# Patient Record
Sex: FEMALE | Race: OTHER | HISPANIC OR LATINO | Employment: OTHER | URBAN - METROPOLITAN AREA
[De-identification: names, ages, dates, MRNs, and addresses within clinical notes are randomized per-mention and may not be internally consistent; named-entity substitution may affect disease eponyms.]

---

## 2018-03-28 ENCOUNTER — TRANSCRIBE ORDERS (OUTPATIENT)
Dept: ADMINISTRATIVE | Facility: HOSPITAL | Age: 48
End: 2018-03-28

## 2018-03-28 DIAGNOSIS — N94.9 DISEASE OF FEMALE GENITAL ORGANS: Primary | ICD-10-CM

## 2018-03-30 ENCOUNTER — HOSPITAL ENCOUNTER (OUTPATIENT)
Dept: ULTRASOUND IMAGING | Facility: HOSPITAL | Age: 48
Discharge: HOME | End: 2018-03-30
Payer: COMMERCIAL

## 2018-03-30 DIAGNOSIS — N94.9 DISEASE OF FEMALE GENITAL ORGANS: ICD-10-CM

## 2018-03-30 PROCEDURE — 76856 US EXAM PELVIC COMPLETE: CPT

## 2018-03-30 PROCEDURE — 76830 TRANSVAGINAL US NON-OB: CPT

## 2018-04-03 ENCOUNTER — TRANSCRIBE ORDERS (OUTPATIENT)
Dept: ADMINISTRATIVE | Facility: HOSPITAL | Age: 48
End: 2018-04-03

## 2018-04-03 DIAGNOSIS — G56.03 CARPAL TUNNEL SYNDROME, BILATERAL: Primary | ICD-10-CM

## 2018-05-15 ENCOUNTER — PROCEDURE VISIT (OUTPATIENT)
Dept: NEUROLOGY | Facility: CLINIC | Age: 48
End: 2018-05-15
Payer: COMMERCIAL

## 2018-05-15 DIAGNOSIS — G56.03 CARPAL TUNNEL SYNDROME, BILATERAL: ICD-10-CM

## 2018-05-15 PROCEDURE — 95886 MUSC TEST DONE W/N TEST COMP: CPT | Performed by: PHYSICAL MEDICINE & REHABILITATION

## 2018-05-15 PROCEDURE — 95910 NRV CNDJ TEST 7-8 STUDIES: CPT | Performed by: PHYSICAL MEDICINE & REHABILITATION

## 2018-05-15 NOTE — PROGRESS NOTES
The procedure was discussed with the patient  Verbal consent was obtained after discussing risks and benefits  A sterile concentric needle electrode was used  The patient tolerated the procedure well  There were no complications  EMG REPORT    Test:  Bilateral Upper Extremities   Performing Dr: CALLIE Moore  The left and right median and ulnar motor conduction velocities and compound muscle action potentials were normal with normal distal latencies across the wrists  The left and right median and ulnar sensory conduction velocity and sensory action potentials were also normal with normal distal latencies across the wrists  The right median  palmar evoked response was prolonged by 0 5 milliseconds as compared to the right ulnar palmar evoked response at the same distance  The left median palmar evoked response was prolonged by  0 6 milliseconds as compared to the left ulnar palmar evoked response at the same distance  The left and right median and ulnar F wave latencies were within normal limits  Concentric needle EMG of the upper extremities bilaterally and cervical paraspinal muscles revealed no spontaneous activities  Early recruited motor units appear normal   Recruitment patterns were full or full for effort  INTERPRETATION: There is electrophysiologic evidence of a:    1  Bilateral mild median nerve compression neuropathy at the wrist with demyelinative changes, consistent with a diagnosis of carpal tunnel syndrome  2   There is no evidence of a cervical radiculopathy or ulnar neuropathy bilaterally  Clinical correlation is recommended       CALLIE Moore

## 2018-05-22 ENCOUNTER — TRANSCRIBE ORDERS (OUTPATIENT)
Dept: ADMINISTRATIVE | Facility: HOSPITAL | Age: 48
End: 2018-05-22

## 2018-05-22 DIAGNOSIS — R52 PAIN: Primary | ICD-10-CM

## 2018-06-01 ENCOUNTER — HOSPITAL ENCOUNTER (OUTPATIENT)
Dept: MRI IMAGING | Facility: HOSPITAL | Age: 48
Discharge: HOME | End: 2018-06-01
Payer: COMMERCIAL

## 2018-06-01 DIAGNOSIS — R52 PAIN: ICD-10-CM

## 2018-06-01 PROCEDURE — 73721 MRI JNT OF LWR EXTRE W/O DYE: CPT

## 2018-06-04 ENCOUNTER — TELEPHONE (OUTPATIENT)
Dept: NEUROLOGY | Facility: CLINIC | Age: 48
End: 2018-06-04

## 2018-06-08 LAB
ABSOL LYMPHOCYTES (HISTORICAL): 1.4 K/UL (ref 0.5–4)
ALBUMIN SERPL BCP-MCNC: 4.4 G/DL (ref 3–5.2)
ALP SERPL-CCNC: 54 U/L (ref 43–122)
ALT SERPL W P-5'-P-CCNC: 26 U/L (ref 9–52)
ANION GAP SERPL CALCULATED.3IONS-SCNC: 9 MMOL/L (ref 5–14)
AST SERPL W P-5'-P-CCNC: 17 U/L (ref 14–36)
BASOPHILS # BLD AUTO: 0 % (ref 0–1)
BASOPHILS # BLD AUTO: 0 K/UL (ref 0–0.1)
BILIRUB SERPL-MCNC: 0.5 MG/DL
BUN SERPL-MCNC: 12 MG/DL (ref 5–25)
CALCIUM SERPL-MCNC: 9.7 MG/DL (ref 8.4–10.2)
CHLORIDE SERPL-SCNC: 105 MEQ/L (ref 97–108)
CHOLEST SERPL-MCNC: 205 MG/DL
CHOLEST/HDLC SERPL: 3.2 {RATIO}
CO2 SERPL-SCNC: 26 MMOL/L (ref 22–30)
CREATINE, SERUM (HISTORICAL): 0.69 MG/DL (ref 0.6–1.2)
DEPRECATED RDW RBC AUTO: 13 %
EGFR (HISTORICAL): >60 ML/MIN/1.73 M2
EOSINOPHIL # BLD AUTO: 0 K/UL (ref 0–0.4)
EOSINOPHIL NFR BLD AUTO: 1 % (ref 0–6)
EST. AVERAGE GLUCOSE BLD GHB EST-MCNC: 105 MG/DL
GLUCOSE SERPL-MCNC: 94 MG/DL (ref 70–99)
HBA1C MFR BLD HPLC: 5.3 %
HCT VFR BLD AUTO: 39.8 % (ref 36–46)
HDLC SERPL-MCNC: 64 MG/DL
HGB BLD-MCNC: 13.4 G/DL (ref 12–16)
LDL/HDL RATIO (HISTORICAL): 1.9
LDLC SERPL CALC-MCNC: 122 MG/DL
LYMPHOCYTES NFR BLD AUTO: 34 % (ref 25–45)
MCH RBC QN AUTO: 31.2 PG (ref 26–34)
MCHC RBC AUTO-ENTMCNC: 33.6 % (ref 31–36)
MCV RBC AUTO: 93 FL (ref 80–100)
MONOCYTES # BLD AUTO: 0.3 K/UL (ref 0.2–0.9)
MONOCYTES NFR BLD AUTO: 7 % (ref 1–10)
NEUTROPHILS ABS COUNT (HISTORICAL): 2.4 K/UL (ref 1.8–7.8)
NEUTS SEG NFR BLD AUTO: 58 % (ref 45–65)
PLATELET # BLD AUTO: 226 K/MCL (ref 150–450)
POTASSIUM SERPL-SCNC: 4.7 MEQ/L (ref 3.6–5)
RBC # BLD AUTO: 4.29 M/MCL (ref 4–5.2)
SODIUM SERPL-SCNC: 140 MEQ/L (ref 137–147)
T3FREE SERPL-MCNC: 4.65 PG/ML (ref 2.77–5.27)
T4 FREE SERPL-MCNC: 0.88 NG/DL (ref 0.78–2.19)
TOTAL PROTEIN (HISTORICAL): 7.4 G/DL (ref 5.9–8.4)
TRIGL SERPL-MCNC: 93 MG/DL
TSH SERPL DL<=0.05 MIU/L-ACNC: 0.92 UIU/ML (ref 0.47–4.68)
VIT B12 SERPL-MCNC: >1000 PG/ML (ref 239–931)
VITAMIN D25-HYDROXY (HISTORICAL): 31.4 NG/ML (ref 30–100)
VLDLC SERPL CALC-MCNC: 19 MG/DL (ref 0–40)
WBC # BLD AUTO: 4.1 K/MCL (ref 4.5–11)

## 2018-06-10 ENCOUNTER — TRANSCRIBE ORDERS (OUTPATIENT)
Dept: ADMINISTRATIVE | Facility: HOSPITAL | Age: 48
End: 2018-06-10

## 2018-06-10 ENCOUNTER — HOSPITAL ENCOUNTER (OUTPATIENT)
Dept: RADIOLOGY | Facility: HOSPITAL | Age: 48
Discharge: HOME | End: 2018-06-10
Payer: COMMERCIAL

## 2018-06-10 DIAGNOSIS — M79.672 LEFT FOOT PAIN: ICD-10-CM

## 2018-06-10 DIAGNOSIS — M79.672 LEFT FOOT PAIN: Primary | ICD-10-CM

## 2018-06-10 PROCEDURE — 73650 X-RAY EXAM OF HEEL: CPT

## 2018-07-02 ENCOUNTER — HOSPITAL ENCOUNTER (EMERGENCY)
Facility: HOSPITAL | Age: 48
Discharge: HOME | End: 2018-07-02
Attending: EMERGENCY MEDICINE | Admitting: EMERGENCY MEDICINE
Payer: COMMERCIAL

## 2018-07-02 VITALS
HEART RATE: 108 BPM | DIASTOLIC BLOOD PRESSURE: 79 MMHG | SYSTOLIC BLOOD PRESSURE: 121 MMHG | WEIGHT: 154 LBS | TEMPERATURE: 98.6 F | OXYGEN SATURATION: 98 % | RESPIRATION RATE: 16 BRPM

## 2018-07-02 DIAGNOSIS — J20.9 BRONCHOSPASM WITH BRONCHITIS, ACUTE: Primary | ICD-10-CM

## 2018-07-02 PROCEDURE — 99283 EMERGENCY DEPT VISIT LOW MDM: CPT

## 2018-07-02 RX ORDER — ALBUTEROL SULFATE 90 UG/1
2 AEROSOL, METERED RESPIRATORY (INHALATION) EVERY 6 HOURS PRN
Qty: 1 INHALER | Refills: 0 | Status: SHIPPED | OUTPATIENT
Start: 2018-07-02 | End: 2018-08-23 | Stop reason: SDUPTHER

## 2018-07-02 RX ORDER — PREDNISONE 1 MG/1
TABLET ORAL
Qty: 21 TABLET | Refills: 0 | Status: SHIPPED | OUTPATIENT
Start: 2018-07-02 | End: 2018-10-23 | Stop reason: ALTCHOICE

## 2018-07-02 RX ORDER — LEVOFLOXACIN 500 MG/1
500 TABLET, FILM COATED ORAL DAILY
Qty: 7 TABLET | Refills: 0 | Status: SHIPPED | OUTPATIENT
Start: 2018-07-02 | End: 2018-07-09

## 2018-07-02 NOTE — DISCHARGE INSTRUCTIONS
Acute Bronchitis   WHAT YOU NEED TO KNOW:   Acute bronchitis is swelling and irritation in the air passages of your lungs  This irritation may cause you to cough or have other breathing problems  Acute bronchitis often starts because of another illness, such as a cold or the flu  The illness spreads from your nose and throat to your windpipe and airways  Bronchitis is often called a chest cold  Acute bronchitis lasts about 3 to 6 weeks and is usually not a serious illness  Your cough can last for several weeks  DISCHARGE INSTRUCTIONS:   Return to the emergency department if:   · You cough up blood  · Your lips or fingernails turn blue  · You feel like you are not getting enough air when you breathe  Contact your healthcare provider if:   · You have a fever  · Your breathing problems do not go away or get worse  · Your cough does not get better within 4 weeks  · You have questions or concerns about your condition or care  Self-care:   · Get more rest   Rest helps your body to heal  Slowly start to do more each day  Rest when you feel it is needed  · Avoid irritants in the air  Avoid chemicals, fumes, and dust  Wear a face mask if you must work around dust or fumes  Stay inside on days when air pollution levels are high  If you have allergies, stay inside when pollen counts are high  Do not use aerosol products, such as spray-on deodorant, bug spray, and hair spray  · Do not smoke or be around others who smoke  Nicotine and other chemicals in cigarettes and cigars damages the cilia that move mucus out of your lungs  Ask your healthcare provider for information if you currently smoke and need help to quit  E-cigarettes or smokeless tobacco still contain nicotine  Talk to your healthcare provider before you use these products  · Drink liquids as directed  Liquids help keep your air passages moist and help you cough up mucus   You may need to drink more liquids when you have acute bronchitis  Ask how much liquid to drink each day and which liquids are best for you  · Use a humidifier or vaporizer  Use a cool mist humidifier or a vaporizer to increase air moisture in your home  This may make it easier for you to breathe and help decrease your cough  Decrease risk for acute bronchitis:   · Get the vaccinations you need  Ask your healthcare provider if you should get vaccinated against the flu or pneumonia  · Prevent the spread of germs  You can decrease your risk of acute bronchitis and other illnesses by doing the following:     [facility] your hands often with soap and water  Carry germ-killing hand lotion or gel with you  You can use the lotion or gel to clean your hands when soap and water are not available  ¨ Do not touch your eyes, nose, or mouth unless you have washed your hands first     ¨ Always cover your mouth when you cough to prevent the spread of germs  It is best to cough into a tissue or your shirt sleeve instead of into your hand  Ask those around you cover their mouths when they cough  ¨ Try to avoid people who have a cold or the flu  If you are sick, stay away from others as much as possible  Medicines: Your healthcare provider may  give you any of the following:  · Ibuprofen or acetaminophen  are medicines that help lower your fever  They are available without a doctor's order  Ask your healthcare provider which medicine is right for you  Ask how much to take and how often to take it  Follow directions  These medicines can cause stomach bleeding if not taken correctly  Ibuprofen can cause kidney damage  Do not take ibuprofen if you have kidney disease, an ulcer, or allergies to aspirin  Acetaminophen can cause liver damage  Do not take more than 4,000 milligrams in 24 hours  · Decongestants  help loosen mucus in your lungs and make it easier to cough up  This can help you breathe easier  · Cough suppressants  decrease your urge to cough   If your cough produces mucus, do not take a cough suppressant unless your healthcare provider tells you to  Your healthcare provider may suggest that you take a cough suppressant at night so you can rest     · Inhalers  may be given  Your healthcare provider may give you one or more inhalers to help you breathe easier and cough less  An inhaler gives your medicine to open your airways  Ask your healthcare provider to show you how to use your inhaler correctly  · Take your medicine as directed  Contact your healthcare provider if you think your medicine is not helping or if you have side effects  Tell him of her if you are allergic to any medicine  Keep a list of the medicines, vitamins, and herbs you take  Include the amounts, and when and why you take them  Bring the list or the pill bottles to follow-up visits  Carry your medicine list with you in case of an emergency  Follow up with your healthcare provider as directed:  Write down questions you have so you will remember to ask them during your follow-up visits  © 2017 [employer] Information is for End User's use only and may not be sold, redistributed or otherwise used for commercial purposes  All illustrations and images included in CareNotes® are the copyrighted property of [employer]  or [employer]  The above information is an  only  It is not intended as medical advice for individual conditions or treatments  Talk to your doctor, nurse or pharmacist before following any medical regimen to see if it is safe and effective for you

## 2018-07-02 NOTE — ED PROVIDER NOTES
History  Chief Complaint   Patient presents with    Cough     pt c/o cough for the past two days  56 y o  female with past medical history significant for chronic low back pain, hypothyroidism, and depression and anxiety presents to ED with chief complaint of cough with congestion  Onset of symptoms reported as 2 days ago  Location of symptoms reported as chest and upper respiratory tract  Quality is reported as cough with congestion and wheezing  Severity is reported as moderate  Associated symptoms: positive for cough  Positive for runny nose  Positive for sinus congestion  Positive for wheezing  Denies fevers  Denies chest pain  Denies lower extremity swelling  Denies rash  Denies sore throat  Modifying factors:  Patient reports she has run out of and albuterol inhaler which she thinks is causing her symptoms to worsen  She reports physical activity, specifically vacuuming, exacerbates symptoms    Context:  Patient reports her son was recently sick with similar symptoms  She reports she has not subsequently developed similar symptoms  Denies recent travel outside the country  Patient is a nonsmoker  She reports she had an albuterol inhaler at home which she has run out of  Reviewed past medical history and visits via EPIC: no prior visits to this ED               History provided by:  Patient   used: No    Cough   Associated symptoms: wheezing    Associated symptoms: no chest pain, no chills, no diaphoresis, no ear pain, no eye discharge, no fever, no headaches, no myalgias, no rash, no rhinorrhea, no shortness of breath and no sore throat        None       Past Medical History:   Diagnosis Date    Carpal tunnel syndrome     Chronic pain     lower back    Disease of thyroid gland     Psychiatric disorder     depression/anxiety       Past Surgical History:   Procedure Laterality Date     SECTION      CHOLECYSTECTOMY      PARTIAL HYSTERECTOMY      TONSILLECTOMY History reviewed  No pertinent family history  I have reviewed and agree with the history as documented  Social History   Substance Use Topics    Smoking status: Never Smoker    Smokeless tobacco: Never Used    Alcohol use No        Review of Systems   Constitutional: Negative for activity change, appetite change, chills, diaphoresis, fatigue, fever and unexpected weight change  HENT: Positive for congestion  Negative for dental problem, drooling, ear discharge, ear pain, facial swelling, hearing loss, mouth sores, nosebleeds, postnasal drip, rhinorrhea, sinus pain, sinus pressure, sneezing, sore throat, tinnitus and trouble swallowing  Eyes: Negative for photophobia, pain, discharge, redness and itching  Respiratory: Positive for cough and wheezing  Negative for apnea, choking, chest tightness, shortness of breath and stridor  Cardiovascular: Negative for chest pain, palpitations and leg swelling  Gastrointestinal: Positive for nausea  Negative for abdominal distention, abdominal pain, anal bleeding, blood in stool, constipation, diarrhea and vomiting  Endocrine: Negative for cold intolerance, heat intolerance, polydipsia, polyphagia and polyuria  Genitourinary: Negative for decreased urine volume, difficulty urinating, dysuria, flank pain, frequency, hematuria, urgency, vaginal bleeding, vaginal discharge and vaginal pain  Musculoskeletal: Negative for arthralgias, back pain, gait problem, joint swelling, myalgias, neck pain and neck stiffness  Skin: Negative for color change, pallor, rash and wound  Allergic/Immunologic: Negative for environmental allergies, food allergies and immunocompromised state  Neurological: Negative for dizziness, tremors, seizures, syncope, facial asymmetry, speech difficulty, weakness, light-headedness, numbness and headaches  Hematological: Negative for adenopathy  Does not bruise/bleed easily     Psychiatric/Behavioral: Negative for agitation, behavioral problems, confusion, decreased concentration, hallucinations and suicidal ideas  The patient is not nervous/anxious  All other systems reviewed and are negative  Physical Exam  Physical Exam   Constitutional: She is oriented to person, place, and time  She appears well-developed and well-nourished  No distress  /79 (BP Location: Right arm)   Pulse (!) 108   Temp 98 6 °F (37 °C) (Oral)   Resp 16   Wt 69 9 kg (154 lb)   SpO2 98%     o2 sat 98% room air - normal, no intervention    HENT:   Head: Normocephalic and atraumatic  Right Ear: Tympanic membrane, external ear and ear canal normal    Left Ear: Tympanic membrane, external ear and ear canal normal    Nose: Rhinorrhea present  No nose lacerations, sinus tenderness, nasal deformity, septal deviation or nasal septal hematoma  No epistaxis  No foreign bodies  Right sinus exhibits no maxillary sinus tenderness and no frontal sinus tenderness  Left sinus exhibits no maxillary sinus tenderness and no frontal sinus tenderness  Mouth/Throat: No oropharyngeal exudate  There is yellow mucous drainage noted to the posterior pharynx  Uvula is midline without deviation  Tonsils without edema or purulent exudate  Nasal turbinates aren't injected and edematous bilaterally with yellow mucous nasal drainage noted   Eyes: Conjunctivae and EOM are normal  Pupils are equal, round, and reactive to light  Right eye exhibits no discharge  Left eye exhibits no discharge  No scleral icterus  Neck: Normal range of motion  Neck supple  No JVD present  No tracheal deviation present  Cardiovascular: Normal rate and regular rhythm  Pulmonary/Chest: Effort normal  No stridor  No respiratory distress  She has wheezes  She has no rales  She exhibits no tenderness  Breath sounds present bilaterally on auscultation  Scattered rhonchi bilaterally  No retractions or accessory muscle use  Abdominal: Soft   Bowel sounds are normal  She exhibits no distension and no mass  There is no tenderness  There is no rebound and no guarding  No hernia  Musculoskeletal: Normal range of motion  She exhibits no edema, tenderness or deformity  Lymphadenopathy:     She has no cervical adenopathy  Neurological: She is alert and oriented to person, place, and time  She has normal reflexes  She displays normal reflexes  No cranial nerve deficit or sensory deficit  She exhibits normal muscle tone  Coordination normal    Skin: Skin is warm and dry  Capillary refill takes less than 2 seconds  No rash noted  She is not diaphoretic  No erythema  No pallor  Psychiatric: She has a normal mood and affect  Her behavior is normal  Judgment and thought content normal    Nursing note and vitals reviewed  Vital Signs  ED Triage Vitals [07/02/18 0833]   Temperature Pulse Respirations Blood Pressure SpO2   98 6 °F (37 °C) (!) 108 16 121/79 98 %      Temp Source Heart Rate Source Patient Position - Orthostatic VS BP Location FiO2 (%)   Oral Monitor Sitting Right arm --      Pain Score       Worst Possible Pain           Vitals:    07/02/18 0833   BP: 121/79   Pulse: (!) 108   Patient Position - Orthostatic VS: Sitting       Visual Acuity      ED Medications  Medications - No data to display    Diagnostic Studies  Results Reviewed     None                 No orders to display              Procedures  Procedures       Phone Contacts  ED Phone Contact    ED Course                               MDM  Number of Diagnoses or Management Options  Bronchospasm with bronchitis, acute: new and does not require workup  Diagnosis management comments: ddx includes but is not limited to:  URI, flu, allergies, asthma, environmental exposures, foreign body, GERD, bronchitis, pneumonia, consider but doubt chf, pe, interstitial lung disease, pertussis  Discussed with patient symptoms appear most consistent with bronchitis  Given her wheezing will treat with an albuterol inhaler  Add prednisone  levaquin for antibiotic coverage as patient reports she gets chronic bronchitis every year  She is allergic to doxycycline and erythromycin  Patient with no hypoxia or hypotension on vital sign review - pulse tachycardic but patient also with anxiety and suspect this is source for tachycardia  Patient appears clinically well, speaking in full sentences with barking cough noted throughout exam and scattered expiratory wheezes on clinical exam without retractions or accessory muscle use no indication for admission  Stable for discharge with outpatient treatment  Follow up with pcp in 2-3 days instructed  Reviewed reasons to return to ed  Patient verbalized understanding of diagnosis and agreement with discharge plan of care as well as understanding of reasons to return to ed  Amount and/or Complexity of Data Reviewed  Review and summarize past medical records: yes    Patient Progress  Patient progress: stable    CritCare Time    Disposition  Final diagnoses:   Bronchospasm with bronchitis, acute     Time reflects when diagnosis was documented in both MDM as applicable and the Disposition within this note     Time User Action Codes Description Comment    7/2/2018  9:07 AM Marycarmen Bello Add [J20 9] Acute bronchitis     7/2/2018  9:07 AM Marycarmen Bello Add [J20 9] Bronchospasm with bronchitis, acute     7/2/2018  9:07 AM Marycarmen Bello Modify [J20 9] Bronchospasm with bronchitis, acute     7/2/2018  9:07 AM Marycarmen Bello Remove [J20 9] Acute bronchitis       ED Disposition     ED Disposition Condition Comment    Discharge  Madison Roman discharge to home/self care      Condition at discharge: Stable        Follow-up Information     Follow up With Specialties Details Why Contact Info Additional Information    Wes Fraga MD Family Medicine Call in 2 days for further evaluation of symptoms [address] [place] [address] [id number] Emergency Department Emergency Medicine Go to If symptoms worsen [address] ED, [place], [place], [address]    Olivia Ring MD Pulmonology, Neurology, Pulmonary Disease, Sleep Medicine Call in 2 days for further evaluation of symptoms [phone] [place] [id number] [phone]             Discharge Medication List as of 7/2/2018  9:12 AM      START taking these medications    Details   albuterol (PROVENTIL HFA,VENTOLIN HFA) 90 mcg/act inhaler Inhale 2 puffs every 6 (six) hours as needed for wheezing, Starting Mon 7/2/2018, Print      levofloxacin (LEVAQUIN) 500 mg tablet Take 1 tablet (500 mg total) by mouth daily for 7 days, Starting Mon 7/2/2018, Until Mon 7/9/2018, Print      predniSONE 5 mg tablet 40 mg PO day 1, then 30 mg PO day 2, 20 mg PO day 3, 10 mg PO day 4, 5 mg PO day 5 then stop, Print           No discharge procedures on file      ED Provider  Electronically Signed by           Velma Carrillo PA-C  07/02/18 6642

## 2018-07-12 ENCOUNTER — APPOINTMENT (EMERGENCY)
Dept: CT IMAGING | Facility: HOSPITAL | Age: 48
End: 2018-07-12
Payer: COMMERCIAL

## 2018-07-12 ENCOUNTER — HOSPITAL ENCOUNTER (EMERGENCY)
Facility: HOSPITAL | Age: 48
Discharge: HOME | End: 2018-07-12
Attending: EMERGENCY MEDICINE | Admitting: EMERGENCY MEDICINE
Payer: COMMERCIAL

## 2018-07-12 VITALS
HEIGHT: 64 IN | OXYGEN SATURATION: 100 % | WEIGHT: 125.88 LBS | RESPIRATION RATE: 18 BRPM | HEART RATE: 81 BPM | DIASTOLIC BLOOD PRESSURE: 75 MMHG | TEMPERATURE: 97.9 F | BODY MASS INDEX: 21.49 KG/M2 | SYSTOLIC BLOOD PRESSURE: 140 MMHG

## 2018-07-12 DIAGNOSIS — R07.9 RIGHT-SIDED CHEST PAIN: ICD-10-CM

## 2018-07-12 DIAGNOSIS — R09.1 PLEURISY: ICD-10-CM

## 2018-07-12 DIAGNOSIS — J20.9 ACUTE BRONCHITIS: Primary | ICD-10-CM

## 2018-07-12 DIAGNOSIS — R53.83 FATIGUE: ICD-10-CM

## 2018-07-12 LAB
ALBUMIN SERPL BCP-MCNC: 3.9 G/DL (ref 3.5–5)
ALP SERPL-CCNC: 47 U/L (ref 46–116)
ALT SERPL W P-5'-P-CCNC: 24 U/L (ref 12–78)
ANION GAP SERPL CALCULATED.3IONS-SCNC: 2 MMOL/L (ref 4–13)
APTT PPP: 28 SECONDS (ref 24–36)
AST SERPL W P-5'-P-CCNC: 14 U/L (ref 5–45)
ATRIAL RATE: 68 BPM
BASOPHILS # BLD AUTO: 0.01 THOUSANDS/ΜL (ref 0–0.1)
BASOPHILS NFR BLD AUTO: 0 % (ref 0–1)
BILIRUB SERPL-MCNC: 0.3 MG/DL (ref 0.2–1)
BILIRUB UR QL STRIP: NEGATIVE
BUN SERPL-MCNC: 11 MG/DL (ref 5–25)
CALCIUM SERPL-MCNC: 8.9 MG/DL (ref 8.3–10.1)
CHLORIDE SERPL-SCNC: 106 MMOL/L (ref 100–108)
CLARITY UR: CLEAR
CO2 SERPL-SCNC: 30 MMOL/L (ref 21–32)
COLOR UR: YELLOW
CREAT SERPL-MCNC: 0.76 MG/DL (ref 0.6–1.3)
EOSINOPHIL # BLD AUTO: 0.06 THOUSAND/ΜL (ref 0–0.61)
EOSINOPHIL NFR BLD AUTO: 1 % (ref 0–6)
ERYTHROCYTE [DISTWIDTH] IN BLOOD BY AUTOMATED COUNT: 12.1 % (ref 11.6–15.1)
EXT PREG TEST URINE: NEGATIVE
GFR SERPL CREATININE-BSD FRML MDRD: 94 ML/MIN/1.73SQ M
GLUCOSE SERPL-MCNC: 95 MG/DL (ref 65–140)
GLUCOSE UR STRIP-MCNC: NEGATIVE MG/DL
HCT VFR BLD AUTO: 40.8 % (ref 34.8–46.1)
HGB BLD-MCNC: 13.5 G/DL (ref 11.5–15.4)
HGB UR QL STRIP.AUTO: NEGATIVE
IMM GRANULOCYTES # BLD AUTO: 0.01 THOUSAND/UL (ref 0–0.2)
IMM GRANULOCYTES NFR BLD AUTO: 0 % (ref 0–2)
INR PPP: 1.02 (ref 0.86–1.17)
KETONES UR STRIP-MCNC: NEGATIVE MG/DL
LACTATE SERPL-SCNC: 0.7 MMOL/L (ref 0.5–2)
LEUKOCYTE ESTERASE UR QL STRIP: NEGATIVE
LYMPHOCYTES # BLD AUTO: 1.95 THOUSANDS/ΜL (ref 0.6–4.47)
LYMPHOCYTES NFR BLD AUTO: 39 % (ref 14–44)
MAGNESIUM SERPL-MCNC: 2 MG/DL (ref 1.6–2.6)
MCH RBC QN AUTO: 30 PG (ref 26.8–34.3)
MCHC RBC AUTO-ENTMCNC: 33.1 G/DL (ref 31.4–37.4)
MCV RBC AUTO: 91 FL (ref 82–98)
MONOCYTES # BLD AUTO: 0.24 THOUSAND/ΜL (ref 0.17–1.22)
MONOCYTES NFR BLD AUTO: 5 % (ref 4–12)
NEUTROPHILS # BLD AUTO: 2.74 THOUSANDS/ΜL (ref 1.85–7.62)
NEUTS SEG NFR BLD AUTO: 55 % (ref 43–75)
NITRITE UR QL STRIP: NEGATIVE
NRBC BLD AUTO-RTO: 0 /100 WBCS
P AXIS: 36 DEGREES
PH UR STRIP.AUTO: 5.5 [PH] (ref 4.5–8)
PLATELET # BLD AUTO: 228 THOUSANDS/UL (ref 149–390)
PMV BLD AUTO: 9.4 FL (ref 8.9–12.7)
POTASSIUM SERPL-SCNC: 4.2 MMOL/L (ref 3.5–5.3)
PR INTERVAL: 152 MS
PROT SERPL-MCNC: 7.2 G/DL (ref 6.4–8.2)
PROT UR STRIP-MCNC: NEGATIVE MG/DL
PROTHROMBIN TIME: 13.3 SECONDS (ref 11.8–14.2)
QRS AXIS: 73 DEGREES
QRSD INTERVAL: 86 MS
QT INTERVAL: 398 MS
QTC INTERVAL: 423 MS
RBC # BLD AUTO: 4.5 MILLION/UL (ref 3.81–5.12)
SODIUM SERPL-SCNC: 138 MMOL/L (ref 136–145)
SP GR UR STRIP.AUTO: 1.01 (ref 1–1.03)
T WAVE AXIS: 60 DEGREES
TROPONIN I SERPL-MCNC: <0.02 NG/ML
UROBILINOGEN UR QL STRIP.AUTO: 0.2 E.U./DL
VENTRICULAR RATE: 68 BPM
WBC # BLD AUTO: 5.01 THOUSAND/UL (ref 4.31–10.16)

## 2018-07-12 PROCEDURE — 36415 COLL VENOUS BLD VENIPUNCTURE: CPT | Performed by: EMERGENCY MEDICINE

## 2018-07-12 PROCEDURE — 86663 EPSTEIN-BARR ANTIBODY: CPT | Performed by: EMERGENCY MEDICINE

## 2018-07-12 PROCEDURE — 96374 THER/PROPH/DIAG INJ IV PUSH: CPT

## 2018-07-12 PROCEDURE — 85025 COMPLETE CBC W/AUTO DIFF WBC: CPT | Performed by: EMERGENCY MEDICINE

## 2018-07-12 PROCEDURE — 93005 ELECTROCARDIOGRAM TRACING: CPT

## 2018-07-12 PROCEDURE — 86665 EPSTEIN-BARR CAPSID VCA: CPT | Performed by: EMERGENCY MEDICINE

## 2018-07-12 PROCEDURE — 85610 PROTHROMBIN TIME: CPT | Performed by: EMERGENCY MEDICINE

## 2018-07-12 PROCEDURE — 81025 URINE PREGNANCY TEST: CPT | Performed by: EMERGENCY MEDICINE

## 2018-07-12 PROCEDURE — 93010 ELECTROCARDIOGRAM REPORT: CPT | Performed by: INTERNAL MEDICINE

## 2018-07-12 PROCEDURE — 86664 EPSTEIN-BARR NUCLEAR ANTIGEN: CPT | Performed by: EMERGENCY MEDICINE

## 2018-07-12 PROCEDURE — 85730 THROMBOPLASTIN TIME PARTIAL: CPT | Performed by: EMERGENCY MEDICINE

## 2018-07-12 PROCEDURE — 83605 ASSAY OF LACTIC ACID: CPT | Performed by: EMERGENCY MEDICINE

## 2018-07-12 PROCEDURE — 80053 COMPREHEN METABOLIC PANEL: CPT | Performed by: EMERGENCY MEDICINE

## 2018-07-12 PROCEDURE — 81003 URINALYSIS AUTO W/O SCOPE: CPT | Performed by: EMERGENCY MEDICINE

## 2018-07-12 PROCEDURE — 94640 AIRWAY INHALATION TREATMENT: CPT

## 2018-07-12 PROCEDURE — 71275 CT ANGIOGRAPHY CHEST: CPT

## 2018-07-12 PROCEDURE — 99285 EMERGENCY DEPT VISIT HI MDM: CPT

## 2018-07-12 PROCEDURE — 83735 ASSAY OF MAGNESIUM: CPT | Performed by: EMERGENCY MEDICINE

## 2018-07-12 PROCEDURE — 84484 ASSAY OF TROPONIN QUANT: CPT | Performed by: EMERGENCY MEDICINE

## 2018-07-12 PROCEDURE — 96361 HYDRATE IV INFUSION ADD-ON: CPT

## 2018-07-12 RX ORDER — KETOROLAC TROMETHAMINE 30 MG/ML
15 INJECTION, SOLUTION INTRAMUSCULAR; INTRAVENOUS ONCE
Status: COMPLETED | OUTPATIENT
Start: 2018-07-12 | End: 2018-07-12

## 2018-07-12 RX ORDER — LIDOCAINE 50 MG/G
1 PATCH TOPICAL DAILY PRN
Qty: 6 PATCH | Refills: 0 | Status: SHIPPED | OUTPATIENT
Start: 2018-07-12 | End: 2021-05-06 | Stop reason: SDUPTHER

## 2018-07-12 RX ORDER — LIDOCAINE 50 MG/G
1 PATCH TOPICAL ONCE
Status: DISCONTINUED | OUTPATIENT
Start: 2018-07-12 | End: 2018-07-12 | Stop reason: HOSPADM

## 2018-07-12 RX ORDER — IPRATROPIUM BROMIDE AND ALBUTEROL SULFATE 2.5; .5 MG/3ML; MG/3ML
3 SOLUTION RESPIRATORY (INHALATION) ONCE
Status: COMPLETED | OUTPATIENT
Start: 2018-07-12 | End: 2018-07-12

## 2018-07-12 RX ORDER — ONDANSETRON 2 MG/ML
4 INJECTION INTRAMUSCULAR; INTRAVENOUS ONCE
Status: DISCONTINUED | OUTPATIENT
Start: 2018-07-12 | End: 2018-07-12 | Stop reason: HOSPADM

## 2018-07-12 RX ORDER — NAPROXEN 500 MG/1
500 TABLET ORAL EVERY 12 HOURS PRN
Qty: 20 TABLET | Refills: 0 | Status: SHIPPED | OUTPATIENT
Start: 2018-07-12 | End: 2019-05-20 | Stop reason: ALTCHOICE

## 2018-07-12 RX ADMIN — IOHEXOL 85 ML: 350 INJECTION, SOLUTION INTRAVENOUS at 12:28

## 2018-07-12 RX ADMIN — SODIUM CHLORIDE 1000 ML: 0.9 INJECTION, SOLUTION INTRAVENOUS at 11:16

## 2018-07-12 RX ADMIN — IPRATROPIUM BROMIDE AND ALBUTEROL SULFATE 3 ML: .5; 3 SOLUTION RESPIRATORY (INHALATION) at 10:55

## 2018-07-12 RX ADMIN — KETOROLAC TROMETHAMINE 15 MG: 30 INJECTION, SOLUTION INTRAMUSCULAR at 11:17

## 2018-07-12 RX ADMIN — LIDOCAINE 1 PATCH: 50 PATCH CUTANEOUS at 10:55

## 2018-07-12 NOTE — DISCHARGE INSTRUCTIONS
Acute Bronchitis   WHAT YOU NEED TO KNOW:   Acute bronchitis is swelling and irritation in the air passages of your lungs  This irritation may cause you to cough or have other breathing problems  Acute bronchitis often starts because of another illness, such as a cold or the flu  The illness spreads from your nose and throat to your windpipe and airways  Bronchitis is often called a chest cold  Acute bronchitis lasts about 3 to 6 weeks and is usually not a serious illness  Your cough can last for several weeks  DISCHARGE INSTRUCTIONS:   Return to the emergency department if:   · You cough up blood  · Your lips or fingernails turn blue  · You feel like you are not getting enough air when you breathe  Contact your healthcare provider if:   · You have a fever  · Your breathing problems do not go away or get worse  · Your cough does not get better within 4 weeks  · You have questions or concerns about your condition or care  Self-care:   · Get more rest   Rest helps your body to heal  Slowly start to do more each day  Rest when you feel it is needed  · Avoid irritants in the air  Avoid chemicals, fumes, and dust  Wear a face mask if you must work around dust or fumes  Stay inside on days when air pollution levels are high  If you have allergies, stay inside when pollen counts are high  Do not use aerosol products, such as spray-on deodorant, bug spray, and hair spray  · Do not smoke or be around others who smoke  Nicotine and other chemicals in cigarettes and cigars damages the cilia that move mucus out of your lungs  Ask your healthcare provider for information if you currently smoke and need help to quit  E-cigarettes or smokeless tobacco still contain nicotine  Talk to your healthcare provider before you use these products  · Drink liquids as directed  Liquids help keep your air passages moist and help you cough up mucus   You may need to drink more liquids when you have acute bronchitis  Ask how much liquid to drink each day and which liquids are best for you  · Use a humidifier or vaporizer  Use a cool mist humidifier or a vaporizer to increase air moisture in your home  This may make it easier for you to breathe and help decrease your cough  Decrease risk for acute bronchitis:   · Get the vaccinations you need  Ask your healthcare provider if you should get vaccinated against the flu or pneumonia  · Prevent the spread of germs  You can decrease your risk of acute bronchitis and other illnesses by doing the following:     [facility] [facility] your hands often with soap and water  Carry germ-killing hand lotion or gel with you  You can use the lotion or gel to clean your hands when soap and water are not available  ¨ Do not touch your eyes, nose, or mouth unless you have washed your hands first     ¨ Always cover your mouth when you cough to prevent the spread of germs  It is best to cough into a tissue or your shirt sleeve instead of into your hand  Ask those around you cover their mouths when they cough  ¨ Try to avoid people who have a cold or the flu  If you are sick, stay away from others as much as possible  Medicines: Your healthcare provider may  give you any of the following:  · Ibuprofen or acetaminophen  are medicines that help lower your fever  They are available without a doctor's order  Ask your healthcare provider which medicine is right for you  Ask how much to take and how often to take it  Follow directions  These medicines can cause stomach bleeding if not taken correctly  Ibuprofen can cause kidney damage  Do not take ibuprofen if you have kidney disease, an ulcer, or allergies to aspirin  Acetaminophen can cause liver damage  Do not take more than 4,000 milligrams in 24 hours  · Decongestants  help loosen mucus in your lungs and make it easier to cough up  This can help you breathe easier  · Cough suppressants  decrease your urge to cough   If your cough produces mucus, do not take a cough suppressant unless your healthcare provider tells you to  Your healthcare provider may suggest that you take a cough suppressant at night so you can rest     · Inhalers  may be given  Your healthcare provider may give you one or more inhalers to help you breathe easier and cough less  An inhaler gives your medicine to open your airways  Ask your healthcare provider to show you how to use your inhaler correctly  · Take your medicine as directed  Contact your healthcare provider if you think your medicine is not helping or if you have side effects  Tell him of her if you are allergic to any medicine  Keep a list of the medicines, vitamins, and herbs you take  Include the amounts, and when and why you take them  Bring the list or the pill bottles to follow-up visits  Carry your medicine list with you in case of an emergency  Follow up with your healthcare provider as directed:  Write down questions you have so you will remember to ask them during your follow-up visits  © 2017 [address] [facility] [place] Information is for End User's use only and may not be sold, redistributed or otherwise used for commercial purposes  All illustrations and images included in CareNotes® are the copyrighted property of [employer]  or [employer]  The above information is an  only  It is not intended as medical advice for individual conditions or treatments  Talk to your doctor, nurse or pharmacist before following any medical regimen to see if it is safe and effective for you  Pleurisy   WHAT YOU NEED TO KNOW:   Pleurisy happens when the pleura becomes irritated or swollen  The pleura are 2 thin layers of tissue that surround your lungs and line the inside of your chest cavity  There is a small amount of fluid between the pleura that helps the layers move easily when you breathe   When the pleura is irritated or swollen, the layers rub together as you breathe  DISCHARGE INSTRUCTIONS:   Call 911 if:   · You have sudden, intense chest pain that feels different from your symptoms  · You are breathing fast, feel confused, or feel like you are going to faint  Seek care immediately if:   · You cough up yellow, green, gray, or bloody mucus  · You feel more short of breath than usual     · Your lips or fingernails turn dusky or blue  Contact your healthcare provider if:   · Your pain gets worse, even after treatment  · You have questions or concerns about your condition or care  · You have a fever  Medicines: You may  receive any of the following:  · Cough medicine  helps decrease your urge to cough  A cough suppressant may help if a dry cough is causing you pain  · Antibiotics  may treat pleurisy caused by a bacteria  · Steroids  may be given to decrease inflammation  · NSAIDs , such as ibuprofen, help decrease swelling, pain, and fever  This medicine is available with or without a doctor's order  NSAIDs can cause stomach bleeding or kidney problems in certain people  If you take blood thinner medicine, always ask if NSAIDs are safe for you  Always read the medicine label and follow directions  Do not give these medicines to children under 10months of age without direction from your child's healthcare provider  · Prescription pain medicine  may be given to decrease severe pain if other pain medicines do not work  Do not wait until the pain is severe before you ask for more medicine  · Take your medicine as directed  Contact your healthcare provider if you think your medicine is not helping or if you have side effects  Tell him or her if you are allergic to any medicine  Keep a list of the medicines, vitamins, and herbs you take  Include the amounts, and when and why you take them  Bring the list or the pill bottles to follow-up visits  Carry your medicine list with you in case of an emergency    Follow up with your healthcare provider as directed:  Write down your questions so you remember to ask them during your visits  Self-care:   · Splint your pain when coughing  Hold a pillow or folded blanket tightly over your chest when you cough or take a deep breath  · Find a comfortable position  that allows you to decrease pain and breathe easier  You may find it comfortable to lie on your the side that has pleurisy  Change your position frequently to prevent complications, such as worsening pneumonia or lung collapse  · Do not smoke  Nicotine and other chemicals in cigarettes and cigars can cause lung damage  Ask your healthcare provider for information if you currently smoke and need help to quit  E-cigarettes or smokeless tobacco still contain nicotine  Talk to your healthcare provider before you use these products  Prevention:   · Get early treatment  for conditions that cause pleurisy  · Get vaccinated  Ask your healthcare provider if you should get a flu and pneumonia vaccine  These vaccines may prevent infections that cause pleurisy  © 2017 [address] [employer] Information is for End User's use only and may not be sold, redistributed or otherwise used for commercial purposes  All illustrations and images included in CareNotes® are the copyrighted property of [employer]  or [employer]  The above information is an  only  It is not intended as medical advice for individual conditions or treatments  Talk to your doctor, nurse or pharmacist before following any medical regimen to see if it is safe and effective for you  Fatigue   WHAT YOU NEED TO KNOW:   Fatigue is mental and physical exhaustion that does not get better with rest  Fatigue may make daily activities difficult or cause extreme sleepiness  It is normal to feel tired sometimes, but long-term fatigue may be a sign of serious illness  DISCHARGE INSTRUCTIONS:   Seek care immediately if:   · You have chest pain       · You have difficulty breathing  Contact your healthcare provider if:   · You have a cough that gets worse, or does not go away  · You see blood in your urine or bowel movement  · You have numbness or tingling around your mouth or in an arm or leg  · You faint, feel dizzy, or have vision changes  · You have swelling in your lymph nodes  · You are a woman and have vaginal bleeding that is not normal for you, or is not expected  · You lose weight without trying, or you have trouble eating  · You feel weak or have muscle pain  · You have pain or swelling in your joints  · You have questions or concerns about your condition or care  Follow up with your healthcare provider as directed: You may need more tests  Your healthcare provider may also refer you to a specialist  Write down your questions so you remember to ask them during your visits  Manage fatigue:   · Keep a fatigue diary  Include anything that makes you feel more tired or less tired  Bring the diary with you to follow-up visits with your provider  · Exercise as directed  Exercise can help you feel more alert  Exercise can also help you manage stress or relieve depression  Try to get at least 30 minutes of exercise most days of the week  · Keep a regular sleep schedule  Go to bed and wake up at the same times every day  Limit naps to 1 hour each day  A nap can improve fatigue, but a long nap may make it harder to go to sleep at night  · Plan and limit your activities  Limit the number of activities such as shopping and cleaning you do each day  If possible, try to spread out your trips throughout the week  Plan ahead so you are not rushing to get something done  Only do activities that you have the energy to complete  Take breaks between activities  Ask for help if you need it  Another person may be able to drive you or help with daily activities  · Eat a variety of healthy foods    Healthy foods include fruits, vegetables, whole-grain breads, low-fat dairy products, beans, lean meats, and fish  Good nutrition can help manage fatigue  · Limit caffeine and alcohol  These can make it difficult to fall or stay asleep  Women should limit alcohol to 1 drink a day  Men should limit alcohol to 2 drinks a day  A drink of alcohol is 12 ounces of beer, 5 ounces of wine, or 1½ ounces of liquor  Ask our healthcare provider how much caffeine is safe for you  · Do not smoke  Nicotine and other chemicals in cigarettes and cigars can cause lung damage and increase fatigue  Ask your healthcare provider for information if you currently smoke and need help to quit  E-cigarettes or smokeless tobacco still contain nicotine  Talk to your healthcare provider before you use these products  © 2017 [address] [employer] Information is for End User's use only and may not be sold, redistributed or otherwise used for commercial purposes  All illustrations and images included in CareNotes® are the copyrighted property of [employer]  [employer]  The above information is an  only  It is not intended as medical advice for individual conditions or treatments  Talk to your doctor, nurse or pharmacist before following any medical regimen to see if it is safe and effective for you

## 2018-07-12 NOTE — ED PROVIDER NOTES
History  Chief Complaint   Patient presents with    Weakness - Generalized     pt diagnosed with bronchitis one week ago, hasnt followed up with PCP, pt c/o right sided rib pain and fatigue      Patient is a 64-year-old female with past medical and surgical history of depression, anxiety, hypothyroidism, chronic low back pain, cholecystectomy, partial hysterectomy, presents to the emergency department complaining of cough for over 2 weeks as well as fatigue and right lateral chest pain  Patient reports she has had a nonproductive cough for about 2 weeks and was seen here for this cough and diagnosed with bronchitis  She reports she still is having nonproductive cough however feels mucus that is stuck in her chest   Over the past 5 days she has experienced increased fatigue which is unusual for her as well as pain in her right chest   She localizes the pain to the lower lateral right chest and states the pain is constant and feels as though someone punched her in the chest   The pain is worse when she coughs or takes a deep breath  Patient also reports generalized chest tightness deep inhalation and mild shortness of breath  On review of systems she also admits to nausea  She denies any known fever, shaking chills, headache, dizziness or near syncope, runny nose, congestion, sore throat, ear pain, neck pain or stiffness, back pain, palpitations, hemoptysis, abdominal pain, vomiting, diarrhea, constipation, blood per rectum or melena, dysuria, change in urinary frequency, hematuria, flank pain, skin rash or color change, extremity weakness or paresthesia or other focal neurologic deficits  Patient 16month-old son was sick with similar bronchitis type symptoms about 2 weeks ago however his symptoms have improved  She denies any other known sick contacts  Denies any recent travel outside the country  Denies smoking history  No prior history of pneumonia  Denies any history of blood clot          History provided by:  Patient   used: No        Prior to Admission Medications   Prescriptions Last Dose Informant Patient Reported? Taking? ALPRAZolam (XANAX) 0 25 mg tablet   Yes No   Sig: alprazolam 0 25 mg tablet   albuterol (PROVENTIL HFA,VENTOLIN HFA) 90 mcg/act inhaler   No No   Sig: Inhale 2 puffs every 6 (six) hours as needed for wheezing   albuterol (VENTOLIN HFA) 90 mcg/act inhaler   Yes No   Sig: Ventolin HFA 90 mcg/actuation aerosol inhaler   buPROPion (WELLBUTRIN XL) 150 mg 24 hr tablet   Yes No   Sig: TAKE 1 TABLET EVERY MORNING   butalbital-acetaminophen-caffeine (FIORICET,ESGIC) -40 mg per tablet   Yes No   Sig: butalbital-acetaminophen-caffeine 50 mg-325 mg-40 mg tablet   celecoxib (CELEBREX) 200 mg capsule   Yes No   Sig: Every 12 hours   citalopram (CeleXA) 20 mg tablet   Yes No   Sig: citalopram 20 mg tablet   fluticasone-vilanterol (BREO ELLIPTA) 100-25 mcg/inh inhaler   Yes No   Sig: every 24 hours   naratriptan (AMERGE) 2 5 MG tablet   Yes No   Sig: take 1 tablet by oral route once may repeat after 4 hours   predniSONE 5 mg tablet   No No   Si mg PO day 1, then 30 mg PO day 2, 20 mg PO day 3, 10 mg PO day 4, 5 mg PO day 5 then stop   scopolamine (TRANSDERM-SCOP, 1 5 MG,) 1 5 mg/3 days TD 72 hr patch   Yes No   Sig: Transderm-Scop 1 5 mg transdermal patch (1 mg over 3 days)   thyroid (ARMOUR THYROID) 15 MG tablet   Yes No   traMADol (ULTRAM) 50 mg tablet   Yes No   Sig: tramadol 50 mg tablet      Facility-Administered Medications: None       Past Medical History:   Diagnosis Date    Carpal tunnel syndrome     Chronic pain     lower back    Disease of thyroid gland     Psychiatric disorder     depression/anxiety       Past Surgical History:   Procedure Laterality Date     SECTION      CHOLECYSTECTOMY      PARTIAL HYSTERECTOMY      TONSILLECTOMY         History reviewed  No pertinent family history    I have reviewed and agree with the history as documented  Social History   Substance Use Topics    Smoking status: Never Smoker    Smokeless tobacco: Never Used    Alcohol use No        Review of Systems   Constitutional: Positive for fatigue  Negative for chills, diaphoresis and fever  HENT: Negative for congestion, ear pain, rhinorrhea and sore throat  Eyes: Negative for pain and visual disturbance  Respiratory: Positive for cough, chest tightness and shortness of breath  Negative for wheezing  Cardiovascular: Positive for chest pain  Negative for palpitations and leg swelling  Gastrointestinal: Positive for nausea  Negative for abdominal distention, abdominal pain, blood in stool, constipation, diarrhea and vomiting  Genitourinary: Negative for dysuria, flank pain, frequency and hematuria  Musculoskeletal: Negative for back pain, neck pain and neck stiffness  Skin: Negative for color change, pallor and rash  Allergic/Immunologic: Negative for immunocompromised state  Neurological: Negative for dizziness, syncope, weakness, light-headedness, numbness and headaches  Hematological: Negative for adenopathy  Does not bruise/bleed easily  Psychiatric/Behavioral: Negative for confusion and decreased concentration  All other systems reviewed and are negative  Physical Exam  Physical Exam   Constitutional: She is oriented to person, place, and time  She appears well-developed and well-nourished  No distress  HENT:   Head: Normocephalic and atraumatic  Right Ear: External ear normal    Left Ear: External ear normal    Mouth/Throat: Oropharynx is clear and moist  No oropharyngeal exudate  Eyes: Conjunctivae and EOM are normal  Pupils are equal, round, and reactive to light  Neck: Normal range of motion  Neck supple  No JVD present  Cardiovascular: Normal rate, regular rhythm, normal heart sounds and intact distal pulses  Exam reveals no gallop and no friction rub  No murmur heard    Pulmonary/Chest: Effort normal and breath sounds normal  No respiratory distress  She has no wheezes  She has no rales  She exhibits tenderness  Right lower anterior and lateral chest wall tenderness  No crepitus  Abdominal: Soft  Bowel sounds are normal  She exhibits no distension  There is no tenderness  There is no rebound and no guarding  Musculoskeletal: Normal range of motion  She exhibits no edema or tenderness  Lymphadenopathy:     She has no cervical adenopathy  Neurological: She is alert and oriented to person, place, and time  No gross motor or sensory deficits  5/5 strength throughout  Skin: Skin is warm and dry  No rash noted  She is not diaphoretic  No erythema  No pallor  Psychiatric: She has a normal mood and affect  Her behavior is normal    Nursing note and vitals reviewed        Vital Signs  ED Triage Vitals [07/12/18 0953]   Temperature Pulse Respirations Blood Pressure SpO2   97 9 °F (36 6 °C) 74 18 136/78 97 %      Temp Source Heart Rate Source Patient Position - Orthostatic VS BP Location FiO2 (%)   Oral Monitor Sitting Right arm --      Pain Score       7         Vitals:    07/12/18 0953 07/12/18 1301   BP: 136/78 140/75   BP Location: Right arm Left arm   Pulse: 74 81   Resp: 18 18   Temp: 97 9 °F (36 6 °C)    TempSrc: Oral    SpO2: 97% 100%   Weight: 57 1 kg (125 lb 14 1 oz)    Height: 5' 4" (1 626 m)      Visual Acuity      ED Medications  Medications   lidocaine (LIDODERM) 5 % patch 1 patch (1 patch Transdermal Medication Applied 7/12/18 1055)   ondansetron (ZOFRAN) injection 4 mg (4 mg Intravenous Not Given 7/12/18 1117)   ipratropium-albuterol (DUO-NEB) 0 5-2 5 mg/3 mL inhalation solution 3 mL (3 mL Nebulization Given 7/12/18 1055)   sodium chloride 0 9 % bolus 1,000 mL (0 mL Intravenous Stopped 7/12/18 1258)   ketorolac (TORADOL) injection 15 mg (15 mg Intravenous Given 7/12/18 1117)   iohexol (OMNIPAQUE) 350 MG/ML injection (MULTI-DOSE) 85 mL (85 mL Intravenous Given 7/12/18 1228)       Diagnostic Studies  Results Reviewed     Procedure Component Value Units Date/Time    UA w Reflex to Microscopic [[id number]] Collected:  07/12/18 1212    Lab Status:  Final result Specimen:  Urine from Urine, Clean Catch Updated:  07/12/18 1221     Color, UA Yellow     Clarity, UA Clear     Specific Gravity, UA 1 015     pH, UA 5 5     Leukocytes, UA Negative     Nitrite, UA Negative     Protein, UA Negative mg/dl      Glucose, UA Negative mg/dl      Ketones, UA Negative mg/dl      Urobilinogen, UA 0 2 E U /dl      Bilirubin, UA Negative     Blood, UA Negative    POCT pregnancy, urine [[id number]]  (Normal) Resulted:  07/12/18 1217    Lab Status:  Final result Updated:  07/12/18 1217     EXT PREG TEST UR (Ref: Negative) Negative    Troponin I [[id number]]  (Normal) Collected:  07/12/18 1114    Lab Status:  Final result Specimen:  Blood from Arm, Right Updated:  07/12/18 1144     Troponin I <0 02 ng/mL     Lactic acid, plasma [[id number]]  (Normal) Collected:  07/12/18 1114    Lab Status:  Final result Specimen:  Blood from Arm, Right Updated:  07/12/18 1144     LACTIC ACID 0 7 mmol/L     Narrative:         Result may be elevated if tourniquet was used during collection  Comprehensive metabolic panel [[id number]]  (Abnormal) Collected:  07/12/18 1114    Lab Status:  Final result Specimen:  Blood from Arm, Right Updated:  07/12/18 1141     Sodium 138 mmol/L      Potassium 4 2 mmol/L      Chloride 106 mmol/L      CO2 30 mmol/L      Anion Gap 2 (L) mmol/L      BUN 11 mg/dL      Creatinine 0 76 mg/dL      Glucose 95 mg/dL      Calcium 8 9 mg/dL      AST 14 U/L      ALT 24 U/L      Alkaline Phosphatase 47 U/L      Total Protein 7 2 g/dL      Albumin 3 9 g/dL      Total Bilirubin 0 30 mg/dL      eGFR 94 ml/min/1 73sq m     Narrative:         National Kidney Disease Education Program recommendations are as follows:  GFR calculation is accurate only with a steady state creatinine  Chronic Kidney disease less than 60 ml/min/1 73 sq  meters  Kidney failure less than 15 ml/min/1 73 sq  meters  Magnesium [[id number]]  (Normal) Collected:  07/12/18 1114    Lab Status:  Final result Specimen:  Blood from Arm, Right Updated:  07/12/18 1141     Magnesium 2 0 mg/dL     APTT [[id number]]  (Normal) Collected:  07/12/18 1114    Lab Status:  Final result Specimen:  Blood from Arm, Right Updated:  07/12/18 1137     PTT 28 seconds     Protime-INR [[id number]]  (Normal) Collected:  07/12/18 1114    Lab Status:  Final result Specimen:  Blood from Arm, Right Updated:  07/12/18 1137     Protime 13 3 seconds      INR 1 02    CBC and differential [[id number]] Collected:  07/12/18 1114    Lab Status:  Final result Specimen:  Blood from Arm, Right Updated:  07/12/18 1125     WBC 5 01 Thousand/uL      RBC 4 50 Million/uL      Hemoglobin 13 5 g/dL      Hematocrit 40 8 %      MCV 91 fL      MCH 30 0 pg      MCHC 33 1 g/dL      RDW 12 1 %      MPV 9 4 fL      Platelets 567 Thousands/uL      nRBC 0 /100 WBCs      Neutrophils Relative 55 %      Immat GRANS % 0 %      Lymphocytes Relative 39 %      Monocytes Relative 5 %      Eosinophils Relative 1 %      Basophils Relative 0 %      Neutrophils Absolute 2 74 Thousands/µL      Immature Grans Absolute 0 01 Thousand/uL      Lymphocytes Absolute 1 95 Thousands/µL      Monocytes Absolute 0 24 Thousand/µL      Eosinophils Absolute 0 06 Thousand/µL      Basophils Absolute 0 01 Thousands/µL     EBV acute panel [[id number]] Collected:  07/12/18 1114    Lab Status: In process Specimen:  Blood from Arm, Right Updated:  07/12/18 1119                 CTA ED chest PE study   Final Result by Josette Pozo MD (07/12 1315)      1  No acute pulmonary embolism  2   4 mm nonobstructing left renal calculus                    Workstation performed: [id number]                    Procedures  ECG 12 Lead Documentation  Date/Time: 7/12/2018 11:08 AM  Performed by: Zeinab Benavides  Authorized by: Zeinab Benavides     ECG reviewed by me, the ED Provider: yes    Patient location:  ED  Previous ECG:     Previous ECG:  Unavailable  Interpretation:     Interpretation: normal    Rate:     ECG rate:  68    ECG rate assessment: normal    Rhythm:     Rhythm: sinus rhythm    Ectopy:     Ectopy: none    QRS:     QRS axis:  Normal    QRS intervals:  Normal  Conduction:     Conduction: normal    ST segments:     ST segments:  Normal  T waves:     T waves: normal             Phone Contacts  ED Phone Contact    ED Course  ED Course as of Jul 12 1334   Thu Jul 12, 2018   1331 Updated patient of workup thus far including incidental kidney stone  Most likely pain is musculoskeletal in origin secondary to bronchitis  Advised her to follow up with PCP  Discussed ED return parameters  MDM  Number of Diagnoses or Management Options  Diagnosis management comments: 78-year-old female presents with 2 weeks of nonproductive cough and recent fatigue and right pleuritic pain  Differential includes acute bronchitis with musculoskeletal pain, costochondritis, pneumonia, pulmonary embolism, less likely ACS  Will check cardiac and septic labs and obtain a CTA of the chest which will be able to rule out PE as well as assess for evidence of pneumonia  Will treat with DuoNeb, IV fluid bolus, Toradol and Lidoderm patch         Amount and/or Complexity of Data Reviewed  Clinical lab tests: ordered and reviewed  Tests in the radiology section of CPT®: ordered and reviewed  Tests in the medicine section of CPT®: ordered and reviewed  Independent visualization of images, tracings, or specimens: yes      CritCare Time    Disposition  Final diagnoses:   Acute bronchitis   Fatigue   Right-sided chest pain   Pleurisy     Time reflects when diagnosis was documented in both MDM as applicable and the Disposition within this note     Time User Action Codes Description Comment    7/12/2018  1:32 PM Camilo Rodriguez [J20 9] Acute bronchitis     7/12/2018  1:32 PM Camilo CAMACHO Add [R53 83] Fatigue     7/12/2018  1:32 PM Camilo CAMACHO Add [R07 9] Right-sided chest pain     7/12/2018  1:33 PM Camilo CAMACHO Add [R09 1] Pleurisy       ED Disposition     ED Disposition Condition Comment    Discharge  Mari Chávez discharge to home/self care  Condition at discharge: Stable        Follow-up Information     Follow up With Specialties Details Why Contact Info Additional Information    Chris Velarde MD Family Medicine Schedule an appointment as soon as possible for a visit  [address] [facility]       [address] [facility] Emergency Department Emergency Medicine Go to If symptoms worsen [address]  [phone] [place] ED, [address] [place], [place], [facility]          Patient's Medications   Discharge Prescriptions    LIDOCAINE (LIDODERM) 5 %    Place 1 patch on the skin daily as needed for mild pain or moderate pain Remove & Discard patch within 12 hours or as directed by MD       Start Date: 7/12/2018 End Date: --       Order Dose: 1 patch       Quantity: 6 patch    Refills: 0    NAPROXEN (NAPROSYN) 500 MG TABLET    Take 1 tablet (500 mg total) by mouth every 12 (twelve) hours as needed for mild pain or moderate pain       Start Date: 7/12/2018 End Date: --       Order Dose: 500 mg       Quantity: 20 tablet    Refills: 0     No discharge procedures on file      ED Provider  Electronically Signed by           Camron Gutiérrez DO  07/12/18 0862

## 2018-07-13 LAB
EBV EA IGG SER-ACNC: <9 U/ML (ref 0–8.9)
EBV NA IGG SER IA-ACNC: 253 U/ML (ref 0–17.9)
EBV PATRN SPEC IB-IMP: ABNORMAL
EBV VCA IGG SER IA-ACNC: 211 U/ML (ref 0–17.9)
EBV VCA IGM SER IA-ACNC: <36 U/ML (ref 0–35.9)

## 2018-07-16 RX ORDER — LIDOCAINE 50 MG/G
1 PATCH TOPICAL
COMMUNITY
Start: 2018-07-12 | End: 2019-01-09 | Stop reason: CLARIF

## 2018-07-16 RX ORDER — NAPROXEN 250 MG/1
250 TABLET ORAL
COMMUNITY
End: 2018-10-23 | Stop reason: SDUPTHER

## 2018-07-16 RX ORDER — NABUMETONE 750 MG/1
TABLET, FILM COATED ORAL
COMMUNITY
Start: 2018-07-13 | End: 2018-10-23 | Stop reason: SDUPTHER

## 2018-07-16 RX ORDER — NARATRIPTAN 2.5 MG/1
TABLET ORAL
COMMUNITY
Start: 2018-07-13 | End: 2020-06-17 | Stop reason: ALTCHOICE

## 2018-07-18 ENCOUNTER — OFFICE VISIT (OUTPATIENT)
Dept: FAMILY MEDICINE CLINIC | Facility: CLINIC | Age: 48
End: 2018-07-18
Payer: COMMERCIAL

## 2018-07-18 VITALS
RESPIRATION RATE: 19 BRPM | SYSTOLIC BLOOD PRESSURE: 122 MMHG | WEIGHT: 161 LBS | DIASTOLIC BLOOD PRESSURE: 70 MMHG | HEIGHT: 64 IN | OXYGEN SATURATION: 99 % | BODY MASS INDEX: 27.49 KG/M2 | TEMPERATURE: 97.6 F | HEART RATE: 82 BPM

## 2018-07-18 DIAGNOSIS — E03.9 ACQUIRED HYPOTHYROIDISM: ICD-10-CM

## 2018-07-18 DIAGNOSIS — K58.0 IRRITABLE BOWEL SYNDROME WITH DIARRHEA: Primary | ICD-10-CM

## 2018-07-18 DIAGNOSIS — E53.8 B12 DEFICIENCY: ICD-10-CM

## 2018-07-18 DIAGNOSIS — E78.5 DYSLIPIDEMIA, GOAL LDL BELOW 130: ICD-10-CM

## 2018-07-18 DIAGNOSIS — F51.05 INSOMNIA DUE TO OTHER MENTAL DISORDER: ICD-10-CM

## 2018-07-18 DIAGNOSIS — F99 INSOMNIA DUE TO OTHER MENTAL DISORDER: ICD-10-CM

## 2018-07-18 DIAGNOSIS — E55.9 VITAMIN D DEFICIENCY: ICD-10-CM

## 2018-07-18 DIAGNOSIS — G43.719 INTRACTABLE CHRONIC MIGRAINE WITHOUT AURA AND WITHOUT STATUS MIGRAINOSUS: ICD-10-CM

## 2018-07-18 PROBLEM — J45.20 INTERMITTENT ASTHMA: Status: ACTIVE | Noted: 2017-03-07

## 2018-07-18 PROBLEM — F41.8 MIXED ANXIETY AND DEPRESSIVE DISORDER: Status: ACTIVE | Noted: 2017-10-16

## 2018-07-18 PROBLEM — M51.369 DEGENERATION OF LUMBAR INTERVERTEBRAL DISC: Status: ACTIVE | Noted: 2018-01-05

## 2018-07-18 PROBLEM — F41.9 ANXIETY DISORDER: Status: ACTIVE | Noted: 2017-03-07

## 2018-07-18 PROBLEM — M79.7 PRIMARY FIBROMYALGIA SYNDROME: Status: ACTIVE | Noted: 2017-03-07

## 2018-07-18 PROBLEM — M50.90 CERVICAL DISC DISORDER: Status: ACTIVE | Noted: 2018-01-05

## 2018-07-18 PROBLEM — M51.36 DEGENERATION OF LUMBAR INTERVERTEBRAL DISC: Status: ACTIVE | Noted: 2018-01-05

## 2018-07-18 PROBLEM — G56.00 CARPAL TUNNEL SYNDROME: Status: ACTIVE | Noted: 2018-07-18

## 2018-07-18 PROCEDURE — 99214 OFFICE O/P EST MOD 30 MIN: CPT | Performed by: FAMILY MEDICINE

## 2018-07-18 PROCEDURE — 3008F BODY MASS INDEX DOCD: CPT | Performed by: FAMILY MEDICINE

## 2018-07-18 RX ORDER — DIVALPROEX SODIUM 250 MG/1
250 TABLET, DELAYED RELEASE ORAL DAILY
COMMUNITY

## 2018-07-18 RX ORDER — MELATONIN 10 MG
10 CAPSULE ORAL DAILY
Qty: 30 CAPSULE | Refills: 1 | Status: SHIPPED | OUTPATIENT
Start: 2018-07-18 | End: 2019-05-20 | Stop reason: ALTCHOICE

## 2018-07-18 RX ORDER — BUSPIRONE HYDROCHLORIDE 10 MG/1
10 TABLET ORAL DAILY
COMMUNITY
End: 2019-11-20 | Stop reason: SDUPTHER

## 2018-07-18 NOTE — PROGRESS NOTES
Assessment/Plan:    1  Follow up with specialists regarding management of chronic conditions  2  Form completed for loan discharge paperwork  3   Trial of melatonin 10 mg q h s   4   Follow-up with me in 2-3 months  No problem-specific Assessment & Plan notes found for this encounter  Problem List Items Addressed This Visit     B12 deficiency    Relevant Orders    CBC and differential    Dyslipidemia, goal LDL below 130    Relevant Orders    Comprehensive metabolic panel    Lipid panel    Hypothyroidism    Relevant Orders    TSH, 3rd generation with Free T4 reflex    IBS (irritable bowel syndrome) - Primary    Intractable chronic migraine without aura and without status migrainosus    Relevant Medications    Erenumab-aooe 70 MG/ML SOAJ    nabumetone (RELAFEN) 750 mg tablet    naproxen (NAPROSYN) 250 mg tablet    naratriptan (AMERGE) 2 5 MG tablet    divalproex sodium (DEPAKOTE) 250 mg EC tablet    Vitamin D deficiency    Relevant Orders    Vitamin D 25 hydroxy      Other Visit Diagnoses     Insomnia due to other mental disorder        Relevant Medications    busPIRone (BUSPAR) 10 mg tablet    Melatonin 10 MG CAPS            Subjective:      Patient ID: Rachid Tidwell is a 56 y o  female  56 yof is here to follow up on her recent hospital visit and to obtain her disability as she claims she is not her normal self  She says she is unable to regain her strength due to fibromyalgia flares, IBS exacerbations and migraines  Reports being lethargic to the point of not being able to sleep  Waking up after an hour of sleep  Reports having a cough, believing it came from her grandson  Has been evaluated for a PE, had an incidental finding of a kidney stone  Was given Lidoderm patches and naproxen to help with rib pain caused by coughing  This has since resolved  Reports naproxen exacerbates her IBS  Migraines have increased in frequency and are now associated with nervousness and dizziness   Has a migraine today, but has not taken anything for it  Takes Tylenol, Amerge, Flexoril, prednisone and toradol to manage migraines  Says she also began a new SQ once monthly injection but cannot recall the name  This is managed by her neurologist  Claims she typically gets migraines a majority of days having them 5 days out of the last week  She is able to watch videos on her phone with her grandson at this visit, but reports that she cannot tolerate bright lights  Her chronic conditions are managed by neurology, rheumatology and GI  Cough   Associated symptoms include a fever, headaches, myalgias, rhinorrhea and shortness of breath  Pertinent negatives include no chest pain or postnasal drip  The following portions of the patient's history were reviewed and updated as appropriate: allergies, current medications, past family history, past medical history, past social history, past surgical history and problem list     Review of Systems   Constitutional: Positive for fatigue, fever and unexpected weight change  HENT: Positive for rhinorrhea  Negative for congestion, postnasal drip and tinnitus  Reports pressure changes in ear, like "taking off in an airplane"   Eyes: Positive for photophobia  Negative for visual disturbance  Reports some blurry vision and claims she needs to go for her annual vision check   Respiratory: Positive for cough and shortness of breath  Cardiovascular: Negative for chest pain, palpitations and leg swelling  Gastrointestinal: Positive for abdominal distention, diarrhea, nausea and vomiting  Genitourinary: Positive for frequency  Negative for difficulty urinating, dysuria and vaginal pain  Musculoskeletal: Positive for arthralgias, back pain, myalgias and neck pain  Allergic/Immunologic: Positive for food allergies  Neurological: Positive for dizziness, weakness, light-headedness, numbness and headaches  Negative for facial asymmetry     Hematological: Bruises/bleeds easily  Psychiatric/Behavioral: Positive for agitation, confusion, decreased concentration, dysphoric mood and sleep disturbance  The patient is nervous/anxious  Objective:      /70   Pulse 82   Temp 97 6 °F (36 4 °C)   Resp 19   Ht 5' 4" (1 626 m)   Wt 73 kg (161 lb)   SpO2 99%   Breastfeeding? No   BMI 27 64 kg/m²          Physical Exam   Constitutional: She appears well-developed  She is cooperative  No distress  HENT:   Head: Normocephalic and atraumatic  Eyes: Conjunctivae are normal  No scleral icterus  Neck: Normal range of motion  Neck supple  No thyromegaly present  Cardiovascular: Normal rate, regular rhythm, normal heart sounds and intact distal pulses  No murmur heard  Pulmonary/Chest: Effort normal and breath sounds normal  No respiratory distress  She has no wheezes  She has no rales  Musculoskeletal: She exhibits tenderness  She exhibits no edema  Neurological: She is alert  Skin: She is not diaphoretic  Psychiatric: She has a normal mood and affect   Judgment and thought content normal

## 2018-07-18 NOTE — PATIENT INSTRUCTIONS
1  Follow up with specialists regarding management of chronic conditions  2  Form completed for loan discharge paperwork

## 2018-07-19 ENCOUNTER — APPOINTMENT (OUTPATIENT)
Dept: LAB | Facility: HOSPITAL | Age: 48
End: 2018-07-19
Payer: COMMERCIAL

## 2018-07-19 DIAGNOSIS — E78.5 DYSLIPIDEMIA, GOAL LDL BELOW 130: ICD-10-CM

## 2018-07-19 DIAGNOSIS — E53.8 B12 DEFICIENCY: ICD-10-CM

## 2018-07-19 DIAGNOSIS — E03.9 ACQUIRED HYPOTHYROIDISM: ICD-10-CM

## 2018-07-19 DIAGNOSIS — E55.9 VITAMIN D DEFICIENCY: ICD-10-CM

## 2018-07-19 LAB
25(OH)D3 SERPL-MCNC: 13.4 NG/ML (ref 30–100)
ALBUMIN SERPL BCP-MCNC: 3.7 G/DL (ref 3.5–5)
ALP SERPL-CCNC: 44 U/L (ref 46–116)
ALT SERPL W P-5'-P-CCNC: 31 U/L (ref 12–78)
ANION GAP SERPL CALCULATED.3IONS-SCNC: 8 MMOL/L (ref 4–13)
AST SERPL W P-5'-P-CCNC: 14 U/L (ref 5–45)
BASOPHILS # BLD AUTO: 0.02 THOUSANDS/ΜL (ref 0–0.1)
BASOPHILS NFR BLD AUTO: 0 % (ref 0–1)
BILIRUB SERPL-MCNC: 0.3 MG/DL (ref 0.2–1)
BUN SERPL-MCNC: 14 MG/DL (ref 5–25)
CALCIUM SERPL-MCNC: 8.8 MG/DL (ref 8.3–10.1)
CHLORIDE SERPL-SCNC: 102 MMOL/L (ref 100–108)
CHOLEST SERPL-MCNC: 208 MG/DL (ref 50–200)
CO2 SERPL-SCNC: 29 MMOL/L (ref 21–32)
CREAT SERPL-MCNC: 0.78 MG/DL (ref 0.6–1.3)
EOSINOPHIL # BLD AUTO: 0.06 THOUSAND/ΜL (ref 0–0.61)
EOSINOPHIL NFR BLD AUTO: 1 % (ref 0–6)
ERYTHROCYTE [DISTWIDTH] IN BLOOD BY AUTOMATED COUNT: 12.3 % (ref 11.6–15.1)
GFR SERPL CREATININE-BSD FRML MDRD: 91 ML/MIN/1.73SQ M
GLUCOSE P FAST SERPL-MCNC: 86 MG/DL (ref 65–99)
HCT VFR BLD AUTO: 37.5 % (ref 34.8–46.1)
HDLC SERPL-MCNC: 60 MG/DL (ref 40–60)
HGB BLD-MCNC: 12.2 G/DL (ref 11.5–15.4)
IMM GRANULOCYTES # BLD AUTO: 0.04 THOUSAND/UL (ref 0–0.2)
IMM GRANULOCYTES NFR BLD AUTO: 1 % (ref 0–2)
LDLC SERPL CALC-MCNC: 114 MG/DL (ref 0–100)
LYMPHOCYTES # BLD AUTO: 2.11 THOUSANDS/ΜL (ref 0.6–4.47)
LYMPHOCYTES NFR BLD AUTO: 41 % (ref 14–44)
MCH RBC QN AUTO: 30.6 PG (ref 26.8–34.3)
MCHC RBC AUTO-ENTMCNC: 32.5 G/DL (ref 31.4–37.4)
MCV RBC AUTO: 94 FL (ref 82–98)
MONOCYTES # BLD AUTO: 0.31 THOUSAND/ΜL (ref 0.17–1.22)
MONOCYTES NFR BLD AUTO: 6 % (ref 4–12)
NEUTROPHILS # BLD AUTO: 2.67 THOUSANDS/ΜL (ref 1.85–7.62)
NEUTS SEG NFR BLD AUTO: 51 % (ref 43–75)
NONHDLC SERPL-MCNC: 148 MG/DL
NRBC BLD AUTO-RTO: 0 /100 WBCS
PLATELET # BLD AUTO: 221 THOUSANDS/UL (ref 149–390)
PMV BLD AUTO: 9.9 FL (ref 8.9–12.7)
POTASSIUM SERPL-SCNC: 4 MMOL/L (ref 3.5–5.3)
PROT SERPL-MCNC: 7 G/DL (ref 6.4–8.2)
RBC # BLD AUTO: 3.99 MILLION/UL (ref 3.81–5.12)
SODIUM SERPL-SCNC: 139 MMOL/L (ref 136–145)
T4 FREE SERPL-MCNC: 0.9 NG/DL (ref 0.76–1.46)
TRIGL SERPL-MCNC: 169 MG/DL
TSH SERPL DL<=0.05 MIU/L-ACNC: 6.78 UIU/ML (ref 0.36–3.74)
WBC # BLD AUTO: 5.21 THOUSAND/UL (ref 4.31–10.16)

## 2018-07-19 PROCEDURE — 85025 COMPLETE CBC W/AUTO DIFF WBC: CPT

## 2018-07-19 PROCEDURE — 80053 COMPREHEN METABOLIC PANEL: CPT

## 2018-07-19 PROCEDURE — 82306 VITAMIN D 25 HYDROXY: CPT

## 2018-07-19 PROCEDURE — 36415 COLL VENOUS BLD VENIPUNCTURE: CPT

## 2018-07-19 PROCEDURE — 84443 ASSAY THYROID STIM HORMONE: CPT

## 2018-07-19 PROCEDURE — 80061 LIPID PANEL: CPT

## 2018-07-19 PROCEDURE — 84439 ASSAY OF FREE THYROXINE: CPT

## 2018-08-04 ENCOUNTER — APPOINTMENT (OUTPATIENT)
Dept: LAB | Facility: HOSPITAL | Age: 48
End: 2018-08-04
Payer: COMMERCIAL

## 2018-08-04 ENCOUNTER — TRANSCRIBE ORDERS (OUTPATIENT)
Dept: ADMINISTRATIVE | Facility: HOSPITAL | Age: 48
End: 2018-08-04

## 2018-08-04 DIAGNOSIS — B35.1 TINEA UNGUIUM: Primary | ICD-10-CM

## 2018-08-04 DIAGNOSIS — B35.1 TINEA UNGUIUM: ICD-10-CM

## 2018-08-04 LAB
ALBUMIN SERPL BCP-MCNC: 3.6 G/DL (ref 3.5–5)
ALP SERPL-CCNC: 54 U/L (ref 46–116)
ALT SERPL W P-5'-P-CCNC: 20 U/L (ref 12–78)
AST SERPL W P-5'-P-CCNC: 13 U/L (ref 5–45)
BILIRUB DIRECT SERPL-MCNC: 0.09 MG/DL (ref 0–0.2)
BILIRUB SERPL-MCNC: 0.4 MG/DL (ref 0.2–1)
PROT SERPL-MCNC: 7.2 G/DL (ref 6.4–8.2)

## 2018-08-04 PROCEDURE — 36415 COLL VENOUS BLD VENIPUNCTURE: CPT

## 2018-08-04 PROCEDURE — 80076 HEPATIC FUNCTION PANEL: CPT

## 2018-08-22 DIAGNOSIS — J20.9 BRONCHOSPASM WITH BRONCHITIS, ACUTE: ICD-10-CM

## 2018-08-22 NOTE — TELEPHONE ENCOUNTER
Patient called and requested a medication refill  She needs a refill for ProVentil HFA 90mcg/act inhaler  She uses the inhaler 2 puffs every 6 hours for wheezing    She would like it called into [facility] [phone]

## 2018-08-23 RX ORDER — ALBUTEROL SULFATE 90 UG/1
2 AEROSOL, METERED RESPIRATORY (INHALATION) EVERY 6 HOURS PRN
Qty: 1 INHALER | Refills: 2 | Status: SHIPPED | OUTPATIENT
Start: 2018-08-23 | End: 2018-10-23 | Stop reason: SDUPTHER

## 2018-09-06 ENCOUNTER — TELEPHONE (OUTPATIENT)
Dept: FAMILY MEDICINE CLINIC | Facility: CLINIC | Age: 48
End: 2018-09-06

## 2018-09-06 NOTE — TELEPHONE ENCOUNTER
Patient called and wanted to know if we ever got the results of EMG from back in April  She stated that it was done at [address]  Please call the patient to advise [phone]

## 2018-09-07 NOTE — TELEPHONE ENCOUNTER
Report from Dr Cornell Shrestha are reviewed  INTERPRETATION: There is electrophysiologic evidence of a:     1   Bilateral mild median nerve compression neuropathy at the wrist with demyelinative changes, consistent with a diagnosis of carpal tunnel syndrome      2  There is no evidence of a cervical radiculopathy or ulnar neuropathy bilaterally      If she is experiencing a lot of problems with her hands due to carpal tunnel syndrome, I would recommend a referral to a specialist   [facility]  [facility] or [facility]

## 2018-09-10 NOTE — TELEPHONE ENCOUNTER
I called this patient back and left a very detailed message  I reviewed the results with patient and made recommendations on who she can schedule apt with  I asked her if she has any questions to call us back or if she did not understand something      Otherwise to schedule apt with specialist

## 2018-09-14 DIAGNOSIS — G43.909 MIGRAINE WITHOUT STATUS MIGRAINOSUS, NOT INTRACTABLE, UNSPECIFIED MIGRAINE TYPE: Primary | ICD-10-CM

## 2018-09-14 RX ORDER — BUTALBITAL, ACETAMINOPHEN AND CAFFEINE 50; 325; 40 MG/1; MG/1; MG/1
1 TABLET ORAL EVERY 4 HOURS PRN
Qty: 30 TABLET | Refills: 0 | Status: SHIPPED | OUTPATIENT
Start: 2018-09-14 | End: 2018-10-23 | Stop reason: SDUPTHER

## 2018-10-03 ENCOUNTER — APPOINTMENT (OUTPATIENT)
Dept: LAB | Facility: HOSPITAL | Age: 48
End: 2018-10-03
Payer: COMMERCIAL

## 2018-10-03 ENCOUNTER — TRANSCRIBE ORDERS (OUTPATIENT)
Dept: ADMINISTRATIVE | Facility: HOSPITAL | Age: 48
End: 2018-10-03

## 2018-10-03 DIAGNOSIS — E55.9 VITAMIN D DEFICIENCY: ICD-10-CM

## 2018-10-03 DIAGNOSIS — Z79.899 HIGH RISK MEDICATIONS (NOT ANTICOAGULANTS) LONG-TERM USE: ICD-10-CM

## 2018-10-03 DIAGNOSIS — G43.719 INTRACTABLE CHRONIC MIGRAINE WITHOUT AURA AND WITHOUT STATUS MIGRAINOSUS: Primary | ICD-10-CM

## 2018-10-03 DIAGNOSIS — G43.719 INTRACTABLE CHRONIC MIGRAINE WITHOUT AURA AND WITHOUT STATUS MIGRAINOSUS: ICD-10-CM

## 2018-10-03 LAB
25(OH)D3 SERPL-MCNC: 39.5 NG/ML (ref 30–100)
ALBUMIN SERPL BCP-MCNC: 3.9 G/DL (ref 3.5–5)
ALP SERPL-CCNC: 43 U/L (ref 46–116)
ALT SERPL W P-5'-P-CCNC: 41 U/L (ref 12–78)
ANION GAP SERPL CALCULATED.3IONS-SCNC: 7 MMOL/L (ref 4–13)
AST SERPL W P-5'-P-CCNC: 16 U/L (ref 5–45)
BASOPHILS # BLD AUTO: 0.01 THOUSANDS/ΜL (ref 0–0.1)
BASOPHILS NFR BLD AUTO: 0 % (ref 0–1)
BILIRUB SERPL-MCNC: 0.3 MG/DL (ref 0.2–1)
BUN SERPL-MCNC: 14 MG/DL (ref 5–25)
CALCIUM SERPL-MCNC: 8.8 MG/DL (ref 8.3–10.1)
CHLORIDE SERPL-SCNC: 106 MMOL/L (ref 100–108)
CO2 SERPL-SCNC: 28 MMOL/L (ref 21–32)
CREAT SERPL-MCNC: 0.8 MG/DL (ref 0.6–1.3)
EOSINOPHIL # BLD AUTO: 0.04 THOUSAND/ΜL (ref 0–0.61)
EOSINOPHIL NFR BLD AUTO: 1 % (ref 0–6)
ERYTHROCYTE [DISTWIDTH] IN BLOOD BY AUTOMATED COUNT: 12.6 % (ref 11.6–15.1)
FERRITIN SERPL-MCNC: 76 NG/ML (ref 8–388)
GFR SERPL CREATININE-BSD FRML MDRD: 88 ML/MIN/1.73SQ M
GLUCOSE P FAST SERPL-MCNC: 98 MG/DL (ref 65–99)
HCT VFR BLD AUTO: 41.1 % (ref 34.8–46.1)
HGB BLD-MCNC: 13.5 G/DL (ref 11.5–15.4)
IMM GRANULOCYTES # BLD AUTO: 0.01 THOUSAND/UL (ref 0–0.2)
IMM GRANULOCYTES NFR BLD AUTO: 0 % (ref 0–2)
LYMPHOCYTES # BLD AUTO: 1.58 THOUSANDS/ΜL (ref 0.6–4.47)
LYMPHOCYTES NFR BLD AUTO: 27 % (ref 14–44)
MAGNESIUM SERPL-MCNC: 2.1 MG/DL (ref 1.6–2.6)
MCH RBC QN AUTO: 30.5 PG (ref 26.8–34.3)
MCHC RBC AUTO-ENTMCNC: 32.8 G/DL (ref 31.4–37.4)
MCV RBC AUTO: 93 FL (ref 82–98)
MONOCYTES # BLD AUTO: 0.27 THOUSAND/ΜL (ref 0.17–1.22)
MONOCYTES NFR BLD AUTO: 5 % (ref 4–12)
NEUTROPHILS # BLD AUTO: 4.01 THOUSANDS/ΜL (ref 1.85–7.62)
NEUTS SEG NFR BLD AUTO: 67 % (ref 43–75)
NRBC BLD AUTO-RTO: 0 /100 WBCS
PLATELET # BLD AUTO: 214 THOUSANDS/UL (ref 149–390)
PMV BLD AUTO: 9.6 FL (ref 8.9–12.7)
POTASSIUM SERPL-SCNC: 4.3 MMOL/L (ref 3.5–5.3)
PROT SERPL-MCNC: 6.9 G/DL (ref 6.4–8.2)
RBC # BLD AUTO: 4.43 MILLION/UL (ref 3.81–5.12)
SODIUM SERPL-SCNC: 141 MMOL/L (ref 136–145)
T4 FREE SERPL-MCNC: 0.72 NG/DL (ref 0.76–1.46)
TSH SERPL DL<=0.05 MIU/L-ACNC: 0.45 UIU/ML (ref 0.36–3.74)
VIT B12 SERPL-MCNC: 628 PG/ML (ref 100–900)
WBC # BLD AUTO: 5.92 THOUSAND/UL (ref 4.31–10.16)

## 2018-10-03 PROCEDURE — 36415 COLL VENOUS BLD VENIPUNCTURE: CPT

## 2018-10-03 PROCEDURE — 80053 COMPREHEN METABOLIC PANEL: CPT

## 2018-10-03 PROCEDURE — 82607 VITAMIN B-12: CPT

## 2018-10-03 PROCEDURE — 82728 ASSAY OF FERRITIN: CPT

## 2018-10-03 PROCEDURE — 84443 ASSAY THYROID STIM HORMONE: CPT

## 2018-10-03 PROCEDURE — 82306 VITAMIN D 25 HYDROXY: CPT

## 2018-10-03 PROCEDURE — 85025 COMPLETE CBC W/AUTO DIFF WBC: CPT

## 2018-10-03 PROCEDURE — 83735 ASSAY OF MAGNESIUM: CPT

## 2018-10-03 PROCEDURE — 84439 ASSAY OF FREE THYROXINE: CPT

## 2018-10-04 ENCOUNTER — TELEPHONE (OUTPATIENT)
Dept: FAMILY MEDICINE CLINIC | Facility: CLINIC | Age: 48
End: 2018-10-04

## 2018-10-04 NOTE — TELEPHONE ENCOUNTER
Please call patient and inform that all of her labs were within satisfactory limits except that her free T4 was very slightly low at 0 72  Normal levels are 0 76-1 46  Since her TSH is 0 446, I would not recommend to increase her thyroid medication dose to try and improve her free T4 at this time  Also inform that I will no longer be at the practice and that I am recommending she schedule a follow-up appointment with the new provider who will be starting in November: dr Dany Espino for ongoing care

## 2018-10-04 NOTE — TELEPHONE ENCOUNTER
Patient called and wanted to go over her lab results and discuss a medication change  Please call the patient to advise at [phone]

## 2018-10-05 ENCOUNTER — CLINICAL SUPPORT (OUTPATIENT)
Dept: FAMILY MEDICINE CLINIC | Facility: CLINIC | Age: 48
End: 2018-10-05
Payer: COMMERCIAL

## 2018-10-05 ENCOUNTER — TELEPHONE (OUTPATIENT)
Dept: FAMILY MEDICINE CLINIC | Facility: CLINIC | Age: 48
End: 2018-10-05

## 2018-10-05 DIAGNOSIS — Z23 ENCOUNTER FOR IMMUNIZATION: ICD-10-CM

## 2018-10-05 PROCEDURE — 90686 IIV4 VACC NO PRSV 0.5 ML IM: CPT

## 2018-10-05 PROCEDURE — 90471 IMMUNIZATION ADMIN: CPT

## 2018-10-08 ENCOUNTER — TELEPHONE (OUTPATIENT)
Dept: FAMILY MEDICINE CLINIC | Facility: CLINIC | Age: 48
End: 2018-10-08

## 2018-10-11 ENCOUNTER — TELEPHONE (OUTPATIENT)
Dept: FAMILY MEDICINE CLINIC | Facility: CLINIC | Age: 48
End: 2018-10-11

## 2018-10-15 ENCOUNTER — TELEPHONE (OUTPATIENT)
Dept: FAMILY MEDICINE CLINIC | Facility: CLINIC | Age: 48
End: 2018-10-15

## 2018-10-18 RX ORDER — BETAMETHASONE DIPROPIONATE 0.5 MG/G
CREAM TOPICAL
COMMUNITY
End: 2022-04-11 | Stop reason: ALTCHOICE

## 2018-10-23 ENCOUNTER — OFFICE VISIT (OUTPATIENT)
Dept: FAMILY MEDICINE CLINIC | Facility: CLINIC | Age: 48
End: 2018-10-23
Payer: COMMERCIAL

## 2018-10-23 VITALS
TEMPERATURE: 97.6 F | WEIGHT: 155 LBS | SYSTOLIC BLOOD PRESSURE: 110 MMHG | RESPIRATION RATE: 18 BRPM | BODY MASS INDEX: 26.61 KG/M2 | HEART RATE: 100 BPM | OXYGEN SATURATION: 100 % | DIASTOLIC BLOOD PRESSURE: 64 MMHG

## 2018-10-23 DIAGNOSIS — J45.40 MODERATE PERSISTENT ASTHMA WITHOUT COMPLICATION: ICD-10-CM

## 2018-10-23 DIAGNOSIS — E03.9 ACQUIRED HYPOTHYROIDISM: ICD-10-CM

## 2018-10-23 DIAGNOSIS — F41.9 ANXIETY: ICD-10-CM

## 2018-10-23 DIAGNOSIS — M79.7 FIBROMYALGIA: Primary | ICD-10-CM

## 2018-10-23 DIAGNOSIS — G43.909 MIGRAINE WITHOUT STATUS MIGRAINOSUS, NOT INTRACTABLE, UNSPECIFIED MIGRAINE TYPE: ICD-10-CM

## 2018-10-23 PROCEDURE — 99214 OFFICE O/P EST MOD 30 MIN: CPT | Performed by: FAMILY MEDICINE

## 2018-10-23 PROCEDURE — 1036F TOBACCO NON-USER: CPT | Performed by: FAMILY MEDICINE

## 2018-10-23 RX ORDER — ALBUTEROL SULFATE 90 UG/1
2 AEROSOL, METERED RESPIRATORY (INHALATION) EVERY 6 HOURS PRN
Qty: 3 INHALER | Refills: 2 | Status: SHIPPED | OUTPATIENT
Start: 2018-10-23 | End: 2020-07-14 | Stop reason: SDUPTHER

## 2018-10-23 RX ORDER — CELECOXIB 200 MG/1
CAPSULE ORAL
COMMUNITY
Start: 2018-04-04 | End: 2018-10-23 | Stop reason: SDUPTHER

## 2018-10-23 RX ORDER — BUPROPION HYDROCHLORIDE 150 MG/1
TABLET, EXTENDED RELEASE ORAL EVERY 12 HOURS
COMMUNITY
Start: 2018-05-08 | End: 2019-01-09 | Stop reason: ALTCHOICE

## 2018-10-23 RX ORDER — LEVOTHYROXINE AND LIOTHYRONINE 38; 9 UG/1; UG/1
60 TABLET ORAL DAILY
COMMUNITY
End: 2018-10-23 | Stop reason: SDUPTHER

## 2018-10-23 RX ORDER — LEVOTHYROXINE AND LIOTHYRONINE 38; 9 UG/1; UG/1
60 TABLET ORAL DAILY
Qty: 90 TABLET | Refills: 3 | Status: SHIPPED | OUTPATIENT
Start: 2018-10-23 | End: 2019-03-18 | Stop reason: SDUPTHER

## 2018-10-23 RX ORDER — LEVOTHYROXINE AND LIOTHYRONINE 38; 9 UG/1; UG/1
60 TABLET ORAL DAILY
Qty: 30 TABLET | Refills: 5 | Status: SHIPPED | OUTPATIENT
Start: 2018-10-23 | End: 2018-10-23 | Stop reason: SDUPTHER

## 2018-10-23 RX ORDER — BUTALBITAL, ACETAMINOPHEN AND CAFFEINE 50; 325; 40 MG/1; MG/1; MG/1
1 TABLET ORAL EVERY 4 HOURS PRN
Qty: 30 TABLET | Refills: 2 | Status: SHIPPED | OUTPATIENT
Start: 2018-10-23 | End: 2019-05-20 | Stop reason: DRUGHIGH

## 2018-10-23 RX ORDER — ALPRAZOLAM 0.25 MG/1
0.25 TABLET ORAL
Qty: 30 TABLET | Refills: 1 | Status: SHIPPED | OUTPATIENT
Start: 2018-10-23 | End: 2019-01-12 | Stop reason: ALTCHOICE

## 2018-10-23 RX ORDER — FLUTICASONE FUROATE AND VILANTEROL 100; 25 UG/1; UG/1
1 POWDER RESPIRATORY (INHALATION) EVERY 24 HOURS
Qty: 3 INHALER | Refills: 2 | Status: SHIPPED | OUTPATIENT
Start: 2018-10-23 | End: 2020-11-13 | Stop reason: ALTCHOICE

## 2018-10-23 RX ORDER — CELECOXIB 200 MG/1
200 CAPSULE ORAL DAILY
Qty: 30 CAPSULE | Refills: 2 | Status: SHIPPED | OUTPATIENT
Start: 2018-10-23

## 2018-10-23 RX ORDER — LEVOTHYROXINE AND LIOTHYRONINE 38; 9 UG/1; UG/1
60 TABLET ORAL DAILY
Qty: 90 TABLET | Refills: 3 | Status: SHIPPED | OUTPATIENT
Start: 2018-10-23 | End: 2018-10-23 | Stop reason: SDUPTHER

## 2018-10-23 NOTE — PROGRESS NOTES
Assessment/Plan:           Problem List Items Addressed This Visit     Anxiety    Relevant Medications    ALPRAZolam (XANAX) 0 25 mg tablet    Fibromyalgia - Primary    Relevant Medications    celecoxib (CELEBREX) 200 mg capsule    Hypothyroidism    Relevant Medications    thyroid (ARMOUR) 60 MG tablet    Moderate persistent asthma without complication    Relevant Medications    albuterol (PROVENTIL HFA,VENTOLIN HFA) 90 mcg/act inhaler    fluticasone-vilanterol (BREO ELLIPTA) 100-25 mcg/inh inhaler    Migraine without status migrainosus, not intractable    Relevant Medications    buPROPion (WELLBUTRIN SR) 150 mg 12 hr tablet    butalbital-acetaminophen-caffeine (FIORICET,ESGIC) -40 mg per tablet    celecoxib (CELEBREX) 200 mg capsule            Subjective:      Patient ID: Arie Browne is a 53 y o  female  Here for follow up multiple ongoing disabling problems:    1  Fibromyalgia:  Ongoing disabling symptoms  Has associated trouble with insomnia  Intense fatigue so she has not had any trouble falling asleep however she is waking frequently and then has trouble going back to sleep  She has seen Rheumatology (Dr Alexa Reynaga) regularly and has had tried numerous medications which have had side effects for her  She also has been seeing a Chiropractor  2   Migraine:    She has seen Neurology Dr Clifton Adorno regularly for treatment  Again multiple medications have been instituted without any significant ongoing relief  She has been recommended to consider injections but she is contemplating this at this time  She has required numerous steroid therapy for breaking her status migrainous attacks  Recently she has been started on Aimovig  Which has helped reduce her migraines to about 9 attacks per month  3   Carpal Tunnel:  Has seen ortho  Injections attempted  [facility]  Impacted writing ability and getting tremors  EMG obtained  She has been recommended surgery at this time      4  Knee pain:    Torn meniscus and being managed by [facility] (Dr Gutierrez)  She has received corticosteroid injections without any significant long-term relief  She has also been recommended to consider arthroscopic surgery for her knee  5   Cervical-Thoracic-Lumbar pain:  Ongoing pain and having trouble functioning and completing iADLs  Taking frequent breaks and has to rest throughout the day  Has ongoing treatment with Chiropractic care and [facility]  6   Anxiety-Depression:  Seeing a psychiatrist and psychologist   Brooke Fajardo to struggle  Taking medications as prescribed  Has needed to take xanax  The following portions of the patient's history were reviewed and updated as appropriate: allergies, current medications, past family history, past medical history, past social history, past surgical history and problem list     Review of Systems   Constitutional: Positive for fatigue  HENT: Negative for sinus pain, sinus pressure, sneezing, sore throat and voice change  Respiratory: Negative for cough, shortness of breath and wheezing  Cardiovascular: Negative for chest pain, palpitations and leg swelling  Gastrointestinal: Positive for abdominal pain and nausea  Endocrine: Negative for polyuria  Musculoskeletal: Positive for arthralgias, back pain, myalgias, neck pain and neck stiffness  Skin: Negative for rash  Allergic/Immunologic: Positive for environmental allergies  Neurological: Positive for headaches  Negative for tremors, speech difficulty and weakness  Hematological: Does not bruise/bleed easily  Psychiatric/Behavioral: Positive for sleep disturbance  The patient is nervous/anxious  Objective:      /64   Pulse 100   Temp 97 6 °F (36 4 °C) (Tympanic)   Resp 18   Wt 70 3 kg (155 lb)   SpO2 100%   BMI 26 61 kg/m²          Physical Exam   Constitutional: She appears well-developed and well-nourished  She is cooperative  No distress     HENT:   Head: Normocephalic and atraumatic  Eyes: Conjunctivae are normal  No scleral icterus  Neck: Normal range of motion  Neck supple  No thyromegaly present  Cardiovascular: Normal rate, regular rhythm, normal heart sounds and intact distal pulses  No murmur heard  Pulmonary/Chest: Effort normal and breath sounds normal  No respiratory distress  She has no wheezes  She has no rales  Musculoskeletal: She exhibits no edema  Multiple tender points  Neurological: She is alert  Skin: She is not diaphoretic  Psychiatric: Her behavior is normal  Judgment and thought content normal  Her mood appears anxious  She exhibits a depressed mood

## 2018-10-24 ENCOUNTER — TELEPHONE (OUTPATIENT)
Dept: FAMILY MEDICINE CLINIC | Facility: CLINIC | Age: 48
End: 2018-10-24

## 2018-10-24 NOTE — TELEPHONE ENCOUNTER
Patient called and wanted to know if she could possibly come in for a quick appt for some paper work that she needs to have filled out for a handicap placard  Please call the patient to advise [phone]

## 2018-10-24 NOTE — TELEPHONE ENCOUNTER
I returned the patient call, and told her to call us to obtain apt  See who she is gonna follow ex Dr Jose Maria Angel, Dr Cindy Bernabe, Dr Honorio Santiago and we will transition to them, unless she wants apt with Louis Schilling

## 2018-10-25 ENCOUNTER — TELEPHONE (OUTPATIENT)
Dept: FAMILY MEDICINE CLINIC | Facility: CLINIC | Age: 48
End: 2018-10-25

## 2018-10-25 NOTE — TELEPHONE ENCOUNTER
Patient called and asked us to fill out a handicap disability plaque  I have completed form and placed on your desk to sign  Once completed we will call her to

## 2018-10-30 ENCOUNTER — TRANSCRIBE ORDERS (OUTPATIENT)
Dept: ADMINISTRATIVE | Facility: HOSPITAL | Age: 48
End: 2018-10-30

## 2018-10-30 ENCOUNTER — HOSPITAL ENCOUNTER (OUTPATIENT)
Dept: RADIOLOGY | Facility: HOSPITAL | Age: 48
Discharge: HOME | End: 2018-10-30
Attending: PODIATRIST
Payer: COMMERCIAL

## 2018-10-30 DIAGNOSIS — M79.672 LEFT FOOT PAIN: Primary | ICD-10-CM

## 2018-10-30 DIAGNOSIS — M79.672 LEFT FOOT PAIN: ICD-10-CM

## 2018-10-30 PROCEDURE — 73630 X-RAY EXAM OF FOOT: CPT

## 2018-11-09 ENCOUNTER — OFFICE VISIT (OUTPATIENT)
Dept: DERMATOLOGY | Facility: CLINIC | Age: 48
End: 2018-11-09
Payer: COMMERCIAL

## 2018-11-09 DIAGNOSIS — D22.9 NEVUS: Primary | ICD-10-CM

## 2018-11-09 DIAGNOSIS — D23.9 DERMATOFIBROMA: ICD-10-CM

## 2018-11-09 DIAGNOSIS — Z13.89 SCREENING FOR SKIN CONDITION: ICD-10-CM

## 2018-11-09 PROCEDURE — 99203 OFFICE O/P NEW LOW 30 MIN: CPT | Performed by: DERMATOLOGY

## 2018-11-09 NOTE — PROGRESS NOTES
[address] [facility] DERMATOLOGY  [address] [place] [address]  [phone]  [phone]     MRN: [id number] : 1970  Encounter: [id number]  Patient Information: [place]  Chief complaint:  Skin check    History of present illness:  81-year-old female without previous history of skin cancer presents because of concerns regarding changes in her skin  Past Medical History:   Diagnosis Date    Anxiety     Carpal tunnel syndrome     Chronic pain     lower back    Disease of thyroid gland     Dyslipidemia     Fibromyalgia     Migraine     Psychiatric disorder     depression/anxiety    Vitamin D deficiency      Past Surgical History:   Procedure Laterality Date     SECTION      CHOLECYSTECTOMY      PARTIAL HYSTERECTOMY      TONSILLECTOMY       Social History   History   Alcohol Use No     History   Drug Use No     History   Smoking Status    Never Smoker   Smokeless Tobacco    Never Used     Family History   Problem Relation Age of Onset    No Known Problems Mother     No Known Problems Father      Meds/Allergies   Allergies   Allergen Reactions    Doxycycline Rash    Erythromycin Rash    Latex Rash    Eletriptan      Pain in lower jaw with pressure in throat       Meds:  Prior to Admission medications    Medication Sig Start Date End Date Taking?  Authorizing Provider   albuterol (PROVENTIL HFA,VENTOLIN HFA) 90 mcg/act inhaler Inhale 2 puffs every 6 (six) hours as needed for wheezing 10/23/18  Yes Arelis Carbone MD   ALPRAZolam [facility]) 0 25 mg tablet Take 1 tablet (0 25 mg total) by mouth daily at bedtime as needed for anxiety 10/23/18  Yes Arelis Carbone MD   betamethasone dipropionate (DIPROSONE) 0 05 % cream APPLY A THIN LAYER TO THE AFFECTED AREA(S) BY TOPICAL ROUTE ONCE DAILY   Yes Historical Provider, MD   buPROPion [facility] SR) 150 mg 12 hr tablet Every 12 hours 18  Yes Historical Provider, MD   buPROPion [facility] XL) 150 mg 24 hr tablet TAKE 1 TABLET EVERY MORNING 11/30/17  Yes Historical Provider, MD   busPIRone (BUSPAR) 10 mg tablet Take 10 mg by mouth daily   Yes Historical Provider, MD   butalbital-acetaminophen-caffeine (FIORICET,ESGIC) -40 mg per tablet Take 1 tablet by mouth every 4 (four) hours as needed for headaches 10/23/18  Yes Joe Odell MD   celecoxib (CELEBREX) 200 mg capsule Take 1 capsule (200 mg total) by mouth daily 10/23/18  Yes Joe Odell MD   citalopram (CeleXA) 20 mg tablet citalopram 20 mg tablet   Yes Historical Provider, MD   divalproex sodium (DEPAKOTE) 250 mg EC tablet Take 250 mg by mouth daily   Yes Historical Provider, MD Berger Ramon 70 MG/ML SOAJ Inject 70 mg under the skin 7/13/18  Yes Historical Provider, MD   fluticasone-vilanterol (BREO ELLIPTA) 100-25 mcg/inh inhaler Inhale 1 puff every 24 hours 10/23/18  Yes Joe Odell MD   lidocaine (LIDODERM) 5 % Place 1 patch on the skin daily as needed for mild pain or moderate pain Remove & Discard patch within 12 hours or as directed by MD 7/12/18  Yes Thony Mcdonough DO   lidocaine (LIDODERM) 5 % Place 1 patch on the skin 7/12/18  Yes Historical Provider, MD   Melatonin 10 MG CAPS Take 1 capsule (10 mg total) by mouth daily 7/18/18  Yes Joe Odell MD   naproxen (NAPROSYN) 500 mg tablet Take 1 tablet (500 mg total) by mouth every 12 (twelve) hours as needed for mild pain or moderate pain 7/12/18  Yes Thony Mcdonough DO   naratriptan (AMERGE) 2 5 MG tablet take 1 tablet by oral route once may repeat after 4 hours 4/4/18  Yes Historical Provider, MD   naratriptan (AMERGE) 2 5 MG tablet Take 1 tablet at onset of migraine  May repeat in 4 hours if unresolved  Do not exceed 5 mg in 24 hours   Limit 2 in 24 hours and 4 per week 7/13/18  Yes Historical Provider, MD   scopolamine (TRANSDERM-SCOP, 1 5 MG,) 1 5 mg/3 days TD 72 hr patch Transderm-Scop 1 5 mg transdermal patch (1 mg over 3 days)   Yes Historical Provider, MD thyroid (ARMOUR) 60 MG tablet Take 1 tablet (60 mg total) by mouth daily 10/23/18  Yes Sree Stephens MD   traMADol (ULTRAM) 50 mg tablet tramadol 50 mg tablet   Yes Historical Provider, MD       Subjective:     Review of Systems:    General: negative for - chills, fatigue, fever,  weight gain or weight loss  Psychological: negative for - anxiety, behavioral disorder, concentration difficulties, decreased libido, depression, irritability, memory difficulties, mood swings, sleep disturbances or suicidal ideation  ENT: negative for - hearing difficulties , nasal congestion, nasal discharge, oral lesions, sinus pain, sneezing, sore throat  Allergy and Immunology: negative for - hives, insect bite sensitivity,  Hematological and Lymphatic: negative for - bleeding problems, blood clots,bruising, swollen lymph nodes  Endocrine: negative for - hair pattern changes, hot flashes, malaise/lethargy, mood swings, palpitations, polydipsia/polyuria, skin changes, temperature intolerance or unexpected weight change  Respiratory: negative for - cough, hemoptysis, orthopnea, shortness of breath, or wheezing  Cardiovascular: negative for - chest pain, dyspnea on exertion, edema,  Gastrointestinal: negative for - abdominal pain, nausea/vomiting  Genito-Urinary: negative for - dysuria, incontinence, irregular/heavy menses or urinary frequency/urgency  Musculoskeletal: negative for - gait disturbance, joint pain, joint stiffness, joint swelling, muscle pain, muscular weakness  Dermatological:  As in HPI  Neurological: negative for confusion, dizziness, headaches, impaired coordination/balance, memory loss, numbness/tingling, seizures, speech problems, tremors or weakness       Objective: There were no vitals taken for this visit      Physical Exam:    General Appearance:    Alert, cooperative, no distress   Head:    Normocephalic, without obvious abnormality, atraumatic           Skin:   A full skin exam was performed including scalp, head scalp, eyes, ears, nose, lips, neck, chest, axilla, abdomen, back, buttocks, bilateral upper extremities, bilateral lower extremities, hands, feet, fingers, toes, fingernails, and toenails normal pigmented lesions all with regular shape and color as occasional papules with positive pinch sign nothing else remarkable noted on complete exam     Assessment:     1  Nevus     2  Dermatofibroma     3  Screening for skin condition           Plan:   Nevi reviewed the concept of ABCDE and ugly duckling nothing markedly atypical patient reassured  Dermatofibroma advised that this is a benign process no treatment indicated  Screening for Dermatologic Disorders: Nothing else of concern noted on complete exam follow up juan c Villatoro MD  11/9/2018,8:35 AM    Portions of the record may have been created with voice recognition software   Occasional wrong word or "sound a like" substitutions may have occurred due to the inherent limitations of voice recognition software   Read the chart carefully and recognize, using context, where substitutions have occurred

## 2018-11-09 NOTE — PATIENT INSTRUCTIONS
Nevi reviewed the concept of ABCDE and ugly duckling nothing markedly atypical patient reassured  Dermatofibroma advised that this is a benign process no treatment indicated  Screening for Dermatologic Disorders: Nothing else of concern noted on complete exam follow up prn

## 2018-11-13 ENCOUNTER — TELEPHONE (OUTPATIENT)
Dept: FAMILY MEDICINE CLINIC | Facility: CLINIC | Age: 48
End: 2018-11-13

## 2018-11-13 DIAGNOSIS — M50.90 CERVICAL DISC DISORDER: ICD-10-CM

## 2018-11-13 DIAGNOSIS — M79.7 FIBROMYALGIA: Primary | ICD-10-CM

## 2018-11-13 NOTE — TELEPHONE ENCOUNTER
Faxed to patient pharmacy per patients request Please confirm the patient has restarted his Coumadin.    Also he should make a follow-up appointment with me in one to 2 weeks. Sciatica is a relatively common condition can be secondary to chronic degeneration in the low back region both in the disc and with arthritis. Follow treatment plan as recommended by the ER and follow-up with me in one to 2 weeks. In the majority of cases this does resolve within 4-6 weeks but at times may need further intervention and treatment.

## 2018-11-13 NOTE — TELEPHONE ENCOUNTER
Patient called and would like to speak to someone in regards to getting a walking cane    She said that she spoke to her insurance, which they told her to speak to her PCP get a DME script for the cane

## 2018-11-28 ENCOUNTER — TELEPHONE (OUTPATIENT)
Dept: FAMILY MEDICINE CLINIC | Facility: CLINIC | Age: 48
End: 2018-11-28

## 2018-11-28 NOTE — TELEPHONE ENCOUNTER
Patient called and stated that she had called last week in regards to getting a Nebulizer machine for her to use    Please call the patient to advise on the status [phone]

## 2018-11-29 ENCOUNTER — TELEPHONE (OUTPATIENT)
Dept: FAMILY MEDICINE CLINIC | Facility: CLINIC | Age: 48
End: 2018-11-29

## 2019-01-07 ENCOUNTER — HOSPITAL ENCOUNTER (EMERGENCY)
Facility: HOSPITAL | Age: 49
Discharge: HOME | End: 2019-01-07
Attending: EMERGENCY MEDICINE
Payer: COMMERCIAL

## 2019-01-07 VITALS
OXYGEN SATURATION: 97 % | RESPIRATION RATE: 18 BRPM | BODY MASS INDEX: 26.46 KG/M2 | HEIGHT: 64 IN | TEMPERATURE: 98 F | SYSTOLIC BLOOD PRESSURE: 117 MMHG | HEART RATE: 94 BPM | DIASTOLIC BLOOD PRESSURE: 69 MMHG | WEIGHT: 155 LBS

## 2019-01-07 DIAGNOSIS — R19.7 NAUSEA VOMITING AND DIARRHEA: Primary | ICD-10-CM

## 2019-01-07 DIAGNOSIS — E78.2 MIXED HYPERLIPIDEMIA: ICD-10-CM

## 2019-01-07 DIAGNOSIS — M79.10 MYALGIA: ICD-10-CM

## 2019-01-07 DIAGNOSIS — F32.89 OTHER DEPRESSION: ICD-10-CM

## 2019-01-07 DIAGNOSIS — R11.2 NAUSEA VOMITING AND DIARRHEA: Primary | ICD-10-CM

## 2019-01-07 LAB
ANION GAP SERPL CALCULATED.3IONS-SCNC: 9 MMOL/L (ref 4–13)
BASOPHILS # BLD AUTO: 0.01 THOUSANDS/ΜL (ref 0–0.1)
BASOPHILS NFR BLD AUTO: 0 % (ref 0–1)
BUN SERPL-MCNC: 8 MG/DL (ref 5–25)
CALCIUM SERPL-MCNC: 9.1 MG/DL (ref 8.3–10.1)
CHLORIDE SERPL-SCNC: 105 MMOL/L (ref 100–108)
CO2 SERPL-SCNC: 26 MMOL/L (ref 21–32)
CREAT SERPL-MCNC: 0.76 MG/DL (ref 0.6–1.3)
EOSINOPHIL # BLD AUTO: 0.07 THOUSAND/ΜL (ref 0–0.61)
EOSINOPHIL NFR BLD AUTO: 1 % (ref 0–6)
ERYTHROCYTE [DISTWIDTH] IN BLOOD BY AUTOMATED COUNT: 12 % (ref 11.6–15.1)
GFR SERPL CREATININE-BSD FRML MDRD: 94 ML/MIN/1.73SQ M
GLUCOSE SERPL-MCNC: 85 MG/DL (ref 65–140)
HCT VFR BLD AUTO: 40.8 % (ref 34.8–46.1)
HGB BLD-MCNC: 13.3 G/DL (ref 11.5–15.4)
IMM GRANULOCYTES # BLD AUTO: 0.01 THOUSAND/UL (ref 0–0.2)
IMM GRANULOCYTES NFR BLD AUTO: 0 % (ref 0–2)
LYMPHOCYTES # BLD AUTO: 1.82 THOUSANDS/ΜL (ref 0.6–4.47)
LYMPHOCYTES NFR BLD AUTO: 28 % (ref 14–44)
MAGNESIUM SERPL-MCNC: 2 MG/DL (ref 1.6–2.6)
MCH RBC QN AUTO: 29.8 PG (ref 26.8–34.3)
MCHC RBC AUTO-ENTMCNC: 32.6 G/DL (ref 31.4–37.4)
MCV RBC AUTO: 91 FL (ref 82–98)
MONOCYTES # BLD AUTO: 0.28 THOUSAND/ΜL (ref 0.17–1.22)
MONOCYTES NFR BLD AUTO: 4 % (ref 4–12)
NEUTROPHILS # BLD AUTO: 4.3 THOUSANDS/ΜL (ref 1.85–7.62)
NEUTS SEG NFR BLD AUTO: 67 % (ref 43–75)
NRBC BLD AUTO-RTO: 0 /100 WBCS
PLATELET # BLD AUTO: 220 THOUSANDS/UL (ref 149–390)
PMV BLD AUTO: 9.9 FL (ref 8.9–12.7)
POTASSIUM SERPL-SCNC: 4.2 MMOL/L (ref 3.5–5.3)
RBC # BLD AUTO: 4.47 MILLION/UL (ref 3.81–5.12)
SODIUM SERPL-SCNC: 140 MMOL/L (ref 136–145)
WBC # BLD AUTO: 6.49 THOUSAND/UL (ref 4.31–10.16)

## 2019-01-07 PROCEDURE — 96374 THER/PROPH/DIAG INJ IV PUSH: CPT

## 2019-01-07 PROCEDURE — 99284 EMERGENCY DEPT VISIT MOD MDM: CPT

## 2019-01-07 PROCEDURE — 85025 COMPLETE CBC W/AUTO DIFF WBC: CPT | Performed by: EMERGENCY MEDICINE

## 2019-01-07 PROCEDURE — 84443 ASSAY THYROID STIM HORMONE: CPT

## 2019-01-07 PROCEDURE — 96361 HYDRATE IV INFUSION ADD-ON: CPT

## 2019-01-07 PROCEDURE — 80061 LIPID PANEL: CPT

## 2019-01-07 PROCEDURE — 83735 ASSAY OF MAGNESIUM: CPT | Performed by: EMERGENCY MEDICINE

## 2019-01-07 PROCEDURE — 36415 COLL VENOUS BLD VENIPUNCTURE: CPT | Performed by: EMERGENCY MEDICINE

## 2019-01-07 PROCEDURE — 80048 BASIC METABOLIC PNL TOTAL CA: CPT | Performed by: EMERGENCY MEDICINE

## 2019-01-07 RX ORDER — KETOROLAC TROMETHAMINE 30 MG/ML
15 INJECTION, SOLUTION INTRAMUSCULAR; INTRAVENOUS ONCE
Status: COMPLETED | OUTPATIENT
Start: 2019-01-07 | End: 2019-01-07

## 2019-01-07 RX ORDER — ONDANSETRON 2 MG/ML
4 INJECTION INTRAMUSCULAR; INTRAVENOUS ONCE
Status: DISCONTINUED | OUTPATIENT
Start: 2019-01-07 | End: 2019-01-07 | Stop reason: HOSPADM

## 2019-01-07 RX ADMIN — SODIUM CHLORIDE 1000 ML: 0.9 INJECTION, SOLUTION INTRAVENOUS at 11:02

## 2019-01-07 RX ADMIN — KETOROLAC TROMETHAMINE 15 MG: 30 INJECTION, SOLUTION INTRAMUSCULAR at 11:02

## 2019-01-07 NOTE — ED PROVIDER NOTES
History  Chief Complaint   Patient presents with    Abdominal Pain     started a week ago with dizziness, headache, lightheadedness, vomiting, fever     HPI    Prior to Admission Medications   Prescriptions Last Dose Informant Patient Reported? Taking?    ALPRAZolam (XANAX) 0 25 mg tablet  Self No No   Sig: Take 1 tablet (0 25 mg total) by mouth daily at bedtime as needed for anxiety   Erenumab-aooe 70 MG/ML SOAJ  Self Yes No   Sig: Inject 70 mg under the skin   Melatonin 10 MG CAPS  Self No No   Sig: Take 1 capsule (10 mg total) by mouth daily   albuterol (PROVENTIL HFA,VENTOLIN HFA) 90 mcg/act inhaler  Self No No   Sig: Inhale 2 puffs every 6 (six) hours as needed for wheezing   betamethasone dipropionate (DIPROSONE) 0 05 % cream  Self Yes No   Sig: APPLY A THIN LAYER TO THE AFFECTED AREA(S) BY TOPICAL ROUTE ONCE DAILY   buPROPion (WELLBUTRIN SR) 150 mg 12 hr tablet  Self Yes No   Sig: Every 12 hours   buPROPion (WELLBUTRIN XL) 150 mg 24 hr tablet  Self Yes No   Sig: TAKE 1 TABLET EVERY MORNING   busPIRone (BUSPAR) 10 mg tablet  Self Yes No   Sig: Take 10 mg by mouth daily   butalbital-acetaminophen-caffeine (FIORICET,ESGIC) -40 mg per tablet  Self No No   Sig: Take 1 tablet by mouth every 4 (four) hours as needed for headaches   celecoxib (CELEBREX) 200 mg capsule  Self No No   Sig: Take 1 capsule (200 mg total) by mouth daily   citalopram (CeleXA) 20 mg tablet  Self Yes No   Sig: citalopram 20 mg tablet   divalproex sodium (DEPAKOTE) 250 mg EC tablet  Self Yes No   Sig: Take 250 mg by mouth daily   fluticasone-vilanterol (BREO ELLIPTA) 100-25 mcg/inh inhaler  Self No No   Sig: Inhale 1 puff every 24 hours   lidocaine (LIDODERM) 5 %  Self No No   Sig: Place 1 patch on the skin daily as needed for mild pain or moderate pain Remove & Discard patch within 12 hours or as directed by MD   lidocaine (LIDODERM) 5 %  Self Yes No   Sig: Place 1 patch on the skin   naproxen (NAPROSYN) 500 mg tablet  Self No No   Sig: Take 1 tablet (500 mg total) by mouth every 12 (twelve) hours as needed for mild pain or moderate pain   naratriptan (AMERGE) 2 5 MG tablet  Self Yes No   Sig: take 1 tablet by oral route once may repeat after 4 hours   naratriptan (AMERGE) 2 5 MG tablet  Self Yes No   Sig: Take 1 tablet at onset of migraine  May repeat in 4 hours if unresolved  Do not exceed 5 mg in 24 hours  Limit 2 in 24 hours and 4 per week   scopolamine (TRANSDERM-SCOP, 1 5 MG,) 1 5 mg/3 days TD 72 hr patch  Self Yes No   Sig: Transderm-Scop 1 5 mg transdermal patch (1 mg over 3 days)   thyroid (ARMOUR) 60 MG tablet  Self No No   Sig: Take 1 tablet (60 mg total) by mouth daily   traMADol (ULTRAM) 50 mg tablet  Self Yes No   Sig: tramadol 50 mg tablet      Facility-Administered Medications: None       Past Medical History:   Diagnosis Date    Anxiety     Carpal tunnel syndrome     Chronic pain     lower back    Disease of thyroid gland     Dyslipidemia     Fibromyalgia     Migraine     Psychiatric disorder     depression/anxiety    Vitamin D deficiency        Past Surgical History:   Procedure Laterality Date     SECTION      CHOLECYSTECTOMY      PARTIAL HYSTERECTOMY      TONSILLECTOMY         Family History   Problem Relation Age of Onset    No Known Problems Mother     No Known Problems Father      I have reviewed and agree with the history as documented  Social History   Substance Use Topics    Smoking status: Never Smoker    Smokeless tobacco: Never Used    Alcohol use No        Review of Systems    Physical Exam  Physical Exam   Constitutional: She is oriented to person, place, and time  She appears well-developed and well-nourished  No distress  Does not appear clinically dehyrated   HENT:   Head: Normocephalic and atraumatic  Mouth/Throat: Oropharynx is clear and moist    Eyes: Pupils are equal, round, and reactive to light  Conjunctivae are normal    Neck: Normal range of motion   No tracheal deviation present  Cardiovascular: Normal rate, regular rhythm, normal heart sounds and intact distal pulses  Pulmonary/Chest: Effort normal and breath sounds normal  No respiratory distress  Abdominal: Soft  Bowel sounds are normal  She exhibits no distension  There is generalized tenderness (very mild)  There is no rigidity, no rebound, no guarding and no CVA tenderness  Neurological: She is alert and oriented to person, place, and time  She has normal strength  GCS eye subscore is 4  GCS verbal subscore is 5  GCS motor subscore is 6  Skin: Skin is warm and dry  Psychiatric: She has a normal mood and affect  Her behavior is normal    Nursing note and vitals reviewed  Vital Signs  ED Triage Vitals [01/07/19 0953]   Temperature Pulse Respirations Blood Pressure SpO2   98 °F (36 7 °C) 94 18 117/69 97 %      Temp Source Heart Rate Source Patient Position - Orthostatic VS BP Location FiO2 (%)   Oral Monitor -- Right arm --      Pain Score       Worst Possible Pain           Vitals:    01/07/19 0953   BP: 117/69   Pulse: 94       Visual Acuity      ED Medications  Medications   sodium chloride 0 9 % bolus 1,000 mL (0 mL Intravenous Stopped 1/7/19 1145)   ketorolac (TORADOL) injection 15 mg (15 mg Intravenous Given 1/7/19 1102)       Diagnostic Studies  Results Reviewed     Procedure Component Value Units Date/Time    Basic metabolic panel [[id number]] Collected:  01/07/19 1101    Lab Status:  Final result Specimen:  Blood from Arm, Right Updated:  01/07/19 1127     Sodium 140 mmol/L      Potassium 4 2 mmol/L      Chloride 105 mmol/L      CO2 26 mmol/L      ANION GAP 9 mmol/L      BUN 8 mg/dL      Creatinine 0 76 mg/dL      Glucose 85 mg/dL      Calcium 9 1 mg/dL      eGFR 94 ml/min/1 73sq m     Narrative:         National Kidney Disease Education Program recommendations are as follows:  GFR calculation is accurate only with a steady state creatinine  Chronic Kidney disease less than 60 ml/min/1 73 sq  meters  Kidney failure less than 15 ml/min/1 73 sq  meters  Magnesium [[id number]]  (Normal) Collected:  01/07/19 1101    Lab Status:  Final result Specimen:  Blood from Arm, Right Updated:  01/07/19 1127     Magnesium 2 0 mg/dL     CBC and differential [[id number]] Collected:  01/07/19 1101    Lab Status:  Final result Specimen:  Blood from Arm, Right Updated:  01/07/19 1112     WBC 6 49 Thousand/uL      RBC 4 47 Million/uL      Hemoglobin 13 3 g/dL      Hematocrit 40 8 %      MCV 91 fL      MCH 29 8 pg      MCHC 32 6 g/dL      RDW 12 0 %      MPV 9 9 fL      Platelets 008 Thousands/uL      nRBC 0 /100 WBCs      Neutrophils Relative 67 %      Immat GRANS % 0 %      Lymphocytes Relative 28 %      Monocytes Relative 4 %      Eosinophils Relative 1 %      Basophils Relative 0 %      Neutrophils Absolute 4 30 Thousands/µL      Immature Grans Absolute 0 01 Thousand/uL      Lymphocytes Absolute 1 82 Thousands/µL      Monocytes Absolute 0 28 Thousand/µL      Eosinophils Absolute 0 07 Thousand/µL      Basophils Absolute 0 01 Thousands/µL                  No orders to display              Procedures  Procedures       Phone Contacts  ED Phone Contact    ED Course                               MDM  Number of Diagnoses or Management Options  Myalgia: new and requires workup  Nausea vomiting and diarrhea: new and requires workup  Diagnosis management comments: This is a 42-year-old female who presents here today with generalized malaise  She became sick about a week ago while on a cruise with abdominal cramping, nausea, vomiting, diarrhea  She initially had fevers up to 101 in the mornings but these have resolved  She endorses a history of IBS D, and does have mild diarrhea at baseline  However she states it has been "out of control"  Her vomiting has been improving, such that she is able to tolerate liquids and though she has been nauseous has not vomited in the past two days  She does endorse diffuse abdominal cramping  More recently, she has been having diffuse cramping of her muscles  She denies any URI symptoms  She has been taking her prescribed Flexeril which helps her sleep but does not provide significant improvement of the pain  She has not taken anything specifically for the vomiting or diarrhea  Her son became sick after she did, but she denies any other known sick contacts  She had , partial hysterectomy and cholecystectomy but no other abdominal surgeries  She denies any blood in her stools  She is here today because she still feels run down, tired, and is having significant diffuse muscle cramping, and is concerned that this is more than just a simple virus because she has not yet shaken it  ROS: Otherwise negative, unless stated as above  She is well-appearing, in no acute distress  She does have very mild diffuse abdominal tenderness  The remainder of her exam is unremarkable  She does not appear clinically dehydrated  This is most likely either viral GI illness or food poisoning from the ship  Her diffuse muscle cramping could be due to lingering affects of the illness verses mild dehydration or electrolyte abnormality for multiple episodes of vomiting or diarrhea  She has had resolution of fevers and is not having significant abdominal pain or tenderness making likelihood of acute intra-abdominal process such as colitis, diverticulitis, appendicitis per viscous with abscess less likely  We will start with lab work to evaluate for signs of significant dehydration or electrolyte abnormalities and treat her symptoms  I do not feel that she needs imaging at this point in time, unless she has significant lab abnormalities  Her lab work is unremarkable  She feels better after medications here  I discussed with her findings, treatment at home, follow up with her PCP and indications for return, and she expresses understanding with this plan         Amount and/or Complexity of Data Reviewed  Clinical lab tests: ordered and reviewed      CritCare Time    Disposition  Final diagnoses:   Nausea vomiting and diarrhea   Myalgia     Time reflects when diagnosis was documented in both MDM as applicable and the Disposition within this note     Time User Action Codes Description Comment    1/7/2019 11:32 AM Rafael Casiano Add [R11 2,  R19 7] Nausea vomiting and diarrhea     1/7/2019 11:32 AM Rafael Casiano Add [M79 10] Myalgia       ED Disposition     ED Disposition Condition Comment    Discharge  Kimberly McLeod discharge to home/self care      Condition at discharge: Good        Follow-up Information     Follow up With Specialties Details Why [address] Bartholomew, MD Family Medicine Schedule an appointment as soon as possible for a visit in 3 days As needed, to follow up on your symptoms [address]  [phone]            Discharge Medication List as of 1/7/2019 11:35 AM      CONTINUE these medications which have NOT CHANGED    Details   albuterol (PROVENTIL HFA,VENTOLIN HFA) 90 mcg/act inhaler Inhale 2 puffs every 6 (six) hours as needed for wheezing, Starting Tue 10/23/2018, Normal      ALPRAZolam (XANAX) 0 25 mg tablet Take 1 tablet (0 25 mg total) by mouth daily at bedtime as needed for anxiety, Starting Tue 10/23/2018, Normal      betamethasone dipropionate (DIPROSONE) 0 05 % cream APPLY A THIN LAYER TO THE AFFECTED AREA(S) BY TOPICAL ROUTE ONCE DAILY, Historical Med      buPROPion (WELLBUTRIN SR) 150 mg 12 hr tablet Every 12 hours, Starting Tue 5/8/2018, Historical Med      buPROPion (WELLBUTRIN XL) 150 mg 24 hr tablet TAKE 1 TABLET EVERY MORNING, Historical Med      busPIRone (BUSPAR) 10 mg tablet Take 10 mg by mouth daily, Historical Med      butalbital-acetaminophen-caffeine (FIORICET,ESGIC) -40 mg per tablet Take 1 tablet by mouth every 4 (four) hours as needed for headaches, Starting Tue 10/23/2018, Normal celecoxib (CELEBREX) 200 mg capsule Take 1 capsule (200 mg total) by mouth daily, Starting Tue 10/23/2018, Normal      citalopram (CeleXA) 20 mg tablet citalopram 20 mg tablet, Historical Med      divalproex sodium (DEPAKOTE) 250 mg EC tablet Take 250 mg by mouth daily, Historical Med      Erenumab-aooe 70 MG/ML SOAJ Inject 70 mg under the skin, Starting Fri 7/13/2018, Historical Med      fluticasone-vilanterol (BREO ELLIPTA) 100-25 mcg/inh inhaler Inhale 1 puff every 24 hours, Starting Tue 10/23/2018, Normal      !! lidocaine (LIDODERM) 5 % Place 1 patch on the skin daily as needed for mild pain or moderate pain Remove & Discard patch within 12 hours or as directed by MD, Starting Thu 7/12/2018, Print      !! lidocaine (LIDODERM) 5 % Place 1 patch on the skin, Starting Thu 7/12/2018, Historical Med      Melatonin 10 MG CAPS Take 1 capsule (10 mg total) by mouth daily, Starting Wed 7/18/2018, Print      naproxen (NAPROSYN) 500 mg tablet Take 1 tablet (500 mg total) by mouth every 12 (twelve) hours as needed for mild pain or moderate pain, Starting Thu 7/12/2018, Print      !! naratriptan (AMERGE) 2 5 MG tablet take 1 tablet by oral route once may repeat after 4 hours, Historical Med      !! naratriptan (AMERGE) 2 5 MG tablet Take 1 tablet at onset of migraine  May repeat in 4 hours if unresolved  Do not exceed 5 mg in 24 hours  Limit 2 in 24 hours and 4 per week, Historical Med      scopolamine (TRANSDERM-SCOP, 1 5 MG,) 1 5 mg/3 days TD 72 hr patch Transderm-Scop 1 5 mg transdermal patch (1 mg over 3 days), Historical Med      thyroid (ARMOUR) 60 MG tablet Take 1 tablet (60 mg total) by mouth daily, Starting Tue 10/23/2018, Print      traMADol (ULTRAM) 50 mg tablet tramadol 50 mg tablet, Historical Med       !! - Potential duplicate medications found  Please discuss with provider  No discharge procedures on file      ED Provider  Electronically Signed by           Starla Casiano MD  01/08/19 4936

## 2019-01-07 NOTE — DISCHARGE INSTRUCTIONS
Take ibuprofen (Motrin, Advil) or acetaminophen (Tylenol) as needed for pain, as per the instructions  Use ice or heat, or topical muscle rubs to the area to help with the pain  Do your normal daily activities to keep the muscles loose  Drink plenty of fluids while you are sick, and eat as you are able to tolerate  Follow up with the primary care doctor to make sure that you are doing better  Acute Diarrhea   WHAT YOU NEED TO KNOW:   Acute diarrhea starts quickly and lasts a short time, usually 1 to 3 days  It can last up to 2 weeks  You may not be able to control your diarrhea  Acute diarrhea usually stops on its own  DISCHARGE INSTRUCTIONS:   Return to the emergency department if:   · You feel confused  · Your heartbeat is faster than normal      · Your eyes look deeply sunken, or you have no tears when you cry  · You urinate less than usual, or your urine is dark yellow  · You have blood or mucus in your stools  · You have severe abdominal pain  · You are unable to drink any liquids  Contact your healthcare provider if:   · Your symptoms do not get better with treatment  · You have a fever higher than 101 3°F (38 5°C)  · You have trouble eating and drinking because you are vomiting  · You are thirsty or have a dry mouth  · Your diarrhea does not get better in 7 days  · You have questions or concerns about your condition or care  Follow up with your healthcare provider as directed:  Write down your questions so you remember to ask them during your visits  Medicines:  · Diarrhea medicine  is an over-the-counter medicine that helps slow or stop your diarrhea  If you take other medicines, talk to your healthcare provider before you take diarrhea medicine  · Antibiotics  may be given to help treat an infection caused by bacteria  · Antiparasitics  may be given to treat an infection caused by parasites  · Take your medicine as directed    Contact your healthcare provider if you think your medicine is not helping or if you have side effects  Tell him of her if you are allergic to any medicine  Keep a list of the medicines, vitamins, and herbs you take  Include the amounts, and when and why you take them  Bring the list or the pill bottles to follow-up visits  Carry your medicine list with you in case of an emergency  Self-care:   · Drink liquids as directed  Liquids will help prevent dehydration caused by diarrhea  Ask your healthcare provider how much liquid to drink each day and which liquids are best for you  You may need to drink an oral rehydration solution (ORS)  An ORS has the right amounts of water, salts, and sugar you need to replace body fluids  You can buy an ORS at most grocery stores and pharmacies  · Eat foods that are easy to digest   Examples include rice, lentils, cereal, bananas, potatoes, and bread  It also includes some fruits (bananas, melon), well-cooked vegetables, and lean meats  Avoid foods high in fiber, fat, and sugar  Also avoid caffeine, alcohol, dairy, and red meat until your diarrhea is gone  Prevent acute diarrhea:   · Wash your hands often  Use soap and water  Wash your hands before you eat or prepare food  Also wash your hands after you use the bathroom  Use an alcohol-based hand gel when soap and water are not available  · Keep bathroom surfaces clean  This helps prevent the spread of germs that cause acute diarrhea  · Wash fruits and vegetables well before you eat them  This can help remove germs that cause diarrhea  If possible, remove the skin from fruits and vegetables, or cook them well before you eat them  · Cook meat as directed  ¨ Cook ground meat  to 160°F      ¨ Cook ground poultry, whole poultry, or cuts of poultry  to at least 165°F  Remove the meat from heat  Let it stand for 3 minutes before you eat it       ¨ Cook whole cuts of meat other than poultry  to at least 145°F  Remove the meat from heat  Let it stand for 3 minutes before you eat it  · Wash dishes that have touched raw meat with hot water and soap  This includes cutting boards, utensils, dishes, and serving containers  · Place raw or cooked meat in the refrigerator as soon as possible  Bacteria can grow in meat that is left at room temperature too long  · Do not eat raw or undercooked oysters, clams, or mussels  These foods may be contaminated and cause infection  · Drink filtered or treated water only when you travel  Do not put ice in your drinks  Drink bottled water whenever possible  © 2017 [employer] Information is for End User's use only and may not be sold, redistributed or otherwise used for commercial purposes  All illustrations and images included in CareNotes® are the copyrighted property of [employer]  or [employer]  The above information is an  only  It is not intended as medical advice for individual conditions or treatments  Talk to your doctor, nurse or pharmacist before following any medical regimen to see if it is safe and effective for you

## 2019-01-09 ENCOUNTER — OFFICE VISIT (OUTPATIENT)
Dept: FAMILY MEDICINE CLINIC | Facility: CLINIC | Age: 49
End: 2019-01-09
Payer: COMMERCIAL

## 2019-01-09 VITALS
BODY MASS INDEX: 26.46 KG/M2 | OXYGEN SATURATION: 99 % | DIASTOLIC BLOOD PRESSURE: 74 MMHG | HEIGHT: 64 IN | WEIGHT: 155 LBS | HEART RATE: 90 BPM | SYSTOLIC BLOOD PRESSURE: 118 MMHG

## 2019-01-09 DIAGNOSIS — F32.89 OTHER DEPRESSION: Primary | ICD-10-CM

## 2019-01-09 DIAGNOSIS — R53.82 CHRONIC FATIGUE: ICD-10-CM

## 2019-01-09 DIAGNOSIS — I10 ESSENTIAL HYPERTENSION: ICD-10-CM

## 2019-01-09 DIAGNOSIS — Z12.31 SCREENING MAMMOGRAM, ENCOUNTER FOR: ICD-10-CM

## 2019-01-09 DIAGNOSIS — Z11.59 NEED FOR HEPATITIS C SCREENING TEST: ICD-10-CM

## 2019-01-09 DIAGNOSIS — E55.9 HYPOVITAMINOSIS D: ICD-10-CM

## 2019-01-09 DIAGNOSIS — Z76.89 ESTABLISHING CARE WITH NEW DOCTOR, ENCOUNTER FOR: ICD-10-CM

## 2019-01-09 DIAGNOSIS — R21 RASH: ICD-10-CM

## 2019-01-09 DIAGNOSIS — E78.2 MIXED HYPERLIPIDEMIA: ICD-10-CM

## 2019-01-09 DIAGNOSIS — M81.0 AGE-RELATED OSTEOPOROSIS WITHOUT CURRENT PATHOLOGICAL FRACTURE: ICD-10-CM

## 2019-01-09 DIAGNOSIS — M25.50 ARTHRALGIA, UNSPECIFIED JOINT: ICD-10-CM

## 2019-01-09 LAB
CHOLEST SERPL-MCNC: 194 MG/DL (ref 50–200)
HDLC SERPL-MCNC: 51 MG/DL (ref 40–60)
LDLC SERPL CALC-MCNC: 108 MG/DL (ref 0–100)
NONHDLC SERPL-MCNC: 143 MG/DL
TRIGL SERPL-MCNC: 173 MG/DL
TSH SERPL DL<=0.05 MIU/L-ACNC: 3.55 UIU/ML (ref 0.36–3.74)

## 2019-01-09 PROCEDURE — 3008F BODY MASS INDEX DOCD: CPT | Performed by: FAMILY MEDICINE

## 2019-01-09 PROCEDURE — 3074F SYST BP LT 130 MM HG: CPT | Performed by: FAMILY MEDICINE

## 2019-01-09 PROCEDURE — 99214 OFFICE O/P EST MOD 30 MIN: CPT | Performed by: FAMILY MEDICINE

## 2019-01-09 PROCEDURE — 3078F DIAST BP <80 MM HG: CPT | Performed by: FAMILY MEDICINE

## 2019-01-09 RX ORDER — BUPROPION HYDROCHLORIDE 150 MG/1
300 TABLET ORAL EVERY MORNING
Refills: 0
Start: 2019-01-09 | End: 2019-11-20 | Stop reason: ALTCHOICE

## 2019-01-09 RX ORDER — PROCHLORPERAZINE MALEATE 10 MG
TABLET ORAL 3 TIMES DAILY
COMMUNITY
Start: 2018-05-08 | End: 2019-05-20 | Stop reason: ALTCHOICE

## 2019-01-09 RX ORDER — DEXAMETHASONE 4 MG/1
TABLET ORAL
COMMUNITY
Start: 2018-12-03 | End: 2019-11-20 | Stop reason: ALTCHOICE

## 2019-01-09 RX ORDER — CYCLOBENZAPRINE HCL 10 MG
10 TABLET ORAL 3 TIMES DAILY PRN
COMMUNITY
End: 2020-06-17 | Stop reason: SDUPTHER

## 2019-01-09 NOTE — PATIENT INSTRUCTIONS
Fasting blood work has been ordered  documistic and DEXA have been ordered  I want to sign for your records from your Doctors   you see a lot of specialists    a lot of consults   I need records so we can see that were all on the same page  The next appointment I want to be a full appointment 45 minutes

## 2019-01-09 NOTE — PROGRESS NOTES
Standard Visit   Assessment/Plan:     Visit Diagnosis:  Diagnoses and all orders for this visit:    Other depression  -     buPROPion (WELLBUTRIN XL) 150 mg 24 hr tablet; Take 2 tablets (300 mg total) by mouth every morning  -     TSH, 3rd generation with Free T4 reflex; Future    Establishing care with new doctor, encounter for    Mixed hyperlipidemia  -     Lipid panel; Future    Essential hypertension  -     Microalbumin / creatinine urine ratio  -     Comprehensive metabolic panel; Future    Need for hepatitis C screening test  -     Hepatitis C antibody; Future    Chronic fatigue  -     Vitamin D 25 hydroxy; Future    Hypovitaminosis D  -     Vitamin D 25 hydroxy; Future    Arthralgia, unspecified joint  -     RAOUL Screen w/ Reflex to Titer/Pattern; Future  -     RF Screen w/ Reflex to Titer; Future    Rash  -     RAOUL Screen w/ Reflex to Titer/Pattern; Future  -     RF Screen w/ Reflex to Titer; Future    Screening mammogram, encounter for  -     Mammo screening bilateral w cad; Future    Age-related osteoporosis without current pathological fracture    Other orders  -     Erenumab-aooe 70 MG/ML SOAJ; Inject 70 mg under the skin  -     diclofenac sodium (VOLTAREN) 1 %; APPLY 2 GRAM TO THE AFFECTED AREA(S) BY TOPICAL ROUTE 4 TIMES PER DAY  -     cyclobenzaprine (FLEXERIL) 10 mg tablet; Take 10 mg by mouth Three times daily as needed  -     prochlorperazine (COMPAZINE) 10 mg tablet; 3 (three) times a day  -     dexamethasone (DECADRON) 4 mg tablet; Take 3 tabs day 1, 2 tabs day 2, 1 tab day 3  Then stop  Take in the a m with food  Subjective:    Patient ID: Kady Wu is a 53 y o  female  Patient is here with the following concerns:     To establish  We are going to order routine screening fasting blood work as a new patient today  Last mammo possibly was 2018 but she is not sure  Gyn exam was years ago  Partial hysterectomy was done in 2011 for fibroid myoma   hypothyroidism which for which she takes Gorman Thyroid  She carries a diagnosis of fibromyalgia    She sees a rheumatologist at [place] [address]  Dr Devaughn Martin  She sees a psychiatrist Dr Orquidea Salmeron  She does not have an endocrinologist  She has a consult specialist for orthopedic for her knees Dr Matt GLEASON  She has asthma for which she takes Advair inhalers and rescue inhalers  She sees a podiatrist for plantar fasciitis        The following portions of the patient's history were reviewed and updated as appropriate: allergies, current medications, past family history, past medical history, past social history, past surgical history and problem list     Review of Systems   Constitutional: Positive for activity change  Musculoskeletal:        Fibromyalgia  Bilateral carpal tunnel  Plantar fasciitis   Psychiatric/Behavioral: Positive for sleep disturbance  /74   Pulse 90   Ht 5' 4" (1 626 m)   Wt 70 3 kg (155 lb)   SpO2 99%   BMI 26 61 kg/m²   Social History     Social History    Marital status: Single     Spouse name: N/A    Number of children: N/A    Years of education: N/A     Occupational History    Not on file       Social History Main Topics    Smoking status: Never Smoker    Smokeless tobacco: Never Used    Alcohol use No    Drug use: No    Sexual activity: No     Other Topics Concern    Not on file     Social History Narrative    No narrative on file     Past Medical History:   Diagnosis Date    Anxiety     Carpal tunnel syndrome     Chronic pain     lower back    Disease of thyroid gland     Dyslipidemia     Fibromyalgia     Migraine     Psychiatric disorder     depression/anxiety    Vitamin D deficiency      Family History   Problem Relation Age of Onset    No Known Problems Mother     No Known Problems Father      Past Surgical History:   Procedure Laterality Date     SECTION      CHOLECYSTECTOMY      PARTIAL HYSTERECTOMY      TONSILLECTOMY         Current Outpatient Prescriptions:     dexamethasone (DECADRON) 4 mg tablet, Take 3 tabs day 1, 2 tabs day 2, 1 tab day 3  Then stop  Take in the a m with food  , Disp: , Rfl:     Erenumab-aooe 70 MG/ML SOAJ, Inject 70 mg under the skin, Disp: , Rfl:     prochlorperazine (COMPAZINE) 10 mg tablet, 3 (three) times a day, Disp: , Rfl:     albuterol (PROVENTIL HFA,VENTOLIN HFA) 90 mcg/act inhaler, Inhale 2 puffs every 6 (six) hours as needed for wheezing, Disp: 3 Inhaler, Rfl: 2    ALPRAZolam (XANAX) 0 25 mg tablet, Take 1 tablet (0 25 mg total) by mouth daily at bedtime as needed for anxiety, Disp: 30 tablet, Rfl: 1    betamethasone dipropionate (DIPROSONE) 0 05 % cream, APPLY A THIN LAYER TO THE AFFECTED AREA(S) BY TOPICAL ROUTE ONCE DAILY, Disp: , Rfl:     buPROPion (WELLBUTRIN XL) 150 mg 24 hr tablet, Take 2 tablets (300 mg total) by mouth every morning, Disp: , Rfl: 0    busPIRone (BUSPAR) 10 mg tablet, Take 10 mg by mouth daily, Disp: , Rfl:     butalbital-acetaminophen-caffeine (FIORICET,ESGIC) -40 mg per tablet, Take 1 tablet by mouth every 4 (four) hours as needed for headaches, Disp: 30 tablet, Rfl: 2    celecoxib (CELEBREX) 200 mg capsule, Take 1 capsule (200 mg total) by mouth daily, Disp: 30 capsule, Rfl: 2    citalopram (CeleXA) 20 mg tablet, citalopram 20 mg tablet, Disp: , Rfl:     cyclobenzaprine (FLEXERIL) 10 mg tablet, Take 10 mg by mouth Three times daily as needed, Disp: , Rfl:     diclofenac sodium (VOLTAREN) 1 %, APPLY 2 GRAM TO THE AFFECTED AREA(S) BY TOPICAL ROUTE 4 TIMES PER DAY, Disp: , Rfl:     divalproex sodium (DEPAKOTE) 250 mg EC tablet, Take 250 mg by mouth daily, Disp: , Rfl:     fluticasone-vilanterol (BREO ELLIPTA) 100-25 mcg/inh inhaler, Inhale 1 puff every 24 hours, Disp: 3 Inhaler, Rfl: 2    lidocaine (LIDODERM) 5 %, Place 1 patch on the skin daily as needed for mild pain or moderate pain Remove & Discard patch within 12 hours or as directed by MD, Disp: 6 patch, Rfl: 0   Melatonin 10 MG CAPS, Take 1 capsule (10 mg total) by mouth daily, Disp: 30 capsule, Rfl: 1    naproxen (NAPROSYN) 500 mg tablet, Take 1 tablet (500 mg total) by mouth every 12 (twelve) hours as needed for mild pain or moderate pain, Disp: 20 tablet, Rfl: 0    naratriptan (AMERGE) 2 5 MG tablet, Take 1 tablet at onset of migraine  May repeat in 4 hours if unresolved  Do not exceed 5 mg in 24 hours  Limit 2 in 24 hours and 4 per week, Disp: , Rfl:     scopolamine (TRANSDERM-SCOP, 1 5 MG,) 1 5 mg/3 days TD 72 hr patch, Transderm-Scop 1 5 mg transdermal patch (1 mg over 3 days), Disp: , Rfl:     thyroid (ARMOUR) 60 MG tablet, Take 1 tablet (60 mg total) by mouth daily, Disp: 90 tablet, Rfl: 3      Objective:     Physical Exam   Constitutional: She is oriented to person, place, and time  She appears well-developed and well-nourished  HENT:   Head: Normocephalic and atraumatic  Eyes: Pupils are equal, round, and reactive to light  Neck: Normal range of motion  Neck supple  Cardiovascular: Normal rate  Pulmonary/Chest: Effort normal and breath sounds normal    Abdominal: Soft  Musculoskeletal: Normal range of motion  Neurological: She is alert and oriented to person, place, and time  Skin: Skin is warm and dry  Psychiatric: She has a normal mood and affect  Nursing note and vitals reviewed  Social History     Social History    Marital status: Single     Spouse name: N/A    Number of children: N/A    Years of education: N/A     Occupational History    Not on file       Social History Main Topics    Smoking status: Never Smoker    Smokeless tobacco: Never Used    Alcohol use No    Drug use: No    Sexual activity: No     Other Topics Concern    Not on file     Social History Narrative    No narrative on file     Past Medical History:   Diagnosis Date    Anxiety     Carpal tunnel syndrome     Chronic pain     lower back    Disease of thyroid gland     Dyslipidemia     Fibromyalgia  Migraine     Psychiatric disorder     depression/anxiety    Vitamin D deficiency        Current Outpatient Prescriptions:     dexamethasone (DECADRON) 4 mg tablet, Take 3 tabs day 1, 2 tabs day 2, 1 tab day 3  Then stop  Take in the a m with food  , Disp: , Rfl:     Erenumab-aooe 70 MG/ML SOAJ, Inject 70 mg under the skin, Disp: , Rfl:     prochlorperazine (COMPAZINE) 10 mg tablet, 3 (three) times a day, Disp: , Rfl:     albuterol (PROVENTIL HFA,VENTOLIN HFA) 90 mcg/act inhaler, Inhale 2 puffs every 6 (six) hours as needed for wheezing, Disp: 3 Inhaler, Rfl: 2    ALPRAZolam (XANAX) 0 25 mg tablet, Take 1 tablet (0 25 mg total) by mouth daily at bedtime as needed for anxiety, Disp: 30 tablet, Rfl: 1    betamethasone dipropionate (DIPROSONE) 0 05 % cream, APPLY A THIN LAYER TO THE AFFECTED AREA(S) BY TOPICAL ROUTE ONCE DAILY, Disp: , Rfl:     buPROPion (WELLBUTRIN XL) 150 mg 24 hr tablet, Take 2 tablets (300 mg total) by mouth every morning, Disp: , Rfl: 0    busPIRone (BUSPAR) 10 mg tablet, Take 10 mg by mouth daily, Disp: , Rfl:     butalbital-acetaminophen-caffeine (FIORICET,ESGIC) -40 mg per tablet, Take 1 tablet by mouth every 4 (four) hours as needed for headaches, Disp: 30 tablet, Rfl: 2    celecoxib (CELEBREX) 200 mg capsule, Take 1 capsule (200 mg total) by mouth daily, Disp: 30 capsule, Rfl: 2    citalopram (CeleXA) 20 mg tablet, citalopram 20 mg tablet, Disp: , Rfl:     cyclobenzaprine (FLEXERIL) 10 mg tablet, Take 10 mg by mouth Three times daily as needed, Disp: , Rfl:     diclofenac sodium (VOLTAREN) 1 %, APPLY 2 GRAM TO THE AFFECTED AREA(S) BY TOPICAL ROUTE 4 TIMES PER DAY, Disp: , Rfl:     divalproex sodium (DEPAKOTE) 250 mg EC tablet, Take 250 mg by mouth daily, Disp: , Rfl:     fluticasone-vilanterol (BREO ELLIPTA) 100-25 mcg/inh inhaler, Inhale 1 puff every 24 hours, Disp: 3 Inhaler, Rfl: 2    lidocaine (LIDODERM) 5 %, Place 1 patch on the skin daily as needed for mild pain or moderate pain Remove & Discard patch within 12 hours or as directed by MD, Disp: 6 patch, Rfl: 0    Melatonin 10 MG CAPS, Take 1 capsule (10 mg total) by mouth daily, Disp: 30 capsule, Rfl: 1    naproxen (NAPROSYN) 500 mg tablet, Take 1 tablet (500 mg total) by mouth every 12 (twelve) hours as needed for mild pain or moderate pain, Disp: 20 tablet, Rfl: 0    naratriptan (AMERGE) 2 5 MG tablet, Take 1 tablet at onset of migraine  May repeat in 4 hours if unresolved  Do not exceed 5 mg in 24 hours   Limit 2 in 24 hours and 4 per week, Disp: , Rfl:     scopolamine (TRANSDERM-SCOP, 1 5 MG,) 1 5 mg/3 days TD 72 hr patch, Transderm-Scop 1 5 mg transdermal patch (1 mg over 3 days), Disp: , Rfl:     thyroid (ARMOUR) 60 MG tablet, Take 1 tablet (60 mg total) by mouth daily, Disp: 90 tablet, Rfl: 3  Family History   Problem Relation Age of Onset    No Known Problems Mother     No Known Problems Father        Patient Instructions   Fasting blood work has been ordered  [facility] and DEXA have been ordered  I want to sign for your records from your Doctors   you see a lot of specialists    a lot of consults   I need records so we can see that were all on the same page  The next appointment I want to be a full appointment 45 minutes          ERICKA Herrera

## 2019-01-10 ENCOUNTER — LAB (OUTPATIENT)
Dept: LAB | Facility: HOSPITAL | Age: 49
End: 2019-01-10
Payer: COMMERCIAL

## 2019-01-10 DIAGNOSIS — E55.9 HYPOVITAMINOSIS D: ICD-10-CM

## 2019-01-10 DIAGNOSIS — R21 RASH: ICD-10-CM

## 2019-01-10 DIAGNOSIS — M25.50 ARTHRALGIA, UNSPECIFIED JOINT: ICD-10-CM

## 2019-01-10 DIAGNOSIS — R53.82 CHRONIC FATIGUE: ICD-10-CM

## 2019-01-10 DIAGNOSIS — Z11.59 NEED FOR HEPATITIS C SCREENING TEST: ICD-10-CM

## 2019-01-10 DIAGNOSIS — I10 ESSENTIAL HYPERTENSION: ICD-10-CM

## 2019-01-10 LAB
25(OH)D3 SERPL-MCNC: 33.8 NG/ML (ref 30–100)
ALBUMIN SERPL BCP-MCNC: 3.9 G/DL (ref 3.5–5)
ALP SERPL-CCNC: 56 U/L (ref 46–116)
ALT SERPL W P-5'-P-CCNC: 31 U/L (ref 12–78)
ANION GAP SERPL CALCULATED.3IONS-SCNC: 9 MMOL/L (ref 4–13)
AST SERPL W P-5'-P-CCNC: 18 U/L (ref 5–45)
BILIRUB SERPL-MCNC: 0.3 MG/DL (ref 0.2–1)
BUN SERPL-MCNC: 14 MG/DL (ref 5–25)
CALCIUM SERPL-MCNC: 9 MG/DL (ref 8.3–10.1)
CHLORIDE SERPL-SCNC: 105 MMOL/L (ref 100–108)
CO2 SERPL-SCNC: 25 MMOL/L (ref 21–32)
CREAT SERPL-MCNC: 0.71 MG/DL (ref 0.6–1.3)
GFR SERPL CREATININE-BSD FRML MDRD: 102 ML/MIN/1.73SQ M
GLUCOSE P FAST SERPL-MCNC: 86 MG/DL (ref 65–99)
POTASSIUM SERPL-SCNC: 4.2 MMOL/L (ref 3.5–5.3)
PROT SERPL-MCNC: 7.1 G/DL (ref 6.4–8.2)
SODIUM SERPL-SCNC: 139 MMOL/L (ref 136–145)

## 2019-01-10 PROCEDURE — 82306 VITAMIN D 25 HYDROXY: CPT

## 2019-01-10 PROCEDURE — 80053 COMPREHEN METABOLIC PANEL: CPT

## 2019-01-10 PROCEDURE — 36415 COLL VENOUS BLD VENIPUNCTURE: CPT

## 2019-01-10 PROCEDURE — 86430 RHEUMATOID FACTOR TEST QUAL: CPT

## 2019-01-10 PROCEDURE — 86038 ANTINUCLEAR ANTIBODIES: CPT

## 2019-01-10 PROCEDURE — 86431 RHEUMATOID FACTOR QUANT: CPT

## 2019-01-10 PROCEDURE — 86803 HEPATITIS C AB TEST: CPT

## 2019-01-11 LAB
CRYOGLOB RF SER-ACNC: ABNORMAL [IU]/ML
HCV AB SER QL: NORMAL
RHEUMATOID FACT SER QL LA: POSITIVE

## 2019-01-12 DIAGNOSIS — M05.80 POLYARTHRITIS WITH POSITIVE RHEUMATOID FACTOR (HCC): Primary | ICD-10-CM

## 2019-01-12 LAB — RYE IGE QN: NEGATIVE

## 2019-01-12 NOTE — PROGRESS NOTES
Let pt know they have abnormal lab result    Lab result positive for rheumatoid factor  I need her to be seen by rheumatologist am going to set up the consult for her to see Jaime Tapia  The rest the lab work is not in yet we are still waiting on the [address] [place] [address] but I will set up this appointment        Continue same advice discussed in office

## 2019-01-15 ENCOUNTER — TELEPHONE (OUTPATIENT)
Dept: OTHER | Facility: OTHER | Age: 49
End: 2019-01-15

## 2019-01-15 DIAGNOSIS — R06.2 WHEEZING: Primary | ICD-10-CM

## 2019-01-16 DIAGNOSIS — J45.41 MODERATE PERSISTENT ASTHMA WITH ACUTE EXACERBATION: Primary | ICD-10-CM

## 2019-01-22 ENCOUNTER — HOSPITAL ENCOUNTER (OUTPATIENT)
Dept: RADIOLOGY | Facility: HOSPITAL | Age: 49
Discharge: HOME | End: 2019-01-22
Payer: COMMERCIAL

## 2019-01-22 ENCOUNTER — OFFICE VISIT (OUTPATIENT)
Dept: FAMILY MEDICINE CLINIC | Facility: CLINIC | Age: 49
End: 2019-01-22
Payer: COMMERCIAL

## 2019-01-22 ENCOUNTER — TRANSCRIBE ORDERS (OUTPATIENT)
Dept: LAB | Facility: HOSPITAL | Age: 49
End: 2019-01-22

## 2019-01-22 ENCOUNTER — APPOINTMENT (OUTPATIENT)
Dept: LAB | Facility: HOSPITAL | Age: 49
End: 2019-01-22
Payer: COMMERCIAL

## 2019-01-22 ENCOUNTER — TELEPHONE (OUTPATIENT)
Dept: FAMILY MEDICINE CLINIC | Facility: CLINIC | Age: 49
End: 2019-01-22

## 2019-01-22 VITALS
TEMPERATURE: 97.9 F | HEART RATE: 104 BPM | SYSTOLIC BLOOD PRESSURE: 110 MMHG | OXYGEN SATURATION: 98 % | BODY MASS INDEX: 26.61 KG/M2 | HEIGHT: 64 IN | DIASTOLIC BLOOD PRESSURE: 64 MMHG

## 2019-01-22 DIAGNOSIS — R06.02 SHORTNESS OF BREATH: ICD-10-CM

## 2019-01-22 DIAGNOSIS — R05.9 COUGH IN ADULT: ICD-10-CM

## 2019-01-22 DIAGNOSIS — R05.9 COUGH IN ADULT: Primary | ICD-10-CM

## 2019-01-22 DIAGNOSIS — R06.02 SHORTNESS OF BREATH: Primary | ICD-10-CM

## 2019-01-22 DIAGNOSIS — I10 ESSENTIAL HYPERTENSION, MALIGNANT: Primary | ICD-10-CM

## 2019-01-22 LAB
CREAT UR-MCNC: 59.6 MG/DL
MICROALBUMIN UR-MCNC: <5 MG/L (ref 0–20)
MICROALBUMIN/CREAT 24H UR: <8 MG/G CREATININE (ref 0–30)

## 2019-01-22 PROCEDURE — 82043 UR ALBUMIN QUANTITATIVE: CPT | Performed by: FAMILY MEDICINE

## 2019-01-22 PROCEDURE — 71046 X-RAY EXAM CHEST 2 VIEWS: CPT

## 2019-01-22 PROCEDURE — 82570 ASSAY OF URINE CREATININE: CPT | Performed by: FAMILY MEDICINE

## 2019-01-22 PROCEDURE — 99214 OFFICE O/P EST MOD 30 MIN: CPT | Performed by: NURSE PRACTITIONER

## 2019-01-22 RX ORDER — DEXTROMETHORPHAN HYDROBROMIDE AND PROMETHAZINE HYDROCHLORIDE 15; 6.25 MG/5ML; MG/5ML
5 SYRUP ORAL 4 TIMES DAILY PRN
Qty: 118 ML | Refills: 0 | Status: SHIPPED | OUTPATIENT
Start: 2019-01-22 | End: 2019-05-20 | Stop reason: ALTCHOICE

## 2019-01-22 RX ORDER — BENZONATATE 100 MG/1
100 CAPSULE ORAL 3 TIMES DAILY PRN
Qty: 20 CAPSULE | Refills: 0 | Status: SHIPPED | OUTPATIENT
Start: 2019-01-22 | End: 2019-05-02 | Stop reason: SDUPTHER

## 2019-01-22 RX ORDER — PREDNISONE 20 MG/1
20 TABLET ORAL DAILY
Qty: 5 TABLET | Refills: 0 | Status: SHIPPED | OUTPATIENT
Start: 2019-01-22 | End: 2019-05-20 | Stop reason: ALTCHOICE

## 2019-01-22 RX ORDER — ALBUTEROL SULFATE 2.5 MG/3ML
2.5 SOLUTION RESPIRATORY (INHALATION) EVERY 6 HOURS PRN
Qty: 20 VIAL | Refills: 0 | Status: SHIPPED | OUTPATIENT
Start: 2019-01-22

## 2019-01-22 NOTE — PROGRESS NOTES
Assessment/Plan:    Cough in adult  Promethazine qid prn cough  Tesalon pearles tid prn cough    Shortness of breath  Chest xray to r/o pneumonia  Positive egophony on physical assessment         Problem List Items Addressed This Visit     Cough in adult - Primary     Promethazine qid prn cough  Tesalon pearles tid prn cough         Relevant Medications    promethazine-dextromethorphan (PHENERGAN-DM) 6 25-15 mg/5 mL oral syrup    benzonatate (TESSALON PERLES) 100 mg capsule    Other Relevant Orders    XR chest pa & lateral    Shortness of breath     Chest xray to r/o pneumonia  Positive egophony on physical assessment         Relevant Medications    albuterol (2 5 mg/3 mL) 0 083 % nebulizer solution    Other Relevant Orders    XR chest pa & lateral            Subjective:      Patient ID: Isela Gaffney is a 53 y o  female  Pt is here with complaints of cough, congestion, fevers and chills, body hurts, chest hurts  Did get the flu shot  Feeling sick since Sunday  Took Mucinex sinus max, promethazine with some relief  Red Litemily has also been ill, is on ABX  The following portions of the patient's history were reviewed and updated as appropriate: allergies, current medications, past family history, past medical history, past social history, past surgical history and problem list     Review of Systems   Constitutional: Positive for appetite change, fatigue and fever  HENT: Positive for congestion, postnasal drip, rhinorrhea, sinus pain, sinus pressure and sore throat  Eyes: Negative  Respiratory: Positive for cough, chest tightness and shortness of breath  Cardiovascular: Negative  Gastrointestinal: Negative  Endocrine: Negative  Genitourinary: Negative  Musculoskeletal: Negative  Skin: Negative  Allergic/Immunologic: Negative  Neurological: Positive for headaches (with cough)  Hematological: Negative  Psychiatric/Behavioral: Negative            Objective:      /64 Pulse 104   Temp 97 9 °F (36 6 °C)   Ht 5' 4" (1 626 m)   SpO2 98%   BMI 26 61 kg/m²          Physical Exam   Constitutional: She is oriented to person, place, and time  She appears well-developed and well-nourished  HENT:   Head: Normocephalic and atraumatic  Right Ear: External ear normal    Left Ear: External ear normal    Nose: Right sinus exhibits frontal sinus tenderness  Left sinus exhibits frontal sinus tenderness  Eyes: Pupils are equal, round, and reactive to light  Neck: Normal range of motion  Cardiovascular: Normal rate and regular rhythm  Pulmonary/Chest: Tachypnea noted  She is in respiratory distress  She has decreased breath sounds in the right lower field and the left lower field  She has no wheezes  She has rales in the right lower field  She exhibits tenderness  Pos egophony   Musculoskeletal: Normal range of motion  Neurological: She is alert and oriented to person, place, and time  Skin: Skin is warm and dry  There is pallor  Psychiatric: She has a normal mood and affect   Her behavior is normal  Judgment and thought content normal

## 2019-01-22 NOTE — PATIENT INSTRUCTIONS
Obtain chest xray  Use cough medicine every 6 hrs as needed   Use nebulizer treatment    Will result xray    Increase oral hydration  Warm tea with honey  Adequate rest  Tylenol or Motrin for pain and/or fever  Nasal mist  Humidify room  Use decongestant- Sudafed  Cold Yuri lozengesGood handwashing  Antihistamine - Allegra or claritin

## 2019-01-24 DIAGNOSIS — R91.1 LEFT UPPER LOBE PULMONARY NODULE: Primary | ICD-10-CM

## 2019-01-26 ENCOUNTER — TELEPHONE (OUTPATIENT)
Dept: FAMILY MEDICINE CLINIC | Facility: CLINIC | Age: 49
End: 2019-01-26

## 2019-01-26 NOTE — TELEPHONE ENCOUNTER
----- Message from Mack Choudhary sent at 1/26/2019 10:49 AM EST -----  Regarding: FW: Test Results Question  Contact: [phone]  Please call this patient back and answer her question about the nodule that they found on a chest x-ray  She emailed me with a question what is a nodule  They found a small nodule on a chest x-ray that was done  The chest x-ray was actually ordered by someone else   I was commenting on it and gave her a call  I want to make sure that follow-up was done and a CT scan was ordered  Can you please double check and make sure that it is ordered in the chart for her    It just needs when we imaged the chest that showed a small nodule this can mean many things often it means just a scar from an old bad bronchitis or an old pneumonia  We do the CT scan just to the take a better look at it and make sure that it is nothing to worry about  ----- Message -----  From: Radha Mcbride MA  Sent: 1/25/2019   1:47 PM  To: ERICKA Choudhary  Subject: FW: Test Results Question                            ----- Message -----  From: Dahlia Saucedo  Sent: 1/25/2019  12:58 PM  To: [address] [facility]  Subject: FW: Test Results Question                            ----- Message -----  From: Nisa Escudero  Sent: 1/25/2019  12:48 PM  To: [address] [facility]  Subject: Test Results Question                            I have a question about XR CHEST PA & LATERAL resulted on 1/22/19 at 1:48 PM     What does that nodular mean?

## 2019-01-28 DIAGNOSIS — R91.1 NODULE OF LEFT LUNG: Primary | ICD-10-CM

## 2019-01-30 ENCOUNTER — TELEPHONE (OUTPATIENT)
Dept: FAMILY MEDICINE CLINIC | Facility: CLINIC | Age: 49
End: 2019-01-30

## 2019-02-06 ENCOUNTER — HOSPITAL ENCOUNTER (OUTPATIENT)
Dept: CT IMAGING | Facility: HOSPITAL | Age: 49
Discharge: HOME | End: 2019-02-06
Payer: COMMERCIAL

## 2019-02-06 DIAGNOSIS — R91.1 LEFT UPPER LOBE PULMONARY NODULE: ICD-10-CM

## 2019-02-06 DIAGNOSIS — R91.1 NODULE OF LEFT LUNG: ICD-10-CM

## 2019-02-06 PROCEDURE — 71260 CT THORAX DX C+: CPT

## 2019-02-06 RX ADMIN — IOHEXOL 85 ML: 350 INJECTION, SOLUTION INTRAVENOUS at 07:58

## 2019-02-08 ENCOUNTER — TELEPHONE (OUTPATIENT)
Dept: FAMILY MEDICINE CLINIC | Facility: CLINIC | Age: 49
End: 2019-02-08

## 2019-02-11 ENCOUNTER — APPOINTMENT (OUTPATIENT)
Dept: LAB | Facility: HOSPITAL | Age: 49
End: 2019-02-11
Payer: COMMERCIAL

## 2019-02-11 ENCOUNTER — TRANSCRIBE ORDERS (OUTPATIENT)
Dept: ADMINISTRATIVE | Facility: HOSPITAL | Age: 49
End: 2019-02-11

## 2019-02-11 DIAGNOSIS — R76.8 FALSE POSITIVE SEROLOGICAL TEST FOR SYPHILIS: ICD-10-CM

## 2019-02-11 DIAGNOSIS — M25.50 PAIN IN JOINT, MULTIPLE SITES: ICD-10-CM

## 2019-02-11 DIAGNOSIS — M25.50 DIFFUSE ARTHRALGIA: ICD-10-CM

## 2019-02-11 DIAGNOSIS — R76.8 ELEVATED RHEUMATOID FACTOR: ICD-10-CM

## 2019-02-11 DIAGNOSIS — Z13.89 SCREENING FOR RHEUMATIC DISORDER: ICD-10-CM

## 2019-02-11 DIAGNOSIS — Z13.89 ENCOUNTER FOR ROUTINE SCREENING FOR MALFORMATION USING ULTRASONICS: ICD-10-CM

## 2019-02-11 DIAGNOSIS — M25.50 PAIN IN JOINT, MULTIPLE SITES: Primary | ICD-10-CM

## 2019-02-11 LAB
CRP SERPL QL: <3 MG/L
ERYTHROCYTE [SEDIMENTATION RATE] IN BLOOD: 3 MM/HOUR (ref 0–20)

## 2019-02-11 PROCEDURE — 36415 COLL VENOUS BLD VENIPUNCTURE: CPT

## 2019-02-11 PROCEDURE — 85652 RBC SED RATE AUTOMATED: CPT

## 2019-02-11 PROCEDURE — 84165 PROTEIN E-PHORESIS SERUM: CPT | Performed by: PATHOLOGY

## 2019-02-11 PROCEDURE — 86200 CCP ANTIBODY: CPT

## 2019-02-11 PROCEDURE — 84165 PROTEIN E-PHORESIS SERUM: CPT

## 2019-02-11 PROCEDURE — 86140 C-REACTIVE PROTEIN: CPT

## 2019-02-11 PROCEDURE — 86235 NUCLEAR ANTIGEN ANTIBODY: CPT

## 2019-02-12 LAB
ENA SS-A AB SER-ACNC: <0.2 AI (ref 0–0.9)
ENA SS-B AB SER-ACNC: <0.2 AI (ref 0–0.9)

## 2019-02-13 LAB — CCP IGA+IGG SERPL IA-ACNC: 4 UNITS (ref 0–19)

## 2019-02-14 LAB
ALBUMIN SERPL ELPH-MCNC: 4.75 G/DL (ref 3.5–5)
ALBUMIN SERPL ELPH-MCNC: 65 % (ref 52–65)
ALPHA1 GLOB SERPL ELPH-MCNC: 0.23 G/DL (ref 0.1–0.4)
ALPHA1 GLOB SERPL ELPH-MCNC: 3.1 % (ref 2.5–5)
ALPHA2 GLOB SERPL ELPH-MCNC: 0.64 G/DL (ref 0.4–1.2)
ALPHA2 GLOB SERPL ELPH-MCNC: 8.8 % (ref 7–13)
BETA GLOB ABNORMAL SERPL ELPH-MCNC: 0.45 G/DL (ref 0.4–0.8)
BETA1 GLOB SERPL ELPH-MCNC: 6.2 % (ref 5–13)
BETA2 GLOB SERPL ELPH-MCNC: 3.7 % (ref 2–8)
BETA2+GAMMA GLOB SERPL ELPH-MCNC: 0.27 G/DL (ref 0.2–0.5)
GAMMA GLOB ABNORMAL SERPL ELPH-MCNC: 0.96 G/DL (ref 0.5–1.6)
GAMMA GLOB SERPL ELPH-MCNC: 13.2 % (ref 12–22)
IGG/ALB SER: 1.86 {RATIO} (ref 1.1–1.8)
PROT PATTERN SERPL ELPH-IMP: ABNORMAL
PROT SERPL-MCNC: 7.3 G/DL (ref 6.4–8.2)

## 2019-03-05 NOTE — RESULT ENCOUNTER NOTE
Please call patient and let them know  The CT looked ok  The small spot they saw in the left lung did not appear on the CT scan  They did see scarring from past bronchitis and URIs on the right  These typically are of no concern  You do have a very small left sided kidney stone of no real significance

## 2019-03-07 ENCOUNTER — APPOINTMENT (EMERGENCY)
Dept: CT IMAGING | Facility: HOSPITAL | Age: 49
End: 2019-03-07
Payer: COMMERCIAL

## 2019-03-07 ENCOUNTER — HOSPITAL ENCOUNTER (EMERGENCY)
Facility: HOSPITAL | Age: 49
Discharge: HOME | End: 2019-03-07
Attending: EMERGENCY MEDICINE | Admitting: EMERGENCY MEDICINE
Payer: COMMERCIAL

## 2019-03-07 VITALS
BODY MASS INDEX: 26.6 KG/M2 | TEMPERATURE: 98.5 F | OXYGEN SATURATION: 100 % | RESPIRATION RATE: 16 BRPM | HEART RATE: 69 BPM | DIASTOLIC BLOOD PRESSURE: 59 MMHG | WEIGHT: 154.98 LBS | SYSTOLIC BLOOD PRESSURE: 113 MMHG

## 2019-03-07 DIAGNOSIS — R10.84 GENERALIZED ABDOMINAL PAIN: Primary | ICD-10-CM

## 2019-03-07 DIAGNOSIS — N94.9 ADNEXAL CYST: ICD-10-CM

## 2019-03-07 DIAGNOSIS — R19.7 DIARRHEA, UNSPECIFIED TYPE: ICD-10-CM

## 2019-03-07 LAB
ALBUMIN SERPL BCP-MCNC: 4 G/DL (ref 3.5–5)
ALP SERPL-CCNC: 51 U/L (ref 46–116)
ALT SERPL W P-5'-P-CCNC: 21 U/L (ref 12–78)
ANION GAP SERPL CALCULATED.3IONS-SCNC: 11 MMOL/L (ref 4–13)
AST SERPL W P-5'-P-CCNC: 14 U/L (ref 5–45)
BASOPHILS # BLD AUTO: 0.02 THOUSANDS/ΜL (ref 0–0.1)
BASOPHILS NFR BLD AUTO: 0 % (ref 0–1)
BILIRUB SERPL-MCNC: 0.4 MG/DL (ref 0.2–1)
BILIRUB UR QL STRIP: NEGATIVE
BUN SERPL-MCNC: 11 MG/DL (ref 5–25)
CALCIUM SERPL-MCNC: 9 MG/DL (ref 8.3–10.1)
CHLORIDE SERPL-SCNC: 106 MMOL/L (ref 100–108)
CLARITY UR: CLEAR
CO2 SERPL-SCNC: 24 MMOL/L (ref 21–32)
COLOR UR: YELLOW
CREAT SERPL-MCNC: 0.8 MG/DL (ref 0.6–1.3)
EOSINOPHIL # BLD AUTO: 0.04 THOUSAND/ΜL (ref 0–0.61)
EOSINOPHIL NFR BLD AUTO: 1 % (ref 0–6)
ERYTHROCYTE [DISTWIDTH] IN BLOOD BY AUTOMATED COUNT: 13 % (ref 11.6–15.1)
GFR SERPL CREATININE-BSD FRML MDRD: 88 ML/MIN/1.73SQ M
GLUCOSE SERPL-MCNC: 87 MG/DL (ref 65–140)
GLUCOSE UR STRIP-MCNC: NEGATIVE MG/DL
HCT VFR BLD AUTO: 38.3 % (ref 34.8–46.1)
HGB BLD-MCNC: 12.5 G/DL (ref 11.5–15.4)
HGB UR QL STRIP.AUTO: NEGATIVE
IMM GRANULOCYTES # BLD AUTO: 0.01 THOUSAND/UL (ref 0–0.2)
IMM GRANULOCYTES NFR BLD AUTO: 0 % (ref 0–2)
KETONES UR STRIP-MCNC: ABNORMAL MG/DL
LEUKOCYTE ESTERASE UR QL STRIP: NEGATIVE
LIPASE SERPL-CCNC: 103 U/L (ref 73–393)
LYMPHOCYTES # BLD AUTO: 2.06 THOUSANDS/ΜL (ref 0.6–4.47)
LYMPHOCYTES NFR BLD AUTO: 40 % (ref 14–44)
MCH RBC QN AUTO: 29.8 PG (ref 26.8–34.3)
MCHC RBC AUTO-ENTMCNC: 32.6 G/DL (ref 31.4–37.4)
MCV RBC AUTO: 91 FL (ref 82–98)
MONOCYTES # BLD AUTO: 0.31 THOUSAND/ΜL (ref 0.17–1.22)
MONOCYTES NFR BLD AUTO: 6 % (ref 4–12)
NEUTROPHILS # BLD AUTO: 2.77 THOUSANDS/ΜL (ref 1.85–7.62)
NEUTS SEG NFR BLD AUTO: 53 % (ref 43–75)
NITRITE UR QL STRIP: NEGATIVE
NRBC BLD AUTO-RTO: 0 /100 WBCS
PH UR STRIP.AUTO: 6 [PH]
PLATELET # BLD AUTO: 232 THOUSANDS/UL (ref 149–390)
PMV BLD AUTO: 9.7 FL (ref 8.9–12.7)
POTASSIUM SERPL-SCNC: 4 MMOL/L (ref 3.5–5.3)
PROT SERPL-MCNC: 7.2 G/DL (ref 6.4–8.2)
PROT UR STRIP-MCNC: NEGATIVE MG/DL
RBC # BLD AUTO: 4.19 MILLION/UL (ref 3.81–5.12)
SODIUM SERPL-SCNC: 141 MMOL/L (ref 136–145)
SP GR UR STRIP.AUTO: >=1.03 (ref 1–1.03)
UROBILINOGEN UR QL STRIP.AUTO: 0.2 E.U./DL
WBC # BLD AUTO: 5.21 THOUSAND/UL (ref 4.31–10.16)

## 2019-03-07 PROCEDURE — 96374 THER/PROPH/DIAG INJ IV PUSH: CPT

## 2019-03-07 PROCEDURE — 85025 COMPLETE CBC W/AUTO DIFF WBC: CPT | Performed by: EMERGENCY MEDICINE

## 2019-03-07 PROCEDURE — 81003 URINALYSIS AUTO W/O SCOPE: CPT | Performed by: EMERGENCY MEDICINE

## 2019-03-07 PROCEDURE — 74177 CT ABD & PELVIS W/CONTRAST: CPT

## 2019-03-07 PROCEDURE — 83690 ASSAY OF LIPASE: CPT | Performed by: EMERGENCY MEDICINE

## 2019-03-07 PROCEDURE — 80053 COMPREHEN METABOLIC PANEL: CPT | Performed by: EMERGENCY MEDICINE

## 2019-03-07 PROCEDURE — 99284 EMERGENCY DEPT VISIT MOD MDM: CPT

## 2019-03-07 PROCEDURE — 36415 COLL VENOUS BLD VENIPUNCTURE: CPT

## 2019-03-07 RX ORDER — KETOROLAC TROMETHAMINE 30 MG/ML
30 INJECTION, SOLUTION INTRAMUSCULAR; INTRAVENOUS ONCE
Status: COMPLETED | OUTPATIENT
Start: 2019-03-07 | End: 2019-03-07

## 2019-03-07 RX ADMIN — IOHEXOL 100 ML: 350 INJECTION, SOLUTION INTRAVENOUS at 17:19

## 2019-03-07 RX ADMIN — KETOROLAC TROMETHAMINE 30 MG: 30 INJECTION, SOLUTION INTRAMUSCULAR at 17:00

## 2019-03-07 NOTE — ED PROVIDER NOTES
History  Chief Complaint   Patient presents with    Abdominal Pain     pt sent by doctor for concern for appendicitis, pt c/o nausea, diarrhea and RLQ tenderness       History provided by:  Patient  Abdominal Pain   Pain location:  Generalized  Pain quality: aching    Pain radiates to:  Does not radiate  Pain severity:  Moderate  Onset quality:  Gradual  Duration:  6 days  Timing:  Constant  Progression:  Unchanged  Chronicity:  New  Context: previous surgery (4 c-sections and partial hysterectomy)    Relieved by:  Nothing  Worsened by:  Nothing  Ineffective treatments: Bowel activity (normally takes xifam for IBS and that helps and this time it didn't)  Associated symptoms: diarrhea, fatigue and nausea    Associated symptoms: no anorexia, no chest pain, no chills, no dysuria, no fever, no hematuria, no shortness of breath, no sore throat and no vomiting    Risk factors: multiple surgeries        Prior to Admission Medications   Prescriptions Last Dose Informant Patient Reported? Taking?    Erenumab-aooe 70 MG/ML SOAJ   Yes No   Sig: Inject 70 mg under the skin   Melatonin 10 MG CAPS  Self No No   Sig: Take 1 capsule (10 mg total) by mouth daily   albuterol (2 5 mg/3 mL) 0 083 % nebulizer solution   No No   Sig: Take 1 vial (2 5 mg total) by nebulization every 6 (six) hours as needed for wheezing or shortness of breath   albuterol (PROVENTIL HFA,VENTOLIN HFA) 90 mcg/act inhaler  Self No No   Sig: Inhale 2 puffs every 6 (six) hours as needed for wheezing   benzonatate (TESSALON PERLES) 100 mg capsule   No No   Sig: Take 1 capsule (100 mg total) by mouth 3 (three) times a day as needed for cough   betamethasone dipropionate (DIPROSONE) 0 05 % cream  Self Yes No   Sig: APPLY A THIN LAYER TO THE AFFECTED AREA(S) BY TOPICAL ROUTE ONCE DAILY   buPROPion (WELLBUTRIN XL) 150 mg 24 hr tablet   No No   Sig: Take 2 tablets (300 mg total) by mouth every morning   busPIRone (BUSPAR) 10 mg tablet  Self Yes No   Sig: Take 10 mg by mouth daily   butalbital-acetaminophen-caffeine (FIORICET,ESGIC) -40 mg per tablet  Self No No   Sig: Take 1 tablet by mouth every 4 (four) hours as needed for headaches   celecoxib (CELEBREX) 200 mg capsule  Self No No   Sig: Take 1 capsule (200 mg total) by mouth daily   citalopram (CeleXA) 20 mg tablet  Self Yes No   Sig: citalopram 20 mg tablet   cyclobenzaprine (FLEXERIL) 10 mg tablet   Yes No   Sig: Take 10 mg by mouth Three times daily as needed   dexamethasone (DECADRON) 4 mg tablet   Yes No   Sig: Take 3 tabs day 1, 2 tabs day 2, 1 tab day 3  Then stop  Take in the a m with food  diclofenac sodium (VOLTAREN) 1 %   Yes No   Sig: APPLY 2 GRAM TO THE AFFECTED AREA(S) BY TOPICAL ROUTE 4 TIMES PER DAY   divalproex sodium (DEPAKOTE) 250 mg EC tablet  Self Yes No   Sig: Take 250 mg by mouth daily   fluticasone-vilanterol (BREO ELLIPTA) 100-25 mcg/inh inhaler  Self No No   Sig: Inhale 1 puff every 24 hours   lidocaine (LIDODERM) 5 %  Self No No   Sig: Place 1 patch on the skin daily as needed for mild pain or moderate pain Remove & Discard patch within 12 hours or as directed by MD   naproxen (NAPROSYN) 500 mg tablet  Self No No   Sig: Take 1 tablet (500 mg total) by mouth every 12 (twelve) hours as needed for mild pain or moderate pain   naratriptan (AMERGE) 2 5 MG tablet  Self Yes No   Sig: Take 1 tablet at onset of migraine  May repeat in 4 hours if unresolved  Do not exceed 5 mg in 24 hours   Limit 2 in 24 hours and 4 per week   predniSONE 20 mg tablet   No No   Sig: Take 1 tablet (20 mg total) by mouth daily   prochlorperazine (COMPAZINE) 10 mg tablet   Yes No   Sig: 3 (three) times a day   promethazine-dextromethorphan (PHENERGAN-DM) 6 25-15 mg/5 mL oral syrup   No No   Sig: Take 5 mL by mouth 4 (four) times a day as needed for cough   scopolamine (TRANSDERM-SCOP, 1 5 MG,) 1 5 mg/3 days TD 72 hr patch  Self Yes No   Sig: Transderm-Scop 1 5 mg transdermal patch (1 mg over 3 days)   thyroid (KAIT) 60 MG tablet  Self No No   Sig: Take 1 tablet (60 mg total) by mouth daily      Facility-Administered Medications: None       Past Medical History:   Diagnosis Date    Anxiety     Carpal tunnel syndrome     Chronic pain     lower back    Disease of thyroid gland     Dyslipidemia     Fibromyalgia     Migraine     Psychiatric disorder     depression/anxiety    Vitamin D deficiency        Past Surgical History:   Procedure Laterality Date     SECTION      CHOLECYSTECTOMY      PARTIAL HYSTERECTOMY      TONSILLECTOMY         Family History   Problem Relation Age of Onset    No Known Problems Mother     No Known Problems Father      I have reviewed and agree with the history as documented  Social History     Tobacco Use    Smoking status: Never Smoker    Smokeless tobacco: Never Used   Substance Use Topics    Alcohol use: No    Drug use: No        Review of Systems   Constitutional: Positive for fatigue  Negative for chills and fever  HENT: Negative for sore throat  Respiratory: Negative for shortness of breath  Cardiovascular: Negative for chest pain  Gastrointestinal: Positive for abdominal pain, diarrhea and nausea  Negative for anorexia and vomiting  Genitourinary: Negative for dysuria and hematuria  All other systems reviewed and are negative  Physical Exam  Physical Exam   Constitutional: She is oriented to person, place, and time  She appears well-developed and well-nourished  No distress  HENT:   Head: Normocephalic and atraumatic  Nose: Nose normal    Mouth/Throat: Oropharynx is clear and moist    Eyes: Pupils are equal, round, and reactive to light  Conjunctivae and EOM are normal    Neck: Normal range of motion  Neck supple  Cardiovascular: Normal rate, regular rhythm and normal heart sounds  Pulmonary/Chest: Effort normal and breath sounds normal  No respiratory distress  Abdominal: Soft  Bowel sounds are normal  There is generalized tenderness   There is no rigidity, no rebound and no guarding  Musculoskeletal: Normal range of motion  Neurological: She is alert and oriented to person, place, and time  Skin: Skin is warm and dry  She is not diaphoretic  Psychiatric: She has a normal mood and affect  Nursing note and vitals reviewed  Vital Signs  ED Triage Vitals [03/07/19 1427]   Temperature Pulse Respirations Blood Pressure SpO2   98 5 °F (36 9 °C) 77 16 143/65 100 %      Temp Source Heart Rate Source Patient Position - Orthostatic VS BP Location FiO2 (%)   Oral Monitor Sitting Left arm --      Pain Score       5           Vitals:    03/07/19 1427 03/07/19 1700   BP: 143/65 113/59   Pulse: 77 69   Patient Position - Orthostatic VS: Sitting        Visual Acuity      ED Medications  Medications   ketorolac (TORADOL) injection 30 mg (30 mg Intravenous Given 3/7/19 1700)   iohexol (OMNIPAQUE) 350 MG/ML injection (MULTI-DOSE) 100 mL (100 mL Intravenous Given 3/7/19 1719)       Diagnostic Studies  Results Reviewed     Procedure Component Value Units Date/Time    Comprehensive metabolic panel [[id number]] Collected:  03/07/19 1540    Lab Status:  Final result Specimen:  Blood from Arm, Right Updated:  03/07/19 1602     Sodium 141 mmol/L      Potassium 4 0 mmol/L      Chloride 106 mmol/L      CO2 24 mmol/L      ANION GAP 11 mmol/L      BUN 11 mg/dL      Creatinine 0 80 mg/dL      Glucose 87 mg/dL      Calcium 9 0 mg/dL      AST 14 U/L      ALT 21 U/L      Alkaline Phosphatase 51 U/L      Total Protein 7 2 g/dL      Albumin 4 0 g/dL      Total Bilirubin 0 40 mg/dL      eGFR 88 ml/min/1 73sq m     Narrative:       National Kidney Disease Education Program recommendations are as follows:  GFR calculation is accurate only with a steady state creatinine  Chronic Kidney disease less than 60 ml/min/1 73 sq  meters  Kidney failure less than 15 ml/min/1 73 sq  meters      Lipase [[id number]]  (Normal) Collected:  03/07/19 1540    Lab Status:  Final result Specimen: Blood from Arm, Right Updated:  03/07/19 1602     Lipase 103 u/L     UA w Reflex to Microscopic w Reflex to Culture [[id number]]  (Abnormal) Collected:  03/07/19 1549    Lab Status:  Final result Specimen:  Urine, Clean Catch Updated:  03/07/19 1555     Color, UA Yellow     Clarity, UA Clear     Specific Gravity, UA >=1 030     pH, UA 6 0     Leukocytes, UA Negative     Nitrite, UA Negative     Protein, UA Negative mg/dl      Glucose, UA Negative mg/dl      Ketones, UA 15 (1+) mg/dl      Urobilinogen, UA 0 2 E U /dl      Bilirubin, UA Negative     Blood, UA Negative    CBC and differential [[id number]] Collected:  03/07/19 1541    Lab Status:  Final result Specimen:  Blood from Arm, Right Updated:  03/07/19 1547     WBC 5 21 Thousand/uL      RBC 4 19 Million/uL      Hemoglobin 12 5 g/dL      Hematocrit 38 3 %      MCV 91 fL      MCH 29 8 pg      MCHC 32 6 g/dL      RDW 13 0 %      MPV 9 7 fL      Platelets 803 Thousands/uL      nRBC 0 /100 WBCs      Neutrophils Relative 53 %      Immat GRANS % 0 %      Lymphocytes Relative 40 %      Monocytes Relative 6 %      Eosinophils Relative 1 %      Basophils Relative 0 %      Neutrophils Absolute 2 77 Thousands/µL      Immature Grans Absolute 0 01 Thousand/uL      Lymphocytes Absolute 2 06 Thousands/µL      Monocytes Absolute 0 31 Thousand/µL      Eosinophils Absolute 0 04 Thousand/µL      Basophils Absolute 0 02 Thousands/µL                  CT abdomen pelvis with contrast   Final Result by Marian Bell MD (03/07 1739)      No acute inflammatory changes in the abdomen or pelvis  32 mm left adnexal cyst  If the patient is postmenopausal follow-up with pelvic ultrasound in 6-12 months  Patient is not postmenopausal then this is in keeping with a physiologic follicle not necessitating follow-up              Workstation performed: [id number]                    Procedures  Procedures       Phone Contacts  ED Phone Contact    ED Course MDM  Number of Diagnoses or Management Options  Adnexal cyst: new and requires workup  Diarrhea, unspecified type: new and requires workup  Generalized abdominal pain: new and requires workup     Amount and/or Complexity of Data Reviewed  Clinical lab tests: ordered and reviewed  Tests in the radiology section of CPT®: ordered and reviewed    Risk of Complications, Morbidity, and/or Mortality  Presenting problems: high    Patient Progress  Patient progress: stable      Disposition  Final diagnoses:   Generalized abdominal pain   Adnexal cyst   Diarrhea, unspecified type     Time reflects when diagnosis was documented in both MDM as applicable and the Disposition within this note     Time User Action Codes Description Comment    3/7/2019  5:43 PM Winnie Snider, [address] [R10 84] Generalized abdominal pain     3/7/2019  5:43 PM Henri Goldmann Add [N94 9] Adnexal cyst     3/7/2019  5:43 PM Sana, [address] [facility] Add [R19 7] Diarrhea, unspecified type       ED Disposition     ED Disposition Condition Date/Time Comment    Discharge Stable Thu Mar 7, 2019  5:43 PM Dona Doe discharge to home/self care  Follow-up Information     Follow up With Specialties Details Why Contact Info Additional Information    [address] [facility] Emergency Department Emergency Medicine  If symptoms worsen [address] [facility] [id number] [phone] [place] ED, [address], [place], [address] [place] [address], [address] [place] [address], Nurse Practitioner Call  If symptoms worsen  [place] [address]  [phone]             Patient's Medications   Discharge Prescriptions    No medications on file     No discharge procedures on file      ED Provider  Electronically Signed by           Arsen Walker MD  03/07/19 1812 Krishna Hull MD  03/07/19 1464

## 2019-03-18 DIAGNOSIS — E03.9 ACQUIRED HYPOTHYROIDISM: ICD-10-CM

## 2019-03-18 NOTE — TELEPHONE ENCOUNTER
Patient says she uses Patient assistance care for her medications and it comes straight from the pharmacy  They mail to our office and patient will pick it up

## 2019-03-18 NOTE — TELEPHONE ENCOUNTER
Patient called and asked to speak to Jose David Rousseau  She stated that she had an agreement with our office for her one medication, that our office needs to fill this medication for by March 28th  She needs a refill for thyroid (ARMOUR) 60 MG tablet - take 1 tablet (60 mg total) by mouth    Please advise  [phone]

## 2019-03-18 NOTE — TELEPHONE ENCOUNTER
This patient has not seen our office, she will need to establish care before any meds can be filled  Second, in chart she saw Tracy Jane on 1/19/19 to establish care, it is her new pcp, she will need to call her for  meds unless she is gonna transfer back  Can you please call the patient back as I am running on the floor? Thanks!

## 2019-03-20 DIAGNOSIS — E03.9 ACQUIRED HYPOTHYROIDISM: ICD-10-CM

## 2019-03-20 RX ORDER — LEVOTHYROXINE AND LIOTHYRONINE 38; 9 UG/1; UG/1
60 TABLET ORAL DAILY
Qty: 90 TABLET | Refills: 1 | Status: SHIPPED | OUTPATIENT
Start: 2019-03-20 | End: 2019-09-06 | Stop reason: SDUPTHER

## 2019-03-22 RX ORDER — THYROID 60 MG
TABLET ORAL
Qty: 30 TABLET | Refills: 4 | OUTPATIENT
Start: 2019-03-22

## 2019-03-22 RX ORDER — CYANOCOBALAMIN 1000 UG/ML
INJECTION INTRAMUSCULAR; SUBCUTANEOUS
Qty: 1 ML | Refills: 10 | OUTPATIENT
Start: 2019-03-22

## 2019-04-09 ENCOUNTER — TRANSCRIBE ORDERS (OUTPATIENT)
Dept: ADMINISTRATIVE | Facility: HOSPITAL | Age: 49
End: 2019-04-09

## 2019-04-09 ENCOUNTER — APPOINTMENT (OUTPATIENT)
Dept: LAB | Facility: HOSPITAL | Age: 49
End: 2019-04-09
Payer: COMMERCIAL

## 2019-04-09 DIAGNOSIS — R10.10 PAIN OF UPPER ABDOMEN: ICD-10-CM

## 2019-04-09 DIAGNOSIS — R10.10 PAIN OF UPPER ABDOMEN: Primary | ICD-10-CM

## 2019-04-09 PROCEDURE — 87505 NFCT AGENT DETECTION GI: CPT

## 2019-04-10 LAB
CAMPYLOBACTER DNA SPEC NAA+PROBE: NORMAL
SALMONELLA DNA SPEC QL NAA+PROBE: NORMAL
SHIGA TOXIN STX GENE SPEC NAA+PROBE: NORMAL
SHIGELLA DNA SPEC QL NAA+PROBE: NORMAL

## 2019-04-18 ENCOUNTER — OFFICE VISIT (OUTPATIENT)
Dept: FAMILY MEDICINE CLINIC | Facility: CLINIC | Age: 49
End: 2019-04-18
Payer: COMMERCIAL

## 2019-04-18 VITALS
HEIGHT: 64 IN | DIASTOLIC BLOOD PRESSURE: 80 MMHG | BODY MASS INDEX: 26.6 KG/M2 | HEART RATE: 83 BPM | SYSTOLIC BLOOD PRESSURE: 110 MMHG | OXYGEN SATURATION: 97 % | TEMPERATURE: 96.1 F | RESPIRATION RATE: 16 BRPM

## 2019-04-18 DIAGNOSIS — F41.8 MIXED ANXIETY AND DEPRESSIVE DISORDER: ICD-10-CM

## 2019-04-18 DIAGNOSIS — K58.0 IRRITABLE BOWEL SYNDROME WITH DIARRHEA: ICD-10-CM

## 2019-04-18 DIAGNOSIS — R53.82 CHRONIC FATIGUE: Primary | ICD-10-CM

## 2019-04-18 DIAGNOSIS — M79.7 PRIMARY FIBROMYALGIA SYNDROME: ICD-10-CM

## 2019-04-18 DIAGNOSIS — R77.8 ABNORMAL SPEP: ICD-10-CM

## 2019-04-18 DIAGNOSIS — F41.9 ANXIETY: ICD-10-CM

## 2019-04-18 DIAGNOSIS — G43.909 MIGRAINE WITHOUT STATUS MIGRAINOSUS, NOT INTRACTABLE, UNSPECIFIED MIGRAINE TYPE: ICD-10-CM

## 2019-04-18 DIAGNOSIS — M79.7 FIBROMYALGIA: ICD-10-CM

## 2019-04-18 DIAGNOSIS — L65.9 HAIR LOSS: ICD-10-CM

## 2019-04-18 PROCEDURE — 99214 OFFICE O/P EST MOD 30 MIN: CPT | Performed by: FAMILY MEDICINE

## 2019-04-24 ENCOUNTER — TELEPHONE (OUTPATIENT)
Dept: FAMILY MEDICINE CLINIC | Facility: CLINIC | Age: 49
End: 2019-04-24

## 2019-04-24 DIAGNOSIS — N83.202 CYSTS OF BOTH OVARIES: Primary | ICD-10-CM

## 2019-04-24 DIAGNOSIS — N83.201 CYSTS OF BOTH OVARIES: Primary | ICD-10-CM

## 2019-05-02 DIAGNOSIS — R05.9 COUGH IN ADULT: ICD-10-CM

## 2019-05-02 RX ORDER — BENZONATATE 100 MG/1
CAPSULE ORAL
Qty: 20 CAPSULE | Refills: 0 | Status: SHIPPED | OUTPATIENT
Start: 2019-05-02 | End: 2020-01-15 | Stop reason: ALTCHOICE

## 2019-05-17 ENCOUNTER — TELEPHONE (OUTPATIENT)
Dept: FAMILY MEDICINE CLINIC | Facility: CLINIC | Age: 49
End: 2019-05-17

## 2019-05-20 ENCOUNTER — OFFICE VISIT (OUTPATIENT)
Dept: BARIATRICS | Facility: CLINIC | Age: 49
End: 2019-05-20
Payer: COMMERCIAL

## 2019-05-20 VITALS
TEMPERATURE: 98 F | BODY MASS INDEX: 26.73 KG/M2 | DIASTOLIC BLOOD PRESSURE: 74 MMHG | HEIGHT: 64 IN | WEIGHT: 156.6 LBS | HEART RATE: 94 BPM | SYSTOLIC BLOOD PRESSURE: 120 MMHG

## 2019-05-20 DIAGNOSIS — G43.909 MIGRAINE WITHOUT STATUS MIGRAINOSUS, NOT INTRACTABLE, UNSPECIFIED MIGRAINE TYPE: ICD-10-CM

## 2019-05-20 DIAGNOSIS — J45.40 MODERATE PERSISTENT ASTHMA WITHOUT COMPLICATION: ICD-10-CM

## 2019-05-20 DIAGNOSIS — E66.3 OVERWEIGHT: Primary | ICD-10-CM

## 2019-05-20 DIAGNOSIS — F41.8 MIXED ANXIETY AND DEPRESSIVE DISORDER: ICD-10-CM

## 2019-05-20 DIAGNOSIS — E03.9 ACQUIRED HYPOTHYROIDISM: ICD-10-CM

## 2019-05-20 DIAGNOSIS — N20.0 KIDNEY STONES: ICD-10-CM

## 2019-05-20 PROCEDURE — 99204 OFFICE O/P NEW MOD 45 MIN: CPT | Performed by: PHYSICIAN ASSISTANT

## 2019-05-20 RX ORDER — DULOXETIN HYDROCHLORIDE 30 MG/1
CAPSULE, DELAYED RELEASE ORAL
COMMUNITY
Start: 2017-12-14 | End: 2019-11-20 | Stop reason: ALTCHOICE

## 2019-05-20 RX ORDER — AZITHROMYCIN 250 MG/1
TABLET, FILM COATED ORAL
COMMUNITY
End: 2019-05-20 | Stop reason: ALTCHOICE

## 2019-05-20 RX ORDER — ELETRIPTAN HYDROBROMIDE 40 MG/1
TABLET, FILM COATED ORAL
COMMUNITY
Start: 2018-05-08 | End: 2020-01-15 | Stop reason: ALTCHOICE

## 2019-05-20 RX ORDER — IBUPROFEN 800 MG/1
TABLET ORAL
COMMUNITY
End: 2019-05-20 | Stop reason: ALTCHOICE

## 2019-05-20 RX ORDER — TRAMADOL HYDROCHLORIDE 50 MG/1
TABLET ORAL
COMMUNITY
End: 2019-11-01 | Stop reason: ALTCHOICE

## 2019-05-20 RX ORDER — NABUMETONE 750 MG/1
TABLET, FILM COATED ORAL
COMMUNITY
Start: 2018-09-04 | End: 2019-11-01

## 2019-05-20 RX ORDER — CEFDINIR 300 MG/1
CAPSULE ORAL
COMMUNITY
End: 2019-05-20 | Stop reason: ALTCHOICE

## 2019-05-20 RX ORDER — TIZANIDINE HYDROCHLORIDE 2 MG/1
2 CAPSULE, GELATIN COATED ORAL 3 TIMES DAILY
COMMUNITY
End: 2020-06-17 | Stop reason: SDUPTHER

## 2019-05-20 RX ORDER — OLANZAPINE 10 MG/1
TABLET ORAL
COMMUNITY
Start: 2018-03-29 | End: 2019-05-20 | Stop reason: ALTCHOICE

## 2019-05-20 RX ORDER — TOPIRAMATE 25 MG/1
TABLET ORAL
COMMUNITY
End: 2019-05-20 | Stop reason: DRUGHIGH

## 2019-05-20 RX ORDER — BUTALBITAL, ACETAMINOPHEN, CAFFEINE AND CODEINE PHOSPHATE 300; 50; 40; 30 MG/1; MG/1; MG/1; MG/1
CAPSULE ORAL EVERY 4 HOURS
COMMUNITY
End: 2019-11-01 | Stop reason: ALTCHOICE

## 2019-05-20 RX ORDER — OMEPRAZOLE 20 MG/1
20 CAPSULE, DELAYED RELEASE ORAL 2 TIMES DAILY
COMMUNITY
End: 2021-04-13

## 2019-05-20 RX ORDER — LIDOCAINE HYDROCHLORIDE 30 MG/G
CREAM TOPICAL
COMMUNITY
End: 2019-05-20 | Stop reason: ALTCHOICE

## 2019-05-20 RX ORDER — DICYCLOMINE HYDROCHLORIDE 10 MG/1
CAPSULE ORAL
COMMUNITY
End: 2019-05-20 | Stop reason: ALTCHOICE

## 2019-05-20 RX ORDER — DIPHENOXYLATE HYDROCHLORIDE AND ATROPINE SULFATE 2.5; .025 MG/1; MG/1
TABLET ORAL
COMMUNITY
End: 2019-05-20 | Stop reason: ALTCHOICE

## 2019-05-20 RX ORDER — SUCRALFATE 1 G/1
TABLET ORAL
COMMUNITY
End: 2019-05-20 | Stop reason: ALTCHOICE

## 2019-05-20 RX ORDER — AMOXICILLIN 875 MG/1
TABLET, COATED ORAL
COMMUNITY
End: 2019-05-20 | Stop reason: ALTCHOICE

## 2019-05-20 RX ORDER — ALPRAZOLAM 0.25 MG/1
TABLET ORAL
COMMUNITY
End: 2019-11-01 | Stop reason: ALTCHOICE

## 2019-05-20 RX ORDER — HYOSCYAMINE SULFATE 0.125 MG
TABLET ORAL
COMMUNITY
End: 2019-05-20 | Stop reason: ALTCHOICE

## 2019-05-20 RX ORDER — SUMATRIPTAN 20 MG/1
SPRAY NASAL
COMMUNITY
End: 2019-05-20 | Stop reason: ALTCHOICE

## 2019-05-20 RX ORDER — GABAPENTIN 100 MG/1
CAPSULE ORAL
COMMUNITY
End: 2019-05-20 | Stop reason: ALTCHOICE

## 2019-05-21 ENCOUNTER — APPOINTMENT (OUTPATIENT)
Dept: LAB | Facility: HOSPITAL | Age: 49
End: 2019-05-21
Payer: COMMERCIAL

## 2019-05-21 DIAGNOSIS — E03.9 ACQUIRED HYPOTHYROIDISM: ICD-10-CM

## 2019-05-21 DIAGNOSIS — F41.8 MIXED ANXIETY AND DEPRESSIVE DISORDER: ICD-10-CM

## 2019-05-21 DIAGNOSIS — E66.3 OVERWEIGHT: ICD-10-CM

## 2019-05-21 DIAGNOSIS — G43.909 MIGRAINE WITHOUT STATUS MIGRAINOSUS, NOT INTRACTABLE, UNSPECIFIED MIGRAINE TYPE: ICD-10-CM

## 2019-05-21 LAB
EST. AVERAGE GLUCOSE BLD GHB EST-MCNC: 103 MG/DL
HBA1C MFR BLD: 5.2 % (ref 4.2–6.3)
INSULIN SERPL-ACNC: 5.9 MU/L (ref 3–25)

## 2019-05-21 PROCEDURE — 83036 HEMOGLOBIN GLYCOSYLATED A1C: CPT

## 2019-05-21 PROCEDURE — 36415 COLL VENOUS BLD VENIPUNCTURE: CPT

## 2019-05-21 PROCEDURE — 83525 ASSAY OF INSULIN: CPT

## 2019-05-22 ENCOUNTER — TELEPHONE (OUTPATIENT)
Dept: BARIATRICS | Facility: CLINIC | Age: 49
End: 2019-05-22

## 2019-06-12 ENCOUNTER — TELEPHONE (OUTPATIENT)
Dept: BARIATRICS | Facility: CLINIC | Age: 49
End: 2019-06-12

## 2019-08-08 ENCOUNTER — CONSULT (OUTPATIENT)
Dept: HEMATOLOGY ONCOLOGY | Facility: CLINIC | Age: 49
End: 2019-08-08
Payer: COMMERCIAL

## 2019-08-08 VITALS
RESPIRATION RATE: 16 BRPM | TEMPERATURE: 98.7 F | BODY MASS INDEX: 25.87 KG/M2 | HEART RATE: 77 BPM | OXYGEN SATURATION: 99 % | DIASTOLIC BLOOD PRESSURE: 62 MMHG | WEIGHT: 146 LBS | SYSTOLIC BLOOD PRESSURE: 115 MMHG | HEIGHT: 63 IN

## 2019-08-08 DIAGNOSIS — Z80.3 FAMILY HISTORY OF BREAST CANCER: ICD-10-CM

## 2019-08-08 DIAGNOSIS — R23.8 EASY BRUISING: Primary | ICD-10-CM

## 2019-08-08 DIAGNOSIS — Z80.0 FAMILY HISTORY OF PANCREATIC CANCER: ICD-10-CM

## 2019-08-08 DIAGNOSIS — R77.0 ABNORMAL ALBUMIN: ICD-10-CM

## 2019-08-08 PROCEDURE — 99243 OFF/OP CNSLTJ NEW/EST LOW 30: CPT | Performed by: INTERNAL MEDICINE

## 2019-08-08 NOTE — PROGRESS NOTES
HEMATOLOGY / ONCOLOGY CLINIC NOTE    Primary Care Provider: ERICKA Lira  Referring Provider:    MRN: [id number]  : 1970    Reason for Encounter:  Chief Complaint   Patient presents with   Ju         Hematology / Oncology History:     Jose Alberto Angela is a 53 y o  female who came in for follow up         1, easy bruise for 3 years, otherwise no major hematology oncology history  Assessment / Plan:       1  Easy bruising  - patient reported has been having easy bruise ongoing for 3 years  Unclear etiology  Eating balanced diet, not on blood thinner, no prior bleeding disorders  Reported used to have menorrhagia before hysterectomy  Patient has a hysterectomy about 3 years ago due to fibroid  Patient is from [facility]   No family history of hematology disorders  - we will proceed with basic workup including PT PTT and platelet function test   Will follow patient in 1 week  - Protime-INR  - APTT  - Platelet Aggregation Test; Future    2  Abnormal albumin  - lab reviewed, SPEP is normal   Appears patient has a slightly abnormal albumin/globulin ratio, however this is a mild abnormality, no further workup needed    3, family history of malignancy  -  Farnaz Taveras had a history of breast cancer, patient's father had history of pancreatic cancer  Patient is up-to-date with cancer screening   - overall patient has no red flag symptoms for malignancy  45  minutes were spent face to face with patient with greater than 50% of the time spent in counseling or coordination of care including discussions of treatment instructions  All of the patient's questions were answered to their satisfactory during this discussion  Advised pt to call if there is any further questions         Interval History:     2019 :  43-year-old female, firm protocol, history of hypothyroidism, fibromyalgia, IBS, questionable other autoimmune disease, referred to Hematology for abnormal albumin/globulin ratio  Patient reported overall at baseline health status, she is currently suffering from migraine  Advised her body weight is stable, no lumps bumps, no bleeding  Patient reported has been having easy bruise ongoing for about 3 years  She is otherwise eating balanced diet, not on blood thinner, she has menorrhagia however likely due to uterine fibroid, status post hysterectomy 3 years ago  No family history of bleeding disorder  Patient is nonsmoker  Problem list:     Patient Active Problem List   Diagnosis    Anxiety    B12 deficiency    Carpal tunnel syndrome    Cervical disc disorder    Degeneration of lumbar intervertebral disc    Dyslipidemia, goal LDL below 130    Fibromyalgia    Hypothyroidism    IBS (irritable bowel syndrome)    Intermittent asthma    Intractable chronic migraine without aura and without status migrainosus    Mixed anxiety and depressive disorder    Primary fibromyalgia syndrome    Vitamin D deficiency    Moderate persistent asthma without complication    Migraine without status migrainosus, not intractable    Cough in adult    Shortness of breath    Overweight    Kidney stones       PHYSICIAL EXAMINATION:       Vital Signs:   [unfilled]  Body mass index is 25 86 kg/m²  Body surface area is 1 69 meters squared  Normal skin, did not notice any bruises  No other major findings on physical         GEN: Alert, awake oriented x3, in no acute distress  HEENT- No pallor, icterus, cyanosis, no oral mucosal lesions,   LAD - no palpable cervical, clavicle, axillary, inguinal LAD  Heart- normal S1 S2, regular rate and rhythm, No murmur, rubs     Lungs- decreased breathing sound bilateral    Abdomen- soft, Non tender, bowel sounds present  Extremities- No cyanosis, clubbing, edema  Neuro- No focal neurological deficit           PAST MEDICAL HISTORY:   has a past medical history of Anxiety, Anxiety, Asthma, Carpal tunnel syndrome, Chronic pain, Depression, Disease of thyroid gland, Dyslipidemia, Fibromyalgia, Irritable bowel, Kidney stones, Kidney stones (2019), Migraine, Psychiatric disorder, and Vitamin D deficiency  PAST SURGICAL HISTORY:   has a past surgical history that includes  section; Cholecystectomy; Tonsillectomy; Partial hysterectomy; Bunionectomy; and Tubal ligation  CURRENT MEDICATIONS:   Current Outpatient Medications   Medication Sig Dispense Refill    albuterol (2 5 mg/3 mL) 0 083 % nebulizer solution Take 1 vial (2 5 mg total) by nebulization every 6 (six) hours as needed for wheezing or shortness of breath 20 vial 0    albuterol (PROVENTIL HFA,VENTOLIN HFA) 90 mcg/act inhaler Inhale 2 puffs every 6 (six) hours as needed for wheezing 3 Inhaler 2    ALPRAZolam (XANAX) 0 25 mg tablet alprazolam 0 25 mg tablet      benzonatate (TESSALON PERLES) 100 mg capsule TAKE 1 CAPSULE (100 MG TOTAL) BY MOUTH THREE (THREE) TIMES A DAY AS NEEDED FOR COUGH 20 capsule 0    betamethasone dipropionate (DIPROSONE) 0 05 % cream APPLY A THIN LAYER TO THE AFFECTED AREA(S) BY TOPICAL ROUTE ONCE DAILY      buPROPion (WELLBUTRIN XL) 150 mg 24 hr tablet Take 2 tablets (300 mg total) by mouth every morning  0    busPIRone (BUSPAR) 10 mg tablet Take 10 mg by mouth daily      Butalbital-APAP-Caff-Cod (FIORICET/CODEINE) -67-30 MG CAPS Take by mouth every 4 (four) hours      celecoxib (CELEBREX) 200 mg capsule Take 1 capsule (200 mg total) by mouth daily 30 capsule 2    cyclobenzaprine (FLEXERIL) 10 mg tablet Take 10 mg by mouth Three times daily as needed      dexamethasone (DECADRON) 4 mg tablet Take 3 tabs day 1, 2 tabs day 2, 1 tab day 3  Then stop  Take in the a m with food        diclofenac sodium (VOLTAREN) 1 % APPLY 2 GRAM TO THE AFFECTED AREA(S) BY TOPICAL ROUTE 4 TIMES PER DAY      Diclofenac Sodium ER (VOLTAREN-XR) 100 MG 24 hr tablet take 1/2  tablet by oral route  every day      divalproex sodium (DEPAKOTE) 250 mg EC tablet Take 250 mg by mouth daily      DULoxetine (CYMBALTA) 30 mg delayed release capsule duloxetine 30 mg capsule,delayed release      eletriptan (RELPAX) 40 MG tablet take 1 tablet by oral route ; if headache returns, the dose may be repeated after 2 hours, but nomore than two doses should be given within a 24-hour period   Erenumab-aooe 70 MG/ML SOAJ Inject 70 mg under the skin      fluticasone-vilanterol (BREO ELLIPTA) 100-25 mcg/inh inhaler Inhale 1 puff every 24 hours 3 Inhaler 2    lidocaine (LIDODERM) 5 % Place 1 patch on the skin daily as needed for mild pain or moderate pain Remove & Discard patch within 12 hours or as directed by MD 6 patch 0    nabumetone (RELAFEN) 750 mg tablet Take 1 tablet twice a day as needed for headache, limit 3 days per week      naratriptan (AMERGE) 2 5 MG tablet Take 1 tablet at onset of migraine  May repeat in 4 hours if unresolved  Do not exceed 5 mg in 24 hours  Limit 2 in 24 hours and 4 per week      omeprazole (PriLOSEC) 20 mg delayed release capsule Take 20 mg by mouth daily      thyroid (ARMOUR) 60 MG tablet Take 1 tablet (60 mg total) by mouth daily 90 tablet 1    TiZANidine (ZANAFLEX) 2 MG capsule Take 2 mg by mouth 3 (three) times a day      Topiramate ER (TROKENDI XR) 50 MG CP24 Take by mouth      traMADol (ULTRAM) 50 mg tablet tramadol 50 mg tablet      Transdermal Patch PTCH Transderm-Scop 1 5 mg transdermal patch (1 mg over 3 days)       No current facility-administered medications for this visit  [unfilled]    SOCIAL HISTORY:   reports that she has never smoked  She has never used smokeless tobacco  She reports that she does not drink alcohol or use drugs       FAMILY HISTORY:  family history includes Cancer in her cousin, father, maternal uncle, paternal aunt, paternal grandmother, and paternal uncle; Diabetes in her mother; Heart disease in her brother; Hypertension in her brother and mother; Hypothyroidism in her mother and sister; Stroke in her mother  ALLERGIES:  is allergic to doxycycline; erythromycin; other; latex; and eletriptan  REVIEW OF SYSTEMS:  Please note that a 14-point review of systems was performed to include Constitutional, HEENT, Respiratory, CVS, GI, , Musculoskeletal, Integumentary, Neurologic, Rheumatologic, Endocrinologic, Psychiatric, Lymphatic, and Hematologic/Oncologic systems were reviewed and are negative unless otherwise stated in HPI  Positive and negative findings pertinent to this evaluation are incorporated into the history of present illness  LAB:  Lab Results   Component Value Date    WBC 5 21 03/07/2019    HGB 12 5 03/07/2019    HCT 38 3 03/07/2019    MCV 91 03/07/2019     03/07/2019     Lab Results   Component Value Date     06/08/2018    SODIUM 141 03/07/2019    K 4 0 03/07/2019     03/07/2019    CO2 24 03/07/2019    ANIONGAP 9 06/08/2018    AGAP 11 03/07/2019    BUN 11 03/07/2019    CREATININE 0 80 03/07/2019    GLUC 87 03/07/2019    GLUF 86 01/10/2019    CALCIUM 9 0 03/07/2019    AST 14 03/07/2019    ALT 21 03/07/2019    ALKPHOS 51 03/07/2019    PROT 7 4 06/08/2018    TP 7 2 03/07/2019    BILITOT 0 5 06/08/2018    TBILI 0 40 03/07/2019    EGFR 88 03/07/2019       CBC with diff:       Invalid input(s):  RBC, TOTALCELLSCOUNTED, SEGS%, GRANS%, LYMPHS%, EOS%, BASO%, ABNEUT, ABGRANS, ABLYMPHS, ABMOMOS, ABEOS, ABBASO    CMP:      Invalid input(s): ALBUMIN        IMAGING:  No orders to display     No results found

## 2019-08-12 ENCOUNTER — APPOINTMENT (OUTPATIENT)
Dept: LAB | Facility: CLINIC | Age: 49
End: 2019-08-12
Payer: COMMERCIAL

## 2019-08-12 LAB
APTT PPP: 29 SECONDS (ref 23–37)
INR PPP: 1.14 (ref 0.84–1.19)
PROTHROMBIN TIME: 14 SECONDS (ref 11.6–14.5)

## 2019-08-12 PROCEDURE — 85576 BLOOD PLATELET AGGREGATION: CPT

## 2019-08-12 PROCEDURE — 85610 PROTHROMBIN TIME: CPT | Performed by: INTERNAL MEDICINE

## 2019-08-12 PROCEDURE — 85730 THROMBOPLASTIN TIME PARTIAL: CPT | Performed by: INTERNAL MEDICINE

## 2019-08-12 PROCEDURE — 36415 COLL VENOUS BLD VENIPUNCTURE: CPT | Performed by: INTERNAL MEDICINE

## 2019-08-15 ENCOUNTER — OFFICE VISIT (OUTPATIENT)
Dept: HEMATOLOGY ONCOLOGY | Facility: CLINIC | Age: 49
End: 2019-08-15
Payer: COMMERCIAL

## 2019-08-15 VITALS
HEART RATE: 82 BPM | SYSTOLIC BLOOD PRESSURE: 118 MMHG | DIASTOLIC BLOOD PRESSURE: 78 MMHG | RESPIRATION RATE: 16 BRPM | BODY MASS INDEX: 24.92 KG/M2 | OXYGEN SATURATION: 99 % | TEMPERATURE: 98.1 F | WEIGHT: 146 LBS | HEIGHT: 64 IN

## 2019-08-15 DIAGNOSIS — R23.8 EASY BRUISABILITY: Primary | ICD-10-CM

## 2019-08-15 DIAGNOSIS — R77.0 ABNORMALITY OF ALBUMIN: ICD-10-CM

## 2019-08-15 DIAGNOSIS — Z80.7: ICD-10-CM

## 2019-08-15 PROCEDURE — 99214 OFFICE O/P EST MOD 30 MIN: CPT | Performed by: INTERNAL MEDICINE

## 2019-08-15 NOTE — PROGRESS NOTES
HEMATOLOGY / ONCOLOGY CLINIC NOTE    Primary Care Provider: ERICKA Ma  Referring Provider:    MRN: [id number]  : 1970    Reason for Encounter:    Chief Complaint   Patient presents with   Geovani Chicas Follow-up         Hematology / Oncology History:     Ophelia Franks is a 53 y o  female who came in for follow up         1, easy bruise for 3 years, otherwise no major hematology oncology history  Assessment / Plan:       1  Easy bruising  - patient reported has been having easy bruise ongoing for 3 years  Unclear etiology  Eating balanced diet, not on blood thinner, no prior bleeding disorders  Reported used to have menorrhagia before hysterectomy  Patient has a hysterectomy about 3 years ago due to fibroid  Patient is from [facility]   No family history of hematology disorders  - reviewed labs with patient, PT PTT platelet number and platelet function are normal   From hematology standpoint, there is no further workup or management needed  Follow with Hematology as needed in the future  -  I recommend patient to take more fruit or vitamin-C tablets if tolerable  2  Abnormal albumin  - lab reviewed, SPEP is normal   Appears patient has a slightly abnormal albumin/globulin ratio, however this is a mild abnormality  - no further workup needed    3, family history of malignancy  -  Franko Cha had a history of breast cancer, patient's father had history of pancreatic cancer  Patient is up-to-date with cancer screening   - no evidence for malignancy continue cancer screening by PCP  25  minutes were spent face to face with patient with greater than 50% of the time spent in counseling or coordination of care including discussions of treatment instructions  All of the patient's questions were answered to their satisfactory during this discussion  Advised pt to call if there is any further questions         Interval History:     2019 :  61-year-old female, firm protocol, history of hypothyroidism, fibromyalgia, IBS, questionable other autoimmune disease, referred to Hematology for abnormal albumin/globulin ratio  Patient reported overall at baseline health status, she is currently suffering from migraine  Advised her body weight is stable, no lumps bumps, no bleeding  Patient reported has been having easy bruise ongoing for about 3 years  She is otherwise eating balanced diet, not on blood thinner, she has menorrhagia however likely due to uterine fibroid, status post hysterectomy 3 years ago  No family history of bleeding disorder  Patient is nonsmoker  8/15/2019 :  Patient came in for follow-up  Still having easy bruise after minor injury  Due to patient's IBS, her diarrhea is restricted  Problem list:       Patient Active Problem List   Diagnosis    Anxiety    B12 deficiency    Carpal tunnel syndrome    Cervical disc disorder    Degeneration of lumbar intervertebral disc    Dyslipidemia, goal LDL below 130    Fibromyalgia    Hypothyroidism    IBS (irritable bowel syndrome)    Intermittent asthma    Intractable chronic migraine without aura and without status migrainosus    Mixed anxiety and depressive disorder    Primary fibromyalgia syndrome    Vitamin D deficiency    Moderate persistent asthma without complication    Migraine without status migrainosus, not intractable    Cough in adult    Shortness of breath    Overweight    Kidney stones       PHYSICIAL EXAMINATION:       Vital Signs:   [unfilled]  Body mass index is 25 06 kg/m²  Body surface area is 1 71 meters squared  Minor bruises noticed on right arm  No other major findings on physical         GEN: Alert, awake oriented x3, in no acute distress  HEENT- No pallor, icterus, cyanosis, no oral mucosal lesions,   LAD - no palpable cervical, clavicle, axillary, inguinal LAD  Heart- normal S1 S2, regular rate and rhythm, No murmur, rubs     Lungs- decreased breathing sound bilateral    Abdomen- soft, Non tender, bowel sounds present  Extremities- No cyanosis, clubbing, edema  Neuro- No focal neurological deficit           PAST MEDICAL HISTORY:   has a past medical history of Anxiety, Anxiety, Asthma, Carpal tunnel syndrome, Chronic pain, Depression, Disease of thyroid gland, Dyslipidemia, Fibromyalgia, Irritable bowel, Kidney stones, Kidney stones (2019), Migraine, Psychiatric disorder, and Vitamin D deficiency  PAST SURGICAL HISTORY:   has a past surgical history that includes  section; Cholecystectomy; Tonsillectomy; Partial hysterectomy; Bunionectomy; and Tubal ligation  CURRENT MEDICATIONS:     Current Outpatient Medications   Medication Sig Dispense Refill    albuterol (2 5 mg/3 mL) 0 083 % nebulizer solution Take 1 vial (2 5 mg total) by nebulization every 6 (six) hours as needed for wheezing or shortness of breath 20 vial 0    albuterol (PROVENTIL HFA,VENTOLIN HFA) 90 mcg/act inhaler Inhale 2 puffs every 6 (six) hours as needed for wheezing 3 Inhaler 2    ALPRAZolam (XANAX) 0 25 mg tablet alprazolam 0 25 mg tablet      benzonatate (TESSALON PERLES) 100 mg capsule TAKE 1 CAPSULE (100 MG TOTAL) BY MOUTH THREE (THREE) TIMES A DAY AS NEEDED FOR COUGH 20 capsule 0    betamethasone dipropionate (DIPROSONE) 0 05 % cream APPLY A THIN LAYER TO THE AFFECTED AREA(S) BY TOPICAL ROUTE ONCE DAILY      buPROPion (WELLBUTRIN XL) 150 mg 24 hr tablet Take 2 tablets (300 mg total) by mouth every morning  0    busPIRone (BUSPAR) 10 mg tablet Take 10 mg by mouth daily      Butalbital-APAP-Caff-Cod (FIORICET/CODEINE) -40-30 MG CAPS Take by mouth every 4 (four) hours      celecoxib (CELEBREX) 200 mg capsule Take 1 capsule (200 mg total) by mouth daily 30 capsule 2    cyclobenzaprine (FLEXERIL) 10 mg tablet Take 10 mg by mouth Three times daily as needed      dexamethasone (DECADRON) 4 mg tablet Take 3 tabs day 1, 2 tabs day 2, 1 tab day 3  Then stop   Take in the a m with food   diclofenac sodium (VOLTAREN) 1 % APPLY 2 GRAM TO THE AFFECTED AREA(S) BY TOPICAL ROUTE 4 TIMES PER DAY      Diclofenac Sodium ER (VOLTAREN-XR) 100 MG 24 hr tablet take 1/2  tablet by oral route  every day      divalproex sodium (DEPAKOTE) 250 mg EC tablet Take 250 mg by mouth daily      DULoxetine (CYMBALTA) 30 mg delayed release capsule duloxetine 30 mg capsule,delayed release      eletriptan (RELPAX) 40 MG tablet take 1 tablet by oral route ; if headache returns, the dose may be repeated after 2 hours, but nomore than two doses should be given within a 24-hour period   Erenumab-aooe 70 MG/ML SOAJ Inject 70 mg under the skin      fluticasone-vilanterol (BREO ELLIPTA) 100-25 mcg/inh inhaler Inhale 1 puff every 24 hours 3 Inhaler 2    lidocaine (LIDODERM) 5 % Place 1 patch on the skin daily as needed for mild pain or moderate pain Remove & Discard patch within 12 hours or as directed by MD 6 patch 0    nabumetone (RELAFEN) 750 mg tablet Take 1 tablet twice a day as needed for headache, limit 3 days per week      naratriptan (AMERGE) 2 5 MG tablet Take 1 tablet at onset of migraine  May repeat in 4 hours if unresolved  Do not exceed 5 mg in 24 hours  Limit 2 in 24 hours and 4 per week      omeprazole (PriLOSEC) 20 mg delayed release capsule Take 20 mg by mouth daily      thyroid (ARMOUR) 60 MG tablet Take 1 tablet (60 mg total) by mouth daily 90 tablet 1    TiZANidine (ZANAFLEX) 2 MG capsule Take 2 mg by mouth 3 (three) times a day      Topiramate ER (TROKENDI XR) 50 MG CP24 Take by mouth      traMADol (ULTRAM) 50 mg tablet tramadol 50 mg tablet      Transdermal Patch PTCH Transderm-Scop 1 5 mg transdermal patch (1 mg over 3 days)       No current facility-administered medications for this visit  [unfilled]    SOCIAL HISTORY:   reports that she has never smoked  She has never used smokeless tobacco  She reports that she does not drink alcohol or use drugs       FAMILY HISTORY:  family history includes Cancer in her cousin, father, maternal uncle, paternal aunt, paternal grandmother, and paternal uncle; Diabetes in her mother; Heart disease in her brother; Hypertension in her brother and mother; Hypothyroidism in her mother and sister; Stroke in her mother  ALLERGIES:  is allergic to doxycycline; erythromycin; other; latex; and eletriptan  REVIEW OF SYSTEMS:  Please note that a 14-point review of systems was performed to include Constitutional, HEENT, Respiratory, CVS, GI, , Musculoskeletal, Integumentary, Neurologic, Rheumatologic, Endocrinologic, Psychiatric, Lymphatic, and Hematologic/Oncologic systems were reviewed and are negative unless otherwise stated in HPI  Positive and negative findings pertinent to this evaluation are incorporated into the history of present illness  LAB:    Lab Results   Component Value Date    WBC 5 21 03/07/2019    HGB 12 5 03/07/2019    HCT 38 3 03/07/2019    MCV 91 03/07/2019     03/07/2019       Lab Results   Component Value Date     06/08/2018    SODIUM 141 03/07/2019    K 4 0 03/07/2019     03/07/2019    CO2 24 03/07/2019    ANIONGAP 9 06/08/2018    AGAP 11 03/07/2019    BUN 11 03/07/2019    CREATININE 0 80 03/07/2019    GLUC 87 03/07/2019    GLUF 86 01/10/2019    CALCIUM 9 0 03/07/2019    AST 14 03/07/2019    ALT 21 03/07/2019    ALKPHOS 51 03/07/2019    PROT 7 4 06/08/2018    TP 7 2 03/07/2019    BILITOT 0 5 06/08/2018    TBILI 0 40 03/07/2019    EGFR 88 03/07/2019       CBC with diff:       Invalid input(s):  RBC, TOTALCELLSCOUNTED, SEGS%, GRANS%, LYMPHS%, EOS%, BASO%, ABNEUT, ABGRANS, ABLYMPHS, ABMOMOS, ABEOS, ABBASO    CMP:      Invalid input(s): ALBUMIN        IMAGING:    No orders to display     No results found

## 2019-09-06 ENCOUNTER — CLINICAL SUPPORT (OUTPATIENT)
Dept: FAMILY MEDICINE CLINIC | Facility: CLINIC | Age: 49
End: 2019-09-06
Payer: COMMERCIAL

## 2019-09-06 DIAGNOSIS — Z23 ENCOUNTER FOR IMMUNIZATION: ICD-10-CM

## 2019-09-06 DIAGNOSIS — E03.9 ACQUIRED HYPOTHYROIDISM: ICD-10-CM

## 2019-09-06 PROCEDURE — 90471 IMMUNIZATION ADMIN: CPT

## 2019-09-06 PROCEDURE — 90682 RIV4 VACC RECOMBINANT DNA IM: CPT

## 2019-09-06 RX ORDER — LEVOTHYROXINE AND LIOTHYRONINE 38; 9 UG/1; UG/1
60 TABLET ORAL DAILY
Qty: 90 TABLET | Refills: 1 | Status: SHIPPED | OUTPATIENT
Start: 2019-09-06 | End: 2020-02-11 | Stop reason: ALTCHOICE

## 2019-09-25 ENCOUNTER — TELEPHONE (OUTPATIENT)
Dept: BEHAVIORAL/MENTAL HEALTH CLINIC | Facility: CLINIC | Age: 49
End: 2019-09-25

## 2019-09-25 NOTE — TELEPHONE ENCOUNTER
Behavorial Health Outpatient Intake Questions    Referred by:    Please advised interviewee that they need to answer all questions truthfully to allow for best care and any misrepresentations of information may affect their ability to be seen at this clinic   => Was this discussed? Yes     Behavorial Health Outpatient Intake History -     Presenting Problem (in patient's words): DEPRESSION AND ANXIETY FOR A LONG TIME, UNHAPPY WITH LAST OFFICE SHE WAS AT  Has the patient ever seen or is currently seeing a psychiatrist? Yes   If yes who/when? MAY 2019, DR HINES    If seen as outpatient, have they been seen here (and by whom)? If not seen here, which provider(s) did the patient see and for how long? Has the patient ever seen or currently see a therapist? Yes If yes who/when? CURRENT [facility], OVER A YEAR    Has a member of the patient's family been in therapy here? No  If yes, with whom? Has the patient been hospitalized for mental health? No   If yes, how long ago was last hospitalization and where was it? Substance Abuse:No concerns of substance abuse are reported  Does the patient have ICM or CTT? No    Is the patient taking injectable psychiatric medications? No    => If yes, patient cannot be seen here  Communications  Are there any developmental disabilities? No    Does the patient have hearing impairment? No       History-    Has the patient served in the [place] [address]? No    If yes, have you had combat services? No    Was the patient activated into federal active duty as a member of the [employer] or reserve? No    Legal History-     Does the patient have any history of arrests, [facility]/care home time, or DUIs? No  If Yes-  1) What types of charges? 2) When were they last incarcerated? 3) Are they currently on parole or probation? Minor Child-    Who has custody of the child? Is there a custody agreement?      If there is a custody agreement remind parent that they must bring a copy to the first appt or they will not be seen  Intake Team, please check with provider before scheduling if flags come up such as:  - complex case  - legal history (other than DUI)  - communication barrier concerns are present  - if, in your judgment, this needs further review    ACCEPTED as a patient Yes  => Appointment Date: 11/20/2019 w/ Tristin Sit    Referred Elsewhere? No    Name of Insurance Co:  Insurance ID#  [employer] #  If ins is primary or secondary  If patient is a minor, parents information such as Name, D  O B of guarantor

## 2019-10-31 ENCOUNTER — TELEPHONE (OUTPATIENT)
Dept: FAMILY MEDICINE CLINIC | Facility: CLINIC | Age: 49
End: 2019-10-31

## 2019-11-01 ENCOUNTER — TELEPHONE (OUTPATIENT)
Dept: FAMILY MEDICINE CLINIC | Facility: CLINIC | Age: 49
End: 2019-11-01

## 2019-11-01 DIAGNOSIS — E03.9 ACQUIRED HYPOTHYROIDISM: Primary | ICD-10-CM

## 2019-11-01 NOTE — TELEPHONE ENCOUNTER
For what? What do I put down as the reason? She takes nothing for thyroid? What symptoms is she having?   The ref is in for her per her request

## 2019-11-01 NOTE — TELEPHONE ENCOUNTER
Pt was looking for paperwork that was to be faxed for her medication  I found an old order in her chart and faxed it to [phone]    However there was only 1 page, not sure if it was enough

## 2019-11-20 ENCOUNTER — OFFICE VISIT (OUTPATIENT)
Dept: PSYCHIATRY | Facility: CLINIC | Age: 49
End: 2019-11-20
Payer: COMMERCIAL

## 2019-11-20 DIAGNOSIS — F31.30 BIPOLAR AFFECTIVE DISORDER, CURRENT EPISODE DEPRESSED, CURRENT EPISODE SEVERITY UNSPECIFIED (HCC): Primary | ICD-10-CM

## 2019-11-20 DIAGNOSIS — F41.1 GAD (GENERALIZED ANXIETY DISORDER): ICD-10-CM

## 2019-11-20 PROCEDURE — 90792 PSYCH DIAG EVAL W/MED SRVCS: CPT | Performed by: PHYSICIAN ASSISTANT

## 2019-11-20 RX ORDER — DICLOFENAC SODIUM 75 MG/1
75 TABLET, DELAYED RELEASE ORAL
COMMUNITY
End: 2021-04-13 | Stop reason: ALTCHOICE

## 2019-11-20 RX ORDER — QUETIAPINE FUMARATE 50 MG/1
50 TABLET, FILM COATED ORAL
Qty: 30 TABLET | Refills: 1 | Status: SHIPPED | OUTPATIENT
Start: 2019-11-20 | End: 2019-12-11 | Stop reason: SDDI

## 2019-11-20 RX ORDER — PROCHLORPERAZINE MALEATE 10 MG
TABLET ORAL
COMMUNITY
Start: 2019-10-09 | End: 2020-08-17 | Stop reason: ALTCHOICE

## 2019-11-20 RX ORDER — METHOCARBAMOL 500 MG/1
500 TABLET, FILM COATED ORAL 4 TIMES DAILY
COMMUNITY
End: 2020-06-17 | Stop reason: SDUPTHER

## 2019-11-20 RX ORDER — BUSPIRONE HYDROCHLORIDE 5 MG/1
5 TABLET ORAL 3 TIMES DAILY
Qty: 90 TABLET | Refills: 1 | Status: SHIPPED | OUTPATIENT
Start: 2019-11-20 | End: 2019-12-11 | Stop reason: DRUGHIGH
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
## 2019-11-20 NOTE — BH TREATMENT PLAN
TREATMENT PLAN (Medication Management Only)        [facility]    Name and Date of Birth:  Chari Srinivasan 51 y o  1970  Date of Treatment Plan: November 20, 2019  Diagnosis/Diagnoses:  Bipolar d/o, depressed, ZAINA  Strengths/Personal Resources for Self-Care: "good grandmother, caring"  Area/Areas of need (in own words): "low tolerance level with anger"  1  Long Term Goal: lose weight with diet and exercise  Target Date: 2 months - 1/20/2020  Person/Persons responsible for completion of goal: Andra Godinez  2  Short Term Objective (s) - How will we reach this goal?:   A  Provider new recommended medication/dosage changes and/or continue medication(s): continue current medications as prescribed  B  restart new book  C  continue therapy  Target Date: 2 months - 1/20/2020  Person/Persons Responsible for Completion of Goal: Andra Godinez, provider  Progress Towards Goals: initiating treatment  Treatment Modality: medication management every 4 weeks, continue psychotherapy with own therapist  Review due 90 to 120 days from date of this plan: 4 months - 3/20/2020  Expected length of service: ongoing treatment  My Physician/PA/NP and I have developed this plan together and I agree to work on the goals and objectives  I understand the treatment goals that were developed for my treatment

## 2019-12-11 ENCOUNTER — OFFICE VISIT (OUTPATIENT)
Dept: PSYCHIATRY | Facility: CLINIC | Age: 49
End: 2019-12-11
Payer: COMMERCIAL

## 2019-12-11 DIAGNOSIS — F41.1 GAD (GENERALIZED ANXIETY DISORDER): ICD-10-CM

## 2019-12-11 DIAGNOSIS — F31.30 BIPOLAR AFFECTIVE DISORDER, CURRENT EPISODE DEPRESSED, CURRENT EPISODE SEVERITY UNSPECIFIED (HCC): Primary | ICD-10-CM

## 2019-12-11 PROCEDURE — 99214 OFFICE O/P EST MOD 30 MIN: CPT | Performed by: PHYSICIAN ASSISTANT

## 2019-12-11 RX ORDER — ORPHENADRINE CITRATE 100 MG/1
TABLET, EXTENDED RELEASE ORAL
COMMUNITY
End: 2020-06-17 | Stop reason: SDUPTHER

## 2019-12-11 RX ORDER — DEXAMETHASONE 4 MG/1
TABLET ORAL
COMMUNITY
Start: 2019-12-02 | End: 2020-06-17 | Stop reason: ALTCHOICE

## 2019-12-11 RX ORDER — BUSPIRONE HYDROCHLORIDE 7.5 MG/1
7.5 TABLET ORAL 3 TIMES DAILY
Qty: 90 TABLET | Refills: 1 | Status: SHIPPED | OUTPATIENT
Start: 2019-12-11 | End: 2020-01-15 | Stop reason: DRUGHIGH

## 2019-12-11 NOTE — PSYCH
MEDICATION MANAGEMENT NOTE        [facility]      Name and Date of Birth:  Dre Mijares 51 y o  1970 MRN: [id number]    Date of Visit: December 11, 2019    SUBJECTIVE:    Lashae Griffiths is seen today for a follow up for Bipolar Disorder and Generalized Anxiety Disorder  Today reports that since starting the Seroquel about 3 weeks ago she stopped on her own about 3 days ago  She reports she felt like it was causing too many side effects including feeling sedated which we did talk about at last visit and that her body needs to adjust her medication  She also reports she feels I have been gaining weight    Patient denies any increase in appetite but feels she has been gaining weight and is concerned and that is her main concern today  Patient also feels that it may have triggered about migraine which she reports she spoke to her neurologist about and is on a steroid taper which can cause weight gain  When asked about continuing Seroquel to see if side effects or reduce patient refuses reports she will not restart it  In terms of depression patient reports she had another stressor recently as her daughter was hospitalized and diagnosed with bipolar disorder about 2 weeks ago  She feels guilty about not being able to help her daughter as much but she reports they cannot live together as they have a toxic relationship  She does feel slightly less depressed mood, but still reports anhedonia, low energy as well  Denies any SI/HI  She reports her sleep has been [place]    She admits there is no change in anxiety and no change in irritability  Her main concern is her irritability as well as weight gain  Discussed with patient at length that in terms of treatment for bipolar disorder most medications when the risk of weight gain and benefits at times outweigh risks  She reports trouble with worry still, irritability, trouble relaxing          HPI ROS:             ('was' notes: recent => remote)  Medication Side Effects:  Sedation, weight gain? With Seroquel  denied   Depression (10 worst):  Increased (Was increased)   Anxiety (10 worst): No change, increased (Was increased)   Safety concerns (SI, HI, etc): Denies (Was denied)   Sleep: Better (Was trouble staying asleep)   Energy: Low (Was low)   Appetite: No change (Was increased)   Weight Change: Unsure denied       Review Of Systems:      Constitutional feeling tired and low energy   Cardiovascular negative   Respiratory negative   Gastrointestinal negative   Musculoskeletal negative   Neurological migraine headache   Endocrine negative       The following portions of the patient's history were reviewed and updated as appropriate: past family history, past medical history, past social history, past surgical history and problem list     Past Psychiatric History:     Past Psychiatric Medication Trials: Zoloft, Celexa, Effexor, Cymbalta, Wellbutrin, Amitriptyline/Elavil, Depakote, Topamax, Neurontin and Buspar,Xanax      Past Medical History:    Past Medical History:   Diagnosis Date    Anxiety     Anxiety     Asthma     Bipolar disorder ([facility] )     Carpal tunnel syndrome     Chronic pain     lower back    Depression     Disease of thyroid gland     Dyslipidemia     Fibromyalgia     Irritable bowel     Kidney stones     Kidney stones 5/20/2019    Migraine     Psychiatric disorder     depression/anxiety    Suicide attempt [facility])     as teen    Vitamin D deficiency      Past Medical History Pertinent Negatives:   Diagnosis Date Noted    Allergic 10/23/2018    Anemia 10/23/2018    Cancer ([facility] ) 10/23/2018    [facility] 10/23/2018    COPD (chronic obstructive pulmonary disease) ([facility] ) 10/23/2018    Coronary artery disease 10/23/2018    Dementia ([facility] ) 10/23/2018    Diabetes mellitus ([facility] ) 10/23/2018    Diverticulitis of colon 10/23/2018    Eating disorder 10/23/2018    GERD (gastroesophageal reflux disease) 10/23/2018    Heart murmur 10/23/2018    HL (hearing loss) 10/23/2018    Hypertension 10/23/2018    Infectious viral hepatitis 10/23/2018    Inflammatory bowel disease 10/23/2018    Memory loss 10/23/2018    Myocardial infarction ([facility] ) 10/23/2018    Obesity 10/23/2018    Osteoporosis 10/23/2018    Otitis media 10/23/2018    Pneumonia 10/23/2018    Scoliosis 10/23/2018    Seizures ([facility] ) 10/23/2018    Shingles 10/23/2018    [facility] 10/23/2018    Substance abuse ([facility] ) 10/23/2018    Urinary tract infection 10/23/2018    Visual impairment 10/23/2018     Past Surgical History:   Procedure Laterality Date    BUNIONECTOMY       SECTION      CHOLECYSTECTOMY      PARTIAL HYSTERECTOMY      TONSILLECTOMY      TUBAL LIGATION       Allergies   Allergen Reactions    Doxycycline Rash    Erythromycin Rash    Other Edema and Wheezing     jalapeno    Latex Rash    Eletriptan      Pain in lower jaw with pressure in throat       Substance Abuse History:    Social History     Substance and Sexual Activity   Alcohol Use No     Social History     Substance and Sexual Activity   Drug Use No       Social History:    Social History     Socioeconomic History    Marital status: Single     Spouse name: Not on file    Number of children: Not on file    Years of education: Not on file    Highest education level: Not on file   Occupational History    Not on file   Social Needs    Financial resource strain: Not on file    Food insecurity:     Worry: Not on file     Inability: Not on file    Transportation needs:     Medical: Not on file     Non-medical: Not on file   Tobacco Use    Smoking status: Never Smoker    Smokeless tobacco: Never Used   Substance and Sexual Activity    Alcohol use: No    Drug use: No    Sexual activity: Never     Partners: Male     Birth control/protection: None   Lifestyle    Physical activity:     Days per week: Not on file Minutes per session: Not on file    Stress: Not on file   Relationships    Social connections:     Talks on phone: Not on file     Gets together: Not on file     Attends Buddhist service: Not on file     Active member of club or organization: Not on file     Attends meetings of clubs or organizations: Not on file     Relationship status: Not on file    Intimate partner violence:     Fear of current or ex partner: Not on file     Emotionally abused: Not on file     Physically abused: Not on file     Forced sexual activity: Not on file   Other Topics Concern    Not on file   Social History Narrative    Not on file       Family Psychiatric History:     Family History   Problem Relation Age of Onset    Hypertension Mother     Diabetes Mother     Hypothyroidism Mother     Stroke Mother     Bipolar disorder Mother     Anxiety disorder Mother     Cancer Father         pancreatic     Hypothyroidism Sister     Hypertension Brother     Heart disease Brother     Cancer Maternal Uncle         lung    Cancer Paternal Aunt         breast    Cancer Paternal Uncle         testicular     Cancer Paternal Grandmother         breast    Cancer Cousin         brain    Bipolar disorder Daughter                 OBJECTIVE:     Vital signs in last 24 hours: There were no vitals filed for this visit      Mental Status Evaluation:    Appearance age appropriate, casually dressed, Good hygiene   Behavior cooperative, calm, Appears slightly irritable, limited eye contact   Speech normal rate, normal volume, normal pitch   Mood depressed, anxious, irritable   Affect mood-congruent   Thought Processes organized, goal directed   Associations concrete associations   Thought Content no overt delusions   Perceptual Disturbances: no auditory hallucinations, no visual hallucinations   Abnormal Thoughts  Risk Potential Suicidal ideation - None  Homicidal ideation - None  Potential for aggression - No   Orientation oriented to person, place, time/date and situation   Memory recent and remote memory grossly intact   Consciousness alert and awake   Attention Span Concentration Span attention span and concentration are age appropriate   Intellect appears to be of average intelligence   Insight fair   Judgement moderate   Muscle Strength and  Gait normal muscle strength and normal muscle tone, normal gait and normal balance   Motor activity no abnormal movements   Language no difficulty naming common objects, no difficulty repeating a phrase, no difficulty writing a sentence   Fund of Knowledge adequate knowledge of current events  adequate fund of knowledge regarding past history  adequate fund of knowledge regarding vocabulary    Pain moderate       Laboratory Results:   I have personally reviewed all pertinent laboratory/tests results  Most Recent Labs:   Lab Results   Component Value Date    WBC 5 21 03/07/2019    RBC 4 19 03/07/2019    HGB 12 5 03/07/2019    HCT 38 3 03/07/2019     03/07/2019    RDW 13 0 03/07/2019    NEUTROABS 2 77 03/07/2019     06/08/2018    K 4 0 03/07/2019     03/07/2019    CO2 24 03/07/2019    BUN 11 03/07/2019    CREATININE 0 80 03/07/2019    GLUCOSE 94 06/08/2018    CALCIUM 9 0 03/07/2019    AST 14 03/07/2019    ALT 21 03/07/2019    ALKPHOS 51 03/07/2019    PROT 7 4 06/08/2018    BILITOT 0 5 06/08/2018    CHOL 205 (H) 06/08/2018    HDL 51 01/07/2019    TRIG 173 (H) 01/07/2019    LDLCALC 108 (H) 01/07/2019    RTD3OOJFANKJ 3 552 01/07/2019    FREET4 0 72 (L) 10/03/2018    T3FREE 4 65 06/08/2018       No results found for this or any previous visit  Assessment/Plan:       There are no diagnoses linked to this encounter  Treatment Recommendations/Precautions/Plan:    Patient has been educated about their diagnosis and treatment modalities  They voiced understanding and agreement with the following plan: Increase BuSpar to 7 5 mg t i d   As patient reports she has been on a higher dose in the past and it helped for anxiety  Discontinue Seroquel 50 mg q h s  As patient reports she stopped this on her own 3 days ago and despite recommendation does not want to restart  Start Latuda 20 mg p o  Q d  With dinner for bipolar disorder in particular bipolar depression as patient reports depression is her main concern at this time       Can consider in future increasing dose of Depakote for mood stability for bipolar disorder    Medication management every 4 weeks  Continue psychotherapy with own therapist  Follows with family physician for medical issues  Aware of 24 hour and weekend coverage for urgent situations accessed by calling [facility] main practice number  Aware to continue following up with Neurology as they have been prescribing Depakote and topiramate for migraines    -Discussed self monitoring of symptoms, and symptom monitoring tools     -Patient has been informed to call the office with any questions or concerns before next office visit or to call 911 or get to emergency room for immediate medical attention or suicidality     -Completed and signed during the session: Not applicable - Treatment Plan not due at this session    Risks/Benefits      Risks, Benefits And Possible Side Effects Of Medications:    Risks, benefits, and possible side effects of medications explained to [place] including risk of parkinsonian symptoms, Tardive Dyskinesia and metabolic syndrome, potential for weight gain, sedation, GI intolerance, dizziness, QTc prolongation related to treatment with antipsychotic medications, risk of cardiovascular events in elderly related to treatment with antipsychotic medications, risks and benefits of treatment with medications in pregnancy and risks and benefits of treatment with medications in lactation and she reports that she is not pregnant, trying to become pregnant or has no symptoms of pregnancy   She verbalizes understanding and agreement for treatment  Controlled Medication Discussion:     No current/active records found for controlled prescriptions according to [place] Prescription Drug Monitoring Program    Psychotherapy Provided:     Individual psychotherapy provided: Medication changes discussed with Maddy Wheeler  Medication education provided to Maddy Han PA-C 12/11/19

## 2019-12-17 DIAGNOSIS — F41.1 GAD (GENERALIZED ANXIETY DISORDER): ICD-10-CM

## 2019-12-17 DIAGNOSIS — F31.30 BIPOLAR AFFECTIVE DISORDER, CURRENT EPISODE DEPRESSED, CURRENT EPISODE SEVERITY UNSPECIFIED (HCC): ICD-10-CM

## 2019-12-17 RX ORDER — BUSPIRONE HYDROCHLORIDE 5 MG/1
TABLET ORAL
Qty: 90 TABLET | Refills: 0 | OUTPATIENT
Start: 2019-12-17

## 2019-12-26 ENCOUNTER — TRANSCRIBE ORDERS (OUTPATIENT)
Dept: ADMINISTRATIVE | Facility: HOSPITAL | Age: 49
End: 2019-12-26

## 2019-12-26 ENCOUNTER — LAB (OUTPATIENT)
Dept: LAB | Facility: HOSPITAL | Age: 49
End: 2019-12-26
Payer: COMMERCIAL

## 2019-12-26 DIAGNOSIS — E03.9 HYPOTHYROIDISM, UNSPECIFIED TYPE: ICD-10-CM

## 2019-12-26 DIAGNOSIS — I51.9 MYXEDEMA HEART DISEASE: ICD-10-CM

## 2019-12-26 DIAGNOSIS — E03.9 HYPOTHYROIDISM, UNSPECIFIED TYPE: Primary | ICD-10-CM

## 2019-12-26 DIAGNOSIS — E03.9 MYXEDEMA HEART DISEASE: ICD-10-CM

## 2019-12-26 LAB
T3 SERPL-MCNC: 1 NG/ML (ref 0.6–1.8)
T4 FREE SERPL-MCNC: 0.91 NG/DL (ref 0.76–1.46)
TSH SERPL DL<=0.05 MIU/L-ACNC: 3 UIU/ML (ref 0.36–3.74)

## 2019-12-26 PROCEDURE — 36415 COLL VENOUS BLD VENIPUNCTURE: CPT

## 2019-12-26 PROCEDURE — 84480 ASSAY TRIIODOTHYRONINE (T3): CPT

## 2019-12-26 PROCEDURE — 84439 ASSAY OF FREE THYROXINE: CPT

## 2019-12-26 PROCEDURE — 84443 ASSAY THYROID STIM HORMONE: CPT

## 2020-01-03 ENCOUNTER — TELEPHONE (OUTPATIENT)
Dept: PSYCHIATRY | Facility: CLINIC | Age: 50
End: 2020-01-03

## 2020-01-03 NOTE — TELEPHONE ENCOUNTER
Patient asked to please call her because she is having concerns that need to be addressed before her appointment

## 2020-01-03 NOTE — TELEPHONE ENCOUNTER
Called pt back today and she reports she feels the change in medication has been helpful with less SEs /wght gain and some change in mood, however, she still admits to emotional lability  She reports her concern is she can no longer see her current therapist and would like to continue therapy  Discussed with pt that I will send a message to intake today at our office to have them call her for new pt therapy appt to help cope with anger  She is in agreement  Also discussed that she is having difficulty sleeping still  Told pt to get used to [place] that was just started and at next OV next wk we can discuss if sleep is still poor about options for insomnia  She also asked about a weight loss medication to which I told her I was not familiar with certain medications and she should talk to her PCP about it and she understands  Told her to call with any other questions or concerns before next visit  Message sent to intake today to call pt about therapy appt

## 2020-01-13 NOTE — TELEPHONE ENCOUNTER
Received a faxed notification that a prior authorization was generated via [facility]  Request completed for Latuda 20 mg daily and submitted to Suhail Becerra

## 2020-01-15 ENCOUNTER — OFFICE VISIT (OUTPATIENT)
Dept: PSYCHIATRY | Facility: CLINIC | Age: 50
End: 2020-01-15
Payer: COMMERCIAL

## 2020-01-15 VITALS
WEIGHT: 168 LBS | DIASTOLIC BLOOD PRESSURE: 78 MMHG | BODY MASS INDEX: 28.84 KG/M2 | SYSTOLIC BLOOD PRESSURE: 117 MMHG | HEART RATE: 80 BPM

## 2020-01-15 DIAGNOSIS — F41.1 GAD (GENERALIZED ANXIETY DISORDER): ICD-10-CM

## 2020-01-15 DIAGNOSIS — G89.29 CHRONIC LOW BACK PAIN, UNSPECIFIED BACK PAIN LATERALITY, UNSPECIFIED WHETHER SCIATICA PRESENT: ICD-10-CM

## 2020-01-15 DIAGNOSIS — M54.50 CHRONIC LOW BACK PAIN, UNSPECIFIED BACK PAIN LATERALITY, UNSPECIFIED WHETHER SCIATICA PRESENT: ICD-10-CM

## 2020-01-15 DIAGNOSIS — F31.30 BIPOLAR AFFECTIVE DISORDER, CURRENT EPISODE DEPRESSED, CURRENT EPISODE SEVERITY UNSPECIFIED (HCC): Primary | ICD-10-CM

## 2020-01-15 DIAGNOSIS — K58.0 IRRITABLE BOWEL SYNDROME WITH DIARRHEA: ICD-10-CM

## 2020-01-15 PROBLEM — F31.9 BIPOLAR 1 DISORDER (HCC): Status: ACTIVE | Noted: 2020-01-15

## 2020-01-15 PROCEDURE — 99214 OFFICE O/P EST MOD 30 MIN: CPT | Performed by: PHYSICIAN ASSISTANT

## 2020-01-15 RX ORDER — BACLOFEN 10 MG/1
TABLET ORAL
COMMUNITY
Start: 2020-01-03 | End: 2022-04-11 | Stop reason: ALTCHOICE

## 2020-01-15 RX ORDER — LEVOTHYROXINE SODIUM 125 UG/1
CAPSULE ORAL
COMMUNITY
Start: 2020-01-08 | End: 2020-08-17 | Stop reason: ALTCHOICE

## 2020-01-15 RX ORDER — BUSPIRONE HYDROCHLORIDE 10 MG/1
10 TABLET ORAL 3 TIMES DAILY
Qty: 90 TABLET | Refills: 0 | Status: SHIPPED | OUTPATIENT
Start: 2020-01-15 | End: 2020-02-11 | Stop reason: SDUPTHER
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
## 2020-01-16 ENCOUNTER — TELEPHONE (OUTPATIENT)
Dept: PSYCHIATRY | Facility: CLINIC | Age: 50
End: 2020-01-16

## 2020-01-16 NOTE — TELEPHONE ENCOUNTER
The insurance denied the prior authorization for (Hubert Pereira) but they want you to call and do a peer to peer after Brendan Small explained the Hubert Pereira has the least side affects given her medical history  She said the sooner you do the peer to peer with them than they can do there part  If you have any further questions she said please call her

## 2020-01-16 NOTE — TELEPHONE ENCOUNTER
Nursing received denial for prior authorization for Latuda from [employer]  It was denied completely because the non formulary rules require the patient to have previously tried one of the following: Aripiprazole, Clozapine, Fluphenazine, Haloperidol, Loxapine, Perphenazine, Risperdal Consta, Trifluoperazine, Ziprasidone  Nursing did document on prior auth request the failed trials of Quetiapine and Olanzapine  Per the letter the physician may file a complaint/grievance and expedite the request by calling ([phone]), faxing a letter ([phone]) or by emailing [email or web address]  Please advise  For Mirella Sykes to review

## 2020-01-16 NOTE — TELEPHONE ENCOUNTER
Leslie Angel, is there a contact number or did you receive a fax back in regards to her prior auth for a peer to peer for me to go about doing that?

## 2020-01-17 ENCOUNTER — TELEPHONE (OUTPATIENT)
Dept: PSYCHIATRY | Facility: CLINIC | Age: 50
End: 2020-01-17

## 2020-01-17 NOTE — TELEPHONE ENCOUNTER
Called pt today and left voicemail to call office back to discuss denial of prior authorization today for Latuda and to discuss medication options

## 2020-01-20 ENCOUNTER — TELEPHONE (OUTPATIENT)
Dept: PSYCHIATRY | Facility: CLINIC | Age: 50
End: 2020-01-20

## 2020-01-20 NOTE — TELEPHONE ENCOUNTER
Returned pt's call back today and spoke with pt about insurance company denying prior [place] for [employer]  She reports she spoke with insurance co  To file grievance about this and was told by insurance a [id number] could be initiated at [phone]  I called this number and spoke with clinical pharmacist who stated at least 3 medications on formulary must be tried prior to approval of [employer]  She also reported if pt can send record from pharmacy that she did try 30 days of Latuda before prior auth they could reconsider denial  Pharmacist provided fax number [phone] for pt's pharmacy to fax that [employer] was picked up for 30 day supply by pt and add PA# [id number] to fax     Called pt back and left message about requesting proof of  from pharmacy be faxed to that insurance co  number for possible approval

## 2020-01-20 NOTE — TELEPHONE ENCOUNTER
Nursing received a prior auth approval for Latuda from [address] [place] [address] 1/20/2020 to 1/20/2021   Pharmacy and patient made aware of approval

## 2020-01-22 ENCOUNTER — APPOINTMENT (OUTPATIENT)
Dept: LAB | Facility: HOSPITAL | Age: 50
End: 2020-01-22
Payer: COMMERCIAL

## 2020-01-22 DIAGNOSIS — I51.9 MYXEDEMA HEART DISEASE: ICD-10-CM

## 2020-01-22 DIAGNOSIS — E03.9 HYPOTHYROIDISM, UNSPECIFIED TYPE: ICD-10-CM

## 2020-01-22 DIAGNOSIS — E03.9 MYXEDEMA HEART DISEASE: ICD-10-CM

## 2020-01-22 LAB
T3 SERPL-MCNC: 0.9 NG/ML (ref 0.6–1.8)
T4 FREE SERPL-MCNC: 0.84 NG/DL (ref 0.76–1.46)
TSH SERPL DL<=0.05 MIU/L-ACNC: 2.9 UIU/ML (ref 0.36–3.74)

## 2020-01-22 PROCEDURE — 84480 ASSAY TRIIODOTHYRONINE (T3): CPT

## 2020-01-22 PROCEDURE — 36415 COLL VENOUS BLD VENIPUNCTURE: CPT

## 2020-01-22 PROCEDURE — 84443 ASSAY THYROID STIM HORMONE: CPT

## 2020-01-22 PROCEDURE — 84439 ASSAY OF FREE THYROXINE: CPT

## 2020-01-28 ENCOUNTER — APPOINTMENT (OUTPATIENT)
Dept: LAB | Facility: HOSPITAL | Age: 50
End: 2020-01-28
Payer: COMMERCIAL

## 2020-01-28 ENCOUNTER — TRANSCRIBE ORDERS (OUTPATIENT)
Dept: ADMINISTRATIVE | Facility: HOSPITAL | Age: 50
End: 2020-01-28

## 2020-01-28 DIAGNOSIS — Z13.89 ENCOUNTER FOR ROUTINE SCREENING FOR MALFORMATION USING ULTRASONICS: ICD-10-CM

## 2020-01-28 DIAGNOSIS — M25.50 PAIN IN JOINT, MULTIPLE SITES: ICD-10-CM

## 2020-01-28 DIAGNOSIS — M25.50 PAIN IN JOINT, MULTIPLE SITES: Primary | ICD-10-CM

## 2020-01-28 DIAGNOSIS — E55.9 AVITAMINOSIS D: ICD-10-CM

## 2020-01-28 LAB
25(OH)D3 SERPL-MCNC: 16.8 NG/ML (ref 30–100)
ALBUMIN SERPL BCP-MCNC: 4 G/DL (ref 3.5–5)
ALP SERPL-CCNC: 62 U/L (ref 46–116)
ALT SERPL W P-5'-P-CCNC: 17 U/L (ref 12–78)
ANION GAP SERPL CALCULATED.3IONS-SCNC: 8 MMOL/L (ref 4–13)
AST SERPL W P-5'-P-CCNC: 13 U/L (ref 5–45)
BASOPHILS # BLD AUTO: 0.01 THOUSANDS/ΜL (ref 0–0.1)
BASOPHILS NFR BLD AUTO: 0 % (ref 0–1)
BILIRUB SERPL-MCNC: 0.4 MG/DL (ref 0.2–1)
BUN SERPL-MCNC: 14 MG/DL (ref 5–25)
CALCIUM SERPL-MCNC: 8.8 MG/DL (ref 8.3–10.1)
CHLORIDE SERPL-SCNC: 107 MMOL/L (ref 100–108)
CO2 SERPL-SCNC: 26 MMOL/L (ref 21–32)
CREAT SERPL-MCNC: 0.78 MG/DL (ref 0.6–1.3)
CRP SERPL QL: <3 MG/L
EOSINOPHIL # BLD AUTO: 0.05 THOUSAND/ΜL (ref 0–0.61)
EOSINOPHIL NFR BLD AUTO: 1 % (ref 0–6)
ERYTHROCYTE [DISTWIDTH] IN BLOOD BY AUTOMATED COUNT: 12.9 % (ref 11.6–15.1)
ERYTHROCYTE [SEDIMENTATION RATE] IN BLOOD: 3 MM/HOUR (ref 0–20)
EST. AVERAGE GLUCOSE BLD GHB EST-MCNC: 100 MG/DL
GFR SERPL CREATININE-BSD FRML MDRD: 90 ML/MIN/1.73SQ M
GLUCOSE P FAST SERPL-MCNC: 76 MG/DL (ref 65–99)
HBA1C MFR BLD: 5.1 % (ref 4.2–6.3)
HCT VFR BLD AUTO: 41.7 % (ref 34.8–46.1)
HGB BLD-MCNC: 13.3 G/DL (ref 11.5–15.4)
IMM GRANULOCYTES # BLD AUTO: 0.02 THOUSAND/UL (ref 0–0.2)
IMM GRANULOCYTES NFR BLD AUTO: 0 % (ref 0–2)
LYMPHOCYTES # BLD AUTO: 1.79 THOUSANDS/ΜL (ref 0.6–4.47)
LYMPHOCYTES NFR BLD AUTO: 34 % (ref 14–44)
MCH RBC QN AUTO: 30.2 PG (ref 26.8–34.3)
MCHC RBC AUTO-ENTMCNC: 31.9 G/DL (ref 31.4–37.4)
MCV RBC AUTO: 95 FL (ref 82–98)
MONOCYTES # BLD AUTO: 0.29 THOUSAND/ΜL (ref 0.17–1.22)
MONOCYTES NFR BLD AUTO: 5 % (ref 4–12)
NEUTROPHILS # BLD AUTO: 3.19 THOUSANDS/ΜL (ref 1.85–7.62)
NEUTS SEG NFR BLD AUTO: 60 % (ref 43–75)
NRBC BLD AUTO-RTO: 0 /100 WBCS
PLATELET # BLD AUTO: 234 THOUSANDS/UL (ref 149–390)
PMV BLD AUTO: 10.6 FL (ref 8.9–12.7)
POTASSIUM SERPL-SCNC: 4.1 MMOL/L (ref 3.5–5.3)
PROT SERPL-MCNC: 7.4 G/DL (ref 6.4–8.2)
RBC # BLD AUTO: 4.4 MILLION/UL (ref 3.81–5.12)
SODIUM SERPL-SCNC: 141 MMOL/L (ref 136–145)
VIT B12 SERPL-MCNC: 681 PG/ML (ref 100–900)
WBC # BLD AUTO: 5.35 THOUSAND/UL (ref 4.31–10.16)

## 2020-01-28 PROCEDURE — 85025 COMPLETE CBC W/AUTO DIFF WBC: CPT

## 2020-01-28 PROCEDURE — 86140 C-REACTIVE PROTEIN: CPT

## 2020-01-28 PROCEDURE — 36415 COLL VENOUS BLD VENIPUNCTURE: CPT

## 2020-01-28 PROCEDURE — 82306 VITAMIN D 25 HYDROXY: CPT

## 2020-01-28 PROCEDURE — 82607 VITAMIN B-12: CPT

## 2020-01-28 PROCEDURE — 83036 HEMOGLOBIN GLYCOSYLATED A1C: CPT

## 2020-01-28 PROCEDURE — 80053 COMPREHEN METABOLIC PANEL: CPT

## 2020-01-28 PROCEDURE — 85652 RBC SED RATE AUTOMATED: CPT

## 2020-02-07 ENCOUNTER — TRANSCRIBE ORDERS (OUTPATIENT)
Dept: ADMINISTRATIVE | Facility: HOSPITAL | Age: 50
End: 2020-02-07

## 2020-02-07 ENCOUNTER — TELEPHONE (OUTPATIENT)
Dept: FAMILY MEDICINE CLINIC | Facility: CLINIC | Age: 50
End: 2020-02-07

## 2020-02-07 ENCOUNTER — APPOINTMENT (OUTPATIENT)
Dept: LAB | Facility: HOSPITAL | Age: 50
End: 2020-02-07
Payer: COMMERCIAL

## 2020-02-07 DIAGNOSIS — R10.10 PAIN OF UPPER ABDOMEN: ICD-10-CM

## 2020-02-07 DIAGNOSIS — R10.10 PAIN OF UPPER ABDOMEN: Primary | ICD-10-CM

## 2020-02-07 LAB
ALBUMIN SERPL BCP-MCNC: 3.8 G/DL (ref 3.5–5)
ALP SERPL-CCNC: 59 U/L (ref 46–116)
ALT SERPL W P-5'-P-CCNC: 21 U/L (ref 12–78)
ANION GAP SERPL CALCULATED.3IONS-SCNC: 8 MMOL/L (ref 4–13)
AST SERPL W P-5'-P-CCNC: 13 U/L (ref 5–45)
BASOPHILS # BLD AUTO: 0.02 THOUSANDS/ΜL (ref 0–0.1)
BASOPHILS NFR BLD AUTO: 0 % (ref 0–1)
BILIRUB SERPL-MCNC: 0.4 MG/DL (ref 0.2–1)
BUN SERPL-MCNC: 13 MG/DL (ref 5–25)
CALCIUM SERPL-MCNC: 8.6 MG/DL (ref 8.3–10.1)
CHLORIDE SERPL-SCNC: 106 MMOL/L (ref 100–108)
CO2 SERPL-SCNC: 25 MMOL/L (ref 21–32)
CREAT SERPL-MCNC: 0.79 MG/DL (ref 0.6–1.3)
CRP SERPL QL: <3 MG/L
EOSINOPHIL # BLD AUTO: 0.08 THOUSAND/ΜL (ref 0–0.61)
EOSINOPHIL NFR BLD AUTO: 2 % (ref 0–6)
ERYTHROCYTE [DISTWIDTH] IN BLOOD BY AUTOMATED COUNT: 12.8 % (ref 11.6–15.1)
ERYTHROCYTE [SEDIMENTATION RATE] IN BLOOD: 2 MM/HOUR (ref 0–20)
GFR SERPL CREATININE-BSD FRML MDRD: 89 ML/MIN/1.73SQ M
GLUCOSE P FAST SERPL-MCNC: 89 MG/DL (ref 65–99)
HCT VFR BLD AUTO: 41.9 % (ref 34.8–46.1)
HGB BLD-MCNC: 13.6 G/DL (ref 11.5–15.4)
IMM GRANULOCYTES # BLD AUTO: 0.01 THOUSAND/UL (ref 0–0.2)
IMM GRANULOCYTES NFR BLD AUTO: 0 % (ref 0–2)
LYMPHOCYTES # BLD AUTO: 1.77 THOUSANDS/ΜL (ref 0.6–4.47)
LYMPHOCYTES NFR BLD AUTO: 37 % (ref 14–44)
MCH RBC QN AUTO: 30.4 PG (ref 26.8–34.3)
MCHC RBC AUTO-ENTMCNC: 32.5 G/DL (ref 31.4–37.4)
MCV RBC AUTO: 94 FL (ref 82–98)
MONOCYTES # BLD AUTO: 0.32 THOUSAND/ΜL (ref 0.17–1.22)
MONOCYTES NFR BLD AUTO: 7 % (ref 4–12)
NEUTROPHILS # BLD AUTO: 2.65 THOUSANDS/ΜL (ref 1.85–7.62)
NEUTS SEG NFR BLD AUTO: 54 % (ref 43–75)
NRBC BLD AUTO-RTO: 0 /100 WBCS
PLATELET # BLD AUTO: 244 THOUSANDS/UL (ref 149–390)
PMV BLD AUTO: 9.8 FL (ref 8.9–12.7)
POTASSIUM SERPL-SCNC: 4.1 MMOL/L (ref 3.5–5.3)
PROT SERPL-MCNC: 7.2 G/DL (ref 6.4–8.2)
RBC # BLD AUTO: 4.47 MILLION/UL (ref 3.81–5.12)
SODIUM SERPL-SCNC: 139 MMOL/L (ref 136–145)
WBC # BLD AUTO: 4.85 THOUSAND/UL (ref 4.31–10.16)

## 2020-02-07 PROCEDURE — 86140 C-REACTIVE PROTEIN: CPT

## 2020-02-07 PROCEDURE — 36415 COLL VENOUS BLD VENIPUNCTURE: CPT

## 2020-02-07 PROCEDURE — 85652 RBC SED RATE AUTOMATED: CPT

## 2020-02-07 PROCEDURE — 85025 COMPLETE CBC W/AUTO DIFF WBC: CPT

## 2020-02-07 PROCEDURE — 80053 COMPREHEN METABOLIC PANEL: CPT

## 2020-02-07 NOTE — TELEPHONE ENCOUNTER
Please call Petra Humphrey and let her know her lab work was reviewed and everything was within normal

## 2020-02-10 ENCOUNTER — APPOINTMENT (OUTPATIENT)
Dept: LAB | Facility: HOSPITAL | Age: 50
End: 2020-02-10
Payer: COMMERCIAL

## 2020-02-10 DIAGNOSIS — R10.10 PAIN OF UPPER ABDOMEN: ICD-10-CM

## 2020-02-10 PROCEDURE — 87505 NFCT AGENT DETECTION GI: CPT

## 2020-02-10 PROCEDURE — 87177 OVA AND PARASITES SMEARS: CPT

## 2020-02-10 PROCEDURE — 87338 HPYLORI STOOL AG IA: CPT

## 2020-02-10 PROCEDURE — 87209 SMEAR COMPLEX STAIN: CPT

## 2020-02-11 ENCOUNTER — OFFICE VISIT (OUTPATIENT)
Dept: PSYCHIATRY | Facility: CLINIC | Age: 50
End: 2020-02-11
Payer: COMMERCIAL

## 2020-02-11 VITALS
SYSTOLIC BLOOD PRESSURE: 114 MMHG | HEART RATE: 74 BPM | WEIGHT: 168 LBS | DIASTOLIC BLOOD PRESSURE: 75 MMHG | BODY MASS INDEX: 28.84 KG/M2

## 2020-02-11 DIAGNOSIS — F31.30 BIPOLAR AFFECTIVE DISORDER, CURRENT EPISODE DEPRESSED, CURRENT EPISODE SEVERITY UNSPECIFIED (HCC): Primary | ICD-10-CM

## 2020-02-11 DIAGNOSIS — K58.0 IRRITABLE BOWEL SYNDROME WITH DIARRHEA: ICD-10-CM

## 2020-02-11 DIAGNOSIS — F41.1 GAD (GENERALIZED ANXIETY DISORDER): ICD-10-CM

## 2020-02-11 DIAGNOSIS — G43.909 MIGRAINE WITHOUT STATUS MIGRAINOSUS, NOT INTRACTABLE, UNSPECIFIED MIGRAINE TYPE: ICD-10-CM

## 2020-02-11 LAB — H PYLORI AG STL QL IA: NEGATIVE

## 2020-02-11 PROCEDURE — 3074F SYST BP LT 130 MM HG: CPT | Performed by: PHYSICIAN ASSISTANT

## 2020-02-11 PROCEDURE — 3078F DIAST BP <80 MM HG: CPT | Performed by: PHYSICIAN ASSISTANT

## 2020-02-11 PROCEDURE — 99214 OFFICE O/P EST MOD 30 MIN: CPT | Performed by: PHYSICIAN ASSISTANT

## 2020-02-11 PROCEDURE — 1036F TOBACCO NON-USER: CPT | Performed by: PHYSICIAN ASSISTANT

## 2020-02-11 RX ORDER — CLOTRIMAZOLE AND BETAMETHASONE DIPROPIONATE 10; .64 MG/G; MG/G
CREAM TOPICAL
COMMUNITY
Start: 2020-01-14 | End: 2020-11-13 | Stop reason: ALTCHOICE

## 2020-02-11 RX ORDER — RIFAXIMIN 550 MG/1
TABLET ORAL
COMMUNITY
Start: 2020-01-22

## 2020-02-11 RX ORDER — BUSPIRONE HYDROCHLORIDE 10 MG/1
10 TABLET ORAL 3 TIMES DAILY
Qty: 90 TABLET | Refills: 2 | Status: SHIPPED | OUTPATIENT
Start: 2020-02-15 | End: 2020-04-22 | Stop reason: SDUPTHER

## 2020-02-11 NOTE — PSYCH
MEDICATION MANAGEMENT NOTE          Leonardmaura  48      Name and Date of Birth:  Chari Srinivasan 51 y o  1970 MRN: [id number]    Date of Visit: February 11, 2020    SUBJECTIVE:    Andra Godinez is seen today for a follow up for Bipolar Disorder and Generalized Anxiety Disorder  Today reports since restarting the [place] after being approved for the prior off appeal she reports her mood has been okay  In the last few weeks since starting [place] again she reports that has been no worsening of depressive or hypomanic symptoms, but reports there is no change in depression  She reports this is due to current long flare-up of her IBS  She reports she is currently following with her provider about her IBS but having worsening physical ailments has not been helping her current depression  She admits to low energy, decreased concentration, depressed mood, anhedonia  However, anhedonia appears more to be because of physical pain  Also admits that sleep continues to be poor due to her IBS and not due to anxiety or depression  She denies SI/HI  She also currently admits to still anxiety with feeling on edge, worry about her physical symptoms and her migraines in IBS and irritability with no change since last visit  She does admit however that she has not been taking her BuSpar as prescribed for t i d  She reports this is due to her current nausea and vomiting and she cannot keep food down  She also admits that she has missed the last 2 days of Latuda due to not being able to keep food down her eating  Regards to hypomanic symptoms she denies any grandiosity, anger lasting for long, distractibility, speech changes  She she does report she is concerned she might be shopping more than she usually does which has happened in the past but she reports so far it has not caused any financial trouble            HPI ROS:             ('was' notes: recent => remote)  Medication Side Effects:  denies  denied, possible IBS flare up with Latuda?    Depression (10 worst): High, no change (Was high)   Anxiety (10 worst): No change (Was high)   Safety concerns (SI, HI, etc): denies (Was denied)   Sleep: Poor due to IBS (Was poor, interrupted)   Energy: low (Was low)   Appetite: Poor due to IBS (Was increased at times)   Weight Change: denies Denied recent       Review Of Systems:      Constitutional feeling poorly and feeling tired   Cardiovascular negative   Respiratory negative   Gastrointestinal abdominal pain, abdominal cramps, nausea, diarrhea and vomiting   Musculoskeletal negative   Neurological migraine headache   Endocrine negative       The following portions of the patient's history were reviewed and updated as appropriate: past family history, past medical history, past social history, past surgical history and problem list     Past Psychiatric History:     Past Psychiatric Medication Trials: Zoloft, Celexa, Effexor, Cymbalta, Wellbutrin, Amitriptyline/Elavil, Depakote, Topamax, Neurontin, Seroquel, Latuda, Buspar and Xanax      Past Medical History:    Past Medical History:   Diagnosis Date    Anxiety     Anxiety     Asthma     Bipolar disorder ([facility] )     Carpal tunnel syndrome     Chronic pain     lower back    Depression     Disease of thyroid gland     Dyslipidemia     Fibromyalgia     Irritable bowel     Kidney stones     Kidney stones 5/20/2019    Migraine     Psychiatric disorder     depression/anxiety    Suicide attempt [facility])     as teen    Vitamin D deficiency      Past Medical History Pertinent Negatives:   Diagnosis Date Noted    Allergic 10/23/2018    Anemia 10/23/2018    Cancer ([facility] ) 10/23/2018    Clotting disorder ([facility] ) 10/23/2018    COPD (chronic obstructive pulmonary disease) ([facility] ) 10/23/2018    Coronary artery disease 10/23/2018    Dementia ([facility] ) 10/23/2018    Diabetes mellitus ([facility] ) 10/23/2018    Diverticulitis of colon 10/23/2018    Eating disorder 10/23/2018    GERD (gastroesophageal reflux disease) 10/23/2018    Heart murmur 10/23/2018    HL (hearing loss) 10/23/2018    Hypertension 10/23/2018    Infectious viral hepatitis 10/23/2018    Inflammatory bowel disease 10/23/2018    Memory loss 10/23/2018    Myocardial infarction ([facility] ) 10/23/2018    Obesity 10/23/2018    Osteoporosis 10/23/2018    Otitis media 10/23/2018    Pneumonia 10/23/2018    Scoliosis 10/23/2018    Seizures ([address] ) 10/23/2018    Shingles 10/23/2018    Stroke ([facility] ) 10/23/2018    Substance abuse ([facility] ) 10/23/2018    Urinary tract infection 10/23/2018    Visual impairment 10/23/2018     Past Surgical History:   Procedure Laterality Date    BUNIONECTOMY       SECTION      CHOLECYSTECTOMY      PARTIAL HYSTERECTOMY      TONSILLECTOMY      TUBAL LIGATION       Allergies   Allergen Reactions    Doxycycline Rash    Erythromycin Rash    Other Edema and Wheezing     jalapeno    Latex Rash    Eletriptan      Pain in lower jaw with pressure in throat       Substance Abuse History:    Social History     Substance and Sexual Activity   Alcohol Use No     Social History     Substance and Sexual Activity   Drug Use No       Social History:    Social History     Socioeconomic History    Marital status: Single     Spouse name: Not on file    Number of children: Not on file    Years of education: Not on file    Highest education level: Not on file   Occupational History    Not on file   Social Needs    Financial resource strain: Not on file    Food insecurity:     Worry: Not on file     Inability: Not on file    Transportation needs:     Medical: Not on file     Non-medical: Not on file   Tobacco Use    Smoking status: Never Smoker    Smokeless tobacco: Never Used   Substance and Sexual Activity    Alcohol use: No    Drug use: No    Sexual activity: Never     Partners: Male     Birth control/protection: None   Lifestyle    Physical activity:     Days per week: Not on file     Minutes per session: Not on file    Stress: Not on file   Relationships    Social connections:     Talks on phone: Not on file     Gets together: Not on file     Attends Sikhism service: Not on file     Active member of club or organization: Not on file     Attends meetings of clubs or organizations: Not on file     Relationship status: Not on file    Intimate partner violence:     Fear of current or ex partner: Not on file     Emotionally abused: Not on file     Physically abused: Not on file     Forced sexual activity: Not on file   Other Topics Concern    Not on file   Social History Narrative    Not on file       Family Psychiatric History:     Family History   Problem Relation Age of Onset    Hypertension Mother     Diabetes Mother     Hypothyroidism Mother     Stroke Mother     Bipolar disorder Mother     Anxiety disorder Mother     Cancer Father         pancreatic     Hypothyroidism Sister     Hypertension Brother     Heart disease Brother     Cancer Maternal Uncle         lung    Cancer Paternal Aunt         breast    Cancer Paternal Uncle         testicular     Cancer Paternal Grandmother         breast    Cancer Cousin         brain    Bipolar disorder Daughter                 OBJECTIVE:     Vital signs in last 24 hours:    Vitals:    02/11/20 1035   BP: 114/75   BP Location: Left arm   Patient Position: Sitting   Cuff Size: Standard   Pulse: 74   Weight: 76 2 kg (168 lb)       Mental Status Evaluation:    Appearance age appropriate, casually dressed   Behavior pleasant, cooperative, appears anxious, good eye contact   Speech normal rate, normal volume, normal pitch, scant   Mood depressed, anxious, irritable   Affect constricted   Thought Processes organized, goal directed   Associations circumstantial associations   Thought Content no overt delusions   Perceptual Disturbances: no auditory hallucinations, no visual hallucinations Abnormal Thoughts  Risk Potential Suicidal ideation - None  Homicidal ideation - None  Potential for aggression - No   Orientation oriented to person, place, time/date and situation   Memory recent and remote memory grossly intact   Consciousness alert and awake   Attention Span Concentration Span attention span and concentration are age appropriate   Intellect appears to be of average intelligence   Insight intact   Judgement intact   Muscle Strength and  Gait normal muscle strength and normal muscle tone, normal gait and normal balance   Motor activity no abnormal movements   Language no difficulty naming common objects, no difficulty repeating a phrase, no difficulty writing a sentence   Fund of Knowledge adequate knowledge of current events  adequate fund of knowledge regarding past history  adequate fund of knowledge regarding vocabulary    Pain severe       Laboratory Results:   Most Recent Labs:   Lab Results   Component Value Date    WBC 4 85 02/07/2020    RBC 4 47 02/07/2020    HGB 13 6 02/07/2020    HCT 41 9 02/07/2020     02/07/2020    RDW 12 8 02/07/2020    NEUTROABS 2 65 02/07/2020     06/08/2018    K 4 1 02/07/2020     02/07/2020    CO2 25 02/07/2020    BUN 13 02/07/2020    CREATININE 0 79 02/07/2020    GLUCOSE 94 06/08/2018    CALCIUM 8 6 02/07/2020    AST 13 02/07/2020    ALT 21 02/07/2020    ALKPHOS 59 02/07/2020    PROT 7 4 06/08/2018    BILITOT 0 5 06/08/2018    CHOL 205 (H) 06/08/2018    HDL 51 01/07/2019    TRIG 173 (H) 01/07/2019    LDLCALC 108 (H) 01/07/2019    VCS3SEXWYNXS 2 897 01/22/2020    FREET4 0 84 01/22/2020    T3FREE 4 65 06/08/2018     I have personally reviewed all pertinent laboratory/tests results  No results found for this or any previous visit        Assessment/Plan:       Diagnoses and all orders for this visit:    Bipolar affective disorder, current episode depressed, current episode severity unspecified (HCC)  -     lurasidone (LATUDA) 20 mg tablet; Take 1 tablet (20 mg total) by mouth daily with dinner    ZAINA (generalized anxiety disorder)  -     busPIRone (BUSPAR) 10 mg tablet; Take 1 tablet (10 mg total) by mouth 3 (three) times a day    Irritable bowel syndrome with diarrhea    Migraine without status migrainosus, not intractable, unspecified migraine type         Treatment Recommendations/Precautions/Plan:    Patient has been educated about their diagnosis and treatment modalities  They voiced understanding and agreement with the following plan:    Continue Latuda 20 mg p o  q d  with dinner, patient reports due to nausea and vomiting with IBS flare-up she has missed the last 2 days of doses  Discussed that this medication is best with taken with at least 380 calories and needs to be taking with food for best efficacy  So at this time discussed not changing medication and getting back on routine of taking the medication consistently with IBS flare to help best for bipolar depression symptoms  Patient also admits there has been no worsening of symptoms  Continue BuSpar 10 mg p o  t i d  for anxiety, discussed with patient about taking as prescribed but right now has not been taking due to IBS flare-up in nausea and vomiting  Discussed not changing at this time to get back on a better routine of taking it as prescribed and not want to exacerbate any GI intolerance with change in dose    Defer to Neurology for Depakote 250 mg QD for migraines but can be mood stabilizer for BP d/o    Medication management every 5 weeks; patient reports she will call to reschedule appointments and was made aware about future follow-up appointments with different provider this provider is leaving the practice  She reports when she is feeling better she will call and make that future appointment for smooth transition of care      Aware of need to follow up with family physician for medical issues as well as GI for IBS and Neurology for migraines    - Pt has upcoming appt for therapy at this office later this month    -Discussed self monitoring of symptoms, and symptom monitoring tools     -Patient has been informed to call office with any questions or concerns prior to next OV  -Completed and signed during the session: Not applicable - Treatment Plan not due at this session    Risks/Benefits      Risks, Benefits And Possible Side Effects Of Medications:    Risks, benefits, and possible side effects of medications explained to [place] including risk of parkinsonian symptoms, Tardive Dyskinesia and metabolic syndrome related to treatment with antipsychotic medications, risk of cardiovascular events in elderly related to treatment with antipsychotic medications, risks and benefits of treatment with medications in pregnancy, risks and benefits of treatment with medications in lactation and Serotonin syndrome risk, sedation with Buspar  She verbalizes understanding and agreement for treatment  Controlled Medication Discussion:     No recent records found for controlled prescriptions according to [place] Prescription Drug Monitoring Program    Psychotherapy Provided:     Individual psychotherapy provided: Recent stressor including health issues discussed with Royal christy  Importance of medication and treatment compliance reviewed with Royal westley Horner PA-C 02/11/20

## 2020-02-13 LAB — O+P STL CONC: NORMAL

## 2020-02-21 ENCOUNTER — SOCIAL WORK (OUTPATIENT)
Dept: BEHAVIORAL/MENTAL HEALTH CLINIC | Facility: CLINIC | Age: 50
End: 2020-02-21
Payer: COMMERCIAL

## 2020-02-21 DIAGNOSIS — F31.30 BIPOLAR AFFECTIVE DISORDER, CURRENT EPISODE DEPRESSED, CURRENT EPISODE SEVERITY UNSPECIFIED (HCC): ICD-10-CM

## 2020-02-21 DIAGNOSIS — F41.1 GAD (GENERALIZED ANXIETY DISORDER): Primary | ICD-10-CM

## 2020-02-21 PROCEDURE — 90792 PSYCH DIAG EVAL W/MED SRVCS: CPT | Performed by: SOCIAL WORKER
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
## 2020-02-21 NOTE — PSYCH
Treatment Plan Tracking    # The initialTreatment Plan not completed within required time limits due to not enough time during the initial assessment   :

## 2020-02-24 ENCOUNTER — TELEPHONE (OUTPATIENT)
Dept: PSYCHIATRY | Facility: CLINIC | Age: 50
End: 2020-02-24

## 2020-02-24 NOTE — TELEPHONE ENCOUNTER
It says new patient at 1:00 how do I remove that I don't want to get in trouble for putting it in a new patient slot?

## 2020-02-24 NOTE — TELEPHONE ENCOUNTER
Is that open spot at 8:30 tomorrow morning for a new patient only? Froilan Mcmillan wanted to schedule an apt with you but I know you are leaving and all of the other slots open are for new patients only

## 2020-02-24 NOTE — TELEPHONE ENCOUNTER
I have an opening next wk on Wed , 3/4/20 if she would like that? I saw her last wk and at that time she said she would call back to schedule a f/u, so if she is ok with next Wed  And also, I did discuss with her about scheduling an appt  with another provider towards the end of April as well for when I leave she is able to get in on time  Would you also be able to see if she would like to schedule with another provider towards the end of April as well as with me on 3/4? Thank you

## 2020-02-24 NOTE — TELEPHONE ENCOUNTER
1 pm is what I have available that day please  As for another provider, it does not matter who  Whomever is available please Mani Tang or Enrique Shirley most likely)   Thanks

## 2020-03-04 ENCOUNTER — OFFICE VISIT (OUTPATIENT)
Dept: PSYCHIATRY | Facility: CLINIC | Age: 50
End: 2020-03-04
Payer: COMMERCIAL

## 2020-03-04 VITALS — HEART RATE: 87 BPM | DIASTOLIC BLOOD PRESSURE: 75 MMHG | SYSTOLIC BLOOD PRESSURE: 115 MMHG

## 2020-03-04 DIAGNOSIS — F41.1 GAD (GENERALIZED ANXIETY DISORDER): ICD-10-CM

## 2020-03-04 DIAGNOSIS — F31.32 BIPOLAR AFFECTIVE DISORDER, CURRENTLY DEPRESSED, MODERATE (HCC): Primary | ICD-10-CM

## 2020-03-04 DIAGNOSIS — G43.719 INTRACTABLE CHRONIC MIGRAINE WITHOUT AURA AND WITHOUT STATUS MIGRAINOSUS: ICD-10-CM

## 2020-03-04 DIAGNOSIS — K58.0 IRRITABLE BOWEL SYNDROME WITH DIARRHEA: ICD-10-CM

## 2020-03-04 PROCEDURE — 1036F TOBACCO NON-USER: CPT | Performed by: PHYSICIAN ASSISTANT

## 2020-03-04 PROCEDURE — 3074F SYST BP LT 130 MM HG: CPT | Performed by: PHYSICIAN ASSISTANT

## 2020-03-04 PROCEDURE — 99214 OFFICE O/P EST MOD 30 MIN: CPT | Performed by: PHYSICIAN ASSISTANT

## 2020-03-04 PROCEDURE — 3078F DIAST BP <80 MM HG: CPT | Performed by: PHYSICIAN ASSISTANT

## 2020-03-04 NOTE — PSYCH
MEDICATION MANAGEMENT NOTE        [facility]      Name and Date of Birth:  Brandon Alonso 51 y o  1970 MRN: [id number]    Date of Visit: March 4, 2020    SUBJECTIVE:    Itz Patel is seen today for a follow up for Bipolar Disorder and Generalized Anxiety Disorder  Today reports she feels there has still been no change in her depression or anxiety since last visit  She reports she continues to have a terrible episode of IBS which is attributing to her poor mood  She does admit there are some days where she has less anhedonia and does activities with her grandson and then other days she has worse anhedonia  Still admits to poor energy, depressed mood and she reports I feel like my depression is deep    Still does admit to poor appetite but that is due to her abdominal cramping with IBS and she reports she is following up with Gastroenterology on Monday  Denies SI/HI  In terms of anxiety she reports she feels like she has more of a sense of feeling afraid something awful might happen and she reports about 3 4 nights a week this feeling does wake her up suddenly  She continues to report sleep is poor and she gets about 4-5 hours due to IBS but also anxiety and feeling afraid something awful might happen  She does report feeling anxious during the day is intermittent and does not worry as much  However, she still remains irritable, restless, and has trouble relaxing  She also reports recently she feels like some nights she is having nightmares but when asked about him she does not recall and she feels it is more like the feeling something awful is going to happen for example, she reports 1 day she woke up in thought it was Thanksgiving and she was worse she did have a dinner ready  She also reports in regards to her irritability she is having tough time and she feels I feel like I do not care where I say to people anymore    A trigger right now also includes not only health issues but she may have to file for custody against her daughter for her grandson  She is currently the guardian of her grandson and takes very good care from reports that he is her protective factor in life but she reports she is anxious that she may have to fight for custody for him  Due to her IBS she still reports she has poor appetite but did discuss today that she does try to eat something when she takes [place] as it works best with food  She reports currently she did anguish muffin with cheese when she takes [place] and did discuss usually taking 3 and 50 calories by eating what she can when she does take and she agrees          HPI ROS:             ('was' notes: recent => remote)  Medication Side Effects:  denies  denied   Depression (10 worst): high (Was high, no change)   Anxiety (10 worst): high (Was no change)   Safety concerns (SI, HI, etc): denies (Was denied)   Sleep: Poor due to IBS and anxiety, 4-5 hrs (Was poor due to IBS)   Energy: low (Was low)   Appetite: Decreased due to IBS (Was poor due to IBS)   Weight Change: denies denied       Review Of Systems:      Constitutional feeling tired and low energy   Cardiovascular negative   Respiratory negative   Gastrointestinal abdominal cramps   Musculoskeletal negative   Neurological migraine headache   Endocrine negative       The following portions of the patient's history were reviewed and updated as appropriate: past family history, past medical history, past social history, past surgical history and problem list     Past Psychiatric History:     Past Psychiatric Medication Trials: Zoloft, Celexa, Effexor, Cymbalta, Wellbutrin, Amitriptyline/Elavil, Depakote, Topamax, Neurontin, Seroquel, Latuda, Buspar and Xanax      Past Medical History:    Past Medical History:   Diagnosis Date    Anxiety     Anxiety     Asthma     Bipolar disorder (HCC)     Carpal tunnel syndrome     Chronic pain     lower back    Depression     Disease of thyroid gland     Dyslipidemia     Fibromyalgia     Irritable bowel     Kidney stones     Kidney stones 2019    Migraine     Psychiatric disorder     depression/anxiety    Suicide attempt [facility])     as teen    Vitamin D deficiency      Past Medical History Pertinent Negatives:   Diagnosis Date Noted    Allergic 10/23/2018    Anemia 10/23/2018    Cancer ([address] ) 10/23/2018    Clotting disorder ([address] ) 10/23/2018    COPD (chronic obstructive pulmonary disease) ([address] 10/23/2018    Coronary artery disease 10/23/2018    Dementia ([address] ) 10/23/2018    Diabetes mellitus ([address] ) 10/23/2018    Diverticulitis of colon 10/23/2018    Eating disorder 10/23/2018    GERD (gastroesophageal reflux disease) 10/23/2018    Heart murmur 10/23/2018    HL (hearing loss) 10/23/2018    Hypertension 10/23/2018    Infectious viral hepatitis 10/23/2018    Inflammatory bowel disease 10/23/2018    Memory loss 10/23/2018    Myocardial infarction ([address] ) 10/23/2018    Obesity 10/23/2018    Osteoporosis 10/23/2018    Otitis media 10/23/2018    Pneumonia 10/23/2018    Scoliosis 10/23/2018    Seizures ([address] ) 10/23/2018    Shingles 10/23/2018    Stroke [address] [address] ) 10/23/2018    Substance abuse ([facility] [address] ) 10/23/2018    Urinary tract infection 10/23/2018    Visual impairment 10/23/2018     Past Surgical History:   Procedure Laterality Date    BUNIONECTOMY       SECTION      CHOLECYSTECTOMY      PARTIAL HYSTERECTOMY      TONSILLECTOMY      TUBAL LIGATION       Allergies   Allergen Reactions    Doxycycline Rash    Erythromycin Rash    Other Edema and Wheezing     jalapeno    Latex Rash    Eletriptan      Pain in lower jaw with pressure in throat       Substance Abuse History:    Social History     Substance and Sexual Activity   Alcohol Use No     Social History     Substance and Sexual Activity   Drug Use No       Social History:    Social History     Socioeconomic History    Marital status: Single     Spouse name: Not on file    Number of children: Not on file    Years of education: Not on file    Highest education level: Not on file   Occupational History    Not on file   Social Needs    Financial resource strain: Not on file    Food insecurity:     Worry: Not on file     Inability: Not on file    Transportation needs:     Medical: Not on file     Non-medical: Not on file   Tobacco Use    Smoking status: Never Smoker    Smokeless tobacco: Never Used   Substance and Sexual Activity    Alcohol use: No    Drug use: No    Sexual activity: Never     Partners: Male     Birth control/protection: None   Lifestyle    Physical activity:     Days per week: Not on file     Minutes per session: Not on file    Stress: Not on file   Relationships    Social connections:     Talks on phone: Not on file     Gets together: Not on file     Attends Sikh service: Not on file     Active member of club or organization: Not on file     Attends meetings of clubs or organizations: Not on file     Relationship status: Not on file    Intimate partner violence:     Fear of current or ex partner: Not on file     Emotionally abused: Not on file     Physically abused: Not on file     Forced sexual activity: Not on file   Other Topics Concern    Not on file   Social History Narrative    Not on file       Family Psychiatric History:     Family History   Problem Relation Age of Onset    Hypertension Mother     Diabetes Mother     Hypothyroidism Mother     Stroke Mother     Bipolar disorder Mother     Anxiety disorder Mother     Cancer Father         pancreatic     Hypothyroidism Sister     Hypertension Brother     Heart disease Brother     Cancer Maternal Uncle         lung    Cancer Paternal Aunt         breast    Cancer Paternal Uncle         testicular     Cancer Paternal Grandmother         breast    Cancer Cousin         brain    Bipolar disorder Daughter                 OBJECTIVE: Vital signs in last 24 hours:    Vitals:    03/04/20 1300   BP: 115/75   BP Location: Left arm   Patient Position: Sitting   Cuff Size: Standard   Pulse: 87       Mental Status Evaluation:    Appearance age appropriate, casually dressed   Behavior pleasant, cooperative, good eye contact, calm   Speech normal rate, normal volume, normal pitch   Mood depressed, anxious, irritable   Affect mood-congruent   Thought Processes organized, goal directed   Associations intact associations   Thought Content no overt delusions   Perceptual Disturbances: no auditory hallucinations, no visual hallucinations   Abnormal Thoughts  Risk Potential Suicidal ideation - None  Homicidal ideation - None  Potential for aggression - No   Orientation oriented to person, place, time/date and situation   Memory recent and remote memory grossly intact   Consciousness alert and awake   Attention Span Concentration Span attention span and concentration are age appropriate   Intellect appears to be of average intelligence   Insight intact   Judgement intact   Muscle Strength and  Gait normal muscle strength and normal muscle tone, normal gait and normal balance   Motor activity no abnormal movements   Language no difficulty naming common objects, no difficulty repeating a phrase, no difficulty writing a sentence   Fund of Knowledge adequate knowledge of current events  adequate fund of knowledge regarding past history  adequate fund of knowledge regarding vocabulary    Pain severe       Laboratory Results:   Recent Labs (last 6 months):   Appointment on 02/10/2020   Component Date Value    Salmonella sp PCR 02/10/2020 None Detected     Shigella sp/Enteroinvasi* 02/10/2020 None Detected     Campylobacter sp (jejuni* 02/10/2020 None Detected     Shiga toxin 1/Shiga toxi* 02/10/2020 None Detected     H pylori Ag, Stl 02/10/2020 Negative     Ova + Parasite Exam 02/10/2020 No ova, cysts, or parasites seen     One negative specimen does not rule out the possibility of a  parasitic infection      Appointment on 02/07/2020   Component Date Value    CRP 02/07/2020 <3 0     WBC 02/07/2020 4 85     RBC 02/07/2020 4 47     Hemoglobin 02/07/2020 13 6     Hematocrit 02/07/2020 41 9     MCV 02/07/2020 94     MCH 02/07/2020 30 4     MCHC 02/07/2020 32 5     RDW 02/07/2020 12 8     MPV 02/07/2020 9 8     Platelets 59/60/8813 244     nRBC 02/07/2020 0     Neutrophils Relative 02/07/2020 54     Immat GRANS % 02/07/2020 0     Lymphocytes Relative 02/07/2020 37     Monocytes Relative 02/07/2020 7     Eosinophils Relative 02/07/2020 2     Basophils Relative 02/07/2020 0     Neutrophils Absolute 02/07/2020 2 65     Immature Grans Absolute 02/07/2020 0 01     Lymphocytes Absolute 02/07/2020 1 77     Monocytes Absolute 02/07/2020 0 32     Eosinophils Absolute 02/07/2020 0 08     Basophils Absolute 02/07/2020 0 02     Sed Rate 02/07/2020 2     Sodium 02/07/2020 139     Potassium 02/07/2020 4 1     Chloride 02/07/2020 106     CO2 02/07/2020 25     ANION GAP 02/07/2020 8     BUN 02/07/2020 13     Creatinine 02/07/2020 0 79     Glucose, Fasting 02/07/2020 89     Calcium 02/07/2020 8 6     AST 02/07/2020 13     ALT 02/07/2020 21     Alkaline Phosphatase 02/07/2020 59     Total Protein 02/07/2020 7 2     Albumin 02/07/2020 3 8     Total Bilirubin 02/07/2020 0 40     eGFR 02/07/2020 89    Appointment on 01/28/2020   Component Date Value    WBC 01/28/2020 5 35     RBC 01/28/2020 4 40     Hemoglobin 01/28/2020 13 3     Hematocrit 01/28/2020 41 7     MCV 01/28/2020 95     MCH 01/28/2020 30 2     MCHC 01/28/2020 31 9     RDW 01/28/2020 12 9     MPV 01/28/2020 10 6     Platelets 43/74/5062 234     nRBC 01/28/2020 0     Neutrophils Relative 01/28/2020 60     Immat GRANS % 01/28/2020 0     Lymphocytes Relative 01/28/2020 34     Monocytes Relative 01/28/2020 5     Eosinophils Relative 01/28/2020 1     Basophils Relative 01/28/2020 0     Neutrophils Absolute 01/28/2020 3 19     Immature Grans Absolute 01/28/2020 0 02     Lymphocytes Absolute 01/28/2020 1 79     Monocytes Absolute 01/28/2020 0 29     Eosinophils Absolute 01/28/2020 0 05     Basophils Absolute 01/28/2020 0 01     Sodium 01/28/2020 141     Potassium 01/28/2020 4 1     Chloride 01/28/2020 107     CO2 01/28/2020 26     ANION GAP 01/28/2020 8     BUN 01/28/2020 14     Creatinine 01/28/2020 0 78     Glucose, Fasting 01/28/2020 76     Calcium 01/28/2020 8 8     AST 01/28/2020 13     ALT 01/28/2020 17     Alkaline Phosphatase 01/28/2020 62     Total Protein 01/28/2020 7 4     Albumin 01/28/2020 4 0     Total Bilirubin 01/28/2020 0 40     eGFR 01/28/2020 90     CRP 01/28/2020 <3 0     Sed Rate 01/28/2020 3     Hemoglobin A1C 01/28/2020 5 1     EAG 01/28/2020 100     Vit D, 25-Hydroxy 01/28/2020 16 8*    Vitamin B-12 01/28/2020 681    Appointment on 01/22/2020   Component Date Value    TSH 3RD GENERATON 01/22/2020 2 897     Free T4 01/22/2020 0 84     T3, Total 01/22/2020 0 90    Lab on 12/26/2019   Component Date Value    TSH 3RD GENERATON 12/26/2019 2 998     Free T4 12/26/2019 0 91     T3, Total 12/26/2019 1 00      I have personally reviewed all pertinent laboratory/tests results  No results found for this or any previous visit  Assessment/Plan:       Diagnoses and all orders for this visit:    Bipolar affective disorder, currently depressed, moderate (HCC)  -     lurasidone (Latuda) 40 mg tablet; Take 1 tablet (40 mg total) by mouth daily with dinner    ZAINA (generalized anxiety disorder)    Irritable bowel syndrome with diarrhea    Intractable chronic migraine without aura and without status migrainosus          Treatment Recommendations/Precautions/Plan:    Patient has been educated about their diagnosis and treatment modalities   They voiced understanding and agreement with the following plan:    Continue BuSpar 10 mg p o  t i d  for anxiety  Increase Latuda to 40 mg p o  q d  with dinner as even though patient reports she is having a tough time eating well taking Latuda feel the benefit of increasing dose to work on bipolar depression and irritability is needed  Did discuss the possibility of needing a prior Auth and office well help with prior off if needed so continue 20 mg until she can  to 40 mg tablet  Depakote 250 mg q d  per Neurology for migraines, patient reports at higher doses she has been severely sedated, so will continue titration with Latuda to help with bipolar depression and irritability    Can also consider adding on trazodone or SSRI to help further with anxiety and/or sleep in future but were normal white now work on titrating up [place] not make too many changes due to patient's current bout of IBS    Patient to follow up with Gastroenterology for IBS which is attributing to current poor sleep, poor appetite, anxiety and depression  Patient also following up with Neurology next week for migraines    Medication management every 6 weeks  Continue psychotherapy with [facility] therapist Brandon Zeng S patient reports she feels initiating therapy has been helpful so far  Aware of need to follow up with family physician for medical issues    Pt signed RO I today for disability paperwork to be filled out     -Discussed self monitoring of symptoms, and symptom monitoring tools     -Patient has been informed to call the office with any questions or concerns prior to next OV      -Completed and signed during the session: Not applicable - Treatment Plan to be completed by [facility] [address] therapist    Risks/Benefits      Risks, Benefits And Possible Side Effects Of Medications:    Risks, benefits, and possible side effects of medications explained to [facility] including risk of parkinsonian symptoms, Tardive Dyskinesia and metabolic syndrome related to treatment with antipsychotic medications, risk of cardiovascular events in elderly related to treatment with antipsychotic medications  She verbalizes understanding and agreement for treatment  Controlled Medication Discussion:     Not applicable, no Controlled substances filled for this pt by this provider    Psychotherapy Provided:     Individual psychotherapy provided: Medication changes discussed with Bryon Garcia  Medication education provided to Bryon Garcia  Importance of medication and treatment compliance reviewed with Bryon Andino PA-C 03/04/20

## 2020-03-11 ENCOUNTER — APPOINTMENT (OUTPATIENT)
Dept: LAB | Facility: HOSPITAL | Age: 50
End: 2020-03-11
Payer: COMMERCIAL

## 2020-03-11 ENCOUNTER — OFFICE VISIT (OUTPATIENT)
Dept: FAMILY MEDICINE CLINIC | Facility: CLINIC | Age: 50
End: 2020-03-11
Payer: COMMERCIAL

## 2020-03-11 ENCOUNTER — TELEPHONE (OUTPATIENT)
Dept: PSYCHOLOGY | Facility: CLINIC | Age: 50
End: 2020-03-11

## 2020-03-11 VITALS
DIASTOLIC BLOOD PRESSURE: 74 MMHG | HEART RATE: 85 BPM | SYSTOLIC BLOOD PRESSURE: 118 MMHG | OXYGEN SATURATION: 98 % | BODY MASS INDEX: 28.84 KG/M2 | HEIGHT: 64 IN

## 2020-03-11 DIAGNOSIS — E78.01 FAMILIAL HYPERCHOLESTEROLEMIA: ICD-10-CM

## 2020-03-11 DIAGNOSIS — Z11.4 SCREENING FOR HIV (HUMAN IMMUNODEFICIENCY VIRUS): ICD-10-CM

## 2020-03-11 DIAGNOSIS — G43.709 CHRONIC MIGRAINE WITHOUT AURA WITHOUT STATUS MIGRAINOSUS, NOT INTRACTABLE: ICD-10-CM

## 2020-03-11 DIAGNOSIS — Z12.11 SCREENING FOR COLON CANCER: ICD-10-CM

## 2020-03-11 DIAGNOSIS — G89.29 CHRONIC LOW BACK PAIN, UNSPECIFIED BACK PAIN LATERALITY, UNSPECIFIED WHETHER SCIATICA PRESENT: ICD-10-CM

## 2020-03-11 DIAGNOSIS — M81.0 AGE-RELATED OSTEOPOROSIS WITHOUT CURRENT PATHOLOGICAL FRACTURE: ICD-10-CM

## 2020-03-11 DIAGNOSIS — R53.82 CHRONIC FATIGUE: ICD-10-CM

## 2020-03-11 DIAGNOSIS — R73.01 IMPAIRED FASTING GLUCOSE: ICD-10-CM

## 2020-03-11 DIAGNOSIS — I10 ESSENTIAL HYPERTENSION: ICD-10-CM

## 2020-03-11 DIAGNOSIS — Z12.31 ENCOUNTER FOR SCREENING MAMMOGRAM FOR BREAST CANCER: ICD-10-CM

## 2020-03-11 DIAGNOSIS — E03.9 ACQUIRED HYPOTHYROIDISM: ICD-10-CM

## 2020-03-11 DIAGNOSIS — G43.909 MIGRAINE WITHOUT STATUS MIGRAINOSUS, NOT INTRACTABLE, UNSPECIFIED MIGRAINE TYPE: ICD-10-CM

## 2020-03-11 DIAGNOSIS — M54.50 CHRONIC LOW BACK PAIN, UNSPECIFIED BACK PAIN LATERALITY, UNSPECIFIED WHETHER SCIATICA PRESENT: ICD-10-CM

## 2020-03-11 DIAGNOSIS — K58.0 IRRITABLE BOWEL SYNDROME WITH DIARRHEA: ICD-10-CM

## 2020-03-11 DIAGNOSIS — Z00.00 WELL ADULT EXAM: Primary | ICD-10-CM

## 2020-03-11 LAB
ALBUMIN SERPL BCP-MCNC: 4 G/DL (ref 3.5–5)
ALP SERPL-CCNC: 57 U/L (ref 46–116)
ALT SERPL W P-5'-P-CCNC: 16 U/L (ref 12–78)
ANION GAP SERPL CALCULATED.3IONS-SCNC: 10 MMOL/L (ref 4–13)
AST SERPL W P-5'-P-CCNC: 11 U/L (ref 5–45)
BASOPHILS # BLD AUTO: 0.01 THOUSANDS/ΜL (ref 0–0.1)
BASOPHILS NFR BLD AUTO: 0 % (ref 0–1)
BILIRUB SERPL-MCNC: 0.4 MG/DL (ref 0.2–1)
BUN SERPL-MCNC: 14 MG/DL (ref 5–25)
CALCIUM SERPL-MCNC: 8.8 MG/DL (ref 8.3–10.1)
CHLORIDE SERPL-SCNC: 107 MMOL/L (ref 100–108)
CHOLEST SERPL-MCNC: 210 MG/DL (ref 50–200)
CO2 SERPL-SCNC: 23 MMOL/L (ref 21–32)
CREAT SERPL-MCNC: 0.8 MG/DL (ref 0.6–1.3)
EOSINOPHIL # BLD AUTO: 0.03 THOUSAND/ΜL (ref 0–0.61)
EOSINOPHIL NFR BLD AUTO: 1 % (ref 0–6)
ERYTHROCYTE [DISTWIDTH] IN BLOOD BY AUTOMATED COUNT: 12.7 % (ref 11.6–15.1)
EST. AVERAGE GLUCOSE BLD GHB EST-MCNC: 97 MG/DL
GFR SERPL CREATININE-BSD FRML MDRD: 87 ML/MIN/1.73SQ M
GLUCOSE P FAST SERPL-MCNC: 92 MG/DL (ref 65–99)
HBA1C MFR BLD: 5 %
HCT VFR BLD AUTO: 41.1 % (ref 34.8–46.1)
HDLC SERPL-MCNC: 68 MG/DL
HGB BLD-MCNC: 13.5 G/DL (ref 11.5–15.4)
IMM GRANULOCYTES # BLD AUTO: 0.01 THOUSAND/UL (ref 0–0.2)
IMM GRANULOCYTES NFR BLD AUTO: 0 % (ref 0–2)
LDLC SERPL CALC-MCNC: 126 MG/DL (ref 0–100)
LYMPHOCYTES # BLD AUTO: 1.74 THOUSANDS/ΜL (ref 0.6–4.47)
LYMPHOCYTES NFR BLD AUTO: 39 % (ref 14–44)
MCH RBC QN AUTO: 30.5 PG (ref 26.8–34.3)
MCHC RBC AUTO-ENTMCNC: 32.8 G/DL (ref 31.4–37.4)
MCV RBC AUTO: 93 FL (ref 82–98)
MONOCYTES # BLD AUTO: 0.31 THOUSAND/ΜL (ref 0.17–1.22)
MONOCYTES NFR BLD AUTO: 7 % (ref 4–12)
NEUTROPHILS # BLD AUTO: 2.41 THOUSANDS/ΜL (ref 1.85–7.62)
NEUTS SEG NFR BLD AUTO: 53 % (ref 43–75)
NONHDLC SERPL-MCNC: 142 MG/DL
NRBC BLD AUTO-RTO: 0 /100 WBCS
PLATELET # BLD AUTO: 226 THOUSANDS/UL (ref 149–390)
PMV BLD AUTO: 10.4 FL (ref 8.9–12.7)
POTASSIUM SERPL-SCNC: 4.3 MMOL/L (ref 3.5–5.3)
PROT SERPL-MCNC: 7.4 G/DL (ref 6.4–8.2)
RBC # BLD AUTO: 4.42 MILLION/UL (ref 3.81–5.12)
SODIUM SERPL-SCNC: 140 MMOL/L (ref 136–145)
TRIGL SERPL-MCNC: 79 MG/DL
TSH SERPL DL<=0.05 MIU/L-ACNC: 3.2 UIU/ML (ref 0.36–3.74)
WBC # BLD AUTO: 4.51 THOUSAND/UL (ref 4.31–10.16)

## 2020-03-11 PROCEDURE — 83036 HEMOGLOBIN GLYCOSYLATED A1C: CPT

## 2020-03-11 PROCEDURE — 99396 PREV VISIT EST AGE 40-64: CPT | Performed by: FAMILY MEDICINE

## 2020-03-11 PROCEDURE — 84443 ASSAY THYROID STIM HORMONE: CPT

## 2020-03-11 PROCEDURE — 80053 COMPREHEN METABOLIC PANEL: CPT

## 2020-03-11 PROCEDURE — 36415 COLL VENOUS BLD VENIPUNCTURE: CPT

## 2020-03-11 PROCEDURE — 85025 COMPLETE CBC W/AUTO DIFF WBC: CPT

## 2020-03-11 PROCEDURE — 87389 HIV-1 AG W/HIV-1&-2 AB AG IA: CPT

## 2020-03-11 PROCEDURE — 80061 LIPID PANEL: CPT

## 2020-03-11 RX ORDER — ONDANSETRON 4 MG/1
4 TABLET, ORALLY DISINTEGRATING ORAL EVERY 6 HOURS PRN
Qty: 20 TABLET | Refills: 0 | Status: SHIPPED | OUTPATIENT
Start: 2020-03-11 | End: 2020-12-02

## 2020-03-11 NOTE — PROGRESS NOTES
BMI Counseling: Body mass index is 28 84 kg/m²  The BMI is above normal  Nutrition recommendations include decreasing portion sizes, decreasing fast food intake, limiting drinks that contain sugar, increasing intake of lean protein and reducing intake of cholesterol  Exercise recommendations include vigorous physical activity 75 minutes/week  No pharmacotherapy was ordered  Standard Visit   Assessment/Plan:     Visit Diagnosis:  Diagnoses and all orders for this visit:    Well adult exam    Acquired hypothyroidism    Chronic low back pain, unspecified back pain laterality, unspecified whether sciatica present    Irritable bowel syndrome with diarrhea    Migraine without status migrainosus, not intractable, unspecified migraine type    Chronic fatigue  -     Microalbumin / creatinine urine ratio  -     TSH, 3rd generation with Free T4 reflex; Future    Impaired fasting glucose  -     Hemoglobin A1C; Future    Familial hypercholesterolemia  -     Lipid panel; Future    Essential hypertension  -     Comprehensive metabolic panel; Future  -     CBC and differential; Future    Screening for colon cancer  -     Occult Blood, Fecal Immunochemical; Future    Screening for HIV (human immunodeficiency virus)  -     HIV 1/2 Antigen/Antibody (4th Generation) w Reflex [facility]; Future    Encounter for screening mammogram for breast cancer  -     Mammo screening bilateral w cad; Future    Age-related osteoporosis without current pathological fracture  -     DXA bone density spine hip and pelvis; Future    Chronic migraine without aura without status migrainosus, not intractable  -     ondansetron (ZOFRAN-ODT) 4 mg disintegrating tablet; Take 1 tablet (4 mg total) by mouth every 6 (six) hours as needed for nausea or vomiting    Other orders  -     Topiramate ER (Trokendi XR) 100 MG CP24;  Take 100 mg by mouth 2 (two) times a day  -     Galcanezumab-gnlm 120 MG/ML SOAJ; Inject 120 mg under the skin every 28 days     use the Zofran at the 1st sign of a migraine  This will help the nausea  Fasting lab work has been ordered   Remember to hydrate   Mammo has been ordered   This will be coded as a physical         Subjective:    Patient ID: Marietta Harris is a 51 y o  female  Patient is here with the following concerns:    Here for a physical    THIS VISIT WILL BE CODED AS A PHYSICAL   LABS WILL BE ORDERED WITHIN THIS VISIT   SURGICAL HISTORY INCLUDES:   3 C-sections   partial hysterectomy  G P3   cholecystectomy   bunionectomy   T&A    EGD and colonoscopy   father passed age  61 pancreatic cancer  Mom alive age 80 diabetes, hypothyroidism, dementia   3 children grown   72-year-old  Son   Migraines -   [facility]   55-year-old daughter  Migraines, nephritis, back pain,   [place]  ( [address] mom)   72-year-old daughter  Migraines,  Bipolar,  [facility]      The following portions of the patient's history were reviewed and updated as appropriate: allergies, current medications, past family history, past medical history, past social history, past surgical history and problem list     Review of Systems   Gastrointestinal: Positive for diarrhea  Musculoskeletal: Positive for arthralgias and back pain  Fibromyalgia   Neurological: Positive for headaches  Psychiatric/Behavioral: The patient is nervous/anxious            /74   Pulse 85   Ht 5' 4" (1 626 m)   SpO2 98%   BMI 28 84 kg/m²   Social History     Socioeconomic History    Marital status: Single     Spouse name: Not on file    Number of children: Not on file    Years of education: Not on file    Highest education level: Not on file   Occupational History    Not on file   Social Needs    Financial resource strain: Not on file    Food insecurity:     Worry: Not on file     Inability: Not on file    Transportation needs:     Medical: Not on file     Non-medical: Not on file   Tobacco Use    Smoking status: Never Smoker    Smokeless tobacco: Never Used   Substance and Sexual Activity    Alcohol use: No    Drug use: No    Sexual activity: Never     Partners: Male     Birth control/protection: None   Lifestyle    Physical activity:     Days per week: Not on file     Minutes per session: Not on file    Stress: Not on file   Relationships    Social connections:     Talks on phone: Not on file     Gets together: Not on file     Attends Mosque service: Not on file     Active member of club or organization: Not on file     Attends meetings of clubs or organizations: Not on file     Relationship status: Not on file    Intimate partner violence:     Fear of current or ex partner: Not on file     Emotionally abused: Not on file     Physically abused: Not on file     Forced sexual activity: Not on file   Other Topics Concern    Not on file   Social History Narrative    Not on file     Past Medical History:   Diagnosis Date    Anxiety     Anxiety     Asthma     Bipolar disorder ([facility] [address]     Carpal tunnel syndrome     Chronic pain     lower back    Depression     Disease of thyroid gland     Dyslipidemia     Fibromyalgia     Irritable bowel     Kidney stones     Kidney stones 2019    Migraine     Psychiatric disorder     depression/anxiety    Suicide attempt [facility])     as teen    Vitamin D deficiency      Family History   Problem Relation Age of Onset    Hypertension Mother     Diabetes Mother     Hypothyroidism Mother     Stroke Mother     Bipolar disorder Mother     Anxiety disorder Mother     Cancer Father         pancreatic     Hypothyroidism Sister     Hypertension Brother     Heart disease Brother     Cancer Maternal Uncle         lung    Cancer Paternal Aunt         breast    Cancer Paternal Uncle         testicular     Cancer Paternal Grandmother         breast    Cancer Cousin         brain    Bipolar disorder Daughter      Past Surgical History:   Procedure Laterality Date    BUNIONECTOMY       SECTION  CHOLECYSTECTOMY      PARTIAL HYSTERECTOMY      TONSILLECTOMY      TUBAL LIGATION         Current Outpatient Medications:     Galcanezumab-gnlm 120 MG/ML SOAJ, Inject 120 mg under the skin every 28 days, Disp: , Rfl:     Topiramate ER (Trokendi XR) 100 MG CP24, Take 100 mg by mouth 2 (two) times a day, Disp: , Rfl:     albuterol (2 5 mg/3 mL) 0 083 % nebulizer solution, Take 1 vial (2 5 mg total) by nebulization every 6 (six) hours as needed for wheezing or shortness of breath, Disp: 20 vial, Rfl: 0    albuterol (PROVENTIL HFA,VENTOLIN HFA) 90 mcg/act inhaler, Inhale 2 puffs every 6 (six) hours as needed for wheezing, Disp: 3 Inhaler, Rfl: 2    baclofen 10 mg tablet, , Disp: , Rfl:     betamethasone dipropionate (DIPROSONE) 0 05 % cream, APPLY A THIN LAYER TO THE AFFECTED AREA(S) BY TOPICAL ROUTE ONCE DAILY, Disp: , Rfl:     busPIRone (BUSPAR) 10 mg tablet, Take 1 tablet (10 mg total) by mouth 3 (three) times a day, Disp: 90 tablet, Rfl: 2    celecoxib (CELEBREX) 200 mg capsule, Take 1 capsule (200 mg total) by mouth daily, Disp: 30 capsule, Rfl: 2    clotrimazole-betamethasone (LOTRISONE) 1-0 05 % cream, , Disp: , Rfl:     cyclobenzaprine (FLEXERIL) 10 mg tablet, Take 10 mg by mouth Three times daily as needed, Disp: , Rfl:     dexamethasone (DECADRON) 4 mg tablet, , Disp: , Rfl:     diclofenac (VOLTAREN) 75 mg EC tablet, Take 75 mg by mouth, Disp: , Rfl:     diclofenac sodium (VOLTAREN) 1 %, APPLY 2 GRAM TO THE AFFECTED AREA(S) BY TOPICAL ROUTE 4 TIMES PER DAY, Disp: , Rfl:     divalproex sodium (DEPAKOTE) 250 mg EC tablet, Take 250 mg by mouth daily, Disp: , Rfl:     Erenumab-aooe 70 MG/ML SOAJ, Inject 70 mg under the skin, Disp: , Rfl:     fluticasone-vilanterol (BREO ELLIPTA) 100-25 mcg/inh inhaler, Inhale 1 puff every 24 hours, Disp: 3 Inhaler, Rfl: 2    lidocaine (LIDODERM) 5 %, Place 1 patch on the skin daily as needed for mild pain or moderate pain Remove & Discard patch within 12 hours or as directed by MD, Disp: 6 patch, Rfl: 0    lurasidone (Latuda) 40 mg tablet, Take 1 tablet (40 mg total) by mouth daily with dinner, Disp: 30 tablet, Rfl: 3    methocarbamol (ROBAXIN) 500 mg tablet, Take 500 mg by mouth 4 (four) times a day, Disp: , Rfl:     naratriptan (AMERGE) 2 5 MG tablet, Take 1 tablet at onset of migraine  May repeat in 4 hours if unresolved  Do not exceed 5 mg in 24 hours  Limit 2 in 24 hours and 4 per week, Disp: , Rfl:     omeprazole (PriLOSEC) 20 mg delayed release capsule, Take 20 mg by mouth 2 (two) times a day , Disp: , Rfl:     ondansetron (ZOFRAN-ODT) 4 mg disintegrating tablet, Take 1 tablet (4 mg total) by mouth every 6 (six) hours as needed for nausea or vomiting, Disp: 20 tablet, Rfl: 0    orphenadrine (NORFLEX) 100 mg tablet, , Disp: , Rfl:     prochlorperazine (COMPAZINE) 10 mg tablet, Take 1/2 tab BID for 7 days, Disp: , Rfl:     TIROSINT 125 MCG CAPS, , Disp: , Rfl:     TiZANidine (ZANAFLEX) 2 MG capsule, Take 2 mg by mouth 3 (three) times a day, Disp: , Rfl:     Transdermal Patch PTCH, Transderm-Scop 1 5 mg transdermal patch (1 mg over 3 days), Disp: , Rfl:     XIFAXAN 550 MG tablet, , Disp: , Rfl:     Lab Review   Appointment on 02/10/2020   Component Date Value    Salmonella sp PCR 02/10/2020 None Detected     Shigella sp/Enteroinvasi* 02/10/2020 None Detected     Campylobacter sp (jejuni* 02/10/2020 None Detected     Shiga toxin 1/Shiga toxi* 02/10/2020 None Detected     H pylori Ag, Stl 02/10/2020 Negative     Ova + Parasite Exam 02/10/2020 No ova, cysts, or parasites seen     One negative specimen does not rule out the possibility of a  parasitic infection      Appointment on 02/07/2020   Component Date Value    CRP 02/07/2020 <3 0     WBC 02/07/2020 4 85     RBC 02/07/2020 4 47     Hemoglobin 02/07/2020 13 6     Hematocrit 02/07/2020 41 9     MCV 02/07/2020 94     [facility] 02/07/2020 30 4     MCHC 02/07/2020 32 5     RDW 02/07/2020 12 8     MPV 02/07/2020 9 8     Platelets 42/48/6987 244     nRBC 02/07/2020 0     Neutrophils Relative 02/07/2020 54     Immat GRANS % 02/07/2020 0     Lymphocytes Relative 02/07/2020 37     Monocytes Relative 02/07/2020 7     Eosinophils Relative 02/07/2020 2     Basophils Relative 02/07/2020 0     Neutrophils Absolute 02/07/2020 2 65     Immature Grans Absolute 02/07/2020 0 01     Lymphocytes Absolute 02/07/2020 1 77     Monocytes Absolute 02/07/2020 0 32     Eosinophils Absolute 02/07/2020 0 08     Basophils Absolute 02/07/2020 0 02     Sed Rate 02/07/2020 2     Sodium 02/07/2020 139     Potassium 02/07/2020 4 1     Chloride 02/07/2020 106     CO2 02/07/2020 25     ANION GAP 02/07/2020 8     BUN 02/07/2020 13     Creatinine 02/07/2020 0 79     Glucose, Fasting 02/07/2020 89     Calcium 02/07/2020 8 6     AST 02/07/2020 13     ALT 02/07/2020 21     Alkaline Phosphatase 02/07/2020 59     Total Protein 02/07/2020 7 2     Albumin 02/07/2020 3 8     Total Bilirubin 02/07/2020 0 40     eGFR 02/07/2020 89    Appointment on 01/28/2020   Component Date Value    WBC 01/28/2020 5 35     RBC 01/28/2020 4 40     Hemoglobin 01/28/2020 13 3     Hematocrit 01/28/2020 41 7     MCV 01/28/2020 95     MCH 01/28/2020 30 2     MCHC 01/28/2020 31 9     RDW 01/28/2020 12 9     MPV 01/28/2020 10 6     Platelets 30/96/9790 234     nRBC 01/28/2020 0     Neutrophils Relative 01/28/2020 60     Immat GRANS % 01/28/2020 0     Lymphocytes Relative 01/28/2020 34     Monocytes Relative 01/28/2020 5     Eosinophils Relative 01/28/2020 1     Basophils Relative 01/28/2020 0     Neutrophils Absolute 01/28/2020 3 19     Immature Grans Absolute 01/28/2020 0 02     Lymphocytes Absolute 01/28/2020 1 79     Monocytes Absolute 01/28/2020 0 29     Eosinophils Absolute 01/28/2020 0 05     Basophils Absolute 01/28/2020 0 01     Sodium 01/28/2020 141  Potassium 01/28/2020 4 1     Chloride 01/28/2020 107     CO2 01/28/2020 26     ANION GAP 01/28/2020 8     BUN 01/28/2020 14     Creatinine 01/28/2020 0 78     Glucose, Fasting 01/28/2020 76     Calcium 01/28/2020 8 8     AST 01/28/2020 13     ALT 01/28/2020 17     Alkaline Phosphatase 01/28/2020 62     Total Protein 01/28/2020 7 4     Albumin 01/28/2020 4 0     Total Bilirubin 01/28/2020 0 40     eGFR 01/28/2020 90     CRP 01/28/2020 <3 0     Sed Rate 01/28/2020 3     Hemoglobin A1C 01/28/2020 5 1     EAG 01/28/2020 100     Vit D, 25-Hydroxy 01/28/2020 16 8*    Vitamin B-12 01/28/2020 681    Appointment on 01/22/2020   Component Date Value    TSH 3RD GENERATON 01/22/2020 2 897     Free T4 01/22/2020 0 84     T3, Total 01/22/2020 0 90    Lab on 12/26/2019   Component Date Value    TSH 3RD GENERATON 12/26/2019 2 998     Free T4 12/26/2019 0 91     T3, Total 12/26/2019 1 00         Objective:     Physical Exam   Constitutional: She is oriented to person, place, and time  She appears well-developed and well-nourished  HENT:   Head: Normocephalic and atraumatic  Eyes: Pupils are equal, round, and reactive to light  Neck: Normal range of motion  Neck supple  Cardiovascular: Normal rate  Pulmonary/Chest: Effort normal and breath sounds normal    Abdominal: Soft  Musculoskeletal: Normal range of motion  Neurological: She is alert and oriented to person, place, and time  Skin: Skin is warm and dry  Psychiatric: She has a normal mood and affect  Nursing note and vitals reviewed        Social History     Socioeconomic History    Marital status: Single     Spouse name: Not on file    Number of children: Not on file    Years of education: Not on file    Highest education level: Not on file   Occupational History    Not on file   Social Needs    Financial resource strain: Not on file    Food insecurity:     Worry: Not on file     Inability: Not on file   [place] needs:      Medical: Not on file     Non-medical: Not on file   Tobacco Use    Smoking status: Never Smoker    Smokeless tobacco: Never Used   Substance and Sexual Activity    Alcohol use: No    Drug use: No    Sexual activity: Never     Partners: Male     Birth control/protection: None   Lifestyle    Physical activity:     Days per week: Not on file     Minutes per session: Not on file    Stress: Not on file   Relationships    Social connections:     Talks on phone: Not on file     Gets together: Not on file     Attends Mormonism service: Not on file     Active member of club or organization: Not on file     Attends meetings of clubs or organizations: Not on file     Relationship status: Not on file    Intimate partner violence:     Fear of current or ex partner: Not on file     Emotionally abused: Not on file     Physically abused: Not on file     Forced sexual activity: Not on file   Other Topics Concern    Not on file   Social History Narrative    Not on file     Past Medical History:   Diagnosis Date    Anxiety     Anxiety     Asthma     Bipolar disorder ([facility] [address]     Carpal tunnel syndrome     Chronic pain     lower back    Depression     Disease of thyroid gland     Dyslipidemia     Fibromyalgia     Irritable bowel     Kidney stones     Kidney stones 5/20/2019    Migraine     Psychiatric disorder     depression/anxiety    Suicide attempt [facility])     as teen    Vitamin D deficiency        Current Outpatient Medications:     Galcanezumab-gnlm 120 MG/ML SOAJ, Inject 120 mg under the skin every 28 days, Disp: , Rfl:     Topiramate ER (Trokendi XR) 100 MG CP24, Take 100 mg by mouth 2 (two) times a day, Disp: , Rfl:     albuterol (2 5 mg/3 mL) 0 083 % nebulizer solution, Take 1 vial (2 5 mg total) by nebulization every 6 (six) hours as needed for wheezing or shortness of breath, Disp: 20 vial, Rfl: 0    albuterol (PROVENTIL HFA,VENTOLIN HFA) 90 mcg/act inhaler, Inhale 2 puffs every 6 (six) hours as needed for wheezing, Disp: 3 Inhaler, Rfl: 2    baclofen 10 mg tablet, , Disp: , Rfl:     betamethasone dipropionate (DIPROSONE) 0 05 % cream, APPLY A THIN LAYER TO THE AFFECTED AREA(S) BY TOPICAL ROUTE ONCE DAILY, Disp: , Rfl:     busPIRone (BUSPAR) 10 mg tablet, Take 1 tablet (10 mg total) by mouth 3 (three) times a day, Disp: 90 tablet, Rfl: 2    celecoxib (CELEBREX) 200 mg capsule, Take 1 capsule (200 mg total) by mouth daily, Disp: 30 capsule, Rfl: 2    clotrimazole-betamethasone (LOTRISONE) 1-0 05 % cream, , Disp: , Rfl:     cyclobenzaprine (FLEXERIL) 10 mg tablet, Take 10 mg by mouth Three times daily as needed, Disp: , Rfl:     dexamethasone (DECADRON) 4 mg tablet, , Disp: , Rfl:     diclofenac (VOLTAREN) 75 mg EC tablet, Take 75 mg by mouth, Disp: , Rfl:     diclofenac sodium (VOLTAREN) 1 %, APPLY 2 GRAM TO THE AFFECTED AREA(S) BY TOPICAL ROUTE 4 TIMES PER DAY, Disp: , Rfl:     divalproex sodium (DEPAKOTE) 250 mg EC tablet, Take 250 mg by mouth daily, Disp: , Rfl:     Erenumab-aooe 70 MG/ML SOAJ, Inject 70 mg under the skin, Disp: , Rfl:     fluticasone-vilanterol (BREO ELLIPTA) 100-25 mcg/inh inhaler, Inhale 1 puff every 24 hours, Disp: 3 Inhaler, Rfl: 2    lidocaine (LIDODERM) 5 %, Place 1 patch on the skin daily as needed for mild pain or moderate pain Remove & Discard patch within 12 hours or as directed by MD, Disp: 6 patch, Rfl: 0    lurasidone (Latuda) 40 mg tablet, Take 1 tablet (40 mg total) by mouth daily with dinner, Disp: 30 tablet, Rfl: 3    methocarbamol (ROBAXIN) 500 mg tablet, Take 500 mg by mouth 4 (four) times a day, Disp: , Rfl:     naratriptan (AMERGE) 2 5 MG tablet, Take 1 tablet at onset of migraine  May repeat in 4 hours if unresolved  Do not exceed 5 mg in 24 hours   Limit 2 in 24 hours and 4 per week, Disp: , Rfl:     omeprazole (PriLOSEC) 20 mg delayed release capsule, Take 20 mg by mouth 2 (two) times a day , Disp: , Rfl:     ondansetron (ZOFRAN-ODT) 4 mg disintegrating tablet, Take 1 tablet (4 mg total) by mouth every 6 (six) hours as needed for nausea or vomiting, Disp: 20 tablet, Rfl: 0    orphenadrine (NORFLEX) 100 mg tablet, , Disp: , Rfl:     prochlorperazine (COMPAZINE) 10 mg tablet, Take 1/2 tab BID for 7 days, Disp: , Rfl:     TIROSINT 125 MCG CAPS, , Disp: , Rfl:     TiZANidine (ZANAFLEX) 2 MG capsule, Take 2 mg by mouth 3 (three) times a day, Disp: , Rfl:     Transdermal Patch PTCH, Transderm-Scop 1 5 mg transdermal patch (1 mg over 3 days), Disp: , Rfl:     XIFAXAN 550 MG tablet, , Disp: , Rfl:   Family History   Problem Relation Age of Onset    Hypertension Mother     Diabetes Mother     Hypothyroidism Mother     Stroke Mother     Bipolar disorder Mother     Anxiety disorder Mother     Cancer Father         pancreatic     Hypothyroidism Sister     Hypertension Brother     Heart disease Brother     Cancer Maternal Uncle         lung    Cancer Paternal Aunt         breast    Cancer Paternal Uncle         testicular     Cancer Paternal Grandmother         breast    Cancer Cousin         brain    Bipolar disorder Daughter      There are no Patient Instructions on file for this visit          ERICKA Fowler

## 2020-03-11 NOTE — TELEPHONE ENCOUNTER
Received a fax notification that a prior authorization for Latuda 40mg tablets was started on [employer]  Faxed prior authorization for Latuda 40mg tablets to [facility] [phone] (fax number)  Awaiting response

## 2020-03-12 LAB — HIV 1+2 AB+HIV1 P24 AG SERPL QL IA: NORMAL

## 2020-03-12 NOTE — TELEPHONE ENCOUNTER
Nursing received prior authorization approval for Latuda 40 mg from 3/11/2020 through 3/11/2021   Preferred pharmacy and patient made aware of the prior auth approval

## 2020-03-13 ENCOUNTER — SOCIAL WORK (OUTPATIENT)
Dept: BEHAVIORAL/MENTAL HEALTH CLINIC | Facility: CLINIC | Age: 50
End: 2020-03-13
Payer: COMMERCIAL

## 2020-03-13 DIAGNOSIS — F41.1 GAD (GENERALIZED ANXIETY DISORDER): Primary | ICD-10-CM

## 2020-03-13 DIAGNOSIS — F31.32 BIPOLAR AFFECTIVE DISORDER, CURRENTLY DEPRESSED, MODERATE (HCC): ICD-10-CM

## 2020-03-13 PROCEDURE — 90834 PSYTX W PT 45 MINUTES: CPT | Performed by: SOCIAL WORKER

## 2020-03-13 PROCEDURE — 1036F TOBACCO NON-USER: CPT | Performed by: SOCIAL WORKER

## 2020-03-13 NOTE — PSYCH
Treatment Plan Tracking    # InitialTreatment Plan not completed within required time limits due to:we were to do it today but she was so upset about her daughter and her grandson and she needed to talk  We agreed to do it next session  Client presented with emotional/behavorial issues that required clinical intervention  Alejandro Barlow
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
## 2020-03-19 ENCOUNTER — TELEPHONE (OUTPATIENT)
Dept: PSYCHIATRY | Facility: CLINIC | Age: 50
End: 2020-03-19

## 2020-03-20 ENCOUNTER — TELEMEDICINE (OUTPATIENT)
Dept: BEHAVIORAL/MENTAL HEALTH CLINIC | Facility: CLINIC | Age: 50
End: 2020-03-20
Payer: COMMERCIAL

## 2020-03-20 ENCOUNTER — TELEPHONE (OUTPATIENT)
Dept: FAMILY MEDICINE CLINIC | Facility: CLINIC | Age: 50
End: 2020-03-20

## 2020-03-20 DIAGNOSIS — F31.32 BIPOLAR AFFECTIVE DISORDER, CURRENTLY DEPRESSED, MODERATE (HCC): ICD-10-CM

## 2020-03-20 DIAGNOSIS — F41.1 GAD (GENERALIZED ANXIETY DISORDER): Primary | ICD-10-CM

## 2020-03-20 PROCEDURE — 99212 OFFICE O/P EST SF 10 MIN: CPT | Performed by: SOCIAL WORKER
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
## 2020-03-20 NOTE — TELEPHONE ENCOUNTER
Nadia Celis,  Please give Gallito Perez a call and tell her    We need all the documentation that she has that gave her temporary custody of this baby  The [address], date where this hearing took place  I need all of this information ASAP

## 2020-03-20 NOTE — TELEPHONE ENCOUNTER
Spoke to [place] she stated she is going for sole custody of Bhumika Hernandez and will need to take a 4 hour class on parenting , pt states she did let them know she cannot sit down for 4 hours due to health conditions, they did tell her if she is able to receive a letter from her provider stating she cannot sit that long they will allow her to watch the DVD at home

## 2020-03-24 ENCOUNTER — TELEPHONE (OUTPATIENT)
Dept: FAMILY MEDICINE CLINIC | Facility: CLINIC | Age: 50
End: 2020-03-24

## 2020-03-24 ENCOUNTER — TELEPHONE (OUTPATIENT)
Dept: PSYCHIATRY | Facility: CLINIC | Age: 50
End: 2020-03-24

## 2020-03-24 NOTE — TELEPHONE ENCOUNTER
Patent wishes to know the status of a letter she needs to be able to conduct taking a parent course at home, vs going to a class and sitting for 4 hours  Patient states she is unable to sit for that long for a class

## 2020-04-09 ENCOUNTER — PATIENT MESSAGE (OUTPATIENT)
Dept: FAMILY MEDICINE CLINIC | Facility: CLINIC | Age: 50
End: 2020-04-09

## 2020-04-10 ENCOUNTER — TELEPHONE (OUTPATIENT)
Dept: FAMILY MEDICINE CLINIC | Facility: CLINIC | Age: 50
End: 2020-04-10

## 2020-04-10 DIAGNOSIS — L02.92 BOIL: Primary | ICD-10-CM

## 2020-04-10 RX ORDER — CEPHALEXIN 500 MG/1
CAPSULE ORAL
Qty: 20 CAPSULE | Refills: 0 | Status: SHIPPED | OUTPATIENT
Start: 2020-04-10 | End: 2020-04-15

## 2020-04-11 ENCOUNTER — PATIENT MESSAGE (OUTPATIENT)
Dept: FAMILY MEDICINE CLINIC | Facility: CLINIC | Age: 50
End: 2020-04-11

## 2020-04-20 ENCOUNTER — TELEMEDICINE (OUTPATIENT)
Dept: BEHAVIORAL/MENTAL HEALTH CLINIC | Facility: CLINIC | Age: 50
End: 2020-04-20
Payer: COMMERCIAL

## 2020-04-20 DIAGNOSIS — F41.1 GAD (GENERALIZED ANXIETY DISORDER): Primary | ICD-10-CM

## 2020-04-20 DIAGNOSIS — F31.32 BIPOLAR AFFECTIVE DISORDER, CURRENTLY DEPRESSED, MODERATE (HCC): ICD-10-CM

## 2020-04-20 PROCEDURE — 90834 PSYTX W PT 45 MINUTES: CPT | Performed by: SOCIAL WORKER

## 2020-04-20 NOTE — PSYCH
Virtual Regular Visit    Problem List Items Addressed This Visit        Other    ZAINA (generalized anxiety disorder) - Primary    Bipolar disorder ([facility] )               Reason for visit is ***    Encounter provider Gumaro Livingston    Provider located at [address] [facility]      Recent Visits  No visits were found meeting these conditions  Showing recent visits within past 7 days and meeting all other requirements     Future Appointments  No visits were found meeting these conditions  Showing future appointments within next 150 days and meeting all other requirements        The patient was identified by name and date of birth  Dre Mijares was informed that this is a telemedicine visit and that the visit is being conducted through   She acknowledged consent and understanding of privacy and security of the video platform  The patient has agreed to participate and understands they can discontinue the visit at any time  Patient is aware this is a billable service  Subjective  Dre Mijares is a 51 y o  female ***      HPI     Past Medical History:   Diagnosis Date    Anxiety     Anxiety     Asthma     [facility] disorder ([facility] )     Carpal tunnel syndrome     Chronic pain     lower back    Depression     Disease of thyroid gland     Dyslipidemia     Fibromyalgia     Irritable bowel     Kidney stones     Kidney stones 2019    Migraine     Psychiatric disorder     depression/anxiety    Suicide attempt [facility])     as teen    Vitamin D deficiency        Past Surgical History:   Procedure Laterality Date    BUNIONECTOMY       SECTION      CHOLECYSTECTOMY      PARTIAL HYSTERECTOMY      TONSILLECTOMY      TUBAL LIGATION         Current Outpatient Medications   Medication Sig Dispense Refill    albuterol (2 5 mg/3 mL) 0 083 % nebulizer solution Take 1 vial (2 5 mg total) by nebulization every 6 (six) hours as needed for wheezing or shortness of breath 20 vial 0    albuterol (PROVENTIL HFA,VENTOLIN HFA) 90 mcg/act inhaler Inhale 2 puffs every 6 (six) hours as needed for wheezing 3 Inhaler 2    baclofen 10 mg tablet       betamethasone dipropionate (DIPROSONE) 0 05 % cream APPLY A THIN LAYER TO THE AFFECTED AREA(S) BY TOPICAL ROUTE ONCE DAILY      busPIRone (BUSPAR) 10 mg tablet Take 1 tablet (10 mg total) by mouth 3 (three) times a day 90 tablet 2    celecoxib (CELEBREX) 200 mg capsule Take 1 capsule (200 mg total) by mouth daily 30 capsule 2    clotrimazole-betamethasone (LOTRISONE) 1-0 05 % cream       cyclobenzaprine (FLEXERIL) 10 mg tablet Take 10 mg by mouth Three times daily as needed      dexamethasone (DECADRON) 4 mg tablet       diclofenac (VOLTAREN) 75 mg EC tablet Take 75 mg by mouth      diclofenac sodium (VOLTAREN) 1 % APPLY 2 GRAM TO THE AFFECTED AREA(S) BY TOPICAL ROUTE 4 TIMES PER DAY      divalproex sodium (DEPAKOTE) 250 mg EC tablet Take 250 mg by mouth daily      Erenumab-aooe 70 MG/ML SOAJ Inject 70 mg under the skin      fluticasone-vilanterol (BREO ELLIPTA) 100-25 mcg/inh inhaler Inhale 1 puff every 24 hours 3 Inhaler 2    Galcanezumab-gnlm 120 MG/ML SOAJ Inject 120 mg under the skin every 28 days      lidocaine (LIDODERM) 5 % Place 1 patch on the skin daily as needed for mild pain or moderate pain Remove & Discard patch within 12 hours or as directed by MD 6 patch 0    lurasidone (Latuda) 40 mg tablet Take 1 tablet (40 mg total) by mouth daily with dinner 30 tablet 3    methocarbamol (ROBAXIN) 500 mg tablet Take 500 mg by mouth 4 (four) times a day      naratriptan (AMERGE) 2 5 MG tablet Take 1 tablet at onset of migraine  May repeat in 4 hours if unresolved  Do not exceed 5 mg in 24 hours   Limit 2 in 24 hours and 4 per week      omeprazole (PriLOSEC) 20 mg delayed release capsule Take 20 mg by mouth 2 (two) times a day       ondansetron (ZOFRAN-ODT) 4 mg disintegrating tablet Take 1 tablet (4 mg total) by mouth every 6 (six) hours as needed for nausea or vomiting 20 tablet 0    orphenadrine (NORFLEX) 100 mg tablet       prochlorperazine (COMPAZINE) 10 mg tablet Take 1/2 tab BID for 7 days      TIROSINT 125 MCG CAPS       TiZANidine (ZANAFLEX) 2 MG capsule Take 2 mg by mouth 3 (three) times a day      Topiramate ER (Trokendi XR) 100 MG CP24 Take 100 mg by mouth 2 (two) times a day      Transdermal Patch PTCH Transderm-Scop 1 5 mg transdermal patch (1 mg over 3 days)      XIFAXAN 550 MG tablet        No current facility-administered medications for this visit  Allergies   Allergen Reactions    Doxycycline Rash    Erythromycin Rash    Other Edema and Wheezing     jalapeno    Latex Rash    Eletriptan      Pain in lower jaw with pressure in throat       Review of Systems    Video Exam    There were no vitals filed for this visit  Physical Exam     As a result of this visit, I have referred the patient for further respiratory evaluation

## 2020-04-20 NOTE — ADDENDUM NOTE
Addended by: Liliana Esparza on: 4/20/2020 01:03 PM     Modules accepted: Level of Service, SmartSet

## 2020-04-20 NOTE — BH TREATMENT PLAN
Cassius Nageotte gave the undersigned authorization to sign for her on her plan due to [id number] and the fact this was a virtual video session  1970       Date of Initial Treatment Plan: 03/20/20   Date of Current Treatment Plan: 03/20/2020  Treatment Plan Number Initial    Strengths/Personal Resources for Self Care: Independent, caring mother and grandmother, takes care of business    Diagnosis:   1  ZAINA (generalized anxiety disorder)     2  Bipolar affective disorder, currently depressed, moderate ([facility] )         Area of Needs: please see below      Long Term Goal 1: I need to continue to develop skills to better manage my anxiety and my depression    Target Date:07/20/2020  Completion Date:TBD          Short Term Objectives for Goal 1: Mindfulness, meditation, stress management strategies, deep breathing, distress tolerance and radical acceptance  Long Term Goal 2: I need to continue to draw and respect my own boundaries and I will continue to make healthy decisions for myself    Target Date: 07/20/2020  Completion Date: TBD    Short Term Objectives for Goal 2: I will not enable people around me in their dusfunctional thinking and behaviors         Long Term Goal # 3: Thru the therapeutic process I want to further explore some of my past poor decision making and put such choices to rest       Target Date: 07/20/2020  Completion Date: TBD    Short Term Objectives for Goal 3: again the use of mindfulness and focusing more in the moment and or present and where I am going rather than staying in the past and behind    GOAL 1: Modality: Individual 4x per month   Completion Date tbd    GOAL 2: Modality: Individual 4x per month   Completion Date tbd     GOAL 3: Modality: Individual 4x per month   Completion Date tbd      [address]: Diagnosis and Treatment Plan explained to Juan Wright relates understanding diagnosis and is agreeable to Treatment Plan         Client Comments : Please share your thoughts, feelings, need and/or experiences regarding your treatment plan: The undersigned and Maddy Wheeler will work as a team to help her progress on her goals

## 2020-04-22 ENCOUNTER — TELEMEDICINE (OUTPATIENT)
Dept: PSYCHIATRY | Facility: CLINIC | Age: 50
End: 2020-04-22
Payer: COMMERCIAL

## 2020-04-22 DIAGNOSIS — F41.1 GAD (GENERALIZED ANXIETY DISORDER): ICD-10-CM

## 2020-04-22 DIAGNOSIS — F31.32 BIPOLAR AFFECTIVE DISORDER, CURRENTLY DEPRESSED, MODERATE (HCC): Primary | ICD-10-CM

## 2020-04-22 PROCEDURE — 99214 OFFICE O/P EST MOD 30 MIN: CPT | Performed by: PHYSICIAN ASSISTANT

## 2020-04-22 RX ORDER — BUSPIRONE HYDROCHLORIDE 10 MG/1
10 TABLET ORAL 3 TIMES DAILY
Qty: 90 TABLET | Refills: 0 | Status: SHIPPED | OUTPATIENT
Start: 2020-05-15 | End: 2020-06-02

## 2020-04-28 ENCOUNTER — TELEPHONE (OUTPATIENT)
Dept: PSYCHIATRY | Facility: CLINIC | Age: 50
End: 2020-04-28

## 2020-04-29 ENCOUNTER — TELEMEDICINE (OUTPATIENT)
Dept: BEHAVIORAL/MENTAL HEALTH CLINIC | Facility: CLINIC | Age: 50
End: 2020-04-29
Payer: COMMERCIAL

## 2020-04-29 ENCOUNTER — TELEMEDICINE (OUTPATIENT)
Dept: FAMILY MEDICINE CLINIC | Facility: CLINIC | Age: 50
End: 2020-04-29
Payer: COMMERCIAL

## 2020-04-29 DIAGNOSIS — H10.30 ACUTE CONJUNCTIVITIS, UNSPECIFIED ACUTE CONJUNCTIVITIS TYPE, UNSPECIFIED LATERALITY: ICD-10-CM

## 2020-04-29 DIAGNOSIS — F41.1 GAD (GENERALIZED ANXIETY DISORDER): Primary | ICD-10-CM

## 2020-04-29 DIAGNOSIS — M62.89 MASS OF MUSCLE OF LEFT UPPER EXTREMITY: Primary | ICD-10-CM

## 2020-04-29 DIAGNOSIS — F31.32 BIPOLAR AFFECTIVE DISORDER, CURRENTLY DEPRESSED, MODERATE (HCC): ICD-10-CM

## 2020-04-29 DIAGNOSIS — H00.015 HORDEOLUM EXTERNUM OF LEFT LOWER EYELID: ICD-10-CM

## 2020-04-29 PROCEDURE — 99213 OFFICE O/P EST LOW 20 MIN: CPT | Performed by: FAMILY MEDICINE

## 2020-04-29 PROCEDURE — 90834 PSYTX W PT 45 MINUTES: CPT | Performed by: SOCIAL WORKER

## 2020-04-29 RX ORDER — MOXIFLOXACIN 5 MG/ML
SOLUTION/ DROPS OPHTHALMIC
Qty: 3 ML | Refills: 0 | Status: SHIPPED | OUTPATIENT
Start: 2020-04-29 | End: 2020-05-01 | Stop reason: SDUPTHER

## 2020-05-01 DIAGNOSIS — H10.30 ACUTE CONJUNCTIVITIS, UNSPECIFIED ACUTE CONJUNCTIVITIS TYPE, UNSPECIFIED LATERALITY: ICD-10-CM

## 2020-05-01 RX ORDER — MOXIFLOXACIN 5 MG/ML
SOLUTION/ DROPS OPHTHALMIC
Qty: 3 ML | Refills: 0 | Status: SHIPPED | OUTPATIENT
Start: 2020-05-01 | End: 2020-05-13

## 2020-05-05 ENCOUNTER — TELEPHONE (OUTPATIENT)
Dept: FAMILY MEDICINE CLINIC | Facility: CLINIC | Age: 50
End: 2020-05-05

## 2020-05-07 DIAGNOSIS — H00.015 HORDEOLUM EXTERNUM OF LEFT LOWER EYELID: Primary | ICD-10-CM

## 2020-05-07 RX ORDER — SULFACETAMIDE SODIUM 100 MG/ML
SOLUTION/ DROPS OPHTHALMIC
Qty: 5 ML | Refills: 1 | Status: SHIPPED | OUTPATIENT
Start: 2020-05-07 | End: 2020-05-11

## 2020-05-13 ENCOUNTER — TELEMEDICINE (OUTPATIENT)
Dept: BEHAVIORAL/MENTAL HEALTH CLINIC | Facility: CLINIC | Age: 50
End: 2020-05-13
Payer: COMMERCIAL

## 2020-05-13 DIAGNOSIS — F41.1 GAD (GENERALIZED ANXIETY DISORDER): Primary | ICD-10-CM

## 2020-05-13 DIAGNOSIS — H10.30 ACUTE CONJUNCTIVITIS, UNSPECIFIED ACUTE CONJUNCTIVITIS TYPE, UNSPECIFIED LATERALITY: Primary | ICD-10-CM

## 2020-05-13 DIAGNOSIS — M54.50 CHRONIC LOW BACK PAIN, UNSPECIFIED BACK PAIN LATERALITY, UNSPECIFIED WHETHER SCIATICA PRESENT: ICD-10-CM

## 2020-05-13 DIAGNOSIS — G89.29 CHRONIC LOW BACK PAIN, UNSPECIFIED BACK PAIN LATERALITY, UNSPECIFIED WHETHER SCIATICA PRESENT: ICD-10-CM

## 2020-05-13 DIAGNOSIS — F31.32 BIPOLAR AFFECTIVE DISORDER, CURRENTLY DEPRESSED, MODERATE (HCC): ICD-10-CM

## 2020-05-13 PROCEDURE — 90834 PSYTX W PT 45 MINUTES: CPT | Performed by: SOCIAL WORKER

## 2020-05-14 ENCOUNTER — TELEMEDICINE (OUTPATIENT)
Dept: FAMILY MEDICINE CLINIC | Facility: CLINIC | Age: 50
End: 2020-05-14
Payer: COMMERCIAL

## 2020-05-14 ENCOUNTER — HOSPITAL ENCOUNTER (OUTPATIENT)
Dept: ULTRASOUND IMAGING | Facility: HOSPITAL | Age: 50
Discharge: HOME | End: 2020-05-14
Payer: COMMERCIAL

## 2020-05-14 VITALS — TEMPERATURE: 98.5 F

## 2020-05-14 DIAGNOSIS — B99.9 INFECTION: Primary | ICD-10-CM

## 2020-05-14 DIAGNOSIS — M62.89 MASS OF MUSCLE OF LEFT UPPER EXTREMITY: ICD-10-CM

## 2020-05-14 PROBLEM — R05.9 COUGH IN ADULT: Status: RESOLVED | Noted: 2019-01-22 | Resolved: 2020-05-14

## 2020-05-14 PROCEDURE — 76882 US LMTD JT/FCL EVL NVASC XTR: CPT

## 2020-05-14 PROCEDURE — 99215 OFFICE O/P EST HI 40 MIN: CPT | Performed by: FAMILY MEDICINE

## 2020-05-15 ENCOUNTER — TELEPHONE (OUTPATIENT)
Dept: FAMILY MEDICINE CLINIC | Facility: CLINIC | Age: 50
End: 2020-05-15

## 2020-05-29 ENCOUNTER — TELEMEDICINE (OUTPATIENT)
Dept: BEHAVIORAL/MENTAL HEALTH CLINIC | Facility: CLINIC | Age: 50
End: 2020-05-29
Payer: COMMERCIAL

## 2020-05-29 DIAGNOSIS — F31.32 BIPOLAR AFFECTIVE DISORDER, CURRENTLY DEPRESSED, MODERATE (HCC): ICD-10-CM

## 2020-05-29 DIAGNOSIS — F41.1 GAD (GENERALIZED ANXIETY DISORDER): Primary | ICD-10-CM

## 2020-05-29 PROCEDURE — 90834 PSYTX W PT 45 MINUTES: CPT | Performed by: SOCIAL WORKER

## 2020-06-02 ENCOUNTER — TELEMEDICINE (OUTPATIENT)
Dept: PSYCHIATRY | Facility: CLINIC | Age: 50
End: 2020-06-02
Payer: COMMERCIAL

## 2020-06-02 DIAGNOSIS — F31.81 BIPOLAR 2 DISORDER (HCC): Primary | ICD-10-CM

## 2020-06-02 DIAGNOSIS — G43.009 MIGRAINE WITHOUT AURA AND WITHOUT STATUS MIGRAINOSUS, NOT INTRACTABLE: ICD-10-CM

## 2020-06-02 DIAGNOSIS — E55.9 VITAMIN D DEFICIENCY: ICD-10-CM

## 2020-06-02 DIAGNOSIS — F41.1 GAD (GENERALIZED ANXIETY DISORDER): ICD-10-CM

## 2020-06-02 DIAGNOSIS — M79.7 PRIMARY FIBROMYALGIA SYNDROME: ICD-10-CM

## 2020-06-02 PROCEDURE — 99215 OFFICE O/P EST HI 40 MIN: CPT | Performed by: PHYSICIAN ASSISTANT

## 2020-06-02 RX ORDER — BUSPIRONE HYDROCHLORIDE 15 MG/1
15 TABLET ORAL 2 TIMES DAILY
Qty: 60 TABLET | Refills: 2 | Status: SHIPPED | OUTPATIENT
Start: 2020-06-02 | End: 2020-09-14

## 2020-06-02 RX ORDER — ZIPRASIDONE HYDROCHLORIDE 20 MG/1
CAPSULE ORAL
Qty: 30 CAPSULE | Refills: 2 | Status: SHIPPED | OUTPATIENT
Start: 2020-06-02 | End: 2020-08-26

## 2020-06-13 ENCOUNTER — APPOINTMENT (OUTPATIENT)
Dept: LAB | Facility: HOSPITAL | Age: 50
End: 2020-06-13
Payer: COMMERCIAL

## 2020-06-13 ENCOUNTER — TRANSCRIBE ORDERS (OUTPATIENT)
Dept: ADMINISTRATIVE | Facility: HOSPITAL | Age: 50
End: 2020-06-13

## 2020-06-13 DIAGNOSIS — E03.9 HYPOTHYROIDISM, ADULT: ICD-10-CM

## 2020-06-13 DIAGNOSIS — E03.9 HYPOTHYROIDISM, ADULT: Primary | ICD-10-CM

## 2020-06-13 LAB
T3 SERPL-MCNC: 1 NG/ML (ref 0.6–1.8)
T4 FREE SERPL-MCNC: 0.97 NG/DL (ref 0.76–1.46)
TSH SERPL DL<=0.05 MIU/L-ACNC: 5.33 UIU/ML (ref 0.36–3.74)

## 2020-06-13 PROCEDURE — 84443 ASSAY THYROID STIM HORMONE: CPT

## 2020-06-13 PROCEDURE — 84480 ASSAY TRIIODOTHYRONINE (T3): CPT

## 2020-06-13 PROCEDURE — 36415 COLL VENOUS BLD VENIPUNCTURE: CPT

## 2020-06-13 PROCEDURE — 84439 ASSAY OF FREE THYROXINE: CPT

## 2020-06-15 ENCOUNTER — TELEMEDICINE (OUTPATIENT)
Dept: FAMILY MEDICINE CLINIC | Facility: CLINIC | Age: 50
End: 2020-06-15
Payer: COMMERCIAL

## 2020-06-15 ENCOUNTER — TELEPHONE (OUTPATIENT)
Dept: BEHAVIORAL/MENTAL HEALTH CLINIC | Facility: CLINIC | Age: 50
End: 2020-06-15

## 2020-06-15 DIAGNOSIS — Z20.828 EXPOSURE TO SARS-ASSOCIATED CORONAVIRUS: Primary | ICD-10-CM

## 2020-06-15 DIAGNOSIS — Z20.828 EXPOSURE TO SARS-ASSOCIATED CORONAVIRUS: ICD-10-CM

## 2020-06-15 PROCEDURE — 99213 OFFICE O/P EST LOW 20 MIN: CPT | Performed by: NURSE PRACTITIONER

## 2020-06-15 PROCEDURE — U0003 INFECTIOUS AGENT DETECTION BY NUCLEIC ACID (DNA OR RNA); SEVERE ACUTE RESPIRATORY SYNDROME CORONAVIRUS 2 (SARS-COV-2) (CORONAVIRUS DISEASE [COVID-19]), AMPLIFIED PROBE TECHNIQUE, MAKING USE OF HIGH THROUGHPUT TECHNOLOGIES AS DESCRIBED BY CMS-2020-01-R: HCPCS

## 2020-06-16 LAB — SARS-COV-2 RNA SPEC QL NAA+PROBE: NOT DETECTED

## 2020-06-17 ENCOUNTER — TELEMEDICINE (OUTPATIENT)
Dept: FAMILY MEDICINE CLINIC | Facility: CLINIC | Age: 50
End: 2020-06-17
Payer: COMMERCIAL

## 2020-06-17 ENCOUNTER — TELEPHONE (OUTPATIENT)
Dept: FAMILY MEDICINE CLINIC | Facility: CLINIC | Age: 50
End: 2020-06-17

## 2020-06-17 DIAGNOSIS — E03.9 ACQUIRED HYPOTHYROIDISM: Primary | ICD-10-CM

## 2020-06-17 DIAGNOSIS — F41.1 GAD (GENERALIZED ANXIETY DISORDER): ICD-10-CM

## 2020-06-17 DIAGNOSIS — T78.40XA ALLERGIC STATE, INITIAL ENCOUNTER: Primary | ICD-10-CM

## 2020-06-17 DIAGNOSIS — F31.81 BIPOLAR 2 DISORDER (HCC): ICD-10-CM

## 2020-06-17 PROCEDURE — 99213 OFFICE O/P EST LOW 20 MIN: CPT | Performed by: FAMILY MEDICINE

## 2020-06-17 RX ORDER — LEVOTHYROXINE SODIUM 0.03 MG/1
25 TABLET ORAL DAILY
Qty: 90 TABLET | Refills: 1 | Status: SHIPPED | OUTPATIENT
Start: 2020-06-17 | End: 2020-07-29 | Stop reason: SDUPTHER

## 2020-06-17 RX ORDER — LORATADINE 10 MG/1
TABLET ORAL
Qty: 90 TABLET | Refills: 1 | Status: SHIPPED | OUTPATIENT
Start: 2020-06-17

## 2020-06-17 RX ORDER — MONTELUKAST SODIUM 10 MG/1
TABLET ORAL
Qty: 90 TABLET | Refills: 1 | Status: SHIPPED | OUTPATIENT
Start: 2020-06-17 | End: 2020-08-17 | Stop reason: ALTCHOICE

## 2020-06-17 RX ORDER — FLUTICASONE PROPIONATE 50 MCG
SPRAY, SUSPENSION (ML) NASAL
Qty: 1 BOTTLE | Refills: 3 | Status: SHIPPED | OUTPATIENT
Start: 2020-06-17 | End: 2020-10-02

## 2020-06-19 RX ORDER — BUSPIRONE HYDROCHLORIDE 10 MG/1
TABLET ORAL
Qty: 90 TABLET | Refills: 1 | OUTPATIENT
Start: 2020-06-19

## 2020-06-19 RX ORDER — LURASIDONE HYDROCHLORIDE 40 MG/1
TABLET, FILM COATED ORAL
Qty: 30 TABLET | Refills: 2 | OUTPATIENT
Start: 2020-06-19

## 2020-06-22 ENCOUNTER — TELEMEDICINE (OUTPATIENT)
Dept: BEHAVIORAL/MENTAL HEALTH CLINIC | Facility: CLINIC | Age: 50
End: 2020-06-22
Payer: COMMERCIAL

## 2020-06-22 DIAGNOSIS — F31.81 BIPOLAR 2 DISORDER (HCC): Primary | ICD-10-CM

## 2020-06-22 DIAGNOSIS — F41.1 GAD (GENERALIZED ANXIETY DISORDER): ICD-10-CM

## 2020-06-22 PROCEDURE — 90834 PSYTX W PT 45 MINUTES: CPT | Performed by: SOCIAL WORKER

## 2020-06-26 ENCOUNTER — TELEMEDICINE (OUTPATIENT)
Dept: BEHAVIORAL/MENTAL HEALTH CLINIC | Facility: CLINIC | Age: 50
End: 2020-06-26
Payer: COMMERCIAL

## 2020-06-26 DIAGNOSIS — F31.81 BIPOLAR 2 DISORDER (HCC): ICD-10-CM

## 2020-06-26 DIAGNOSIS — F41.1 GAD (GENERALIZED ANXIETY DISORDER): Primary | ICD-10-CM

## 2020-06-26 PROCEDURE — 90834 PSYTX W PT 45 MINUTES: CPT | Performed by: SOCIAL WORKER

## 2020-07-08 ENCOUNTER — TELEMEDICINE (OUTPATIENT)
Dept: BEHAVIORAL/MENTAL HEALTH CLINIC | Facility: CLINIC | Age: 50
End: 2020-07-08
Payer: COMMERCIAL

## 2020-07-08 DIAGNOSIS — F31.81 BIPOLAR 2 DISORDER (HCC): ICD-10-CM

## 2020-07-08 DIAGNOSIS — F41.1 GAD (GENERALIZED ANXIETY DISORDER): Primary | ICD-10-CM

## 2020-07-08 PROCEDURE — 1036F TOBACCO NON-USER: CPT | Performed by: SOCIAL WORKER

## 2020-07-08 PROCEDURE — 90834 PSYTX W PT 45 MINUTES: CPT | Performed by: SOCIAL WORKER
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
## 2020-07-14 DIAGNOSIS — J45.40 MODERATE PERSISTENT ASTHMA WITHOUT COMPLICATION: ICD-10-CM

## 2020-07-14 RX ORDER — ALBUTEROL SULFATE 90 UG/1
2 AEROSOL, METERED RESPIRATORY (INHALATION) EVERY 6 HOURS PRN
Qty: 3 INHALER | Refills: 2 | Status: SHIPPED | OUTPATIENT
Start: 2020-07-14 | End: 2021-04-13

## 2020-07-24 ENCOUNTER — TELEMEDICINE (OUTPATIENT)
Dept: BEHAVIORAL/MENTAL HEALTH CLINIC | Facility: CLINIC | Age: 50
End: 2020-07-24
Payer: COMMERCIAL

## 2020-07-24 DIAGNOSIS — F31.81 BIPOLAR 2 DISORDER (HCC): ICD-10-CM

## 2020-07-24 DIAGNOSIS — F41.1 GAD (GENERALIZED ANXIETY DISORDER): Primary | ICD-10-CM

## 2020-07-24 PROCEDURE — 1036F TOBACCO NON-USER: CPT | Performed by: SOCIAL WORKER

## 2020-07-24 PROCEDURE — 90834 PSYTX W PT 45 MINUTES: CPT | Performed by: SOCIAL WORKER
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
## 2020-07-24 NOTE — PSYCH
Treatment Plan Tracking    # The updatedTreatment Plan not completed within required time limits due to:the fact it was due on the 20th    However today she was so upset about issues with her daughter and her daughter in law  She also wanted to talk about her current goals  We will update her goals the next session  Client presented with emotional/behavorial issues that required clinical intervention  Jean Pierre Ruano

## 2020-07-25 DIAGNOSIS — J45.20 MILD INTERMITTENT ASTHMA, UNSPECIFIED WHETHER COMPLICATED: Primary | ICD-10-CM

## 2020-07-25 RX ORDER — SOFT LENS DISINFECTANT
SOLUTION, NON-ORAL MISCELLANEOUS
COMMUNITY
End: 2020-07-27 | Stop reason: SDUPTHER

## 2020-07-27 RX ORDER — SOFT LENS DISINFECTANT
SOLUTION, NON-ORAL MISCELLANEOUS
Qty: 90 EACH | Refills: 1 | Status: SHIPPED | OUTPATIENT
Start: 2020-07-27 | End: 2022-07-08

## 2020-07-28 ENCOUNTER — APPOINTMENT (OUTPATIENT)
Dept: LAB | Facility: HOSPITAL | Age: 50
End: 2020-07-28
Payer: COMMERCIAL

## 2020-07-28 ENCOUNTER — TRANSCRIBE ORDERS (OUTPATIENT)
Dept: ADMINISTRATIVE | Facility: HOSPITAL | Age: 50
End: 2020-07-28

## 2020-07-28 DIAGNOSIS — R10.9 ABDOMINAL PAIN, UNSPECIFIED ABDOMINAL LOCATION: ICD-10-CM

## 2020-07-28 DIAGNOSIS — R19.7 DIARRHEA, UNSPECIFIED TYPE: ICD-10-CM

## 2020-07-28 DIAGNOSIS — R10.9 ABDOMINAL PAIN, UNSPECIFIED ABDOMINAL LOCATION: Primary | ICD-10-CM

## 2020-07-28 DIAGNOSIS — R11.0 NAUSEA: ICD-10-CM

## 2020-07-28 DIAGNOSIS — Z12.11 SCREENING FOR COLON CANCER: ICD-10-CM

## 2020-07-28 DIAGNOSIS — E03.9 HYPOTHYROIDISM, UNSPECIFIED TYPE: Primary | ICD-10-CM

## 2020-07-28 DIAGNOSIS — E03.9 HYPOTHYROIDISM, UNSPECIFIED TYPE: ICD-10-CM

## 2020-07-28 LAB
AMYLASE SERPL-CCNC: 51 IU/L (ref 25–115)
HEMOCCULT STL QL IA: NEGATIVE
LIPASE SERPL-CCNC: 91 U/L (ref 73–393)
T3 SERPL-MCNC: 1 NG/ML (ref 0.6–1.8)
T4 FREE SERPL-MCNC: 0.93 NG/DL (ref 0.76–1.46)
TSH SERPL DL<=0.05 MIU/L-ACNC: 4.31 UIU/ML (ref 0.36–3.74)

## 2020-07-28 PROCEDURE — 82150 ASSAY OF AMYLASE: CPT

## 2020-07-28 PROCEDURE — 83690 ASSAY OF LIPASE: CPT

## 2020-07-28 PROCEDURE — 84480 ASSAY TRIIODOTHYRONINE (T3): CPT

## 2020-07-28 PROCEDURE — 84443 ASSAY THYROID STIM HORMONE: CPT

## 2020-07-28 PROCEDURE — 84439 ASSAY OF FREE THYROXINE: CPT

## 2020-07-28 PROCEDURE — 36415 COLL VENOUS BLD VENIPUNCTURE: CPT

## 2020-07-28 PROCEDURE — G0328 FECAL BLOOD SCRN IMMUNOASSAY: HCPCS

## 2020-07-29 DIAGNOSIS — E03.9 ACQUIRED HYPOTHYROIDISM: ICD-10-CM

## 2020-07-29 RX ORDER — LEVOTHYROXINE SODIUM 0.05 MG/1
50 TABLET ORAL DAILY
Qty: 90 TABLET | Refills: 1 | Status: SHIPPED | OUTPATIENT
Start: 2020-07-29 | End: 2020-10-21

## 2020-08-13 ENCOUNTER — TELEMEDICINE (OUTPATIENT)
Dept: BEHAVIORAL/MENTAL HEALTH CLINIC | Facility: CLINIC | Age: 50
End: 2020-08-13
Payer: COMMERCIAL

## 2020-08-13 ENCOUNTER — TELEPHONE (OUTPATIENT)
Dept: PSYCHIATRY | Facility: CLINIC | Age: 50
End: 2020-08-13

## 2020-08-13 DIAGNOSIS — F41.1 GAD (GENERALIZED ANXIETY DISORDER): Primary | ICD-10-CM

## 2020-08-13 DIAGNOSIS — F31.81 BIPOLAR 2 DISORDER (HCC): ICD-10-CM

## 2020-08-13 PROCEDURE — 90834 PSYTX W PT 45 MINUTES: CPT | Performed by: SOCIAL WORKER

## 2020-08-13 NOTE — TELEPHONE ENCOUNTER
Please call Davian Abel at [phone]  She got cut off from session and would like to finish and get her point across
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
## 2020-08-13 NOTE — PSYCH
Treatment Plan Tracking    # Her updatedTreatment Plan not completed within required time limits due to we were to update it today but shelter thru the session she got cut off and I could not recontact her  Will complete next session:

## 2020-08-14 ENCOUNTER — APPOINTMENT (OUTPATIENT)
Dept: LAB | Facility: HOSPITAL | Age: 50
End: 2020-08-14
Payer: COMMERCIAL

## 2020-08-14 ENCOUNTER — TRANSCRIBE ORDERS (OUTPATIENT)
Dept: ADMINISTRATIVE | Facility: HOSPITAL | Age: 50
End: 2020-08-14

## 2020-08-14 DIAGNOSIS — M05.841 SEROPOSITIVE RHEUMATOID ARTHRITIS OF BOTH HANDS (HCC): ICD-10-CM

## 2020-08-14 DIAGNOSIS — M79.642 PAIN IN BOTH HANDS: ICD-10-CM

## 2020-08-14 DIAGNOSIS — M79.641 PAIN IN BOTH HANDS: ICD-10-CM

## 2020-08-14 DIAGNOSIS — M79.7 FIBROMYALGIA: Primary | ICD-10-CM

## 2020-08-14 DIAGNOSIS — M05.842 SEROPOSITIVE RHEUMATOID ARTHRITIS OF BOTH HANDS (HCC): ICD-10-CM

## 2020-08-14 DIAGNOSIS — M79.7 FIBROMYALGIA: ICD-10-CM

## 2020-08-14 DIAGNOSIS — E55.9 VITAMIN D DEFICIENCY: ICD-10-CM

## 2020-08-14 LAB
25(OH)D3 SERPL-MCNC: 18 NG/ML (ref 30–100)
ALBUMIN SERPL BCP-MCNC: 3.8 G/DL (ref 3.5–5)
ALP SERPL-CCNC: 64 U/L (ref 46–116)
ALT SERPL W P-5'-P-CCNC: 21 U/L (ref 12–78)
ANION GAP SERPL CALCULATED.3IONS-SCNC: 7 MMOL/L (ref 4–13)
AST SERPL W P-5'-P-CCNC: 14 U/L (ref 5–45)
BASOPHILS # BLD AUTO: 0.01 THOUSANDS/ΜL (ref 0–0.1)
BASOPHILS NFR BLD AUTO: 0 % (ref 0–1)
BILIRUB SERPL-MCNC: 0.5 MG/DL (ref 0.2–1)
BUN SERPL-MCNC: 11 MG/DL (ref 5–25)
CALCIUM SERPL-MCNC: 8.8 MG/DL (ref 8.3–10.1)
CHLORIDE SERPL-SCNC: 108 MMOL/L (ref 100–108)
CO2 SERPL-SCNC: 26 MMOL/L (ref 21–32)
CREAT SERPL-MCNC: 0.74 MG/DL (ref 0.6–1.3)
CRP SERPL QL: <3 MG/L
CRYOGLOB RF SER-ACNC: ABNORMAL [IU]/ML
EOSINOPHIL # BLD AUTO: 0.05 THOUSAND/ΜL (ref 0–0.61)
EOSINOPHIL NFR BLD AUTO: 1 % (ref 0–6)
ERYTHROCYTE [DISTWIDTH] IN BLOOD BY AUTOMATED COUNT: 13 % (ref 11.6–15.1)
ERYTHROCYTE [SEDIMENTATION RATE] IN BLOOD: 9 MM/HOUR (ref 0–19)
GFR SERPL CREATININE-BSD FRML MDRD: 96 ML/MIN/1.73SQ M
GLUCOSE P FAST SERPL-MCNC: 91 MG/DL (ref 65–99)
HCT VFR BLD AUTO: 40.3 % (ref 34.8–46.1)
HGB BLD-MCNC: 12.9 G/DL (ref 11.5–15.4)
IMM GRANULOCYTES # BLD AUTO: 0.01 THOUSAND/UL (ref 0–0.2)
IMM GRANULOCYTES NFR BLD AUTO: 0 % (ref 0–2)
LYMPHOCYTES # BLD AUTO: 1.37 THOUSANDS/ΜL (ref 0.6–4.47)
LYMPHOCYTES NFR BLD AUTO: 34 % (ref 14–44)
MCH RBC QN AUTO: 29.6 PG (ref 26.8–34.3)
MCHC RBC AUTO-ENTMCNC: 32 G/DL (ref 31.4–37.4)
MCV RBC AUTO: 92 FL (ref 82–98)
MONOCYTES # BLD AUTO: 0.21 THOUSAND/ΜL (ref 0.17–1.22)
MONOCYTES NFR BLD AUTO: 5 % (ref 4–12)
NEUTROPHILS # BLD AUTO: 2.36 THOUSANDS/ΜL (ref 1.85–7.62)
NEUTS SEG NFR BLD AUTO: 60 % (ref 43–75)
NRBC BLD AUTO-RTO: 0 /100 WBCS
PLATELET # BLD AUTO: 235 THOUSANDS/UL (ref 149–390)
PMV BLD AUTO: 9.8 FL (ref 8.9–12.7)
POTASSIUM SERPL-SCNC: 4.5 MMOL/L (ref 3.5–5.3)
PROT SERPL-MCNC: 7.3 G/DL (ref 6.4–8.2)
RBC # BLD AUTO: 4.36 MILLION/UL (ref 3.81–5.12)
RHEUMATOID FACT SER QL LA: POSITIVE
SODIUM SERPL-SCNC: 141 MMOL/L (ref 136–145)
WBC # BLD AUTO: 4.01 THOUSAND/UL (ref 4.31–10.16)

## 2020-08-14 PROCEDURE — 82306 VITAMIN D 25 HYDROXY: CPT

## 2020-08-14 PROCEDURE — 86430 RHEUMATOID FACTOR TEST QUAL: CPT

## 2020-08-14 PROCEDURE — 86200 CCP ANTIBODY: CPT

## 2020-08-14 PROCEDURE — 85025 COMPLETE CBC W/AUTO DIFF WBC: CPT

## 2020-08-14 PROCEDURE — 86431 RHEUMATOID FACTOR QUANT: CPT

## 2020-08-14 PROCEDURE — 86140 C-REACTIVE PROTEIN: CPT

## 2020-08-14 PROCEDURE — 36415 COLL VENOUS BLD VENIPUNCTURE: CPT

## 2020-08-14 PROCEDURE — 85652 RBC SED RATE AUTOMATED: CPT

## 2020-08-14 PROCEDURE — 86038 ANTINUCLEAR ANTIBODIES: CPT

## 2020-08-14 PROCEDURE — 80053 COMPREHEN METABOLIC PANEL: CPT

## 2020-08-15 ENCOUNTER — APPOINTMENT (OUTPATIENT)
Dept: LAB | Facility: HOSPITAL | Age: 50
End: 2020-08-15
Payer: COMMERCIAL

## 2020-08-15 LAB
BILIRUB UR QL STRIP: NEGATIVE
CLARITY UR: NORMAL
COLOR UR: YELLOW
GLUCOSE UR STRIP-MCNC: NEGATIVE MG/DL
HGB UR QL STRIP.AUTO: NEGATIVE
KETONES UR STRIP-MCNC: NEGATIVE MG/DL
LEUKOCYTE ESTERASE UR QL STRIP: NEGATIVE
NITRITE UR QL STRIP: NEGATIVE
PH UR STRIP.AUTO: 7.5 [PH]
PROT UR STRIP-MCNC: NEGATIVE MG/DL
SP GR UR STRIP.AUTO: 1.01 (ref 1–1.03)
UROBILINOGEN UR QL STRIP.AUTO: 0.2 E.U./DL

## 2020-08-15 PROCEDURE — 82570 ASSAY OF URINE CREATININE: CPT | Performed by: FAMILY MEDICINE

## 2020-08-15 PROCEDURE — 81003 URINALYSIS AUTO W/O SCOPE: CPT | Performed by: INTERNAL MEDICINE

## 2020-08-15 PROCEDURE — 82043 UR ALBUMIN QUANTITATIVE: CPT | Performed by: FAMILY MEDICINE

## 2020-08-16 LAB
CCP IGA+IGG SERPL IA-ACNC: 2 UNITS (ref 0–19)
CREAT UR-MCNC: 171 MG/DL
MICROALBUMIN UR-MCNC: 6.9 MG/L (ref 0–20)
MICROALBUMIN/CREAT 24H UR: 4 MG/G CREATININE (ref 0–30)

## 2020-08-17 ENCOUNTER — TELEPHONE (OUTPATIENT)
Dept: FAMILY MEDICINE CLINIC | Facility: CLINIC | Age: 50
End: 2020-08-17

## 2020-08-17 ENCOUNTER — TELEMEDICINE (OUTPATIENT)
Dept: FAMILY MEDICINE CLINIC | Facility: CLINIC | Age: 50
End: 2020-08-17
Payer: COMMERCIAL

## 2020-08-17 VITALS — TEMPERATURE: 99.3 F | WEIGHT: 177 LBS | BODY MASS INDEX: 30.38 KG/M2

## 2020-08-17 DIAGNOSIS — Z20.828 EXPOSURE TO SARS-ASSOCIATED CORONAVIRUS: Primary | ICD-10-CM

## 2020-08-17 LAB — RYE IGE QN: NEGATIVE

## 2020-08-17 PROCEDURE — 3078F DIAST BP <80 MM HG: CPT | Performed by: NURSE PRACTITIONER

## 2020-08-17 PROCEDURE — 1036F TOBACCO NON-USER: CPT | Performed by: NURSE PRACTITIONER

## 2020-08-17 PROCEDURE — 99213 OFFICE O/P EST LOW 20 MIN: CPT | Performed by: NURSE PRACTITIONER

## 2020-08-17 PROCEDURE — 3074F SYST BP LT 130 MM HG: CPT | Performed by: NURSE PRACTITIONER

## 2020-08-17 NOTE — ASSESSMENT & PLAN NOTE
To obtain COVID-19 testing now, will call with results  Advised self quarantine for 14 days  Counseled on staying well hydrated, taking small, frequent sips of a sports drinks such as Gatorade and beginning bland diet  May take over-the-counter Tylenol as needed for fever or body aches  Follow-up if symptoms have not improved in 1 week or sooner if symptoms worsen

## 2020-08-17 NOTE — PROGRESS NOTES
Virtual Regular Visit      Assessment/Plan:    Problem List Items Addressed This Visit        Other    Exposure to SARS-associated coronavirus - Primary     To obtain COVID-19 testing now, will call with results  Advised self quarantine for 14 days  Counseled on staying well hydrated, taking small, frequent sips of a sports drinks such as Gatorade and beginning bland diet  May take over-the-counter Tylenol as needed for fever or body aches  Follow-up if symptoms have not improved in 1 week or sooner if symptoms worsen  Relevant Orders    Novel Coronavirus (COVID-19), PCR [facility] - Col[facility] at   [facility] [place] [address] or [facility]               Reason for visit is   Chief Complaint   Patient presents with    Virtual Regular Visit        Encounter provider Darcy Dawson, [facility]    Provider located at [facility] [address] [address] [phone]      Recent Visits  No visits were found meeting these conditions  Showing recent visits within past 7 days and meeting all other requirements     Today's Visits  Date Type Provider Dept   08/17/20 Telephone Darcy Dawson, [address] [place] [address]   08/17/20 [facility], [facility] Inf today's visits and meeting all other requirements     Future Appointments  No visits were found meeting these conditions  Showing future appointments within next 150 days and meeting all other requirements        The patient was identified by name and date of birth  Arie Browne was informed that this is a telemedicine visit and that the visit is being conducted through [employer] and patient was informed that this is not a secure, HIPAA-complaint platform  She agrees to proceed     My office door was closed  No one else was in the room    She acknowledged consent and understanding of privacy and security of the video platform  The patient has agreed to participate and understands they can discontinue the visit at any time  Patient is aware this is a billable service  Subjective  Erick Preston is a 51 y o  female       Maria Teresa Sherman reports developing headache and low-grade fever yesterday  T-max 99 3°  She is also experiencing body aches, epigastric abdominal pain, nausea, and diarrhea  She has had multiple episodes of diarrhea yesterday  Today she has had 4 episodes of diarrhea  Denies sick contacts, cough, shortness of breath, vomiting, blood in stool, sore throat, nasal congestion, or change in smell or taste  Nothing has been used to treat her symptoms         Past Medical History:   Diagnosis Date    Anxiety     Anxiety     Asthma     Bipolar disorder ([facility] [address] )     Carpal tunnel syndrome     Chronic pain     lower back    Depression     Disease of thyroid gland     Dyslipidemia     Fibromyalgia     Irritable bowel     Kidney stones     Kidney stones 2019    Migraine     Psychiatric disorder     depression/anxiety    [facility])     as teen    Vitamin D deficiency        Past Surgical History:   Procedure Laterality Date    BUNIONECTOMY       SECTION      CHOLECYSTECTOMY      PARTIAL HYSTERECTOMY      TONSILLECTOMY      TUBAL LIGATION         Current Outpatient Medications   Medication Sig Dispense Refill    albuterol (2 5 mg/3 mL) 0 083 % nebulizer solution Take 1 vial (2 5 mg total) by nebulization every 6 (six) hours as needed for wheezing or shortness of breath 20 vial 0    albuterol (PROVENTIL HFA,VENTOLIN HFA) 90 mcg/act inhaler Inhale 2 puffs every 6 (six) hours as needed for wheezing 3 Inhaler 2    baclofen 10 mg tablet       betamethasone dipropionate (DIPROSONE) 0 05 % cream APPLY A THIN LAYER TO THE AFFECTED AREA(S) BY TOPICAL ROUTE ONCE DAILY      busPIRone (BUSPAR) 15 mg tablet Take 1 tablet (15 mg total) by mouth 2 (two) times a day 60 tablet 2    celecoxib (CELEBREX) 200 mg capsule Take 1 capsule (200 mg total) by mouth daily 30 capsule 2    clotrimazole-betamethasone (LOTRISONE) 1-0 05 % cream       diclofenac (VOLTAREN) 75 mg EC tablet Take 75 mg by mouth      diclofenac sodium (VOLTAREN) 1 % APPLY 2 GRAM TO THE AFFECTED AREA(S) BY TOPICAL ROUTE 4 TIMES PER DAY      divalproex sodium (DEPAKOTE) 250 mg EC tablet Take 250 mg by mouth daily      fluticasone (FLONASE) 50 mcg/act nasal spray Two sprays both sides of the nose twice a d 1 Bottle 3    fluticasone-vilanterol (BREO ELLIPTA) 100-25 mcg/inh inhaler Inhale 1 puff every 24 hours 3 Inhaler 2    Galcanezumab-gnlm 120 MG/ML SOAJ Inject 120 mg under the skin every 28 days      levothyroxine 50 mcg tablet Take 1 tablet (50 mcg total) by mouth daily 90 tablet 1    lidocaine (LIDODERM) 5 % Place 1 patch on the skin daily as needed for mild pain or moderate pain Remove & Discard patch within 12 hours or as directed by MD 6 patch 0    loratadine (CLARITIN) 10 mg tablet Take 1 pill daily for allergies 90 tablet 1    Misc Natural Products (TURMERIC CURCUMIN) CAPS Take 2,000 capsules by mouth daily      omeprazole (PriLOSEC) 20 mg delayed release capsule Take 20 mg by mouth 2 (two) times a day       ondansetron (ZOFRAN-ODT) 4 mg disintegrating tablet Take 1 tablet (4 mg total) by mouth every 6 (six) hours as needed for nausea or vomiting 20 tablet 0    Respiratory Therapy Supplies (NEBULIZER) DONA by Does not apply route every 4 (four) hours while awake 90 each 1    tobramycin (TOBREX) 0 3 % OINT Administer 0 5 inches to both eyes 3 (three) times a day 1 Tube 0    Topiramate ER (Trokendi XR) 100 MG CP24 Take 100 mg by mouth 2 (two) times a day      Transdermal Patch PTCH Transderm-Scop 1 5 mg transdermal patch (1 mg over 3 days)      Ubrogepant (Ubrelvy) 50 MG TABS       XIFAXAN 550 MG tablet       ziprasidone (GEODON) 20 mg capsule Take 1 cap po qd with food 30 capsule 2     No current facility-administered medications for this visit  Allergies   Allergen Reactions    Doxycycline Rash    Erythromycin Rash    Other Edema and Wheezing     jalapeno    Latex Rash    Eletriptan      Pain in lower jaw with pressure in throat       Review of Systems   Constitutional: Positive for fatigue  HENT: Negative for congestion and sore throat  Respiratory: Negative  Negative for cough, chest tightness and shortness of breath  Cardiovascular: Negative  Gastrointestinal: Positive for abdominal pain, diarrhea and nausea  Negative for abdominal distention, blood in stool, constipation and vomiting  Musculoskeletal: Positive for myalgias  Neurological: Positive for light-headedness and headaches  Psychiatric/Behavioral: Negative  Video Exam    Vitals:    08/17/20 1309   Temp: 99 3 °F (37 4 °C)   Weight: 80 3 kg (177 lb)       Physical Exam  Constitutional:       Appearance: Normal appearance  HENT:      Head: Normocephalic and atraumatic  Mouth/Throat:      Mouth: Mucous membranes are moist    Neck:      Musculoskeletal: Neck supple  Pulmonary:      Effort: Pulmonary effort is normal    Neurological:      Mental Status: She is alert and oriented to person, place, and time  Psychiatric:         Mood and Affect: Mood normal          Behavior: Behavior normal          Thought Content: Thought content normal          Judgment: Judgment normal           I spent 10 minutes directly with the patient during this visit      VIRTUAL VISIT [address] acknowledges that she has consented to an online visit or consultation  She understands that the online visit is based solely on information provided by her, and that, in the absence of a face-to-face physical evaluation by the physician, the diagnosis she receives is both limited and provisional in terms of accuracy and completeness   This is not intended to replace a full medical face-to-face evaluation by the physician  Grace Byers understands and accepts these terms

## 2020-08-18 DIAGNOSIS — Z20.828 EXPOSURE TO SARS-ASSOCIATED CORONAVIRUS: ICD-10-CM

## 2020-08-18 PROCEDURE — U0003 INFECTIOUS AGENT DETECTION BY NUCLEIC ACID (DNA OR RNA); SEVERE ACUTE RESPIRATORY SYNDROME CORONAVIRUS 2 (SARS-COV-2) (CORONAVIRUS DISEASE [COVID-19]), AMPLIFIED PROBE TECHNIQUE, MAKING USE OF HIGH THROUGHPUT TECHNOLOGIES AS DESCRIBED BY CMS-2020-01-R: HCPCS | Performed by: NURSE PRACTITIONER

## 2020-08-19 ENCOUNTER — TELEMEDICINE (OUTPATIENT)
Dept: BEHAVIORAL/MENTAL HEALTH CLINIC | Facility: CLINIC | Age: 50
End: 2020-08-19
Payer: COMMERCIAL

## 2020-08-19 DIAGNOSIS — F41.1 GAD (GENERALIZED ANXIETY DISORDER): Primary | ICD-10-CM

## 2020-08-19 DIAGNOSIS — F31.81 BIPOLAR 2 DISORDER (HCC): ICD-10-CM

## 2020-08-19 LAB — SARS-COV-2 RNA SPEC QL NAA+PROBE: NOT DETECTED

## 2020-08-19 PROCEDURE — 1036F TOBACCO NON-USER: CPT | Performed by: SOCIAL WORKER

## 2020-08-19 PROCEDURE — 90834 PSYTX W PT 45 MINUTES: CPT | Performed by: SOCIAL WORKER
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
## 2020-08-19 NOTE — BH TREATMENT PLAN
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 with her 3 adult dysfunctional children  Target Date: 12/19/2020  Completion Date: TBD         Short Term Objectives for Goal 1: Will look at situations and will discuss in therapy appropriate boundaries that need to be set and adhered to  Long Term Goal 2: Colonel Motta continues to work on skills that will help her keep her depression and her anxiety to a minimum level  Target Date: 12/19/2020  Completion Date: TBD    Short Term Objectives for Goal 2: Mindfulness and cognitive behavioral strategies         Long Term Goal # 3: N/A     Target Date: N/A  Completion Date: N/A    Short Term Objectives for Goal 3: N/A    GOAL 1: Modality: Individual 2x per month   Completion Date TBD    GOAL 2: Modality: Individual 2x per month   Completion Date tbd     GOAL 3: Modality: Individual n/ax per month   Completion Date n/a      Behavioral Health Treatment Plan [facility]: Diagnosis and Treatment Plan explained to Odalis Bernal relates understanding diagnosis and is agreeable to Treatment Plan  Client Comments : Please share your thoughts, feelings, need and/or experiences regarding your treatment plan: Colonel Motta and the undersigned will work as a team t help her progress on her goals

## 2020-08-20 ENCOUNTER — TELEPHONE (OUTPATIENT)
Dept: FAMILY MEDICINE CLINIC | Facility: CLINIC | Age: 50
End: 2020-08-20

## 2020-08-20 NOTE — TELEPHONE ENCOUNTER
Spoke with patient and informed the patient of lab results, which the patient understood and has no further questions at this time

## 2020-08-26 DIAGNOSIS — F31.81 BIPOLAR 2 DISORDER (HCC): ICD-10-CM

## 2020-08-26 RX ORDER — ZIPRASIDONE HYDROCHLORIDE 20 MG/1
CAPSULE ORAL
Qty: 30 CAPSULE | Refills: 0 | Status: SHIPPED | OUTPATIENT
Start: 2020-08-26 | End: 2020-09-14

## 2020-08-27 ENCOUNTER — APPOINTMENT (OUTPATIENT)
Dept: LAB | Facility: HOSPITAL | Age: 50
End: 2020-08-27
Payer: COMMERCIAL

## 2020-08-27 ENCOUNTER — TRANSCRIBE ORDERS (OUTPATIENT)
Dept: ADMINISTRATIVE | Facility: HOSPITAL | Age: 50
End: 2020-08-27

## 2020-08-27 DIAGNOSIS — R19.7 DIARRHEA, UNSPECIFIED TYPE: Primary | ICD-10-CM

## 2020-08-27 DIAGNOSIS — R19.7 DIARRHEA, UNSPECIFIED TYPE: ICD-10-CM

## 2020-08-27 LAB
ALBUMIN SERPL BCP-MCNC: 3.9 G/DL (ref 3.5–5)
ALP SERPL-CCNC: 66 U/L (ref 46–116)
ALT SERPL W P-5'-P-CCNC: 18 U/L (ref 12–78)
ANION GAP SERPL CALCULATED.3IONS-SCNC: 10 MMOL/L (ref 4–13)
AST SERPL W P-5'-P-CCNC: 16 U/L (ref 5–45)
BASOPHILS # BLD AUTO: 0.01 THOUSANDS/ΜL (ref 0–0.1)
BASOPHILS NFR BLD AUTO: 0 % (ref 0–1)
BILIRUB SERPL-MCNC: 0.2 MG/DL (ref 0.2–1)
BUN SERPL-MCNC: 18 MG/DL (ref 5–25)
CALCIUM SERPL-MCNC: 8.8 MG/DL (ref 8.3–10.1)
CHLORIDE SERPL-SCNC: 107 MMOL/L (ref 100–108)
CO2 SERPL-SCNC: 24 MMOL/L (ref 21–32)
CREAT SERPL-MCNC: 0.92 MG/DL (ref 0.6–1.3)
CRP SERPL QL: <3 MG/L
EOSINOPHIL # BLD AUTO: 0.05 THOUSAND/ΜL (ref 0–0.61)
EOSINOPHIL NFR BLD AUTO: 1 % (ref 0–6)
ERYTHROCYTE [DISTWIDTH] IN BLOOD BY AUTOMATED COUNT: 13 % (ref 11.6–15.1)
ERYTHROCYTE [SEDIMENTATION RATE] IN BLOOD: 3 MM/HOUR (ref 0–19)
GFR SERPL CREATININE-BSD FRML MDRD: 74 ML/MIN/1.73SQ M
GLUCOSE SERPL-MCNC: 87 MG/DL (ref 65–140)
HCT VFR BLD AUTO: 39.4 % (ref 34.8–46.1)
HGB BLD-MCNC: 12.8 G/DL (ref 11.5–15.4)
IMM GRANULOCYTES # BLD AUTO: 0.01 THOUSAND/UL (ref 0–0.2)
IMM GRANULOCYTES NFR BLD AUTO: 0 % (ref 0–2)
LYMPHOCYTES # BLD AUTO: 2.18 THOUSANDS/ΜL (ref 0.6–4.47)
LYMPHOCYTES NFR BLD AUTO: 37 % (ref 14–44)
MCH RBC QN AUTO: 29.4 PG (ref 26.8–34.3)
MCHC RBC AUTO-ENTMCNC: 32.5 G/DL (ref 31.4–37.4)
MCV RBC AUTO: 90 FL (ref 82–98)
MONOCYTES # BLD AUTO: 0.42 THOUSAND/ΜL (ref 0.17–1.22)
MONOCYTES NFR BLD AUTO: 7 % (ref 4–12)
NEUTROPHILS # BLD AUTO: 3.29 THOUSANDS/ΜL (ref 1.85–7.62)
NEUTS SEG NFR BLD AUTO: 55 % (ref 43–75)
NRBC BLD AUTO-RTO: 0 /100 WBCS
PLATELET # BLD AUTO: 255 THOUSANDS/UL (ref 149–390)
PMV BLD AUTO: 10.2 FL (ref 8.9–12.7)
POTASSIUM SERPL-SCNC: 4.4 MMOL/L (ref 3.5–5.3)
PROT SERPL-MCNC: 7.4 G/DL (ref 6.4–8.2)
RBC # BLD AUTO: 4.36 MILLION/UL (ref 3.81–5.12)
SODIUM SERPL-SCNC: 141 MMOL/L (ref 136–145)
WBC # BLD AUTO: 5.96 THOUSAND/UL (ref 4.31–10.16)

## 2020-08-27 PROCEDURE — 36415 COLL VENOUS BLD VENIPUNCTURE: CPT

## 2020-08-27 PROCEDURE — 85025 COMPLETE CBC W/AUTO DIFF WBC: CPT

## 2020-08-27 PROCEDURE — 80053 COMPREHEN METABOLIC PANEL: CPT

## 2020-08-27 PROCEDURE — 86140 C-REACTIVE PROTEIN: CPT

## 2020-08-27 PROCEDURE — 85652 RBC SED RATE AUTOMATED: CPT

## 2020-08-28 ENCOUNTER — TRANSCRIBE ORDERS (OUTPATIENT)
Dept: ADMINISTRATIVE | Facility: HOSPITAL | Age: 50
End: 2020-08-28

## 2020-08-28 ENCOUNTER — APPOINTMENT (OUTPATIENT)
Dept: LAB | Facility: HOSPITAL | Age: 50
End: 2020-08-28
Payer: COMMERCIAL

## 2020-08-28 DIAGNOSIS — R10.9 ABDOMINAL PAIN, UNSPECIFIED ABDOMINAL LOCATION: ICD-10-CM

## 2020-08-28 DIAGNOSIS — R19.7 DIARRHEA, UNSPECIFIED TYPE: ICD-10-CM

## 2020-08-28 DIAGNOSIS — R14.0 BLOATING: ICD-10-CM

## 2020-08-28 DIAGNOSIS — R19.7 DIARRHEA, UNSPECIFIED TYPE: Primary | ICD-10-CM

## 2020-08-28 PROCEDURE — 87045 FECES CULTURE AEROBIC BACT: CPT

## 2020-08-28 PROCEDURE — 87209 SMEAR COMPLEX STAIN: CPT

## 2020-08-28 PROCEDURE — 87427 SHIGA-LIKE TOXIN AG IA: CPT

## 2020-08-28 PROCEDURE — 87177 OVA AND PARASITES SMEARS: CPT

## 2020-08-28 PROCEDURE — 87046 STOOL CULTR AEROBIC BACT EA: CPT

## 2020-08-31 ENCOUNTER — APPOINTMENT (OUTPATIENT)
Dept: LAB | Facility: HOSPITAL | Age: 50
End: 2020-08-31
Payer: COMMERCIAL

## 2020-08-31 PROCEDURE — 87338 HPYLORI STOOL AG IA: CPT

## 2020-08-31 PROCEDURE — 87493 C DIFF AMPLIFIED PROBE: CPT

## 2020-09-01 LAB
C DIFF TOX GENS STL QL NAA+PROBE: NEGATIVE
H PYLORI AG STL QL IA: NEGATIVE
O+P STL CONC: NORMAL

## 2020-09-02 ENCOUNTER — TELEMEDICINE (OUTPATIENT)
Dept: BEHAVIORAL/MENTAL HEALTH CLINIC | Facility: CLINIC | Age: 50
End: 2020-09-02

## 2020-09-02 DIAGNOSIS — F31.81 BIPOLAR 2 DISORDER (HCC): ICD-10-CM

## 2020-09-02 DIAGNOSIS — F41.1 GAD (GENERALIZED ANXIETY DISORDER): ICD-10-CM

## 2020-09-02 NOTE — PSYCH
Virtual Regular Visit      Assessment/Plan:    Problem List Items Addressed This Visit        Other    ZAINA (generalized anxiety disorder)    Bipolar 2 disorder ([facility] [address] )               Reason for visit is      Encounter provider Ravinder Kinsey    Provider located at [address] [facility]  [address]  [facility] [address]      Recent Visits  No visits were found meeting these conditions  Showing recent visits within past 7 days and meeting all other requirements     Today's Visits  Date Type Provider Dept   [address] [facility]   Showing today's visits and meeting all other requirements     Future Appointments  No visits were found meeting these conditions  Showing future appointments within next 150 days and meeting all other requirements        The patient was identified by name and date of birth  Grace Byers was informed that this is a telemedicine visit and that the visit is being conducted through [employer]  My office door was closed  No one else was in the room  She acknowledged consent and understanding of privacy and security of the video platform  The patient has agreed to participate and understands they can discontinue the visit at any time  Patient is aware this is a billable service  Subjective  Grace Byers is a 51 y o  female          HPI     Past Medical History:   Diagnosis Date    Anxiety     Anxiety     Asthma     Bipolar disorder ([facility] [address] )     Carpal tunnel syndrome     Chronic pain     lower back    Depression     Disease of thyroid gland     Dyslipidemia     Fibromyalgia     Irritable bowel     Kidney stones     Kidney stones 2019    Migraine     Psychiatric disorder     depression/anxiety    Suicide attempt [facility])     as teen    Vitamin D deficiency        Past Surgical History:   Procedure Laterality Date    BUNIONECTOMY       SECTION      CHOLECYSTECTOMY      PARTIAL HYSTERECTOMY      TONSILLECTOMY      TUBAL LIGATION         Current Outpatient Medications   Medication Sig Dispense Refill    albuterol (2 5 mg/3 mL) 0 083 % nebulizer solution Take 1 vial (2 5 mg total) by nebulization every 6 (six) hours as needed for wheezing or shortness of breath 20 vial 0    albuterol (PROVENTIL HFA,VENTOLIN HFA) 90 mcg/act inhaler Inhale 2 puffs every 6 (six) hours as needed for wheezing 3 Inhaler 2    baclofen 10 mg tablet       betamethasone dipropionate (DIPROSONE) 0 05 % cream APPLY A THIN LAYER TO THE AFFECTED AREA(S) BY TOPICAL ROUTE ONCE DAILY      busPIRone (BUSPAR) 15 mg tablet Take 1 tablet (15 mg total) by mouth 2 (two) times a day 60 tablet 2    celecoxib (CELEBREX) 200 mg capsule Take 1 capsule (200 mg total) by mouth daily 30 capsule 2    clotrimazole-betamethasone (LOTRISONE) 1-0 05 % cream       diclofenac (VOLTAREN) 75 mg EC tablet Take 75 mg by mouth      diclofenac sodium (VOLTAREN) 1 % APPLY 2 GRAM TO THE AFFECTED AREA(S) BY TOPICAL ROUTE 4 TIMES PER DAY      divalproex sodium (DEPAKOTE) 250 mg EC tablet Take 250 mg by mouth daily      fluticasone (FLONASE) 50 mcg/act nasal spray Two sprays both sides of the nose twice a d 1 Bottle 3    fluticasone-vilanterol (BREO ELLIPTA) 100-25 mcg/inh inhaler Inhale 1 puff every 24 hours 3 Inhaler 2    Galcanezumab-gnlm 120 MG/ML SOAJ Inject 120 mg under the skin every 28 days      levothyroxine 50 mcg tablet Take 1 tablet (50 mcg total) by mouth daily 90 tablet 1    lidocaine (LIDODERM) 5 % Place 1 patch on the skin daily as needed for mild pain or moderate pain Remove & Discard patch within 12 hours or as directed by MD 6 patch 0    loratadine (CLARITIN) 10 mg tablet Take 1 pill daily for allergies 90 tablet 1    Misc Natural Products (TURMERIC CURCUMIN) CAPS Take 2,000 capsules by mouth daily      omeprazole (PriLOSEC) 20 mg delayed release capsule Take 20 mg by mouth 2 (two) times a day       ondansetron (ZOFRAN-ODT) 4 mg disintegrating tablet Take 1 tablet (4 mg total) by mouth every 6 (six) hours as needed for nausea or vomiting 20 tablet 0    Respiratory Therapy Supplies (NEBULIZER) DONA by Does not apply route every 4 (four) hours while awake 90 each 1    tobramycin (TOBREX) 0 3 % OINT Administer 0 5 inches to both eyes 3 (three) times a day 1 Tube 0    Topiramate ER (Trokendi XR) 100 MG CP24 Take 100 mg by mouth 2 (two) times a day      Transdermal Patch PTCH Transderm-Scop 1 5 mg transdermal patch (1 mg over 3 days)      Ubrogepant (Ubrelvy) 50 MG TABS       XIFAXAN 550 MG tablet       ziprasidone (GEODON) 20 mg capsule TAKE TAKE ONE CAPSULE BY MOUTH DAILY WITH FOOD 30 capsule 0     No current facility-administered medications for this visit  Allergies   Allergen Reactions    Doxycycline Rash    Erythromycin Rash    Other Edema and Wheezing     jalapeno    Latex Rash    Eletriptan      Pain in lower jaw with pressure in throat       Review of Systems Data:Could not reach client Tanya White for her virtual visit  This is a no show    Video Exam    There were no vitals filed for this visit  Physical Exam N/A    I spent 0 minutes directly with the patient during this visit due to no show  VIRTUAL VISIT DISCLAIMER    Kady Wu acknowledges that she has consented to an online visit or consultation  She understands that the online visit is based solely on information provided by her, and that, in the absence of a face-to-face physical evaluation by the physician, the diagnosis she receives is both limited and provisional in terms of accuracy and completeness  This is not intended to replace a full medical face-to-face evaluation by the physician  Kady Wu understands and accepts these terms

## 2020-09-03 ENCOUNTER — TELEMEDICINE (OUTPATIENT)
Dept: BEHAVIORAL/MENTAL HEALTH CLINIC | Facility: CLINIC | Age: 50
End: 2020-09-03
Payer: COMMERCIAL

## 2020-09-03 DIAGNOSIS — F31.81 BIPOLAR 2 DISORDER (HCC): ICD-10-CM

## 2020-09-03 DIAGNOSIS — F41.1 GAD (GENERALIZED ANXIETY DISORDER): Primary | ICD-10-CM

## 2020-09-03 PROCEDURE — 90834 PSYTX W PT 45 MINUTES: CPT | Performed by: SOCIAL WORKER

## 2020-09-03 NOTE — PSYCH
Virtual Regular Visit      Assessment/Plan:    Problem List Items Addressed This Visit        Other    ZAINA (generalized anxiety disorder) - Primary    Bipolar 2 disorder ([address] )               Reason for visit is due to the covid 19 pandemic     Encounter provider Maryann Winters    Provider located at [address] [facility]  [address]  [facility] [id number]      Recent Visits  No visits were found meeting these conditions  Showing recent visits within past 7 days and meeting all other requirements     Today's Visits  Date Type Provider Dept   09/03/20 [address] [facility] Therapist   Showing today's visits and meeting all other requirements     Future Appointments  No visits were found meeting these conditions  Showing future appointments within next 150 days and meeting all other requirements        The patient was identified by name and date of birth  Keara Koch was informed that this is a telemedicine visit and that the visit is being conducted through [employer]  My office door was closed  No one else was in the room  She acknowledged consent and understanding of privacy and security of the video platform  The patient has agreed to participate and understands they can discontinue the visit at any time  Patient is aware this is a billable service  Subjective  Keara Koch is a 51 y o  female          HPI     Past Medical History:   Diagnosis Date    Anxiety     Anxiety     Asthma     Bipolar disorder ([facility] )     Carpal tunnel syndrome     Chronic pain     lower back    Depression     Disease of thyroid gland     Dyslipidemia     Fibromyalgia     Irritable bowel     Kidney stones     Kidney stones 5/20/2019    Migraine     Psychiatric disorder     depression/anxiety    Suicide attempt [facility])     as teen    Vitamin D deficiency        Past Surgical History:   Procedure Laterality Date    BUNIONECTOMY       SECTION      CHOLECYSTECTOMY      PARTIAL HYSTERECTOMY      TONSILLECTOMY      TUBAL LIGATION         Current Outpatient Medications   Medication Sig Dispense Refill    albuterol (2 5 mg/3 mL) 0 083 % nebulizer solution Take 1 vial (2 5 mg total) by nebulization every 6 (six) hours as needed for wheezing or shortness of breath 20 vial 0    albuterol (PROVENTIL HFA,VENTOLIN HFA) 90 mcg/act inhaler Inhale 2 puffs every 6 (six) hours as needed for wheezing 3 Inhaler 2    baclofen 10 mg tablet       betamethasone dipropionate (DIPROSONE) 0 05 % cream APPLY A THIN LAYER TO THE AFFECTED AREA(S) BY TOPICAL ROUTE ONCE DAILY      busPIRone (BUSPAR) 15 mg tablet Take 1 tablet (15 mg total) by mouth 2 (two) times a day 60 tablet 2    celecoxib (CELEBREX) 200 mg capsule Take 1 capsule (200 mg total) by mouth daily 30 capsule 2    clotrimazole-betamethasone (LOTRISONE) 1-0 05 % cream       diclofenac (VOLTAREN) 75 mg EC tablet Take 75 mg by mouth      diclofenac sodium (VOLTAREN) 1 % APPLY 2 GRAM TO THE AFFECTED AREA(S) BY TOPICAL ROUTE 4 TIMES PER DAY      divalproex sodium (DEPAKOTE) 250 mg EC tablet Take 250 mg by mouth daily      fluticasone (FLONASE) 50 mcg/act nasal spray Two sprays both sides of the nose twice a d 1 Bottle 3    fluticasone-vilanterol (BREO ELLIPTA) 100-25 mcg/inh inhaler Inhale 1 puff every 24 hours 3 Inhaler 2    Galcanezumab-gnlm 120 MG/ML SOAJ Inject 120 mg under the skin every 28 days      levothyroxine 50 mcg tablet Take 1 tablet (50 mcg total) by mouth daily 90 tablet 1    lidocaine (LIDODERM) 5 % Place 1 patch on the skin daily as needed for mild pain or moderate pain Remove & Discard patch within 12 hours or as directed by MD 6 patch 0    loratadine (CLARITIN) 10 mg tablet Take 1 pill daily for allergies 90 tablet 1    Misc Natural Products (TURMERIC CURCUMIN) CAPS Take 2,000 capsules by mouth daily      omeprazole (PriLOSEC) 20 mg delayed release capsule Take 20 mg by mouth 2 (two) times a day       ondansetron (ZOFRAN-ODT) 4 mg disintegrating tablet Take 1 tablet (4 mg total) by mouth every 6 (six) hours as needed for nausea or vomiting 20 tablet 0    Respiratory Therapy Supplies (NEBULIZER) DONA by Does not apply route every 4 (four) hours while awake 90 each 1    tobramycin (TOBREX) 0 3 % OINT Administer 0 5 inches to both eyes 3 (three) times a day 1 Tube 0    Topiramate ER (Trokendi XR) 100 MG CP24 Take 100 mg by mouth 2 (two) times a day      Transdermal Patch PTCH Transderm-Scop 1 5 mg transdermal patch (1 mg over 3 days)      Ubrogepant (Ubrelvy) 50 MG TABS       XIFAXAN 550 MG tablet       ziprasidone (GEODON) 20 mg capsule TAKE TAKE ONE CAPSULE BY MOUTH DAILY WITH FOOD 30 capsule 0     No current facility-administered medications for this visit  Allergies   Allergen Reactions    Doxycycline Rash    Erythromycin Rash    Other Edema and Wheezing     jalapeno    Latex Rash    Eletriptan      Pain in lower jaw with pressure in throat       Review of Systems Data: Virtually met with Tanya White  She has custody of her grandson but in the papers she is to have 3 video sessions with her daughter and son in law  Weekly with their son she is raising  However the son in law has her blocked and the daughter often does not answer  We discussed she needs to document all of this  Her  who is helping her with her social security is not being responsible with getting her paperwork filed on time or appropriately  Her son is currently being difficult  She is raising her special needs autistic grandson  Tanya White discussed how her adult children are so ungrateful and irresponsible after everything Tanya White has done for them  She admits this all causes her anger and makes her sarcastic about life in general Assessment: Tanya White denies suicidal/homicidal ideation, plan or intent   However she is depressed, angry, resentful and frustrated with the choices and ungratefullness of her adult children  We discussed her goals of drawing and sticking to her boundaries with her dysfunctional adult children and we discussed strategies to help her manage her depression and her anxiety  We also worked on her learning to let go of things she does not own  We worked on frustration tolerance  Plan: Continue to help her progress on her goals  Video Exam    There were no vitals filed for this visit  Physical Exam N/A    I spent 45 minutes directly with the patient during this visit      VIRTUAL VISIT [address] acknowledges that she has consented to an online visit or consultation  She understands that the online visit is based solely on information provided by her, and that, in the absence of a face-to-face physical evaluation by the physician, the diagnosis she receives is both limited and provisional in terms of accuracy and completeness  This is not intended to replace a full medical face-to-face evaluation by the physician  Roni Trevino understands and accepts these terms

## 2020-09-08 LAB — MISCELLANEOUS LAB TEST RESULT: NORMAL
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
## 2020-09-14 ENCOUNTER — TELEMEDICINE (OUTPATIENT)
Dept: PSYCHIATRY | Facility: CLINIC | Age: 50
End: 2020-09-14
Payer: COMMERCIAL

## 2020-09-14 DIAGNOSIS — F41.1 GAD (GENERALIZED ANXIETY DISORDER): ICD-10-CM

## 2020-09-14 DIAGNOSIS — E55.9 VITAMIN D DEFICIENCY: ICD-10-CM

## 2020-09-14 DIAGNOSIS — G43.719 INTRACTABLE CHRONIC MIGRAINE WITHOUT AURA AND WITHOUT STATUS MIGRAINOSUS: ICD-10-CM

## 2020-09-14 DIAGNOSIS — F31.81 BIPOLAR 2 DISORDER (HCC): Primary | ICD-10-CM

## 2020-09-14 DIAGNOSIS — E03.9 HYPOTHYROIDISM, UNSPECIFIED TYPE: ICD-10-CM

## 2020-09-14 PROCEDURE — 99214 OFFICE O/P EST MOD 30 MIN: CPT | Performed by: PHYSICIAN ASSISTANT

## 2020-09-14 RX ORDER — BUSPIRONE HYDROCHLORIDE 15 MG/1
15 TABLET ORAL 3 TIMES DAILY
Qty: 90 TABLET | Refills: 2 | Status: SHIPPED | OUTPATIENT
Start: 2020-09-14 | End: 2020-12-02

## 2020-09-14 RX ORDER — ZIPRASIDONE HYDROCHLORIDE 40 MG/1
40 CAPSULE ORAL
Qty: 30 CAPSULE | Refills: 2 | Status: SHIPPED | OUTPATIENT
Start: 2020-09-14 | End: 2020-11-13 | Stop reason: ALTCHOICE

## 2020-09-17 DIAGNOSIS — Z20.828 EXPOSURE TO SARS-ASSOCIATED CORONAVIRUS: Primary | ICD-10-CM

## 2020-09-18 DIAGNOSIS — Z20.828 EXPOSURE TO SARS-ASSOCIATED CORONAVIRUS: ICD-10-CM

## 2020-09-18 PROCEDURE — U0003 INFECTIOUS AGENT DETECTION BY NUCLEIC ACID (DNA OR RNA); SEVERE ACUTE RESPIRATORY SYNDROME CORONAVIRUS 2 (SARS-COV-2) (CORONAVIRUS DISEASE [COVID-19]), AMPLIFIED PROBE TECHNIQUE, MAKING USE OF HIGH THROUGHPUT TECHNOLOGIES AS DESCRIBED BY CMS-2020-01-R: HCPCS | Performed by: FAMILY MEDICINE

## 2020-09-19 LAB — SARS-COV-2 RNA SPEC QL NAA+PROBE: NOT DETECTED

## 2020-09-22 ENCOUNTER — TELEMEDICINE (OUTPATIENT)
Dept: BEHAVIORAL/MENTAL HEALTH CLINIC | Facility: CLINIC | Age: 50
End: 2020-09-22
Payer: COMMERCIAL

## 2020-09-22 DIAGNOSIS — F41.1 GAD (GENERALIZED ANXIETY DISORDER): Primary | ICD-10-CM

## 2020-09-22 DIAGNOSIS — F31.81 BIPOLAR 2 DISORDER (HCC): ICD-10-CM

## 2020-09-22 PROCEDURE — 90834 PSYTX W PT 45 MINUTES: CPT | Performed by: SOCIAL WORKER
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
## 2020-09-29 ENCOUNTER — TELEPHONE (OUTPATIENT)
Dept: FAMILY MEDICINE CLINIC | Facility: CLINIC | Age: 50
End: 2020-09-29

## 2020-09-29 DIAGNOSIS — F31.81 BIPOLAR 2 DISORDER (HCC): ICD-10-CM

## 2020-09-29 DIAGNOSIS — F41.1 GAD (GENERALIZED ANXIETY DISORDER): ICD-10-CM

## 2020-09-29 DIAGNOSIS — F31.32 BIPOLAR AFFECTIVE DISORDER, CURRENTLY DEPRESSED, MODERATE (HCC): ICD-10-CM

## 2020-09-29 RX ORDER — ZIPRASIDONE HYDROCHLORIDE 20 MG/1
CAPSULE ORAL
Qty: 30 CAPSULE | Refills: 1 | OUTPATIENT
Start: 2020-09-29

## 2020-09-29 NOTE — TELEPHONE ENCOUNTER
Called pt to make away the test she is referring to was a stool specimen which came back normal        Regarding: FW: Test Results Question  Contact: [phone]   Can call Tanya White up and find out what she is talking about  ----- Message -----  From: Isabel Bradshaw MA  Sent: 9/18/2020   3:21 PM EDT  To: ERICKA Saavedra  Subject: Test Results Question                            ----- Message from Isabel Bradshaw MA sent at 9/18/2020  3:21 PM EDT -----       ----- Message from Jannie Reynolds to Naila Rosa Meneses sent at 9/18/2020  3:00 PM -----   I have a question about MISCELLANEOUS LAB TEST resulted on 9/8/20 at 10:43 AM     I don't know where to access this

## 2020-09-30 RX ORDER — LURASIDONE HYDROCHLORIDE 40 MG/1
TABLET, FILM COATED ORAL
Qty: 30 TABLET | Refills: 2 | OUTPATIENT
Start: 2020-09-30

## 2020-09-30 RX ORDER — BUSPIRONE HYDROCHLORIDE 10 MG/1
TABLET ORAL
Qty: 90 TABLET | Refills: 1 | OUTPATIENT
Start: 2020-09-30

## 2020-10-02 DIAGNOSIS — T78.40XA ALLERGY, INITIAL ENCOUNTER: ICD-10-CM

## 2020-10-02 RX ORDER — FLUTICASONE PROPIONATE 50 MCG
SPRAY, SUSPENSION (ML) NASAL
Qty: 1 BOTTLE | Refills: 2 | Status: SHIPPED | OUTPATIENT
Start: 2020-10-02 | End: 2021-05-06 | Stop reason: ALTCHOICE

## 2020-10-19 ENCOUNTER — OFFICE VISIT (OUTPATIENT)
Dept: FAMILY MEDICINE CLINIC | Facility: CLINIC | Age: 50
End: 2020-10-19
Payer: COMMERCIAL

## 2020-10-19 DIAGNOSIS — J06.9 VIRAL UPPER RESPIRATORY TRACT INFECTION: Primary | ICD-10-CM

## 2020-10-19 PROCEDURE — 1036F TOBACCO NON-USER: CPT | Performed by: FAMILY MEDICINE

## 2020-10-19 PROCEDURE — 99213 OFFICE O/P EST LOW 20 MIN: CPT | Performed by: FAMILY MEDICINE

## 2020-10-21 DIAGNOSIS — E03.9 ACQUIRED HYPOTHYROIDISM: ICD-10-CM

## 2020-10-21 RX ORDER — LEVOTHYROXINE SODIUM 0.05 MG/1
50 TABLET ORAL DAILY
Qty: 90 TABLET | Refills: 0 | Status: SHIPPED | OUTPATIENT
Start: 2020-10-21 | End: 2020-12-27 | Stop reason: SDUPTHER

## 2020-10-23 ENCOUNTER — TELEMEDICINE (OUTPATIENT)
Dept: BEHAVIORAL/MENTAL HEALTH CLINIC | Facility: CLINIC | Age: 50
End: 2020-10-23
Payer: COMMERCIAL

## 2020-10-23 DIAGNOSIS — F41.1 GAD (GENERALIZED ANXIETY DISORDER): Primary | ICD-10-CM

## 2020-10-23 DIAGNOSIS — F31.81 BIPOLAR 2 DISORDER (HCC): ICD-10-CM

## 2020-10-23 PROCEDURE — 90834 PSYTX W PT 45 MINUTES: CPT | Performed by: SOCIAL WORKER

## 2020-11-05 DIAGNOSIS — F31.81 BIPOLAR 2 DISORDER (HCC): ICD-10-CM

## 2020-11-05 RX ORDER — ZIPRASIDONE HYDROCHLORIDE 40 MG/1
CAPSULE ORAL
Qty: 30 CAPSULE | Refills: 1 | OUTPATIENT
Start: 2020-11-05

## 2020-11-10 ENCOUNTER — TELEPHONE (OUTPATIENT)
Dept: BARIATRICS | Facility: CLINIC | Age: 50
End: 2020-11-10

## 2020-11-11 ENCOUNTER — TELEPHONE (OUTPATIENT)
Dept: BARIATRICS | Facility: CLINIC | Age: 50
End: 2020-11-11

## 2020-11-12 ENCOUNTER — TELEMEDICINE (OUTPATIENT)
Dept: BEHAVIORAL/MENTAL HEALTH CLINIC | Facility: CLINIC | Age: 50
End: 2020-11-12
Payer: COMMERCIAL

## 2020-11-12 DIAGNOSIS — F41.1 GAD (GENERALIZED ANXIETY DISORDER): ICD-10-CM

## 2020-11-12 DIAGNOSIS — F31.81 BIPOLAR 2 DISORDER (HCC): ICD-10-CM

## 2020-11-12 PROCEDURE — 90834 PSYTX W PT 45 MINUTES: CPT | Performed by: SOCIAL WORKER

## 2020-11-13 ENCOUNTER — TELEMEDICINE (OUTPATIENT)
Dept: BARIATRICS | Facility: CLINIC | Age: 50
End: 2020-11-13
Payer: COMMERCIAL

## 2020-11-13 VITALS — HEIGHT: 64 IN | WEIGHT: 187.4 LBS | BODY MASS INDEX: 31.99 KG/M2

## 2020-11-13 DIAGNOSIS — J45.40 MODERATE PERSISTENT ASTHMA WITHOUT COMPLICATION: ICD-10-CM

## 2020-11-13 DIAGNOSIS — E66.9 OBESITY (BMI 30.0-34.9): Primary | ICD-10-CM

## 2020-11-13 DIAGNOSIS — F31.81 BIPOLAR 2 DISORDER (HCC): ICD-10-CM

## 2020-11-13 DIAGNOSIS — G43.909 MIGRAINE WITHOUT STATUS MIGRAINOSUS, NOT INTRACTABLE: ICD-10-CM

## 2020-11-13 DIAGNOSIS — E03.9 HYPOTHYROIDISM: ICD-10-CM

## 2020-11-13 PROBLEM — E66.811 OBESITY (BMI 30.0-34.9): Status: ACTIVE | Noted: 2019-05-20

## 2020-11-13 PROCEDURE — 99214 OFFICE O/P EST MOD 30 MIN: CPT | Performed by: PHYSICIAN ASSISTANT

## 2020-11-13 PROCEDURE — 3008F BODY MASS INDEX DOCD: CPT | Performed by: PHYSICIAN ASSISTANT

## 2020-11-13 PROCEDURE — 1036F TOBACCO NON-USER: CPT | Performed by: PHYSICIAN ASSISTANT

## 2020-11-13 RX ORDER — NARATRIPTAN 1 MG/1
1 TABLET ORAL EVERY 4 HOURS PRN
COMMUNITY
End: 2022-05-16 | Stop reason: ALTCHOICE

## 2020-11-18 ENCOUNTER — TELEPHONE (OUTPATIENT)
Dept: PSYCHIATRY | Facility: CLINIC | Age: 50
End: 2020-11-18

## 2020-11-18 DIAGNOSIS — F31.30 BIPOLAR AFFECTIVE DISORDER, CURRENT EPISODE DEPRESSED, CURRENT EPISODE SEVERITY UNSPECIFIED (HCC): ICD-10-CM

## 2020-11-19 RX ORDER — LURASIDONE HYDROCHLORIDE 20 MG/1
TABLET, FILM COATED ORAL
Qty: 30 TABLET | Refills: 1 | OUTPATIENT
Start: 2020-11-19

## 2020-11-20 ENCOUNTER — TELEMEDICINE (OUTPATIENT)
Dept: BEHAVIORAL/MENTAL HEALTH CLINIC | Facility: CLINIC | Age: 50
End: 2020-11-20
Payer: COMMERCIAL

## 2020-11-20 DIAGNOSIS — F31.81 BIPOLAR 2 DISORDER (HCC): ICD-10-CM

## 2020-11-20 DIAGNOSIS — F41.1 GAD (GENERALIZED ANXIETY DISORDER): ICD-10-CM

## 2020-11-20 PROCEDURE — 90834 PSYTX W PT 45 MINUTES: CPT | Performed by: SOCIAL WORKER

## 2020-11-20 RX ORDER — BUSPIRONE HYDROCHLORIDE 15 MG/1
15 TABLET ORAL 3 TIMES DAILY
Qty: 90 TABLET | Refills: 2 | OUTPATIENT
Start: 2020-11-20 | End: 2020-12-20

## 2020-12-02 ENCOUNTER — TELEMEDICINE (OUTPATIENT)
Dept: FAMILY MEDICINE CLINIC | Facility: CLINIC | Age: 50
End: 2020-12-02
Payer: COMMERCIAL

## 2020-12-02 DIAGNOSIS — R11.0 NAUSEA: Primary | ICD-10-CM

## 2020-12-02 DIAGNOSIS — F41.1 GAD (GENERALIZED ANXIETY DISORDER): ICD-10-CM

## 2020-12-02 PROCEDURE — 1036F TOBACCO NON-USER: CPT | Performed by: FAMILY MEDICINE

## 2020-12-02 PROCEDURE — 99214 OFFICE O/P EST MOD 30 MIN: CPT | Performed by: FAMILY MEDICINE

## 2020-12-02 RX ORDER — BUSPIRONE HYDROCHLORIDE 15 MG/1
TABLET ORAL
Qty: 60 TABLET | Refills: 1 | Status: SHIPPED | OUTPATIENT
Start: 2020-12-02 | End: 2020-12-16 | Stop reason: SDUPTHER

## 2020-12-02 RX ORDER — ONDANSETRON 4 MG/1
TABLET, ORALLY DISINTEGRATING ORAL
Qty: 60 TABLET | Refills: 1 | Status: SHIPPED | OUTPATIENT
Start: 2020-12-02 | End: 2021-01-26

## 2020-12-04 ENCOUNTER — TELEMEDICINE (OUTPATIENT)
Dept: BEHAVIORAL/MENTAL HEALTH CLINIC | Facility: CLINIC | Age: 50
End: 2020-12-04
Payer: COMMERCIAL

## 2020-12-04 DIAGNOSIS — F31.81 BIPOLAR 2 DISORDER (HCC): ICD-10-CM

## 2020-12-04 DIAGNOSIS — F41.1 GAD (GENERALIZED ANXIETY DISORDER): ICD-10-CM

## 2020-12-04 PROCEDURE — 90834 PSYTX W PT 45 MINUTES: CPT | Performed by: SOCIAL WORKER

## 2020-12-07 ENCOUNTER — TELEPHONE (OUTPATIENT)
Dept: FAMILY MEDICINE CLINIC | Facility: CLINIC | Age: 50
End: 2020-12-07

## 2020-12-07 ENCOUNTER — TELEPHONE (OUTPATIENT)
Dept: PSYCHIATRY | Facility: CLINIC | Age: 50
End: 2020-12-07

## 2020-12-07 DIAGNOSIS — Z20.822 EXPOSURE TO COVID-19 VIRUS: Primary | ICD-10-CM

## 2020-12-11 ENCOUNTER — TELEPHONE (OUTPATIENT)
Dept: PSYCHIATRY | Facility: CLINIC | Age: 50
End: 2020-12-11

## 2020-12-11 PROCEDURE — 87637 SARSCOV2&INF A&B&RSV AMP PRB: CPT | Performed by: FAMILY MEDICINE

## 2020-12-14 LAB
FLUAV RNA NPH QL NAA+PROBE: NOT DETECTED
FLUBV RNA NPH QL NAA+PROBE: NOT DETECTED
RSV RNA NPH QL NAA+PROBE: NOT DETECTED
SARS-COV-2 RNA NPH QL NAA+PROBE: NOT DETECTED

## 2020-12-15 DIAGNOSIS — F31.30 BIPOLAR AFFECTIVE DISORDER, CURRENT EPISODE DEPRESSED, CURRENT EPISODE SEVERITY UNSPECIFIED (HCC): ICD-10-CM

## 2020-12-15 RX ORDER — LURASIDONE HYDROCHLORIDE 20 MG/1
TABLET, FILM COATED ORAL
Qty: 30 TABLET | Refills: 1 | OUTPATIENT
Start: 2020-12-15

## 2020-12-16 ENCOUNTER — TELEMEDICINE (OUTPATIENT)
Dept: PSYCHIATRY | Facility: CLINIC | Age: 50
End: 2020-12-16
Payer: COMMERCIAL

## 2020-12-16 ENCOUNTER — TELEPHONE (OUTPATIENT)
Dept: PSYCHIATRY | Facility: CLINIC | Age: 50
End: 2020-12-16

## 2020-12-16 DIAGNOSIS — M79.7 FIBROMYALGIA: ICD-10-CM

## 2020-12-16 DIAGNOSIS — F41.1 GAD (GENERALIZED ANXIETY DISORDER): ICD-10-CM

## 2020-12-16 DIAGNOSIS — G43.719 INTRACTABLE CHRONIC MIGRAINE WITHOUT AURA AND WITHOUT STATUS MIGRAINOSUS: ICD-10-CM

## 2020-12-16 DIAGNOSIS — E55.9 VITAMIN D DEFICIENCY: ICD-10-CM

## 2020-12-16 DIAGNOSIS — F31.81 BIPOLAR 2 DISORDER (HCC): Primary | ICD-10-CM

## 2020-12-16 DIAGNOSIS — K58.9 IRRITABLE BOWEL SYNDROME, UNSPECIFIED TYPE: ICD-10-CM

## 2020-12-16 PROCEDURE — 99214 OFFICE O/P EST MOD 30 MIN: CPT | Performed by: PHYSICIAN ASSISTANT

## 2020-12-16 RX ORDER — BUSPIRONE HYDROCHLORIDE 15 MG/1
15 TABLET ORAL 3 TIMES DAILY
Qty: 270 TABLET | Refills: 0 | Status: SHIPPED | OUTPATIENT
Start: 2020-12-16 | End: 2021-02-11 | Stop reason: SDUPTHER

## 2020-12-16 RX ORDER — ZIPRASIDONE HYDROCHLORIDE 60 MG/1
60 CAPSULE ORAL
Qty: 90 CAPSULE | Refills: 0 | Status: SHIPPED | OUTPATIENT
Start: 2020-12-16 | End: 2021-02-11 | Stop reason: SDUPTHER

## 2020-12-26 ENCOUNTER — APPOINTMENT (OUTPATIENT)
Dept: LAB | Facility: HOSPITAL | Age: 50
End: 2020-12-26
Payer: COMMERCIAL

## 2020-12-26 ENCOUNTER — TRANSCRIBE ORDERS (OUTPATIENT)
Dept: ADMINISTRATIVE | Facility: HOSPITAL | Age: 50
End: 2020-12-26

## 2020-12-26 DIAGNOSIS — E03.9 ACQUIRED HYPOTHYROIDISM: Primary | ICD-10-CM

## 2020-12-26 DIAGNOSIS — E03.9 ACQUIRED HYPOTHYROIDISM: ICD-10-CM

## 2020-12-26 LAB
T3 SERPL-MCNC: 1.2 NG/ML (ref 0.6–1.8)
T4 FREE SERPL-MCNC: 0.98 NG/DL (ref 0.76–1.46)
TSH SERPL DL<=0.05 MIU/L-ACNC: 5.25 UIU/ML (ref 0.36–3.74)

## 2020-12-26 PROCEDURE — 36415 COLL VENOUS BLD VENIPUNCTURE: CPT

## 2020-12-26 PROCEDURE — 84480 ASSAY TRIIODOTHYRONINE (T3): CPT

## 2020-12-26 PROCEDURE — 84443 ASSAY THYROID STIM HORMONE: CPT

## 2020-12-26 PROCEDURE — 84439 ASSAY OF FREE THYROXINE: CPT

## 2020-12-27 ENCOUNTER — TELEPHONE (OUTPATIENT)
Dept: FAMILY MEDICINE CLINIC | Facility: CLINIC | Age: 50
End: 2020-12-27

## 2020-12-27 DIAGNOSIS — E03.9 ACQUIRED HYPOTHYROIDISM: ICD-10-CM

## 2020-12-27 RX ORDER — LEVOTHYROXINE SODIUM 0.07 MG/1
75 TABLET ORAL DAILY
Qty: 90 TABLET | Refills: 1 | Status: SHIPPED | OUTPATIENT
Start: 2020-12-27 | End: 2021-04-13

## 2020-12-29 ENCOUNTER — TELEPHONE (OUTPATIENT)
Dept: PSYCHIATRY | Facility: CLINIC | Age: 50
End: 2020-12-29

## 2021-01-05 ENCOUNTER — TELEPHONE (OUTPATIENT)
Dept: FAMILY MEDICINE CLINIC | Facility: CLINIC | Age: 51
End: 2021-01-05

## 2021-01-11 DIAGNOSIS — F31.81 BIPOLAR 2 DISORDER (HCC): ICD-10-CM

## 2021-01-11 RX ORDER — ZIPRASIDONE HYDROCHLORIDE 60 MG/1
60 CAPSULE ORAL
Qty: 90 CAPSULE | Refills: 0 | OUTPATIENT
Start: 2021-01-11

## 2021-01-13 ENCOUNTER — TELEMEDICINE (OUTPATIENT)
Dept: BEHAVIORAL/MENTAL HEALTH CLINIC | Facility: CLINIC | Age: 51
End: 2021-01-13
Payer: COMMERCIAL

## 2021-01-13 DIAGNOSIS — F31.81 BIPOLAR 2 DISORDER (HCC): ICD-10-CM

## 2021-01-13 DIAGNOSIS — F41.1 GAD (GENERALIZED ANXIETY DISORDER): ICD-10-CM

## 2021-01-13 PROCEDURE — 90834 PSYTX W PT 45 MINUTES: CPT | Performed by: SOCIAL WORKER

## 2021-01-13 NOTE — PSYCH
Treatment Plan Tracking    # The updated Treatment Plan not completed within required time limits due to it is not due  The undersigned was dong the plan every 4:months and the plan is not due until every 6 months  The update is due 2/19/21                      This note was not shared with the patient due to this is a psychotherapy note  Virtual Regular Visit      Assessment/Plan:    Problem List Items Addressed This Visit     None               Reason for visit is due to her ongoing symptoms and covid 19 pandemic     Encounter provider Yin Patten    Provider located at [address] [place] [address] [address] [address]      Recent Visits  No visits were found meeting these conditions  Showing recent visits within past 7 days and meeting all other requirements     Future Appointments  No visits were found meeting these conditions  Showing future appointments within next 150 days and meeting all other requirements        The patient was identified by name and date of birth  Komal Mauricio was informed that this is a telemedicine visit and that the visit is being conducted through [employer] and patient was informed that this is a secure, HIPAA-compliant platform  She agrees to proceed     My office door was closed  No one else was in the room  She acknowledged consent and understanding of privacy and security of the video platform  The patient has agreed to participate and understands they can discontinue the visit at any time  Patient is aware this is a billable service       Subjective  Komal Mauricio is a 53 y o  female     HPI     Past Medical History:   Diagnosis Date    Anxiety     Anxiety     Asthma     Bipolar disorder ([facility] [address] )     Carpal tunnel syndrome     Chronic pain     lower back    Depression     Disease of thyroid gland     Dyslipidemia     Fibromyalgia     Irritable bowel     Kidney stones     Kidney stones 5/20/2019  Migraine     Obesity (BMI 30 0-34  9)     Psychiatric disorder     depression/anxiety    Suicide attempt [facility])     as teen    Vitamin D deficiency        Past Surgical History:   Procedure Laterality Date    BUNIONECTOMY       SECTION      CHOLECYSTECTOMY      PARTIAL HYSTERECTOMY      TONSILLECTOMY      TUBAL LIGATION         Current Outpatient Medications   Medication Sig Dispense Refill    albuterol (2 5 mg/3 mL) 0 083 % nebulizer solution Take 1 vial (2 5 mg total) by nebulization every 6 (six) hours as needed for wheezing or shortness of breath 20 vial 0    albuterol (PROVENTIL HFA,VENTOLIN HFA) 90 mcg/act inhaler Inhale 2 puffs every 6 (six) hours as needed for wheezing 3 Inhaler 2    baclofen 10 mg tablet       betamethasone dipropionate (DIPROSONE) 0 05 % cream APPLY A THIN LAYER TO THE AFFECTED AREA(S) BY TOPICAL ROUTE ONCE DAILY      busPIRone (BUSPAR) 15 mg tablet Take 1 tablet (15 mg total) by mouth 3 (three) times a day 270 tablet 0    Butalbital-APAP-Caffeine -40 MG CAPS TAKE 1 CAPSULE Daily PRN      celecoxib (CELEBREX) 200 mg capsule Take 1 capsule (200 mg total) by mouth daily 30 capsule 2    Cholecalciferol 1 25 MG (66827 UT) TABS once a week        diclofenac (VOLTAREN) 75 mg EC tablet Take 75 mg by mouth      diclofenac sodium (VOLTAREN) 1 % APPLY 2 GRAM TO THE AFFECTED AREA(S) BY TOPICAL ROUTE 4 TIMES PER DAY      divalproex sodium (DEPAKOTE) 250 mg EC tablet Take 250 mg by mouth daily      fluticasone (FLONASE) 50 mcg/act nasal spray TWO SPRAYS BOTH SIDES OF THE FOR NOSE ONLY TWICE A DAY 1 Bottle 2    fremanezumab-vfrm (Ajovy) 225 MG/1 5ML auto-injector Inject 225 mg under the skin every 30 (thirty) days      levothyroxine 75 mcg tablet Take 1 tablet (75 mcg total) by mouth daily 90 tablet 1    lidocaine (LIDODERM) 5 % Place 1 patch on the skin daily as needed for mild pain or moderate pain Remove & Discard patch within 12 hours or as directed by MD Castro patch 0    loratadine (CLARITIN) 10 mg tablet Take 1 pill daily for allergies 90 tablet 1    meclizine (ANTIVERT) 25 mg tablet Take 25 mg by mouth Three times daily as needed      methocarbamol (ROBAXIN) 750 mg tablet TAKE 1 TABLET EVERY EIGHT HOURS AS NEEDED      Misc Natural Products (TURMERIC CURCUMIN) CAPS Take 2,000 capsules by mouth daily      naratriptan (AMERGE) 1 MG TABS Take 1 mg by mouth every 4 (four) hours as needed for migraine      omeprazole (PriLOSEC) 20 mg delayed release capsule Take 20 mg by mouth 2 (two) times a day       ondansetron (ZOFRAN-ODT) 4 mg disintegrating tablet May put 1 tab underneath the tongue every 12 hours p r n  nausea 60 tablet 1    prochlorperazine (COMPAZINE) 10 mg tablet TAKE 1/2 TAB TWICE A DAY FOR SEVEN DAYS      Respiratory Therapy Supplies (NEBULIZER) DONA by Does not apply route every 4 (four) hours while awake 90 each 1    Topiramate ER (Trokendi XR) 100 MG CP24 Take 100 mg by mouth 2 (two) times a day      Ubrogepant (Ubrelvy) 50 MG TABS       XIFAXAN 550 MG tablet       ziprasidone (GEODON) 60 mg capsule Take 1 capsule (60 mg total) by mouth daily with dinner 90 capsule 0     No current facility-administered medications for this visit  Allergies   Allergen Reactions    Doxycycline Rash    Erythromycin Rash    Other Edema and Wheezing     jalapeno    Latex Rash    Duloxetine      Other reaction(s): Nausea, Loopy    Eletriptan      Pain in lower jaw with pressure in throat    Emgality [Galcanezumab-Gnlm]     Galcanezumab      rash       Review of Systems  Data:Belem discussed the boundaries she has had to place on her 3 adult dysfunctional children and we discussed how she is managing her depression and her anxiety  She continues to raise her disabled grandson  Assessment She is not suicidal or homicidal but she is depressed how her children are  We discussed her continued need to draw boundaries to better manage her depression   We worked on mindfulness and CBT strategies  Plan: continue to help her skill build in distress tolerance  Video Exam    There were no vitals filed for this visit  Physical Exam N/A    I spent 45 minutes directly with the patient during this visit      VIRTUAL VISIT [address] acknowledges that she has consented to an online visit or consultation  She understands that the online visit is based solely on information provided by her, and that, in the absence of a face-to-face physical evaluation by the physician, the diagnosis she receives is both limited and provisional in terms of accuracy and completeness  This is not intended to replace a full medical face-to-face evaluation by the physician  Vamshi Resendiz understands and accepts these terms

## 2021-01-18 DIAGNOSIS — R52 PAIN: Primary | ICD-10-CM

## 2021-01-26 DIAGNOSIS — R11.0 NAUSEA: ICD-10-CM

## 2021-01-26 RX ORDER — ONDANSETRON 4 MG/1
TABLET, ORALLY DISINTEGRATING ORAL
Qty: 60 TABLET | Refills: 0 | Status: SHIPPED | OUTPATIENT
Start: 2021-01-26 | End: 2022-05-13

## 2021-01-27 ENCOUNTER — TELEMEDICINE (OUTPATIENT)
Dept: BEHAVIORAL/MENTAL HEALTH CLINIC | Facility: CLINIC | Age: 51
End: 2021-01-27
Payer: COMMERCIAL

## 2021-01-27 DIAGNOSIS — F31.81 BIPOLAR 2 DISORDER (HCC): Primary | ICD-10-CM

## 2021-01-27 PROCEDURE — 90834 PSYTX W PT 45 MINUTES: CPT | Performed by: SOCIAL WORKER

## 2021-01-27 NOTE — PSYCH
Virtual Regular Visit  This note was not shared with the patient due to this is a psychotherapy note  Assessment/Plan:    Problem List Items Addressed This Visit     None               Reason for visit is due to ongoing symptoms and covid 19     Encounter provider Jermaine Wyman    Provider located at [address] [place] [address] [address] [address]      Recent Visits  No visits were found meeting these conditions  Showing recent visits within past 7 days and meeting all other requirements     Today's Visits  Date Type Provider Dept   01/27/21 [address] [facility]rap[facility]   Showing today's visits and meeting all other requirements     Future Appointments  No visits were found meeting these conditions  Showing future appointments within next 150 days and meeting all other requirements        The patient was identified by name and date of birth  Jose A Kulkarni was informed that this is a telemedicine visit and that the visit is being conducted through [employer] and patient was informed that this is a secure, HIPAA-compliant platform  She agrees to proceed     My office door was closed  No one else was in the room  She acknowledged consent and understanding of privacy and security of the video platform  The patient has agreed to participate and understands they can discontinue the visit at any time  Patient is aware this is a billable service  Subjective  Jose A Kulkarni is a 53 y o  female      HPI     Past Medical History:   Diagnosis Date    Anxiety     Anxiety     Asthma     Bipolar disorder ([address] )     Carpal tunnel syndrome     Chronic pain     lower back    Depression     Disease of thyroid gland     Dyslipidemia     Fibromyalgia     Irritable bowel     Kidney stones     Kidney stones 5/20/2019    Migraine     Obesity (BMI 30 0-34  9)     Psychiatric disorder     depression/anxiety    Suicide attempt [facility])     as teen    Vitamin D deficiency        Past Surgical History:   Procedure Laterality Date    BUNIONECTOMY       SECTION      CHOLECYSTECTOMY      PARTIAL HYSTERECTOMY      TONSILLECTOMY      TUBAL LIGATION         Current Outpatient Medications   Medication Sig Dispense Refill    albuterol (2 5 mg/3 mL) 0 083 % nebulizer solution Take 1 vial (2 5 mg total) by nebulization every 6 (six) hours as needed for wheezing or shortness of breath 20 vial 0    albuterol (PROVENTIL HFA,VENTOLIN HFA) 90 mcg/act inhaler Inhale 2 puffs every 6 (six) hours as needed for wheezing 3 Inhaler 2    baclofen 10 mg tablet       betamethasone dipropionate (DIPROSONE) 0 05 % cream APPLY A THIN LAYER TO THE AFFECTED AREA(S) BY TOPICAL ROUTE ONCE DAILY      busPIRone (BUSPAR) 15 mg tablet Take 1 tablet (15 mg total) by mouth 3 (three) times a day 270 tablet 0    Butalbital-APAP-Caffeine -40 MG CAPS TAKE 1 CAPSULE Daily PRN      celecoxib (CELEBREX) 200 mg capsule Take 1 capsule (200 mg total) by mouth daily 30 capsule 2    Cholecalciferol 1 25 MG (04180 UT) TABS once a week        diclofenac (VOLTAREN) 75 mg EC tablet Take 75 mg by mouth      diclofenac sodium (VOLTAREN) 1 % APPLY 2 GRAM TO THE AFFECTED AREA(S) BY TOPICAL ROUTE 4 TIMES PER DAY      divalproex sodium (DEPAKOTE) 250 mg EC tablet Take 250 mg by mouth daily      fluticasone (FLONASE) 50 mcg/act nasal spray TWO SPRAYS BOTH SIDES OF THE FOR NOSE ONLY TWICE A DAY 1 Bottle 2    fremanezumab-vfrm (Ajovy) 225 MG/1 5ML auto-injector Inject 225 mg under the skin every 30 (thirty) days      levothyroxine 75 mcg tablet Take 1 tablet (75 mcg total) by mouth daily 90 tablet 1    lidocaine (LIDODERM) 5 % Place 1 patch on the skin daily as needed for mild pain or moderate pain Remove & Discard patch within 12 hours or as directed by MD 6 patch 0    loratadine (CLARITIN) 10 mg tablet Take 1 pill daily for allergies 90 tablet 1    meclizine (ANTIVERT) 25 mg tablet Take 25 mg by mouth Three times daily as needed      methocarbamol (ROBAXIN) 750 mg tablet TAKE 1 TABLET EVERY EIGHT HOURS AS NEEDED      Misc Natural Products (TURMERIC CURCUMIN) CAPS Take 2,000 capsules by mouth daily      naratriptan (AMERGE) 1 MG TABS Take 1 mg by mouth every 4 (four) hours as needed for migraine      omeprazole (PriLOSEC) 20 mg delayed release capsule Take 20 mg by mouth 2 (two) times a day       ondansetron (ZOFRAN-ODT) 4 mg disintegrating tablet MAY PUT 1 TAB UNDERNEATH THE TONGUE EVERY 12 HOURS AS NEEDED FOR NAUSEA 60 tablet 0    prochlorperazine (COMPAZINE) 10 mg tablet TAKE 1/2 TAB TWICE A DAY FOR SEVEN DAYS      Respiratory Therapy Supplies (NEBULIZER) DONA by Does not apply route every 4 (four) hours while awake 90 each 1    Topiramate ER (Trokendi XR) 100 MG CP24 Take 100 mg by mouth 2 (two) times a day      Ubrogepant (Ubrelvy) 50 MG TABS       XIFAXAN 550 MG tablet       ziprasidone (GEODON) 60 mg capsule Take 1 capsule (60 mg total) by mouth daily with dinner 90 capsule 0     No current facility-administered medications for this visit  Allergies   Allergen Reactions    Doxycycline Rash    Erythromycin Rash    Other Edema and Wheezing     jalapeno    Latex Rash    Duloxetine      Other reaction(s): Nausea, Loopy    Eletriptan      Pain in lower jaw with pressure in throat    Emgality [Galcanezumab-Gnlm]     Galcanezumab      rash       Review of Systems Data:Belem and the undersigned did her session  Regarding her goals we discussed how she continues to draw boundaries with her 3 adult dysfunctional children  We also discussed issues with her raising her disabled grandson  We discussed strategies to help her manage her depression and her anxiety  Wandrmarvin Bocanegra continues to have major medical issues she is coping with   Assessment: She is not suicidal or homicidal  However she also continues to be in a relationship where there is no longer a connection they basically are roomates  We discuss mindfulness and CBT strategies  Plan: Continue to help her skill build in distress tolerance  Video Exam    There were no vitals filed for this visit  Physical Exam N/A    I spent 45 minutes directly with the patient during this visit      VIRTUAL VISIT [address] acknowledges that she has consented to an online visit or consultation  She understands that the online visit is based solely on information provided by her, and that, in the absence of a face-to-face physical evaluation by the physician, the diagnosis she receives is both limited and provisional in terms of accuracy and completeness  This is not intended to replace a full medical face-to-face evaluation by the physician  Bryan Smith understands and accepts these terms

## 2021-02-08 DIAGNOSIS — F31.81 BIPOLAR 2 DISORDER (HCC): ICD-10-CM

## 2021-02-08 RX ORDER — ZIPRASIDONE HYDROCHLORIDE 60 MG/1
60 CAPSULE ORAL
Qty: 90 CAPSULE | Refills: 1 | OUTPATIENT
Start: 2021-02-08
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
## 2021-02-11 ENCOUNTER — TELEMEDICINE (OUTPATIENT)
Dept: PSYCHIATRY | Facility: CLINIC | Age: 51
End: 2021-02-11
Payer: COMMERCIAL

## 2021-02-11 DIAGNOSIS — F31.81 BIPOLAR 2 DISORDER (HCC): Primary | ICD-10-CM

## 2021-02-11 DIAGNOSIS — K58.8 OTHER IRRITABLE BOWEL SYNDROME: ICD-10-CM

## 2021-02-11 DIAGNOSIS — E55.9 VITAMIN D DEFICIENCY: ICD-10-CM

## 2021-02-11 DIAGNOSIS — F41.1 GAD (GENERALIZED ANXIETY DISORDER): ICD-10-CM

## 2021-02-11 DIAGNOSIS — G43.719 INTRACTABLE CHRONIC MIGRAINE WITHOUT AURA AND WITHOUT STATUS MIGRAINOSUS: ICD-10-CM

## 2021-02-11 PROCEDURE — 99214 OFFICE O/P EST MOD 30 MIN: CPT | Performed by: PHYSICIAN ASSISTANT

## 2021-02-11 RX ORDER — BUSPIRONE HYDROCHLORIDE 15 MG/1
15 TABLET ORAL 3 TIMES DAILY
Qty: 270 TABLET | Refills: 0 | Status: SHIPPED | OUTPATIENT
Start: 2021-02-11 | End: 2021-06-04

## 2021-02-11 RX ORDER — ZIPRASIDONE HYDROCHLORIDE 60 MG/1
60 CAPSULE ORAL
Qty: 90 CAPSULE | Refills: 0 | Status: SHIPPED | OUTPATIENT
Start: 2021-02-11 | End: 2021-06-04

## 2021-02-15 ENCOUNTER — TELEMEDICINE (OUTPATIENT)
Dept: BEHAVIORAL/MENTAL HEALTH CLINIC | Facility: CLINIC | Age: 51
End: 2021-02-15
Payer: COMMERCIAL

## 2021-02-15 DIAGNOSIS — F41.1 GAD (GENERALIZED ANXIETY DISORDER): ICD-10-CM

## 2021-02-15 DIAGNOSIS — F31.81 BIPOLAR 2 DISORDER (HCC): ICD-10-CM

## 2021-02-15 PROCEDURE — 90834 PSYTX W PT 45 MINUTES: CPT | Performed by: SOCIAL WORKER

## 2021-02-15 NOTE — PSYCH
Psychotherapy Provided: Individual Psychotherapy 45 minutes   This note was not shared with the patient due to this is a psychotherapy note  Length of time in session: 45 minutes, follow up in 2 week    Goals addressed in session: Goal 1, Goal 2 and Goal 3      Pain:      none and moderate to severe    8    Current suicide risk : Low     Data:Belem had her session today  She is having a severe migraine that has lasted several days  She discussed the agency that was providing wraparound services has disappointed her because they are not following thru so she had to change agencies  Regarding her goals she continues to draw boundaries with her 3 adult dysfunctional children  She also is managing her depression and her anxiety more effectively  She is fighting social security for herself and her grandson  Assessment: Vanessa is not suicidal or homicidal however she is upset over her health, her services for her grandson and the issues she encounters with her three adult children  She sees herself as a survivor  She has settled that her partner is simply the way he is and in her words will never change  Plan: Continue to help her skill build in distress tolerance  Behavioral Health Treatment Plan [facility]: Diagnosis and Treatment Plan explained to Jazmin Hatch relates understanding diagnosis and is agreeable to Treatment Plan   Yes

## 2021-03-11 ENCOUNTER — TELEPHONE (OUTPATIENT)
Dept: PSYCHIATRY | Facility: CLINIC | Age: 51
End: 2021-03-11

## 2021-03-11 NOTE — TELEPHONE ENCOUNTER
Petra Humphrey brought in a form to be completed by her physician for her [facility] annual review  Paperwork and signed LAUREEN was placed in [employer]  She requested it be faxed to [employer] when completed   [phone]

## 2021-03-15 ENCOUNTER — TELEMEDICINE (OUTPATIENT)
Dept: FAMILY MEDICINE CLINIC | Facility: CLINIC | Age: 51
End: 2021-03-15
Payer: MEDICARE

## 2021-03-15 DIAGNOSIS — H10.32 ACUTE CONJUNCTIVITIS OF LEFT EYE, UNSPECIFIED ACUTE CONJUNCTIVITIS TYPE: Primary | ICD-10-CM

## 2021-03-15 PROCEDURE — 99213 OFFICE O/P EST LOW 20 MIN: CPT | Performed by: NURSE PRACTITIONER

## 2021-03-15 RX ORDER — POLYMYXIN B SULFATE AND TRIMETHOPRIM 1; 10000 MG/ML; [USP'U]/ML
1 SOLUTION OPHTHALMIC EVERY 4 HOURS
Qty: 10 ML | Refills: 0 | Status: SHIPPED | OUTPATIENT
Start: 2021-03-15 | End: 2021-04-13

## 2021-03-15 NOTE — PROGRESS NOTES
Virtual Regular Visit      Assessment/Plan:    Problem List Items Addressed This Visit     None      Visit Diagnoses     Acute conjunctivitis of left eye, unspecified acute conjunctivitis type    -  Primary    Will treat with polytrim, apply warm soaks  Relevant Medications    polymyxin b-trimethoprim (POLYTRIM) ophthalmic solution               Reason for visit is   Chief Complaint   Patient presents with    Virtual Regular Visit        Encounter provider ERICKA Christian    Provider located at [facility] [address] [address] [phone]      Recent Visits  No visits were found meeting these conditions  Showing recent visits within past 7 days and meeting all other requirements     Today's Visits  Date Type Provider Dept   03/15/21 Telemedicine ERICKA [address] today's visits and meeting all other requirements     Future Appointments  No visits were found meeting these conditions  Showing future appointments within next 150 days and meeting all other requirements        The patient was identified by name and date of birth  Samuel Levin was informed that this is a telemedicine visit and that the visit is being conducted through [employer] and patient was informed that this is not a secure, HIPAA-compliant platform  She agrees to proceed     My office door was closed  No one else was in the room  She acknowledged consent and understanding of privacy and security of the video platform  The patient has agreed to participate and understands they can discontinue the visit at any time  Patient is aware this is a billable service  Subjective  Samuel Levin is a 53 y o  female Presenting via [employer] for sick visit  Patient states this started 3 days ago with swollen eye lid, itching, discharge from left eye   Patient did notice a lump on upper left eyelid  She has been sneezing and slight cough  Denies fevers, chills, PND, SOB  HPI     Past Medical History:   Diagnosis Date    Anxiety     Anxiety     Asthma     Bipolar disorder ([address] )     Carpal tunnel syndrome     Chronic pain     lower back    Depression     Disease of thyroid gland     Dyslipidemia     Fibromyalgia     Irritable bowel     Kidney stones     Kidney stones 2019    Migraine     Obesity (BMI 30 0-34  9)     Psychiatric disorder     depression/anxiety    Suicide attempt [facility])     as teen    Vitamin D deficiency        Past Surgical History:   Procedure Laterality Date    BUNIONECTOMY       SECTION      CHOLECYSTECTOMY      PARTIAL HYSTERECTOMY      TONSILLECTOMY      TUBAL LIGATION         Current Outpatient Medications   Medication Sig Dispense Refill    albuterol (2 5 mg/3 mL) 0 083 % nebulizer solution Take 1 vial (2 5 mg total) by nebulization every 6 (six) hours as needed for wheezing or shortness of breath 20 vial 0    albuterol (PROVENTIL HFA,VENTOLIN HFA) 90 mcg/act inhaler Inhale 2 puffs every 6 (six) hours as needed for wheezing 3 Inhaler 2    baclofen 10 mg tablet       betamethasone dipropionate (DIPROSONE) 0 05 % cream APPLY A THIN LAYER TO THE AFFECTED AREA(S) BY TOPICAL ROUTE ONCE DAILY      busPIRone (BUSPAR) 15 mg tablet Take 1 tablet (15 mg total) by mouth 3 (three) times a day 270 tablet 0    Butalbital-APAP-Caffeine -40 MG CAPS TAKE 1 CAPSULE Daily PRN      celecoxib (CELEBREX) 200 mg capsule Take 1 capsule (200 mg total) by mouth daily 30 capsule 2    Cholecalciferol 1 25 MG (80134 UT) TABS once a week        diclofenac (VOLTAREN) 75 mg EC tablet Take 75 mg by mouth      diclofenac sodium (VOLTAREN) 1 % APPLY 2 GRAM TO THE AFFECTED AREA(S) BY TOPICAL ROUTE 4 TIMES PER DAY      divalproex sodium (DEPAKOTE) 250 mg EC tablet Take 250 mg by mouth daily      fluticasone (FLONASE) 50 mcg/act nasal spray TWO SPRAYS BOTH SIDES OF THE FOR NOSE ONLY TWICE A DAY 1 Bottle 2    fremanezumab-vfrm (Ajovy) 225 MG/1 5ML auto-injector Inject 225 mg under the skin every 30 (thirty) days      levothyroxine 75 mcg tablet Take 1 tablet (75 mcg total) by mouth daily 90 tablet 1    lidocaine (LIDODERM) 5 % Place 1 patch on the skin daily as needed for mild pain or moderate pain Remove & Discard patch within 12 hours or as directed by MD 6 patch 0    loratadine (CLARITIN) 10 mg tablet Take 1 pill daily for allergies 90 tablet 1    meclizine (ANTIVERT) 25 mg tablet Take 25 mg by mouth Three times daily as needed      methocarbamol (ROBAXIN) 750 mg tablet TAKE 1 TABLET EVERY EIGHT HOURS AS NEEDED      Misc Natural Products (TURMERIC CURCUMIN) CAPS Take 2,000 capsules by mouth daily      naratriptan (AMERGE) 1 MG TABS Take 1 mg by mouth every 4 (four) hours as needed for migraine      omeprazole (PriLOSEC) 20 mg delayed release capsule Take 20 mg by mouth 2 (two) times a day       ondansetron (ZOFRAN-ODT) 4 mg disintegrating tablet MAY PUT 1 TAB UNDERNEATH THE TONGUE EVERY 12 HOURS AS NEEDED FOR NAUSEA 60 tablet 0    polymyxin b-trimethoprim (POLYTRIM) ophthalmic solution Administer 1 drop into the left eye every 4 (four) hours 10 mL 0    prochlorperazine (COMPAZINE) 10 mg tablet TAKE 1/2 TAB TWICE A DAY FOR SEVEN DAYS      Respiratory Therapy Supplies (NEBULIZER) DONA by Does not apply route every 4 (four) hours while awake 90 each 1    Topiramate ER (Trokendi XR) 100 MG CP24 Take 100 mg by mouth 2 (two) times a day      Ubrogepant (Ubrelvy) 50 MG TABS       XIFAXAN 550 MG tablet       ziprasidone (GEODON) 60 mg capsule Take 1 capsule (60 mg total) by mouth daily with dinner 90 capsule 0     No current facility-administered medications for this visit           Allergies   Allergen Reactions    Doxycycline Rash    Erythromycin Rash    Other Edema and Wheezing     jalapeno    Latex Rash    Duloxetine      Other reaction(s): Nausea, Loopy    Eletriptan      Pain in lower jaw with pressure in throat    Emgality [Galcanezumab-Gnlm]     Galcanezumab      rash       Review of Systems   Constitutional: Negative for chills and fever  HENT: Positive for sneezing  Negative for ear pain and sore throat  Eyes: Positive for discharge and itching  Negative for photophobia, pain, redness and visual disturbance  Respiratory: Negative for cough and shortness of breath  Cardiovascular: Negative for chest pain and palpitations  Gastrointestinal: Negative for abdominal pain and vomiting  Genitourinary: Negative for dysuria and hematuria  Musculoskeletal: Negative for arthralgias and back pain  Skin: Negative for color change and rash  Neurological: Negative for seizures and syncope  All other systems reviewed and are negative  Video Exam    There were no vitals filed for this visit  Physical Exam  Constitutional:       General: She is not in acute distress  Pulmonary:      Effort: Pulmonary effort is normal    Neurological:      Mental Status: She is alert and oriented to person, place, and time  I spent 15 minutes directly with the patient during this visit      VIRTUAL VISIT [address] acknowledges that she has consented to an online visit or consultation  She understands that the online visit is based solely on information provided by her, and that, in the absence of a face-to-face physical evaluation by the physician, the diagnosis she receives is both limited and provisional in terms of accuracy and completeness  This is not intended to replace a full medical face-to-face evaluation by the physician  Heber Nunez understands and accepts these terms

## 2021-03-16 ENCOUNTER — DOCUMENTATION (OUTPATIENT)
Dept: BEHAVIORAL/MENTAL HEALTH CLINIC | Facility: CLINIC | Age: 51
End: 2021-03-16

## 2021-03-17 NOTE — PROGRESS NOTES
The [employer] [address] [place] [address] was sent by Jennifer Cordoba to reassess for temporary disability [employer] benefits  Form was filled out and sent to the front staff to be given to [phone]

## 2021-03-19 ENCOUNTER — TRANSCRIBE ORDERS (OUTPATIENT)
Dept: ADMINISTRATIVE | Facility: HOSPITAL | Age: 51
End: 2021-03-19

## 2021-03-19 ENCOUNTER — APPOINTMENT (OUTPATIENT)
Dept: LAB | Facility: HOSPITAL | Age: 51
End: 2021-03-19
Payer: MEDICARE

## 2021-03-19 DIAGNOSIS — E03.9 HYPOTHYROIDISM, ADULT: Primary | ICD-10-CM

## 2021-03-19 DIAGNOSIS — E03.9 HYPOTHYROIDISM, ADULT: ICD-10-CM

## 2021-03-19 LAB
T3 SERPL-MCNC: 1.3 NG/ML (ref 0.6–1.8)
T4 FREE SERPL-MCNC: 0.91 NG/DL (ref 0.76–1.46)
TSH SERPL DL<=0.05 MIU/L-ACNC: 5.97 UIU/ML (ref 0.36–3.74)

## 2021-03-19 PROCEDURE — 84443 ASSAY THYROID STIM HORMONE: CPT

## 2021-03-19 PROCEDURE — 36415 COLL VENOUS BLD VENIPUNCTURE: CPT

## 2021-03-19 PROCEDURE — 84439 ASSAY OF FREE THYROXINE: CPT

## 2021-03-19 PROCEDURE — 84480 ASSAY TRIIODOTHYRONINE (T3): CPT

## 2021-03-22 ENCOUNTER — DOCUMENTATION (OUTPATIENT)
Dept: BEHAVIORAL/MENTAL HEALTH CLINIC | Facility: CLINIC | Age: 51
End: 2021-03-22

## 2021-03-22 NOTE — PROGRESS NOTES
The [facility] group home) form was filled out but there was no MRO form that I could find on the evening when I was last in office on 3/16/2021  The front staff returned the [facility] with a blank MRO request form today--and I filled that out and sent all papers back to the staff to give to [facility]

## 2021-03-24 ENCOUNTER — TELEPHONE (OUTPATIENT)
Dept: PSYCHIATRY | Facility: CLINIC | Age: 51
End: 2021-03-24

## 2021-03-24 NOTE — TELEPHONE ENCOUNTER
Patient called and states records weren't necessary to be sent she just needed the form filled out and faxed

## 2021-03-25 ENCOUNTER — TELEMEDICINE (OUTPATIENT)
Dept: BEHAVIORAL/MENTAL HEALTH CLINIC | Facility: CLINIC | Age: 51
End: 2021-03-25
Payer: MEDICARE

## 2021-03-25 DIAGNOSIS — F31.81 BIPOLAR 2 DISORDER (HCC): Primary | ICD-10-CM

## 2021-03-25 PROCEDURE — 90834 PSYTX W PT 45 MINUTES: CPT | Performed by: SOCIAL WORKER

## 2021-03-25 NOTE — PSYCH
This note was not shared with the patient due to this is a psychotherapy note  It was my intent to perform this visit via video technology but the patient was not able to do a video connection so the visit was completed via audio telephone only  Virtual Regular Visit      Assessment/Plan:    Problem List Items Addressed This Visit     None               Reason for visit is due to ongoing symptoms and the covid 19 pandemic  Encounter provider Dorothea Geiger    Provider located at [address] [place] [id number] [phone]      Recent Visits  No visits were found meeting these conditions  Showing recent visits within past 7 days and meeting all other requirements     Today's Visits  Date Type Provider Dept   03/25/21 [address] [facility] Therapist Bryan   Showing today's visits and meeting all other requirements     Future Appointments  No visits were found meeting these conditions  Showing future appointments within next 150 days and meeting all other requirements        The patient was identified by name and date of birth  Mike Tobar was informed that this is a telemedicine visit and that the visit is being conducted through [employer] and patient was informed that this is a secure, HIPAA-compliant platform  She agrees to proceed     My office door was closed  No one else was in the room  She acknowledged consent and understanding of privacy and security of the video platform  The patient has agreed to participate and understands they can discontinue the visit at any time  Patient is aware this is a billable service  Subjective  Mike Tobar is a 53 y o  female          HPI     Past Medical History:   Diagnosis Date    Anxiety     Anxiety     Asthma     B[facility] disorder ([facility] [address] )     Carpal tunnel syndrome     Chronic pain     lower back    Depression     Disease of thyroid gland     Dyslipidemia     Fibromyalgia     Irritable bowel     Kidney stones     Kidney stones 2019    Migraine     Obesity (BMI 30 0-34  9)     Psychiatric disorder     depression/anxiety    Suicide [facility])     as teen    Vitamin D deficiency        Past Surgical History:   Procedure Laterality Date    BUNIONECTOMY       SECTION      CHOLECYSTECTOMY      PARTIAL HYSTERECTOMY      TONSILLECTOMY      TUBAL LIGATION         Current Outpatient Medications   Medication Sig Dispense Refill    albuterol (2 5 mg/3 mL) 0 083 % nebulizer solution Take 1 vial (2 5 mg total) by nebulization every 6 (six) hours as needed for wheezing or shortness of breath 20 vial 0    albuterol (PROVENTIL HFA,VENTOLIN HFA) 90 mcg/act inhaler Inhale 2 puffs every 6 (six) hours as needed for wheezing 3 Inhaler 2    baclofen 10 mg tablet       betamethasone dipropionate (DIPROSONE) 0 05 % cream APPLY A THIN LAYER TO THE AFFECTED AREA(S) BY TOPICAL ROUTE ONCE DAILY      busPIRone (BUSPAR) 15 mg tablet Take 1 tablet (15 mg total) by mouth 3 (three) times a day 270 tablet 0    Butalbital-APAP-Caffeine -40 MG CAPS TAKE 1 CAPSULE Daily PRN      celecoxib (CELEBREX) 200 mg capsule Take 1 capsule (200 mg total) by mouth daily 30 capsule 2    Cholecalciferol 1 25 MG (21412 UT) TABS once a week        diclofenac (VOLTAREN) 75 mg EC tablet Take 75 mg by mouth      diclofenac sodium (VOLTAREN) 1 % APPLY 2 GRAM TO THE AFFECTED AREA(S) BY TOPICAL ROUTE 4 TIMES PER DAY      divalproex sodium (DEPAKOTE) 250 mg EC tablet Take 250 mg by mouth daily      fluticasone (FLONASE) 50 mcg/act nasal spray TWO SPRAYS BOTH SIDES OF THE FOR NOSE ONLY TWICE A DAY 1 Bottle 2    fremanezumab-vfrm (Ajovy) 225 MG/1 5ML auto-injector Inject 225 mg under the skin every 30 (thirty) days      levothyroxine 75 mcg tablet Take 1 tablet (75 mcg total) by mouth daily 90 tablet 1    lidocaine (LIDODERM) 5 % Place 1 patch on the skin daily as needed for mild pain or moderate pain Remove & Discard patch within 12 hours or as directed by MD 6 patch 0    loratadine (CLARITIN) 10 mg tablet Take 1 pill daily for allergies 90 tablet 1    meclizine (ANTIVERT) 25 mg tablet Take 25 mg by mouth Three times daily as needed      methocarbamol (ROBAXIN) 750 mg tablet TAKE 1 TABLET EVERY EIGHT HOURS AS NEEDED      Misc Natural Products (TURMERIC CURCUMIN) CAPS Take 2,000 capsules by mouth daily      naratriptan (AMERGE) 1 MG TABS Take 1 mg by mouth every 4 (four) hours as needed for migraine      omeprazole (PriLOSEC) 20 mg delayed release capsule Take 20 mg by mouth 2 (two) times a day       ondansetron (ZOFRAN-ODT) 4 mg disintegrating tablet MAY PUT 1 TAB UNDERNEATH THE TONGUE EVERY 12 HOURS AS NEEDED FOR NAUSEA 60 tablet 0    polymyxin b-trimethoprim (POLYTRIM) ophthalmic solution Administer 1 drop into the left eye every 4 (four) hours 10 mL 0    prochlorperazine (COMPAZINE) 10 mg tablet TAKE 1/2 TAB TWICE A DAY FOR SEVEN DAYS      Respiratory Therapy Supplies (NEBULIZER) DONA by Does not apply route every 4 (four) hours while awake 90 each 1    Topiramate ER (Trokendi XR) 100 MG CP24 Take 100 mg by mouth 2 (two) times a day      Ubrogepant (Ubrelvy) 50 MG TABS       XIFAXAN 550 MG tablet       ziprasidone (GEODON) 60 mg capsule Take 1 capsule (60 mg total) by mouth daily with dinner 90 capsule 0     No current facility-administered medications for this visit           Allergies   Allergen Reactions    Doxycycline Rash    Erythromycin Rash    Other Edema and Wheezing     jalapeno    Latex Rash    Duloxetine      Other reaction(s): Nausea, Loopy    Eletriptan      Pain in lower jaw with pressure in throat    Emgality [Galcanezumab-Gnlm]     Galcanezumab      rash       Review of Systems Data:   Piper Hawthorne discussed how well her grandson is due to all the appointments she takes him to for various therapies  Dedra Duffy is on the spectrum and Debi Mclaughlin is caring for him and is his main caretaker due to her daughter being a very irresponsible parent  Debi Mclaughlin discussed some of her daughters recent manipulative behaviors and Debi Mclaughlin discussed her boundaries and the fact that she is sticking to them which of course her adult daughter does not like  The remainder of the session was Debi Mclaughlin discussing how her current partner who she now basically is just a roommate was cheating and continues to cheat  However Debi Mclaughlin admits she is financially cornered and is forced to stay there so that her grandson has a decent home  She is working on options to be able to leave there and a relative may help her buy the home from her partner  Assessment: Debi Mclaughlin is not suicidal or homicidal but is anxious about her current situation and her future  She is surrounded by three adult dysfunctional children and a dysfunctional partner she is currently financially forced to stay with  We continue to work on strategies to help her manage her anxiety  Plan: Continue to help her skilll build in distress tolerance  Video Exam    There were no vitals filed for this visit  Physical Exam N/A    I spent 45 minutes directly with the patient during this visit      VIRTUAL VISIT [address] acknowledges that she has consented to an online visit or consultation  She understands that the online visit is based solely on information provided by her, and that, in the absence of a face-to-face physical evaluation by the physician, the diagnosis she receives is both limited and provisional in terms of accuracy and completeness  This is not intended to replace a full medical face-to-face evaluation by the physician  Rosendo Fermin understands and accepts these terms

## 2021-03-25 NOTE — BH TREATMENT PLAN
Gavi Colindres           Please Note: Javan Cosme and the undersigned verbally signed off on this plan due to ongoing IT signature pad problems and due to ongoing covid restrictions  1970       Date of Initial Treatment Plan: 03/20/20  Date of Current Treatment Plan: 03/25/21    Treatment Plan Number 2nd update    Strengths/Personal Resources for Self Care: Independent,caring,good grandmother,Takes care of issues  Diagnosis:   1  Bipolar 2 disorder ([facility] [address] )         Area of Needs: Please see below      Long Term Goal 1: I eed to better manage my anxiety especially around my adult children and my partner  Target Date: 09/25/2021  Completion Date: TBD         Short Term Objectives for Goal 1: mindfulness, CBT, meditation, guided imagry    Long Term Goal 2: I need to be more mindful of the mind body connection which affect my sleep and my IBS and how my anxiety plays a part  Target Date: 09/25/2021  Completion Date: TBD    Short Term Objectives for Goal 2: yoga, meditation, massage, acupuncture,physical exercise, healthy nutrition         Long Term Goal # 3: I need to keep drawing boundaries with my partner     Target Date: 09/25/2021  Completion Date: TBD    Short Term Objectives for Goal 3: either go for couples or formulate an exit strategy    GOAL 1: Modality: Individual 2x per month   Completion Date tbd    GOAL 2: Modality: Individual 2x per month   Completion Date tbd     GOAL 3: Modality: Individual 2x per month   Completion Date tbd      Behavioral Health Treatment Plan [facility]: Diagnosis and Treatment Plan explained to Patricialolitravis Ariela relates understanding diagnosis and is agreeable to Treatment Plan  Client Comments : Please share your thoughts, feelings, need and/or experiences regarding your treatment plan: Kamla AND THE UNDERSIGNED WILL CONTINUE TO WORK AS A TEAM TO HELP HER TO CONTINUE TO PROGRESS ON HER GOALS

## 2021-03-25 NOTE — TELEPHONE ENCOUNTER
Paperwork for [facility] was faxed (and received) today  Will send for medical records separately  Bryanna Zeng

## 2021-03-30 DIAGNOSIS — Z23 ENCOUNTER FOR IMMUNIZATION: ICD-10-CM

## 2021-04-01 ENCOUNTER — TELEPHONE (OUTPATIENT)
Dept: PSYCHIATRY | Facility: CLINIC | Age: 51
End: 2021-04-01

## 2021-04-01 NOTE — TELEPHONE ENCOUNTER
[facility] was [facility] 3/31/21

## 2021-04-07 ENCOUNTER — TELEPHONE (OUTPATIENT)
Dept: PSYCHIATRY | Facility: CLINIC | Age: 51
End: 2021-04-07

## 2021-04-07 NOTE — TELEPHONE ENCOUNTER
----- Message from Denise Lewis sent at 4/7/2021  8:11 AM EDT -----  Regarding: late cancellation  Patient called at 8:07 to cancel 8am appointment for today, 4/7   She stated she had an emergency

## 2021-04-13 ENCOUNTER — OFFICE VISIT (OUTPATIENT)
Dept: FAMILY MEDICINE CLINIC | Facility: CLINIC | Age: 51
End: 2021-04-13
Payer: MEDICARE

## 2021-04-13 ENCOUNTER — APPOINTMENT (OUTPATIENT)
Dept: LAB | Facility: HOSPITAL | Age: 51
End: 2021-04-13
Payer: MEDICARE

## 2021-04-13 VITALS
TEMPERATURE: 97.8 F | WEIGHT: 186 LBS | OXYGEN SATURATION: 99 % | SYSTOLIC BLOOD PRESSURE: 122 MMHG | HEIGHT: 64 IN | HEART RATE: 74 BPM | DIASTOLIC BLOOD PRESSURE: 74 MMHG | BODY MASS INDEX: 31.76 KG/M2

## 2021-04-13 DIAGNOSIS — E03.9 HYPOTHYROIDISM, UNSPECIFIED TYPE: ICD-10-CM

## 2021-04-13 DIAGNOSIS — E66.09 CLASS 1 OBESITY DUE TO EXCESS CALORIES WITHOUT SERIOUS COMORBIDITY WITH BODY MASS INDEX (BMI) OF 32.0 TO 32.9 IN ADULT: ICD-10-CM

## 2021-04-13 DIAGNOSIS — M79.7 PRIMARY FIBROMYALGIA SYNDROME: ICD-10-CM

## 2021-04-13 DIAGNOSIS — Z00.00 ANNUAL PHYSICAL EXAM: Primary | ICD-10-CM

## 2021-04-13 DIAGNOSIS — K58.8 OTHER IRRITABLE BOWEL SYNDROME: ICD-10-CM

## 2021-04-13 DIAGNOSIS — Z83.3 FAMILY HISTORY OF DIABETES MELLITUS: ICD-10-CM

## 2021-04-13 DIAGNOSIS — Z12.31 ENCOUNTER FOR SCREENING MAMMOGRAM FOR MALIGNANT NEOPLASM OF BREAST: ICD-10-CM

## 2021-04-13 DIAGNOSIS — Z00.00 HEALTHCARE MAINTENANCE: ICD-10-CM

## 2021-04-13 DIAGNOSIS — F41.1 GAD (GENERALIZED ANXIETY DISORDER): ICD-10-CM

## 2021-04-13 DIAGNOSIS — G43.719 INTRACTABLE CHRONIC MIGRAINE WITHOUT AURA AND WITHOUT STATUS MIGRAINOSUS: ICD-10-CM

## 2021-04-13 DIAGNOSIS — L30.9 ECZEMA, UNSPECIFIED TYPE: ICD-10-CM

## 2021-04-13 DIAGNOSIS — Z12.4 CERVICAL CANCER SCREENING: ICD-10-CM

## 2021-04-13 DIAGNOSIS — R07.89 CHEST DISCOMFORT: ICD-10-CM

## 2021-04-13 PROBLEM — Z20.828 EXPOSURE TO SARS-ASSOCIATED CORONAVIRUS: Status: RESOLVED | Noted: 2020-08-17 | Resolved: 2021-04-13

## 2021-04-13 PROBLEM — E66.811 OBESITY (BMI 30.0-34.9): Status: RESOLVED | Noted: 2019-05-20 | Resolved: 2021-04-13

## 2021-04-13 PROBLEM — E66.9 OBESITY (BMI 30.0-34.9): Status: RESOLVED | Noted: 2019-05-20 | Resolved: 2021-04-13

## 2021-04-13 PROBLEM — E66.811 CLASS 1 OBESITY DUE TO EXCESS CALORIES WITHOUT SERIOUS COMORBIDITY WITH BODY MASS INDEX (BMI) OF 32.0 TO 32.9 IN ADULT: Status: ACTIVE | Noted: 2021-04-13

## 2021-04-13 LAB
25(OH)D3 SERPL-MCNC: 29.7 NG/ML (ref 30–100)
ALBUMIN SERPL BCP-MCNC: 3.6 G/DL (ref 3.5–5)
ALP SERPL-CCNC: 82 U/L (ref 46–116)
ALT SERPL W P-5'-P-CCNC: 22 U/L (ref 12–78)
ANION GAP SERPL CALCULATED.3IONS-SCNC: 10 MMOL/L (ref 4–13)
AST SERPL W P-5'-P-CCNC: 13 U/L (ref 5–45)
BILIRUB SERPL-MCNC: 0.21 MG/DL (ref 0.2–1)
BUN SERPL-MCNC: 10 MG/DL (ref 5–25)
CALCIUM SERPL-MCNC: 8.4 MG/DL (ref 8.3–10.1)
CHLORIDE SERPL-SCNC: 109 MMOL/L (ref 100–108)
CHOLEST SERPL-MCNC: 202 MG/DL (ref 50–200)
CO2 SERPL-SCNC: 21 MMOL/L (ref 21–32)
CREAT SERPL-MCNC: 0.68 MG/DL (ref 0.6–1.3)
ERYTHROCYTE [DISTWIDTH] IN BLOOD BY AUTOMATED COUNT: 13.2 % (ref 11.6–15.1)
EST. AVERAGE GLUCOSE BLD GHB EST-MCNC: 105 MG/DL
GFR SERPL CREATININE-BSD FRML MDRD: 103 ML/MIN/1.73SQ M
GLUCOSE P FAST SERPL-MCNC: 84 MG/DL (ref 65–99)
HBA1C MFR BLD: 5.3 %
HCT VFR BLD AUTO: 39.1 % (ref 34.8–46.1)
HDLC SERPL-MCNC: 63 MG/DL
HGB BLD-MCNC: 12.5 G/DL (ref 11.5–15.4)
LDLC SERPL CALC-MCNC: 119 MG/DL (ref 0–100)
MCH RBC QN AUTO: 29.1 PG (ref 26.8–34.3)
MCHC RBC AUTO-ENTMCNC: 32 G/DL (ref 31.4–37.4)
MCV RBC AUTO: 91 FL (ref 82–98)
NONHDLC SERPL-MCNC: 139 MG/DL
PLATELET # BLD AUTO: 256 THOUSANDS/UL (ref 149–390)
PMV BLD AUTO: 9.7 FL (ref 8.9–12.7)
POTASSIUM SERPL-SCNC: 4 MMOL/L (ref 3.5–5.3)
PROT SERPL-MCNC: 7.1 G/DL (ref 6.4–8.2)
RBC # BLD AUTO: 4.3 MILLION/UL (ref 3.81–5.12)
SODIUM SERPL-SCNC: 140 MMOL/L (ref 136–145)
TRIGL SERPL-MCNC: 100 MG/DL
WBC # BLD AUTO: 5.74 THOUSAND/UL (ref 4.31–10.16)

## 2021-04-13 PROCEDURE — 36415 COLL VENOUS BLD VENIPUNCTURE: CPT

## 2021-04-13 PROCEDURE — 3008F BODY MASS INDEX DOCD: CPT | Performed by: NURSE PRACTITIONER

## 2021-04-13 PROCEDURE — 1036F TOBACCO NON-USER: CPT | Performed by: NURSE PRACTITIONER

## 2021-04-13 PROCEDURE — 83036 HEMOGLOBIN GLYCOSYLATED A1C: CPT

## 2021-04-13 PROCEDURE — 99396 PREV VISIT EST AGE 40-64: CPT | Performed by: NURSE PRACTITIONER

## 2021-04-13 PROCEDURE — 80061 LIPID PANEL: CPT

## 2021-04-13 PROCEDURE — 93000 ELECTROCARDIOGRAM COMPLETE: CPT | Performed by: NURSE PRACTITIONER

## 2021-04-13 PROCEDURE — 82306 VITAMIN D 25 HYDROXY: CPT

## 2021-04-13 PROCEDURE — 85027 COMPLETE CBC AUTOMATED: CPT

## 2021-04-13 PROCEDURE — 80053 COMPREHEN METABOLIC PANEL: CPT

## 2021-04-13 RX ORDER — CEPHALEXIN 500 MG/1
CAPSULE ORAL
COMMUNITY
End: 2021-04-13 | Stop reason: ALTCHOICE

## 2021-04-13 RX ORDER — PHENTERMINE HYDROCHLORIDE 15 MG/1
15 CAPSULE ORAL EVERY MORNING
Qty: 30 CAPSULE | Refills: 0 | Status: SHIPPED | OUTPATIENT
Start: 2021-04-13 | End: 2021-05-06 | Stop reason: SDUPTHER

## 2021-04-13 RX ORDER — MOMETASONE FUROATE 1 MG/G
CREAM TOPICAL DAILY
Qty: 45 G | Refills: 0 | Status: SHIPPED | OUTPATIENT
Start: 2021-04-13

## 2021-04-13 RX ORDER — PROCHLORPERAZINE MALEATE 10 MG
TABLET ORAL
COMMUNITY
Start: 2020-12-28 | End: 2021-04-13 | Stop reason: ALTCHOICE

## 2021-04-13 NOTE — PATIENT INSTRUCTIONS

## 2021-04-13 NOTE — PROGRESS NOTES
Patient presents for annual physical   Patient is concerned with recent weight gain  Patient states she is typically around 130 lb  She has been on phentermine in the past with [facility] [employer]  She states this very much helped her lose weight  She feels weight is major issue due to pain  She has been having some chest discomfort with exertion  Patient denies shortness of breath, palpitations, leg swelling  She states this is been going on for the past few weeks  She states that could be due to weight gain  Brother, CABG in 42's  Patient does see  Rheumatology, endocrinology, Neurology Gastroenterology and Psychiatry  Zhen Wolfe [address]    NAME: Vivien Barcenas  AGE: 53 y o  SEX: female  : 1970     DATE: 2021     Assessment and Plan:     Problem List Items Addressed This Visit        Digestive    IBS (irritable bowel syndrome)       Patient sees Gastroenterology with [facility]  Endocrine    Hypothyroidism       Patient sees endocrinology with [facility]  Cardiovascular and Mediastinum    Intractable chronic migraine without aura and without status migrainosus       Currently seeing Neurology with [facility]  Musculoskeletal and Integument    Primary fibromyalgia syndrome       Currently seeing rheumatology  Other    ZAINA (generalized anxiety disorder)       Continue medications and care with psychiatry  Relevant Medications    phentermine 15 MG capsule    Class 1 obesity due to excess calories without serious comorbidity with body mass index (BMI) of 32 0 to 32 9 in adult       Patient has done well with phentermine in the past   Will start on phentermine at 15 mg daily  Advised 1200 calorie diet, low carb diet  Advised to increase hydration    Patient will return in 1 month for weight and blood pressure check  Relevant Medications    phentermine 15 MG capsule      Other Visit Diagnoses     Annual physical exam    -  Primary     will obtain fasting labs  Will obtain mammo  Encounter for screening mammogram for malignant neoplasm of breast        Relevant Orders    Mammo screening bilateral w 3d & cad    Cervical cancer screening        Relevant Orders    Ambulatory referral to Obstetrics / Gynecology    Healthcare maintenance        Relevant Orders    Lipid panel    Hemoglobin A1C    Vitamin D 25 hydroxy    Comprehensive metabolic panel    CBC and Platelet    Family history of diabetes mellitus        Relevant Orders    Hemoglobin A1C    BMI 32 0-32 9,adult        Eczema, unspecified type        Relevant Medications    mometasone (ELOCON) 0 1 % cream    Chest discomfort         EKG normal in office  Did discuss that this may be due to recent weight gain  Advised to increase exercise and weight loss  Relevant Orders    POCT ECG          Immunizations and preventive care screenings were discussed with patient today  Appropriate education was printed on patient's after visit summary  Counseling:  · Exercise: the importance of regular exercise/physical activity was discussed  Recommend exercise 3-5 times per week for at least 30 minutes  BMI Counseling: Body mass index is 32 13 kg/m²  The BMI is above normal  Nutrition recommendations include decreasing portion sizes, encouraging healthy choices of fruits and vegetables, decreasing fast food intake, consuming healthier snacks, limiting drinks that contain sugar, moderation in carbohydrate intake, increasing intake of lean protein, reducing intake of saturated and trans fat and reducing intake of cholesterol  Exercise recommendations include moderate physical activity 150 minutes/week, vigorous physical activity 75 minutes/week and exercising 3-5 times per week  No pharmacotherapy was ordered   Patient referred to nutritionist due to patient being overweight  Return in about 4 weeks (around 5/11/2021)  Chief Complaint:     Chief Complaint   Patient presents with    Physical Exam      History of Present Illness:     Adult Annual Physical   Patient here for a comprehensive physical exam  The patient reports problems - weight, chest discomfort  Diet and Physical Activity  · Diet/Nutrition: well balanced diet  · Exercise: no formal exercise  Depression Screening  PHQ-9 Depression Screening    PHQ-9:   Frequency of the following problems over the past two weeks:           General Health  · Sleep: sleeps poorly  · Hearing: normal - none   · Vision: no vision problems  · Dental: regular dental visits  /GYN Health  · Patient is: s/p partial hyster  · Last menstrual period:   · Contraceptive method:      Review of Systems:     Review of Systems   Constitutional: Positive for fatigue  Negative for chills and fever  HENT: Negative for ear pain and sore throat  Eyes: Negative for pain and visual disturbance  Respiratory: Negative for cough and shortness of breath  Cardiovascular: Positive for chest pain (with exertion)  Negative for palpitations  Gastrointestinal: Positive for abdominal distention, abdominal pain, constipation and diarrhea (with IBS)  Negative for vomiting  Genitourinary: Negative for dysuria and hematuria  Musculoskeletal: Positive for arthralgias  Negative for back pain  Skin: Negative for color change and rash  Neurological: Negative for seizures and syncope  Psychiatric/Behavioral: Positive for dysphoric mood and sleep disturbance  The patient is nervous/anxious  All other systems reviewed and are negative       Past Medical History:     Past Medical History:   Diagnosis Date    Anxiety     Anxiety     Asthma     Bipolar disorder ([address] )     Carpal tunnel syndrome     Chronic pain     lower back    Depression     Disease of thyroid gland     Dyslipidemia     Fibromyalgia     Irritable bowel     Kidney stones     Kidney stones 2019    Migraine     Obesity (BMI 30 0-34  9)     Psychiatric disorder     depression/anxiety    Suicide attempt [facility])     as teen    Vitamin D deficiency       Past Surgical History:     Past Surgical History:   Procedure Laterality Date    BUNIONECTOMY       SECTION      CHOLECYSTECTOMY      PARTIAL HYSTERECTOMY      TONSILLECTOMY      TUBAL LIGATION        Social History:        Social History     Socioeconomic History    Marital status: Single     Spouse name: None    Number of children: None    Years of education: None    Highest education level: None   Occupational History    None   Social Needs    Financial resource strain: None    Food insecurity     Worry: None     Inability: None    Transportation needs     Medical: None     Non-medical: None   Tobacco Use    Smoking status: Never Smoker    Smokeless tobacco: Never Used   Substance and Sexual Activity    Alcohol use: No    Drug use: Never    Sexual activity: Not Currently     Partners: Male     Birth control/protection: Abstinence, Female Sterilization, Other, None     Comment: Hysterectomy   Lifestyle    Physical activity     Days per week: None     Minutes per session: None    Stress: None   Relationships    Social connections     Talks on phone: None     Gets together: None     Attends Pentec[employer]alism service: None     Active member of club or organization: None     Attends meetings of clubs or organizations: None     Relationship status: None    Intimate partner violence     Fear of current or ex partner: None     Emotionally abused: None     Physically abused: None     Forced sexual activity: None   Other Topics Concern    None   Social History Narrative    None      Family History:     Family History   Problem Relation Age of Onset    Hypertension Mother     Diabetes Mother     Hypothyroidism Mother     Stroke Mother     Bipolar disorder Mother     Anxiety disorder Mother     Arthritis Mother     Depression Mother     Mental illness Mother     Cancer Father         pancreatic     Hypothyroidism Sister     Hypertension Brother     Heart disease Brother     Cancer Maternal Uncle         lung    Cancer Paternal Aunt         breast    Cancer Paternal Uncle         testicular     Cancer Paternal Grandmother         breast    Dementia Paternal Grandmother     Cancer Cousin         brain    Bipolar disorder Daughter       Current Medications:     Current Outpatient Medications   Medication Sig Dispense Refill    albuterol (2 5 mg/3 mL) 0 083 % nebulizer solution Take 1 vial (2 5 mg total) by nebulization every 6 (six) hours as needed for wheezing or shortness of breath 20 vial 0    baclofen 10 mg tablet       betamethasone dipropionate (DIPROSONE) 0 05 % cream APPLY A THIN LAYER TO THE AFFECTED AREA(S) BY TOPICAL ROUTE ONCE DAILY      busPIRone (BUSPAR) 15 mg tablet Take 1 tablet (15 mg total) by mouth 3 (three) times a day 270 tablet 0    Butalbital-APAP-Caffeine -40 MG CAPS TAKE 1 CAPSULE Daily PRN      celecoxib (CELEBREX) 200 mg capsule Take 1 capsule (200 mg total) by mouth daily 30 capsule 2    diclofenac sodium (VOLTAREN) 1 % APPLY 2 GRAM TO THE AFFECTED AREA(S) BY TOPICAL ROUTE 4 TIMES PER DAY      divalproex sodium (DEPAKOTE) 250 mg EC tablet Take 250 mg by mouth daily      fluticasone (FLONASE) 50 mcg/act nasal spray TWO SPRAYS BOTH SIDES OF THE FOR NOSE ONLY TWICE A DAY 1 Bottle 2    fremanezumab-vfrm (Ajovy) 225 MG/1 5ML auto-injector Inject 225 mg under the skin every 30 (thirty) days      lidocaine (LIDODERM) 5 % Place 1 patch on the skin daily as needed for mild pain or moderate pain Remove & Discard patch within 12 hours or as directed by MD 6 patch 0    loratadine (CLARITIN) 10 mg tablet Take 1 pill daily for allergies 90 tablet 1    meclizine (ANTIVERT) 25 mg tablet Take 25 mg by mouth Three times daily as needed      methocarbamol (ROBAXIN) 750 mg tablet TAKE 1 TABLET EVERY EIGHT HOURS AS NEEDED      mometasone (ELOCON) 0 1 % cream Apply topically daily 45 g 0    naratriptan (AMERGE) 1 MG TABS Take 1 mg by mouth every 4 (four) hours as needed for migraine      ondansetron (ZOFRAN-ODT) 4 mg disintegrating tablet MAY PUT 1 TAB UNDERNEATH THE TONGUE EVERY 12 HOURS AS NEEDED FOR NAUSEA 60 tablet 0    phentermine 15 MG capsule Take 1 capsule (15 mg total) by mouth every morning 30 capsule 0    prochlorperazine (COMPAZINE) 10 mg tablet TAKE 1/2 TAB TWICE A DAY FOR SEVEN DAYS      Respiratory Therapy Supplies (NEBULIZER) DONA by Does not apply route every 4 (four) hours while awake 90 each 1    Topiramate ER (Trokendi XR) 100 MG CP24 Take 100 mg by mouth 2 (two) times a day      Ubrogepant (Ubrelvy) 50 MG TABS       XIFAXAN 550 MG tablet       ziprasidone (GEODON) 60 mg capsule Take 1 capsule (60 mg total) by mouth daily with dinner 90 capsule 0     No current facility-administered medications for this visit  Allergies: Allergies   Allergen Reactions    Doxycycline Rash    Erythromycin Rash    Other Edema and Wheezing     jalapeno    Latex Rash    Duloxetine      Other reaction(s): Nausea, Loopy    Eletriptan      Pain in lower jaw with pressure in throat    Emgality [Galcanezumab-Gnlm]     Galcanezumab      rash      Physical Exam:     /74   Pulse 74   Temp 97 8 °F (36 6 °C)   Ht 5' 3 8" (1 621 m)   Wt 84 4 kg (186 lb)   SpO2 99%   BMI 32 13 kg/m²     Physical Exam  Vitals signs and nursing note reviewed  Constitutional:       General: She is not in acute distress  Appearance: She is well-developed  HENT:      Head: Normocephalic and atraumatic  Right Ear: Tympanic membrane normal       Left Ear: Tympanic membrane normal    Eyes:      Conjunctiva/sclera: Conjunctivae normal    Neck:      Musculoskeletal: Neck supple     Cardiovascular:      Rate and Rhythm: Normal rate and regular rhythm  Heart sounds: No murmur  Pulmonary:      Effort: Pulmonary effort is normal  No respiratory distress  Breath sounds: Normal breath sounds  Skin:     General: Skin is warm and dry  Neurological:      Mental Status: She is alert and oriented to person, place, and time            [address] [address], [address] [facility] [address] [facility]

## 2021-04-13 NOTE — ASSESSMENT & PLAN NOTE
Patient has done well with phentermine in the past   Will start on phentermine at 15 mg daily  Advised 1200 calorie diet, low carb diet  Advised to increase hydration  Patient will return in 1 month for weight and blood pressure check

## 2021-04-15 ENCOUNTER — TELEPHONE (OUTPATIENT)
Dept: PSYCHIATRY | Facility: CLINIC | Age: 51
End: 2021-04-15

## 2021-04-15 NOTE — TELEPHONE ENCOUNTER
Ahmad Lyon called requesting a call back  She is having a really bad day and the day only started    Please call [phone]

## 2021-04-16 ENCOUNTER — IMMUNIZATIONS (OUTPATIENT)
Dept: FAMILY MEDICINE CLINIC | Facility: HOSPITAL | Age: 51
End: 2021-04-16

## 2021-04-16 DIAGNOSIS — Z23 ENCOUNTER FOR IMMUNIZATION: Primary | ICD-10-CM

## 2021-04-16 PROCEDURE — 0001A SARS-COV-2 / COVID-19 MRNA VACCINE (PFIZER-BIONTECH) 30 MCG: CPT

## 2021-04-16 PROCEDURE — 91300 SARS-COV-2 / COVID-19 MRNA VACCINE (PFIZER-BIONTECH) 30 MCG: CPT

## 2021-04-21 ENCOUNTER — TELEPHONE (OUTPATIENT)
Dept: PSYCHIATRY | Facility: CLINIC | Age: 51
End: 2021-04-21

## 2021-04-21 NOTE — TELEPHONE ENCOUNTER
----- Message from Zina Pratt sent at 4/21/2021  9:18 AM EDT -----  Regarding: late cancellation-no charge  Per Belem Sánchez's appointment for today, 4/21 at 8am is a late cancellation with NO CHARGE

## 2021-04-28 ENCOUNTER — TELEPHONE (OUTPATIENT)
Dept: PSYCHIATRY | Facility: CLINIC | Age: 51
End: 2021-04-28

## 2021-04-28 NOTE — TELEPHONE ENCOUNTER
----- Message from Ashley Palma sent at 4/28/2021  8:23 AM EDT -----  Regarding: Late cancellation  Patient contacted office and spoke with writer to cancel appointment with Eddy Esparza WITHOUT sufficient notice for therapy on 4/28//2021

## 2021-05-05 ENCOUNTER — TELEPHONE (OUTPATIENT)
Dept: PSYCHIATRY | Facility: CLINIC | Age: 51
End: 2021-05-05

## 2021-05-05 NOTE — TELEPHONE ENCOUNTER
Called to confirm virtual visit   Patient stated that she will call back to update insurance information

## 2021-05-06 ENCOUNTER — TELEPHONE (OUTPATIENT)
Dept: FAMILY MEDICINE CLINIC | Facility: CLINIC | Age: 51
End: 2021-05-06

## 2021-05-06 ENCOUNTER — OFFICE VISIT (OUTPATIENT)
Dept: FAMILY MEDICINE CLINIC | Facility: CLINIC | Age: 51
End: 2021-05-06
Payer: MEDICARE

## 2021-05-06 VITALS
BODY MASS INDEX: 31.92 KG/M2 | HEIGHT: 64 IN | HEART RATE: 76 BPM | SYSTOLIC BLOOD PRESSURE: 137 MMHG | WEIGHT: 187 LBS | DIASTOLIC BLOOD PRESSURE: 82 MMHG | RESPIRATION RATE: 19 BRPM | TEMPERATURE: 97.4 F | OXYGEN SATURATION: 97 %

## 2021-05-06 DIAGNOSIS — R07.9 RIGHT-SIDED CHEST PAIN: ICD-10-CM

## 2021-05-06 DIAGNOSIS — E66.09 CLASS 1 OBESITY DUE TO EXCESS CALORIES WITHOUT SERIOUS COMORBIDITY WITH BODY MASS INDEX (BMI) OF 32.0 TO 32.9 IN ADULT: ICD-10-CM

## 2021-05-06 DIAGNOSIS — M54.16 LUMBAR RADICULOPATHY: Primary | ICD-10-CM

## 2021-05-06 DIAGNOSIS — M51.36 DEGENERATION OF LUMBAR INTERVERTEBRAL DISC: ICD-10-CM

## 2021-05-06 PROCEDURE — 99214 OFFICE O/P EST MOD 30 MIN: CPT | Performed by: NURSE PRACTITIONER

## 2021-05-06 RX ORDER — LIDOCAINE 50 MG/G
1 PATCH TOPICAL DAILY PRN
Qty: 6 PATCH | Refills: 1 | Status: SHIPPED | OUTPATIENT
Start: 2021-05-06

## 2021-05-06 RX ORDER — PHENTERMINE HYDROCHLORIDE 15 MG/1
15 CAPSULE ORAL EVERY MORNING
Qty: 30 CAPSULE | Refills: 0 | Status: SHIPPED | OUTPATIENT
Start: 2021-05-06 | End: 2022-01-21 | Stop reason: ALTCHOICE

## 2021-05-06 NOTE — TELEPHONE ENCOUNTER
Patient called stating she scheduled her MRI on 5/23/2021  She wants us to know just in case a prior authorization is needed

## 2021-05-06 NOTE — ASSESSMENT & PLAN NOTE
Advised to begin baclofen and Celebrex for pain relief  To proceed with MRI of Lumbar spine due to weakness in right extremities  Will call with results  To proceed with orthopedist referral as patient is currently connected with [facility]

## 2021-05-06 NOTE — ASSESSMENT & PLAN NOTE
To resume phentermine 15 mg daily  Counseled the importance of healthy food choices and physical activity as tolerated  Follow-up in 1 month or sooner if needed

## 2021-05-06 NOTE — PROGRESS NOTES
Assessment/Plan:    Class 1 obesity due to excess calories without serious comorbidity with body mass index (BMI) of 32 0 to 32 9 in adult   To resume phentermine 15 mg daily  Counseled the importance of healthy food choices and physical activity as tolerated  Follow-up in 1 month or sooner if needed  Lumbar radiculopathy   Advised to begin baclofen and Celebrex for pain relief  To proceed with MRI of Lumbar spine due to weakness in right extremities  Will call with results  To proceed with orthopedist referral as patient is currently connected with [facility]  Diagnoses and all orders for this visit:    Lumbar radiculopathy  -     MRI lumbar spine wo contrast; Future    Right-sided chest pain  -     lidocaine (LIDODERM) 5 %; Apply 1 patch topically daily as needed (back pain) Remove & Discard patch within 12 hours or as directed by MD    Degeneration of lumbar intervertebral disc    Class 1 obesity due to excess calories without serious comorbidity with body mass index (BMI) of 32 0 to 32 9 in adult  -     phentermine 15 MG capsule; Take 1 capsule (15 mg total) by mouth every morning          Subjective:      Patient ID: Vivien Barcenas is a 53 y o  female  Meg Chaparro presents for a follow-up  She was started on phentermine 15 mg 1 month ago to help promote weight loss  She took this a couple weeks but then stopped after back pain started to worsen  Jahairaosmani Chaparro had taken a long car trip to [place] which was about 5 hours  After this, she noted significant right lower back pain with radiation into her right leg  She is also experiencing numbness and weakness in her right lower extremity  She must use a cane to walk  Jahairaosmani Chaparro has a history of degenerative disc disease and herniated discs in her lumbar as well as cervical spine  Denies recent injury, bowel/bladder dysfunction, or saddle anesthesia  Her right leg has been going numb after sitting prolonged period of times    She feels as though the lidocaine patch helps with her symptoms but has not tried anything else for pain relief  Her chiropractor recommended an MRI of the lumbar spine         The following portions of the patient's history were reviewed and updated as appropriate:   She   Patient Active Problem List    Diagnosis Date Noted    Lumbar radiculopathy 05/06/2021    Class 1 obesity due to excess calories without serious comorbidity with body mass index (BMI) of 32 0 to 32 9 in adult 04/13/2021    Chronic fatigue 03/11/2020    B[facility] 01/15/2020    Kidney stones 05/20/2019    Shortness of breath 01/22/2019    Moderate persistent asthma without complication 09/46/3781    Migraine without status migrainosus, not intractable 10/23/2018    Carpal tunnel syndrome 07/18/2018    Cervical disc disorder 01/05/2018    Degeneration of lumbar intervertebral disc 01/05/2018    Intractable chronic migraine without aura and without status migrainosus 04/24/2017    ZAINA (generalized anxiety disorder) 03/07/2017    Intermittent asthma 03/07/2017    Primary fibromyalgia syndrome 03/07/2017    B12 deficiency 06/22/2012    Fibromyalgia 06/22/2012    IBS (irritable bowel syndrome) 06/22/2012    Dyslipidemia, goal LDL below 130 11/05/2010    Vitamin D deficiency 11/17/2009    Hypothyroidism 04/14/2008     Current Outpatient Medications   Medication Sig Dispense Refill    albuterol (2 5 mg/3 mL) 0 083 % nebulizer solution Take 1 vial (2 5 mg total) by nebulization every 6 (six) hours as needed for wheezing or shortness of breath 20 vial 0    baclofen 10 mg tablet       betamethasone dipropionate (DIPROSONE) 0 05 % cream APPLY A THIN LAYER TO THE AFFECTED AREA(S) BY TOPICAL ROUTE ONCE DAILY      busPIRone (BUSPAR) 15 mg tablet Take 1 tablet (15 mg total) by mouth 3 (three) times a day 270 tablet 0    Butalbital-APAP-Caffeine -40 MG CAPS TAKE 1 CAPSULE Daily PRN      celecoxib (CELEBREX) 200 mg capsule Take 1 capsule (200 mg total) by mouth daily 30 capsule 2    diclofenac sodium (VOLTAREN) 1 % APPLY 2 GRAM TO THE AFFECTED AREA(S) BY TOPICAL ROUTE 4 TIMES PER DAY      divalproex sodium (DEPAKOTE) 250 mg EC tablet Take 250 mg by mouth daily      fremanezumab-vfrm (Ajovy) 225 MG/1 5ML auto-injector Inject 225 mg under the skin every 30 (thirty) days      lidocaine (LIDODERM) 5 % Apply 1 patch topically daily as needed (back pain) Remove & Discard patch within 12 hours or as directed by MD Castro patch 1    loratadine (CLARITIN) 10 mg tablet Take 1 pill daily for allergies 90 tablet 1    meclizine (ANTIVERT) 25 mg tablet Take 25 mg by mouth Three times daily as needed      mometasone (ELOCON) 0 1 % cream Apply topically daily 45 g 0    naratriptan (AMERGE) 1 MG TABS Take 1 mg by mouth every 4 (four) hours as needed for migraine      ondansetron (ZOFRAN-ODT) 4 mg disintegrating tablet MAY PUT 1 TAB UNDERNEATH THE TONGUE EVERY 12 HOURS AS NEEDED FOR NAUSEA 60 tablet 0    phentermine 15 MG capsule Take 1 capsule (15 mg total) by mouth every morning 30 capsule 0    prochlorperazine (COMPAZINE) 10 mg tablet TAKE 1/2 TAB TWICE A DAY FOR SEVEN DAYS      Topiramate ER (Trokendi XR) 100 MG CP24 Take 100 mg by mouth 2 (two) times a day      Ubrogepant (Ubrelvy) 50 MG TABS       XIFAXAN 550 MG tablet       ziprasidone (GEODON) 60 mg capsule Take 1 capsule (60 mg total) by mouth daily with dinner 90 capsule 0    Respiratory Therapy Supplies (NEBULIZER) DONA by Does not apply route every 4 (four) hours while awake 90 each 1     No current facility-administered medications for this visit  She is allergic to doxycycline; erythromycin; other; latex; duloxetine; eletriptan; emgality [galcanezumab-gnlm]; and galcanezumab       Review of Systems   Constitutional: Negative  HENT: Negative  Respiratory: Negative  Cardiovascular: Negative      Musculoskeletal: Positive for arthralgias, back pain, gait problem and neck pain  Neurological: Positive for weakness (in RLE) and numbness (in RLE)  Psychiatric/Behavioral: Negative  /82   Pulse 76   Temp (!) 97 4 °F (36 3 °C)   Resp 19   Ht 5' 3 8" (1 621 m)   Wt 84 8 kg (187 lb)   SpO2 97%   BMI 32 30 kg/m²     Objective:     Physical Exam  Vitals signs and nursing note reviewed  Constitutional:       Appearance: She is well-developed  HENT:      Head: Normocephalic and atraumatic  Eyes:      Conjunctiva/sclera: Conjunctivae normal    Neck:      Musculoskeletal: Neck supple  Cardiovascular:      Rate and Rhythm: Normal rate and regular rhythm  Heart sounds: Normal heart sounds  No murmur  Pulmonary:      Effort: Pulmonary effort is normal  No respiratory distress  Breath sounds: Normal breath sounds  No wheezing or rales  Chest:      Chest wall: No tenderness  Musculoskeletal:      Comments:  No obvious deformity in lumbar spine  Limited range of motion due to pain  Strength 3/5 in right lower extremity and 5/5 in left lower extremity  Sensation to light touch decreased in right lower extremity  Unable to fully evaluate reflexes as patient was unable to sit on exam table  Neurological:      Mental Status: She is alert and oriented to person, place, and time  Psychiatric:         Behavior: Behavior normal          Thought Content:  Thought content normal          Judgment: Judgment normal

## 2021-05-10 ENCOUNTER — IMMUNIZATIONS (OUTPATIENT)
Dept: FAMILY MEDICINE CLINIC | Facility: HOSPITAL | Age: 51
End: 2021-05-10

## 2021-05-10 DIAGNOSIS — Z23 ENCOUNTER FOR IMMUNIZATION: Primary | ICD-10-CM

## 2021-05-10 PROCEDURE — 0002A SARS-COV-2 / COVID-19 MRNA VACCINE (PFIZER-BIONTECH) 30 MCG: CPT

## 2021-05-10 PROCEDURE — 91300 SARS-COV-2 / COVID-19 MRNA VACCINE (PFIZER-BIONTECH) 30 MCG: CPT

## 2021-05-11 ENCOUNTER — TELEPHONE (OUTPATIENT)
Dept: PSYCHIATRY | Facility: CLINIC | Age: 51
End: 2021-05-11

## 2021-05-11 NOTE — TELEPHONE ENCOUNTER
----- Message from Gurpreet Garcia sent at 5/11/2021  4:06 PM EDT -----  Regarding: Patient No Show  Misael [place] 05/11/21  for Astrid Harvey PA-C and did not call with proper notice to Cx appt   They are marked as a No Show for today's visit

## 2021-05-12 ENCOUNTER — TELEMEDICINE (OUTPATIENT)
Dept: BEHAVIORAL/MENTAL HEALTH CLINIC | Facility: CLINIC | Age: 51
End: 2021-05-12
Payer: MEDICARE

## 2021-05-12 ENCOUNTER — TELEPHONE (OUTPATIENT)
Dept: PSYCHIATRY | Facility: CLINIC | Age: 51
End: 2021-05-12

## 2021-05-12 DIAGNOSIS — F41.1 GAD (GENERALIZED ANXIETY DISORDER): Primary | ICD-10-CM

## 2021-05-12 DIAGNOSIS — F31.81 BIPOLAR 2 DISORDER (HCC): ICD-10-CM

## 2021-05-12 PROCEDURE — 90834 PSYTX W PT 45 MINUTES: CPT | Performed by: SOCIAL WORKER

## 2021-05-12 NOTE — PSYCH
Treatment Plan Tracking    # The updatedTreatment Plan not completed within required time limits due to:she was so upset about her social security she asked if we could do it next time  Donnell Aceves
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
## 2021-05-23 ENCOUNTER — HOSPITAL ENCOUNTER (OUTPATIENT)
Dept: MRI IMAGING | Facility: HOSPITAL | Age: 51
Discharge: HOME | End: 2021-05-23
Payer: MEDICARE

## 2021-05-23 DIAGNOSIS — M54.16 LUMBAR RADICULOPATHY: ICD-10-CM

## 2021-05-23 PROCEDURE — 72148 MRI LUMBAR SPINE W/O DYE: CPT

## 2021-05-23 PROCEDURE — G1004 CDSM NDSC: HCPCS
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
## 2021-06-04 ENCOUNTER — TELEMEDICINE (OUTPATIENT)
Dept: PSYCHIATRY | Facility: CLINIC | Age: 51
End: 2021-06-04
Payer: MEDICARE

## 2021-06-04 DIAGNOSIS — F31.81 BIPOLAR 2 DISORDER (HCC): Primary | ICD-10-CM

## 2021-06-04 DIAGNOSIS — F41.1 GAD (GENERALIZED ANXIETY DISORDER): ICD-10-CM

## 2021-06-04 PROCEDURE — 99213 OFFICE O/P EST LOW 20 MIN: CPT | Performed by: PHYSICIAN ASSISTANT

## 2021-06-04 RX ORDER — CLONAZEPAM 0.5 MG/1
0.25 TABLET ORAL DAILY PRN
Qty: 15 TABLET | Refills: 3 | Status: SHIPPED | OUTPATIENT
Start: 2021-06-04 | End: 2021-08-23 | Stop reason: SDUPTHER

## 2021-06-04 RX ORDER — ARIPIPRAZOLE 5 MG/1
5 TABLET ORAL DAILY
Qty: 90 TABLET | Refills: 1 | Status: SHIPPED | OUTPATIENT
Start: 2021-06-04 | End: 2021-06-08 | Stop reason: ALTCHOICE

## 2021-06-08 ENCOUNTER — TELEPHONE (OUTPATIENT)
Dept: PSYCHIATRY | Facility: CLINIC | Age: 51
End: 2021-06-08

## 2021-06-08 ENCOUNTER — TELEMEDICINE (OUTPATIENT)
Dept: BEHAVIORAL/MENTAL HEALTH CLINIC | Facility: CLINIC | Age: 51
End: 2021-06-08
Payer: MEDICARE

## 2021-06-08 DIAGNOSIS — F31.81 BIPOLAR 2 DISORDER (HCC): Primary | ICD-10-CM

## 2021-06-08 DIAGNOSIS — F41.1 GAD (GENERALIZED ANXIETY DISORDER): Primary | ICD-10-CM

## 2021-06-08 DIAGNOSIS — F31.81 BIPOLAR 2 DISORDER (HCC): ICD-10-CM

## 2021-06-08 PROCEDURE — 90834 PSYTX W PT 45 MINUTES: CPT | Performed by: SOCIAL WORKER

## 2021-06-08 NOTE — TELEPHONE ENCOUNTER
I contacted Real Benavides on cell/home phone # of record and Pt would rather try the Emmer Cap at this time  I formally discontinued the Aripiprazole and I reviewed the risks, benefits and SE of Vraylar  Return appt is for 7/2/2021    Pt accepts to start the new SGA and titrate as follows, and the Rx was e-scribed to her designated [facility]:  Emmer Cap 1 5mg (1) cap po qd x 1 week # 7, then 3mg qd # 30
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
## 2021-06-08 NOTE — TELEPHONE ENCOUNTER
Called patient to schedule f/u on 6/8/21  She stated that she wanted to inform you that she would like to discuss changing the new medication that was recently prescribed (Abilify)  She is interested in possibly trying the [address]  She stated that "after giving it a thought", she decided that she was no longer interested in taking the Abilify due to the side effects that can occur  Please advise

## 2021-06-15 ENCOUNTER — TELEMEDICINE (OUTPATIENT)
Dept: FAMILY MEDICINE CLINIC | Facility: CLINIC | Age: 51
End: 2021-06-15
Payer: MEDICARE

## 2021-06-15 VITALS — WEIGHT: 182 LBS | BODY MASS INDEX: 31.07 KG/M2 | HEIGHT: 64 IN

## 2021-06-15 DIAGNOSIS — B34.9 VIRAL INFECTION, UNSPECIFIED: Primary | ICD-10-CM

## 2021-06-15 DIAGNOSIS — R06.02 SHORTNESS OF BREATH: ICD-10-CM

## 2021-06-15 PROCEDURE — 99213 OFFICE O/P EST LOW 20 MIN: CPT | Performed by: FAMILY MEDICINE

## 2021-06-15 PROCEDURE — 3008F BODY MASS INDEX DOCD: CPT | Performed by: NURSE PRACTITIONER

## 2021-06-15 NOTE — PROGRESS NOTES
COVID-19 Outpatient Progress Note    Assessment/Plan:  Problem List Items Addressed This Visit        Other    Shortness of breath    Relevant Orders    XR chest pa & lateral      Other Visit Diagnoses     Viral infection, unspecified    -  Primary    Relevant Orders    Novel Coronavirus (Covid-19),PCR [facility] - Collected in Office       Disposition:     I recommended the patient to come to our office to perform PCR testing for COVID-19  Due to immunocompromised state, fever and SOB, will check XR  Advised albuterol use every 4 hours PRN  Advised Tylenol use PRN for fever  Patient to come to office for COVID swab  Advised patient to go to ED or urgent care if SOB worsens  I have spent 10 minutes directly with the patient  Encounter provider Brigitte Pang DO    Provider located at [facility] [address] [facility]  [address] [facility] 2  [address]  [phone]    Recent Visits  No visits were found meeting these conditions  Showing recent visits within past 7 days and meeting all other requirements  Today's Visits  Date Type Provider Dept   06/15/21 Telemedicine Brigitte Pang DO [facility] [address]   Showing today's visits and meeting all other requirements  Future Appointments  No visits were found meeting these conditions  Showing future appointments within next 150 days and meeting all other requirements     This virtual check-in was done via [employer] and patient was informed that this is a secure, HIPAA-compliant platform  She agrees to proceed  Patient agrees to participate in a virtual check in via telephone or video visit instead of presenting to the office to address urgent/immediate medical needs  Patient is aware this is a billable service  After connecting through [facility], the patient was identified by name and date of birth   Edward Mcnealel was informed that this was a telemedicine visit and that the exam was being conducted confidentially over secure lines  My office door was closed  No one else was in the room  Etta Mclaughlin acknowledged consent and understanding of privacy and security of the telemedicine visit  I informed the patient that I have reviewed her record in Epic and presented the opportunity for her to ask any questions regarding the visit today  The patient agreed to participate  Subjective:   Etta Mclaughlin is a 53 y o  female who is concerned about COVID-19  Patient's symptoms include fever, fatigue, nasal congestion, cough, chest tightness and headache  Patient denies chills, malaise, rhinorrhea, sore throat, anosmia, loss of taste, shortness of breath, abdominal pain, nausea, vomiting, diarrhea and myalgias  Date of symptom onset: 6/13/2021    Exposure:   Contact with a person who is under investigation (PUI) for or who is positive for COVID-19 within the last 14 days?: Yes    Hospitalized recently for fever and/or lower respiratory symptoms?: No      Currently a healthcare worker that is involved in direct patient care?: No      Works in a special setting where the risk of COVID-19 transmission may be high? (this may include long-term care, correctional and custodial facilities; homeless shelters; assisted-living facilities and group homes ): No      Resident in a special setting where the risk of COVID-19 transmission may be high? (this may include long-term care, correctional and custodial facilities; homeless shelters; assisted-living facilities and group homes ): No      Completed COVID vaccine on 5/10/21    Has not been using her inhaler  Taking Tylenol with some help         Lab Results   Component Value Date    SARSCOV2 Not Detected 12/11/2020    SARSCOV2 Not Detected 09/18/2020     Past Medical History:   Diagnosis Date    Anxiety     Anxiety     Asthma     Bipolar disorder ([facility] [address] )     Carpal tunnel syndrome     Chronic pain     lower back    Depression     Disease of thyroid gland     Dyslipidemia     Fibromyalgia     Irritable bowel     Kidney stones     Kidney stones 2019    Migraine     Obesity (BMI 30 0-34  9)     Psychiatric disorder     depression/anxiety    Suicide attempt [facility])     as teen    Vitamin D deficiency      Past Surgical History:   Procedure Laterality Date    BUNIONECTOMY       SECTION      CHOLECYSTECTOMY      PARTIAL HYSTERECTOMY      TONSILLECTOMY      TUBAL LIGATION       Current Outpatient Medications   Medication Sig Dispense Refill    albuterol (2 5 mg/3 mL) 0 083 % nebulizer solution Take 1 vial (2 5 mg total) by nebulization every 6 (six) hours as needed for wheezing or shortness of breath 20 vial 0    baclofen 10 mg tablet       betamethasone dipropionate (DIPROSONE) 0 05 % cream APPLY A THIN LAYER TO THE AFFECTED AREA(S) BY TOPICAL ROUTE ONCE DAILY      Butalbital-APAP-Caffeine -40 MG CAPS TAKE 1 CAPSULE Daily PRN      cariprazine (VRAYLAR) 1 5 MG capsule Take 1 capsule (1 5 mg total) by mouth daily Start with this dose first 7 capsule 0    cariprazine (VRAYLAR) 3 MG capsule Take 1 capsule (3 mg total) by mouth daily Start this dose after the 1 5mg dose is finished 30 capsule 0    celecoxib (CELEBREX) 200 mg capsule Take 1 capsule (200 mg total) by mouth daily 30 capsule 2    clonazePAM (KlonoPIN) 0 5 mg tablet Take 0 5 tablets (0 25 mg total) by mouth daily as needed for anxiety 15 tablet 3    diclofenac sodium (VOLTAREN) 1 % APPLY 2 GRAM TO THE AFFECTED AREA(S) BY TOPICAL ROUTE 4 TIMES PER DAY      divalproex sodium (DEPAKOTE) 250 mg EC tablet Take 250 mg by mouth daily      fremanezumab-vfrm (Ajovy) 225 MG/1 5ML auto-injector Inject 225 mg under the skin every 30 (thirty) days      lidocaine (LIDODERM) 5 % Apply 1 patch topically daily as needed (back pain) Remove & Discard patch within 12 hours or as directed by MD 6 patch 1    loratadine (CLARITIN) 10 mg tablet Take 1 pill daily for allergies 90 tablet 1    meclizine (ANTIVERT) 25 mg tablet Take 25 mg by mouth Three times daily as needed      mometasone (ELOCON) 0 1 % cream Apply topically daily 45 g 0    naratriptan (AMERGE) 1 MG TABS Take 1 mg by mouth every 4 (four) hours as needed for migraine      ondansetron (ZOFRAN-ODT) 4 mg disintegrating tablet MAY PUT 1 TAB UNDERNEATH THE TONGUE EVERY 12 HOURS AS NEEDED FOR NAUSEA 60 tablet 0    phentermine 15 MG capsule Take 1 capsule (15 mg total) by mouth every morning 30 capsule 0    prochlorperazine (COMPAZINE) 10 mg tablet TAKE 1/2 TAB TWICE A DAY FOR SEVEN DAYS      Topiramate ER (Trokendi XR) 100 MG CP24 Take 100 mg by mouth 2 (two) times a day      Ubrogepant (Ubrelvy) 50 MG TABS       XIFAXAN 550 MG tablet       Respiratory Therapy Supplies (NEBULIZER) DONA by Does not apply route every 4 (four) hours while awake 90 each 1     No current facility-administered medications for this visit  Allergies   Allergen Reactions    Doxycycline Rash    Erythromycin Rash    Other Edema and Wheezing     jalapeno    Latex Rash    Duloxetine      Other reaction(s): Nausea, Loopy    Eletriptan      Pain in lower jaw with pressure in throat    Emgality [Galcanezumab-Gnlm]     Galcanezumab      rash      Review of Systems   Constitutional: Positive for fatigue and fever  Negative for chills  HENT: Positive for congestion, ear pain and postnasal drip  Negative for rhinorrhea and sore throat  Respiratory: Positive for cough and chest tightness  Negative for shortness of breath  Gastrointestinal: Negative for abdominal pain, constipation, diarrhea, nausea and vomiting  Musculoskeletal: Negative for myalgias  Neurological: Positive for headaches  Objective:  Vitals:    06/15/21 1152   Weight: 82 6 kg (182 lb)   Height: 5' 3 8" (1 621 m)     Physical Exam  Vitals reviewed  Constitutional:       General: She is not in acute distress       Appearance: Normal appearance  She is ill-appearing  HENT:      Head: Normocephalic and atraumatic  Right Ear: External ear normal       Left Ear: External ear normal       Nose: Congestion present  Mouth/Throat:      Mouth: Mucous membranes are moist    Eyes:      Extraocular Movements: Extraocular movements intact  Conjunctiva/sclera: Conjunctivae normal    Pulmonary:      Effort: Pulmonary effort is normal  No respiratory distress  Neurological:      Mental Status: She is alert  Mental status is at baseline  VIRTUAL VISIT DISCLAIMER    Kevan Rena acknowledges that she has consented to an online visit or consultation  She understands that the online visit is based solely on information provided by her, and that, in the absence of a face-to-face physical evaluation by the physician, the diagnosis she receives is both limited and provisional in terms of accuracy and completeness  This is not intended to replace a full medical face-to-face evaluation by the physician  Kevan Chase understands and accepts these terms      Loc So DO  [facility]  6/15/2021 3:06 PM

## 2021-06-16 ENCOUNTER — HOSPITAL ENCOUNTER (OUTPATIENT)
Dept: RADIOLOGY | Facility: HOSPITAL | Age: 51
Discharge: HOME | End: 2021-06-16
Attending: FAMILY MEDICINE
Payer: MEDICARE

## 2021-06-16 DIAGNOSIS — R06.02 SHORTNESS OF BREATH: ICD-10-CM

## 2021-06-16 PROCEDURE — 71046 X-RAY EXAM CHEST 2 VIEWS: CPT

## 2021-06-23 ENCOUNTER — TELEMEDICINE (OUTPATIENT)
Dept: BEHAVIORAL/MENTAL HEALTH CLINIC | Facility: CLINIC | Age: 51
End: 2021-06-23
Payer: MEDICARE

## 2021-06-23 DIAGNOSIS — F31.81 BIPOLAR 2 DISORDER (HCC): ICD-10-CM

## 2021-06-23 DIAGNOSIS — F41.1 GAD (GENERALIZED ANXIETY DISORDER): ICD-10-CM

## 2021-06-23 PROCEDURE — 90834 PSYTX W PT 45 MINUTES: CPT | Performed by: SOCIAL WORKER

## 2021-06-24 NOTE — PSYCH
This note was not shared with the patient due to this is a psychotherapy note    Virtual Regular Visit      Assessment/Plan:    Problem List Items Addressed This Visit        Other    ZAINA (generalized anxiety disorder)    Bipolar 2 disorder ([facility] [address] )          Goals addressed in session: Goal 1, Goal 2 and Goal 3           Reason for visit is   Chief Complaint   Patient presents with    Virtual Regular Visit        Encounter provider Balta Cole    Provider located at [address] [id number] [phone]      Recent Visits  Date Type Provider Dept   06/23/21 [id number]   Showing recent visits within past 7 days and meeting all other requirements  Future Appointments  No visits were found meeting these conditions  Showing future appointments within next 150 days and meeting all other requirements       The patient was identified by name and date of birth  Jennifer Justice was informed that this is a telemedicine visit and that the visit is being conducted through [employer] and patient was informed that this is a secure, HIPAA-compliant platform  She agrees to proceed     My office door was closed  No one else was in the room  She acknowledged consent and understanding of privacy and security of the video platform  The patient has agreed to participate and understands they can discontinue the visit at any time  Patient is aware this is a billable service  Subjective  Jennifer Justice is a 53 y o  female          HPI     Past Medical History:   Diagnosis Date    Anxiety     Anxiety     Asthma     Bipolar disorder ([facility] [address] )     Carpal tunnel syndrome     Chronic pain     lower back    Depression     Disease of thyroid gland     Dyslipidemia     Fibromyalgia     Irritable bowel     Kidney stones     Kidney stones 5/20/2019    Migraine     Obesity (BMI 30 0-34  9)     Psychiatric disorder     depression/anxiety    Suicide attempt [facility])     as teen    Vitamin D deficiency        Past Surgical History:   Procedure Laterality Date    BUNIONECTOMY       SECTION      CHOLECYSTECTOMY      PARTIAL HYSTERECTOMY      TONSILLECTOMY      TUBAL LIGATION         Current Outpatient Medications   Medication Sig Dispense Refill    albuterol (2 5 mg/3 mL) 0 083 % nebulizer solution Take 1 vial (2 5 mg total) by nebulization every 6 (six) hours as needed for wheezing or shortness of breath 20 vial 0    baclofen 10 mg tablet       betamethasone dipropionate (DIPROSONE) 0 05 % cream APPLY A THIN LAYER TO THE AFFECTED AREA(S) BY TOPICAL ROUTE ONCE DAILY      Butalbital-APAP-Caffeine -40 MG CAPS TAKE 1 CAPSULE Daily PRN      cariprazine (VRAYLAR) 1 5 MG capsule Take 1 capsule (1 5 mg total) by mouth daily Start with this dose first 7 capsule 0    cariprazine (VRAYLAR) 3 MG capsule Take 1 capsule (3 mg total) by mouth daily Start this dose after the 1 5mg dose is finished 30 capsule 0    celecoxib (CELEBREX) 200 mg capsule Take 1 capsule (200 mg total) by mouth daily 30 capsule 2    clonazePAM (KlonoPIN) 0 5 mg tablet Take 0 5 tablets (0 25 mg total) by mouth daily as needed for anxiety 15 tablet 3    diclofenac sodium (VOLTAREN) 1 % APPLY 2 GRAM TO THE AFFECTED AREA(S) BY TOPICAL ROUTE 4 TIMES PER DAY      divalproex sodium (DEPAKOTE) 250 mg EC tablet Take 250 mg by mouth daily      fremanezumab-vfrm (Ajovy) 225 MG/1 5ML auto-injector Inject 225 mg under the skin every 30 (thirty) days      lidocaine (LIDODERM) 5 % Apply 1 patch topically daily as needed (back pain) Remove & Discard patch within 12 hours or as directed by MD 6 patch 1    loratadine (CLARITIN) 10 mg tablet Take 1 pill daily for allergies 90 tablet 1    meclizine (ANTIVERT) 25 mg tablet Take 25 mg by mouth Three times daily as needed      mometasone (ELOCON) 0 1 % cream Apply topically daily 45 g 0    naratriptan (AMERGE) 1 MG TABS Take 1 mg by mouth every 4 (four) hours as needed for migraine      ondansetron (ZOFRAN-ODT) 4 mg disintegrating tablet MAY PUT 1 TAB UNDERNEATH THE TONGUE EVERY 12 HOURS AS NEEDED FOR NAUSEA 60 tablet 0    phentermine 15 MG capsule Take 1 capsule (15 mg total) by mouth every morning 30 capsule 0    prochlorperazine (COMPAZINE) 10 mg tablet TAKE 1/2 TAB TWICE A DAY FOR SEVEN DAYS      Respiratory Therapy Supplies (NEBULIZER) DONA by Does not apply route every 4 (four) hours while awake 90 each 1    Topiramate ER (Trokendi XR) 100 MG CP24 Take 100 mg by mouth 2 (two) times a day      Ubrogepant (Ubrelvy) 50 MG TABS       XIFAXAN 550 MG tablet        No current facility-administered medications for this visit  Allergies   Allergen Reactions    Doxycycline Rash    Erythromycin Rash    Other Edema and Wheezing     jalapeno    Latex Rash    Duloxetine      Other reaction(s): Nausea, Loopy    Eletriptan      Pain in lower jaw with pressure in throat    Emgality [Galcanezumab-Gnlm]     Galcanezumab      rash       Review of Systems Data:Belem discussed how she is trying to manage her anxiety and draw boundaries with her dysfunctional partner  She did share when her stress is high this is when her IBS is greatly affected  She recently put a bid in for a house and she discussed some of the issues she found out about the house after wards  Assessment: Cl Fabian is less anxious now that she is leaving her partner however she has new stressors with the home she bought  We discussed mindfulness, CBT and radical acceptance  Plan: Continue to help her progress on her goals  Video Exam    There were no vitals filed for this visit      Physical Exam N/A    I spent 45 minutes directly with the patient during this visit      VIRTUAL VISIT [address] acknowledges that she has consented to an online visit or consultation  She understands that the online visit is based solely on information provided by her, and that, in the absence of a face-to-face physical evaluation by the physician, the diagnosis she receives is both limited and provisional in terms of accuracy and completeness  This is not intended to replace a full medical face-to-face evaluation by the physician  Dameon Pérez understands and accepts these terms

## 2021-07-02 ENCOUNTER — TELEPHONE (OUTPATIENT)
Dept: PSYCHIATRY | Facility: CLINIC | Age: 51
End: 2021-07-02

## 2021-07-07 ENCOUNTER — TELEMEDICINE (OUTPATIENT)
Dept: FAMILY MEDICINE CLINIC | Facility: CLINIC | Age: 51
End: 2021-07-07
Payer: MEDICARE

## 2021-07-07 DIAGNOSIS — R19.7 DIARRHEA, UNSPECIFIED TYPE: Primary | ICD-10-CM

## 2021-07-07 PROCEDURE — 99213 OFFICE O/P EST LOW 20 MIN: CPT | Performed by: NURSE PRACTITIONER

## 2021-07-07 RX ORDER — DICYCLOMINE HYDROCHLORIDE 10 MG/1
10 CAPSULE ORAL
Qty: 90 CAPSULE | Refills: 0 | Status: SHIPPED | OUTPATIENT
Start: 2021-07-07 | End: 2022-04-11 | Stop reason: ALTCHOICE

## 2021-07-07 NOTE — PROGRESS NOTES
Virtual Regular Visit      Assessment/Plan:    Problem List Items Addressed This Visit     None      Visit Diagnoses     Diarrhea, unspecified type    -  Primary    Relevant Medications    dicyclomine (BENTYL) 10 mg capsule    Other Relevant Orders    CBC and differential    Comprehensive metabolic panel      Offered patient order for stool culture, but declined at this time  Encouraged hydration with Pedialyte, advised to try to avoid Gatorade/Powerade  Educated on B R A T  diet and bowel rest   Advised to change positions slowly to avoid dizziness  To call office for any worsening/concerning symptoms  Reason for visit is   Chief Complaint   Patient presents with    Virtual Regular Visit        Encounter provider ERICKA Acuna    Provider located at [facility] [address] [facility]  [address] [facility] [address] [phone]      Recent Visits  No visits were found meeting these conditions  Showing recent visits within past 7 days and meeting all other requirements  Today's Visits  Date Type Provider Dept   [facility] [address]   Showing today's visits and meeting all other requirements  Future Appointments  No visits were found meeting these conditions  Showing future appointments within next 150 days and meeting all other requirements       The patient was identified by name and date of birth  Ruperto Hernandez was informed that this is a telemedicine visit and that the visit is being conducted through [address] [place] [address] Now and patient was informed that this is a secure, HIPAA-compliant platform  She agrees to proceed     My office door was closed  No one else was in the room  She acknowledged consent and understanding of privacy and security of the video platform  The patient has agreed to participate and understands they can discontinue the visit at any time      Patient is aware this is a billable service  Subjective  Nader Martinez is a 53 y o  female    Patient presents via [facility] for diarrhea x's 3 days  Patient notes has been having multiple episodes of loose, watery stools  Denies blood or mucous  Denies recent antibiotic use or travel  Denies sick contacts  Notes abdominal cramping  Denies n/v/urinary symptoms  Patient notes dizziness when changing positions  Diarrhea   This is a new problem  The current episode started in the past 7 days  The problem occurs 5 to 10 times per day  The problem has been unchanged  The stool consistency is described as watery  The patient states that diarrhea does not awaken her from sleep  Associated symptoms include abdominal pain  Pertinent negatives include no bloating, chills, coughing, fever, headaches, myalgias, sweats, URI, vomiting or weight loss  Associated symptoms comments: dizziness  Exacerbated by: food  There are no known risk factors  She has tried nothing for the symptoms  The treatment provided no relief  There is no history of bowel resection, malabsorption or a recent abdominal surgery  Past Medical History:   Diagnosis Date    Anxiety     Anxiety     Asthma     Bipolar disorder ([address] )     Carpal tunnel syndrome     Chronic pain     lower back    Depression     Disease of thyroid gland     Dyslipidemia     Fibromyalgia     Irritable bowel     Kidney stones     Kidney stones 2019    Migraine     Obesity (BMI 30 0-34  9)     Psychiatric disorder     depression/anxiety    Suicide attempt [facility])     as teen    Vitamin D deficiency        Past Surgical History:   Procedure Laterality Date    BUNIONECTOMY       SECTION      CHOLECYSTECTOMY      PARTIAL HYSTERECTOMY      TONSILLECTOMY      TUBAL LIGATION         Current Outpatient Medications   Medication Sig Dispense Refill    albuterol (2 5 mg/3 mL) 0 083 % nebulizer solution Take 1 vial (2 5 mg total) by nebulization every 6 (six) hours as needed for wheezing or shortness of breath 20 vial 0    baclofen 10 mg tablet       betamethasone dipropionate (DIPROSONE) 0 05 % cream APPLY A THIN LAYER TO THE AFFECTED AREA(S) BY TOPICAL ROUTE ONCE DAILY      Butalbital-APAP-Caffeine -40 MG CAPS TAKE 1 CAPSULE Daily PRN      cariprazine (VRAYLAR) 1 5 MG capsule Take 1 capsule (1 5 mg total) by mouth daily Start with this dose first 7 capsule 0    cariprazine (VRAYLAR) 3 MG capsule Take 1 capsule (3 mg total) by mouth daily Start this dose after the 1 5mg dose is finished 30 capsule 0    celecoxib (CELEBREX) 200 mg capsule Take 1 capsule (200 mg total) by mouth daily 30 capsule 2    clonazePAM (KlonoPIN) 0 5 mg tablet Take 0 5 tablets (0 25 mg total) by mouth daily as needed for anxiety 15 tablet 3    diclofenac sodium (VOLTAREN) 1 % APPLY 2 GRAM TO THE AFFECTED AREA(S) BY TOPICAL ROUTE 4 TIMES PER DAY      dicyclomine (BENTYL) 10 mg capsule Take 1 capsule (10 mg total) by mouth 3 (three) times a day before meals 90 capsule 0    divalproex sodium (DEPAKOTE) 250 mg EC tablet Take 250 mg by mouth daily      fremanezumab-vfrm (Ajovy) 225 MG/1 5ML auto-injector Inject 225 mg under the skin every 30 (thirty) days      lidocaine (LIDODERM) 5 % Apply 1 patch topically daily as needed (back pain) Remove & Discard patch within 12 hours or as directed by MD 6 patch 1    loratadine (CLARITIN) 10 mg tablet Take 1 pill daily for allergies 90 tablet 1    meclizine (ANTIVERT) 25 mg tablet Take 25 mg by mouth Three times daily as needed      mometasone (ELOCON) 0 1 % cream Apply topically daily 45 g 0    naratriptan (AMERGE) 1 MG TABS Take 1 mg by mouth every 4 (four) hours as needed for migraine      ondansetron (ZOFRAN-ODT) 4 mg disintegrating tablet MAY PUT 1 TAB UNDERNEATH THE TONGUE EVERY 12 HOURS AS NEEDED FOR NAUSEA 60 tablet 0    phentermine 15 MG capsule Take 1 capsule (15 mg total) by mouth every morning 30 capsule 0    prochlorperazine (COMPAZINE) 10 mg tablet TAKE 1/2 TAB TWICE A DAY FOR SEVEN DAYS      Respiratory Therapy Supplies (NEBULIZER) DONA by Does not apply route every 4 (four) hours while awake 90 each 1    Topiramate ER (Trokendi XR) 100 MG CP24 Take 100 mg by mouth 2 (two) times a day      Ubrogepant (Ubrelvy) 50 MG TABS       XIFAXAN 550 MG tablet        No current facility-administered medications for this visit  Allergies   Allergen Reactions    Doxycycline Rash    Erythromycin Rash    Other Edema and Wheezing     jalapeno    Latex Rash    Duloxetine      Other reaction(s): Nausea, Loopy    Eletriptan      Pain in lower jaw with pressure in throat    Emgality [Galcanezumab-Gnlm]     Galcanezumab      rash       Review of Systems   Constitutional: Negative for chills, fever and weight loss  Respiratory: Negative for cough and shortness of breath  Cardiovascular: Negative for chest pain  Gastrointestinal: Positive for abdominal pain and diarrhea  Negative for bloating, blood in stool, nausea and vomiting  Genitourinary: Negative for decreased urine volume, difficulty urinating, dysuria, flank pain, frequency and urgency  Musculoskeletal: Negative for myalgias  Skin: Negative for color change, pallor and rash  Neurological: Positive for dizziness  Negative for syncope, weakness, numbness and headaches  Video Exam    There were no vitals filed for this visit  Physical Exam  Constitutional:       General: She is not in acute distress  Appearance: Normal appearance  She is not ill-appearing  HENT:      Head: Normocephalic and atraumatic  Right Ear: External ear normal       Left Ear: External ear normal       Nose: Nose normal       Mouth/Throat:      Mouth: Mucous membranes are moist    Pulmonary:      Effort: Pulmonary effort is normal  No respiratory distress  Skin:     Coloration: Skin is not pale     Neurological:      Mental Status: She is alert and oriented to person, place, and time  Psychiatric:         Mood and Affect: Mood normal          Behavior: Behavior normal           I spent 10 minutes directly with the patient during this visit      3 Jaja Meneses acknowledges that she has consented to an online visit or consultation  She understands that the online visit is based solely on information provided by her, and that, in the absence of a face-to-face physical evaluation by the physician, the diagnosis she receives is both limited and provisional in terms of accuracy and completeness  This is not intended to replace a full medical face-to-face evaluation by the physician  Magdy Austin understands and accepts these terms

## 2021-07-07 NOTE — PATIENT INSTRUCTIONS
Acute Diarrhea   AMBULATORY CARE:   Acute diarrhea  starts quickly and lasts a short time, usually 1 to 3 days  It can last up to 2 weeks  Signs and symptoms that may happen with diarrhea:  You may have 3 or more episodes of diarrhea  It may be hard to control your diarrhea  You may also have any of the following:  · Fever and chills    · Headache or abdominal pain    · Nausea and vomiting    · Symptoms of dehydration such as thirst, decreased urination, dry skin, sunken eyes, or fast, pounding heartbeat    Seek care immediately if:   · You feel confused  · Your heartbeat is faster than usual      · Your eyes look deeply sunken, or you have no tears when you cry  · You urinate less than usual, or your urine is dark yellow  · You have blood or mucus in your bowel movements  · You have severe abdominal pain  · You are unable to drink any liquids  Contact your healthcare provider if:  · Your symptoms do not get better with treatment  · You have a fever higher than 101 3°F (38 5°C)  · You have trouble eating and drinking because you are vomiting  · Your diarrhea does not get better in 7 days  · You have questions or concerns about your condition or care  Treatment for acute diarrhea  may include medicines to slow or stop your diarrhea  You may also need medicine to treat an infection  Self-care:   · Drink liquids as directed  Liquids will help prevent dehydration caused by diarrhea  Ask your healthcare provider how much liquid to drink each day and which liquids are best for you  You may need to drink an oral rehydration solution (ORS)  An ORS has the right amounts of water, salts, and sugar you need to replace body fluids  You can buy an ORS at most grocery stores and pharmacies  · Eat foods that are easy to digest   Examples include rice, lentils, cereal, bananas, potatoes, and bread   It also includes some fruits (bananas, melon), well-cooked vegetables, and lean meats  Do not eat foods high in fiber, fat, and sugar  Also, do not drink alcohol until your diarrhea is gone  Prevent diarrhea:   · Wash your hands often  Use soap and water  Wash your hands before you eat or prepare food  Also wash your hands after you use the bathroom  Use an alcohol-based hand gel when soap and water are not available  · Keep bathroom surfaces clean  This helps prevent the spread of germs that cause acute diarrhea  · Wash fruits and vegetables well before you eat them  This can help remove germs that cause diarrhea  If possible, remove the skin from fruits and vegetables, or cook them well before you eat them  · [employer] and poultry as directed  Meat includes beef and pork  Poultry includes chicken, turkey, and duck  ? Cook ground meat  to 160°F      ? Cook ground poultry, whole poultry, or cuts of poultry  to at least 165°F  Remove the poultry from heat  Let it stand for 3 minutes before you eat it  ? Cook whole cuts of meat other than poultry  to at least 145°F  Remove the meat from heat  Let it stand for 3 minutes before you eat it  · Wash dishes that have touched raw meat or poultry with hot water and soap  This includes cutting boards, utensils, dishes, and serving containers  · Place raw or cooked meat or poultry in the refrigerator as soon as possible  Bacteria can grow in meat that is left at room temperature too long  · Do not eat raw or undercooked oysters, clams, or mussels  These foods may be contaminated and cause infection  · Drink only filtered or treated water when you travel  Do not put ice in your drinks  Drink bottled water whenever possible  Follow up with your healthcare provider as directed:  Write down your questions so you remember to ask them during your visits  © Copyright [facility] [address] Information is for End User's use only and may not be sold, redistributed or otherwise used for commercial purposes   All illustrations and images included in CareNotes® are the copyrighted property of [employer]  or Jagdish Harper   The above information is an  only  It is not intended as medical advice for individual conditions or treatments  Talk to your doctor, nurse or pharmacist before following any medical regimen to see if it is safe and effective for you  Nutrition Tips for Relief of Diarrhea   AMBULATORY CARE:   Nutrition tips for relief of diarrhea  are diet changes you can make to help relieve or stop diarrhea  These changes include limiting or avoiding foods and liquids that are high in sugar, fat, fiber, and lactose  Lactose is a sugar found in milk products  Milk products can cause diarrhea in people who are lactose intolerant  You should also drink extra liquids to replace fluids that are lost when you have diarrhea  Diarrhea can lead to dehydration  Foods to limit or avoid:   · Dairy:      ? Whole milk    ? Half-and-half, cream, and sour cream    ? Regular (whole milk) ice cream    · Grains:      ? Whole wheat and whole grain breads, pasta, cereals, and crackers    ? Jenness Spillers and wild rice    ? Breads and cereals with seeds or nuts    ? Popcorn    · Fruit and vegetables:      ? All raw fruits, except bananas and melon    ? Dried fruits, including prunes and raisins    ? Canned fruit in heavy syrup    ? Prune juice and any fruit juice with pulp    ? Raw vegetables, except lettuce     ? Fried vegetables    ? Corn, raw and cooked broccoli, cabbage, cauliflower, and morena greens    · Protein:      ? Fried meat, poultry, and fish    ? High-fat luncheon meats, such as bologna    ? Fatty meats, such as sausage, lee, and hot dogs    ? Beans and nuts    · Liquids:      ? Sodas and fruit-flavored drinks    ? Drinks that contain caffeine, such as energy drinks, coffee, and tea     ?  Drinks that contain alcohol or sugar alcohol, such as sorbitol    Foods and liquids you may eat or drink:  Most people can tolerate the foods and liquids listed below  If any of them make your symptoms worse, stop eating or drinking them until you feel better  If you are lactose intolerant, avoid milk products  · Dairy:      ? Skim or low-fat milk or evaporated milk    ? Soy milk or buttermilk     ? Low-fat, part-skim, and aged cheese    ? Yogurt, low-fat ice cream, or sherbert    · Grains:  (Choose foods with less than 2 grams of dietary fiber per serving )     ? White or refined flour breads, bagels, pasta, and crackers    ? Cold or hot cereals made from white or refined flour such as puffed rice, cornflakes, or cream of wheat    ? White rice    · Fruit and vegetables:      ? Bananas or melon    ? Fruit juice without pulp, except prune juice    ? Canned fruit in juice or light syrup    ? Lettuce and most well-cooked vegetables without seeds or skins     ? Strained vegetable juice    · Protein:      ? Tender, well-cooked meat, poultry, or fish    ? Well-cooked eggs or soy foods (cooked without added fat)    ? Smooth nut butters    · Fats:  (Limit fats to less than 8 teaspoons a day)     ? Oil, butter, or margarine, or mayonnaise    ? Cream cheese or salad dressings    · Liquids:      ? For infants, breast milk or formula    ? Oral rehydration solution     ? Decaffeinated coffee or caffeine-free teas    ? Soft drinks without caffeine    Other guidelines to follow:   · Drink liquids as directed  You may need to drink more liquids than usual to prevent dehydration  Ask how much liquid to drink each day and which liquids are best for you  You may need to drink an oral rehydration solution (ORS)  An ORS helps replace fluids and electrolytes that you lose when you have diarrhea  · Eat small meals or snacks every 3 to 4 hours  instead of large meals  Continue eating even if you still have diarrhea  Your diarrhea will continue for a few days but should gradually go away      Follow up with your healthcare provider as directed: Write down your questions so you remember to ask them during your visits  © Copyright [address] Information is for End User's use only and may not be sold, redistributed or otherwise used for commercial purposes  All illustrations and images included in CareNotes® are the copyrighted property of [employer]  or Jagdish Leiva  The above information is an  only  It is not intended as medical advice for individual conditions or treatments  Talk to your doctor, nurse or pharmacist before following any medical regimen to see if it is safe and effective for you

## 2021-07-16 ENCOUNTER — TELEPHONE (OUTPATIENT)
Dept: FAMILY MEDICINE CLINIC | Facility: CLINIC | Age: 51
End: 2021-07-16

## 2021-07-16 ENCOUNTER — APPOINTMENT (OUTPATIENT)
Dept: LAB | Facility: HOSPITAL | Age: 51
End: 2021-07-16
Payer: MEDICARE

## 2021-07-16 DIAGNOSIS — E03.9 HYPOTHYROIDISM, ADULT: Primary | ICD-10-CM

## 2021-07-16 DIAGNOSIS — R19.7 DIARRHEA, UNSPECIFIED TYPE: ICD-10-CM

## 2021-07-16 LAB
ALBUMIN SERPL BCP-MCNC: 3.8 G/DL (ref 3.5–5)
ALP SERPL-CCNC: 74 U/L (ref 46–116)
ALT SERPL W P-5'-P-CCNC: 22 U/L (ref 12–78)
ANION GAP SERPL CALCULATED.3IONS-SCNC: 10 MMOL/L (ref 4–13)
AST SERPL W P-5'-P-CCNC: 15 U/L (ref 5–45)
BASOPHILS # BLD AUTO: 0.01 THOUSANDS/ΜL (ref 0–0.1)
BASOPHILS NFR BLD AUTO: 0 % (ref 0–1)
BILIRUB SERPL-MCNC: 0.3 MG/DL (ref 0.2–1)
BUN SERPL-MCNC: 12 MG/DL (ref 5–25)
CALCIUM SERPL-MCNC: 8.8 MG/DL (ref 8.3–10.1)
CHLORIDE SERPL-SCNC: 110 MMOL/L (ref 100–108)
CO2 SERPL-SCNC: 23 MMOL/L (ref 21–32)
CREAT SERPL-MCNC: 0.84 MG/DL (ref 0.6–1.3)
EOSINOPHIL # BLD AUTO: 0.06 THOUSAND/ΜL (ref 0–0.61)
EOSINOPHIL NFR BLD AUTO: 2 % (ref 0–6)
ERYTHROCYTE [DISTWIDTH] IN BLOOD BY AUTOMATED COUNT: 13.7 % (ref 11.6–15.1)
GFR SERPL CREATININE-BSD FRML MDRD: 82 ML/MIN/1.73SQ M
GLUCOSE P FAST SERPL-MCNC: 90 MG/DL (ref 65–99)
HCT VFR BLD AUTO: 41.2 % (ref 34.8–46.1)
HGB BLD-MCNC: 13.2 G/DL (ref 11.5–15.4)
IMM GRANULOCYTES # BLD AUTO: 0.01 THOUSAND/UL (ref 0–0.2)
IMM GRANULOCYTES NFR BLD AUTO: 0 % (ref 0–2)
LYMPHOCYTES # BLD AUTO: 1.31 THOUSANDS/ΜL (ref 0.6–4.47)
LYMPHOCYTES NFR BLD AUTO: 34 % (ref 14–44)
MCH RBC QN AUTO: 29.9 PG (ref 26.8–34.3)
MCHC RBC AUTO-ENTMCNC: 32 G/DL (ref 31.4–37.4)
MCV RBC AUTO: 93 FL (ref 82–98)
MONOCYTES # BLD AUTO: 0.2 THOUSAND/ΜL (ref 0.17–1.22)
MONOCYTES NFR BLD AUTO: 5 % (ref 4–12)
NEUTROPHILS # BLD AUTO: 2.26 THOUSANDS/ΜL (ref 1.85–7.62)
NEUTS SEG NFR BLD AUTO: 59 % (ref 43–75)
NRBC BLD AUTO-RTO: 0 /100 WBCS
PLATELET # BLD AUTO: 292 THOUSANDS/UL (ref 149–390)
PMV BLD AUTO: 10 FL (ref 8.9–12.7)
POTASSIUM SERPL-SCNC: 4.2 MMOL/L (ref 3.5–5.3)
PROT SERPL-MCNC: 7.4 G/DL (ref 6.4–8.2)
RBC # BLD AUTO: 4.42 MILLION/UL (ref 3.81–5.12)
SODIUM SERPL-SCNC: 143 MMOL/L (ref 136–145)
T3 SERPL-MCNC: 1.1 NG/ML (ref 0.6–1.8)
T4 FREE SERPL-MCNC: 0.79 NG/DL (ref 0.76–1.46)
TSH SERPL DL<=0.05 MIU/L-ACNC: 7.36 UIU/ML (ref 0.36–3.74)
WBC # BLD AUTO: 3.85 THOUSAND/UL (ref 4.31–10.16)

## 2021-07-16 PROCEDURE — 84439 ASSAY OF FREE THYROXINE: CPT

## 2021-07-16 PROCEDURE — 84443 ASSAY THYROID STIM HORMONE: CPT

## 2021-07-16 PROCEDURE — 84480 ASSAY TRIIODOTHYRONINE (T3): CPT

## 2021-07-16 PROCEDURE — 36415 COLL VENOUS BLD VENIPUNCTURE: CPT

## 2021-07-16 PROCEDURE — 80053 COMPREHEN METABOLIC PANEL: CPT

## 2021-07-16 PROCEDURE — 85025 COMPLETE CBC W/AUTO DIFF WBC: CPT

## 2021-07-16 NOTE — TELEPHONE ENCOUNTER
Patient is requesting lab results  [facility] states her white blood cell count is high

## 2021-07-21 ENCOUNTER — TELEPHONE (OUTPATIENT)
Dept: FAMILY MEDICINE CLINIC | Facility: CLINIC | Age: 51
End: 2021-07-21

## 2021-07-21 NOTE — TELEPHONE ENCOUNTER
----- Message from [address] sent at 7/16/2021  2:55 PM EDT -----  Please call patient and inform her blood work does not show signs of dehydration  She does not have an elevated white blood cell count either  Thank you

## 2021-07-30 ENCOUNTER — TELEMEDICINE (OUTPATIENT)
Dept: BEHAVIORAL/MENTAL HEALTH CLINIC | Facility: CLINIC | Age: 51
End: 2021-07-30
Payer: MEDICARE

## 2021-07-30 DIAGNOSIS — F31.81 BIPOLAR 2 DISORDER (HCC): Primary | ICD-10-CM

## 2021-07-30 DIAGNOSIS — F41.1 GAD (GENERALIZED ANXIETY DISORDER): ICD-10-CM

## 2021-07-30 PROCEDURE — 90834 PSYTX W PT 45 MINUTES: CPT | Performed by: SOCIAL WORKER

## 2021-07-30 NOTE — PSYCH
Psychotherapy Provided: Individual Psychotherapy 45 minutes     Length of time in session: 45 minutes, follow up in 2 week    Bipolar II    Goals addressed in session: Goal 1, Goal 2 and Goal 3      Pain:      moderate to severe    5    Current suicide risk : Low     Data: Marco Browning discussed how per her goals she is trying to manage her anxiety, how she is taking better care of herself and she is more aware of the mind body connection and how she continues to draw boundaries with the paRTNER she is leaving  Most of the session she discussed the stress of looking for a home to move to  Assessment: Marco Browning denies suicidal/homicidal ideation, plan or intent  However her anxiety is currently high  We worked on mindfulness, distress tolerance and radical acceptance  Plan: Continue to skill build in distress tolerance  Behavioral Health Treatment Plan [facility]: Diagnosis and Treatment Plan explained to Magalie Wheeler relates understanding diagnosis and is agreeable to Treatment Plan   Yes
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
## 2021-08-23 ENCOUNTER — TELEMEDICINE (OUTPATIENT)
Dept: PSYCHIATRY | Facility: CLINIC | Age: 51
End: 2021-08-23
Payer: MEDICARE

## 2021-08-23 DIAGNOSIS — F41.1 GAD (GENERALIZED ANXIETY DISORDER): ICD-10-CM

## 2021-08-23 DIAGNOSIS — F31.81 BIPOLAR 2 DISORDER (HCC): Primary | ICD-10-CM

## 2021-08-23 PROCEDURE — 99214 OFFICE O/P EST MOD 30 MIN: CPT | Performed by: PHYSICIAN ASSISTANT

## 2021-08-23 RX ORDER — CLONAZEPAM 0.5 MG/1
0.5 TABLET ORAL DAILY PRN
Qty: 30 TABLET | Refills: 2 | Status: SHIPPED | OUTPATIENT
Start: 2021-08-23 | End: 2022-01-26 | Stop reason: SDUPTHER

## 2021-09-02 ENCOUNTER — TELEMEDICINE (OUTPATIENT)
Dept: BEHAVIORAL/MENTAL HEALTH CLINIC | Facility: CLINIC | Age: 51
End: 2021-09-02
Payer: MEDICARE

## 2021-09-02 DIAGNOSIS — F31.81 BIPOLAR 2 DISORDER (HCC): Primary | ICD-10-CM

## 2021-09-02 DIAGNOSIS — F41.1 GAD (GENERALIZED ANXIETY DISORDER): ICD-10-CM

## 2021-09-02 PROCEDURE — 90834 PSYTX W PT 45 MINUTES: CPT | Performed by: SOCIAL WORKER
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
## 2021-09-22 ENCOUNTER — TELEPHONE (OUTPATIENT)
Dept: PSYCHIATRY | Facility: CLINIC | Age: 51
End: 2021-09-22

## 2021-09-24 ENCOUNTER — OFFICE VISIT (OUTPATIENT)
Dept: FAMILY MEDICINE CLINIC | Facility: CLINIC | Age: 51
End: 2021-09-24
Payer: MEDICARE

## 2021-09-24 VITALS
DIASTOLIC BLOOD PRESSURE: 78 MMHG | TEMPERATURE: 97.8 F | BODY MASS INDEX: 31.44 KG/M2 | SYSTOLIC BLOOD PRESSURE: 132 MMHG | HEART RATE: 79 BPM | HEIGHT: 64 IN | OXYGEN SATURATION: 98 %

## 2021-09-24 DIAGNOSIS — Z23 IMMUNIZATION DUE: Primary | ICD-10-CM

## 2021-09-24 DIAGNOSIS — R22.1 LOCALIZED SWELLING, MASS AND LUMP, NECK: ICD-10-CM

## 2021-09-24 PROBLEM — M46.1 SACROILIITIS (HCC): Status: ACTIVE | Noted: 2021-09-24

## 2021-09-24 PROCEDURE — 99213 OFFICE O/P EST LOW 20 MIN: CPT | Performed by: NURSE PRACTITIONER

## 2021-09-24 PROCEDURE — 90682 RIV4 VACC RECOMBINANT DNA IM: CPT

## 2021-09-24 PROCEDURE — G0008 ADMIN INFLUENZA VIRUS VAC: HCPCS

## 2021-09-24 RX ORDER — CLOBETASOL PROPIONATE 0.5 MG/G
CREAM TOPICAL
COMMUNITY
Start: 2021-08-20 | End: 2022-02-28

## 2021-09-24 RX ORDER — TERBINAFINE HYDROCHLORIDE 250 MG/1
TABLET ORAL DAILY
COMMUNITY
End: 2022-02-28

## 2021-09-24 RX ORDER — CLOTRIMAZOLE AND BETAMETHASONE DIPROPIONATE 10; .64 MG/G; MG/G
CREAM TOPICAL
COMMUNITY
End: 2022-02-28

## 2021-09-24 RX ORDER — MELOXICAM 7.5 MG/1
TABLET ORAL DAILY
COMMUNITY

## 2021-09-24 RX ORDER — METHYLPREDNISOLONE 4 MG/1
TABLET ORAL
COMMUNITY
Start: 2021-09-22 | End: 2022-02-28

## 2021-09-24 RX ORDER — DICYCLOMINE HCL 20 MG
TABLET ORAL
COMMUNITY
Start: 2021-07-21 | End: 2022-02-28

## 2021-09-24 RX ORDER — ERENUMAB-AOOE 140 MG/ML
INJECTION, SOLUTION SUBCUTANEOUS
COMMUNITY
End: 2022-07-08 | Stop reason: ALTCHOICE

## 2021-09-24 RX ORDER — GENTIAN VIOLET 2% 2 G/100ML
SOLUTION TOPICAL
COMMUNITY
End: 2022-02-28

## 2021-09-24 RX ORDER — FAMOTIDINE 40 MG/1
TABLET, FILM COATED ORAL
COMMUNITY

## 2021-09-24 RX ORDER — LEVOTHYROXINE SODIUM 0.03 MG/1
TABLET ORAL
COMMUNITY
Start: 2021-09-22 | End: 2022-06-22 | Stop reason: SDUPTHER

## 2021-09-24 RX ORDER — LEVOTHYROXINE SODIUM 0.2 MG/1
TABLET ORAL
COMMUNITY
Start: 2021-09-22

## 2021-09-24 RX ORDER — DEXAMETHASONE 4 MG/1
TABLET ORAL
COMMUNITY
Start: 2021-08-20 | End: 2022-02-28

## 2021-09-24 RX ORDER — CHOLESTYRAMINE 4 G/9G
POWDER, FOR SUSPENSION ORAL
COMMUNITY
End: 2022-02-28

## 2021-09-24 RX ORDER — SULINDAC 150 MG/1
TABLET ORAL
COMMUNITY
End: 2022-04-11 | Stop reason: ALTCHOICE

## 2021-09-24 RX ORDER — KETOCONAZOLE 20 MG/G
CREAM TOPICAL
COMMUNITY
End: 2022-02-28

## 2021-09-24 RX ORDER — ARIPIPRAZOLE 5 MG/1
TABLET ORAL
COMMUNITY
Start: 2021-08-30 | End: 2022-01-26 | Stop reason: ALTCHOICE

## 2021-09-24 RX ORDER — HYDROXYCHLOROQUINE SULFATE 200 MG/1
TABLET, FILM COATED ORAL
COMMUNITY
Start: 2021-08-10

## 2021-09-24 NOTE — PROGRESS NOTES
Assessment/Plan:    No problem-specific Assessment & Plan notes found for this encounter  Diagnoses and all orders for this visit:    Localized swelling, mass and lump, neck  -     CT soft tissue neck w wo contrast; Future          Subjective:      Patient ID: Elisha Shields is a 49 y o  female  Patient presents with swelling to left lower lateral neck  Does not recall how long area has been swollen, but observed it this week  Patient denies tenderness, redness, drainage, or fever  Denies recent illness or trauma to the neck  Does not note any other areas of swelling in body  Patient notes when she is eating causes discomfort to throat and has a sensation of tightness around throat when eating  Denies horse voice, denies wheezing or stridor  Denies difficulty breathing or inability to swallow own secretions  The following portions of the patient's history were reviewed and updated as appropriate: allergies, current medications, past medical history, past social history, past surgical history and problem list     Review of Systems   Constitutional: Negative for chills and fever  HENT: Positive for trouble swallowing  Negative for congestion, drooling, ear pain, facial swelling, sinus pressure, sinus pain, sneezing, sore throat and voice change  Eyes: Negative for visual disturbance  Respiratory: Positive for choking (sensation when eating or laying flat)  Negative for cough and shortness of breath  Cardiovascular: Negative for chest pain and palpitations  Gastrointestinal: Negative for abdominal pain, nausea and vomiting  Musculoskeletal: Negative for arthralgias and myalgias  Skin: Negative for color change and rash  Neurological: Negative for dizziness, speech difficulty, weakness, light-headedness and headaches  Hematological: Negative for adenopathy           Objective:      /78   Pulse 79   Temp 97 8 °F (36 6 °C)   Ht 5' 3 8" (1 621 m)   SpO2 98%   BMI 31 44 kg/m²          Physical Exam  Vitals reviewed  Constitutional:       Appearance: Normal appearance  She is not ill-appearing  HENT:      Head: Normocephalic and atraumatic  Right Ear: Tympanic membrane, ear canal and external ear normal  There is no impacted cerumen  Left Ear: Tympanic membrane, ear canal and external ear normal  There is no impacted cerumen  Nose: Nose normal  No congestion or rhinorrhea  Mouth/Throat:      Mouth: Mucous membranes are moist       Pharynx: Oropharynx is clear  No oropharyngeal exudate or posterior oropharyngeal erythema  Eyes:      Conjunctiva/sclera: Conjunctivae normal       Pupils: Pupils are equal, round, and reactive to light  Neck:      Thyroid: No thyroid mass, thyromegaly or thyroid tenderness  Vascular: Normal carotid pulses  No carotid bruit or JVD  Trachea: Trachea normal      Cardiovascular:      Rate and Rhythm: Normal rate and regular rhythm  Heart sounds: Normal heart sounds  No murmur heard  Pulmonary:      Effort: Pulmonary effort is normal  No respiratory distress  Breath sounds: Normal breath sounds  No stridor  No wheezing  Abdominal:      Palpations: Abdomen is soft  There is no mass  Tenderness: There is no abdominal tenderness  There is no right CVA tenderness or left CVA tenderness  Musculoskeletal:         General: Normal range of motion  Cervical back: Normal range of motion and neck supple  Edema present  No erythema, rigidity or crepitus  Pain with movement present  No spinous process tenderness or muscular tenderness  Normal range of motion  Right lower leg: No edema  Left lower leg: No edema  Lymphadenopathy:      Cervical: No cervical adenopathy  Skin:     General: Skin is warm and dry  Neurological:      General: No focal deficit present  Mental Status: She is alert and oriented to person, place, and time     Psychiatric:         Mood and Affect: Mood normal  Behavior: Behavior normal

## 2021-09-24 NOTE — PATIENT INSTRUCTIONS
Soft Tissue Mass   WHAT YOU NEED TO KNOW:   A soft tissue mass is a lump or area of swelling under your skin  The lump may feel hard, soft, or painful  It may be found anywhere on your body  The cause of your lump may not be known  Common causes include injury, infection, or a benign (non-cancerous) tumor  Rarely, a soft tissue mass is a sign of cancer  DISCHARGE INSTRUCTIONS:   Follow up with your healthcare provider as directed: You will need other tests to find the cause of your soft tissue mass  Write down your questions so you remember to ask them during your visits  Return to the emergency department if:   · Your mass suddenly gets bigger or changes color  · Your mass starts bleeding  · Your arm or leg closest to the mass is numb, tingling, or feels cold  Contact your healthcare provider if:   · You have a fever or chills  · Your mass is red, swollen, or draining pus  · Your mass is more painful  · You have new symptoms  · You have questions or concerns about your condition or care  © Copyright [employer] 2021 Information is for End User's use only and may not be sold, redistributed or otherwise used for commercial purposes  All illustrations and images included in CareNotes® are the copyrighted property of A D A M , Inc  or [facility] Solitario Harper   The above information is an  only  It is not intended as medical advice for individual conditions or treatments  Talk to your doctor, nurse or pharmacist before following any medical regimen to see if it is safe and effective for you

## 2021-09-27 NOTE — BH TREATMENT PLAN
Nader Michelle  1970       Date of Initial Treatment Plan: 03/20/20   Date of Current Treatment Plan: 09/27/21    Treatment Plan Number 3rd update  Strengths/Personal Resources for Self Care: Independent,caring, good grandmom,takes care of issues  Diagnosis:   1  Bipolar 2 disorder ([address] )     2  ZAINA (generalized anxiety disorder)         Area of Needs: Please see below      Long Term Goal 1: I still need to better manage my anxiety  Target Date: 02/25/2022  Completion Date: TBD         Short Term Objectives for Goal 1: Mindfulness and CBT    Long Term Goal 2: I need to continue to draw boundaries  Target Date: 02/25/2022  Completion Date: TBD    Short Term Objectives for Goal 2: I need to continue to be strong and assertive  Long Term Goal # 3: N/A     Target Date: N/A  Completion Date: N/A    Short Term Objectives for Goal 3: N/A    GOAL 1: Modality: Individual 2x per month   Completion Date tbd    GOAL 2: Modality: Individual 2x per month   Completion Date tbd     GOAL 3: Modality: Individual n/ax per month   Completion Date n/a      Behavioral Health Treatment Plan [facility]: Diagnosis and Treatment Plan explained to Cornell Tucker relates understanding diagnosis and is agreeable to Treatment Plan  Client Comments : Please share your thoughts, feelings, need and/or experiences regarding your treatment plan: Jacalyn Saint and the undersigned will work as a team to help her progress on her goals

## 2021-09-27 NOTE — TELEPHONE ENCOUNTER
Since Jimenez Barton has an appointment this coming Friday, 10/1, I checked with Susy and Linda Watt that the insurance Jimenez Barton has would allow her two visits in one week  They informed me she could    Called and left message for Jimenez Barton to call back  Let her know of two opening for tomorrow 9/28  Left my name and number in message

## 2021-09-28 ENCOUNTER — TELEMEDICINE (OUTPATIENT)
Dept: BEHAVIORAL/MENTAL HEALTH CLINIC | Facility: CLINIC | Age: 51
End: 2021-09-28
Payer: MEDICARE

## 2021-09-28 DIAGNOSIS — F41.1 GAD (GENERALIZED ANXIETY DISORDER): ICD-10-CM

## 2021-09-28 DIAGNOSIS — F31.81 BIPOLAR 2 DISORDER (HCC): Primary | ICD-10-CM

## 2021-09-28 PROCEDURE — 90834 PSYTX W PT 45 MINUTES: CPT | Performed by: SOCIAL WORKER
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
## 2021-10-01 ENCOUNTER — TELEMEDICINE (OUTPATIENT)
Dept: BEHAVIORAL/MENTAL HEALTH CLINIC | Facility: CLINIC | Age: 51
End: 2021-10-01
Payer: MEDICARE

## 2021-10-01 DIAGNOSIS — F31.81 BIPOLAR 2 DISORDER (HCC): Primary | ICD-10-CM

## 2021-10-01 DIAGNOSIS — F41.1 GAD (GENERALIZED ANXIETY DISORDER): ICD-10-CM

## 2021-10-01 PROCEDURE — 90834 PSYTX W PT 45 MINUTES: CPT | Performed by: SOCIAL WORKER

## 2021-10-29 ENCOUNTER — TELEMEDICINE (OUTPATIENT)
Dept: BEHAVIORAL/MENTAL HEALTH CLINIC | Facility: CLINIC | Age: 51
End: 2021-10-29
Payer: MEDICARE

## 2021-10-29 DIAGNOSIS — F31.81 BIPOLAR 2 DISORDER (HCC): Primary | ICD-10-CM

## 2021-10-29 DIAGNOSIS — F41.1 GAD (GENERALIZED ANXIETY DISORDER): ICD-10-CM

## 2021-10-29 PROCEDURE — 90834 PSYTX W PT 45 MINUTES: CPT | Performed by: SOCIAL WORKER

## 2021-11-02 ENCOUNTER — TELEMEDICINE (OUTPATIENT)
Dept: FAMILY MEDICINE CLINIC | Facility: CLINIC | Age: 51
End: 2021-11-02
Payer: MEDICARE

## 2021-11-02 DIAGNOSIS — B34.9 VIRAL INFECTION, UNSPECIFIED: Primary | ICD-10-CM

## 2021-11-02 PROCEDURE — 99213 OFFICE O/P EST LOW 20 MIN: CPT | Performed by: NURSE PRACTITIONER

## 2021-11-02 PROCEDURE — 0241U HB NFCT DS VIR RESP RNA 4 TRGT: CPT | Performed by: NURSE PRACTITIONER

## 2021-11-03 LAB
FLUAV RNA RESP QL NAA+PROBE: NEGATIVE
FLUBV RNA RESP QL NAA+PROBE: NEGATIVE
RSV RNA RESP QL NAA+PROBE: NEGATIVE
SARS-COV-2 RNA RESP QL NAA+PROBE: NEGATIVE

## 2021-11-12 ENCOUNTER — TELEPHONE (OUTPATIENT)
Dept: PSYCHIATRY | Facility: CLINIC | Age: 51
End: 2021-11-12

## 2021-11-22 ENCOUNTER — HOSPITAL ENCOUNTER (OUTPATIENT)
Dept: CT IMAGING | Facility: HOSPITAL | Age: 51
Discharge: HOME | End: 2021-11-22
Payer: MEDICARE

## 2021-11-22 DIAGNOSIS — R22.1 LOCALIZED SWELLING, MASS AND LUMP, NECK: ICD-10-CM

## 2021-11-22 PROCEDURE — 70491 CT SOFT TISSUE NECK W/DYE: CPT

## 2021-11-22 RX ADMIN — IOHEXOL 85 ML: 350 INJECTION, SOLUTION INTRAVENOUS at 11:14

## 2021-11-24 ENCOUNTER — TELEPHONE (OUTPATIENT)
Dept: FAMILY MEDICINE CLINIC | Facility: CLINIC | Age: 51
End: 2021-11-24

## 2021-11-26 ENCOUNTER — TELEMEDICINE (OUTPATIENT)
Dept: FAMILY MEDICINE CLINIC | Facility: CLINIC | Age: 51
End: 2021-11-26
Payer: MEDICARE

## 2021-11-26 ENCOUNTER — TELEPHONE (OUTPATIENT)
Dept: FAMILY MEDICINE CLINIC | Facility: CLINIC | Age: 51
End: 2021-11-26

## 2021-11-26 DIAGNOSIS — J01.10 ACUTE NON-RECURRENT FRONTAL SINUSITIS: ICD-10-CM

## 2021-11-26 DIAGNOSIS — B34.9 VIRAL INFECTION: Primary | ICD-10-CM

## 2021-11-26 DIAGNOSIS — R05.9 COUGH: ICD-10-CM

## 2021-11-26 PROCEDURE — 99213 OFFICE O/P EST LOW 20 MIN: CPT | Performed by: NURSE PRACTITIONER

## 2021-11-26 RX ORDER — ALBUTEROL SULFATE 90 UG/1
2 AEROSOL, METERED RESPIRATORY (INHALATION) EVERY 6 HOURS PRN
Qty: 18 G | Refills: 1 | Status: SHIPPED | OUTPATIENT
Start: 2021-11-26 | End: 2022-01-20

## 2021-11-26 RX ORDER — AMOXICILLIN AND CLAVULANATE POTASSIUM 875; 125 MG/1; MG/1
1 TABLET, FILM COATED ORAL EVERY 12 HOURS SCHEDULED
Qty: 14 TABLET | Refills: 0 | Status: SHIPPED | OUTPATIENT
Start: 2021-11-26 | End: 2021-12-03

## 2021-11-26 RX ORDER — DEXTROMETHORPHAN HYDROBROMIDE AND PROMETHAZINE HYDROCHLORIDE 15; 6.25 MG/5ML; MG/5ML
5 SOLUTION ORAL 4 TIMES DAILY PRN
Qty: 118 ML | Refills: 0 | Status: SHIPPED | OUTPATIENT
Start: 2021-11-26 | End: 2022-02-28

## 2021-12-07 ENCOUNTER — TELEMEDICINE (OUTPATIENT)
Dept: FAMILY MEDICINE CLINIC | Facility: CLINIC | Age: 51
End: 2021-12-07
Payer: MEDICARE

## 2021-12-07 DIAGNOSIS — B34.9 VIRAL INFECTION, UNSPECIFIED: ICD-10-CM

## 2021-12-07 DIAGNOSIS — M62.838 MUSCLE SPASM: ICD-10-CM

## 2021-12-07 DIAGNOSIS — R19.7 DIARRHEA, UNSPECIFIED TYPE: Primary | ICD-10-CM

## 2021-12-07 DIAGNOSIS — R42 DIZZINESS: ICD-10-CM

## 2021-12-07 PROCEDURE — 99213 OFFICE O/P EST LOW 20 MIN: CPT | Performed by: NURSE PRACTITIONER

## 2021-12-07 RX ORDER — MECLIZINE HYDROCHLORIDE 25 MG/1
25 TABLET ORAL EVERY 8 HOURS PRN
Qty: 30 TABLET | Refills: 1 | Status: SHIPPED | OUTPATIENT
Start: 2021-12-07

## 2021-12-07 RX ORDER — CYCLOBENZAPRINE HCL 10 MG
10 TABLET ORAL 3 TIMES DAILY PRN
Qty: 60 TABLET | Refills: 0 | Status: SHIPPED | OUTPATIENT
Start: 2021-12-07 | End: 2022-04-11 | Stop reason: ALTCHOICE

## 2021-12-16 ENCOUNTER — SOCIAL WORK (OUTPATIENT)
Dept: BEHAVIORAL/MENTAL HEALTH CLINIC | Facility: CLINIC | Age: 51
End: 2021-12-16
Payer: MEDICARE

## 2021-12-16 DIAGNOSIS — F41.1 GAD (GENERALIZED ANXIETY DISORDER): ICD-10-CM

## 2021-12-16 DIAGNOSIS — F31.81 BIPOLAR 2 DISORDER (HCC): Primary | ICD-10-CM

## 2021-12-16 PROCEDURE — 90834 PSYTX W PT 45 MINUTES: CPT | Performed by: SOCIAL WORKER

## 2022-01-06 ENCOUNTER — TELEMEDICINE (OUTPATIENT)
Dept: BEHAVIORAL/MENTAL HEALTH CLINIC | Facility: CLINIC | Age: 52
End: 2022-01-06
Payer: MEDICARE

## 2022-01-06 DIAGNOSIS — F31.81 BIPOLAR 2 DISORDER (HCC): ICD-10-CM

## 2022-01-06 DIAGNOSIS — F41.1 GAD (GENERALIZED ANXIETY DISORDER): Primary | ICD-10-CM

## 2022-01-06 PROCEDURE — 90834 PSYTX W PT 45 MINUTES: CPT | Performed by: SOCIAL WORKER

## 2022-01-06 NOTE — PSYCH
Virtual Regular Visit    Verification of patient location:    Patient is located in the following state in which I hold an active license [place]      Assessment/Plan:    Problem List Items Addressed This Visit     None          Goals addressed in session: goal 1 and goal 2         Reason for visit is   Chief Complaint   Patient presents with    Virtual Regular Visit        Encounter provider Malorie Phelps    Provider located at [address] [place] [address] [id number] [phone]      Recent Visits  No visits were found meeting these conditions  Showing recent visits within past 7 days and meeting all other requirements  Future Appointments  No visits were found meeting these conditions  Showing future appointments within next 150 days and meeting all other requirements       The patient was identified by name and date of birth  Vivien Barcenas was informed that this is a telemedicine visit and that the visit is being conducted through[employer] and patient was informed this is a secure, HIPAA-complaint platform  She agrees to proceed     My office door was closed  No one else was in the room  She acknowledged consent and understanding of privacy and security of the video platform  The patient has agreed to participate and understands they can discontinue the visit at any time  Patient is aware this is a billable service  Subjective  Vivien Barcenas is a 49 y o  female    HPI     Past Medical History:   Diagnosis Date    Anxiety     Anxiety     Asthma     Bipolar disorder ([address] )     Carpal tunnel syndrome     Chronic pain     lower back    Depression     Disease of thyroid gland     Dyslipidemia     Fibromyalgia     Irritable bowel     Kidney stones     Kidney stones 5/20/2019    Migraine     Obesity (BMI 30 0-34  9)     Psychiatric disorder     depression/anxiety    Suicide attempt [facility])     as teen    Vitamin D deficiency        Past Surgical History:   Procedure Laterality Date    BUNIONECTOMY       SECTION      CHOLECYSTECTOMY      PARTIAL HYSTERECTOMY      TONSILLECTOMY      TUBAL LIGATION         Current Outpatient Medications   Medication Sig Dispense Refill    albuterol (2 5 mg/3 mL) 0 083 % nebulizer solution Take 1 vial (2 5 mg total) by nebulization every 6 (six) hours as needed for wheezing or shortness of breath 20 vial 0    albuterol (Ventolin HFA) 90 mcg/act inhaler Inhale 2 puffs every 6 (six) hours as needed for wheezing or shortness of breath 18 g 1    ARIPiprazole (ABILIFY) 5 mg tablet       baclofen 10 mg tablet       betamethasone dipropionate (DIPROSONE) 0 05 % cream APPLY A THIN LAYER TO THE AFFECTED AREA(S) BY TOPICAL ROUTE ONCE DAILY      Butalbital-APAP-Caffeine -40 MG CAPS TAKE 1 CAPSULE Daily PRN      cariprazine (VRAYLAR) 3 MG capsule Take 1 capsule (3 mg total) by mouth daily 30 capsule 2    celecoxib (CELEBREX) 200 mg capsule Take 1 capsule (200 mg total) by mouth daily 30 capsule 2    cholestyramine (QUESTRAN) 4 g packet cholestyramine (with sugar) 4 gram powder for susp in a packet      clobetasol (TEMOVATE) 0 05 % cream       clonazePAM (KlonoPIN) 0 5 mg tablet Take 1 tablet (0 5 mg total) by mouth daily as needed for anxiety 30 tablet 2    clotrimazole-betamethasone (LOTRISONE) 1-0 05 % cream clotrimazole-betamethasone 1 %-0 05 % topical cream      cyclobenzaprine (FLEXERIL) 10 mg tablet Take 1 tablet (10 mg total) by mouth 3 (three) times a day as needed for muscle spasms 60 tablet 0    dexamethasone (DECADRON) 4 mg tablet       diclofenac sodium (VOLTAREN) 1 % APPLY 2 GRAM TO THE AFFECTED AREA(S) BY TOPICAL ROUTE 4 TIMES PER DAY      dicyclomine (BENTYL) 10 mg capsule Take 1 capsule (10 mg total) by mouth 3 (three) times a day before meals 90 capsule 0    dicyclomine (BENTYL) 20 mg tablet       divalproex sodium (DEPAKOTE) 250 mg EC tablet Take 250 mg by mouth daily      Erenumab-aooe (Aimovig) 140 MG/ML SOAJ Aimovig Autoinjector 140 mg/mL subcutaneous auto-injector      famotidine (PEPCID) 40 MG tablet famotidine 40 mg tablet      fremanezumab-vfrm (Ajovy) 225 MG/1 5ML auto-injector Inject 225 mg under the skin every 30 (thirty) days      gentian violet 2 % topical solution gentian violet 2 % topical solution   Apply 1 application by topical route        hydrocortisone 2 5 % cream hydrocortisone 2 5 % topical cream      hydroxychloroquine (PLAQUENIL) 200 mg tablet       ketoconazole (NIZORAL) 2 % cream ketoconazole 2 % topical cream   APPLY TO THE AFFECTED AREA(S) BY TOPICAL ROUTE ONCE DAILY      levothyroxine 200 mcg tablet       levothyroxine 25 mcg tablet       lidocaine (LIDODERM) 5 % Apply 1 patch topically daily as needed (back pain) Remove & Discard patch within 12 hours or as directed by MD 6 patch 1    loratadine (CLARITIN) 10 mg tablet Take 1 pill daily for allergies 90 tablet 1    lurasidone (Latuda) 20 mg tablet Latuda 20 mg tablet      lurasidone (Latuda) 40 mg tablet Latuda 40 mg tablet      meclizine (ANTIVERT) 25 mg tablet Take 1 tablet (25 mg total) by mouth every 8 (eight) hours as needed for dizziness 30 tablet 1    meloxicam (MOBIC) 7 5 mg tablet Daily      methylPREDNISolone 4 MG tablet therapy pack       mometasone (ELOCON) 0 1 % cream Apply topically daily 45 g 0    naratriptan (AMERGE) 1 MG TABS Take 1 mg by mouth every 4 (four) hours as needed for migraine      ondansetron (ZOFRAN-ODT) 4 mg disintegrating tablet MAY PUT 1 TAB UNDERNEATH THE TONGUE EVERY 12 HOURS AS NEEDED FOR NAUSEA 60 tablet 0    phentermine 15 MG capsule Take 1 capsule (15 mg total) by mouth every morning 30 capsule 0    prochlorperazine (COMPAZINE) 10 mg tablet TAKE 1/2 TAB TWICE A DAY FOR SEVEN DAYS      Promethazine-DM (PHENERGAN-DM) 6 25-15 mg/5 mL oral syrup Take 5 mL by mouth 4 (four) times a day as needed for cough 118 mL 0    Respiratory Therapy Supplies (NEBULIZER) DONA by Does not apply route every 4 (four) hours while awake 90 each 1    scopolamine (TRANSDERM-SCOP) 1 mg/3 days TD 72 hr patch Place 1 patch on the skin every third day 4 patch 0    sulindac (CLINORIL) 150 MG tablet sulindac 150 mg tablet      terbinafine (LamISIL) 250 mg tablet Daily      Topiramate ER (Trokendi XR) 100 MG CP24 Take 100 mg by mouth 2 (two) times a day      Ubrogepant (Ubrelvy) 50 MG TABS       XIFAXAN 550 MG tablet        No current facility-administered medications for this visit  Allergies   Allergen Reactions    Doxycycline Rash    Erythromycin Rash    Other Edema and Wheezing     jalapeno    Latex Rash    Duloxetine      Other reaction(s): Nausea, Loopy    Eletriptan      Pain in lower jaw with pressure in throat    Emgality [Galcanezumab-Gnlm]     Galcanezumab      rash       Review of Systems Data:Belem discussed the situation she encountered with her daughter and son in law who recently came to see their son that Ann-Marie Oliver is raising  She discussed how it raised her anxiety but how she was strong and assertive with them  Assessment: Ann-Marie Oliver denies suicidal/homicidal ideation, plan or intent  She admitted her daughter makes her anxious but she talat strong boundaries with her dysfunctional daughter  We discussed mindfulness and distress tolerance  Plan: Continue to encourage Ann-Marie Oliver to draw strong boundaries  Video Exam    There were no vitals filed for this visit  Physical Exam N/A    I spent 45 minutes directly with the patient during this visit    VIRTUAL VISIT [address] [place] [address] verbally agrees to participate in [employer]   Pt is aware that [employer] could be limited without vital signs or the ability to perform a full hands-on physical exam  Belem Howell understands she or the provider may request at any time to terminate the video visit and request the patient to seek care or treatment in person

## 2022-01-13 ENCOUNTER — TELEMEDICINE (OUTPATIENT)
Dept: FAMILY MEDICINE CLINIC | Facility: CLINIC | Age: 52
End: 2022-01-13
Payer: MEDICARE

## 2022-01-13 DIAGNOSIS — Z20.818 EXPOSURE TO LISTERIA MONOCYTOGENES: Primary | ICD-10-CM

## 2022-01-13 PROCEDURE — 99213 OFFICE O/P EST LOW 20 MIN: CPT | Performed by: NURSE PRACTITIONER

## 2022-01-13 NOTE — PROGRESS NOTES
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    -  Primary    Blood cultures ordered to r/o listeria  Relevant Orders    Blood culture    Blood culture               Reason for visit is   Chief Complaint   Patient presents with    Virtual Regular Visit        Encounter provider ERICKA Kunz    Provider located at [facility] [address] [address] [phone]      Recent Visits  No visits were found meeting these conditions  Showing recent visits within past 7 days and meeting all other requirements  Today's Visits  Date Type Provider Dept   01/13/22 Telemedicine ERICKA [address] today's visits and meeting all other requirements  Future Appointments  No visits were found meeting these conditions  Showing future appointments within next 150 days and meeting all other requirements       The patient was identified by name and date of birth  Kurt Real was informed that this is a telemedicine visit and that the visit is being conducted through [address] [place] [address] Now and patient was informed that this is a secure, HIPAA-compliant platform  She agrees to proceed     My office door was closed  No one else was in the room  She acknowledged consent and understanding of privacy and security of the video platform  The patient has agreed to participate and understands they can discontinue the visit at any time  Patient is aware this is a billable service  Subjective  Kurt Real is a 49 y o  female presenting via [facility] for concerns of diarrhea  Patient was recently exposed to possible listeria via [employer]   She is currently in contact with [employer] about the issue and was advised to have blood testing  Moises THRASHER     Past Medical History:   Diagnosis Date    Anxiety     Anxiety     Asthma     Bipolar disorder ([address] )     Carpal tunnel syndrome     Chronic pain     lower back    Depression     Disease of thyroid gland     Dyslipidemia     Fibromyalgia     Irritable bowel     Kidney stones     Kidney stones 2019    Migraine     Obesity (BMI 30 0-34  9)     Psychiatric disorder     depression/anxiety    Suicide attempt [facility])     as teen    Vitamin D deficiency        Past Surgical History:   Procedure Laterality Date    BUNIONECTOMY       SECTION      CHOLECYSTECTOMY      PARTIAL HYSTERECTOMY      TONSILLECTOMY      TUBAL LIGATION         Current Outpatient Medications   Medication Sig Dispense Refill    albuterol (2 5 mg/3 mL) 0 083 % nebulizer solution Take 1 vial (2 5 mg total) by nebulization every 6 (six) hours as needed for wheezing or shortness of breath 20 vial 0    albuterol (Ventolin HFA) 90 mcg/act inhaler Inhale 2 puffs every 6 (six) hours as needed for wheezing or shortness of breath 18 g 1    ARIPiprazole (ABILIFY) 5 mg tablet       baclofen 10 mg tablet       betamethasone dipropionate (DIPROSONE) 0 05 % cream APPLY A THIN LAYER TO THE AFFECTED AREA(S) BY TOPICAL ROUTE ONCE DAILY      Butalbital-APAP-Caffeine -40 MG CAPS TAKE 1 CAPSULE Daily PRN      cariprazine (VRAYLAR) 3 MG capsule Take 1 capsule (3 mg total) by mouth daily 30 capsule 2    celecoxib (CELEBREX) 200 mg capsule Take 1 capsule (200 mg total) by mouth daily 30 capsule 2    cholestyramine (QUESTRAN) 4 g packet cholestyramine (with sugar) 4 gram powder for susp in a packet      clobetasol (TEMOVATE) 0 05 % cream       clonazePAM (KlonoPIN) 0 5 mg tablet Take 1 tablet (0 5 mg total) by mouth daily as needed for anxiety 30 tablet 2    clotrimazole-betamethasone (LOTRISONE) 1-0 05 % cream clotrimazole-betamethasone 1 %-0 05 % topical cream      cyclobenzaprine (FLEXERIL) 10 mg tablet Take 1 tablet (10 mg total) by mouth 3 (three) times a day as needed for muscle spasms 60 tablet 0    dexamethasone (DECADRON) 4 mg tablet       diclofenac sodium (VOLTAREN) 1 % APPLY 2 GRAM TO THE AFFECTED AREA(S) BY TOPICAL ROUTE 4 TIMES PER DAY      dicyclomine (BENTYL) 10 mg capsule Take 1 capsule (10 mg total) by mouth 3 (three) times a day before meals 90 capsule 0    dicyclomine (BENTYL) 20 mg tablet       divalproex sodium (DEPAKOTE) 250 mg EC tablet Take 250 mg by mouth daily      Erenumab-aooe (Aimovig) 140 MG/ML SOAJ Aimovig Autoinjector 140 mg/mL subcutaneous auto-injector      famotidine (PEPCID) 40 MG tablet famotidine 40 mg tablet      fremanezumab-vfrm (Ajovy) 225 MG/1 5ML auto-injector Inject 225 mg under the skin every 30 (thirty) days      gentian violet 2 % topical solution gentian violet 2 % topical solution   Apply 1 application by topical route        hydrocortisone 2 5 % cream hydrocortisone 2 5 % topical cream      hydroxychloroquine (PLAQUENIL) 200 mg tablet       ketoconazole (NIZORAL) 2 % cream ketoconazole 2 % topical cream   APPLY TO THE AFFECTED AREA(S) BY TOPICAL ROUTE ONCE DAILY      levothyroxine 200 mcg tablet       levothyroxine 25 mcg tablet       lidocaine (LIDODERM) 5 % Apply 1 patch topically daily as needed (back pain) Remove & Discard patch within 12 hours or as directed by MD 6 patch 1    loratadine (CLARITIN) 10 mg tablet Take 1 pill daily for allergies 90 tablet 1    lurasidone (Latuda) 20 mg tablet Latuda 20 mg tablet      lurasidone (Latuda) 40 mg tablet Latuda 40 mg tablet      meclizine (ANTIVERT) 25 mg tablet Take 1 tablet (25 mg total) by mouth every 8 (eight) hours as needed for dizziness 30 tablet 1    meloxicam (MOBIC) 7 5 mg tablet Daily      methylPREDNISolone 4 MG tablet therapy pack       mometasone (ELOCON) 0 1 % cream Apply topically daily 45 g 0    naratriptan (AMERGE) 1 MG TABS Take 1 mg by mouth every 4 (four) hours as needed for migraine      ondansetron (ZOFRAN-ODT) 4 mg disintegrating tablet MAY PUT 1 TAB UNDERNEATH THE TONGUE EVERY 12 HOURS AS NEEDED FOR NAUSEA 60 tablet 0    phentermine 15 MG capsule Take 1 capsule (15 mg total) by mouth every morning 30 capsule 0    prochlorperazine (COMPAZINE) 10 mg tablet TAKE 1/2 TAB TWICE A DAY FOR SEVEN DAYS      Promethazine-DM (PHENERGAN-DM) 6 25-15 mg/5 mL oral syrup Take 5 mL by mouth 4 (four) times a day as needed for cough 118 mL 0    Respiratory Therapy Supplies (NEBULIZER) DONA by Does not apply route every 4 (four) hours while awake 90 each 1    scopolamine (TRANSDERM-SCOP) 1 mg/3 days TD 72 hr patch Place 1 patch on the skin every third day 4 patch 0    sulindac (CLINORIL) 150 MG tablet sulindac 150 mg tablet      terbinafine (LamISIL) 250 mg tablet Daily      Topiramate ER (Trokendi XR) 100 MG CP24 Take 100 mg by mouth 2 (two) times a day      Ubrogepant (Ubrelvy) 50 MG TABS       XIFAXAN 550 MG tablet        No current facility-administered medications for this visit  Allergies   Allergen Reactions    Doxycycline Rash    Erythromycin Rash    Other Edema and Wheezing     jalapeno    Latex Rash    Duloxetine      Other reaction(s): Nausea, Loopy    Eletriptan      Pain in lower jaw with pressure in throat    Emgality [Galcanezumab-Gnlm]     Galcanezumab      rash       Review of Systems   Constitutional: Positive for fatigue and unexpected weight change (5 lbs)  Negative for chills and fever  HENT: Negative for ear pain and sore throat  Eyes: Negative for pain and visual disturbance  Respiratory: Negative for cough and shortness of breath  Cardiovascular: Negative for chest pain and palpitations  Gastrointestinal: Positive for abdominal distention, abdominal pain and diarrhea   Negative for anal bleeding, blood in stool, constipation, nausea, rectal pain and vomiting  Genitourinary: Negative for dysuria and hematuria  Musculoskeletal: Negative for arthralgias and back pain  Skin: Negative for color change and rash  Neurological: Negative for seizures and syncope  All other systems reviewed and are negative  Video Exam    There were no vitals filed for this visit  Physical Exam  Constitutional:       Appearance: Normal appearance  Pulmonary:      Effort: Pulmonary effort is normal  No respiratory distress  Neurological:      Mental Status: She is alert and oriented to person, place, and time  Psychiatric:         Mood and Affect: Mood normal           I spent 15 minutes directly with the patient during this visit    VIRTUAL VISIT [address] [place] [address] verbally agrees to participate in [employer]  Pt is aware that [employer] could be limited without vital signs or the ability to perform a full hands-on physical exam  Belem Pham understands she or the provider may request at any time to terminate the video visit and request the patient to seek care or treatment in person

## 2022-01-20 DIAGNOSIS — B34.9 VIRAL INFECTION: ICD-10-CM

## 2022-01-20 RX ORDER — ALBUTEROL SULFATE 90 UG/1
AEROSOL, METERED RESPIRATORY (INHALATION)
Qty: 6.7 G | Refills: 0 | Status: SHIPPED | OUTPATIENT
Start: 2022-01-20 | End: 2022-01-31

## 2022-01-21 ENCOUNTER — OFFICE VISIT (OUTPATIENT)
Dept: FAMILY MEDICINE CLINIC | Facility: CLINIC | Age: 52
End: 2022-01-21
Payer: MEDICARE

## 2022-01-21 VITALS
BODY MASS INDEX: 31.44 KG/M2 | SYSTOLIC BLOOD PRESSURE: 110 MMHG | DIASTOLIC BLOOD PRESSURE: 80 MMHG | HEIGHT: 64 IN | OXYGEN SATURATION: 97 % | TEMPERATURE: 96.2 F | HEART RATE: 80 BPM | RESPIRATION RATE: 16 BRPM

## 2022-01-21 DIAGNOSIS — E03.9 HYPOTHYROIDISM, UNSPECIFIED TYPE: ICD-10-CM

## 2022-01-21 DIAGNOSIS — R22.1 LOCALIZED SWELLING, MASS AND LUMP, NECK: ICD-10-CM

## 2022-01-21 DIAGNOSIS — M79.7 FIBROMYALGIA: ICD-10-CM

## 2022-01-21 DIAGNOSIS — Z00.00 HEALTHCARE MAINTENANCE: ICD-10-CM

## 2022-01-21 DIAGNOSIS — F31.81 BIPOLAR 2 DISORDER (HCC): ICD-10-CM

## 2022-01-21 DIAGNOSIS — M79.7 PRIMARY FIBROMYALGIA SYNDROME: ICD-10-CM

## 2022-01-21 DIAGNOSIS — Z12.31 ENCOUNTER FOR SCREENING MAMMOGRAM FOR MALIGNANT NEOPLASM OF BREAST: ICD-10-CM

## 2022-01-21 DIAGNOSIS — Z00.00 MEDICARE ANNUAL WELLNESS VISIT, SUBSEQUENT: Primary | ICD-10-CM

## 2022-01-21 PROBLEM — R06.02 SHORTNESS OF BREATH: Status: RESOLVED | Noted: 2019-01-22 | Resolved: 2022-01-21

## 2022-01-21 PROCEDURE — 99214 OFFICE O/P EST MOD 30 MIN: CPT | Performed by: NURSE PRACTITIONER

## 2022-01-21 PROCEDURE — G0402 INITIAL PREVENTIVE EXAM: HCPCS | Performed by: NURSE PRACTITIONER

## 2022-01-21 NOTE — PROGRESS NOTES
BMI Counseling: Body mass index is 31 44 kg/m²  The BMI is above normal  Nutrition recommendations include decreasing portion sizes, encouraging healthy choices of fruits and vegetables, decreasing fast food intake, consuming healthier snacks, limiting drinks that contain sugar, moderation in carbohydrate intake, increasing intake of lean protein, reducing intake of saturated and trans fat and reducing intake of cholesterol  Exercise recommendations include moderate physical activity 150 minutes/week, vigorous physical activity 75 minutes/week, exercising 3-5 times per week, obtaining a gym membership and strength training exercises  No pharmacotherapy was ordered  Rationale for BMI follow-up plan is due to patient being overweight or obese  Assessment/Plan:     Chronic Problems:  Hypothyroidism  Continue care with endocrinologist     Fibromyalgia  Continue care with Rheumatology  B[facility] 2 [facility] ([facility] )  Continue care with psychiatrist       Visit Diagnosis:  Diagnoses and all orders for this visit:    Medicare annual wellness visit, subsequent    Healthcare maintenance  -     CBC and differential; Future  -     Comprehensive metabolic panel; Future  -     Lipid panel; Future  -     TSH, 3rd generation with Free T4 reflex; Future    Encounter for screening mammogram for malignant neoplasm of breast  -     Mammo screening bilateral w 3d & cad; Future    Localized swelling, mass and lump, neck  -     US head neck soft tissue; Future    Hypothyroidism, unspecified type    Primary fibromyalgia syndrome    Fibromyalgia    Bipolar 2 disorder (HCC)          Subjective:    Patient ID: Erick Preston is a 49 y o  female  Patient presents for concerns of neck mass, f/u and AWV  Neck mass has been present for several months, but recently became very tender and has been growing  Patient did have CT soft tissue neck in Nov 2021 that was negative, but mass has grown since this time   Patient is also concerned with her weight  She is pending labs for listeria as patient had exposure  The following portions of the patient's history were reviewed and updated as appropriate: allergies, current medications, past family history, past medical history, past social history, past surgical history and problem list     Review of Systems   Constitutional: Positive for fatigue  Negative for chills and fever  HENT: Negative for congestion, ear pain and sore throat  Eyes: Negative for pain and visual disturbance  Respiratory: Negative for cough and shortness of breath  Cardiovascular: Negative for chest pain and palpitations  Gastrointestinal: Positive for abdominal distention, abdominal pain (cramping) and diarrhea  Negative for nausea and vomiting  Genitourinary: Negative for dysuria and hematuria  Musculoskeletal: Positive for arthralgias  Negative for back pain  Skin: Negative for color change and rash  Neck mass     Neurological: Negative for dizziness, seizures, syncope, light-headedness and headaches  All other systems reviewed and are negative          /80   Pulse 80   Temp (!) 96 2 °F (35 7 °C)   Resp 16   Ht 5' 3 8" (1 621 m)   SpO2 97%   BMI 31 44 kg/m²   Social History     Socioeconomic History    Marital status: Single     Spouse name: Not on file    Number of children: Not on file    Years of education: Not on file    Highest education level: Not on file   Occupational History    Occupation: Disability   Tobacco Use    Smoking status: Never Smoker    Smokeless tobacco: Never Used   Vaping Use    Vaping Use: Never used   Substance and Sexual Activity    Alcohol use: No    Drug use: Never    Sexual activity: Not Currently     Partners: Male     Birth control/protection: Abstinence, Female Sterilization, Other, None     Comment: Hysterectomy   Other Topics Concern    Not on file   Social History Narrative    Not on file     Social Determinants of Health     Financial Resource Strain: Not on file   Food Insecurity: Not on file   Transportation Needs: Not on file   Physical Activity: Not on file   Stress: Not on file   Social Connections: Not on file   Intimate Partner Violence: Not on file   Housing Stability: Not on file     Past Medical History:   Diagnosis Date    Anxiety     Anxiety     Asthma     Bipolar disorder ([address] )     Carpal tunnel syndrome     Chronic pain     lower back    Depression     Disease of thyroid gland     Dyslipidemia     Fibromyalgia     Irritable bowel     Kidney stones     Kidney stones 2019    Migraine     Obesity (BMI 30 0-34  9)     Psychiatric disorder     depression/anxiety    Suicide attempt [facility])     as teen    Vitamin D deficiency      Family History   Problem Relation Age of Onset    Hypertension Mother     Diabetes Mother     Hypothyroidism Mother     Stroke Mother     Bipolar disorder Mother     Anxiety disorder Mother     Arthritis Mother     Depression Mother     Mental illness Mother     Cancer Father         pancreatic     Hypothyroidism Sister     Hypertension Brother     Heart disease Brother     Cancer Maternal Uncle         lung    Cancer Paternal Aunt         breast    Cancer Paternal Uncle         testicular     Cancer Paternal Grandmother         breast    Dementia Paternal Grandmother     Cancer Cousin         brain    Bipolar disorder Daughter      Past Surgical History:   Procedure Laterality Date    BUNIONECTOMY       SECTION      CHOLECYSTECTOMY      PARTIAL HYSTERECTOMY      TONSILLECTOMY      TUBAL LIGATION         Current Outpatient Medications:     albuterol (2 5 mg/3 mL) 0 083 % nebulizer solution, Take 1 vial (2 5 mg total) by nebulization every 6 (six) hours as needed for wheezing or shortness of breath, Disp: 20 vial, Rfl: 0    albuterol (PROVENTIL HFA,VENTOLIN HFA) 90 mcg/act inhaler, INHALE TWO PUFFS EVERY 6 (SIX) HOURS AS NEEDED FOR WHEEZING OR SHORTNESS OF BREATH, Disp: 6 7 g, Rfl: 0    ARIPiprazole (ABILIFY) 5 mg tablet, , Disp: , Rfl:     baclofen 10 mg tablet, , Disp: , Rfl:     betamethasone dipropionate (DIPROSONE) 0 05 % cream, APPLY A THIN LAYER TO THE AFFECTED AREA(S) BY TOPICAL ROUTE ONCE DAILY, Disp: , Rfl:     Butalbital-APAP-Caffeine -40 MG CAPS, TAKE 1 CAPSULE Daily PRN, Disp: , Rfl:     celecoxib (CELEBREX) 200 mg capsule, Take 1 capsule (200 mg total) by mouth daily, Disp: 30 capsule, Rfl: 2    cholestyramine (QUESTRAN) 4 g packet, cholestyramine (with sugar) 4 gram powder for susp in a packet, Disp: , Rfl:     clobetasol (TEMOVATE) 0 05 % cream, , Disp: , Rfl:     clonazePAM (KlonoPIN) 0 5 mg tablet, Take 1 tablet (0 5 mg total) by mouth daily as needed for anxiety, Disp: 30 tablet, Rfl: 2    clotrimazole-betamethasone (LOTRISONE) 1-0 05 % cream, clotrimazole-betamethasone 1 %-0 05 % topical cream, Disp: , Rfl:     cyclobenzaprine (FLEXERIL) 10 mg tablet, Take 1 tablet (10 mg total) by mouth 3 (three) times a day as needed for muscle spasms, Disp: 60 tablet, Rfl: 0    dexamethasone (DECADRON) 4 mg tablet, , Disp: , Rfl:     diclofenac sodium (VOLTAREN) 1 %, APPLY 2 GRAM TO THE AFFECTED AREA(S) BY TOPICAL ROUTE 4 TIMES PER DAY, Disp: , Rfl:     dicyclomine (BENTYL) 10 mg capsule, Take 1 capsule (10 mg total) by mouth 3 (three) times a day before meals, Disp: 90 capsule, Rfl: 0    dicyclomine (BENTYL) 20 mg tablet, , Disp: , Rfl:     divalproex sodium (DEPAKOTE) 250 mg EC tablet, Take 250 mg by mouth daily, Disp: , Rfl:     Erenumab-aooe (Aimovig) 140 MG/ML SOAJ, Aimovig Autoinjector 140 mg/mL subcutaneous auto-injector, Disp: , Rfl:     famotidine (PEPCID) 40 MG tablet, famotidine 40 mg tablet, Disp: , Rfl:     fremanezumab-vfrm (Ajovy) 225 MG/1 5ML auto-injector, Inject 225 mg under the skin every 30 (thirty) days, Disp: , Rfl:     gentian violet 2 % topical solution, gentian violet 2 % topical solution  Apply 1 application by topical route , Disp: , Rfl:     hydrocortisone 2 5 % cream, hydrocortisone 2 5 % topical cream, Disp: , Rfl:     hydroxychloroquine (PLAQUENIL) 200 mg tablet, , Disp: , Rfl:     ketoconazole (NIZORAL) 2 % cream, ketoconazole 2 % topical cream  APPLY TO THE AFFECTED AREA(S) BY TOPICAL ROUTE ONCE DAILY, Disp: , Rfl:     levothyroxine 200 mcg tablet, , Disp: , Rfl:     levothyroxine 25 mcg tablet, , Disp: , Rfl:     lidocaine (LIDODERM) 5 %, Apply 1 patch topically daily as needed (back pain) Remove & Discard patch within 12 hours or as directed by MD, Disp: 6 patch, Rfl: 1    loratadine (CLARITIN) 10 mg tablet, Take 1 pill daily for allergies, Disp: 90 tablet, Rfl: 1    lurasidone (Latuda) 20 mg tablet, Latuda 20 mg tablet, Disp: , Rfl:     lurasidone (Latuda) 40 mg tablet, Latuda 40 mg tablet, Disp: , Rfl:     meclizine (ANTIVERT) 25 mg tablet, Take 1 tablet (25 mg total) by mouth every 8 (eight) hours as needed for dizziness, Disp: 30 tablet, Rfl: 1    meloxicam (MOBIC) 7 5 mg tablet, Daily, Disp: , Rfl:     methylPREDNISolone 4 MG tablet therapy pack, , Disp: , Rfl:     mometasone (ELOCON) 0 1 % cream, Apply topically daily, Disp: 45 g, Rfl: 0    naratriptan (AMERGE) 1 MG TABS, Take 1 mg by mouth every 4 (four) hours as needed for migraine, Disp: , Rfl:     ondansetron (ZOFRAN-ODT) 4 mg disintegrating tablet, MAY PUT 1 TAB UNDERNEATH THE TONGUE EVERY 12 HOURS AS NEEDED FOR NAUSEA, Disp: 60 tablet, Rfl: 0    prochlorperazine (COMPAZINE) 10 mg tablet, TAKE 1/2 TAB TWICE A DAY FOR SEVEN DAYS, Disp: , Rfl:     Promethazine-DM (PHENERGAN-DM) 6 25-15 mg/5 mL oral syrup, Take 5 mL by mouth 4 (four) times a day as needed for cough, Disp: 118 mL, Rfl: 0    Respiratory Therapy Supplies (NEBULIZER) DONA, by Does not apply route every 4 (four) hours while awake, Disp: 90 each, Rfl: 1    scopolamine (TRANSDERM-SCOP) 1 mg/3 days TD 72 hr patch, Place 1 patch on the skin every third day, Disp: 4 patch, Rfl: 0    sulindac (CLINORIL) 150 MG tablet, sulindac 150 mg tablet, Disp: , Rfl:     terbinafine (LamISIL) 250 mg tablet, Daily, Disp: , Rfl:     Topiramate ER (Trokendi XR) 100 MG CP24, Take 100 mg by mouth 2 (two) times a day, Disp: , Rfl:     Ubrogepant (Ubrelvy) 50 MG TABS, , Disp: , Rfl:     Vraylar 3 MG capsule, TAKE 1 CAPSULE (3 MG TOTAL) BY MOUTH DAILY, Disp: 30 capsule, Rfl: 0    XIFAXAN 550 MG tablet, , Disp: , Rfl:     Allergies   Allergen Reactions    Doxycycline Rash    Erythromycin Rash    Other Edema and Wheezing     jalapeno    Latex Rash    Duloxetine      Other reaction(s): Nausea, Loopy    Eletriptan      Pain in lower jaw with pressure in throat    Emgality [Galcanezumab-Gnlm]     Galcanezumab      rash          Lab Review   No visits with results within 2 Month(s) from this visit  Latest known visit with results is:   Telemedicine on 11/02/2021   Component Date Value    SARS-CoV-2 11/02/2021 Negative     INFLUENZA A PCR 11/02/2021 Negative     INFLUENZA B PCR 11/02/2021 Negative     RSV PCR 11/02/2021 Negative         Imaging: No results found  Objective:     Physical Exam  Constitutional:       Appearance: She is well-developed  Neck:     Cardiovascular:      Rate and Rhythm: Normal rate and regular rhythm  Heart sounds: Normal heart sounds  No murmur heard  Pulmonary:      Effort: Pulmonary effort is normal  No respiratory distress  Breath sounds: Normal breath sounds  Skin:     General: Skin is warm and dry  Neurological:      Mental Status: She is alert and oriented to person, place, and time  Patient Instructions       Medicare Preventive Visit Patient Instructions  Thank you for completing your Welcome to Medicare Visit or Medicare Annual Wellness Visit today  Your next wellness visit will be due in one year (1/22/2023)  The screening/preventive services that you may require over the next 5-10 years are detailed below   Some tests may not apply to you based off risk factors and/or age  Screening tests ordered at today's visit but not completed yet may show as past due  Also, please note that scanned in results may not display below  Preventive Screenings:  Service Recommendations Previous Testing/Comments   Colorectal Cancer Screening  * Colonoscopy    * Fecal Occult Blood Test (FOBT)/Fecal Immunochemical Test (FIT)  * Fecal DNA/Cologuard Test  * Flexible Sigmoidoscopy Age: 54-65 years old   Colonoscopy: every 10 years (may be performed more frequently if at higher risk)  OR  FOBT/FIT: every 1 year  OR  Cologuard: every 3 years  OR  Sigmoidoscopy: every 5 years  Screening may be recommended earlier than age 48 if at higher risk for colorectal cancer  Also, an individualized decision between you and your healthcare provider will decide whether screening between the ages of 74-80 would be appropriate  Colonoscopy: 06/28/2021  FOBT/FIT: 07/28/2020  Cologuard: Not on file  Sigmoidoscopy: Not on file    Screening Current     Breast Cancer Screening Age: 36 years old  Frequency: every 1-2 years  Not required if history of left and right mastectomy Mammogram: Not on file        Cervical Cancer Screening Between the ages of 21-29, pap smear recommended once every 3 years  Between the ages of 33-67, can perform pap smear with HPV co-testing every 5 years  Recommendations may differ for women with a history of total hysterectomy, cervical cancer, or abnormal pap smears in past  Pap Smear: Not on file        Hepatitis C Screening Once for adults born between 1945 and 1965  More frequently in patients at high risk for Hepatitis C Hep C Antibody: 01/10/2019    Screening Current   Diabetes Screening 1-2 times per year if you're at risk for diabetes or have pre-diabetes Fasting glucose: 90 mg/dL   A1C: 5 3 %    Screening Current   Cholesterol Screening Once every 5 years if you don't have a lipid disorder  May order more often based on risk factors   Lipid panel: 04/13/2021    Screening Not Indicated  History Lipid Disorder     Other Preventive Screenings Covered by Medicare:  1  Abdominal Aortic Aneurysm (AAA) Screening: covered once if your at risk  You're considered to be at risk if you have a family history of AAA  2  Lung Cancer Screening: covers low dose CT scan once per year if you meet all of the following conditions: (1) Age 50-69; (2) No signs or symptoms of lung cancer; (3) Current smoker or have quit smoking within the last 15 years; (4) You have a tobacco smoking history of at least 30 pack years (packs per day multiplied by number of years you smoked); (5) You get a written order from a healthcare provider  3  Glaucoma Screening: covered annually if you're considered high risk: (1) You have diabetes OR (2) Family history of glaucoma OR (3)  aged 48 and older OR (3)  American aged 72 and older  3  Osteoporosis Screening: covered every 2 years if you meet one of the following conditions: (1) You're estrogen deficient and at risk for osteoporosis based off medical history and other findings; (2) Have a vertebral abnormality; (3) On glucocorticoid therapy for more than 3 months; (4) Have primary hyperparathyroidism; (5) On osteoporosis medications and need to assess response to drug therapy  · Last bone density test (DXA Scan): Not on file  5  HIV Screening: covered annually if you're between the age of 12-76  Also covered annually if you are younger than 13 and older than 72 with risk factors for HIV infection  For pregnant patients, it is covered up to 3 times per pregnancy      Immunizations:  Immunization Recommendations   Influenza Vaccine Annual influenza vaccination during flu season is recommended for all persons aged >= 6 months who do not have contraindications   Pneumococcal Vaccine (Prevnar and Pneumovax)  * Prevnar = PCV13  * Pneumovax = PPSV23   Adults 25-60 years old: 1-3 doses may be recommended based on certain risk factors  Adults 72 years old: Prevnar (PCV13) vaccine recommended followed by Pneumovax (PPSV23) vaccine  If already received PPSV23 since turning 65, then PCV13 recommended at least one year after PPSV23 dose  Hepatitis B Vaccine 3 dose series if at intermediate or high risk (ex: diabetes, end stage renal disease, liver disease)   Tetanus (Td) Vaccine - COST NOT COVERED BY MEDICARE PART B Following completion of primary series, a booster dose should be given every 10 years to maintain immunity against tetanus  Td may also be given as tetanus wound prophylaxis  Tdap Vaccine - COST NOT COVERED BY MEDICARE PART B Recommended at least once for all adults  For pregnant patients, recommended with each pregnancy  Shingles Vaccine (Shingrix) - COST NOT COVERED BY MEDICARE PART B  2 shot series recommended in those aged 48 and above     Health Maintenance Due:      Topic Date Due    Cervical Cancer Screening  Never done    Breast Cancer Screening: Mammogram  Never done    Colorectal Cancer Screening  06/28/2031    HIV Screening  Completed    Hepatitis C Screening  Completed     Immunizations Due:      Topic Date Due    COVID-19 Vaccine (3 - Booster for [employer] series) 11/10/2021     Advance Directives   What are advance directives? Advance directives are legal documents that state your wishes and plans for medical care  These plans are made ahead of time in case you lose your ability to make decisions for yourself  Advance directives can apply to any medical decision, such as the treatments you want, and if you want to donate organs  What are the types of advance directives? There are many types of advance directives, and each state has rules about how to use them  You may choose a combination of any of the following:  · Living will: This is a written record of the treatment you want  You can also choose which treatments you do not want, which to limit, and which to stop at a certain time   This includes surgery, medicine, IV fluid, and tube feedings  · Durable power of  for healthcare [facility]): This is a written record that states who you want to make healthcare choices for you when you are unable to make them for yourself  This person, called a proxy, is usually a family member or a friend  You may choose more than 1 proxy  · Do not resuscitate (DNR) order:  A DNR order is used in case your heart stops beating or you stop breathing  It is a request not to have certain forms of treatment, such as CPR  A DNR order may be included in other types of advance directives  · Medical directive: This covers the care that you want if you are in a coma, near death, or unable to make decisions for yourself  You can list the treatments you want for each condition  Treatment may include pain medicine, surgery, blood transfusions, dialysis, IV or tube feedings, and a ventilator (breathing machine)  · Values history: This document has questions about your views, beliefs, and how you feel and think about life  This information can help others choose the care that you would choose  Why are advance directives important? An advance directive helps you control your care  Although spoken wishes may be used, it is better to have your wishes written down  Spoken wishes can be misunderstood, or not followed  Treatments may be given even if you do not want them  An advance directive may make it easier for your family to make difficult choices about your care  Weight Management   Why it is important to manage your weight:  Being overweight increases your risk of health conditions such as heart disease, high blood pressure, type 2 diabetes, and certain types of cancer  It can also increase your risk for osteoarthritis, sleep apnea, and other respiratory problems  Aim for a slow, steady weight loss  Even a small amount of weight loss can lower your risk of health problems    How to lose weight safely:  A safe and healthy way to lose weight is to eat fewer calories and get regular exercise  You can lose up about 1 pound a week by decreasing the number of calories you eat by 500 calories each day  Healthy meal plan for weight management:  A healthy meal plan includes a variety of foods, contains fewer calories, and helps you stay healthy  A healthy meal plan includes the following:  · Eat whole-grain foods more often  A healthy meal plan should contain fiber  Fiber is the part of grains, fruits, and vegetables that is not broken down by your body  Whole-grain foods are healthy and provide extra fiber in your diet  Some examples of whole-grain foods are whole-wheat breads and pastas, oatmeal, brown rice, and bulgur  · Eat a variety of vegetables every day  Include dark, leafy greens such as spinach, kale, morena greens, and mustard greens  Eat yellow and orange vegetables such as carrots, sweet potatoes, and winter squash  · Eat a variety of fruits every day  Choose fresh or canned fruit (canned in its own juice or light syrup) instead of juice  Fruit juice has very little or no fiber  · Eat low-fat dairy foods  Drink fat-free (skim) milk or 1% milk  Eat fat-free yogurt and low-fat cottage cheese  Try low-fat cheeses such as mozzarella and other reduced-fat cheeses  · Choose meat and other protein foods that are low in fat  Choose beans or other legumes such as split peas or lentils  Choose fish, skinless poultry (chicken or turkey), or lean cuts of red meat (beef or pork)  Before you cook meat or poultry, cut off any visible fat  · Use less fat and oil  Try baking foods instead of frying them  Add less fat, such as margarine, sour cream, regular salad dressing and mayonnaise to foods  Eat fewer high-fat foods  Some examples of high-fat foods include french fries, doughnuts, ice cream, and cakes  · Eat fewer sweets  Limit foods and drinks that are high in sugar   This includes candy, cookies, regular soda, and sweetened drinks  Exercise:  Exercise at least 30 minutes per day on most days of the week  Some examples of exercise include walking, biking, dancing, and swimming  You can also fit in more physical activity by taking the stairs instead of the elevator or parking farther away from stores  Ask your healthcare provider about the best exercise plan for you  © Copyright [employer] 2018 Information is for End User's use only and may not be sold, redistributed or otherwise used for commercial purposes  All illustrations and images included in CareNotes® are the copyrighted property of [employer]  or [facility], CRNP    Portions of the record may have been created with voice recognition software  Occasional wrong word or "sound a like" substitutions may have occurred due to the inherent limitations of voice recognition software  Read the chart carefully and recognize, using context, where substitutions have occurred

## 2022-01-21 NOTE — PATIENT INSTRUCTIONS
Medicare Preventive Visit Patient Instructions  Thank you for completing your Welcome to Medicare Visit or Medicare Annual Wellness Visit today  Your next wellness visit will be due in one year (1/22/2023)  The screening/preventive services that you may require over the next 5-10 years are detailed below  Some tests may not apply to you based off risk factors and/or age  Screening tests ordered at today's visit but not completed yet may show as past due  Also, please note that scanned in results may not display below  Preventive Screenings:  Service Recommendations Previous Testing/Comments   Colorectal Cancer Screening  * Colonoscopy    * Fecal Occult Blood Test (FOBT)/Fecal Immunochemical Test (FIT)  * Fecal DNA/Cologuard Test  * Flexible Sigmoidoscopy Age: 54-65 years old   Colonoscopy: every 10 years (may be performed more frequently if at higher risk)  OR  FOBT/FIT: every 1 year  OR  Cologuard: every 3 years  OR  Sigmoidoscopy: every 5 years  Screening may be recommended earlier than age 48 if at higher risk for colorectal cancer  Also, an individualized decision between you and your healthcare provider will decide whether screening between the ages of 74-80 would be appropriate  Colonoscopy: 06/28/2021  FOBT/FIT: 07/28/2020  Cologuard: Not on file  Sigmoidoscopy: Not on file    Screening Current     Breast Cancer Screening Age: 36 years old  Frequency: every 1-2 years  Not required if history of left and right mastectomy Mammogram: Not on file        Cervical Cancer Screening Between the ages of 21-29, pap smear recommended once every 3 years  Between the ages of 33-67, can perform pap smear with HPV co-testing every 5 years     Recommendations may differ for women with a history of total hysterectomy, cervical cancer, or abnormal pap smears in past  Pap Smear: Not on file        Hepatitis C Screening Once for adults born between 1945 and 1965  More frequently in patients at high risk for Hepatitis C Hep C Antibody: 01/10/2019    Screening Current   Diabetes Screening 1-2 times per year if you're at risk for diabetes or have pre-diabetes Fasting glucose: 90 mg/dL   A1C: 5 3 %    Screening Current   Cholesterol Screening Once every 5 years if you don't have a lipid disorder  May order more often based on risk factors  Lipid panel: 04/13/2021    Screening Not Indicated  History Lipid Disorder     Other Preventive Screenings Covered by Medicare:  1  Abdominal Aortic Aneurysm (AAA) Screening: covered once if your at risk  You're considered to be at risk if you have a family history of AAA  2  Lung Cancer Screening: covers low dose CT scan once per year if you meet all of the following conditions: (1) Age 50-69; (2) No signs or symptoms of lung cancer; (3) Current smoker or have quit smoking within the last 15 years; (4) You have a tobacco smoking history of at least 30 pack years (packs per day multiplied by number of years you smoked); (5) You get a written order from a healthcare provider  3  Glaucoma Screening: covered annually if you're considered high risk: (1) You have diabetes OR (2) Family history of glaucoma OR (3)  aged 48 and older OR (3)  American aged 72 and older  3  Osteoporosis Screening: covered every 2 years if you meet one of the following conditions: (1) You're estrogen deficient and at risk for osteoporosis based off medical history and other findings; (2) Have a vertebral abnormality; (3) On glucocorticoid therapy for more than 3 months; (4) Have primary hyperparathyroidism; (5) On osteoporosis medications and need to assess response to drug therapy  · Last bone density test (DXA Scan): Not on file  5  HIV Screening: covered annually if you're between the age of 12-76  Also covered annually if you are younger than 13 and older than 72 with risk factors for HIV infection  For pregnant patients, it is covered up to 3 times per pregnancy      Immunizations:  Immunization Recommendations   Influenza Vaccine Annual influenza vaccination during flu season is recommended for all persons aged >= 6 months who do not have contraindications   Pneumococcal Vaccine (Prevnar and Pneumovax)  * Prevnar = PCV13  * Pneumovax = PPSV23   Adults 25-60 years old: 1-3 doses may be recommended based on certain risk factors  Adults 72 years old: Prevnar (PCV13) vaccine recommended followed by Pneumovax (PPSV23) vaccine  If already received PPSV23 since turning 65, then PCV13 recommended at least one year after PPSV23 dose  Hepatitis B Vaccine 3 dose series if at intermediate or high risk (ex: diabetes, end stage renal disease, liver disease)   Tetanus (Td) Vaccine - COST NOT COVERED BY MEDICARE PART B Following completion of primary series, a booster dose should be given every 10 years to maintain immunity against tetanus  Td may also be given as tetanus wound prophylaxis  Tdap Vaccine - COST NOT COVERED BY MEDICARE PART B Recommended at least once for all adults  For pregnant patients, recommended with each pregnancy  Shingles Vaccine (Shingrix) - COST NOT COVERED BY MEDICARE PART B  2 shot series recommended in those aged 48 and above     Health Maintenance Due:      Topic Date Due    Cervical Cancer Screening  Never done    Breast Cancer Screening: Mammogram  Never done    Colorectal Cancer Screening  06/28/2031    HIV Screening  Completed    Hepatitis C Screening  Completed     Immunizations Due:      Topic Date Due    COVID-19 Vaccine (3 - Booster for [employer] series) 11/10/2021     Advance Directives   What are advance directives? Advance directives are legal documents that state your wishes and plans for medical care  These plans are made ahead of time in case you lose your ability to make decisions for yourself  Advance directives can apply to any medical decision, such as the treatments you want, and if you want to donate organs  What are the types of advance directives?   There are many types of advance directives, and each state has rules about how to use them  You may choose a combination of any of the following:  · Living will: This is a written record of the treatment you want  You can also choose which treatments you do not want, which to limit, and which to stop at a certain time  This includes surgery, medicine, IV fluid, and tube feedings  · Durable power of  for healthcare [facility]): This is a written record that states who you want to make healthcare choices for you when you are unable to make them for yourself  This person, called a proxy, is usually a family member or a friend  You may choose more than 1 proxy  · Do not resuscitate (DNR) order:  A DNR order is used in case your heart stops beating or you stop breathing  It is a request not to have certain forms of treatment, such as CPR  A DNR order may be included in other types of advance directives  · Medical directive: This covers the care that you want if you are in a coma, near death, or unable to make decisions for yourself  You can list the treatments you want for each condition  Treatment may include pain medicine, surgery, blood transfusions, dialysis, IV or tube feedings, and a ventilator (breathing machine)  · Values history: This document has questions about your views, beliefs, and how you feel and think about life  This information can help others choose the care that you would choose  Why are advance directives important? An advance directive helps you control your care  Although spoken wishes may be used, it is better to have your wishes written down  Spoken wishes can be misunderstood, or not followed  Treatments may be given even if you do not want them  An advance directive may make it easier for your family to make difficult choices about your care     Weight Management   Why it is important to manage your weight:  Being overweight increases your risk of health conditions such as heart disease, high blood pressure, type 2 diabetes, and certain types of cancer  It can also increase your risk for osteoarthritis, sleep apnea, and other respiratory problems  Aim for a slow, steady weight loss  Even a small amount of weight loss can lower your risk of health problems  How to lose weight safely:  A safe and healthy way to lose weight is to eat fewer calories and get regular exercise  You can lose up about 1 pound a week by decreasing the number of calories you eat by 500 calories each day  Healthy meal plan for weight management:  A healthy meal plan includes a variety of foods, contains fewer calories, and helps you stay healthy  A healthy meal plan includes the following:  · Eat whole-grain foods more often  A healthy meal plan should contain fiber  Fiber is the part of grains, fruits, and vegetables that is not broken down by your body  Whole-grain foods are healthy and provide extra fiber in your diet  Some examples of whole-grain foods are whole-wheat breads and pastas, oatmeal, brown rice, and bulgur  · Eat a variety of vegetables every day  Include dark, leafy greens such as spinach, kale, morena greens, and mustard greens  Eat yellow and orange vegetables such as carrots, sweet potatoes, and winter squash  · Eat a variety of fruits every day  Choose fresh or canned fruit (canned in its own juice or light syrup) instead of juice  Fruit juice has very little or no fiber  · Eat low-fat dairy foods  Drink fat-free (skim) milk or 1% milk  Eat fat-free yogurt and low-fat cottage cheese  Try low-fat cheeses such as mozzarella and other reduced-fat cheeses  · Choose meat and other protein foods that are low in fat  Choose beans or other legumes such as split peas or lentils  Choose fish, skinless poultry (chicken or turkey), or lean cuts of red meat (beef or pork)  Before you cook meat or poultry, cut off any visible fat  · Use less fat and oil  Try baking foods instead of frying them   Add less fat, such as margarine, sour cream, regular salad dressing and mayonnaise to foods  Eat fewer high-fat foods  Some examples of high-fat foods include french fries, doughnuts, ice cream, and cakes  · Eat fewer sweets  Limit foods and drinks that are high in sugar  This includes candy, cookies, regular soda, and sweetened drinks  Exercise:  Exercise at least 30 minutes per day on most days of the week  Some examples of exercise include walking, biking, dancing, and swimming  You can also fit in more physical activity by taking the stairs instead of the elevator or parking farther away from stores  Ask your healthcare provider about the best exercise plan for you  © Copyright [employer] 2018 Information is for End User's use only and may not be sold, redistributed or otherwise used for commercial purposes   All illustrations and images included in CareNotes® are the copyrighted property of [employer]  or [address] [place] [address]

## 2022-01-21 NOTE — PROGRESS NOTES
Assessment and Plan:     Problem List Items Addressed This Visit        Endocrine    Hypothyroidism     Continue care with endocrinologist             Musculoskeletal and Integument    Primary fibromyalgia syndrome       Other    Fibromyalgia     Continue care with Rheumatology  Bipolar 2 disorder ([address] )     Continue care with psychiatrist            Other Visit Diagnoses     Medicare annual wellness visit, subsequent    -  Primary    Healthcare maintenance        Relevant Orders    CBC and differential    Comprehensive metabolic panel    Lipid panel    TSH, 3rd generation with Free T4 reflex    Encounter for screening mammogram for malignant neoplasm of breast        Relevant Orders    Mammo screening bilateral w 3d & cad    Localized swelling, mass and lump, neck        Relevant Orders    US head neck soft tissue           Preventive health issues were discussed with patient, and age appropriate screening tests were ordered as noted in patient's After Visit Summary  Personalized health advice and appropriate referrals for health education or preventive services given if needed, as noted in patient's After Visit Summary       History of Present Illness:     Patient presents for Medicare Annual Wellness visit    Patient Care Team:  Kalpesh Ahmadi as PCP - General (Family Medicine)  Corry De La Garza MD as PCP - [address] [place] [address] (RTE)  Corry De La Garza MD as PCP - PCP-[employer] ([place])     Problem List:     Patient Active Problem List   Diagnosis    ZAINA (generalized anxiety disorder)    B12 deficiency    Carpal tunnel syndrome    Cervical disc disorder    Degeneration of lumbar intervertebral disc    Dyslipidemia, goal LDL below 130    Fibromyalgia    Hypothyroidism    IBS (irritable bowel syndrome)    Intermittent asthma    Intractable chronic migraine without aura and without status migrainosus    Primary fibromyalgia syndrome    Vitamin D deficiency    Moderate persistent asthma without complication    Migraine without status migrainosus, not intractable    Kidney stones    Bipolar 2 disorder (HCC)    Chronic fatigue    Class 1 obesity due to excess calories without serious comorbidity with body mass index (BMI) of 32 0 to 32 9 in adult    Lumbar radiculopathy    Sacroiliitis (HCC)      Past Medical and Surgical History:     Past Medical History:   Diagnosis Date    Anxiety     Anxiety     Asthma     Bipolar disorder ([address] )     Carpal tunnel syndrome     Chronic pain     lower back    Depression     Disease of thyroid gland     Dyslipidemia     Fibromyalgia     Irritable bowel     Kidney stones     Kidney stones 2019    Migraine     Obesity (BMI 30 0-34  9)     Psychiatric disorder     depression/anxiety    Suicide attempt [facility])     as teen    Vitamin D deficiency      Past Surgical History:   Procedure Laterality Date    BUNIONECTOMY       SECTION      CHOLECYSTECTOMY      PARTIAL HYSTERECTOMY      TONSILLECTOMY      TUBAL LIGATION        Family History:     Family History   Problem Relation Age of Onset    Hypertension Mother     Diabetes Mother     Hypothyroidism Mother     Stroke Mother     Bipolar disorder Mother     Anxiety disorder Mother     Arthritis Mother     Depression Mother     Mental illness Mother     Cancer Father         pancreatic     Hypothyroidism Sister     Hypertension Brother     Heart disease Brother     Cancer Maternal Uncle         lung    Cancer Paternal Aunt         breast    Cancer Paternal Uncle         testicular     Cancer Paternal Grandmother         breast    Dementia Paternal Grandmother     Cancer Cousin         brain    Bipolar disorder Daughter       Social History:     Social History     Socioeconomic History    Marital status: Single     Spouse name: Not on file    Number of children: Not on file    Years of education: Not on file    Highest education level: Not on file   Occupational History    Occupation: Disability   Tobacco Use    Smoking status: Never Smoker    Smokeless tobacco: Never Used   Vaping Use    Vaping Use: Never used   Substance and Sexual Activity    Alcohol use: No    Drug use: Never    Sexual activity: Not Currently     Partners: Male     Birth control/protection: Abstinence, Female Sterilization, Other, None     Comment: Hysterectomy   Other Topics Concern    Not on file   Social History Narrative    Not on file     Social Determinants of Health     Financial Resource Strain: Not on file   Food Insecurity: Not on file   Transportation Needs: Not on file   Physical Activity: Not on file   Stress: Not on file   Social Connections: Not on file   Intimate Partner Violence: Not on file   Housing Stability: Not on file      Medications and Allergies:     Current Outpatient Medications   Medication Sig Dispense Refill    albuterol (2 5 mg/3 mL) 0 083 % nebulizer solution Take 1 vial (2 5 mg total) by nebulization every 6 (six) hours as needed for wheezing or shortness of breath 20 vial 0    albuterol (PROVENTIL HFA,VENTOLIN HFA) 90 mcg/act inhaler INHALE TWO PUFFS EVERY 6 (SIX) HOURS AS NEEDED FOR WHEEZING OR SHORTNESS OF BREATH 6 7 g 0    ARIPiprazole (ABILIFY) 5 mg tablet       baclofen 10 mg tablet       betamethasone dipropionate (DIPROSONE) 0 05 % cream APPLY A THIN LAYER TO THE AFFECTED AREA(S) BY TOPICAL ROUTE ONCE DAILY      Butalbital-APAP-Caffeine -40 MG CAPS TAKE 1 CAPSULE Daily PRN      celecoxib (CELEBREX) 200 mg capsule Take 1 capsule (200 mg total) by mouth daily 30 capsule 2    cholestyramine (QUESTRAN) 4 g packet cholestyramine (with sugar) 4 gram powder for susp in a packet      clobetasol (TEMOVATE) 0 05 % cream       clonazePAM (KlonoPIN) 0 5 mg tablet Take 1 tablet (0 5 mg total) by mouth daily as needed for anxiety 30 tablet 2    clotrimazole-betamethasone (LOTRISONE) 1-0 05 % cream clotrimazole-betamethasone 1 %-0 05 % topical cream  cyclobenzaprine (FLEXERIL) 10 mg tablet Take 1 tablet (10 mg total) by mouth 3 (three) times a day as needed for muscle spasms 60 tablet 0    dexamethasone (DECADRON) 4 mg tablet       diclofenac sodium (VOLTAREN) 1 % APPLY 2 GRAM TO THE AFFECTED AREA(S) BY TOPICAL ROUTE 4 TIMES PER DAY      dicyclomine (BENTYL) 10 mg capsule Take 1 capsule (10 mg total) by mouth 3 (three) times a day before meals 90 capsule 0    dicyclomine (BENTYL) 20 mg tablet       divalproex sodium (DEPAKOTE) 250 mg EC tablet Take 250 mg by mouth daily      Erenumab-aooe (Aimovig) 140 MG/ML SOAJ Aimovig Autoinjector 140 mg/mL subcutaneous auto-injector      famotidine (PEPCID) 40 MG tablet famotidine 40 mg tablet      fremanezumab-vfrm (Ajovy) 225 MG/1 5ML auto-injector Inject 225 mg under the skin every 30 (thirty) days      gentian violet 2 % topical solution gentian violet 2 % topical solution   Apply 1 application by topical route        hydrocortisone 2 5 % cream hydrocortisone 2 5 % topical cream      hydroxychloroquine (PLAQUENIL) 200 mg tablet       ketoconazole (NIZORAL) 2 % cream ketoconazole 2 % topical cream   APPLY TO THE AFFECTED AREA(S) BY TOPICAL ROUTE ONCE DAILY      levothyroxine 200 mcg tablet       levothyroxine 25 mcg tablet       lidocaine (LIDODERM) 5 % Apply 1 patch topically daily as needed (back pain) Remove & Discard patch within 12 hours or as directed by MD 6 patch 1    loratadine (CLARITIN) 10 mg tablet Take 1 pill daily for allergies 90 tablet 1    lurasidone (Latuda) 20 mg tablet Latuda 20 mg tablet      lurasidone (Latuda) 40 mg tablet Latuda 40 mg tablet      meclizine (ANTIVERT) 25 mg tablet Take 1 tablet (25 mg total) by mouth every 8 (eight) hours as needed for dizziness 30 tablet 1    meloxicam (MOBIC) 7 5 mg tablet Daily      methylPREDNISolone 4 MG tablet therapy pack       mometasone (ELOCON) 0 1 % cream Apply topically daily 45 g 0    naratriptan (AMERGE) 1 MG TABS Take 1 mg by mouth every 4 (four) hours as needed for migraine      ondansetron (ZOFRAN-ODT) 4 mg disintegrating tablet MAY PUT 1 TAB UNDERNEATH THE TONGUE EVERY 12 HOURS AS NEEDED FOR NAUSEA 60 tablet 0    prochlorperazine (COMPAZINE) 10 mg tablet TAKE 1/2 TAB TWICE A DAY FOR SEVEN DAYS      Promethazine-DM (PHENERGAN-DM) 6 25-15 mg/5 mL oral syrup Take 5 mL by mouth 4 (four) times a day as needed for cough 118 mL 0    Respiratory Therapy Supplies (NEBULIZER) DONA by Does not apply route every 4 (four) hours while awake 90 each 1    scopolamine (TRANSDERM-SCOP) 1 mg/3 days TD 72 hr patch Place 1 patch on the skin every third day 4 patch 0    sulindac (CLINORIL) 150 MG tablet sulindac 150 mg tablet      terbinafine (LamISIL) 250 mg tablet Daily      Topiramate ER (Trokendi XR) 100 MG CP24 Take 100 mg by mouth 2 (two) times a day      Ubrogepant (Ubrelvy) 50 MG TABS       Vraylar 3 MG capsule TAKE 1 CAPSULE (3 MG TOTAL) BY MOUTH DAILY 30 capsule 0    XIFAXAN 550 MG tablet        No current facility-administered medications for this visit       Allergies   Allergen Reactions    Doxycycline Rash    Erythromycin Rash    Other Edema and Wheezing     jalapeno    Latex Rash    Duloxetine      Other reaction(s): Nausea, Loopy    Eletriptan      Pain in lower jaw with pressure in throat    Emgality [Galcanezumab-Gnlm]     Galcanezumab      rash      Immunizations:     Immunization History   Administered Date(s) Administered    COVID-19 PFIZER VACCINE 0 3 ML IM 04/16/2021, 05/10/2021    Fluzone Split Quad 0 5 mL 09/20/2016, 09/20/2016, 09/08/2017, 09/08/2017    INFLUENZA 11/29/2007, 10/16/2008, 09/21/2009, 10/04/2010, 09/17/2011, 09/06/2012, 09/20/2016, 09/08/2017, 07/10/2019, 09/01/2019, 09/05/2020, 09/24/2020    Influenza Quadrivalent 3 years and older 09/20/2016    Influenza, injectable, quadrivalent, preservative free 0 5 mL 10/05/2018    Influenza, recombinant, quadrivalent,injectable, preservative free 09/06/2019, 09/24/2021    Influenza, seasonal, injectable 11/29/2007, 10/16/2008, 09/21/2009, 10/04/2010, 09/17/2011, 09/06/2012, 09/20/2013, 09/23/2014, 09/08/2015    Pneumococcal Polysaccharide PPV23 09/21/2009    Td (adult), adsorbed 04/03/2004    Tdap 03/01/2003, 11/12/2014    Unknown 06/22/2012, 07/23/2012      Health Maintenance:         Topic Date Due    Cervical Cancer Screening  Never done    Breast Cancer Screening: Mammogram  Never done    Colorectal Cancer Screening  06/28/2031    HIV Screening  Completed    Hepatitis C Screening  Completed         Topic Date Due    COVID-19 Vaccine (3 - Booster for [place] Ravinder series) 11/10/2021      Medicare Health Risk Assessment:     /80   Pulse 80   Temp (!) 96 2 °F (35 7 °C)   Resp 16   Ht 5' 3 8" (1 621 m)   SpO2 97%   BMI 31 44 kg/m²      Kunal Guthrie is here for her Subsequent Wellness visit  Health Risk Assessment:   Patient rates overall health as fair  Patient feels that their physical health rating is much worse  Patient is satisfied with their life  Eyesight was rated as same  Hearing was rated as same  Patient feels that their emotional and mental health rating is same  Patients states they are often angry  Patient states they are always unusually tired/fatigued  Pain experienced in the last 7 days has been a lot  Patient's pain rating has been 10/10  Patient states that she has experienced weight loss or gain in last 6 months  Fall Risk Screening: In the past year, patient has experienced: history of falling in past year    Number of falls: 1  Injured during fall?: Yes    Feels unsteady when standing or walking?: Yes    Worried about falling?: Yes      Home Safety:  Patient does not have trouble with stairs inside or outside of their home  Patient has working smoke alarms and has working carbon monoxide detector  Home safety hazards include: none  Nutrition:   Current diet is Regular       Medications:   Patient is currently taking over-the-counter supplements  OTC medications include: see medication list  Patient is able to manage medications  Activities of Daily Living (ADLs)/Instrumental Activities of Daily Living (IADLs):   Walk and transfer into and out of bed and chair?: Yes  Dress and groom yourself?: Yes    Bathe or shower yourself?: Yes    Feed yourself? Yes  Do your laundry/housekeeping?: No  Manage your money, pay your bills and track your expenses?: Yes  Make your own meals?: Yes    Do your own shopping?: Yes    Previous Hospitalizations:   Any hospitalizations or ED visits within the last 12 months?: No      Advance Care Planning:   Living will: No    Durable POA for healthcare: No      PREVENTIVE SCREENINGS      Cardiovascular Screening:    General: Screening Not Indicated and History Lipid Disorder      Diabetes Screening:     General: Screening Current      Colorectal Cancer Screening:     General: Screening Current      Breast Cancer Screening:       Due for: Mammogram        Cervical Cancer Screening:      Due for: Cervical Pap Smear      Osteoporosis Screening:    General: Screening Not Indicated      Abdominal Aortic Aneurysm (AAA) Screening:        General: Screening Not Indicated      Lung Cancer Screening:     General: Screening Not Indicated      Hepatitis C Screening:    General: Screening Current    Screening, Brief Intervention, and Referral to Treatment (SBIRT)    Screening  Typical number of drinks in a day: 0  Typical number of drinks in a week: 0  Interpretation: Low risk drinking behavior      Single Item Drug Screening:  How often have you used an illegal drug (including marijuana) or a prescription medication for non-medical reasons in the past year? never    Single Item Drug Screen Score: 0  Interpretation: Negative screen for possible drug use disorder      ERICKA Lu
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
## 2022-01-24 ENCOUNTER — APPOINTMENT (OUTPATIENT)
Dept: LAB | Facility: HOSPITAL | Age: 52
End: 2022-01-24
Payer: MEDICARE

## 2022-01-24 DIAGNOSIS — Z00.00 HEALTHCARE MAINTENANCE: ICD-10-CM

## 2022-01-24 DIAGNOSIS — Z20.818 EXPOSURE TO LISTERIA MONOCYTOGENES: ICD-10-CM

## 2022-01-24 LAB
ALBUMIN SERPL BCP-MCNC: 4.1 G/DL (ref 3.5–5)
ALP SERPL-CCNC: 85 U/L (ref 46–116)
ALT SERPL W P-5'-P-CCNC: 27 U/L (ref 12–78)
ANION GAP SERPL CALCULATED.3IONS-SCNC: 9 MMOL/L (ref 4–13)
AST SERPL W P-5'-P-CCNC: 18 U/L (ref 5–45)
BASOPHILS # BLD AUTO: 0.02 THOUSANDS/ΜL (ref 0–0.1)
BASOPHILS NFR BLD AUTO: 1 % (ref 0–1)
BILIRUB SERPL-MCNC: 0.43 MG/DL (ref 0.2–1)
BUN SERPL-MCNC: 14 MG/DL (ref 5–25)
CALCIUM SERPL-MCNC: 9 MG/DL (ref 8.3–10.1)
CHLORIDE SERPL-SCNC: 109 MMOL/L (ref 100–108)
CHOLEST SERPL-MCNC: 261 MG/DL
CO2 SERPL-SCNC: 25 MMOL/L (ref 21–32)
CREAT SERPL-MCNC: 0.89 MG/DL (ref 0.6–1.3)
EOSINOPHIL # BLD AUTO: 0.09 THOUSAND/ΜL (ref 0–0.61)
EOSINOPHIL NFR BLD AUTO: 2 % (ref 0–6)
ERYTHROCYTE [DISTWIDTH] IN BLOOD BY AUTOMATED COUNT: 13.3 % (ref 11.6–15.1)
GFR SERPL CREATININE-BSD FRML MDRD: 75 ML/MIN/1.73SQ M
GLUCOSE P FAST SERPL-MCNC: 90 MG/DL (ref 65–99)
HCT VFR BLD AUTO: 42.8 % (ref 34.8–46.1)
HDLC SERPL-MCNC: 76 MG/DL
HGB BLD-MCNC: 13.7 G/DL (ref 11.5–15.4)
IMM GRANULOCYTES # BLD AUTO: 0.01 THOUSAND/UL (ref 0–0.2)
IMM GRANULOCYTES NFR BLD AUTO: 0 % (ref 0–2)
LDLC SERPL CALC-MCNC: 166 MG/DL (ref 0–100)
LYMPHOCYTES # BLD AUTO: 1.71 THOUSANDS/ΜL (ref 0.6–4.47)
LYMPHOCYTES NFR BLD AUTO: 40 % (ref 14–44)
MCH RBC QN AUTO: 29.1 PG (ref 26.8–34.3)
MCHC RBC AUTO-ENTMCNC: 32 G/DL (ref 31.4–37.4)
MCV RBC AUTO: 91 FL (ref 82–98)
MONOCYTES # BLD AUTO: 0.29 THOUSAND/ΜL (ref 0.17–1.22)
MONOCYTES NFR BLD AUTO: 7 % (ref 4–12)
NEUTROPHILS # BLD AUTO: 2.12 THOUSANDS/ΜL (ref 1.85–7.62)
NEUTS SEG NFR BLD AUTO: 50 % (ref 43–75)
NONHDLC SERPL-MCNC: 185 MG/DL
NRBC BLD AUTO-RTO: 0 /100 WBCS
PLATELET # BLD AUTO: 257 THOUSANDS/UL (ref 149–390)
PMV BLD AUTO: 9.7 FL (ref 8.9–12.7)
POTASSIUM SERPL-SCNC: 4.4 MMOL/L (ref 3.5–5.3)
PROT SERPL-MCNC: 7.6 G/DL (ref 6.4–8.2)
RBC # BLD AUTO: 4.7 MILLION/UL (ref 3.81–5.12)
SODIUM SERPL-SCNC: 143 MMOL/L (ref 136–145)
T4 FREE SERPL-MCNC: 0.74 NG/DL (ref 0.76–1.46)
TRIGL SERPL-MCNC: 97 MG/DL
TSH SERPL DL<=0.05 MIU/L-ACNC: 8.63 UIU/ML (ref 0.36–3.74)
WBC # BLD AUTO: 4.24 THOUSAND/UL (ref 4.31–10.16)

## 2022-01-24 PROCEDURE — 84439 ASSAY OF FREE THYROXINE: CPT

## 2022-01-24 PROCEDURE — 85025 COMPLETE CBC W/AUTO DIFF WBC: CPT

## 2022-01-24 PROCEDURE — 87040 BLOOD CULTURE FOR BACTERIA: CPT

## 2022-01-24 PROCEDURE — 84443 ASSAY THYROID STIM HORMONE: CPT

## 2022-01-24 PROCEDURE — 36415 COLL VENOUS BLD VENIPUNCTURE: CPT

## 2022-01-24 PROCEDURE — 80053 COMPREHEN METABOLIC PANEL: CPT

## 2022-01-24 PROCEDURE — 80061 LIPID PANEL: CPT

## 2022-01-26 ENCOUNTER — TELEMEDICINE (OUTPATIENT)
Dept: PSYCHIATRY | Facility: CLINIC | Age: 52
End: 2022-01-26
Payer: MEDICARE

## 2022-01-26 DIAGNOSIS — F41.1 GAD (GENERALIZED ANXIETY DISORDER): ICD-10-CM

## 2022-01-26 DIAGNOSIS — E03.8 OTHER SPECIFIED HYPOTHYROIDISM: ICD-10-CM

## 2022-01-26 DIAGNOSIS — F31.81 BIPOLAR 2 DISORDER (HCC): Primary | ICD-10-CM

## 2022-01-26 PROCEDURE — 99213 OFFICE O/P EST LOW 20 MIN: CPT | Performed by: PHYSICIAN ASSISTANT

## 2022-01-26 RX ORDER — CLONAZEPAM 0.5 MG/1
0.5 TABLET ORAL DAILY PRN
Qty: 30 TABLET | Refills: 2 | Status: SHIPPED | OUTPATIENT
Start: 2022-01-26 | End: 2022-04-11 | Stop reason: ALTCHOICE

## 2022-01-27 ENCOUNTER — TELEPHONE (OUTPATIENT)
Dept: PSYCHIATRY | Facility: CLINIC | Age: 52
End: 2022-01-27

## 2022-01-27 NOTE — TELEPHONE ENCOUNTER
Called patient and left voicemail to call office back to schedule 8/10 week follow up with Latrice Phillips   Please schedule first available

## 2022-01-28 DIAGNOSIS — B34.9 VIRAL INFECTION: ICD-10-CM

## 2022-01-29 LAB — BACTERIA BLD CULT: NORMAL

## 2022-01-31 RX ORDER — ALBUTEROL SULFATE 90 UG/1
AEROSOL, METERED RESPIRATORY (INHALATION)
Qty: 18 G | Refills: 2 | Status: SHIPPED | OUTPATIENT
Start: 2022-01-31 | End: 2022-05-20

## 2022-02-10 ENCOUNTER — HOSPITAL ENCOUNTER (OUTPATIENT)
Dept: RADIOLOGY | Facility: HOSPITAL | Age: 52
Discharge: HOME | End: 2022-02-10
Payer: MEDICARE

## 2022-02-10 DIAGNOSIS — R07.81 RIB PAIN: ICD-10-CM

## 2022-02-10 PROCEDURE — 71046 X-RAY EXAM CHEST 2 VIEWS: CPT

## 2022-02-11 ENCOUNTER — TELEPHONE (OUTPATIENT)
Dept: PSYCHIATRY | Facility: CLINIC | Age: 52
End: 2022-02-11

## 2022-02-14 ENCOUNTER — TELEPHONE (OUTPATIENT)
Dept: PSYCHIATRY | Facility: CLINIC | Age: 52
End: 2022-02-14

## 2022-02-24 ENCOUNTER — TELEPHONE (OUTPATIENT)
Dept: PSYCHIATRY | Facility: CLINIC | Age: 52
End: 2022-02-24

## 2022-02-24 ENCOUNTER — TELEMEDICINE (OUTPATIENT)
Dept: BEHAVIORAL/MENTAL HEALTH CLINIC | Facility: CLINIC | Age: 52
End: 2022-02-24
Payer: MEDICARE

## 2022-02-24 DIAGNOSIS — F31.81 BIPOLAR 2 DISORDER (HCC): ICD-10-CM

## 2022-02-24 DIAGNOSIS — F41.1 GAD (GENERALIZED ANXIETY DISORDER): Primary | ICD-10-CM

## 2022-02-24 PROCEDURE — 90834 PSYTX W PT 45 MINUTES: CPT | Performed by: SOCIAL WORKER

## 2022-02-24 NOTE — TELEPHONE ENCOUNTER
A letter was formulated on the patient's behalf. The following therapy services were provided via letter. 1. [facility]  Address: [address]  [address] [facility], [address]  Contact: Thurl Boas [phone]     2. [facility]  Address: [address] [facility], [address] [facility]  Contact: [phone]     3. [facility]  Address: [address] [address] [facility], [address] [place] [address] [address]  Contact: [phone]     4. [facility]  Address: [address] [facility], [facility] [facility]  Contact: Dillon Mccarty [phone]     5. [facility]  Address: [address] [place] [address]. [address]  Contact: Poly Estes [phone] [phone]    Will have the  mail to the recipient.

## 2022-02-24 NOTE — PSYCH
Virtual Regular Visit    Verification of patient location:    Patient is located in the following state in which I hold an active license [place]      Assessment/Plan:    Problem List Items Addressed This Visit     None          Goals addressed in session: Goal 1 and Goal 2          Reason for visit is   Chief Complaint   Patient presents with    Virtual Regular Visit        Encounter provider Ravinder Kinsey    Provider located at [address] [place] [id number]  [phone]      Recent Visits  No visits were found meeting these conditions  Showing recent visits within past 7 days and meeting all other requirements  Future Appointments  No visits were found meeting these conditions  Showing future appointments within next 150 days and meeting all other requirements       The patient was identified by name and date of birth  Grace Byers was informed that this is a telemedicine visit and that the visit is being conducted through[employer] and patient was informed that this is a secure, HIPAA-compliant platform  She agrees to proceed     My office door was closed  No one else was in the room  She acknowledged consent and understanding of privacy and security of the video platform  The patient has agreed to participate and understands they can discontinue the visit at any time  Patient is aware this is a billable service  Subjective  Grace Byers is a 49 y o  female    HPI     Past Medical History:   Diagnosis Date    Anxiety     Anxiety     Asthma     Bipolar disorder ([address] )     Carpal tunnel syndrome     Chronic pain     lower back    Depression     Disease of thyroid gland     Dyslipidemia     Fibromyalgia     Irritable bowel     Kidney stones     Kidney stones 5/20/2019    Migraine     Obesity (BMI 30 0-34  9)     Psychiatric disorder     depression/anxiety    Suicide attempt [facility])     as teen    Vitamin D deficiency        Past Surgical History:   Procedure Laterality Date    BUNIONECTOMY       SECTION      CHOLECYSTECTOMY      PARTIAL HYSTERECTOMY      TONSILLECTOMY      TUBAL LIGATION         Current Outpatient Medications   Medication Sig Dispense Refill    albuterol (2 5 mg/3 mL) 0 083 % nebulizer solution Take 1 vial (2 5 mg total) by nebulization every 6 (six) hours as needed for wheezing or shortness of breath 20 vial 0    albuterol (PROVENTIL HFA,VENTOLIN HFA) 90 mcg/act inhaler INHALE TWO PUFFS EVERY 6 (SIX) HOURS AS NEEDED FOR WHEEZING OR SHORTNESS OF BREATH 18 g 2    baclofen 10 mg tablet       betamethasone dipropionate (DIPROSONE) 0 05 % cream APPLY A THIN LAYER TO THE AFFECTED AREA(S) BY TOPICAL ROUTE ONCE DAILY      Butalbital-APAP-Caffeine -40 MG CAPS TAKE 1 CAPSULE Daily PRN      cariprazine (Vraylar) 3 MG capsule Take 1 capsule (3 mg total) by mouth daily 30 capsule 2    celecoxib (CELEBREX) 200 mg capsule Take 1 capsule (200 mg total) by mouth daily 30 capsule 2    cholestyramine (QUESTRAN) 4 g packet cholestyramine (with sugar) 4 gram powder for susp in a packet      clobetasol (TEMOVATE) 0 05 % cream       clonazePAM (KlonoPIN) 0 5 mg tablet Take 1 tablet (0 5 mg total) by mouth daily as needed for anxiety 30 tablet 2    clotrimazole-betamethasone (LOTRISONE) 1-0 05 % cream clotrimazole-betamethasone 1 %-0 05 % topical cream      cyclobenzaprine (FLEXERIL) 10 mg tablet Take 1 tablet (10 mg total) by mouth 3 (three) times a day as needed for muscle spasms 60 tablet 0    dexamethasone (DECADRON) 4 mg tablet       diclofenac sodium (VOLTAREN) 1 % APPLY 2 GRAM TO THE AFFECTED AREA(S) BY TOPICAL ROUTE 4 TIMES PER DAY      dicyclomine (BENTYL) 10 mg capsule Take 1 capsule (10 mg total) by mouth 3 (three) times a day before meals 90 capsule 0    dicyclomine (BENTYL) 20 mg tablet       divalproex sodium (DEPAKOTE) 250 mg EC tablet Take 250 mg by mouth daily      Erenumab-aooe (Aimovig) 140 MG/ML SOAJ Aimovig Autoinjector 140 mg/mL subcutaneous auto-injector      famotidine (PEPCID) 40 MG tablet famotidine 40 mg tablet      fremanezumab-vfrm (Ajovy) 225 MG/1 5ML auto-injector Inject 225 mg under the skin every 30 (thirty) days      gentian violet 2 % topical solution gentian violet 2 % topical solution   Apply 1 application by topical route        hydrocortisone 2 5 % cream hydrocortisone 2 5 % topical cream      hydroxychloroquine (PLAQUENIL) 200 mg tablet       ketoconazole (NIZORAL) 2 % cream ketoconazole 2 % topical cream   APPLY TO THE AFFECTED AREA(S) BY TOPICAL ROUTE ONCE DAILY      levothyroxine 200 mcg tablet       levothyroxine 25 mcg tablet       lidocaine (LIDODERM) 5 % Apply 1 patch topically daily as needed (back pain) Remove & Discard patch within 12 hours or as directed by MD 6 patch 1    loratadine (CLARITIN) 10 mg tablet Take 1 pill daily for allergies 90 tablet 1    meclizine (ANTIVERT) 25 mg tablet Take 1 tablet (25 mg total) by mouth every 8 (eight) hours as needed for dizziness 30 tablet 1    meloxicam (MOBIC) 7 5 mg tablet Daily      methylPREDNISolone 4 MG tablet therapy pack       mometasone (ELOCON) 0 1 % cream Apply topically daily 45 g 0    naratriptan (AMERGE) 1 MG TABS Take 1 mg by mouth every 4 (four) hours as needed for migraine      ondansetron (ZOFRAN-ODT) 4 mg disintegrating tablet MAY PUT 1 TAB UNDERNEATH THE TONGUE EVERY 12 HOURS AS NEEDED FOR NAUSEA 60 tablet 0    prochlorperazine (COMPAZINE) 10 mg tablet TAKE 1/2 TAB TWICE A DAY FOR SEVEN DAYS      Promethazine-DM (PHENERGAN-DM) 6 25-15 mg/5 mL oral syrup Take 5 mL by mouth 4 (four) times a day as needed for cough 118 mL 0    Respiratory Therapy Supplies (NEBULIZER) DONA by Does not apply route every 4 (four) hours while awake 90 each 1    scopolamine (TRANSDERM-SCOP) 1 mg/3 days TD 72 hr patch Place 1 patch on the skin every third day 4 patch 0    Semaglutide (Rybelsus) 7 MG TABS Take 7 mg by mouth in the morning 30 tablet 0    sulindac (CLINORIL) 150 MG tablet sulindac 150 mg tablet      terbinafine (LamISIL) 250 mg tablet Daily      Topiramate ER (Trokendi XR) 100 MG CP24 Take 100 mg by mouth 2 (two) times a day      Ubrogepant (Ubrelvy) 50 MG TABS       XIFAXAN 550 MG tablet        No current facility-administered medications for this visit  Allergies   Allergen Reactions    Doxycycline Rash    Erythromycin Rash    Other Edema and Wheezing     jalapeno    Latex Rash    Duloxetine      Other reaction(s): Nausea, Loopy    Eletriptan      Pain in lower jaw with pressure in throat    Emgality [Galcanezumab-Gnlm]     Galcanezumab      rash       Review of Systems Data:Saw Meg Chaparro virtually today  All she did was complain about the downstairs staff and how no one per her ever get back to her  She claims she tried to change her last session that was marked a no show to virtual and could not get anyone  She asked us to give her referrals to other places  Meg Chaparro has had a history of bitterly complaining about the office here  I informed her I will have our  call her and send her a list of other outpatient behavioral health providers  Assessment: Meg Chaparro denies suicidal/homicidal ideation, plan or intent  However she is always very angry and has acted on staff here in a volatile manner  She is unable to emotionally regulate her emotions  Today will be our last session  Plan: I will have the  provide her referral services  Video Exam    There were no vitals filed for this visit  Physical Exam N/A    I spent 45 minutes directly with the patient during this visit    VIRTUAL VISIT [facility] verbally agrees to participate in [employer]   Pt is aware that [employer] could be limited without vital signs or the ability to perform a full hands-on physical Jose Daniel Kong understands she or the provider may request at any time to terminate the video visit and request the patient to seek care or treatment in person

## 2022-02-25 ENCOUNTER — TELEPHONE (OUTPATIENT)
Dept: PSYCHIATRY | Facility: CLINIC | Age: 52
End: 2022-02-25

## 2022-02-25 NOTE — TELEPHONE ENCOUNTER
Angel Funk cancelled upcoming appointments on 3/10 and 3 /24 with Maryann Winters  She is no longer interested in therapy appointments

## 2022-02-28 ENCOUNTER — OFFICE VISIT (OUTPATIENT)
Dept: FAMILY MEDICINE CLINIC | Facility: CLINIC | Age: 52
End: 2022-02-28
Payer: MEDICARE

## 2022-02-28 VITALS
SYSTOLIC BLOOD PRESSURE: 118 MMHG | DIASTOLIC BLOOD PRESSURE: 72 MMHG | HEIGHT: 64 IN | BODY MASS INDEX: 31.44 KG/M2 | OXYGEN SATURATION: 97 % | HEART RATE: 88 BPM

## 2022-02-28 DIAGNOSIS — F31.81 BIPOLAR 2 DISORDER (HCC): ICD-10-CM

## 2022-02-28 DIAGNOSIS — E03.8 OTHER SPECIFIED HYPOTHYROIDISM: ICD-10-CM

## 2022-02-28 DIAGNOSIS — M79.7 FIBROMYALGIA: ICD-10-CM

## 2022-02-28 DIAGNOSIS — R22.31 MASS OF RIGHT AXILLA: ICD-10-CM

## 2022-02-28 DIAGNOSIS — N64.4 BREAST PAIN: Primary | ICD-10-CM

## 2022-02-28 PROCEDURE — 99214 OFFICE O/P EST MOD 30 MIN: CPT

## 2022-02-28 NOTE — PROGRESS NOTES
Assessment/Plan:    Problem List Items Addressed This Visit        Endocrine    Hypothyroidism     Schedule appointment with Endocrinology            Other    Fibromyalgia     Keep the appointment with Rheumatology in May         Bipolar 2 disorder [facility])     Continue to see Psychiatry           Other Visit Diagnoses     Breast pain    -  Primary    Bilateral breast tenderness, onset last Wednesday  Hysterectomy , mammogram ordered    Relevant Orders    Mammo diagnostic bilateral w cad    Mass of right axilla        Onset Wednesday, mammogram ordered    Relevant Orders    Mammo diagnostic bilateral w cad            Subjective:      Patient ID: Dona Doe is a 49 y o  female  Patient presents to office complaining of bilateral breast tenderness and right axillary mass that she noticed on Wednesday  The axillary mass is about 1 in diameter, nontender, non mobile, no redness or swelling  She does not remember when she had her mammogram last  She is denying any changes to nipples, inverted nipples, no discharge, no bleeding, no breast masses  History of hysterectomy   Scheduled for mammogram tomorrow at 11:15 a m  The following portions of the patient's history were reviewed and updated as appropriate:   Past Medical History:  She has a past medical history of Anxiety, Anxiety, Asthma, Bipolar disorder ([address] ), Carpal tunnel syndrome, Chronic pain, Depression, Disease of thyroid gland, Dyslipidemia, Fibromyalgia, Irritable bowel, Kidney stones, Kidney stones (2019), Migraine, Obesity (BMI 30 0-34 9), Psychiatric disorder, Suicide attempt ([address] ), and Vitamin D deficiency  ,  _______________________________________________________________________  Medical Problems:  does not have any pertinent problems on file ,  _______________________________________________________________________  Past Surgical History:   has a past surgical history that includes  section;  Cholecystectomy; Tonsillectomy; Partial hysterectomy; Bunionectomy; and Tubal ligation  ,  _______________________________________________________________________  Family History:  family history includes Anxiety disorder in her mother; Arthritis in her mother; Bipolar disorder in her daughter and mother; Cancer in her cousin, father, maternal uncle, paternal aunt, paternal grandmother, and paternal uncle; Dementia in her paternal grandmother; Depression in her mother; Diabetes in her mother; Heart disease in her brother; Hypertension in her brother and mother; Hypothyroidism in her mother and sister; Mental illness in her mother; Stroke in her mother ,  _______________________________________________________________________  Social History:   reports that she has never smoked  She has never used smokeless tobacco  She reports that she does not drink alcohol and does not use drugs  ,  _______________________________________________________________________  Allergies:  is allergic to doxycycline, erythromycin, other, latex, duloxetine, eletriptan, emgality [galcanezumab-gnlm], and galcanezumab     _______________________________________________________________________  Current Outpatient Medications   Medication Sig Dispense Refill    albuterol (2 5 mg/3 mL) 0 083 % nebulizer solution Take 1 vial (2 5 mg total) by nebulization every 6 (six) hours as needed for wheezing or shortness of breath 20 vial 0    albuterol (PROVENTIL HFA,VENTOLIN HFA) 90 mcg/act inhaler INHALE TWO PUFFS EVERY 6 (SIX) HOURS AS NEEDED FOR WHEEZING OR SHORTNESS OF BREATH 18 g 2    baclofen 10 mg tablet       betamethasone dipropionate (DIPROSONE) 0 05 % cream APPLY A THIN LAYER TO THE AFFECTED AREA(S) BY TOPICAL ROUTE ONCE DAILY      Butalbital-APAP-Caffeine -40 MG CAPS TAKE 1 CAPSULE Daily PRN      cariprazine (Vraylar) 3 MG capsule Take 1 capsule (3 mg total) by mouth daily 30 capsule 2    celecoxib (CELEBREX) 200 mg capsule Take 1 capsule (200 mg total) by mouth daily 30 capsule 2    clonazePAM (KlonoPIN) 0 5 mg tablet Take 1 tablet (0 5 mg total) by mouth daily as needed for anxiety 30 tablet 2    cyclobenzaprine (FLEXERIL) 10 mg tablet Take 1 tablet (10 mg total) by mouth 3 (three) times a day as needed for muscle spasms 60 tablet 0    diclofenac sodium (VOLTAREN) 1 % APPLY 2 GRAM TO THE AFFECTED AREA(S) BY TOPICAL ROUTE 4 TIMES PER DAY      dicyclomine (BENTYL) 10 mg capsule Take 1 capsule (10 mg total) by mouth 3 (three) times a day before meals 90 capsule 0    divalproex sodium (DEPAKOTE) 250 mg EC tablet Take 250 mg by mouth daily      Erenumab-aooe (Aimovig) 140 MG/ML SOAJ Aimovig Autoinjector 140 mg/mL subcutaneous auto-injector      famotidine (PEPCID) 40 MG tablet famotidine 40 mg tablet      fremanezumab-vfrm (Ajovy) 225 MG/1 5ML auto-injector Inject 225 mg under the skin every 30 (thirty) days      hydroxychloroquine (PLAQUENIL) 200 mg tablet       levothyroxine 200 mcg tablet       levothyroxine 25 mcg tablet       lidocaine (LIDODERM) 5 % Apply 1 patch topically daily as needed (back pain) Remove & Discard patch within 12 hours or as directed by MD Castro patch 1    loratadine (CLARITIN) 10 mg tablet Take 1 pill daily for allergies 90 tablet 1    meclizine (ANTIVERT) 25 mg tablet Take 1 tablet (25 mg total) by mouth every 8 (eight) hours as needed for dizziness 30 tablet 1    meloxicam (MOBIC) 7 5 mg tablet Daily      mometasone (ELOCON) 0 1 % cream Apply topically daily 45 g 0    naratriptan (AMERGE) 1 MG TABS Take 1 mg by mouth every 4 (four) hours as needed for migraine      ondansetron (ZOFRAN-ODT) 4 mg disintegrating tablet MAY PUT 1 TAB UNDERNEATH THE TONGUE EVERY 12 HOURS AS NEEDED FOR NAUSEA 60 tablet 0    Respiratory Therapy Supplies (NEBULIZER) DONA by Does not apply route every 4 (four) hours while awake 90 each 1    Semaglutide (Rybelsus) 7 MG TABS Take 7 mg by mouth in the morning 30 tablet 0    sulindac (CLINORIL) 150 MG tablet sulindac 150 mg tablet      Topiramate ER (Trokendi XR) 100 MG CP24 Take 100 mg by mouth 2 (two) times a day      Ubrogepant (Ubrelvy) 50 MG TABS       XIFAXAN 550 MG tablet        No current facility-administered medications for this visit      _______________________________________________________________________  Review of Systems   Constitutional: Negative for chills and fever  HENT: Negative for ear pain and sore throat  Eyes: Negative for pain and visual disturbance  Respiratory: Negative for cough and shortness of breath  Cardiovascular: Negative for chest pain and palpitations  Gastrointestinal: Negative for abdominal pain and vomiting  Genitourinary: Negative for dysuria and hematuria  Complaining of bilateral breast tenderness   Musculoskeletal: Negative for arthralgias and back pain  Skin: Negative for color change and rash  Neurological: Negative for seizures and syncope  Hematological: Positive for adenopathy (Right axilla)  Does not bruise/bleed easily  Psychiatric/Behavioral: Negative for decreased concentration and dysphoric mood  All other systems reviewed and are negative  Objective:  Vitals:    02/28/22 1118   BP: 118/72   Pulse: 88   SpO2: 97%   Height: 5' 3 8" (1 621 m)     Body mass index is 31 44 kg/m²  Physical Exam  Constitutional:       General: She is not in acute distress  Appearance: Normal appearance  She is not ill-appearing  HENT:      Head: Normocephalic  Nose: Nose normal       Mouth/Throat:      Mouth: Mucous membranes are moist    Eyes:      Conjunctiva/sclera: Conjunctivae normal       Pupils: Pupils are equal, round, and reactive to light  Cardiovascular:      Rate and Rhythm: Normal rate and regular rhythm  Pulses: Normal pulses  Heart sounds: Normal heart sounds  Pulmonary:      Effort: Pulmonary effort is normal  No respiratory distress  Breath sounds: Normal breath sounds  No wheezing  Chest:   Breasts:      Right: Tenderness and axillary adenopathy (About 1 in in diameter, nontender, non moveable) present  No swelling, bleeding, inverted nipple, mass, nipple discharge or skin change  Left: Tenderness present  No swelling, bleeding, inverted nipple, mass, nipple discharge or skin change  Abdominal:      General: Bowel sounds are normal       Palpations: Abdomen is soft  Musculoskeletal:         General: No swelling  Normal range of motion  Cervical back: Normal range of motion  No tenderness  Lymphadenopathy:      Upper Body:      Right upper body: Axillary adenopathy (About 1 in in diameter, nontender, non moveable) present  Skin:     General: Skin is warm and dry  Neurological:      Mental Status: She is alert and oriented to person, place, and time  Psychiatric:         Mood and Affect: Mood normal          Behavior: Behavior normal          Thought Content:  Thought content normal          Judgment: Judgment normal

## 2022-03-03 ENCOUNTER — TELEPHONE (OUTPATIENT)
Dept: PSYCHIATRY | Facility: CLINIC | Age: 52
End: 2022-03-03

## 2022-03-03 NOTE — TELEPHONE ENCOUNTER
DISCHARGE LETTER for Mouna Solano, WAYNE, ACSW, BCJOSE DANIEL, [facility] (certified and regular) placed in outgoing mail on 3/3/2022      Article #:  [id number]    Address:    Dameon Pérez  [address] [place] [address]

## 2022-03-11 NOTE — TELEPHONE ENCOUNTER
Certificate for the Discharge Letter was signed/received on 3/11/22/2022  A copy has been scanned into [employer]

## 2022-03-15 ENCOUNTER — HOSPITAL ENCOUNTER (OUTPATIENT)
Dept: MAMMOGRAPHY | Facility: CLINIC | Age: 52
Discharge: HOME | End: 2022-03-15
Payer: MEDICARE

## 2022-03-15 ENCOUNTER — HOSPITAL ENCOUNTER (OUTPATIENT)
Dept: ULTRASOUND IMAGING | Facility: CLINIC | Age: 52
Discharge: HOME | End: 2022-03-15

## 2022-03-15 VITALS — BODY MASS INDEX: 32.25 KG/M2 | HEIGHT: 63 IN | WEIGHT: 182 LBS

## 2022-03-15 DIAGNOSIS — R22.31 MASS OF RIGHT AXILLA: ICD-10-CM

## 2022-03-15 DIAGNOSIS — N64.4 BREAST PAIN: ICD-10-CM

## 2022-03-15 PROCEDURE — G0279 TOMOSYNTHESIS, MAMMO: HCPCS

## 2022-03-15 PROCEDURE — 77066 DX MAMMO INCL CAD BI: CPT

## 2022-03-18 ENCOUNTER — TELEPHONE (OUTPATIENT)
Dept: FAMILY MEDICINE CLINIC | Facility: CLINIC | Age: 52
End: 2022-03-18

## 2022-03-21 ENCOUNTER — TELEMEDICINE (OUTPATIENT)
Dept: FAMILY MEDICINE CLINIC | Facility: CLINIC | Age: 52
End: 2022-03-21
Payer: MEDICARE

## 2022-03-21 DIAGNOSIS — R05.9 COUGH: Primary | ICD-10-CM

## 2022-03-21 DIAGNOSIS — J02.9 SORE THROAT: ICD-10-CM

## 2022-03-21 PROCEDURE — 99213 OFFICE O/P EST LOW 20 MIN: CPT

## 2022-03-21 RX ORDER — AZELASTINE 1 MG/ML
1 SPRAY, METERED NASAL 2 TIMES DAILY
Qty: 30 ML | Refills: 0 | Status: SHIPPED | OUTPATIENT
Start: 2022-03-21

## 2022-03-21 RX ORDER — BENZONATATE 100 MG/1
100 CAPSULE ORAL 3 TIMES DAILY PRN
Qty: 20 CAPSULE | Refills: 0 | Status: SHIPPED | OUTPATIENT
Start: 2022-03-21 | End: 2022-04-11 | Stop reason: ALTCHOICE

## 2022-03-21 RX ORDER — METHYLPREDNISOLONE 4 MG/1
TABLET ORAL
Qty: 21 EACH | Refills: 0 | Status: SHIPPED | OUTPATIENT
Start: 2022-03-21 | End: 2022-04-11 | Stop reason: ALTCHOICE

## 2022-03-21 NOTE — PATIENT INSTRUCTIONS
Acute Cough   AMBULATORY CARE:   An acute cough  can last up to 3 weeks  Common causes of an acute cough include a cold, allergies, or a lung infection  Seek care immediately if:   · You have trouble breathing or feel short of breath  · You cough up blood, or you see blood in your mucus  · You faint or feel weak or dizzy  · You have chest pain when you cough or take a deep breath  · You have new wheezing  Contact your healthcare provider if:   · You have a fever  · Your cough lasts longer than 4 weeks  · Your symptoms do not improve with treatment  · You have questions or concerns about your condition or care  Treatment:  An acute cough usually goes away on its own  Ask your healthcare provider about medicines you can take to decrease your cough  You may need medicine to stop the cough, decrease swelling in your airways, or help open your airways  Medicine may also be given to help you cough up mucus  If you have an infection caused by bacteria, you may need antibiotics  Manage your symptoms:   · Do not smoke and stay away from others who smoke  Nicotine and other chemicals in cigarettes and cigars can cause lung damage and make your cough worse  Ask your healthcare provider for information if you currently smoke and need help to quit  E-cigarettes or smokeless tobacco still contain nicotine  Talk to your healthcare provider before you use these products  · Drink extra liquids as directed  Liquids will help thin and loosen mucus so you can cough it up  Liquids will also help prevent dehydration  Examples of good liquids to drink include water, fruit juice, and broth  Do not drink liquids that contain caffeine  Caffeine can increase your risk for dehydration  Ask your healthcare provider how much liquid to drink each day  · Rest as directed  Do not do activities that make your cough worse, such as exercise  · Use a humidifier or vaporizer    Use a cool mist humidifier or a vaporizer to increase air moisture in your home  This may make it easier for you to breathe and help decrease your cough  · Eat 2 to 5 mL of honey 2 times each day  Honey can help thin mucus and decrease your cough  · Use cough drops or lozenges  These can help decrease throat irritation and your cough  Follow up with your healthcare provider as directed:  Write down your questions so you remember to ask them during your visits  © Copyright [address] 2022 Information is for End User's use only and may not be sold, redistributed or otherwise used for commercial purposes  All illustrations and images included in CareNotes® are the copyrighted property of A D A M , Inc  or [employer]  The above information is an  only  It is not intended as medical advice for individual conditions or treatments  Talk to your doctor, nurse or pharmacist before following any medical regimen to see if it is safe and effective for you

## 2022-03-21 NOTE — PROGRESS NOTES
Virtual Regular Visit    Verification of patient location:    Patient is located in the following state in which I hold an active license [place]      Assessment/Plan:    Problem List Items Addressed This Visit     None      Visit Diagnoses     Cough    -  Primary    Onset last week and getting worse    Relevant Medications    methylPREDNISolone 4 MG tablet therapy pack    benzonatate (TESSALON PERLES) 100 mg capsule    azelastine (ASTELIN) 0 1 % nasal spray    Sore throat        Onset today               Reason for visit is   Chief Complaint   Patient presents with    Virtual Regular Visit        Encounter provider Wilmer Grimes, [facility]    Provider located at [facility] [address] [place] [address]  [address] [place] [id number] [phone]      Recent Visits  Date Type Provider Dept   03/18/22 Telephone [address] recent visits within past 7 days and meeting all other requirements  Today's Visits  Date Type Provider Dept   03/21/22 Telemedicine ERICKA Payne [facility] [phone]   Showing today's visits and meeting all other requirements  Future Appointments  No visits were found meeting these conditions  Showing future appointments within next 150 days and meeting all other requirements       The patient was identified by name and date of birth  Roni Trevino was informed that this is a telemedicine visit and that the visit is being conducted through [address] [place] [address] Now and patient was informed that this is a secure, HIPAA-compliant platform  She agrees to proceed     My office door was closed  No one else was in the room  She acknowledged consent and understanding of privacy and security of the video platform  The patient has agreed to participate and understands they can discontinue the visit at any time  Patient is aware this is a billable service       [facility]  Amirah Earl is a 49 y o  female   Took at home COVID test that was negative    Cough  This is a new problem  The current episode started in the past 7 days  The problem has been gradually worsening  The problem occurs constantly  The cough is non-productive  Associated symptoms include a sore throat  Pertinent negatives include no chest pain, chills, ear congestion, ear pain, fever, headaches, heartburn, hemoptysis, myalgias, nasal congestion, postnasal drip, rash, rhinorrhea, shortness of breath, sweats, weight loss or wheezing  Nothing aggravates the symptoms  She has tried nothing for the symptoms  Her past medical history is significant for bronchitis  There is no history of asthma, bronchiectasis, COPD, emphysema, environmental allergies or pneumonia  Sore Throat   The current episode started today  The problem has been unchanged  There has been no fever  Associated symptoms include coughing and a hoarse voice  Pertinent negatives include no abdominal pain, congestion, diarrhea, drooling, ear discharge, ear pain, headaches, plugged ear sensation, neck pain, shortness of breath, stridor, swollen glands, trouble swallowing or vomiting  She has had no exposure to strep or mono  She has tried nothing for the symptoms  The treatment provided no relief  Past Medical History:   Diagnosis Date    Anxiety     Anxiety     Asthma     Bipolar disorder ([address] )     Carpal tunnel syndrome     Chronic pain     lower back    Depression     Disease of thyroid gland     Dyslipidemia     Fibromyalgia     Irritable bowel     Kidney stones     Kidney stones 2019    Migraine     Obesity (BMI 30 0-34  9)     Psychiatric disorder     depression/anxiety    Suicide attempt [facility])     as teen    Vitamin D deficiency        Past Surgical History:   Procedure Laterality Date    BUNIONECTOMY       SECTION      CHOLECYSTECTOMY      PARTIAL HYSTERECTOMY      TONSILLECTOMY      TUBAL LIGATION Current Outpatient Medications   Medication Sig Dispense Refill    albuterol (2 5 mg/3 mL) 0 083 % nebulizer solution Take 1 vial (2 5 mg total) by nebulization every 6 (six) hours as needed for wheezing or shortness of breath 20 vial 0    albuterol (PROVENTIL HFA,VENTOLIN HFA) 90 mcg/act inhaler INHALE TWO PUFFS EVERY 6 (SIX) HOURS AS NEEDED FOR WHEEZING OR SHORTNESS OF BREATH 18 g 2    azelastine (ASTELIN) 0 1 % nasal spray 1 spray into each nostril 2 (two) times a day Use in each nostril as directed 30 mL 0    baclofen 10 mg tablet       benzonatate (TESSALON PERLES) 100 mg capsule Take 1 capsule (100 mg total) by mouth 3 (three) times a day as needed for cough 20 capsule 0    betamethasone dipropionate (DIPROSONE) 0 05 % cream APPLY A THIN LAYER TO THE AFFECTED AREA(S) BY TOPICAL ROUTE ONCE DAILY      Butalbital-APAP-Caffeine -40 MG CAPS TAKE 1 CAPSULE Daily PRN      cariprazine (Vraylar) 3 MG capsule Take 1 capsule (3 mg total) by mouth daily 30 capsule 2    celecoxib (CELEBREX) 200 mg capsule Take 1 capsule (200 mg total) by mouth daily 30 capsule 2    clonazePAM (KlonoPIN) 0 5 mg tablet Take 1 tablet (0 5 mg total) by mouth daily as needed for anxiety 30 tablet 2    cyclobenzaprine (FLEXERIL) 10 mg tablet Take 1 tablet (10 mg total) by mouth 3 (three) times a day as needed for muscle spasms 60 tablet 0    diclofenac sodium (VOLTAREN) 1 % APPLY 2 GRAM TO THE AFFECTED AREA(S) BY TOPICAL ROUTE 4 TIMES PER DAY      dicyclomine (BENTYL) 10 mg capsule Take 1 capsule (10 mg total) by mouth 3 (three) times a day before meals 90 capsule 0    divalproex sodium (DEPAKOTE) 250 mg EC tablet Take 250 mg by mouth daily      Erenumab-aooe (Aimovig) 140 MG/ML SOAJ Aimovig Autoinjector 140 mg/mL subcutaneous auto-injector      famotidine (PEPCID) 40 MG tablet famotidine 40 mg tablet      fremanezumab-vfrm (Ajovy) 225 MG/1 5ML auto-injector Inject 225 mg under the skin every 30 (thirty) days      hydroxychloroquine (PLAQUENIL) 200 mg tablet       levothyroxine 200 mcg tablet       levothyroxine 25 mcg tablet       lidocaine (LIDODERM) 5 % Apply 1 patch topically daily as needed (back pain) Remove & Discard patch within 12 hours or as directed by MD 6 patch 1    loratadine (CLARITIN) 10 mg tablet Take 1 pill daily for allergies 90 tablet 1    meclizine (ANTIVERT) 25 mg tablet Take 1 tablet (25 mg total) by mouth every 8 (eight) hours as needed for dizziness 30 tablet 1    meloxicam (MOBIC) 7 5 mg tablet Daily      methylPREDNISolone 4 MG tablet therapy pack Use as directed on package 21 each 0    mometasone (ELOCON) 0 1 % cream Apply topically daily 45 g 0    naratriptan (AMERGE) 1 MG TABS Take 1 mg by mouth every 4 (four) hours as needed for migraine      ondansetron (ZOFRAN-ODT) 4 mg disintegrating tablet MAY PUT 1 TAB UNDERNEATH THE TONGUE EVERY 12 HOURS AS NEEDED FOR NAUSEA 60 tablet 0    Respiratory Therapy Supplies (NEBULIZER) DONA by Does not apply route every 4 (four) hours while awake 90 each 1    Semaglutide (Rybelsus) 7 MG TABS Take 7 mg by mouth in the morning 30 tablet 0    sulindac (CLINORIL) 150 MG tablet sulindac 150 mg tablet      Topiramate ER (Trokendi XR) 100 MG CP24 Take 100 mg by mouth 2 (two) times a day      Ubrogepant (Ubrelvy) 50 MG TABS       XIFAXAN 550 MG tablet        No current facility-administered medications for this visit  Allergies   Allergen Reactions    Doxycycline Rash    Erythromycin Rash    Other Edema and Wheezing     jalapeno    Latex Rash    Duloxetine      Other reaction(s): Nausea, Loopy    Eletriptan      Pain in lower jaw with pressure in throat    Emgality [Galcanezumab-Gnlm]     Galcanezumab      rash       Review of Systems   Constitutional: Negative for chills, fever and weight loss  HENT: Positive for hoarse voice and sore throat   Negative for congestion, drooling, ear discharge, ear pain, postnasal drip, rhinorrhea and trouble swallowing  Respiratory: Positive for cough  Negative for hemoptysis, shortness of breath, wheezing and stridor  Cardiovascular: Negative for chest pain  Gastrointestinal: Negative for abdominal pain, diarrhea, heartburn and vomiting  Musculoskeletal: Negative for myalgias and neck pain  Skin: Negative for rash  Allergic/Immunologic: Negative for environmental allergies  Neurological: Negative for headaches  Video Exam    There were no vitals filed for this visit  Physical Exam  Constitutional:       General: She is not in acute distress  Appearance: Normal appearance  She is ill-appearing  HENT:      Head: Normocephalic  Nose: Nose normal    Pulmonary:      Effort: Pulmonary effort is normal  No respiratory distress  Comments: Coughing during visit  Neurological:      Mental Status: She is alert and oriented to person, place, and time  Psychiatric:         Mood and Affect: Mood normal          Behavior: Behavior normal          Thought Content: Thought content normal          Judgment: Judgment normal           I spent 10 minutes directly with the patient during this visit    VIRTUAL VISIT [facility] verbally agrees to participate in [employer]  Pt is aware that [employer] could be limited without vital signs or the ability to perform a full hands-on physical exam  Belem Gilliam understands she or the provider may request at any time to terminate the video visit and request the patient to seek care or treatment in person

## 2022-03-22 ENCOUNTER — OFFICE VISIT (OUTPATIENT)
Dept: FAMILY MEDICINE CLINIC | Facility: CLINIC | Age: 52
End: 2022-03-22
Payer: MEDICARE

## 2022-03-22 VITALS
HEART RATE: 84 BPM | DIASTOLIC BLOOD PRESSURE: 78 MMHG | TEMPERATURE: 97.2 F | OXYGEN SATURATION: 97 % | WEIGHT: 181 LBS | HEIGHT: 63 IN | BODY MASS INDEX: 32.07 KG/M2 | SYSTOLIC BLOOD PRESSURE: 120 MMHG

## 2022-03-22 DIAGNOSIS — E03.8 OTHER SPECIFIED HYPOTHYROIDISM: ICD-10-CM

## 2022-03-22 DIAGNOSIS — F41.1 GAD (GENERALIZED ANXIETY DISORDER): ICD-10-CM

## 2022-03-22 DIAGNOSIS — Z12.11 SCREEN FOR COLON CANCER: ICD-10-CM

## 2022-03-22 DIAGNOSIS — E28.39 MENOPAUSE OVARIAN FAILURE: ICD-10-CM

## 2022-03-22 DIAGNOSIS — F31.81 BIPOLAR 2 DISORDER (HCC): ICD-10-CM

## 2022-03-22 DIAGNOSIS — M79.7 FIBROMYALGIA: ICD-10-CM

## 2022-03-22 DIAGNOSIS — E66.09 CLASS 1 OBESITY DUE TO EXCESS CALORIES WITHOUT SERIOUS COMORBIDITY WITH BODY MASS INDEX (BMI) OF 32.0 TO 32.9 IN ADULT: Primary | ICD-10-CM

## 2022-03-22 PROCEDURE — 99214 OFFICE O/P EST MOD 30 MIN: CPT

## 2022-03-22 RX ORDER — PHENTERMINE HYDROCHLORIDE 15 MG/1
15 CAPSULE ORAL EVERY MORNING
Qty: 30 CAPSULE | Refills: 0 | Status: SHIPPED | OUTPATIENT
Start: 2022-03-22 | End: 2022-04-18 | Stop reason: SDUPTHER

## 2022-03-22 NOTE — PROGRESS NOTES
Assessment/Plan:     Problem List Items Addressed This Visit        Endocrine    Hypothyroidism     Stable on current medication            Other    ZAINA (generalized anxiety disorder)     Stable on current medication regiment, continue to see Psychiatry         Relevant Medications    phentermine 15 MG capsule    Fibromyalgia     Stable         Bipolar 2 disorder (HCC)     Continue to see Psychiatry         Relevant Medications    phentermine 15 MG capsule    Class 1 obesity due to excess calories without serious comorbidity with body mass index (BMI) of 32 0 to 32 9 in adult - Primary     Will start on a phentermine daily  Instructed to keep food journal, diet to of 1500 calories a day, increase water and exercise         Relevant Medications    phentermine 15 MG capsule      Other Visit Diagnoses     Screen for colon cancer        Relevant Orders    Cologuard    Menopause ovarian failure        Relevant Orders    DXA bone density spine hip and pelvis        Return in 4 weeks for weight check    Subjective:      Patient ID: Niko Marcelo is a 49 y o  female  Patient presents to the office to follow-up on her weight  She used to take phentermine to support her weight loss and states that she had success with it  Like to resume it today  Patient was educated on dietary changes, introducing exercise, and keeping food journal   She is denying any chest pain, shortness a breath, palpitations, or headaches  She was instructed to start on lowest dose  Return in 4 weeks for weight check         The following portions of the patient's history were reviewed and updated as appropriate:   Past Medical History:  She has a past medical history of Anxiety, Anxiety, Asthma, Bipolar disorder ([address] ), Carpal tunnel syndrome, Chronic pain, Depression, Disease of thyroid gland, Dyslipidemia, Fibromyalgia, Irritable bowel, Kidney stones, Kidney stones (5/20/2019), Migraine, Obesity (BMI 30 0-34 9), Psychiatric disorder, Suicide attempt ([address] ), and Vitamin D deficiency  ,  _______________________________________________________________________  Medical Problems:  does not have any pertinent problems on file ,  _______________________________________________________________________  Past Surgical History:   has a past surgical history that includes  section; Cholecystectomy; Tonsillectomy; Partial hysterectomy; Bunionectomy; and Tubal ligation  ,  _______________________________________________________________________  Family History:  family history includes Anxiety disorder in her mother; Arthritis in her mother; Bipolar disorder in her daughter and mother; Breast cancer in her paternal aunt and paternal grandmother; Cancer in her cousin, father, maternal uncle, paternal aunt, paternal grandmother, and paternal uncle; Dementia in her paternal grandmother; Depression in her mother; Diabetes in her mother; Heart disease in her brother; Hypertension in her brother and mother; Hypothyroidism in her mother and sister; Mental illness in her mother; Stroke in her mother ,  _______________________________________________________________________  Social History:   reports that she has never smoked  She has never used smokeless tobacco  She reports that she does not drink alcohol and does not use drugs  ,  _______________________________________________________________________  Allergies:  is allergic to doxycycline, erythromycin, other, latex, duloxetine, eletriptan, emgality [galcanezumab-gnlm], and galcanezumab     _______________________________________________________________________  Current Outpatient Medications   Medication Sig Dispense Refill    albuterol (2 5 mg/3 mL) 0 083 % nebulizer solution Take 1 vial (2 5 mg total) by nebulization every 6 (six) hours as needed for wheezing or shortness of breath 20 vial 0    albuterol (PROVENTIL HFA,VENTOLIN HFA) 90 mcg/act inhaler INHALE TWO PUFFS EVERY 6 (SIX) HOURS AS NEEDED FOR WHEEZING OR SHORTNESS OF BREATH 18 g 2    azelastine (ASTELIN) 0 1 % nasal spray 1 spray into each nostril 2 (two) times a day Use in each nostril as directed 30 mL 0    baclofen 10 mg tablet       benzonatate (TESSALON PERLES) 100 mg capsule Take 1 capsule (100 mg total) by mouth 3 (three) times a day as needed for cough 20 capsule 0    betamethasone dipropionate (DIPROSONE) 0 05 % cream APPLY A THIN LAYER TO THE AFFECTED AREA(S) BY TOPICAL ROUTE ONCE DAILY      Butalbital-APAP-Caffeine -40 MG CAPS TAKE 1 CAPSULE Daily PRN      cariprazine (Vraylar) 3 MG capsule Take 1 capsule (3 mg total) by mouth daily 30 capsule 2    celecoxib (CELEBREX) 200 mg capsule Take 1 capsule (200 mg total) by mouth daily 30 capsule 2    cyclobenzaprine (FLEXERIL) 10 mg tablet Take 1 tablet (10 mg total) by mouth 3 (three) times a day as needed for muscle spasms 60 tablet 0    diclofenac sodium (VOLTAREN) 1 % APPLY 2 GRAM TO THE AFFECTED AREA(S) BY TOPICAL ROUTE 4 TIMES PER DAY      dicyclomine (BENTYL) 10 mg capsule Take 1 capsule (10 mg total) by mouth 3 (three) times a day before meals 90 capsule 0    divalproex sodium (DEPAKOTE) 250 mg EC tablet Take 250 mg by mouth daily      Erenumab-aooe (Aimovig) 140 MG/ML SOAJ Aimovig Autoinjector 140 mg/mL subcutaneous auto-injector      famotidine (PEPCID) 40 MG tablet famotidine 40 mg tablet      fremanezumab-vfrm (Ajovy) 225 MG/1 5ML auto-injector Inject 225 mg under the skin every 30 (thirty) days      hydroxychloroquine (PLAQUENIL) 200 mg tablet       levothyroxine 200 mcg tablet       levothyroxine 25 mcg tablet       lidocaine (LIDODERM) 5 % Apply 1 patch topically daily as needed (back pain) Remove & Discard patch within 12 hours or as directed by MD 6 patch 1    loratadine (CLARITIN) 10 mg tablet Take 1 pill daily for allergies 90 tablet 1    meclizine (ANTIVERT) 25 mg tablet Take 1 tablet (25 mg total) by mouth every 8 (eight) hours as needed for dizziness 30 tablet 1    meloxicam (MOBIC) 7 5 mg tablet Daily      methylPREDNISolone 4 MG tablet therapy pack Use as directed on package 21 each 0    mometasone (ELOCON) 0 1 % cream Apply topically daily 45 g 0    naratriptan (AMERGE) 1 MG TABS Take 1 mg by mouth every 4 (four) hours as needed for migraine      ondansetron (ZOFRAN-ODT) 4 mg disintegrating tablet MAY PUT 1 TAB UNDERNEATH THE TONGUE EVERY 12 HOURS AS NEEDED FOR NAUSEA 60 tablet 0    Semaglutide (Rybelsus) 7 MG TABS Take 7 mg by mouth in the morning 30 tablet 0    sulindac (CLINORIL) 150 MG tablet sulindac 150 mg tablet      Topiramate ER (Trokendi XR) 100 MG CP24 Take 100 mg by mouth 2 (two) times a day      Ubrogepant (Ubrelvy) 50 MG TABS       XIFAXAN 550 MG tablet       clonazePAM (KlonoPIN) 0 5 mg tablet Take 1 tablet (0 5 mg total) by mouth daily as needed for anxiety 30 tablet 2    phentermine 15 MG capsule Take 1 capsule (15 mg total) by mouth every morning 30 capsule 0    Respiratory Therapy Supplies (NEBULIZER) DONA by Does not apply route every 4 (four) hours while awake 90 each 1     No current facility-administered medications for this visit      _______________________________________________________________________  Review of Systems   Constitutional: Negative for chills and fever  HENT: Positive for congestion ( improving)  Negative for ear pain and sore throat  Eyes: Negative for pain and visual disturbance  Respiratory: Positive for cough ( improving)  Negative for shortness of breath  Cardiovascular: Negative for chest pain and palpitations  Gastrointestinal: Negative for abdominal pain and vomiting  Genitourinary: Negative for dysuria and hematuria  Musculoskeletal: Positive for arthralgias (Chronic)  Negative for back pain  Skin: Negative for color change and rash  Neurological: Negative for dizziness, seizures, syncope and headaches     Psychiatric/Behavioral: Negative for decreased concentration and dysphoric mood    All other systems reviewed and are negative  Objective:  Vitals:    03/22/22 1033   BP: 120/78   Pulse: 84   Temp: (!) 97 2 °F (36 2 °C)   SpO2: 97%   Weight: 82 1 kg (181 lb)   Height: 5' 3" (1 6 m)     Body mass index is 32 06 kg/m²  Physical Exam  Vitals and nursing note reviewed  Constitutional:       General: She is not in acute distress  Appearance: Normal appearance  She is obese  She is not ill-appearing  HENT:      Head: Normocephalic  Right Ear: External ear normal       Left Ear: External ear normal       Nose: Nose normal       Mouth/Throat:      Mouth: Mucous membranes are moist    Eyes:      Conjunctiva/sclera: Conjunctivae normal       Pupils: Pupils are equal, round, and reactive to light  Cardiovascular:      Rate and Rhythm: Normal rate and regular rhythm  Pulses: Normal pulses  Heart sounds: Normal heart sounds  Pulmonary:      Effort: Pulmonary effort is normal  No respiratory distress  Breath sounds: Normal breath sounds  No wheezing  Abdominal:      General: Bowel sounds are normal       Palpations: Abdomen is soft  Musculoskeletal:         General: No swelling, tenderness, deformity or signs of injury  Normal range of motion  Cervical back: Normal range of motion  No tenderness  Right lower leg: No edema  Left lower leg: No edema  Lymphadenopathy:      Cervical: No cervical adenopathy  Skin:     General: Skin is warm and dry  Capillary Refill: Capillary refill takes less than 2 seconds  Neurological:      Mental Status: She is alert and oriented to person, place, and time  Psychiatric:         Mood and Affect: Mood normal          Behavior: Behavior normal          Thought Content:  Thought content normal          Judgment: Judgment normal

## 2022-03-22 NOTE — ASSESSMENT & PLAN NOTE
Will start on a phentermine daily    Instructed to keep food journal, diet to of 1500 calories a day, increase water and exercise

## 2022-03-22 NOTE — PATIENT INSTRUCTIONS
Suggest you keep a food diary like My Fitness Pal or the Lose it Patti  Calories should be less than 1200 daily spread out during the course of the day  Cut back on carbs and eat more proteins  Make better food choices  At least 6 to 8 glasses of water daily  Slowly increase the exercise and try to get up to 30 minutes at least 5 times weekly  The changes you make now are for life

## 2022-04-04 DIAGNOSIS — E66.09 CLASS 1 OBESITY DUE TO EXCESS CALORIES WITHOUT SERIOUS COMORBIDITY WITH BODY MASS INDEX (BMI) OF 31.0 TO 31.9 IN ADULT: ICD-10-CM

## 2022-04-05 RX ORDER — ORAL SEMAGLUTIDE 7 MG/1
TABLET ORAL
Qty: 30 TABLET | Refills: 0 | Status: SHIPPED | OUTPATIENT
Start: 2022-04-05 | End: 2022-04-11 | Stop reason: ALTCHOICE

## 2022-04-11 ENCOUNTER — OFFICE VISIT (OUTPATIENT)
Dept: OBGYN CLINIC | Facility: MEDICAL CENTER | Age: 52
End: 2022-04-11
Payer: MEDICARE

## 2022-04-11 VITALS
WEIGHT: 173.8 LBS | BODY MASS INDEX: 29.67 KG/M2 | DIASTOLIC BLOOD PRESSURE: 64 MMHG | HEIGHT: 64 IN | SYSTOLIC BLOOD PRESSURE: 134 MMHG

## 2022-04-11 DIAGNOSIS — R35.0 URINE FREQUENCY: ICD-10-CM

## 2022-04-11 DIAGNOSIS — Z01.419 ENCNTR FOR GYN EXAM (GENERAL) (ROUTINE) W/O ABN FINDINGS: ICD-10-CM

## 2022-04-11 DIAGNOSIS — Z12.4 CERVICAL CANCER SCREENING: ICD-10-CM

## 2022-04-11 PROBLEM — R42 VERTIGO: Status: ACTIVE | Noted: 2021-10-26

## 2022-04-11 PROBLEM — M05.9 SEROPOSITIVE RHEUMATOID ARTHRITIS (HCC): Status: ACTIVE | Noted: 2020-10-06

## 2022-04-11 LAB
BACTERIA UR QL AUTO: ABNORMAL /HPF
BILIRUB UR QL STRIP: NEGATIVE
CLARITY UR: CLEAR
COLOR UR: YELLOW
GLUCOSE UR STRIP-MCNC: NEGATIVE MG/DL
HGB UR QL STRIP.AUTO: NEGATIVE
KETONES UR STRIP-MCNC: ABNORMAL MG/DL
LEUKOCYTE ESTERASE UR QL STRIP: NEGATIVE
MUCOUS THREADS UR QL AUTO: ABNORMAL
NITRITE UR QL STRIP: NEGATIVE
NON-SQ EPI CELLS URNS QL MICRO: ABNORMAL /HPF
PH UR STRIP.AUTO: 6 [PH]
PROT UR STRIP-MCNC: ABNORMAL MG/DL
RBC #/AREA URNS AUTO: ABNORMAL /HPF
SP GR UR STRIP.AUTO: 1.03 (ref 1–1.03)
UROBILINOGEN UR STRIP-ACNC: <2 MG/DL
WBC #/AREA URNS AUTO: ABNORMAL /HPF

## 2022-04-11 PROCEDURE — G0101 CA SCREEN;PELVIC/BREAST EXAM: HCPCS | Performed by: PHYSICIAN ASSISTANT

## 2022-04-11 PROCEDURE — 81001 URINALYSIS AUTO W/SCOPE: CPT | Performed by: PHYSICIAN ASSISTANT

## 2022-04-11 PROCEDURE — 87086 URINE CULTURE/COLONY COUNT: CPT | Performed by: PHYSICIAN ASSISTANT

## 2022-04-11 PROCEDURE — G0145 SCR C/V CYTO,THINLAYER,RESCR: HCPCS | Performed by: PHYSICIAN ASSISTANT

## 2022-04-11 NOTE — PROGRESS NOTES
Tom Ben  1970    S:  49 y o  female here as a NEW PATIENT for a problem visit  She complains of vaginal odor for the past    She does notice a lot of sweating  She showers twice a day and she still notices the odor  She has tried feminine deodorants which have helped but she doesn't want to depend on those  She complains of pelvic pressure, urinary urgency, and feelings of incomplete bladder emptying  She denies dysuria  She doesn't understand why she would have these feelings when she doesn't have a uterus to be pushing down on her bladder  She had a colonoscopy in 2017 at [facility]    She had a tubal ligation for contraception  She is s/p hysterectomy for a "tumor " She does not know if she has a cervix or not  Records from [facility] indicate she had a supracervical hysterectomy in 2011; this was a planned laparoscopic hysterectomy that was converted to laparotomy and supracervical hysterectomy due to adhesions  She is not currently sexually active for the past 3 years but met someone and anticipates this in the future  Past Medical History:   Diagnosis Date    Anxiety     Anxiety     Asthma     Bipolar disorder ([address] )     Carpal tunnel syndrome     Chronic pain     lower back    Depression     Disease of thyroid gland     Dyslipidemia     Fibromyalgia     Irritable bowel     Kidney stones     Kidney stones 5/20/2019    Migraine     Obesity (BMI 30 0-34  9)     Psychiatric disorder     depression/anxiety    Suicide attempt [facility])     as teen    Vitamin D deficiency      Family History   Problem Relation Age of Onset    Hypertension Mother     Diabetes Mother     Hypothyroidism Mother     Stroke Mother     Bipolar disorder Mother     Anxiety disorder Mother     Arthritis Mother     Depression Mother     Mental illness Mother     Cancer Father         pancreatic     Hypothyroidism Sister     Hypertension Brother     Heart disease Brother     Cancer Maternal Uncle         lung    Cancer Paternal Aunt         breast    Breast cancer Paternal Aunt     Cancer Paternal Uncle         testicular     Cancer Paternal Grandmother         breast    Dementia Paternal Grandmother     Breast cancer Paternal Grandmother     Cancer Cousin         brain    Bipolar disorder Daughter      Social History     Socioeconomic History    Marital status: Single     Spouse name: None    Number of children: None    Years of education: None    Highest education level: None   Occupational History    Occupation: Disability   Tobacco Use    Smoking status: Never Smoker    Smokeless tobacco: Never Used   Vaping Use    Vaping Use: Never used   Substance and Sexual Activity    Alcohol use: No    Drug use: Never    Sexual activity: Not Currently     Partners: Male     Birth control/protection: Abstinence, Female Sterilization, Other, None     Comment: Hysterectomy   Other Topics Concern    None   Social History Narrative    None     Social Determinants of Health     Financial Resource Strain: Not on file   Food Insecurity: Not on file   Transportation Needs: Not on file   Physical Activity: Not on file   Stress: Not on file   Social Connections: Not on file   Intimate Partner Violence: Not on file   Housing Stability: Not on file       Review of Systems   Respiratory: Negative  Cardiovascular: Negative  Gastrointestinal: Negative for constipation and diarrhea  Genitourinary: Negative for difficulty urinating, pelvic pain, vaginal bleeding, vaginal discharge, itching or odor      O:  /64 (BP Location: Right arm, Patient Position: Sitting, Cuff Size: Standard)   Ht 5' 3 78" (1 62 m)   Wt 78 8 kg (173 lb 12 8 oz)   BMI 30 04 kg/m²   She appears well and is in no distress  Abdomen is soft and nontender  External genitals are normal without lesions or rashes  Vagina is normal, no discharge or bleeding noted  Cervix is normal, no lesions or discharge  Uterus is surgically absent  Adnexa are nontender, no pelvic masses appreciated    Wet mount reveals no yeast, no clue cells, no trich, pH 3 5, neg whiff, many lactobacillus     A/P: Vulvar odor - discussed this could be from urine or from sweat  Suggested cornstarch baby powder to the vulva to stay dry  Will check urinalysis, urine culture    Pelvic pressure, incomplete bladder emptying  Check urinalysis, urine culture   If neg, then will check pelvic US    Cervical cancer screening - Pap with reflex HPV collected today (has medicare, cannot run HPV)     RTO 3 months for annual exam and recheck

## 2022-04-12 ENCOUNTER — TELEPHONE (OUTPATIENT)
Dept: OBGYN CLINIC | Facility: CLINIC | Age: 52
End: 2022-04-12

## 2022-04-12 DIAGNOSIS — R10.2 PELVIC PRESSURE IN FEMALE: Primary | ICD-10-CM

## 2022-04-12 LAB — BACTERIA UR CULT: NORMAL

## 2022-04-12 NOTE — TELEPHONE ENCOUNTER
Called pt and relayed below to her  Told her I will enter [address] script  She was driving so couldn't take down central scheduling number  I told pt to let her cell go to  and ill leave the number on there   Verbalized understanding

## 2022-04-12 NOTE — TELEPHONE ENCOUNTER
----- Message from Alden Nixon PA-C sent at 4/12/2022  2:22 PM EDT -----  Urine culture is neg, urinalysis looks like she was a little dehydrated at the time of collection  Please order pelvic US to evaluate pelvic pressure

## 2022-04-16 LAB
LAB AP GYN PRIMARY INTERPRETATION: NORMAL
Lab: NORMAL

## 2022-04-18 ENCOUNTER — OFFICE VISIT (OUTPATIENT)
Dept: FAMILY MEDICINE CLINIC | Facility: CLINIC | Age: 52
End: 2022-04-18
Payer: MEDICARE

## 2022-04-18 VITALS
BODY MASS INDEX: 29.02 KG/M2 | RESPIRATION RATE: 14 BRPM | WEIGHT: 170 LBS | TEMPERATURE: 97.4 F | HEIGHT: 64 IN | DIASTOLIC BLOOD PRESSURE: 80 MMHG | HEART RATE: 106 BPM | OXYGEN SATURATION: 99 % | SYSTOLIC BLOOD PRESSURE: 110 MMHG

## 2022-04-18 DIAGNOSIS — M79.7 PRIMARY FIBROMYALGIA SYNDROME: ICD-10-CM

## 2022-04-18 DIAGNOSIS — R63.5 WEIGHT GAIN: ICD-10-CM

## 2022-04-18 DIAGNOSIS — G43.719 INTRACTABLE CHRONIC MIGRAINE WITHOUT AURA AND WITHOUT STATUS MIGRAINOSUS: ICD-10-CM

## 2022-04-18 DIAGNOSIS — K58.8 OTHER IRRITABLE BOWEL SYNDROME: ICD-10-CM

## 2022-04-18 PROBLEM — G43.909 MIGRAINE WITHOUT STATUS MIGRAINOSUS, NOT INTRACTABLE: Status: RESOLVED | Noted: 2018-10-23 | Resolved: 2022-04-18

## 2022-04-18 PROBLEM — J45.20 INTERMITTENT ASTHMA: Status: RESOLVED | Noted: 2017-03-07 | Resolved: 2022-04-18

## 2022-04-18 PROCEDURE — 99214 OFFICE O/P EST MOD 30 MIN: CPT

## 2022-04-18 RX ORDER — PHENTERMINE HYDROCHLORIDE 15 MG/1
15 CAPSULE ORAL EVERY MORNING
Qty: 30 CAPSULE | Refills: 0 | Status: SHIPPED | OUTPATIENT
Start: 2022-04-18 | End: 2022-05-16 | Stop reason: SDUPTHER

## 2022-04-18 NOTE — PROGRESS NOTES
Assessment/Plan:     Problem List Items Addressed This Visit        Digestive    IBS (irritable bowel syndrome)     Stable on current regiment             Cardiovascular and Mediastinum    Intractable chronic migraine without aura and without status migrainosus     Stable, no increase in headaches since phentermine initiation             Musculoskeletal and Integument    Primary fibromyalgia syndrome     Continue to see rheumatology             Other    BMI 29 0-29 9,adult - Primary     Recent weight loss, continue to exercise and manage your diet  Great job!!!         Relevant Medications    phentermine 15 MG capsule      Other Visit Diagnoses     Weight gain        Relevant Medications    phentermine 15 MG capsule          Subjective:      Patient ID: Rosendo Fermin is a 49 y o  female  Patient presents to office in order to follow-up on her weight loss  She has been taking 15 mg on phentermine daily  She is denying any adverse effects like increase in headaches, dizziness, chest pain, shortness a breath, or palpitations  She lost about 9 lb since last visit and feels great  Disc discussed to continue to exercise and make dietary changes in order to maintain 1200 calorie diet  The following portions of the patient's history were reviewed and updated as appropriate:   Past Medical History:  She has a past medical history of Anxiety, Anxiety, Asthma, Bipolar disorder ([facility] [address] ), Carpal tunnel syndrome, Chronic pain, Depression, Disease of thyroid gland, Dyslipidemia, Fibromyalgia, Irritable bowel, Kidney stones, Kidney stones (5/20/2019), Migraine, Obesity (BMI 30 0-34 9), Psychiatric disorder, Suicide attempt ([facility] [address] ), and Vitamin D deficiency  ,  _______________________________________________________________________  Medical Problems:  does not have any pertinent problems on file ,  _______________________________________________________________________  Past Surgical History:   has a past surgical history that includes  section; Cholecystectomy; Tonsillectomy; Partial hysterectomy; Bunionectomy; and Tubal ligation  ,  _______________________________________________________________________  Family History:  family history includes Anxiety disorder in her mother; Arthritis in her mother; Bipolar disorder in her daughter and mother; Breast cancer in her paternal aunt and paternal grandmother; Cancer in her cousin, father, maternal uncle, paternal aunt, paternal grandmother, and paternal uncle; Dementia in her paternal grandmother; Depression in her mother; Diabetes in her mother; Heart disease in her brother; Hypertension in her brother and mother; Hypothyroidism in her mother and sister; Mental illness in her mother; Stroke in her mother ,  _______________________________________________________________________  Social History:   reports that she has never smoked  She has never used smokeless tobacco  She reports that she does not drink alcohol and does not use drugs  ,  _______________________________________________________________________  Allergies:  is allergic to doxycycline, erythromycin, other, latex, duloxetine, eletriptan, emgality [galcanezumab-gnlm], and galcanezumab     _______________________________________________________________________  Current Outpatient Medications   Medication Sig Dispense Refill    azelastine (ASTELIN) 0 1 % nasal spray 1 spray into each nostril 2 (two) times a day Use in each nostril as directed 30 mL 0    divalproex sodium (DEPAKOTE) 250 mg EC tablet Take 250 mg by mouth daily      Erenumab-aooe (Aimovig) 140 MG/ML SOAJ Aimovig Autoinjector 140 mg/mL subcutaneous auto-injector      famotidine (PEPCID) 40 MG tablet famotidine 40 mg tablet      fremanezumab-vfrm (Ajovy) 225 MG/1 5ML auto-injector Inject 225 mg under the skin every 30 (thirty) days      hydroxychloroquine (PLAQUENIL) 200 mg tablet       levothyroxine 200 mcg tablet       levothyroxine 25 mcg tablet       loratadine (CLARITIN) 10 mg tablet Take 1 pill daily for allergies 90 tablet 1    phentermine 15 MG capsule Take 1 capsule (15 mg total) by mouth every morning 30 capsule 0    Topiramate ER (Trokendi XR) 100 MG CP24 Take 100 mg by mouth 2 (two) times a day      Ubrogepant (Ubrelvy) 50 MG TABS       Vraylar 3 MG capsule Take 1 capsule (3 mg total) by mouth daily 30 capsule 1    albuterol (2 5 mg/3 mL) 0 083 % nebulizer solution Take 1 vial (2 5 mg total) by nebulization every 6 (six) hours as needed for wheezing or shortness of breath 20 vial 0    albuterol (PROVENTIL HFA,VENTOLIN HFA) 90 mcg/act inhaler INHALE TWO PUFFS EVERY 6 (SIX) HOURS AS NEEDED FOR WHEEZING OR SHORTNESS OF BREATH 18 g 2    Butalbital-APAP-Caffeine -40 MG CAPS TAKE 1 CAPSULE Daily PRN      celecoxib (CELEBREX) 200 mg capsule Take 1 capsule (200 mg total) by mouth daily 30 capsule 2    lidocaine (LIDODERM) 5 % Apply 1 patch topically daily as needed (back pain) Remove & Discard patch within 12 hours or as directed by MD 6 patch 1    meclizine (ANTIVERT) 25 mg tablet Take 1 tablet (25 mg total) by mouth every 8 (eight) hours as needed for dizziness 30 tablet 1    meloxicam (MOBIC) 7 5 mg tablet Daily      mometasone (ELOCON) 0 1 % cream Apply topically daily 45 g 0    naratriptan (AMERGE) 1 MG TABS Take 1 mg by mouth every 4 (four) hours as needed for migraine      ondansetron (ZOFRAN-ODT) 4 mg disintegrating tablet MAY PUT 1 TAB UNDERNEATH THE TONGUE EVERY 12 HOURS AS NEEDED FOR NAUSEA 60 tablet 0    Respiratory Therapy Supplies (NEBULIZER) DONA by Does not apply route every 4 (four) hours while awake 90 each 1    XIFAXAN 550 MG tablet        No current facility-administered medications for this visit      _______________________________________________________________________  Review of Systems   Constitutional: Negative for chills and fever  HENT: Negative for ear pain and sore throat      Eyes: Negative for pain and visual disturbance  Respiratory: Negative for cough and shortness of breath  Cardiovascular: Negative for chest pain and palpitations  Gastrointestinal: Negative for abdominal pain and vomiting  Genitourinary: Negative for dysuria and hematuria  Musculoskeletal: Negative for arthralgias and back pain  Skin: Negative for color change and rash  Neurological: Negative for seizures and syncope  All other systems reviewed and are negative  Objective:  Vitals:    04/18/22 1447   BP: 110/80   Pulse: (!) 106   Resp: 14   Temp: (!) 97 4 °F (36 3 °C)   SpO2: 99%   Weight: 77 1 kg (170 lb)   Height: 5' 3 5" (1 613 m)     Body mass index is 29 64 kg/m²  Physical Exam  Vitals and nursing note reviewed  Constitutional:       General: She is not in acute distress  Appearance: Normal appearance  She is not ill-appearing  HENT:      Head: Normocephalic  Right Ear: External ear normal       Left Ear: External ear normal       Nose: Nose normal       Mouth/Throat:      Mouth: Mucous membranes are moist    Eyes:      Conjunctiva/sclera: Conjunctivae normal       Pupils: Pupils are equal, round, and reactive to light  Cardiovascular:      Rate and Rhythm: Normal rate and regular rhythm  Pulses: Normal pulses  Heart sounds: Normal heart sounds  Pulmonary:      Effort: Pulmonary effort is normal  No respiratory distress  Breath sounds: Normal breath sounds  No wheezing  Abdominal:      General: Bowel sounds are normal       Palpations: Abdomen is soft  Musculoskeletal:         General: No swelling, tenderness, deformity or signs of injury  Normal range of motion  Cervical back: Normal range of motion  No tenderness  Right lower leg: No edema  Left lower leg: No edema  Lymphadenopathy:      Cervical: No cervical adenopathy  Skin:     General: Skin is warm and dry  Capillary Refill: Capillary refill takes less than 2 seconds     Neurological: Mental Status: She is alert and oriented to person, place, and time  Sensory: No sensory deficit  Motor: No weakness  Gait: Gait normal    Psychiatric:         Mood and Affect: Mood normal          Behavior: Behavior normal          Thought Content:  Thought content normal          Judgment: Judgment normal

## 2022-04-22 ENCOUNTER — TELEPHONE (OUTPATIENT)
Dept: PSYCHIATRY | Facility: CLINIC | Age: 52
End: 2022-04-22

## 2022-04-22 NOTE — TELEPHONE ENCOUNTER
PT called she is following up about therapy appointment, inform pt of the wait list and pt has been added to wait list  PT stated she does not want to see Val Rangeler anymore and wants a new provider  Pt was transfer to front to schedule with Maxwell Mckeon has no refill available

## 2022-04-29 ENCOUNTER — TELEPHONE (OUTPATIENT)
Dept: PSYCHIATRY | Facility: CLINIC | Age: 52
End: 2022-04-29

## 2022-04-29 NOTE — LETTER
22     Adri Turk   : 1970   [address]  [place] [address]       It is the policy of [facility] to monitor and manage appointments that have been no-showed or cancelled with less than 48-hour notice  This is necessary to ensure that we are able to provide timely access for all patients to our providers  Undue numbers of unutilized appointments delays necessary medical care for all patients  Dear Adri Turk : We are sorry that you missed your appointment with Su Viera PA-C on 2022  Your health and follow-up care are important to us  We want to make you aware that you do not have another appointment with Su Viera PA-C scheduled  Please be aware that our office policy states that if you 'no show' two Medication Management  appointments in a row without prior notice of cancellation, or three or more in a calendar year, you may be discharged from Medication Management  treatment  We want to bring this to your attention now to prevent an interruption of your care  If you have any questions about this policy, please call us at the number above  If we do not hear from you within 10 business days to make a follow up appointment, we will assume you are no longer interested in care here  Thank you in advance for your cooperation and assistance         Sincerely,      [address] [facility] [address]

## 2022-04-29 NOTE — TELEPHONE ENCOUNTER
NO-SHOW LETTER MAILED TO Manny Flores    ADDRESS: [address] [place] [address] [address]

## 2022-05-05 ENCOUNTER — TELEMEDICINE (OUTPATIENT)
Dept: FAMILY MEDICINE CLINIC | Facility: CLINIC | Age: 52
End: 2022-05-05
Payer: MEDICARE

## 2022-05-05 ENCOUNTER — HOSPITAL ENCOUNTER (OUTPATIENT)
Dept: RADIOLOGY | Facility: HOSPITAL | Age: 52
Discharge: HOME | End: 2022-05-05
Payer: MEDICARE

## 2022-05-05 ENCOUNTER — TELEPHONE (OUTPATIENT)
Dept: FAMILY MEDICINE CLINIC | Facility: CLINIC | Age: 52
End: 2022-05-05

## 2022-05-05 DIAGNOSIS — R50.9 COUGH WITH FEVER: ICD-10-CM

## 2022-05-05 DIAGNOSIS — R05.9 COUGH WITH FEVER: ICD-10-CM

## 2022-05-05 DIAGNOSIS — B34.9 VIRAL INFECTION, UNSPECIFIED: Primary | ICD-10-CM

## 2022-05-05 DIAGNOSIS — J02.0 STREP PHARYNGITIS: ICD-10-CM

## 2022-05-05 DIAGNOSIS — J02.9 SORE THROAT: ICD-10-CM

## 2022-05-05 LAB — S PYO AG THROAT QL: POSITIVE

## 2022-05-05 PROCEDURE — 87636 SARSCOV2 & INF A&B AMP PRB: CPT | Performed by: NURSE PRACTITIONER

## 2022-05-05 PROCEDURE — 99213 OFFICE O/P EST LOW 20 MIN: CPT | Performed by: NURSE PRACTITIONER

## 2022-05-05 PROCEDURE — 71046 X-RAY EXAM CHEST 2 VIEWS: CPT

## 2022-05-05 RX ORDER — AMOXICILLIN 500 MG/1
500 CAPSULE ORAL EVERY 12 HOURS SCHEDULED
Qty: 20 CAPSULE | Refills: 0 | Status: SHIPPED | OUTPATIENT
Start: 2022-05-05 | End: 2022-05-15

## 2022-05-05 RX ORDER — DEXTROMETHORPHAN HYDROBROMIDE AND PROMETHAZINE HYDROCHLORIDE 15; 6.25 MG/5ML; MG/5ML
5 SOLUTION ORAL 4 TIMES DAILY PRN
Qty: 473 ML | Refills: 0 | Status: SHIPPED | OUTPATIENT
Start: 2022-05-05 | End: 2022-05-16 | Stop reason: ALTCHOICE

## 2022-05-05 NOTE — PROGRESS NOTES
COVID-19 Outpatient Progress Note    Assessment/Plan:    Problem List Items Addressed This Visit     None      Visit Diagnoses     Viral infection, unspecified    -  Primary    Advised rest, increased hydration, adequate nutrition  Tylneol for fever/bodyaches  Advised vitamin-c, d, zinc  Will swab for flu/covid  Relevant Orders    Covid/Flu- Office Collect    POCT rapid strepA (Completed)    Cough with fever        CXR to r/o pneumonia  Advised rest, increased hydration, adequate nutrition  Cough medicine as prescribed  Monitor for worsening sx and seek care if they develo    Relevant Medications    Promethazine-DM (PHENERGAN-DM) 6 25-15 mg/5 mL oral syrup    Other Relevant Orders    Covid/Flu- Office Collect    XR chest pa & lateral    Sore throat        Will swab to rule/out strep  Adivsed increased hydration, soft foods, voice rest  Tylenol/Motrin, cepacol lozenges for discomfort    Strep pharyngitis        Rapid strep positive in office  Will treat with Amoxicillin  Relevant Medications    amoxicillin (AMOXIL) 500 mg capsule         Disposition:     Recommended patient to come to the office to test for COVID-19/Influenza  I have spent 9 minutes directly with the patient  Greater than 50% of this time was spent in counseling/coordination of care regarding: instructions for management and patient and family education  Encounter provider ERICKA Rowan    Provider located at [address] [address] [phone]    Recent Visits  No visits were found meeting these conditions    Showing recent visits within past 7 days and meeting all other requirements  Today's Visits  Date Type Provider Dept   [facility] [address]   Showing today's visits and meeting all other requirements  Future Appointments  No visits were found meeting these conditions  Showing future appointments within next 150 days and meeting all other requirements     This virtual check-in was done via [facility] and patient was informed that this is a secure, HIPAA-compliant platform  She agrees to proceed  Patient agrees to participate in a virtual check in via telephone or video visit instead of presenting to the office to address urgent/immediate medical needs  Patient is aware this is a billable service  After connecting through [facility], the patient was identified by name and date of birth  Reynaldo Trinidad was informed that this was a telemedicine visit and that the exam was being conducted confidentially over secure lines  My office door was closed  No one else was in the room  Reynaldo Trinidad acknowledged consent and understanding of privacy and security of the telemedicine visit  I informed the patient that I have reviewed her record in Epic and presented the opportunity for her to ask any questions regarding the visit today  The patient agreed to participate  Verification of patient location:  Patient is located in the following state in which I hold an active license: [place]    Subjective:   Reynaldo Trinidad is a 49 y o  female who is concerned about COVID-19  Patient's symptoms include fever, chills, malaise, sore throat, cough, chest tightness and myalgias  Patient denies fatigue, congestion, rhinorrhea, anosmia, loss of taste, shortness of breath, abdominal pain, nausea, vomiting, diarrhea and headaches       - Date of symptom onset: 5/3/2022      COVID-19 vaccination status: Fully vaccinated (primary series)    Exposure:   Contact with a person who is under investigation (PUI) for or who is positive for COVID-19 within the last 14 days?: No    Hospitalized recently for fever and/or lower respiratory symptoms?: No      Currently a healthcare worker that is involved in direct patient care?: No      Works in a special setting where the risk of COVID-19 transmission may be high? (this may include long-term care, correctional and FDC facilities; homeless shelters; assisted-living facilities and group homes ): No      Resident in a special setting where the risk of COVID-19 transmission may be high? (this may include long-term care, correctional and FDC facilities; homeless shelters; assisted-living facilities and group homes ): No      Lab Results   Component Value Date    SARSCOV2 Negative 2021    SARSCOV2 Not Detected 2020     Past Medical History:   Diagnosis Date    Anxiety     Anxiety     Asthma     Bipolar disorder ([address] )     Carpal tunnel syndrome     Chronic pain     lower back    Depression     Disease of thyroid gland     Dyslipidemia     Fibromyalgia     Irritable bowel     Kidney stones     Kidney stones 2019    Migraine     Obesity (BMI 30 0-34  9)     Psychiatric disorder     depression/anxiety    Suicide [facility])     as teen    Vitamin D deficiency      Past Surgical History:   Procedure Laterality Date    BUNIONECTOMY       SECTION      CHOLECYSTECTOMY      PARTIAL HYSTERECTOMY      TONSILLECTOMY      TUBAL LIGATION       Current Outpatient Medications   Medication Sig Dispense Refill    albuterol (2 5 mg/3 mL) 0 083 % nebulizer solution Take 1 vial (2 5 mg total) by nebulization every 6 (six) hours as needed for wheezing or shortness of breath 20 vial 0    albuterol (PROVENTIL HFA,VENTOLIN HFA) 90 mcg/act inhaler INHALE TWO PUFFS EVERY 6 (SIX) HOURS AS NEEDED FOR WHEEZING OR SHORTNESS OF BREATH 18 g 2    amoxicillin (AMOXIL) 500 mg capsule Take 1 capsule (500 mg total) by mouth every 12 (twelve) hours for 10 days 20 capsule 0    azelastine (ASTELIN) 0 1 % nasal spray 1 spray into each nostril 2 (two) times a day Use in each nostril as directed 30 mL 0    Butalbital-APAP-Caffeine -40 MG CAPS TAKE 1 CAPSULE Daily PRN      celecoxib (CELEBREX) 200 mg capsule Take 1 capsule (200 mg total) by mouth daily 30 capsule 2    divalproex sodium (DEPAKOTE) 250 mg EC tablet Take 250 mg by mouth daily      Erenumab-aooe (Aimovig) 140 MG/ML SOAJ Aimovig Autoinjector 140 mg/mL subcutaneous auto-injector      famotidine (PEPCID) 40 MG tablet famotidine 40 mg tablet      fremanezumab-vfrm (Ajovy) 225 MG/1 5ML auto-injector Inject 225 mg under the skin every 30 (thirty) days      hydroxychloroquine (PLAQUENIL) 200 mg tablet       levothyroxine 200 mcg tablet       levothyroxine 25 mcg tablet       lidocaine (LIDODERM) 5 % Apply 1 patch topically daily as needed (back pain) Remove & Discard patch within 12 hours or as directed by MD Castro patch 1    loratadine (CLARITIN) 10 mg tablet Take 1 pill daily for allergies 90 tablet 1    meclizine (ANTIVERT) 25 mg tablet Take 1 tablet (25 mg total) by mouth every 8 (eight) hours as needed for dizziness 30 tablet 1    meloxicam (MOBIC) 7 5 mg tablet Daily      mometasone (ELOCON) 0 1 % cream Apply topically daily 45 g 0    naratriptan (AMERGE) 1 MG TABS Take 1 mg by mouth every 4 (four) hours as needed for migraine      ondansetron (ZOFRAN-ODT) 4 mg disintegrating tablet MAY PUT 1 TAB UNDERNEATH THE TONGUE EVERY 12 HOURS AS NEEDED FOR NAUSEA 60 tablet 0    phentermine 15 MG capsule Take 1 capsule (15 mg total) by mouth every morning 30 capsule 0    Promethazine-DM (PHENERGAN-DM) 6 25-15 mg/5 mL oral syrup Take 5 mL by mouth 4 (four) times a day as needed for cough 473 mL 0    Respiratory Therapy Supplies (NEBULIZER) DONA by Does not apply route every 4 (four) hours while awake 90 each 1    Topiramate ER (Trokendi XR) 100 MG CP24 Take 100 mg by mouth 2 (two) times a day      Ubrogepant (Ubrelvy) 50 MG TABS       Vraylar 3 MG capsule Take 1 capsule (3 mg total) by mouth daily 30 capsule 1    XIFAXAN 550 MG tablet        No current facility-administered medications for this visit       Allergies   Allergen Reactions    Doxycycline Rash    Erythromycin Rash    Other Edema and Wheezing     jalapeno    Latex Rash    Duloxetine      Other reaction(s): Nausea, Loopy    Eletriptan      Pain in lower jaw with pressure in throat    Emgality [Galcanezumab-Gnlm]     Galcanezumab      rash       Review of Systems   Constitutional: Positive for chills and fever  Negative for fatigue  HENT: Positive for sneezing, sore throat and voice change  Negative for congestion, ear pain, rhinorrhea, sinus pressure, sinus pain and trouble swallowing  Eyes: Negative for pain and itching  Respiratory: Positive for cough and chest tightness  Negative for shortness of breath  Gastrointestinal: Negative for abdominal pain, diarrhea, nausea and vomiting  Genitourinary: Negative for decreased urine volume  Musculoskeletal: Positive for myalgias  Negative for arthralgias and back pain  Skin: Negative for color change and rash  Neurological: Negative for dizziness, weakness, light-headedness and headaches  Psychiatric/Behavioral: Negative for confusion  Objective: There were no vitals filed for this visit  Physical Exam  Constitutional:       General: She is not in acute distress  Appearance: Normal appearance  She is not ill-appearing  HENT:      Head: Normocephalic  Right Ear: External ear normal       Left Ear: External ear normal    Pulmonary:      Effort: Pulmonary effort is normal  No respiratory distress  Musculoskeletal:         General: Normal range of motion  Cervical back: Normal range of motion  Skin:     Coloration: Skin is not pale  Neurological:      General: No focal deficit present  Mental Status: She is alert and oriented to person, place, and time  Psychiatric:         Mood and Affect: Mood normal          Behavior: Behavior normal          VIRTUAL VISIT [address] [place] [address] verbally agrees to participate in [employer]   Pt is aware that Virtual Care Services could be limited without vital signs or the ability to perform a full hands-on physical exam  Belem Weir understands she or the provider may request at any time to terminate the video visit and request the patient to seek care or treatment in person

## 2022-05-05 NOTE — PATIENT INSTRUCTIONS
Viral Syndrome   AMBULATORY CARE:   Viral syndrome  is a term used for symptoms of an infection caused by a virus  Viruses are spread easily from person to person through the air and on shared items  Signs and symptoms  may start slowly or suddenly and last hours to days  They can be mild to severe and can change over days or hours  You may have any of the following:  · Fever and chills    · A runny or stuffy nose    · Cough, sore throat, or hoarseness    · Headache, or pain and pressure around your eyes    · Muscle aches and joint pain    · Shortness of breath or wheezing    · Abdominal pain, cramps, and diarrhea    · Nausea, vomiting, or loss of appetite    Call your local emergency number (911 in the [facility]) or have someone else call if:   · You have a seizure  · You cannot be woken  · You have chest pain or trouble breathing  Seek care immediately if:   · You have a stiff neck, a bad headache, and sensitivity to light  · You feel weak, dizzy, or confused  · You stop urinating or urinate a lot less than usual     · You cough up blood or thick yellow or green mucus  · You have severe abdominal pain or your abdomen is larger than usual     Call your doctor if:   · Your symptoms do not get better with treatment or get worse after 3 days  · You have a rash or ear pain  · You have burning when you urinate  · You have questions or concerns about your condition or care  Treatment for viral syndrome  may include medicines to manage your symptoms  Antibiotics are not given for a viral infection  You may  need any of the following:  · Acetaminophen  decreases pain and fever  It is available without a doctor's order  Ask how much to take and how often to take it  Follow directions  Read the labels of all other medicines you are using to see if they also contain acetaminophen, or ask your doctor or pharmacist  Acetaminophen can cause liver damage if not taken correctly   Do not use more than 4 grams (4,000 milligrams) total of acetaminophen in one day  · NSAIDs , such as ibuprofen, help decrease swelling, pain, and fever  NSAIDs can cause stomach bleeding or kidney problems in certain people  If you take blood thinner medicine, always ask your healthcare provider if NSAIDs are safe for you  Always read the medicine label and follow directions  · Cold medicine  helps decrease swelling, control a cough, and relieve chest or nasal congestion  · Saline nasal spray  helps decrease nasal congestion  Manage your symptoms:   · Drink liquids as directed to prevent dehydration  Ask how much liquid to drink each day and which liquids are best for you  Ask if you should drink an oral rehydration solution (ORS)  An ORS has the right amounts of water, salts, and sugar you need to replace body fluids  This may help prevent dehydration caused by vomiting or diarrhea  Do not drink liquids with caffeine  Liquids with caffeine can make dehydration worse  · Get plenty of rest to help your body heal   Take naps throughout the day  Ask your healthcare provider when you can return to work and your normal activities  · Use a cool mist humidifier to help you breathe easier  Ask your healthcare provider how to use a cool mist humidifier  · Eat honey or use cough drops for a sore throat  Cough drops are available without a doctor's order  Follow directions for taking cough drops  · Do not smoke or be close to anyone who is smoking  Nicotine and other chemicals in cigarettes and cigars can cause lung damage  Smoking can also delay healing  Ask your healthcare provider for information if you currently smoke and need help to quit  E-cigarettes or smokeless tobacco still contain nicotine  Talk to your healthcare provider before you use these products  Prevent the spread of germs:       · Wash your hands often  Wash your hands several times each day   Wash after you use the bathroom, change a child's diaper, and before you prepare or eat food  Use soap and water every time  Rub your soapy hands together, lacing your fingers  Wash the front and back of your hands, and in between your fingers  Use the fingers of one hand to scrub under the fingernails of the other hand  Wash for at least 20 seconds  Rinse with warm, running water for several seconds  Then dry your hands with a clean towel or paper towel  Use hand  that contains alcohol if soap and water are not available  Do not touch your eyes, nose, or mouth without washing your hands first          · Cover a sneeze or cough  Use a tissue that covers your mouth and nose  Throw the tissue away in a trash can right away  Use the bend of your arm if a tissue is not available  Wash your hands well with soap and water or use a hand   · Stay away from others while you are sick  Avoid crowds as much as possible  · Ask about vaccines you may need  Talk to your healthcare provider about your vaccine history  He or she will tell you which vaccines you need, and when to get them  ? Get the influenza (flu) vaccine as soon as recommended each year  The flu vaccine is available starting in September or October  Flu viruses change, so it is important to get a flu vaccine every year  ? Get the pneumonia vaccine if recommended  This vaccine is usually recommended every 5 years  Your provider will tell you when to get this vaccine, if needed  Follow up with your doctor as directed:  Write down your questions so you remember to ask them during your visits  © [employer] 2022 Information is for End User's use only and may not be sold, redistributed or otherwise used for commercial purposes  All illustrations and images included in CareNotes® are the copyrighted property of [employer]  or Jagdish Leiva  The above information is an  only  It is not intended as medical advice for individual conditions or treatments  Talk to your doctor, nurse or pharmacist before following any medical regimen to see if it is safe and effective for you

## 2022-05-06 ENCOUNTER — TELEPHONE (OUTPATIENT)
Dept: FAMILY MEDICINE CLINIC | Facility: CLINIC | Age: 52
End: 2022-05-06

## 2022-05-06 LAB
FLUAV RNA RESP QL NAA+PROBE: NEGATIVE
FLUBV RNA RESP QL NAA+PROBE: NEGATIVE
SARS-COV-2 RNA RESP QL NAA+PROBE: POSITIVE

## 2022-05-06 NOTE — TELEPHONE ENCOUNTER
----- Message from [address] sent at 5/6/2022  7:57 AM EDT -----  Please make patient aware she is positive for Covid  Remind of 5-day quarantine plus 5-day strict mask wearing after quarantine  Can continue Amoxicillin as prescribed for strep   Encourage fluids, vitamin-c, d, zinc   Thank you

## 2022-05-06 NOTE — TELEPHONE ENCOUNTER
Called pt went straight to v/m she will be in later with grandharjinder for swab, I will relay message at that time

## 2022-05-10 ENCOUNTER — TELEPHONE (OUTPATIENT)
Dept: FAMILY MEDICINE CLINIC | Facility: CLINIC | Age: 52
End: 2022-05-10

## 2022-05-10 NOTE — TELEPHONE ENCOUNTER
----- Message from Tim Bauer, [address] sent at 5/6/2022 11:12 AM EDT -----  Please make patient aware chest x-ray is negative  No pneumonia seen  Thank you

## 2022-05-13 ENCOUNTER — TELEPHONE (OUTPATIENT)
Dept: FAMILY MEDICINE CLINIC | Facility: CLINIC | Age: 52
End: 2022-05-13

## 2022-05-13 DIAGNOSIS — R11.2 NAUSEA AND VOMITING, UNSPECIFIED VOMITING TYPE: Primary | ICD-10-CM

## 2022-05-13 RX ORDER — ONDANSETRON 4 MG/1
4 TABLET, ORALLY DISINTEGRATING ORAL EVERY 6 HOURS PRN
Qty: 20 TABLET | Refills: 0 | Status: SHIPPED | OUTPATIENT
Start: 2022-05-13 | End: 2022-08-18 | Stop reason: SDUPTHER

## 2022-05-13 NOTE — TELEPHONE ENCOUNTER
Patient called complaining of nausea and diarrhea due to Diana Soto said she can take Imodium A-D and she will  prescribe Zofran today for her  Patient is aware

## 2022-05-16 ENCOUNTER — OFFICE VISIT (OUTPATIENT)
Dept: FAMILY MEDICINE CLINIC | Facility: CLINIC | Age: 52
End: 2022-05-16
Payer: MEDICARE

## 2022-05-16 VITALS
SYSTOLIC BLOOD PRESSURE: 120 MMHG | HEIGHT: 64 IN | HEART RATE: 97 BPM | TEMPERATURE: 97.7 F | DIASTOLIC BLOOD PRESSURE: 80 MMHG | WEIGHT: 158 LBS | RESPIRATION RATE: 12 BRPM | BODY MASS INDEX: 26.98 KG/M2 | OXYGEN SATURATION: 99 %

## 2022-05-16 DIAGNOSIS — K21.9 GASTROESOPHAGEAL REFLUX DISEASE WITHOUT ESOPHAGITIS: Primary | ICD-10-CM

## 2022-05-16 DIAGNOSIS — Z86.16 HISTORY OF COVID-19: ICD-10-CM

## 2022-05-16 DIAGNOSIS — K58.8 OTHER IRRITABLE BOWEL SYNDROME: ICD-10-CM

## 2022-05-16 DIAGNOSIS — R63.5 WEIGHT GAIN: ICD-10-CM

## 2022-05-16 DIAGNOSIS — M05.9 SEROPOSITIVE RHEUMATOID ARTHRITIS (HCC): ICD-10-CM

## 2022-05-16 DIAGNOSIS — R53.82 CHRONIC FATIGUE: ICD-10-CM

## 2022-05-16 PROCEDURE — 99214 OFFICE O/P EST MOD 30 MIN: CPT

## 2022-05-16 RX ORDER — METHOCARBAMOL 750 MG/1
TABLET, FILM COATED ORAL
COMMUNITY
Start: 2022-04-28

## 2022-05-16 RX ORDER — NARATRIPTAN 2.5 MG/1
TABLET ORAL
COMMUNITY
Start: 2022-04-28

## 2022-05-16 RX ORDER — UBROGEPANT 100 MG/1
TABLET ORAL
COMMUNITY
Start: 2022-04-28

## 2022-05-16 RX ORDER — PHENTERMINE HYDROCHLORIDE 15 MG/1
15 CAPSULE ORAL EVERY MORNING
Qty: 30 CAPSULE | Refills: 0 | Status: SHIPPED | OUTPATIENT
Start: 2022-05-16 | End: 2022-06-13 | Stop reason: SDUPTHER

## 2022-05-16 NOTE — ASSESSMENT & PLAN NOTE
Goal weight of 145lb, she is doing well on the current dose of 15 mg of phentermine  Blood pressure well controlled at 120/80  Recent weight loss of 12 lb

## 2022-05-16 NOTE — PATIENT INSTRUCTIONS
Weight (last 2 days)       Date/Time Weight    05/16/22 1003 71 7 (158)           Suggest you keep a food diary like My Fitness Pal or the Lose it Patti  Calories should be less than 1200 daily spread out during the course of the day  Cut back on carbs and eat more proteins  Make better food choices  At least 6 to 8 glasses of water daily  Slowly increase the exercise and try to get up to 30 minutes at least 5 times weekly  The changes you make now are for life  Dietary changes     Highly processed, calorie-dense, nutrient-depleted diet favored in the current [place] culture frequently leads to exaggerated post meal spikes in blood glucose, triglycerides and lipids  The temporary increase in free radicals triggers inflammation, endothelial dysfunction, hypercoagulability, and sympathetic hyperactivity  Such state is an independent predictor of future cardiovascular events even in nondiabetic individuals  What changes to implement: diet high in minimally processed, high-fiber, plant-based foods such as vegetables and fruits, whole grains, legumes, and nuts will markedly decrease the post-meal increase in glucose, triglycerides, and inflammation  Additionally, lean protein, vinegar, fish oil, tea, cinnamon, calorie restriction, weight loss, exercise, and low-dose to moderate-dose alcohol each positively impact post meal spikes in blood glucose, triglycerides and lipids  Studies indicate that eating patterns, such as the traditional Mediterranean or [place]an diets, that incorporate these types of foods and beverages reduce inflammation and cardiovascular risk  This anti-inflammatory diet should be considered for the primary and secondary prevention of coronary artery disease and diabetes  Steps to Improve Post-Prandial Glucose and Triglycerides  1  Choose high-fiber, low glycemic index carbohydrates such as whole grains, legumes, and vegetables and fruits  2  Eat lean protein at all 3 meals     3  Consume nuts on a daily basis, about 1 handful (with a closed fist)  Eat with vegetables, berries or other fruits, or grains  4  Eat a salad of leafy greens dressed with vinegar and virgin olive oil on a daily basis  5  Avoid highly processed foods and drinks, especially those containing sugar, high-fructose corn syrup, white flour, or trans fats  6  Keep serving sizes modest    7  Avoid being overweight or obese; maintain a waist circumference less than one-half of height in inches  8  Obtain 30 min or more of daily physical activity of at least moderate intensity  9  Consider consuming 1 alcoholic drink before or with the evening meal (for those without a history of substance abuse)         https://doi org/10 1016/j jacc  [phone]

## 2022-05-16 NOTE — PROGRESS NOTES
Assessment/Plan:     Problem List Items Addressed This Visit        Digestive    IBS (irritable bowel syndrome)     Recently irritated by COVID infection  Will try Nexium           Relevant Medications    esomeprazole (NexIUM) 20 mg capsule       Musculoskeletal and Integument    Seropositive rheumatoid arthritis ([address] )     Stable on current medication regiment              Other    Chronic fatigue     Worsening after COVID infection           BMI 27 0-27 9,adult     Goal weight of 145lb, she is doing well on the current dose of 15 mg of phentermine  Blood pressure well controlled at 120/80  Recent weight loss of 12 lb  Relevant Medications    phentermine 15 MG capsule    History of COVID-19     Recent COVID infection last week  States that she is still having some abdominal pain that is improving  Was also on steroid for headache that has already improved  Will try Nexium for stomach discomfort  Other Visit Diagnoses     Gastroesophageal reflux disease without esophagitis    -  Primary    Relevant Medications    esomeprazole (NexIUM) 20 mg capsule    Weight gain        Relevant Medications    phentermine 15 MG capsule          Subjective:      Patient ID: Mara Barrera is a 49 y o  female  Patient presents to the office for routine weight lost follow-up visit  She stating that she is tolerating 15 mg of phentermine well  She is denying any dizziness, chest pain, shortness a breath  Recent weight loss of 12 lb  Will continue on same dose        The following portions of the patient's history were reviewed and updated as appropriate:   Past Medical History:  She has a past medical history of Anxiety, Anxiety, Asthma, Bipolar disorder ([address] ), Carpal tunnel syndrome, Chronic pain, Depression, Disease of thyroid gland, Dyslipidemia, Fibromyalgia, Irritable bowel, Kidney stones, Kidney stones (5/20/2019), Migraine, Obesity (BMI 30 0-34 9), Psychiatric disorder, Suicide attempt ([address] ), and Vitamin D deficiency  ,  _______________________________________________________________________  Medical Problems:  does not have any pertinent problems on file ,  _______________________________________________________________________  Past Surgical History:   has a past surgical history that includes  section; Cholecystectomy; Tonsillectomy; Partial hysterectomy; Bunionectomy; and Tubal ligation  ,  _______________________________________________________________________  Family History:  family history includes Anxiety disorder in her mother; Arthritis in her mother; Bipolar disorder in her daughter and mother; Breast cancer in her paternal aunt and paternal grandmother; Cancer in her cousin, father, maternal uncle, paternal aunt, paternal grandmother, and paternal uncle; Dementia in her paternal grandmother; Depression in her mother; Diabetes in her mother; Heart disease in her brother; Hypertension in her brother and mother; Hypothyroidism in her mother and sister; Mental illness in her mother; Stroke in her mother ,  _______________________________________________________________________  Social History:   reports that she has never smoked  She has never used smokeless tobacco  She reports that she does not drink alcohol and does not use drugs  ,  _______________________________________________________________________  Allergies:  is allergic to doxycycline, erythromycin, other, latex, duloxetine, eletriptan, emgality [galcanezumab-gnlm], and galcanezumab     _______________________________________________________________________  Current Outpatient Medications   Medication Sig Dispense Refill    albuterol (2 5 mg/3 mL) 0 083 % nebulizer solution Take 1 vial (2 5 mg total) by nebulization every 6 (six) hours as needed for wheezing or shortness of breath 20 vial 0    albuterol (PROVENTIL HFA,VENTOLIN HFA) 90 mcg/act inhaler INHALE TWO PUFFS EVERY 6 (SIX) HOURS AS NEEDED FOR WHEEZING OR SHORTNESS OF BREATH 18 g 2    azelastine (ASTELIN) 0 1 % nasal spray 1 spray into each nostril 2 (two) times a day Use in each nostril as directed 30 mL 0    Butalbital-APAP-Caffeine -40 MG CAPS TAKE 1 CAPSULE Daily PRN      celecoxib (CELEBREX) 200 mg capsule Take 1 capsule (200 mg total) by mouth daily 30 capsule 2    divalproex sodium (DEPAKOTE) 250 mg EC tablet Take 250 mg by mouth daily      Erenumab-aooe (Aimovig) 140 MG/ML SOAJ Aimovig Autoinjector 140 mg/mL subcutaneous auto-injector      esomeprazole (NexIUM) 20 mg capsule Take 1 capsule (20 mg total) by mouth in the morning and 1 capsule (20 mg total) in the evening  Take before meals   60 capsule 0    famotidine (PEPCID) 40 MG tablet famotidine 40 mg tablet      fremanezumab-vfrm (Ajovy) 225 MG/1 5ML auto-injector Inject 225 mg under the skin every 30 (thirty) days      hydroxychloroquine (PLAQUENIL) 200 mg tablet       levothyroxine 200 mcg tablet       levothyroxine 25 mcg tablet       loratadine (CLARITIN) 10 mg tablet Take 1 pill daily for allergies 90 tablet 1    meclizine (ANTIVERT) 25 mg tablet Take 1 tablet (25 mg total) by mouth every 8 (eight) hours as needed for dizziness 30 tablet 1    naratriptan (AMERGE) 2 5 MG tablet       phentermine 15 MG capsule Take 1 capsule (15 mg total) by mouth every morning 30 capsule 0    Topiramate  MG CP24 Take 100 mg by mouth 2 (two) times a day      Ubrelvy 100 MG tablet       Vraylar 3 MG capsule Take 1 capsule (3 mg total) by mouth daily 30 capsule 1    lidocaine (LIDODERM) 5 % Apply 1 patch topically daily as needed (back pain) Remove & Discard patch within 12 hours or as directed by MD 6 patch 1    meloxicam (MOBIC) 7 5 mg tablet Daily      methocarbamol (ROBAXIN) 750 mg tablet       mometasone (ELOCON) 0 1 % cream Apply topically daily 45 g 0    ondansetron (Zofran ODT) 4 mg disintegrating tablet Take 1 tablet (4 mg total) by mouth every 6 (six) hours as needed for nausea or vomiting 20 tablet 0    Respiratory Therapy Supplies (NEBULIZER) DONA by Does not apply route every 4 (four) hours while awake 90 each 1    XIFAXAN 550 MG tablet        No current facility-administered medications for this visit      _______________________________________________________________________  Review of Systems   Constitutional: Positive for fatigue  Negative for chills and fever  HENT: Negative for ear pain and sore throat  Eyes: Negative for pain and visual disturbance  Respiratory: Negative for cough and shortness of breath  Cardiovascular: Negative for chest pain and palpitations  Gastrointestinal: Positive for abdominal pain (post covid )  Negative for vomiting  Genitourinary: Negative for dysuria and hematuria  Musculoskeletal: Negative for arthralgias and back pain  Skin: Negative for color change and rash  Neurological: Positive for headaches (Improved after steroid treatment)  Negative for seizures and syncope  Psychiatric/Behavioral: The patient is not nervous/anxious  All other systems reviewed and are negative  Objective:  Vitals:    05/16/22 1003 05/16/22 1022   BP: 120/80    Pulse: (!) 122 97   Resp: 12    Temp: 97 7 °F (36 5 °C)    SpO2: 99%    Weight: 71 7 kg (158 lb)    Height: 5' 3 5" (1 613 m)      Body mass index is 27 55 kg/m²  Physical Exam  Vitals and nursing note reviewed  Constitutional:       General: She is not in acute distress  Appearance: Normal appearance  She is not ill-appearing  HENT:      Head: Normocephalic  Right Ear: External ear normal       Left Ear: External ear normal       Nose: Nose normal       Mouth/Throat:      Mouth: Mucous membranes are moist    Eyes:      Conjunctiva/sclera: Conjunctivae normal    Cardiovascular:      Rate and Rhythm: Normal rate and regular rhythm  Pulses: Normal pulses  Heart sounds: Normal heart sounds     Pulmonary:      Effort: Pulmonary effort is normal  No respiratory distress  Breath sounds: Normal breath sounds  No wheezing  Abdominal:      General: Bowel sounds are normal       Palpations: Abdomen is soft  Musculoskeletal:         General: No swelling or tenderness  Normal range of motion  Cervical back: Normal range of motion  No tenderness  Right lower leg: No edema  Left lower leg: No edema  Lymphadenopathy:      Cervical: No cervical adenopathy  Skin:     General: Skin is warm and dry  Neurological:      Mental Status: She is alert and oriented to person, place, and time  Psychiatric:         Mood and Affect: Mood normal          Behavior: Behavior normal          Thought Content:  Thought content normal          Judgment: Judgment normal

## 2022-05-19 DIAGNOSIS — F31.81 BIPOLAR 2 DISORDER (HCC): ICD-10-CM

## 2022-05-19 RX ORDER — CARIPRAZINE 3 MG/1
CAPSULE, GELATIN COATED ORAL
Qty: 30 CAPSULE | Refills: 0 | Status: SHIPPED | OUTPATIENT
Start: 2022-05-19 | End: 2022-06-02 | Stop reason: SDUPTHER

## 2022-05-27 ENCOUNTER — TELEPHONE (OUTPATIENT)
Dept: PSYCHIATRY | Facility: CLINIC | Age: 52
End: 2022-05-27
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
## 2022-06-02 ENCOUNTER — TELEMEDICINE (OUTPATIENT)
Dept: PSYCHIATRY | Facility: CLINIC | Age: 52
End: 2022-06-02
Payer: MEDICARE

## 2022-06-02 ENCOUNTER — TELEMEDICINE (OUTPATIENT)
Dept: PSYCHIATRY | Facility: CLINIC | Age: 52
End: 2022-06-02

## 2022-06-02 ENCOUNTER — TELEPHONE (OUTPATIENT)
Dept: PSYCHIATRY | Facility: CLINIC | Age: 52
End: 2022-06-02

## 2022-06-02 DIAGNOSIS — E55.9 VITAMIN D DEFICIENCY: ICD-10-CM

## 2022-06-02 DIAGNOSIS — G47.09 OTHER INSOMNIA: ICD-10-CM

## 2022-06-02 DIAGNOSIS — F41.1 GAD (GENERALIZED ANXIETY DISORDER): ICD-10-CM

## 2022-06-02 DIAGNOSIS — F41.1 GAD (GENERALIZED ANXIETY DISORDER): Primary | ICD-10-CM

## 2022-06-02 DIAGNOSIS — F31.81 BIPOLAR 2 DISORDER (HCC): ICD-10-CM

## 2022-06-02 DIAGNOSIS — F31.81 BIPOLAR 2 DISORDER (HCC): Primary | ICD-10-CM

## 2022-06-02 PROBLEM — G47.00 INSOMNIA: Status: ACTIVE | Noted: 2022-06-02

## 2022-06-02 PROCEDURE — 99213 OFFICE O/P EST LOW 20 MIN: CPT | Performed by: PHYSICIAN ASSISTANT

## 2022-06-02 RX ORDER — CLONAZEPAM 0.5 MG/1
0.5 TABLET ORAL DAILY PRN
Qty: 30 TABLET | Refills: 1 | Status: SHIPPED | OUTPATIENT
Start: 2022-06-02 | End: 2022-07-14 | Stop reason: SDUPTHER
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
## 2022-06-02 NOTE — BH TREATMENT PLAN
TREATMENT PLAN (Medication Management Only)        [facility]    Name/Date of Birth/MRN:  Mike Tobar 49 y o  1970 MRN: [id number]  Date of Treatment Plan: June 2, 2022  Diagnosis/Diagnoses:   1  Bipolar 2 disorder ([address] )    2  ZAINA (generalized anxiety disorder)    3  Other insomnia    4  Vitamin D deficiency      Strengths/Personal Resources for Self-Care: "My grandson; My ability to try and keep the present moment"  Area/Areas of need (in own words): "Still having trouble sleeping at night"  1  Long Term Goal:   "Maintain mood stability, control anxiety, keep compliant with medications"  Target Date: 3-6 months  Person/Persons responsible for completion of goal: Sophie Roque and Rosanne Matthew, and Rupert Whitlock LCSW (therapist)  2  Short Term Objective (s) - How will we reach this goal?:   A  Provider new recommended medication/dosage changes and/or continue medication(s): Pt is having hypomanic and depressive Sxs in recent times, as well as increased anxiety, but also reports inconsistent medication noncompliance  Pt advised to journal and set an alarm in her phone  No change in regimen at this time and discussed the importance of compliance for optimum benefit from her medicines  Uncertain if she is truly having EPS or if her twitch happens just emerging from sleep but will monitor this Sx  Treatment plan done and Pt accepts the plan     Continue:  Vraylar 3mg (1) cap po qd # 30 R1  Clonazepam 0 5mg (1) tab po qd prn anxiety # 30 R1  Trokendi XR 100mg (1) tab po bid for migraines--per Neurology  Vit D--per Rheumatology  Continue psychotherapy with Rupert Whitlock  Return 6 weeks, call sooner prn    Target Date: 3-6 months  Person/Persons Responsible for Completion of Goal: Sophie Dasilva   Progress Towards Goals: stable, continuing treatment  Treatment Modality: Medication mgt and psychotherapy  Review due 180 days from date of this plan: 6 months - 12/2/2022  Expected length of service: ongoing treatment unless revised  My Physician/PA/NP and I have developed this plan together and I agree to work on the goals and objectives  I understand the treatment goals that were developed for my treatment    Electronic Signatures: on file (unless signed below)    Su Viera PA-C 06/02/22

## 2022-06-10 ENCOUNTER — TELEMEDICINE (OUTPATIENT)
Dept: PSYCHIATRY | Facility: CLINIC | Age: 52
End: 2022-06-10

## 2022-06-10 DIAGNOSIS — F31.81 BIPOLAR 2 DISORDER (HCC): ICD-10-CM

## 2022-06-10 DIAGNOSIS — F41.1 GAD (GENERALIZED ANXIETY DISORDER): Primary | ICD-10-CM

## 2022-06-10 NOTE — BH TREATMENT PLAN
Daniel Hand  1970       Date of Initial Treatment Plan: 6/10/22   Date of Current Treatment Plan: 06/10/22        Treatment Plan Number: 1     Strengths/Personal Resources for Self Care: Jennifer Cordoba has a good sense of humor and independent as well as setting and pursuing goals and managing surrounding demands and opportunities  Diagnosis:   1  ZAINA (generalized anxiety disorder)     2  Bipolar 2 disorder ([address] )         Area of Needs: stress management and emotional support distress tolerance skills      Long Term Goal 1:  Jennifer Cordoba will work to [place] that result in a reduction of anxiety and worry, and improved daily functioning  Target Date: 6/10/23  Completion Date: to be determined         Short Term Objectives for Goal 1:      A  Jennifer Cordoba identify 4-5 symptoms of anxiety that impacts her daily functioning  Val Carbajal will learn and implement calming skills to reduce overall anxiety and manage anxiety, such has mindfulness,    progressive muscle relaxation, mindful breathing, cognitive reframing, awareness of physical sensations from emotions  Kamila Mast will learn and implement personal and interpersonal skills to reduce anxiety and improve interpersonal relationships  Goal 1: Modality: Individual 4x per month   Completion Date to be determined and The person(s) responsible for carrying out the plan is  this clinician and Jennifer Cordoba  Behavioral Health Treatment Plan [facility]: Diagnosis and Treatment Plan explained to Juan Wright relates understanding diagnosis and is agreeable to Treatment Plan  Client Comments : Please share your thoughts, feelings, need and/or experiences regarding your treatment plan: agreeable
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
## 2022-06-13 ENCOUNTER — OFFICE VISIT (OUTPATIENT)
Dept: FAMILY MEDICINE CLINIC | Facility: CLINIC | Age: 52
End: 2022-06-13
Payer: MEDICARE

## 2022-06-13 ENCOUNTER — APPOINTMENT (OUTPATIENT)
Dept: LAB | Facility: CLINIC | Age: 52
End: 2022-06-13
Payer: MEDICARE

## 2022-06-13 VITALS
OXYGEN SATURATION: 99 % | WEIGHT: 155.4 LBS | TEMPERATURE: 97 F | HEART RATE: 92 BPM | SYSTOLIC BLOOD PRESSURE: 118 MMHG | HEIGHT: 64 IN | BODY MASS INDEX: 26.53 KG/M2 | DIASTOLIC BLOOD PRESSURE: 80 MMHG | RESPIRATION RATE: 14 BRPM

## 2022-06-13 DIAGNOSIS — E55.9 VITAMIN D DEFICIENCY: ICD-10-CM

## 2022-06-13 DIAGNOSIS — R63.5 WEIGHT GAIN: ICD-10-CM

## 2022-06-13 DIAGNOSIS — E03.8 OTHER SPECIFIED HYPOTHYROIDISM: ICD-10-CM

## 2022-06-13 DIAGNOSIS — R53.82 CHRONIC FATIGUE: ICD-10-CM

## 2022-06-13 DIAGNOSIS — M79.7 FIBROMYALGIA: ICD-10-CM

## 2022-06-13 DIAGNOSIS — R63.5 WEIGHT GAIN: Primary | ICD-10-CM

## 2022-06-13 DIAGNOSIS — F41.1 GAD (GENERALIZED ANXIETY DISORDER): ICD-10-CM

## 2022-06-13 DIAGNOSIS — M05.9 SEROPOSITIVE RHEUMATOID ARTHRITIS (HCC): ICD-10-CM

## 2022-06-13 DIAGNOSIS — Z79.899 ENCOUNTER FOR LONG-TERM (CURRENT) USE OF OTHER MEDICATIONS: ICD-10-CM

## 2022-06-13 DIAGNOSIS — E78.5 DYSLIPIDEMIA, GOAL LDL BELOW 130: ICD-10-CM

## 2022-06-13 LAB
25(OH)D3 SERPL-MCNC: 30 NG/ML (ref 30–100)
ALBUMIN SERPL BCP-MCNC: 3.8 G/DL (ref 3.5–5)
ALP SERPL-CCNC: 81 U/L (ref 46–116)
ALT SERPL W P-5'-P-CCNC: 24 U/L (ref 12–78)
ANION GAP SERPL CALCULATED.3IONS-SCNC: 7 MMOL/L (ref 4–13)
AST SERPL W P-5'-P-CCNC: 11 U/L (ref 5–45)
BASOPHILS # BLD AUTO: 0.02 THOUSANDS/ΜL (ref 0–0.1)
BASOPHILS NFR BLD AUTO: 0 % (ref 0–1)
BILIRUB SERPL-MCNC: 0.34 MG/DL (ref 0.2–1)
BUN SERPL-MCNC: 12 MG/DL (ref 5–25)
CALCIUM SERPL-MCNC: 9.4 MG/DL (ref 8.3–10.1)
CHLORIDE SERPL-SCNC: 115 MMOL/L (ref 100–108)
CO2 SERPL-SCNC: 21 MMOL/L (ref 21–32)
CREAT SERPL-MCNC: 0.92 MG/DL (ref 0.6–1.3)
CRP SERPL QL: <3 MG/L
EOSINOPHIL # BLD AUTO: 0.05 THOUSAND/ΜL (ref 0–0.61)
EOSINOPHIL NFR BLD AUTO: 1 % (ref 0–6)
ERYTHROCYTE [DISTWIDTH] IN BLOOD BY AUTOMATED COUNT: 14 % (ref 11.6–15.1)
ERYTHROCYTE [SEDIMENTATION RATE] IN BLOOD: 8 MM/HOUR (ref 0–29)
GFR SERPL CREATININE-BSD FRML MDRD: 72 ML/MIN/1.73SQ M
GLUCOSE SERPL-MCNC: 109 MG/DL (ref 65–140)
HCT VFR BLD AUTO: 40.1 % (ref 34.8–46.1)
HGB BLD-MCNC: 13 G/DL (ref 11.5–15.4)
IMM GRANULOCYTES # BLD AUTO: 0.02 THOUSAND/UL (ref 0–0.2)
IMM GRANULOCYTES NFR BLD AUTO: 0 % (ref 0–2)
LYMPHOCYTES # BLD AUTO: 1.79 THOUSANDS/ΜL (ref 0.6–4.47)
LYMPHOCYTES NFR BLD AUTO: 34 % (ref 14–44)
MCH RBC QN AUTO: 30 PG (ref 26.8–34.3)
MCHC RBC AUTO-ENTMCNC: 32.4 G/DL (ref 31.4–37.4)
MCV RBC AUTO: 93 FL (ref 82–98)
MONOCYTES # BLD AUTO: 0.36 THOUSAND/ΜL (ref 0.17–1.22)
MONOCYTES NFR BLD AUTO: 7 % (ref 4–12)
NEUTROPHILS # BLD AUTO: 2.96 THOUSANDS/ΜL (ref 1.85–7.62)
NEUTS SEG NFR BLD AUTO: 58 % (ref 43–75)
NRBC BLD AUTO-RTO: 0 /100 WBCS
PLATELET # BLD AUTO: 313 THOUSANDS/UL (ref 149–390)
PMV BLD AUTO: 10.3 FL (ref 8.9–12.7)
POTASSIUM SERPL-SCNC: 3.8 MMOL/L (ref 3.5–5.3)
PROT SERPL-MCNC: 7.3 G/DL (ref 6.4–8.2)
RBC # BLD AUTO: 4.33 MILLION/UL (ref 3.81–5.12)
SODIUM SERPL-SCNC: 143 MMOL/L (ref 136–145)
T4 FREE SERPL-MCNC: 0.79 NG/DL (ref 0.76–1.46)
TSH SERPL DL<=0.05 MIU/L-ACNC: 4.87 UIU/ML (ref 0.45–4.5)
WBC # BLD AUTO: 5.2 THOUSAND/UL (ref 4.31–10.16)

## 2022-06-13 PROCEDURE — 85652 RBC SED RATE AUTOMATED: CPT

## 2022-06-13 PROCEDURE — 84443 ASSAY THYROID STIM HORMONE: CPT

## 2022-06-13 PROCEDURE — 85025 COMPLETE CBC W/AUTO DIFF WBC: CPT

## 2022-06-13 PROCEDURE — 80053 COMPREHEN METABOLIC PANEL: CPT

## 2022-06-13 PROCEDURE — 84439 ASSAY OF FREE THYROXINE: CPT

## 2022-06-13 PROCEDURE — 99214 OFFICE O/P EST MOD 30 MIN: CPT

## 2022-06-13 PROCEDURE — 36415 COLL VENOUS BLD VENIPUNCTURE: CPT

## 2022-06-13 PROCEDURE — 86140 C-REACTIVE PROTEIN: CPT

## 2022-06-13 PROCEDURE — 82306 VITAMIN D 25 HYDROXY: CPT

## 2022-06-13 RX ORDER — PHENTERMINE HYDROCHLORIDE 30 MG/1
30 CAPSULE ORAL EVERY MORNING
Qty: 30 CAPSULE | Refills: 0 | Status: SHIPPED | OUTPATIENT
Start: 2022-06-13 | End: 2022-07-11 | Stop reason: ALTCHOICE

## 2022-06-13 RX ORDER — OMEPRAZOLE 40 MG/1
CAPSULE, DELAYED RELEASE ORAL
COMMUNITY
Start: 2022-05-16 | End: 2022-07-08 | Stop reason: ALTCHOICE

## 2022-06-13 NOTE — ASSESSMENT & PLAN NOTE
3 lb weight loss since last month, feels as though she had plateau  Will increase to 30 mg of phentermine daily    She is denying any chest pain, shortness a breath, dizziness or increasing headaches

## 2022-06-13 NOTE — PROGRESS NOTES
Assessment/Plan:     Problem List Items Addressed This Visit        Endocrine    Hypothyroidism     Continue to taking medications as prescribed- blood work ordered           Relevant Orders    CBC    Lipid panel    Comprehensive metabolic panel    TSH, 3rd generation with Free T4 reflex       Other    ZAINA (generalized anxiety disorder)    Relevant Medications    phentermine 30 MG capsule    Other Relevant Orders    CBC    Lipid panel    Comprehensive metabolic panel    TSH, 3rd generation with Free T4 reflex    Dyslipidemia, goal LDL below 130    Relevant Orders    Lipid panel    Chronic fatigue    Relevant Orders    CBC    Lipid panel    Comprehensive metabolic panel    TSH, 3rd generation with Free T4 reflex    BMI 27 0-27 9,adult    Relevant Medications    phentermine 30 MG capsule    Other Relevant Orders    CBC    Lipid panel    Comprehensive metabolic panel    TSH, 3rd generation with Free T4 reflex    Weight gain - Primary     3 lb weight loss since last month, feels as though she had plateau  Will increase to 30 mg of phentermine daily  She is denying any chest pain, shortness a breath, dizziness or increasing headaches           Relevant Medications    phentermine 30 MG capsule    Other Relevant Orders    CBC    Lipid panel    Comprehensive metabolic panel    TSH, 3rd generation with Free T4 reflex            Subjective:      Patient ID: Chari Srinivasan is a 49 y o  female  Patient presents to the office for routine weight loss follow-up  She has been taking 15 mg of phentermine as prescribed  She is denying any chest pain, shortness with, dizziness or increase in headaches  Her last weight loss was 3 lb  She feels though she had plateaued wants to go up to 30 mg daily  She is taking all of few other medications as prescribed no adverse effects noted  Blood work ordered         The following portions of the patient's history were reviewed and updated as appropriate:   Past Medical History:  She has a past medical history of Anxiety, Anxiety, Asthma, Bipolar disorder ([facility] [address] ), Carpal tunnel syndrome, Chronic pain, Depression, Disease of thyroid gland, Dyslipidemia, Fibromyalgia, Irritable bowel, Kidney stones, Kidney stones (2019), Migraine, Obesity (BMI 30 0-34 9), Psychiatric disorder, Suicide attempt ([facility] [address] ), and Vitamin D deficiency  ,  _______________________________________________________________________  Medical Problems:  does not have any pertinent problems on file ,  _______________________________________________________________________  Past Surgical History:   has a past surgical history that includes  section; Cholecystectomy; Tonsillectomy; Partial hysterectomy; Bunionectomy; and Tubal ligation  ,  _______________________________________________________________________  Family History:  family history includes Anxiety disorder in her mother; Arthritis in her mother; Bipolar disorder in her daughter and mother; Breast cancer in her paternal aunt and paternal grandmother; Cancer in her cousin, father, maternal uncle, paternal aunt, paternal grandmother, and paternal uncle; Dementia in her paternal grandmother; Depression in her mother; Diabetes in her mother; Heart disease in her brother; Hypertension in her brother and mother; Hypothyroidism in her mother and sister; Mental illness in her mother; Stroke in her mother ,  _______________________________________________________________________  Social History:   reports that she has never smoked  She has never used smokeless tobacco  She reports that she does not drink alcohol and does not use drugs  ,  _______________________________________________________________________  Allergies:  is allergic to doxycycline, erythromycin, other, latex, duloxetine, eletriptan, emgality [galcanezumab-gnlm], and galcanezumab     _______________________________________________________________________  Current Outpatient Medications Medication Sig Dispense Refill    albuterol (2 5 mg/3 mL) 0 083 % nebulizer solution Take 1 vial (2 5 mg total) by nebulization every 6 (six) hours as needed for wheezing or shortness of breath 20 vial 0    azelastine (ASTELIN) 0 1 % nasal spray 1 spray into each nostril 2 (two) times a day Use in each nostril as directed 30 mL 0    Butalbital-APAP-Caffeine -40 MG CAPS TAKE 1 CAPSULE Daily PRN      cariprazine (Vraylar) 3 MG capsule Take 1 capsule (3 mg total) by mouth daily 30 capsule 1    celecoxib (CELEBREX) 200 mg capsule Take 1 capsule (200 mg total) by mouth daily 30 capsule 2    clonazePAM (KlonoPIN) 0 5 mg tablet Take 1 tablet (0 5 mg total) by mouth daily as needed for anxiety 30 tablet 1    divalproex sodium (DEPAKOTE) 250 mg EC tablet Take 250 mg by mouth daily      Erenumab-aooe (Aimovig) 140 MG/ML SOAJ Aimovig Autoinjector 140 mg/mL subcutaneous auto-injector      famotidine (PEPCID) 40 MG tablet famotidine 40 mg tablet      fremanezumab-vfrm (Ajovy) 225 MG/1 5ML auto-injector Inject 225 mg under the skin every 30 (thirty) days      hydroxychloroquine (PLAQUENIL) 200 mg tablet       levothyroxine 200 mcg tablet       loratadine (CLARITIN) 10 mg tablet Take 1 pill daily for allergies 90 tablet 1    meclizine (ANTIVERT) 25 mg tablet Take 1 tablet (25 mg total) by mouth every 8 (eight) hours as needed for dizziness 30 tablet 1    meloxicam (MOBIC) 7 5 mg tablet Daily      naratriptan (AMERGE) 2 5 MG tablet       omeprazole (PriLOSEC) 40 MG capsule       phentermine 30 MG capsule Take 1 capsule (30 mg total) by mouth every morning 30 capsule 0    Topiramate  MG CP24 Take 100 mg by mouth 2 (two) times a day      Ubrelvy 100 MG tablet       Ventolin  (90 Base) MCG/ACT inhaler INHALE TWO PUFFS EVERY 6 (SIX) HOURS AS NEEDED FOR WHEEZING OR SHORTNESS OF BREATH 18 g 1    XIFAXAN 550 MG tablet       esomeprazole (NexIUM) 20 mg capsule Take 1 capsule (20 mg total) by mouth in the morning and 1 capsule (20 mg total) in the evening  Take before meals  60 capsule 0    levothyroxine 25 mcg tablet       lidocaine (LIDODERM) 5 % Apply 1 patch topically daily as needed (back pain) Remove & Discard patch within 12 hours or as directed by MD Castro patch 1    methocarbamol (ROBAXIN) 750 mg tablet       mometasone (ELOCON) 0 1 % cream Apply topically daily 45 g 0    ondansetron (Zofran ODT) 4 mg disintegrating tablet Take 1 tablet (4 mg total) by mouth every 6 (six) hours as needed for nausea or vomiting 20 tablet 0    Respiratory Therapy Supplies (NEBULIZER) DONA by Does not apply route every 4 (four) hours while awake 90 each 1     No current facility-administered medications for this visit      _______________________________________________________________________  Review of Systems   Constitutional: Negative for chills and fever  HENT: Negative for ear pain and sore throat  Eyes: Negative for pain and visual disturbance  Respiratory: Negative for cough and shortness of breath  Cardiovascular: Negative for chest pain and palpitations  Gastrointestinal: Negative for abdominal pain and vomiting  Genitourinary: Negative for dysuria and hematuria  Musculoskeletal: Positive for arthralgias  Negative for back pain  Skin: Negative for color change and rash  Neurological: Positive for headaches  Negative for seizures and syncope  Psychiatric/Behavioral: Positive for dysphoric mood  All other systems reviewed and are negative  Objective:  Vitals:    06/13/22 0951   BP: 118/80   Pulse: 92   Resp: 14   Temp: (!) 97 °F (36 1 °C)   SpO2: 99%   Weight: 70 5 kg (155 lb 6 4 oz)   Height: 5' 3 5" (1 613 m)     Body mass index is 27 1 kg/m²  Physical Exam  Vitals and nursing note reviewed  Constitutional:       General: She is not in acute distress  Appearance: Normal appearance  She is not ill-appearing  HENT:      Head: Normocephalic        Right Ear: External ear normal       Left Ear: External ear normal       Nose: Nose normal       Mouth/Throat:      Mouth: Mucous membranes are moist    Eyes:      Conjunctiva/sclera: Conjunctivae normal    Cardiovascular:      Rate and Rhythm: Normal rate and regular rhythm  Pulses: Normal pulses  Heart sounds: Normal heart sounds  Pulmonary:      Effort: Pulmonary effort is normal  No respiratory distress  Breath sounds: Normal breath sounds  No wheezing  Abdominal:      General: Bowel sounds are normal       Palpations: Abdomen is soft  Musculoskeletal:         General: No swelling or tenderness  Normal range of motion  Cervical back: Normal range of motion  No tenderness  Right lower leg: No edema  Left lower leg: No edema  Lymphadenopathy:      Cervical: No cervical adenopathy  Skin:     General: Skin is warm and dry  Neurological:      Mental Status: She is alert and oriented to person, place, and time  Psychiatric:         Mood and Affect: Mood normal          Behavior: Behavior normal          Thought Content:  Thought content normal          Judgment: Judgment normal

## 2022-06-16 ENCOUNTER — TELEMEDICINE (OUTPATIENT)
Dept: PSYCHIATRY | Facility: CLINIC | Age: 52
End: 2022-06-16

## 2022-06-16 DIAGNOSIS — F41.1 GAD (GENERALIZED ANXIETY DISORDER): Primary | ICD-10-CM

## 2022-06-16 NOTE — PSYCH
INDIVIDUAL SESSION NOTE     Length of time in session: 52 minutes, follow up in 1 week     Psychotherapy Provided: Individual Psychotherapy 52 minutes      Diagnosis ICD-10-CM Associated Orders   1  ZAINA (generalized anxiety disorder)  F41 1           Goals addressed in session: Goal 1     Pain:      moderate to severe    5    Current suicide risk:  minimal    Data: Met with Bryan Smith for scheduled individual session  Topics discussed included relationship with significant other and mood regulation and symptoms  Hortencia Renae was tearful at the beginning of the session, stating "I'm not as tough as I used to be"  She cited the situation with the man she met online and how he scammed her  She researched him online and found that a lot of information he told her was "bogus"  Hortencia Renae stated she's changed her number multiple times and she keeps getting "prank" calls and strange messages  Hortencia Renae states she's been in contact with a woman that she's not sure if that's his wife or a woman he's scamming too  This other woman doesn't believe anything that Hortencia Herosevelt has told her  She said this man is "emotionally blackmailing" her; he'll tell her he's so upset and that she doesn't care about him unless she sends him money  He's also said that his mother doesn't understand why Hortencia Renae isn't "taking care of him", meaning sending him money  We processed her anger that this person is trying to scam her and that she has not fallen for it  During the session, Hortencia Renae seemed to feel a bit better and was much less tearful  We talked about her focus on her self-care and the care of her grandson as priority  Hortencia Renae shows evidence of utilizing weighing pros and cons, emotion regulation skills and effective communication skills skills to manage mental health symptoms    During this session, this clinical used the following therapeutic modalities engagement strategies, supportive psychotherapy, client-centered therapy and solution-focused therapy  Clinician provided psychoeducation regarding use of emotional regulation and coping skills       Assessment:  Tarsha Kimbrough presents with a dysphoric mood  her affect is tearful  Tarsha Kimbrough was oriented x3  She was focused and engaged  Tarsha Kimbrough exhibits growing therapeutic rapport with this clinician  she continues to exhibit willingness to work on treatment goals and objectives  Tarsha Kimbrough presents with a minimal risk of suicide, minimal risk of self-harm and minimal of harm to others  Plan:  Tarsha Kimbrough will return in 1 week for the next scheduled session  Between sessions, she  will implement positive coping skills and will report back during the next session re: success and barriers  At the next session, this clinical will use engagement strategies, supportive psychotherapy, client-centered therapy and solution-focused therapy to address her anxiety, in effort to assist Tarsha Kimbrough with meeting treatment goals  Behavioral Health Treatment Plan [facility]: Diagnosis and Treatment Plan explained to Sumeet Willoughby relates understanding diagnosis and is agreeable to Treatment Plan   Yes     Virtual Regular Visit    Verification of patient location:    Patient is located in the following state in which I hold an active license [place]      Assessment/Plan:    Problem List Items Addressed This Visit        Other    ZAINA (generalized anxiety disorder) - Primary          Goals addressed in session: Goal 1          Reason for visit is   Chief Complaint   Patient presents with    Virtual Regular Visit        Encounter provider Yvonne Pearl LCSW    Provider located at [address] [place] [id number] [phone]      Recent Visits  Date Type Provider Dept   06/10/22 Telemedicine Yvonne Pearl LCSW [facility]   Showing recent visits within past 7 days and meeting all other requirements  Future Appointments  No visits were found meeting these conditions  Showing future appointments within next 150 days and meeting all other requirements       The patient was identified by name and date of birth  Gabi Polanco was informed that this is a telemedicine visit and that the visit is being conducted throughEpic Embedded and patient was informed this is a secure, HIPAA-complaint platform  She agrees to proceed     My office door was closed  No one else was in the room  She acknowledged consent and understanding of privacy and security of the video platform  The patient has agreed to participate and understands they can discontinue the visit at any time  Patient is aware this is a billable service  Subjective  Gabi Polanco is a 49 y o  female  HPI     Past Medical History:   Diagnosis Date    Anxiety     Anxiety     Asthma     Bipolar disorder ([facility] [address] )     Carpal tunnel syndrome     Chronic pain     lower back    Depression     Disease of thyroid gland     Dyslipidemia     Fibromyalgia     Irritable bowel     Kidney stones     Kidney stones 2019    Migraine     Obesity (BMI 30 0-34  9)     Psychiatric disorder     depression/anxiety    Suicide attempt [facility])     as teen    Vitamin D deficiency        Past Surgical History:   Procedure Laterality Date    BUNIONECTOMY       SECTION      CHOLECYSTECTOMY      PARTIAL HYSTERECTOMY      TONSILLECTOMY      TUBAL LIGATION         Current Outpatient Medications   Medication Sig Dispense Refill    albuterol (2 5 mg/3 mL) 0 083 % nebulizer solution Take 1 vial (2 5 mg total) by nebulization every 6 (six) hours as needed for wheezing or shortness of breath 20 vial 0    azelastine (ASTELIN) 0 1 % nasal spray 1 spray into each nostril 2 (two) times a day Use in each nostril as directed 30 mL 0    Butalbital-APAP-Caffeine -40 MG CAPS TAKE 1 CAPSULE Daily PRN      cariprazine (Vraylar) 3 MG capsule Take 1 capsule (3 mg total) by mouth daily 30 capsule 1    celecoxib (CELEBREX) 200 mg capsule Take 1 capsule (200 mg total) by mouth daily 30 capsule 2    clonazePAM (KlonoPIN) 0 5 mg tablet Take 1 tablet (0 5 mg total) by mouth daily as needed for anxiety 30 tablet 1    divalproex sodium (DEPAKOTE) 250 mg EC tablet Take 250 mg by mouth daily      Erenumab-aooe (Aimovig) 140 MG/ML SOAJ Aimovig Autoinjector 140 mg/mL subcutaneous auto-injector      esomeprazole (NexIUM) 20 mg capsule Take 1 capsule (20 mg total) by mouth in the morning and 1 capsule (20 mg total) in the evening  Take before meals   60 capsule 0    famotidine (PEPCID) 40 MG tablet famotidine 40 mg tablet      fremanezumab-vfrm (Ajovy) 225 MG/1 5ML auto-injector Inject 225 mg under the skin every 30 (thirty) days      hydroxychloroquine (PLAQUENIL) 200 mg tablet       levothyroxine 200 mcg tablet       levothyroxine 25 mcg tablet       lidocaine (LIDODERM) 5 % Apply 1 patch topically daily as needed (back pain) Remove & Discard patch within 12 hours or as directed by MD 6 patch 1    loratadine (CLARITIN) 10 mg tablet Take 1 pill daily for allergies 90 tablet 1    meclizine (ANTIVERT) 25 mg tablet Take 1 tablet (25 mg total) by mouth every 8 (eight) hours as needed for dizziness 30 tablet 1    meloxicam (MOBIC) 7 5 mg tablet Daily      methocarbamol (ROBAXIN) 750 mg tablet       mometasone (ELOCON) 0 1 % cream Apply topically daily 45 g 0    naratriptan (AMERGE) 2 5 MG tablet       omeprazole (PriLOSEC) 40 MG capsule       ondansetron (Zofran ODT) 4 mg disintegrating tablet Take 1 tablet (4 mg total) by mouth every 6 (six) hours as needed for nausea or vomiting 20 tablet 0    phentermine 30 MG capsule Take 1 capsule (30 mg total) by mouth every morning 30 capsule 0    Respiratory Therapy Supplies (NEBULIZER) DONA by Does not apply route every 4 (four) hours while awake 90 each 1    Topiramate  MG CP24 Take 100 mg by mouth 2 (two) times a day     Magali Schmitz 100 MG tablet       Ventolin  (90 Base) MCG/ACT inhaler INHALE TWO PUFFS EVERY 6 (SIX) HOURS AS NEEDED FOR WHEEZING OR SHORTNESS OF BREATH 18 g 1    XIFAXAN 550 MG tablet        No current facility-administered medications for this visit  Allergies   Allergen Reactions    Doxycycline Rash    Erythromycin Rash    Other Edema and Wheezing     jalapeno    Latex Rash    Duloxetine      Other reaction(s): Nausea, Loopy    Eletriptan      Pain in lower jaw with pressure in throat    Emgality [Galcanezumab-Gnlm]     Galcanezumab      rash       Review of Systems    Video Exam    There were no vitals filed for this visit  Physical Exam     I spent 52 minutes directly with the patient during this visit    VIRTUAL VISIT [facility] verbally agrees to participate in [employer]  Pt is aware that [employer] could be limited without vital signs or the ability to perform a full hands-on physical exam  Belem Crespo understands she or the provider may request at any time to terminate the video visit and request the patient to seek care or treatment in person

## 2022-06-21 ENCOUNTER — TELEPHONE (OUTPATIENT)
Dept: OTHER | Facility: OTHER | Age: 52
End: 2022-06-21

## 2022-06-21 NOTE — TELEPHONE ENCOUNTER
PT  Called in requesting a call back regarding her fatigue symptoms  PT refused triage services and is requesting a call back in the morning to possibly schedule a same day

## 2022-06-22 ENCOUNTER — OFFICE VISIT (OUTPATIENT)
Dept: FAMILY MEDICINE CLINIC | Facility: CLINIC | Age: 52
End: 2022-06-22
Payer: MEDICARE

## 2022-06-22 VITALS
OXYGEN SATURATION: 100 % | BODY MASS INDEX: 27.1 KG/M2 | TEMPERATURE: 96 F | SYSTOLIC BLOOD PRESSURE: 116 MMHG | HEIGHT: 64 IN | HEART RATE: 73 BPM | DIASTOLIC BLOOD PRESSURE: 88 MMHG

## 2022-06-22 DIAGNOSIS — R53.82 CHRONIC FATIGUE: Primary | ICD-10-CM

## 2022-06-22 DIAGNOSIS — E03.8 OTHER SPECIFIED HYPOTHYROIDISM: ICD-10-CM

## 2022-06-22 DIAGNOSIS — R07.9 CHEST PAIN, UNSPECIFIED TYPE: ICD-10-CM

## 2022-06-22 DIAGNOSIS — U07.1 COVID-19: ICD-10-CM

## 2022-06-22 DIAGNOSIS — H61.21 IMPACTED CERUMEN OF RIGHT EAR: ICD-10-CM

## 2022-06-22 PROCEDURE — 93000 ELECTROCARDIOGRAM COMPLETE: CPT

## 2022-06-22 PROCEDURE — 69210 REMOVE IMPACTED EAR WAX UNI: CPT

## 2022-06-22 PROCEDURE — 99214 OFFICE O/P EST MOD 30 MIN: CPT

## 2022-06-22 RX ORDER — LEVOTHYROXINE SODIUM 0.03 MG/1
37.5 TABLET ORAL DAILY
Qty: 45 TABLET | Refills: 3 | Status: SHIPPED | OUTPATIENT
Start: 2022-06-22

## 2022-06-22 RX ORDER — COLCHICINE 0.6 MG/1
0.6 TABLET ORAL DAILY
Qty: 30 TABLET | Refills: 5 | Status: SHIPPED | OUTPATIENT
Start: 2022-06-22

## 2022-06-22 NOTE — ASSESSMENT & PLAN NOTE
At risk for pericarditis, Have lab work, echo and stress test  Will call with results  EKG today NSR  Stop phentermine  Vital signs WNL and no current chest pain at this time

## 2022-06-22 NOTE — PROGRESS NOTES
Assessment/Plan:         Problem List Items Addressed This Visit        Endocrine    Hypothyroidism     Increase levothyroxine to 237 5 today, repeat tsh in 3 months  Relevant Medications    levothyroxine 25 mcg tablet       Other    Chronic fatigue - Primary     Have lab work, increase levothyroxine  Will call with results  Relevant Orders    POCT ECG (Completed)    Comprehensive metabolic panel    CBC and differential    COVID-19     At risk for pericarditis, Have lab work, echo and stress test  Will call with results  EKG today NSR  Stop phentermine  Vital signs WNL and no current chest pain at this time  Relevant Medications    levothyroxine 25 mcg tablet    colchicine (COLCRYS) 0 6 mg tablet    Other Relevant Orders    Echo complete w/ contrast if indicated    Stress test only, exercise    High Sensitivity Troponin I Random    D-dimer, quantitative    Chest pain     At risk for pericarditis, Have lab work, echo and stress test  Will call with results  EKG today NSR  Stop phentermine  Vital signs WNL and no current chest pain at this time  Relevant Medications    levothyroxine 25 mcg tablet    colchicine (COLCRYS) 0 6 mg tablet    Other Relevant Orders    Echo complete w/ contrast if indicated    Stress test only, exercise    High Sensitivity Troponin I Random    D-dimer, quantitative      Other Visit Diagnoses     Impacted cerumen of right ear        cerumen removed today    Relevant Orders    Ear cerumen removal (Completed)            Subjective:      Patient ID: John Meredith is a 49 y o  female  Idolina Dubin has been feeling run down and tired and on Sunday she started with chest pains and last night (Wednesday) she started throwing everything up  She is having precordial chest pain that radiates to the neck and dizzy spells  When she was trying to breath it hurt  The pain lasts for a few minutes  The pain is tight and sharp in character   Marilyn Dubin had COVID last month        The following portions of the patient's history were reviewed and updated as appropriate:   Past Medical History:  She has a past medical history of Anxiety, Anxiety, Asthma, Bipolar disorder ([facility] [address] ), Carpal tunnel syndrome, Chronic pain, Depression, Disease of thyroid gland, Dyslipidemia, Fibromyalgia, Irritable bowel, Kidney stones, Kidney stones (2019), Migraine, Obesity (BMI 30 0-34 9), Psychiatric disorder, Suicide attempt ([email or web address] ), and Vitamin D deficiency  ,  _______________________________________________________________________  Medical Problems:  does not have any pertinent problems on file ,  _______________________________________________________________________  Past Surgical History:   has a past surgical history that includes  section; Cholecystectomy; Tonsillectomy; Partial hysterectomy; Bunionectomy; and Tubal ligation  ,  _______________________________________________________________________  Family History:  family history includes Anxiety disorder in her mother; Arthritis in her mother; Bipolar disorder in her daughter and mother; Breast cancer in her paternal aunt and paternal grandmother; Cancer in her cousin, father, maternal uncle, paternal aunt, paternal grandmother, and paternal uncle; Dementia in her paternal grandmother; Depression in her mother; Diabetes in her mother; Heart disease in her brother; Hypertension in her brother and mother; Hypothyroidism in her mother and sister; Mental illness in her mother; Stroke in her mother ,  _______________________________________________________________________  Social History:   reports that she has never smoked  She has never used smokeless tobacco  She reports that she does not drink alcohol and does not use drugs  ,  _______________________________________________________________________  Allergies:  is allergic to doxycycline, erythromycin, other, latex, duloxetine, eletriptan, emgality [galcanezumab-gnlm], and galcanezumab     _______________________________________________________________________  Current Outpatient Medications   Medication Sig Dispense Refill    albuterol (2 5 mg/3 mL) 0 083 % nebulizer solution Take 1 vial (2 5 mg total) by nebulization every 6 (six) hours as needed for wheezing or shortness of breath 20 vial 0    azelastine (ASTELIN) 0 1 % nasal spray 1 spray into each nostril 2 (two) times a day Use in each nostril as directed 30 mL 0    Butalbital-APAP-Caffeine -40 MG CAPS TAKE 1 CAPSULE Daily PRN      cariprazine (Vraylar) 3 MG capsule Take 1 capsule (3 mg total) by mouth daily 30 capsule 1    celecoxib (CELEBREX) 200 mg capsule Take 1 capsule (200 mg total) by mouth daily 30 capsule 2    clonazePAM (KlonoPIN) 0 5 mg tablet Take 1 tablet (0 5 mg total) by mouth daily as needed for anxiety 30 tablet 1    colchicine (COLCRYS) 0 6 mg tablet Take 1 tablet (0 6 mg total) by mouth daily 30 tablet 5    divalproex sodium (DEPAKOTE) 250 mg EC tablet Take 250 mg by mouth daily      Erenumab-aooe (Aimovig) 140 MG/ML SOAJ Aimovig Autoinjector 140 mg/mL subcutaneous auto-injector      esomeprazole (NexIUM) 20 mg capsule Take 1 capsule (20 mg total) by mouth in the morning and 1 capsule (20 mg total) in the evening  Take before meals   60 capsule 0    famotidine (PEPCID) 40 MG tablet famotidine 40 mg tablet      fremanezumab-vfrm (Ajovy) 225 MG/1 5ML auto-injector Inject 225 mg under the skin every 30 (thirty) days      hydroxychloroquine (PLAQUENIL) 200 mg tablet       levothyroxine 200 mcg tablet       levothyroxine 25 mcg tablet Take 1 5 tablets (37 5 mcg total) by mouth daily 45 tablet 3    lidocaine (LIDODERM) 5 % Apply 1 patch topically daily as needed (back pain) Remove & Discard patch within 12 hours or as directed by MD 6 patch 1    loratadine (CLARITIN) 10 mg tablet Take 1 pill daily for allergies 90 tablet 1    meclizine (ANTIVERT) 25 mg tablet Take 1 tablet (25 mg total) by mouth every 8 (eight) hours as needed for dizziness 30 tablet 1    meloxicam (MOBIC) 7 5 mg tablet Daily      methocarbamol (ROBAXIN) 750 mg tablet       mometasone (ELOCON) 0 1 % cream Apply topically daily 45 g 0    naratriptan (AMERGE) 2 5 MG tablet       omeprazole (PriLOSEC) 40 MG capsule       ondansetron (Zofran ODT) 4 mg disintegrating tablet Take 1 tablet (4 mg total) by mouth every 6 (six) hours as needed for nausea or vomiting 20 tablet 0    phentermine 30 MG capsule Take 1 capsule (30 mg total) by mouth every morning 30 capsule 0    Topiramate  MG CP24 Take 100 mg by mouth 2 (two) times a day      Ubrelvy 100 MG tablet       Ventolin  (90 Base) MCG/ACT inhaler INHALE TWO PUFFS EVERY 6 (SIX) HOURS AS NEEDED FOR WHEEZING OR SHORTNESS OF BREATH 18 g 1    XIFAXAN 550 MG tablet       Respiratory Therapy Supplies (NEBULIZER) DONA by Does not apply route every 4 (four) hours while awake 90 each 1     No current facility-administered medications for this visit      _______________________________________________________________________  Review of Systems   Constitutional: Positive for fatigue  Negative for chills, diaphoresis and fever  HENT: Negative for congestion, ear pain, sinus pressure, sinus pain and sore throat  Eyes: Positive for visual disturbance (sometimes, new since COVID)  Negative for pain  Respiratory: Negative for cough, chest tightness, shortness of breath and wheezing  Cardiovascular: Positive for chest pain  Negative for palpitations  Gastrointestinal: Positive for abdominal pain, diarrhea, nausea and vomiting (6/21)  Negative for constipation  Endocrine: Positive for cold intolerance  Genitourinary: Negative for dysuria, frequency, hematuria and urgency  Musculoskeletal: Positive for arthralgias and myalgias  Negative for back pain  Full body just feels tired  Skin: Negative for color change and rash  Neurological: Positive for headaches  Negative for seizures and syncope  Hematological: Bruises/bleeds easily (Wakes up with bruises  )  Psychiatric/Behavioral: Positive for agitation, dysphoric mood and sleep disturbance (sleeps 3-4 hours of sleep a night  )  All other systems reviewed and are negative  Objective:  Vitals:    06/22/22 1038   BP: 116/88   BP Location: Right arm   Patient Position: Sitting   Cuff Size: Standard   Pulse: 73   Temp: (!) 96 °F (35 6 °C)   TempSrc: Tympanic   SpO2: 100%   Height: 5' 3 5" (1 613 m)     Body mass index is 27 1 kg/m²  Physical Exam  Vitals and nursing note reviewed  Constitutional:       Appearance: Normal appearance  She is ill-appearing  HENT:      Right Ear: There is impacted cerumen  Left Ear: Tympanic membrane, ear canal and external ear normal  There is no impacted cerumen  Nose: Nose normal  No congestion  Mouth/Throat:      Mouth: Mucous membranes are moist       Pharynx: No posterior oropharyngeal erythema  Eyes:      Extraocular Movements: Extraocular movements intact  Cardiovascular:      Rate and Rhythm: Normal rate and regular rhythm  Heart sounds: Normal heart sounds  No murmur heard  Pulmonary:      Effort: Pulmonary effort is normal       Breath sounds: Normal breath sounds  No wheezing  Abdominal:      Palpations: Abdomen is soft  Tenderness: There is no abdominal tenderness  Musculoskeletal:         General: Tenderness (left sternoclido mastoid muscle) present  Normal range of motion  Cervical back: Normal range of motion  Left lower leg: No edema  Comments: Pain with ROM of the left shoulder  Skin:     General: Skin is warm and dry  Neurological:      General: No focal deficit present  Mental Status: She is alert     Psychiatric:         Mood and Affect: Mood normal          Behavior: Behavior normal          Ear cerumen removal    Date/Time: 6/22/2022 1:00 PM  Performed by: ERICKA Murray  Authorized by: ERICKA Mohamud   Universal Protocol:  Procedure performed by:  Risks and benefits: risks, benefits and alternatives were discussed  Consent given by: patient  Time out: Immediately prior to procedure a "time out" was called to verify the correct patient, procedure, equipment, support staff and site/side marked as required  Timeout called at: 6/22/2022 1:00 PM   Patient understanding: patient states understanding of the procedure being performed  Patient consent: the patient's understanding of the procedure matches consent given  Required items: required blood products, implants, devices, and special equipment available  Patient identity confirmed: verbally with patient      Patient location:  Clinic  Procedure details:     Local anesthetic:  None    Location:  R ear    Procedure type: irrigation with instrumentation      Instrumentation: curette      Approach:  External  Post-procedure details:     Complication:  None    Hearing quality:  Improved    Patient tolerance of procedure: Tolerated well, no immediate complications  Comments:      Patient did have some soreness 4/10 but could hear well, TM was visualized but did still have some wax in the ear that she did not want removed  Suggest Debrox as directed on the box and call is soreness is not improved by tomorrow

## 2022-06-23 ENCOUNTER — TELEMEDICINE (OUTPATIENT)
Dept: PSYCHIATRY | Facility: CLINIC | Age: 52
End: 2022-06-23

## 2022-06-23 DIAGNOSIS — F31.81 BIPOLAR 2 DISORDER (HCC): ICD-10-CM

## 2022-06-23 DIAGNOSIS — F41.1 GAD (GENERALIZED ANXIETY DISORDER): Primary | ICD-10-CM

## 2022-06-23 NOTE — PSYCH
INDIVIDUAL SESSION NOTE     Length of time in session: 52 minutes, follow up in 1 week     Psychotherapy Provided: Individual Psychotherapy 52 minutes      Diagnosis ICD-10-CM Associated Orders   1  ZAINA (generalized anxiety disorder)  F41 1    2  Bipolar 2 disorder ([facility] [address] )  F31 81           Goals addressed in session: Goal 1     Pain:      moderate    5    Current suicide risk:  minimal    Data: Met with Gavi Colindres for scheduled individual session  Topics discussed included relationship with significant other, relationships with family and physical health concerns  Lamonica Cosme has to get an EKG and echocardiogram; due to covid (her heart muscle being inflamed)  Javan Cosme stated she was vomiting the other evening, which isn't something she does much  She's also struggling with back pain and migraines  She's feeling tired and run down  Javan Cosme states her biggest fear isn't her own "demise", but what would happen to her grandson  Javan Cosme stated she feels angry because her grandson keeps getting exposed at his therapy program because she feels other people are irresponsible  Javan Cosme states the chest pain she's having is scary for her because she doesn't know if it can be treated  Javan Cosme then stated she ended up getting a new phone because she said "weird things" keep happening with her phone, which she thinks is a result of the online scammer she had contact with  Javan Cosme said she's had her money transferred through the cash jessee in small increments, but it's added up to $1000  She's since deleted these apps and also contacted her bank to get a new bank card  Javan Cosme is frustrated that she feels she had to defend who she is "harder" versus the people who are doing wrong  This situation has affected her emotionally and she is truly unsettled by this experience  Lamonica Cosme shows evidence of utilizing emotion regulation skills skills to manage mental health symptoms    During this session, this clinical used the following therapeutic modalities engagement strategies, supportive psychotherapy and client-centered therapy  Clinician provided psychoeducation regarding use of reality-based self-talk       Assessment:  Martin Pacheco presents with a dysphoric mood  her affect is tearful  Martin Pacheco was oriented x3  She was focused and engaged  Martin Pacheco exhibits growing therapeutic rapport with this clinician  she continues to exhibit willingness to work on treatment goals and objectives  Martin Pacheco presents with a minimal risk of suicide, minimal risk of self-harm and minimal of harm to others  Plan:  Martin Pacheco will return in 1 week for the next scheduled session  Between sessions, she  will work on reality-based self-talk and will report back during the next session re: success and barriers  At the next session, this clinical will use engagement strategies, supportive psychotherapy and CBT techniques to address her anxiety, in effort to assist Martin Pacheco with meeting treatment goals  Behavioral Health Treatment Plan [facility]: Diagnosis and Treatment Plan explained to Silva Dukes relates understanding diagnosis and is agreeable to Treatment Plan   Yes     Virtual Regular Visit    Verification of patient location:    Patient is located in the following state in which I hold an active license [place]      Assessment/Plan:    Problem List Items Addressed This Visit        Other    ZAINA (generalized anxiety disorder) - Primary    Bipolar 2 disorder ([facility] [address] )          Goals addressed in session: Goal 1          Reason for visit is   Chief Complaint   Patient presents with    Virtual Regular Visit        Encounter provider Narinder Pulido LCSW    Provider located at [address] [place] [id number] [phone]      Recent Visits  Date Type Provider Dept   06/16/22 Telemedicine Narinder Pulido LCSW [facility]   Showing recent visits within past 7 days and meeting all other requirements  Future Appointments  No visits were found meeting these conditions  Showing future appointments within next 150 days and meeting all other requirements       The patient was identified by name and date of birth  Samuel Levin was informed that this is a telemedicine visit and that the visit is being conducted through[employer] and patient was informed that this is a secure, HIPAA-compliant platform  She agrees to proceed     My office door was closed  No one else was in the room  She acknowledged consent and understanding of privacy and security of the video platform  The patient has agreed to participate and understands they can discontinue the visit at any time  Patient is aware this is a billable service  Subjective  Samuel Levin is a 49 y o  female  HPI     Past Medical History:   Diagnosis Date    Anxiety     Anxiety     Asthma     Bipolar disorder ([facility] [address] )     Carpal tunnel syndrome     Chronic pain     lower back    Depression     Disease of thyroid gland     Dyslipidemia     Fibromyalgia     Irritable bowel     Kidney stones     Kidney stones 2019    Migraine     Obesity (BMI 30 0-34  9)     Psychiatric disorder     depression/anxiety    Suicide attempt [facility])     as teen    Vitamin D deficiency        Past Surgical History:   Procedure Laterality Date    BUNIONECTOMY       SECTION      CHOLECYSTECTOMY      PARTIAL HYSTERECTOMY      TONSILLECTOMY      TUBAL LIGATION         Current Outpatient Medications   Medication Sig Dispense Refill    albuterol (2 5 mg/3 mL) 0 083 % nebulizer solution Take 1 vial (2 5 mg total) by nebulization every 6 (six) hours as needed for wheezing or shortness of breath 20 vial 0    azelastine (ASTELIN) 0 1 % nasal spray 1 spray into each nostril 2 (two) times a day Use in each nostril as directed 30 mL 0    Butalbital-APAP-Caffeine -40 MG CAPS TAKE 1 CAPSULE Daily PRN  cariprazine (Vraylar) 3 MG capsule Take 1 capsule (3 mg total) by mouth daily 30 capsule 1    celecoxib (CELEBREX) 200 mg capsule Take 1 capsule (200 mg total) by mouth daily 30 capsule 2    clonazePAM (KlonoPIN) 0 5 mg tablet Take 1 tablet (0 5 mg total) by mouth daily as needed for anxiety 30 tablet 1    colchicine (COLCRYS) 0 6 mg tablet Take 1 tablet (0 6 mg total) by mouth daily 30 tablet 5    divalproex sodium (DEPAKOTE) 250 mg EC tablet Take 250 mg by mouth daily      Erenumab-aooe (Aimovig) 140 MG/ML SOAJ Aimovig Autoinjector 140 mg/mL subcutaneous auto-injector      esomeprazole (NexIUM) 20 mg capsule Take 1 capsule (20 mg total) by mouth in the morning and 1 capsule (20 mg total) in the evening  Take before meals   60 capsule 0    famotidine (PEPCID) 40 MG tablet famotidine 40 mg tablet      fremanezumab-vfrm (Ajovy) 225 MG/1 5ML auto-injector Inject 225 mg under the skin every 30 (thirty) days      hydroxychloroquine (PLAQUENIL) 200 mg tablet       levothyroxine 200 mcg tablet       levothyroxine 25 mcg tablet Take 1 5 tablets (37 5 mcg total) by mouth daily 45 tablet 3    lidocaine (LIDODERM) 5 % Apply 1 patch topically daily as needed (back pain) Remove & Discard patch within 12 hours or as directed by MD Castro patch 1    loratadine (CLARITIN) 10 mg tablet Take 1 pill daily for allergies 90 tablet 1    meclizine (ANTIVERT) 25 mg tablet Take 1 tablet (25 mg total) by mouth every 8 (eight) hours as needed for dizziness 30 tablet 1    meloxicam (MOBIC) 7 5 mg tablet Daily      methocarbamol (ROBAXIN) 750 mg tablet       mometasone (ELOCON) 0 1 % cream Apply topically daily 45 g 0    naratriptan (AMERGE) 2 5 MG tablet       omeprazole (PriLOSEC) 40 MG capsule       ondansetron (Zofran ODT) 4 mg disintegrating tablet Take 1 tablet (4 mg total) by mouth every 6 (six) hours as needed for nausea or vomiting 20 tablet 0    phentermine 30 MG capsule Take 1 capsule (30 mg total) by mouth every morning 30 capsule 0    Respiratory Therapy Supplies (NEBULIZER) DONA by Does not apply route every 4 (four) hours while awake 90 each 1    Topiramate  MG CP24 Take 100 mg by mouth 2 (two) times a day      Ubrelvy 100 MG tablet       Ventolin  (90 Base) MCG/ACT inhaler INHALE TWO PUFFS EVERY 6 (SIX) HOURS AS NEEDED FOR WHEEZING OR SHORTNESS OF BREATH 18 g 1    XIFAXAN 550 MG tablet        No current facility-administered medications for this visit  Allergies   Allergen Reactions    Doxycycline Rash    Erythromycin Rash    Other Edema and Wheezing     jalapeno    Latex Rash    Duloxetine      Other reaction(s): Nausea, Loopy    Eletriptan      Pain in lower jaw with pressure in throat    Emgality [Galcanezumab-Gnlm]     Galcanezumab      rash       Review of Systems    Video Exam    There were no vitals filed for this visit  Physical Exam     I spent 52 minutes directly with the patient during this visit    VIRTUAL VISIT [address] [place] [address] verbally agrees to participate in [employer]  Pt is aware that [employer] could be limited without vital signs or the ability to perform a full hands-on physical exam  Belem Carrasquillo understands she or the provider may request at any time to terminate the video visit and request the patient to seek care or treatment in person

## 2022-06-24 ENCOUNTER — HOSPITAL ENCOUNTER (OUTPATIENT)
Dept: NON INVASIVE DIAGNOSTICS | Facility: HOSPITAL | Age: 52
Discharge: HOME | End: 2022-06-24
Payer: MEDICARE

## 2022-06-24 ENCOUNTER — TELEPHONE (OUTPATIENT)
Dept: FAMILY MEDICINE CLINIC | Facility: CLINIC | Age: 52
End: 2022-06-24

## 2022-06-24 VITALS
HEIGHT: 64 IN | WEIGHT: 155 LBS | BODY MASS INDEX: 26.46 KG/M2 | HEART RATE: 73 BPM | SYSTOLIC BLOOD PRESSURE: 116 MMHG | DIASTOLIC BLOOD PRESSURE: 88 MMHG

## 2022-06-24 DIAGNOSIS — U07.1 COVID-19: ICD-10-CM

## 2022-06-24 DIAGNOSIS — R07.9 CHEST PAIN, UNSPECIFIED TYPE: ICD-10-CM

## 2022-06-24 LAB
AORTIC ROOT: 2.9 CM
APICAL FOUR CHAMBER EJECTION FRACTION: 72 %
E WAVE DECELERATION TIME: 212 MS
FRACTIONAL SHORTENING: 42 % (ref 28–44)
INTERVENTRICULAR SEPTUM IN DIASTOLE (PARASTERNAL SHORT AXIS VIEW): 0.9 CM
INTERVENTRICULAR SEPTUM: 0.9 CM (ref 0.6–1.1)
LAAS-AP4: 11.5 CM2
LEFT ATRIUM SIZE: 3.2 CM
LEFT INTERNAL DIMENSION IN SYSTOLE: 2.1 CM (ref 2.1–4)
LEFT VENTRICULAR INTERNAL DIMENSION IN DIASTOLE: 3.6 CM (ref 3.5–6)
LEFT VENTRICULAR POSTERIOR WALL IN END DIASTOLE: 0.8 CM
LEFT VENTRICULAR STROKE VOLUME: 40 ML
LVSV (TEICH): 40 ML
MV E'TISSUE VEL-LAT: 12 CM/S
MV PEAK A VEL: 0.8 M/S
MV PEAK E VEL: 98 CM/S
MV STENOSIS PRESSURE HALF TIME: 62 MS
MV VALVE AREA P 1/2 METHOD: 3.55 CM2
RA PRESSURE ESTIMATED: 3 MMHG
RIGHT ATRIUM AREA SYSTOLE A4C: 9.7 CM2
RIGHT VENTRICLE ID DIMENSION: 2.8 CM
RV PSP: 17 MMHG
SL CV LV EF: 65
SL CV PED ECHO LEFT VENTRICLE DIASTOLIC VOLUME (MOD BIPLANE) 2D: 54 ML
SL CV PED ECHO LEFT VENTRICLE SYSTOLIC VOLUME (MOD BIPLANE) 2D: 15 ML
TR MAX PG: 14 MMHG
TR PEAK VELOCITY: 1.9 M/S
TRICUSPID VALVE PEAK REGURGITATION VELOCITY: 1.88 M/S

## 2022-06-24 PROCEDURE — 93306 TTE W/DOPPLER COMPLETE: CPT

## 2022-06-24 PROCEDURE — 93306 TTE W/DOPPLER COMPLETE: CPT | Performed by: INTERNAL MEDICINE

## 2022-06-24 NOTE — TELEPHONE ENCOUNTER
Called pt and let her know that her echocardiogram came back normal  She was happy about that but is still experiencing some chest pain  Said she would schedule the stress test to the best of he ability because of time wise and asked if she should start taking the colchicine (colcrys) to help with her chest pains and I suggested she should and if it doesn't seem to help then to let us and Bobby Mauricio know and she could prescribe something else

## 2022-06-30 ENCOUNTER — TELEMEDICINE (OUTPATIENT)
Dept: PSYCHIATRY | Facility: CLINIC | Age: 52
End: 2022-06-30

## 2022-06-30 ENCOUNTER — TELEPHONE (OUTPATIENT)
Dept: ADMINISTRATIVE | Facility: OTHER | Age: 52
End: 2022-06-30

## 2022-06-30 DIAGNOSIS — F31.81 BIPOLAR 2 DISORDER (HCC): ICD-10-CM

## 2022-06-30 DIAGNOSIS — F41.1 GAD (GENERALIZED ANXIETY DISORDER): Primary | ICD-10-CM

## 2022-06-30 PROCEDURE — NOSHOW

## 2022-06-30 NOTE — PSYCH
Virtual Regular Visit    Verification of patient location:    Patient is located in the following state in which I hold an active license { amb virtual patient location:[address]}      Assessment/Plan:    Problem List Items Addressed This Visit        Other    ZAINA (generalized anxiety disorder) - Primary    Bipolar 2 disorder ([facility] [address] )          Goals addressed in session: {GOALS:60040}          Reason for visit is   Chief Complaint   Patient presents with    Virtual Regular Visit        Encounter provider Sheldon Leyva LCSW    Provider located at [address] [place] [id number] [phone]      Recent Visits  Date Type Provider Dept   06/23/22 Telemedicine Sheldon Leyva LCSW [facility]   Showing recent visits within past 7 days and meeting all other requirements  Future Appointments  No visits were found meeting these conditions  Showing future appointments within next 150 days and meeting all other requirements       The patient was identified by name and date of birth  Deric Almendarez was informed that this is a telemedicine visit and that the visit is being conducted through{AMB VIRTUAL VISIT HQRXVF:61297}  {Telemedicine confidentiality :85277} {Telemedicine participants:24833}  She acknowledged consent and understanding of privacy and security of the video platform  The patient has agreed to participate and understands they can discontinue the visit at any time  Patient is aware this is a billable service  Subjective  Deric Almendarez is a 49 y o  female ***         HPI     Past Medical History:   Diagnosis Date    Anxiety     Anxiety     Asthma     Bipolar disorder ([facility] [address] )     Carpal tunnel syndrome     Chronic pain     lower back    Depression     Disease of thyroid gland     Dyslipidemia     Fibromyalgia     Irritable bowel     Kidney stones     Kidney stones 2019    Migraine     Obesity (BMI 30 0-34  9)     Psychiatric disorder     depression/anxiety    [facility])     as teen    Vitamin D deficiency        Past Surgical History:   Procedure Laterality Date    BUNIONECTOMY       SECTION      CHOLECYSTECTOMY      PARTIAL HYSTERECTOMY      TONSILLECTOMY      TUBAL LIGATION         Current Outpatient Medications   Medication Sig Dispense Refill    albuterol (2 5 mg/3 mL) 0 083 % nebulizer solution Take 1 vial (2 5 mg total) by nebulization every 6 (six) hours as needed for wheezing or shortness of breath 20 vial 0    azelastine (ASTELIN) 0 1 % nasal spray 1 spray into each nostril 2 (two) times a day Use in each nostril as directed 30 mL 0    Butalbital-APAP-Caffeine -40 MG CAPS TAKE 1 CAPSULE Daily PRN      cariprazine (Vraylar) 3 MG capsule Take 1 capsule (3 mg total) by mouth daily 30 capsule 1    celecoxib (CELEBREX) 200 mg capsule Take 1 capsule (200 mg total) by mouth daily 30 capsule 2    clonazePAM (KlonoPIN) 0 5 mg tablet Take 1 tablet (0 5 mg total) by mouth daily as needed for anxiety 30 tablet 1    colchicine (COLCRYS) 0 6 mg tablet Take 1 tablet (0 6 mg total) by mouth daily 30 tablet 5    divalproex sodium (DEPAKOTE) 250 mg EC tablet Take 250 mg by mouth daily      Erenumab-aooe (Aimovig) 140 MG/ML SOAJ Aimovig Autoinjector 140 mg/mL subcutaneous auto-injector      esomeprazole (NexIUM) 20 mg capsule Take 1 capsule (20 mg total) by mouth in the morning and 1 capsule (20 mg total) in the evening  Take before meals   60 capsule 0    famotidine (PEPCID) 40 MG tablet famotidine 40 mg tablet      fremanezumab-vfrm (Ajovy) 225 MG/1 5ML auto-injector Inject 225 mg under the skin every 30 (thirty) days      hydroxychloroquine (PLAQUENIL) 200 mg tablet       levothyroxine 200 mcg tablet       levothyroxine 25 mcg tablet Take 1 5 tablets (37 5 mcg total) by mouth daily 45 tablet 3    lidocaine (LIDODERM) 5 % Apply 1 patch topically daily as needed (back pain) Remove & Discard patch within 12 hours or as directed by MD Castro patch 1    loratadine (CLARITIN) 10 mg tablet Take 1 pill daily for allergies 90 tablet 1    meclizine (ANTIVERT) 25 mg tablet Take 1 tablet (25 mg total) by mouth every 8 (eight) hours as needed for dizziness 30 tablet 1    meloxicam (MOBIC) 7 5 mg tablet Daily      methocarbamol (ROBAXIN) 750 mg tablet       mometasone (ELOCON) 0 1 % cream Apply topically daily 45 g 0    naratriptan (AMERGE) 2 5 MG tablet       omeprazole (PriLOSEC) 40 MG capsule       ondansetron (Zofran ODT) 4 mg disintegrating tablet Take 1 tablet (4 mg total) by mouth every 6 (six) hours as needed for nausea or vomiting 20 tablet 0    phentermine 30 MG capsule Take 1 capsule (30 mg total) by mouth every morning 30 capsule 0    Respiratory Therapy Supplies (NEBULIZER) DONA by Does not apply route every 4 (four) hours while awake 90 each 1    Topiramate  MG CP24 Take 100 mg by mouth 2 (two) times a day      Ubrelvy 100 MG tablet       Ventolin  (90 Base) MCG/ACT inhaler INHALE TWO PUFFS EVERY 6 (SIX) HOURS AS NEEDED FOR WHEEZING OR SHORTNESS OF BREATH 18 g 1    XIFAXAN 550 MG tablet        No current facility-administered medications for this visit  Allergies   Allergen Reactions    Doxycycline Rash    Erythromycin Rash    Other Edema and Wheezing     jalapeno    Latex Rash    Duloxetine      Other reaction(s): Nausea, Loopy    Eletriptan      Pain in lower jaw with pressure in throat    Emgality [Galcanezumab-Gnlm]     Galcanezumab      rash       Review of Systems    Video Exam    There were no vitals filed for this visit  Physical Exam     {Time Spent:48532}    VIRTUAL VISIT DISCLAIMER    Rush Alonso verbally agrees to participate in [employer]   Pt is aware that [employer] could be limited without vital signs or the ability to perform a full hands-on physical Jose Daniel Kong understands she or the provider may request at any time to terminate the video visit and request the patient to seek care or treatment in person

## 2022-06-30 NOTE — TELEPHONE ENCOUNTER
6/30/22  10:53 am    The patient was called and a message was left to call the PCP office regarding an open order      Thank you,  Rere MARTE [facility]

## 2022-07-06 ENCOUNTER — TELEPHONE (OUTPATIENT)
Dept: OBGYN CLINIC | Facility: CLINIC | Age: 52
End: 2022-07-06

## 2022-07-06 NOTE — TELEPHONE ENCOUNTER
Pt is having vaginal exterior burning and some itching, she said this is mostly near clitoral area, no new partners with intercourse, no discharge, no uti sx

## 2022-07-06 NOTE — TELEPHONE ENCOUNTER
----- Message from Eduardo Moya sent at 7/6/2022  4:04 AM EDT -----  Regarding: New issue   Having an issue and wondering if I can get in for an appointment ASAP

## 2022-07-07 ENCOUNTER — TELEMEDICINE (OUTPATIENT)
Dept: PSYCHIATRY | Facility: CLINIC | Age: 52
End: 2022-07-07
Payer: MEDICARE

## 2022-07-07 ENCOUNTER — TELEPHONE (OUTPATIENT)
Dept: PSYCHIATRY | Facility: CLINIC | Age: 52
End: 2022-07-07

## 2022-07-07 DIAGNOSIS — F31.81 BIPOLAR 2 DISORDER (HCC): ICD-10-CM

## 2022-07-07 DIAGNOSIS — F41.1 GAD (GENERALIZED ANXIETY DISORDER): Primary | ICD-10-CM

## 2022-07-07 PROCEDURE — 90834 PSYTX W PT 45 MINUTES: CPT

## 2022-07-07 NOTE — PSYCH
INDIVIDUAL SESSION NOTE     Length of time in session: 53 minutes, follow up in 1 week     Psychotherapy Provided: Individual Psychotherapy 53 minutes      Diagnosis ICD-10-CM Associated Orders   1  ZAINA (generalized anxiety disorder)  F41 1    2  Bipolar 2 disorder ([facility] [address] )  F31 81           Goals addressed in session: Goal 1     Pain:      none    0    Current suicide risk:  minimal    Data: Met with Nisa Escudero for scheduled individual session  Topics discussed included relationship with significant other and mood regulation and symptoms  Oletta Moritz stated she's "good" but it's "been a hell of a two weeks" since we last spoke  She said she was supposed to meet up for a movie date but the man didn't show; she ended up seeing a movie with her grandson, which was a lot of fun  Oletta Moritz continues to date around online and talked about coming across another man  She was able to meet up with him and was pleasantly surprised that he was "real" and they hit it off  Oletta Moritz feels like her guard is up because of the bad luck she's had with online dating but she wants to try  She said they spent the 4th of July together and had a good time  Oletta Moritz stated that although things are going well, she feels like there is something about him she can't "put [her] finger on"  She acknowledges that this might be her guard being up  Oletta Moritz went on to say that they continued to see each other but he kept acting "off"  He started blowing her off and she suspected he was seeing someone else  They ended up breaking it off  Oletta Moritz said she's been upset ever since  She is just taking it day by day and focusing on herself  Oletta Moritz shows evidence of utilizing effective communication skills skills to manage mental health symptoms  During this session, this clinical used the following therapeutic modalities engagement strategies, supportive psychotherapy and client-centered therapy  Assessment:  Oletta Moritz presents with a normal mood    her affect is normal range and intensity, appropriate  Manoj Palma was oriented x3  She was focused and engaged  Manoj Palma exhibits early engagement with this clinician  she continues to exhibit willingness to work on treatment goals and objectives  Manoj Palma presents with a minimal risk of suicide, minimal risk of self-harm and minimal of harm to others  Plan:  Manoj Palma will return in 2 weeks for the next scheduled session  Between sessions, she  will focus on herself and her grandson and will report back during the next session re: success and barriers  At the next session, this clinical will use engagement strategies, supportive psychotherapy and client-centered therapy to address her anxiety, in effort to assist Manoj Palma with meeting treatment goals  Behavioral Health Treatment Plan [facility]: Diagnosis and Treatment Plan explained to Merle Martinez relates understanding diagnosis and is agreeable to Treatment Plan  Yes     Virtual Regular Visit    Verification of patient location:    Patient is located in the following state in which I hold an active license [place]      Assessment/Plan:    Problem List Items Addressed This Visit        Other    ZAINA (generalized anxiety disorder) - Primary    Bipolar 2 disorder ([facility] [address] )          Goals addressed in session: Goal 1          Reason for visit is   Chief Complaint   Patient presents with    Virtual Regular Visit        Encounter provider Ned Aschoff, LCSW    Provider located at [address] [place] [id number] [phone]      Recent Visits  No visits were found meeting these conditions  Showing recent visits within past 7 days and meeting all other requirements  Future Appointments  No visits were found meeting these conditions  Showing future appointments within next 150 days and meeting all other requirements       The patient was identified by name and date of birth   Olga Fritz Gretchen Mays was informed that this is a telemedicine visit and that the visit is being conducted through[employer] [facility] and patient was informed that this is a secure, HIPAA-compliant platform  She agrees to proceed     My office door was closed  No one else was in the room  She acknowledged consent and understanding of privacy and security of the video platform  The patient has agreed to participate and understands they can discontinue the visit at any time  Patient is aware this is a billable service  Subjective  Kuldeep Reddy is a 49 y o  female  HPI     Past Medical History:   Diagnosis Date    Anxiety     Anxiety     Asthma     Bipolar disorder ([address] )     Carpal tunnel syndrome     Chronic pain     lower back    Depression     Disease of thyroid gland     Dyslipidemia     Fibromyalgia     Irritable bowel     Kidney stones     Kidney stones 2019    Migraine     Obesity (BMI 30 0-34  9)     Psychiatric disorder     depression/anxiety    Suicide attempt [facility])     as teen    Vitamin D deficiency        Past Surgical History:   Procedure Laterality Date    BUNIONECTOMY       SECTION      CHOLECYSTECTOMY      PARTIAL HYSTERECTOMY      TONSILLECTOMY      TUBAL LIGATION         Current Outpatient Medications   Medication Sig Dispense Refill    albuterol (2 5 mg/3 mL) 0 083 % nebulizer solution Take 1 vial (2 5 mg total) by nebulization every 6 (six) hours as needed for wheezing or shortness of breath 20 vial 0    azelastine (ASTELIN) 0 1 % nasal spray 1 spray into each nostril 2 (two) times a day Use in each nostril as directed 30 mL 0    Butalbital-APAP-Caffeine -40 MG CAPS TAKE 1 CAPSULE Daily PRN      cariprazine (Vraylar) 3 MG capsule Take 1 capsule (3 mg total) by mouth daily 30 capsule 1    celecoxib (CELEBREX) 200 mg capsule Take 1 capsule (200 mg total) by mouth daily 30 capsule 2    clonazePAM (KlonoPIN) 0 5 mg tablet Take 1 tablet (0 5 mg total) by mouth daily as needed for anxiety 30 tablet 1    colchicine (COLCRYS) 0 6 mg tablet Take 1 tablet (0 6 mg total) by mouth daily 30 tablet 5    divalproex sodium (DEPAKOTE) 250 mg EC tablet Take 250 mg by mouth daily      Erenumab-aooe (Aimovig) 140 MG/ML SOAJ Aimovig Autoinjector 140 mg/mL subcutaneous auto-injector      esomeprazole (NexIUM) 20 mg capsule Take 1 capsule (20 mg total) by mouth in the morning and 1 capsule (20 mg total) in the evening  Take before meals   60 capsule 0    famotidine (PEPCID) 40 MG tablet famotidine 40 mg tablet      fremanezumab-vfrm (Ajovy) 225 MG/1 5ML auto-injector Inject 225 mg under the skin every 30 (thirty) days      hydroxychloroquine (PLAQUENIL) 200 mg tablet       levothyroxine 200 mcg tablet       levothyroxine 25 mcg tablet Take 1 5 tablets (37 5 mcg total) by mouth daily 45 tablet 3    lidocaine (LIDODERM) 5 % Apply 1 patch topically daily as needed (back pain) Remove & Discard patch within 12 hours or as directed by MD 6 patch 1    loratadine (CLARITIN) 10 mg tablet Take 1 pill daily for allergies 90 tablet 1    meclizine (ANTIVERT) 25 mg tablet Take 1 tablet (25 mg total) by mouth every 8 (eight) hours as needed for dizziness 30 tablet 1    meloxicam (MOBIC) 7 5 mg tablet Daily      methocarbamol (ROBAXIN) 750 mg tablet       mometasone (ELOCON) 0 1 % cream Apply topically daily 45 g 0    naratriptan (AMERGE) 2 5 MG tablet       omeprazole (PriLOSEC) 40 MG capsule       ondansetron (Zofran ODT) 4 mg disintegrating tablet Take 1 tablet (4 mg total) by mouth every 6 (six) hours as needed for nausea or vomiting 20 tablet 0    phentermine 30 MG capsule Take 1 capsule (30 mg total) by mouth every morning 30 capsule 0    Respiratory Therapy Supplies (NEBULIZER) DONA by Does not apply route every 4 (four) hours while awake 90 each 1    Topiramate  MG CP24 Take 100 mg by mouth 2 (two) times a day      Ubrelvy 100 MG tablet       Ventolin  (90 Base) MCG/ACT inhaler INHALE TWO PUFFS EVERY 6 (SIX) HOURS AS NEEDED FOR WHEEZING OR SHORTNESS OF BREATH 18 g 1    XIFAXAN 550 MG tablet        No current facility-administered medications for this visit  Allergies   Allergen Reactions    Doxycycline Rash    Erythromycin Rash    Other Edema and Wheezing     jalapeno    Latex Rash    Duloxetine      Other reaction(s): Nausea, Loopy    Eletriptan      Pain in lower jaw with pressure in throat    Emgality [Galcanezumab-Gnlm]     Galcanezumab      rash       Review of Systems    Video Exam    There were no vitals filed for this visit  Physical Exam     I spent 53 minutes directly with the patient during this visit    VIRTUAL VISIT [address] [place] [address] verbally agrees to participate in [employer]  Pt is aware that [employer] could be limited without vital signs or the ability to perform a full hands-on physical exam  Belem Marrufo understands she or the provider may request at any time to terminate the video visit and request the patient to seek care or treatment in person

## 2022-07-08 ENCOUNTER — OFFICE VISIT (OUTPATIENT)
Dept: OBGYN CLINIC | Facility: CLINIC | Age: 52
End: 2022-07-08
Payer: MEDICARE

## 2022-07-08 VITALS — DIASTOLIC BLOOD PRESSURE: 78 MMHG | SYSTOLIC BLOOD PRESSURE: 124 MMHG

## 2022-07-08 DIAGNOSIS — N76.2 ACUTE VULVITIS: Primary | ICD-10-CM

## 2022-07-08 PROCEDURE — 99213 OFFICE O/P EST LOW 20 MIN: CPT | Performed by: PHYSICIAN ASSISTANT

## 2022-07-08 RX ORDER — IBUPROFEN 800 MG/1
TABLET ORAL
COMMUNITY
Start: 2022-06-13

## 2022-07-08 RX ORDER — DEXAMETHASONE 4 MG/1
TABLET ORAL
COMMUNITY
Start: 2022-06-14

## 2022-07-08 NOTE — PROGRESS NOTES
Bianka Chang  1970    S:  49 y o  female here for a problem visit  She complains of vulvar irritation/burning and itching near the clitoris; it burns and hurts when she wipes after urination, and soap also burns the area (Dove Sensitive Skin liquid)  She tried Vagisil which also burned  She denies vaginal discharge, itching or odor    She has a newer partner; he performed oral sex o her just prior to the onset of symptoms  She is s/p supracervical hysterectomy in 2011  Past Medical History:   Diagnosis Date    Anxiety     Anxiety     Asthma     B[facility] disorder ([facility] [address] )     Carpal tunnel syndrome     Chronic pain     lower back    Depression     Disease of thyroid gland     Dyslipidemia     Fibromyalgia     Irritable bowel     Kidney stones     Kidney stones 5/20/2019    Migraine     Obesity (BMI 30 0-34  5)     Psychiatric disorder     depression/anxiety    Suicide attempt [facility])     as teen    Vitamin D deficiency      Family History   Problem Relation Age of Onset    Hypertension Mother     Diabetes Mother     Hypothyroidism Mother     Stroke Mother     Bipolar disorder Mother     Anxiety disorder Mother     Arthritis Mother     Depression Mother     Mental illness Mother     Cancer Father         pancreatic     Hypothyroidism Sister     Hypertension Brother     Heart disease Brother     Cancer Maternal Uncle         lung    Cancer Paternal Aunt         breast    Breast cancer Paternal Aunt     Cancer Paternal Uncle         testicular     Cancer Paternal Grandmother         breast    Dementia Paternal Grandmother     Breast cancer Paternal Grandmother     Cancer Cousin         brain    Bipolar disorder Daughter      Social History     Socioeconomic History    Marital status: Single     Spouse name: None    Number of children: None    Years of education: None    Highest education level: None   Occupational History    Occupation: Disability   Tobacco Use    Smoking status: Never Smoker    Smokeless tobacco: Never Used   Vaping Use    Vaping Use: Never used   Substance and Sexual Activity    Alcohol use: No    Drug use: Never    Sexual activity: Not Currently     Partners: Male     Birth control/protection: Abstinence, Female Sterilization     Comment: Hysterectomy   Other Topics Concern    None   Social History Narrative    None     Social Determinants of Health     Financial Resource Strain: Not on file   Food Insecurity: Not on file   Transportation Needs: Not on file   Physical Activity: Not on file   Stress: Not on file   Social Connections: Not on file   Intimate Partner Violence: Not on file   Housing Stability: Not on file       Review of Systems   Respiratory: Negative  Cardiovascular: Negative  Gastrointestinal: Negative for constipation and diarrhea  Genitourinary: Negative for difficulty urinating, pelvic pain, vaginal bleeding, vaginal discharge, itching or odor  O:  /78 (BP Location: Right arm, Patient Position: Sitting, Cuff Size: Standard)   She appears well and is in no distress  Abdomen is soft and nontender  External genitals are normal without lesions or rashes  Some irritation at the clitoral hudson  Vagina is normal, no discharge or bleeding noted  Cervix is normal, no lesions or discharge  Uterus is surgically absent  Adnexa are nontender, no pelvic masses appreciated    A/P: Vulvitis  Recommended coconut oil topically PRN  Call in one week if not all better  Recommended lubricants as needed in the future

## 2022-07-11 ENCOUNTER — OFFICE VISIT (OUTPATIENT)
Dept: FAMILY MEDICINE CLINIC | Facility: CLINIC | Age: 52
End: 2022-07-11
Payer: MEDICARE

## 2022-07-11 VITALS
HEIGHT: 64 IN | BODY MASS INDEX: 26.05 KG/M2 | WEIGHT: 152.6 LBS | HEART RATE: 80 BPM | TEMPERATURE: 97 F | OXYGEN SATURATION: 98 % | DIASTOLIC BLOOD PRESSURE: 80 MMHG | RESPIRATION RATE: 14 BRPM | SYSTOLIC BLOOD PRESSURE: 102 MMHG

## 2022-07-11 DIAGNOSIS — F41.1 GAD (GENERALIZED ANXIETY DISORDER): ICD-10-CM

## 2022-07-11 DIAGNOSIS — R63.5 WEIGHT GAIN: Primary | ICD-10-CM

## 2022-07-11 PROCEDURE — 99214 OFFICE O/P EST MOD 30 MIN: CPT

## 2022-07-11 NOTE — PATIENT INSTRUCTIONS
Weight Management   AMBULATORY CARE:   Why it is important to manage your weight:  Being overweight increases your risk of health conditions such as heart disease, high blood pressure, type 2 diabetes, and certain types of cancer  It can also increase your risk for osteoarthritis, sleep apnea, and other respiratory problems  Aim for a slow, steady weight loss  Even a small amount of weight loss can lower your risk of health problems  Risks of being overweight:  Extra weight can cause many health problems, including the following:  Diabetes (high blood sugar level)    High blood pressure or high cholesterol    Heart disease    Stroke    Gallbladder or liver disease    Cancer of the colon, breast, prostate, liver, or kidney    Sleep apnea    Arthritis or gout    Screening  is done to check for health conditions before you have signs or symptoms  If you are 28to 79years old, your blood sugar level may be checked every 3 years for signs of prediabetes or diabetes  Your healthcare provider will check your blood pressure at each visit  High blood pressure can lead to a stroke or other problems  Your provider may check for signs of heart disease, cancer, or other health problems  How to lose weight safely:  A safe and healthy way to lose weight is to eat fewer calories and get regular exercise  You can lose up about 1 pound a week by decreasing the number of calories you eat by 500 calories each day  You can decrease calories by eating smaller portion sizes or by cutting out high-calorie foods  Read labels to find out how many calories are in the foods you eat  You can also burn calories with exercise such as walking, swimming, or biking  You will be more likely to keep weight off if you make these changes part of your lifestyle  Exercise at least 30 minutes per day on most days of the week   You can also fit in more physical activity by taking the stairs instead of the elevator or parking farther away from stores  Ask your healthcare provider about the best exercise plan for you  Healthy meal plan for weight management:  A healthy meal plan includes a variety of foods, contains fewer calories, and helps you stay healthy  A healthy meal plan includes the following:     Eat whole-grain foods more often  A healthy meal plan should contain fiber  Fiber is the part of grains, fruits, and vegetables that is not broken down by your body  Whole-grain foods are healthy and provide extra fiber in your diet  Some examples of whole-grain foods are whole-wheat breads and pastas, oatmeal, brown rice, and bulgur  Eat a variety of vegetables every day  Include dark, leafy greens such as spinach, kale, morena greens, and mustard greens  Eat yellow and orange vegetables such as carrots, sweet potatoes, and winter squash  Eat a variety of fruits every day  Choose fresh or canned fruit (canned in its own juice or light syrup) instead of juice  Fruit juice has very little or no fiber  Eat low-fat dairy foods  Drink fat-free (skim) milk or 1% milk  Eat fat-free yogurt and low-fat cottage cheese  Try low-fat cheeses such as mozzarella and other reduced-fat cheeses  Choose meat and other protein foods that are low in fat  Choose beans or other legumes such as split peas or lentils  Choose fish, skinless poultry (chicken or turkey), or lean cuts of red meat (beef or pork)  Before you cook meat or poultry, cut off any visible fat  Use less fat and oil  Try baking foods instead of frying them  Add less fat, such as margarine, sour cream, regular salad dressing and mayonnaise to foods  Eat fewer high-fat foods  Some examples of high-fat foods include french fries, doughnuts, ice cream, and cakes  Eat fewer sweets  Limit foods and drinks that are high in sugar  This includes candy, cookies, regular soda, and sweetened drinks  Ways to decrease calories:   Eat smaller portions       Use a small plate with smaller servings  Do not eat second helpings  When you eat at a restaurant, ask for a box and place half of your meal in the box before you eat  Share an entrée with someone else  Replace high-calorie snacks with healthy, low-calorie snacks  Choose fresh fruit, vegetables, fat-free rice cakes, or air-popped popcorn instead of potato chips, nuts, or chocolate  Choose water or calorie-free drinks instead of soda or sweetened drinks  Do not shop for groceries when you are hungry  You may be more likely to make unhealthy food choices  Take a grocery list of healthy foods and shop after you have eaten  Eat regular meals  Do not skip meals  Skipping meals can lead to overeating later in the day  This can make it harder for you to lose weight  Eat a healthy snack in place of a meal if you do not have time to eat a regular meal  Talk with a dietitian to help you create a meal plan and schedule that is right for you  Other things to consider as you try to lose weight:   Be aware of situations that may give you the urge to overeat, such as eating while watching television  Find ways to avoid these situations  For example, read a book, go for a walk, or do crafts  Meet with a weight loss support group or friends who are also trying to lose weight  This may help you stay motivated to continue working on your weight loss goals  © Copyright [employer] 2022 Information is for End User's use only and may not be sold, redistributed or otherwise used for commercial purposes  All illustrations and images included in CareNotes® are the copyrighted property of [employer]  or Jagdish Harper   The above information is an  only  It is not intended as medical advice for individual conditions or treatments  Talk to your doctor, nurse or pharmacist before following any medical regimen to see if it is safe and effective for you

## 2022-07-11 NOTE — PROGRESS NOTES
Assessment/Plan:       Problem List Items Addressed This Visit        Other    ZAINA (generalized anxiety disorder)     Worsened on phentermine  Will switch patient to sameglutide for weight loss           Weight gain - Primary     It 1200 calories a day  Increase exercise  Start sameglutide once a week for 4 weeks  Return in a month for follow-up           Relevant Medications    Semaglutide,0 25 or 0 5MG/DOS, 2 MG/1 5ML SOPN    BMI 26 0-26 9,adult     3 lb weight loss since last visit           Relevant Medications    Semaglutide,0 25 or 0 5MG/DOS, 2 MG/1 5ML SOPN            Subjective:      Patient ID: Rodney Mata is a 49 y o  female  Patient presents to the office in order to follow-up on her weight  She has been taking 30 mg of phentermine daily but had an episode of chest pain and feels as though her anxiety is getting worse  Stop phentermine  Will initiate semaglutide  Adverse reactions discussed  The following portions of the patient's history were reviewed and updated as appropriate:   Past Medical History:  She has a past medical history of Anxiety, Anxiety, Asthma, [facility] ([facility] ), Carpal tunnel syndrome, Chronic pain, Depression, Disease of thyroid gland, Dyslipidemia, Fibromyalgia, Irritable bowel, Kidney stones, Kidney stones (2019), Migraine, Obesity (BMI 30 0-34 9), Psychiatric disorder, Suicide attempt ([facility] ), and Vitamin D deficiency  ,  _______________________________________________________________________  Medical Problems:  does not have any pertinent problems on file ,  _______________________________________________________________________  Past Surgical History:   has a past surgical history that includes  section; Cholecystectomy; Tonsillectomy; Partial hysterectomy; Bunionectomy; and Tubal ligation  ,  _______________________________________________________________________  Family History:  family history includes Anxiety disorder in her mother; Arthritis in her mother; Bipolar disorder in her daughter and mother; Breast cancer in her paternal aunt and paternal grandmother; Cancer in her cousin, father, maternal uncle, paternal aunt, paternal grandmother, and paternal uncle; Dementia in her paternal grandmother; Depression in her mother; Diabetes in her mother; Heart disease in her brother; Hypertension in her brother and mother; Hypothyroidism in her mother and sister; Mental illness in her mother; Stroke in her mother ,  _______________________________________________________________________  Social History:   reports that she has never smoked  She has never used smokeless tobacco  She reports that she does not drink alcohol and does not use drugs  ,  _______________________________________________________________________  Allergies:  is allergic to doxycycline, erythromycin, other, latex, duloxetine, eletriptan, emgality [galcanezumab-gnlm], and galcanezumab     _______________________________________________________________________  Current Outpatient Medications   Medication Sig Dispense Refill    albuterol (2 5 mg/3 mL) 0 083 % nebulizer solution Take 1 vial (2 5 mg total) by nebulization every 6 (six) hours as needed for wheezing or shortness of breath 20 vial 0    azelastine (ASTELIN) 0 1 % nasal spray 1 spray into each nostril 2 (two) times a day Use in each nostril as directed 30 mL 0    Butalbital-APAP-Caffeine -40 MG CAPS TAKE 1 CAPSULE Daily PRN      cariprazine (Vraylar) 3 MG capsule Take 1 capsule (3 mg total) by mouth daily 30 capsule 1    celecoxib (CELEBREX) 200 mg capsule Take 1 capsule (200 mg total) by mouth daily 30 capsule 2    colchicine (COLCRYS) 0 6 mg tablet Take 1 tablet (0 6 mg total) by mouth daily 30 tablet 5    divalproex sodium (DEPAKOTE) 250 mg EC tablet Take 250 mg by mouth daily      famotidine (PEPCID) 40 MG tablet famotidine 40 mg tablet      fremanezumab-vfrm (Ajovy) 225 MG/1 5ML auto-injector Inject 225 mg under the skin every 30 (thirty) days      hydroxychloroquine (PLAQUENIL) 200 mg tablet       levothyroxine 200 mcg tablet       levothyroxine 25 mcg tablet Take 1 5 tablets (37 5 mcg total) by mouth daily 45 tablet 3    lidocaine (LIDODERM) 5 % Apply 1 patch topically daily as needed (back pain) Remove & Discard patch within 12 hours or as directed by MD Castro patch 1    loratadine (CLARITIN) 10 mg tablet Take 1 pill daily for allergies 90 tablet 1    meclizine (ANTIVERT) 25 mg tablet Take 1 tablet (25 mg total) by mouth every 8 (eight) hours as needed for dizziness 30 tablet 1    meloxicam (MOBIC) 7 5 mg tablet Daily      methocarbamol (ROBAXIN) 750 mg tablet       naratriptan (AMERGE) 2 5 MG tablet       Semaglutide,0 25 or 0 5MG/DOS, 2 MG/1 5ML SOPN Inject 0 25 mg under the skin once a week 1 5 mL 0    Topiramate  MG CP24 Take 100 mg by mouth 2 (two) times a day      Ubrelvy 100 MG tablet       Ventolin  (90 Base) MCG/ACT inhaler INHALE TWO PUFFS EVERY 6 (SIX) HOURS AS NEEDED FOR WHEEZING OR SHORTNESS OF BREATH 18 g 1    XIFAXAN 550 MG tablet       clonazePAM (KlonoPIN) 0 5 mg tablet Take 1 tablet (0 5 mg total) by mouth daily as needed for anxiety 30 tablet 1    dexamethasone (DECADRON) 4 mg tablet        MG tablet       mometasone (ELOCON) 0 1 % cream Apply topically daily 45 g 0    ondansetron (Zofran ODT) 4 mg disintegrating tablet Take 1 tablet (4 mg total) by mouth every 6 (six) hours as needed for nausea or vomiting 20 tablet 0    Respiratory Therapy Supplies (NEBULIZER) DONA by Does not apply route every 4 (four) hours while awake 90 each 1     No current facility-administered medications for this visit      _______________________________________________________________________  Review of Systems   Constitutional: Negative for chills and fever  HENT: Negative for ear pain and sore throat  Eyes: Negative for pain and visual disturbance     Respiratory: Negative for cough and shortness of breath  Cardiovascular: Negative for chest pain and palpitations  Gastrointestinal: Positive for abdominal pain (On antibiotic for IBS)  Negative for vomiting  Genitourinary: Negative for dysuria and hematuria  Musculoskeletal: Positive for arthralgias ( chronic)  Negative for back pain  Skin: Negative for color change and rash  Neurological: Negative for headaches  Psychiatric/Behavioral: The patient is nervous/anxious  All other systems reviewed and are negative  Objective:  Vitals:    07/11/22 1005   BP: 102/80   Pulse: 80   Resp: 14   Temp: (!) 97 °F (36 1 °C)   SpO2: 98%   Weight: 69 2 kg (152 lb 9 6 oz)   Height: 5' 3 5" (1 613 m)     Body mass index is 26 61 kg/m²  Physical Exam  Vitals and nursing note reviewed  Constitutional:       General: She is not in acute distress  Appearance: Normal appearance  She is not ill-appearing  HENT:      Head: Normocephalic  Right Ear: External ear normal       Left Ear: External ear normal       Nose: Nose normal       Mouth/Throat:      Mouth: Mucous membranes are moist    Eyes:      Conjunctiva/sclera: Conjunctivae normal    Cardiovascular:      Rate and Rhythm: Normal rate and regular rhythm  Pulses: Normal pulses  Heart sounds: Normal heart sounds  Pulmonary:      Effort: Pulmonary effort is normal  No respiratory distress  Breath sounds: Normal breath sounds  No wheezing  Abdominal:      General: Bowel sounds are normal       Palpations: Abdomen is soft  Musculoskeletal:         General: No swelling or tenderness  Normal range of motion  Cervical back: Normal range of motion  No tenderness  Right lower leg: No edema  Left lower leg: No edema  Lymphadenopathy:      Cervical: No cervical adenopathy  Skin:     General: Skin is warm and dry  Neurological:      Mental Status: She is alert and oriented to person, place, and time     Psychiatric:         Mood and Affect: Mood normal          Behavior: Behavior normal          Thought Content:  Thought content normal          Judgment: Judgment normal

## 2022-07-11 NOTE — ASSESSMENT & PLAN NOTE
It 1200 calories a day  Increase exercise  Start sameglutide once a week for 4 weeks    Return in a month for follow-up
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
## 2022-07-14 ENCOUNTER — TELEPHONE (OUTPATIENT)
Dept: PSYCHIATRY | Facility: CLINIC | Age: 52
End: 2022-07-14

## 2022-07-14 ENCOUNTER — TELEMEDICINE (OUTPATIENT)
Dept: PSYCHIATRY | Facility: CLINIC | Age: 52
End: 2022-07-14
Payer: MEDICARE

## 2022-07-14 DIAGNOSIS — F31.81 BIPOLAR 2 DISORDER (HCC): Primary | ICD-10-CM

## 2022-07-14 DIAGNOSIS — F31.81 BIPOLAR 2 DISORDER (HCC): ICD-10-CM

## 2022-07-14 DIAGNOSIS — E55.9 VITAMIN D DEFICIENCY: ICD-10-CM

## 2022-07-14 DIAGNOSIS — G47.09 OTHER INSOMNIA: ICD-10-CM

## 2022-07-14 DIAGNOSIS — F41.1 GAD (GENERALIZED ANXIETY DISORDER): Primary | ICD-10-CM

## 2022-07-14 DIAGNOSIS — F41.1 GAD (GENERALIZED ANXIETY DISORDER): ICD-10-CM

## 2022-07-14 PROCEDURE — 90834 PSYTX W PT 45 MINUTES: CPT

## 2022-07-14 PROCEDURE — 99214 OFFICE O/P EST MOD 30 MIN: CPT | Performed by: PHYSICIAN ASSISTANT

## 2022-07-14 RX ORDER — CLONAZEPAM 0.5 MG/1
0.5 TABLET ORAL DAILY PRN
Qty: 30 TABLET | Refills: 1 | Status: SHIPPED | OUTPATIENT
Start: 2022-07-14 | End: 2022-08-13

## 2022-07-14 NOTE — PSYCH
INDIVIDUAL SESSION NOTE     Length of time in session: 53 minutes, follow up in 1 week     Psychotherapy Provided: Individual Psychotherapy 53 minutes      Diagnosis ICD-10-CM Associated Orders   1  ZAINA (generalized anxiety disorder)  F41 1    2  Bipolar 2 disorder ([address] )  F31 81           Goals addressed in session: Goal 1     Pain:      none    0    Current suicide risk:  minimal    Data: Met with Arie Browne for scheduled individual session  Topics discussed included relationships with family and mood regulation and symptoms  Annalee Scales states she's "ok"; she said she received 2 tax bills for things for her house before she bought it  She pushed back and was told if they "reevaluate" these bills, her own taxes could be raised  Annalee Scales is upset about this because it feels like she loses either way  Annalee Scales then said her grandson has been throwing tantrums more often  He also had an allergic reaction (he's allergic to a lot of things outside/environmentally) and his face puffed up  She gave him benadryl and he still stayed puffy so she had to take him to the doctor  Annalee Scales said the benadryl seemed to make him irritable but whatever the doctor gave him seemed to work  He had a tantrum at this therapy meeting and she had to go inside and get him  Annalee Scales then said she's been contemplating to reach out to her her son; he's her oldest and she misses him  She said it was his choice to stop talking to her  When she was getting custody of her grandson, her son was encouraging of it  But here she found out he was "playing both sides" and talking to his sister (grandson's mother) and "pumping" her up  Her son was disrespectful to her and then his wife started to be disrespectful to Annalee Scales, which she was extremely upset by  Her son ended up saying Annalee Scales favors her grandson over every her other grandson (son's son) and stopped talking to her   Annalee Scales talked for a while about her kids and how they reacted negatively to her setting boundaries with them  Jose Murray shows evidence of utilizing effective communication skills skills to manage mental health symptoms  During this session, this clinical used the following therapeutic modalities engagement strategies, supportive psychotherapy and client-centered therapy  Clinician provided psychoeducation regarding use of setting healthy boundaries and preparing for push back but understanding you're taking care of yourself      Assessment:  Jose Murray presents with a normal mood  her affect is normal range and intensity, appropriate  Jose Murray was oriented x3  She was focused and engaged  Jose Murray exhibits good therapeutic rapport with this clinician  she continues to exhibit willingness to work on treatment goals and objectives  Jose Murray presents with a minimal risk of suicide, minimal risk of self-harm and minimal of harm to others  Plan:  Jose Murray will return in 1 week for the next scheduled session  Between sessions, she  will keep tending to her self care and her grandson's care and will report back during the next session re: success and barriers  At the next session, this clinical will use engagement strategies, supportive psychotherapy and client-centered therapy to address her anxiety, in effort to assist Jose Murray with meeting treatment goals  Behavioral Health Treatment Plan [facility]: Diagnosis and Treatment Plan explained to Debbie Gipson relates understanding diagnosis and is agreeable to Treatment Plan   Yes     Virtual Regular Visit    Verification of patient location:    Patient is located in the following state in which I hold an active license [place]      Assessment/Plan:    Problem List Items Addressed This Visit        Other    ZAINA (generalized anxiety disorder) - Primary    Bipolar 2 disorder ([facility] [address] )          Goals addressed in session: Goal 1          Reason for visit is   Chief Complaint   Patient presents with    Virtual Regular Visit        Encounter provider Amor Estrada WAYNE Bonds    Provider located at [address] [place] [address] [facility] [id number] [phone]      Recent Visits  Date Type Provider Dept   22 Telephone Marilyn Aquino LCSW [facility]   22 Telemedicine Marilyn Aquino LCSW [facility]   Showing recent visits within past 7 days and meeting all other requirements  Future Appointments  No visits were found meeting these conditions  Showing future appointments within next 150 days and meeting all other requirements       The patient was identified by name and date of birth  Vivien Barcenas was informed that this is a telemedicine visit and that the visit is being conducted throughCLEAR and patient was informed that this is a secure, HIPAA-compliant platform  She agrees to proceed     My office door was closed  No one else was in the room  She acknowledged consent and understanding of privacy and security of the video platform  The patient has agreed to participate and understands they can discontinue the visit at any time  Patient is aware this is a billable service  Subjective  Vivien Barcenas is a 49 y o  female  HPI     Past Medical History:   Diagnosis Date    Anxiety     Anxiety     Asthma     Bipolar disorder ([address] )     Carpal tunnel syndrome     Chronic pain     lower back    Depression     Disease of thyroid gland     Dyslipidemia     Fibromyalgia     Irritable bowel     Kidney stones     Kidney stones 2019    Migraine     Obesity (BMI 30 0-34  9)     Psychiatric disorder     depression/anxiety    Suicide attempt [facility])     as teen    Vitamin D deficiency        Past Surgical History:   Procedure Laterality Date    BUNIONECTOMY       SECTION      CHOLECYSTECTOMY      PARTIAL HYSTERECTOMY      TONSILLECTOMY      TUBAL LIGATION         Current Outpatient Medications   Medication Sig Dispense Refill    albuterol (2 5 mg/3 mL) 0 083 % nebulizer solution Take 1 vial (2 5 mg total) by nebulization every 6 (six) hours as needed for wheezing or shortness of breath 20 vial 0    azelastine (ASTELIN) 0 1 % nasal spray 1 spray into each nostril 2 (two) times a day Use in each nostril as directed 30 mL 0    Butalbital-APAP-Caffeine -40 MG CAPS TAKE 1 CAPSULE Daily PRN      cariprazine (Vraylar) 3 MG capsule Take 1 capsule (3 mg total) by mouth daily 30 capsule 1    celecoxib (CELEBREX) 200 mg capsule Take 1 capsule (200 mg total) by mouth daily 30 capsule 2    clonazePAM (KlonoPIN) 0 5 mg tablet Take 1 tablet (0 5 mg total) by mouth daily as needed for anxiety 30 tablet 1    colchicine (COLCRYS) 0 6 mg tablet Take 1 tablet (0 6 mg total) by mouth daily 30 tablet 5    dexamethasone (DECADRON) 4 mg tablet       divalproex sodium (DEPAKOTE) 250 mg EC tablet Take 250 mg by mouth daily      famotidine (PEPCID) 40 MG tablet famotidine 40 mg tablet      fremanezumab-vfrm (Ajovy) 225 MG/1 5ML auto-injector Inject 225 mg under the skin every 30 (thirty) days      hydroxychloroquine (PLAQUENIL) 200 mg tablet        MG tablet       levothyroxine 200 mcg tablet       levothyroxine 25 mcg tablet Take 1 5 tablets (37 5 mcg total) by mouth daily 45 tablet 3    lidocaine (LIDODERM) 5 % Apply 1 patch topically daily as needed (back pain) Remove & Discard patch within 12 hours or as directed by MD 6 patch 1    loratadine (CLARITIN) 10 mg tablet Take 1 pill daily for allergies 90 tablet 1    meclizine (ANTIVERT) 25 mg tablet Take 1 tablet (25 mg total) by mouth every 8 (eight) hours as needed for dizziness 30 tablet 1    meloxicam (MOBIC) 7 5 mg tablet Daily      methocarbamol (ROBAXIN) 750 mg tablet       mometasone (ELOCON) 0 1 % cream Apply topically daily 45 g 0    naratriptan (AMERGE) 2 5 MG tablet       ondansetron (Zofran ODT) 4 mg disintegrating tablet Take 1 tablet (4 mg total) by mouth every 6 (six) hours as needed for nausea or vomiting 20 tablet 0    Respiratory Therapy Supplies (NEBULIZER) DONA by Does not apply route every 4 (four) hours while awake 90 each 1    Semaglutide,0 25 or 0 5MG/DOS, 2 MG/1 5ML SOPN Inject 0 25 mg under the skin once a week 1 5 mL 0    Topiramate  MG CP24 Take 100 mg by mouth 2 (two) times a day      Ubrelvy 100 MG tablet       Ventolin  (90 Base) MCG/ACT inhaler INHALE TWO PUFFS EVERY 6 (SIX) HOURS AS NEEDED FOR WHEEZING OR SHORTNESS OF BREATH 18 g 1    XIFAXAN 550 MG tablet        No current facility-administered medications for this visit  Allergies   Allergen Reactions    Doxycycline Rash    Erythromycin Rash    Other Edema and Wheezing     jalapeno    Latex Rash    Duloxetine      Other reaction(s): Nausea, Loopy    Eletriptan      Pain in lower jaw with pressure in throat    Emgality [Galcanezumab-Gnlm]     Galcanezumab      rash       Review of Systems    Video Exam    There were no vitals filed for this visit  Physical Exam     I spent 53 minutes directly with the patient during this visit    VIRTUAL VISIT Radha Olivares verbally agrees to participate in [address] [place] [address]  Pt is aware that [address] [place] [address] could be limited without vital signs or the ability to perform a full hands-on physical exam  Belem Wellington understands she or the provider may request at any time to terminate the video visit and request the patient to seek care or treatment in person

## 2022-07-14 NOTE — TELEPHONE ENCOUNTER
Per Bess Pete PA-C's follow up instructions from 7/14/22     " Return 4-8 weeks, the sooner available appt, for Medication review  "    Please contact patient to schedule follow up appt      Thank you

## 2022-07-21 ENCOUNTER — TELEMEDICINE (OUTPATIENT)
Dept: PSYCHIATRY | Facility: CLINIC | Age: 52
End: 2022-07-21
Payer: MEDICARE

## 2022-07-21 DIAGNOSIS — F41.1 GAD (GENERALIZED ANXIETY DISORDER): Primary | ICD-10-CM

## 2022-07-21 DIAGNOSIS — F31.81 BIPOLAR 2 DISORDER (HCC): ICD-10-CM

## 2022-07-21 PROCEDURE — 90834 PSYTX W PT 45 MINUTES: CPT

## 2022-07-21 NOTE — PSYCH
INDIVIDUAL SESSION NOTE     Length of time in session: 53 minutes, follow up in 1 week     Psychotherapy Provided: Individual      Diagnosis ICD-10-CM Associated Orders   1  ZAINA (generalized anxiety disorder)  F41 1    2  Bipolar 2 disorder ([facility] [address] )  F31 81           Goals addressed in session: Goal 1     Pain:      none    0    Current suicide risk:  minimal    Data: Met with Arie Browne for a scheduled individual session  Topics discussed included emotional wellness, relationships with family, physical health concerns and grief and loss  Annalee Cloud is feeling "ok" but says "I wish I had a cord to unplug my brain so I can stop thinking for a while"  She states she's been thinking about her mother (who has passed) and just wishes she could talk to her  Her IBS is acting up and she's not been able to eat much  Annalee Cloud said she went on a date with a new man; he was a complete gentleman and she said she had a really good time  She stated that was a nice change  Annalee Cloud feels a lot of anger about her mother passes; she feels that an autopsy should have been done to determine whether it was suicide or natural causes  Her mother was prone to depression and made statements about not wanting to live often  Annalee Cloud feels her brother was neglectful of their mother and she thinks that is why an autopsy wasn't done  Additionally, she thinks her mother wasn't following a  proper diet or taking her medications the way she should have been  Her brother lived 10 minutes away from their mother and Annalee Cloud was 1 5 hours away  Her ashes were to be spread at the beach but Belem's brother cancelled the ceremony that they weren't going to do it  Annalee Cloud found out later he did spread 1/2 of the ashes at the beach, without any family there, and kept the other half  Annalee Cloud is also under the impression that her son and his wife may have had another child and no one told her   Her younger daughter video called her a few days ago and while they were talking, she made reference to Belem's tattoo that represents her 3 grandsons  She mentioned about "what if" she had another grandchild, what would she do about her tattoo  Irma Lakhani questioned her but her daughter wouldn't say anything else  Irma Lakhani can't find her son's social media so she assumes he blocked her but she now thinks he may have another child  This bothers her but she also feels like there isn't much she can do if he doesn't want to communicate with her  Irma Lakhani shows evidence of utilizing distress tolerance skills and effective communication skills to manage mental health symptoms  During this session, this clinical used the following therapeutic modalities supportive psychotherapy, client-centered therapy and problem solving skills  Assessment:  Irma Lakhani presents with a dysphoric mood  her affect is tearful  Irma Lakhani was oriented x3  She was focused and engaged  Irma Lakhani exhibits good therapeutic rapport with this clinician  she continues to exhibit willingness to work on treatment goals and objectives  Irma Lakhani presents with a minimal risk of suicide, minimal risk of self-harm and minimal of harm to others  Plan:  Irma Lakhani will return in 1 week for the next scheduled session  Between sessions, she  will continue to mourn her mother and let herself feel her emotions and will report back during the next session re: success and barriers  At the next session, this clinical will use supportive psychotherapy and client-centered therapy to address her anxiety, in an effort to assist Irma Lakhani with meeting treatment goals  Behavioral Health Treatment Plan [facility]: Diagnosis and Treatment Plan explained to Juan Francisco Caro relates understanding diagnosis and is agreeable to Treatment Plan   Yes     Virtual Regular Visit    Verification of patient location:    Patient is located in the following state in which I hold an active license [place]      Assessment/Plan:    Problem List Items Addressed This Visit Other    ZAINA (generalized anxiety disorder) - Primary    Bipolar 2 disorder ([facility] [address] )          Goals addressed in session: Goal 1          Reason for visit is   Chief Complaint   Patient presents with    Virtual Regular Visit        Encounter provider Sammy Shepherd LCSW    Provider located at [address] [place] [id number] [phone]      Recent Visits  Date Type Provider Dept   07/14/22 Telemedicine Sammy Shepherd LCSW [facility]   Showing recent visits within past 7 days and meeting all other requirements  Future Appointments  No visits were found meeting these conditions  Showing future appointments within next 150 days and meeting all other requirements       The patient was identified by name and date of birth  Tomi Frankel was informed that this is a telemedicine visit and that the visit is being conducted throughLAVEGOic Embedded and patient was informed this is a secure, HIPAA-complaint platform  She agrees to proceed     My office door was closed  No one else was in the room  She acknowledged consent and understanding of privacy and security of the video platform  The patient has agreed to participate and understands they can discontinue the visit at any time  Patient is aware this is a billable service  Subjective  Tomi Frankel is a 49 y o  female  HPI     Past Medical History:   Diagnosis Date    Anxiety     Anxiety     Asthma     Bipolar disorder ([facility] [address] )     Carpal tunnel syndrome     Chronic pain     lower back    Depression     Disease of thyroid gland     Dyslipidemia     Fibromyalgia     Irritable bowel     Kidney stones     Kidney stones 5/20/2019    Migraine     Obesity (BMI 30 0-34  9)     Psychiatric disorder     depression/anxiety    Suicide attempt [facility])     as teen    Vitamin D deficiency        Past Surgical History: Procedure Laterality Date    BUNIONECTOMY       SECTION      CHOLECYSTECTOMY      PARTIAL HYSTERECTOMY      TONSILLECTOMY      TUBAL LIGATION         Current Outpatient Medications   Medication Sig Dispense Refill    albuterol (2 5 mg/3 mL) 0 083 % nebulizer solution Take 1 vial (2 5 mg total) by nebulization every 6 (six) hours as needed for wheezing or shortness of breath 20 vial 0    azelastine (ASTELIN) 0 1 % nasal spray 1 spray into each nostril 2 (two) times a day Use in each nostril as directed 30 mL 0    Butalbital-APAP-Caffeine -40 MG CAPS TAKE 1 CAPSULE Daily PRN      cariprazine (Vraylar) 4 5 MG capsule Take 1 capsule (4 5 mg total) by mouth daily 30 capsule 1    celecoxib (CELEBREX) 200 mg capsule Take 1 capsule (200 mg total) by mouth daily 30 capsule 2    clonazePAM (KlonoPIN) 0 5 mg tablet Take 1 tablet (0 5 mg total) by mouth daily as needed for anxiety 30 tablet 1    colchicine (COLCRYS) 0 6 mg tablet Take 1 tablet (0 6 mg total) by mouth daily 30 tablet 5    dexamethasone (DECADRON) 4 mg tablet       divalproex sodium (DEPAKOTE) 250 mg EC tablet Take 250 mg by mouth daily      famotidine (PEPCID) 40 MG tablet famotidine 40 mg tablet      fremanezumab-vfrm (Ajovy) 225 MG/1 5ML auto-injector Inject 225 mg under the skin every 30 (thirty) days      hydroxychloroquine (PLAQUENIL) 200 mg tablet        MG tablet       levothyroxine 200 mcg tablet       levothyroxine 25 mcg tablet Take 1 5 tablets (37 5 mcg total) by mouth daily 45 tablet 3    lidocaine (LIDODERM) 5 % Apply 1 patch topically daily as needed (back pain) Remove & Discard patch within 12 hours or as directed by MD 6 patch 1    loratadine (CLARITIN) 10 mg tablet Take 1 pill daily for allergies 90 tablet 1    meclizine (ANTIVERT) 25 mg tablet Take 1 tablet (25 mg total) by mouth every 8 (eight) hours as needed for dizziness 30 tablet 1    meloxicam (MOBIC) 7 5 mg tablet Daily      methocarbamol (ROBAXIN) 750 mg tablet       mometasone (ELOCON) 0 1 % cream Apply topically daily 45 g 0    naratriptan (AMERGE) 2 5 MG tablet       ondansetron (Zofran ODT) 4 mg disintegrating tablet Take 1 tablet (4 mg total) by mouth every 6 (six) hours as needed for nausea or vomiting 20 tablet 0    Respiratory Therapy Supplies (NEBULIZER) DONA by Does not apply route every 4 (four) hours while awake 90 each 1    Semaglutide,0 25 or 0 5MG/DOS, 2 MG/1 5ML SOPN Inject 0 25 mg under the skin once a week 1 5 mL 0    Topiramate  MG CP24 Take 100 mg by mouth 2 (two) times a day      Ubrelvy 100 MG tablet       Ventolin  (90 Base) MCG/ACT inhaler INHALE TWO PUFFS EVERY 6 (SIX) HOURS AS NEEDED FOR WHEEZING OR SHORTNESS OF BREATH 18 g 1    XIFAXAN 550 MG tablet        No current facility-administered medications for this visit  Allergies   Allergen Reactions    Doxycycline Rash    Erythromycin Rash    Other Edema and Wheezing     jalapeno    Latex Rash    Duloxetine      Other reaction(s): Nausea, Loopy    Eletriptan      Pain in lower jaw with pressure in throat    Emgality [Galcanezumab-Gnlm]     Galcanezumab      rash       Review of Systems    Video Exam    There were no vitals filed for this visit  Physical Exam     I spent 53 minutes directly with the patient during this visit    VIRTUAL VISIT Radha [facility] verbally agrees to participate in [employer]  Pt is aware that [employer] could be limited without vital signs or the ability to perform a full hands-on physical exam  Belem Ramon understands she or the provider may request at any time to terminate the video visit and request the patient to seek care or treatment in person

## 2022-07-25 ENCOUNTER — TELEPHONE (OUTPATIENT)
Dept: PSYCHIATRY | Facility: CLINIC | Age: 52
End: 2022-07-25

## 2022-07-25 NOTE — TELEPHONE ENCOUNTER
Called and lvm for patient to call office to schedule 4-8 week follow up from last completed appt  Please schedule patient if she calls the office

## 2022-07-28 ENCOUNTER — TELEPHONE (OUTPATIENT)
Dept: PSYCHIATRY | Facility: CLINIC | Age: 52
End: 2022-07-28

## 2022-07-28 ENCOUNTER — TELEMEDICINE (OUTPATIENT)
Dept: PSYCHIATRY | Facility: CLINIC | Age: 52
End: 2022-07-28

## 2022-07-28 DIAGNOSIS — F31.81 BIPOLAR 2 DISORDER (HCC): ICD-10-CM

## 2022-07-28 DIAGNOSIS — F41.1 GAD (GENERALIZED ANXIETY DISORDER): Primary | ICD-10-CM

## 2022-08-04 ENCOUNTER — TELEMEDICINE (OUTPATIENT)
Dept: PSYCHIATRY | Facility: CLINIC | Age: 52
End: 2022-08-04
Payer: MEDICARE

## 2022-08-04 DIAGNOSIS — F31.81 BIPOLAR 2 DISORDER (HCC): ICD-10-CM

## 2022-08-04 DIAGNOSIS — F41.1 GAD (GENERALIZED ANXIETY DISORDER): Primary | ICD-10-CM

## 2022-08-04 PROCEDURE — 90834 PSYTX W PT 45 MINUTES: CPT

## 2022-08-04 NOTE — PSYCH
INDIVIDUAL SESSION NOTE     Length of time in session: 53 minutes, follow up in 1 week     Psychotherapy Provided: Individual      Diagnosis ICD-10-CM Associated Orders   1  ZAINA (generalized anxiety disorder)  F41 1    2  Bipolar 2 disorder ([address] )  F31 81           Goals addressed in session: Goal 1     Pain:      none    0    Current suicide risk:  minimal    Data: Met with Clara Crisostomo for a scheduled individual session  Topics discussed included emotional wellness and family stressors  Jimbo Cat is feeling "ok" but she's really been struggling with her migraines  Some of her meds have been changed so she's hoping that helps  Jimbo Cat states she's going through menopause so when migraines come on, she feels in really bad shape  She said this makes her feel irritable and have a quick temper  Jimbo Cat stated she recently reached out to her kids via text and her son responded  They ended up talking for a while and Jimbo Cat was really glad to hear from him and get caught up with him  Her son told her that her daughter had been staying with him and it wasn't going well  He finally asked her and her boyfriend to leave, which they did  Josigavin Cat said her daughter tends to be involved in "a lot of drama" as she struggles with her interpersonal relationships  Tainaverónica Cat said her daughter tends to be very nice to people and "put them up on a pedestal" and then talks bad about them  Belem's daughter had asked her if she could live with her but Jimbo Cat told her no because it's not just her daughter and because her daughter takes advantage of others  Jimbo Cat has worked hard to create a calm and peaceful atmosphere in her home, with her grandson  They stick to a routine and it works well for both of them  She stated she's learned from past experiences that her daughter can disrupt all of that and she can't risk it this time     Jimbo Cat shows evidence of utilizing effective communication skills and emotion regulation skills to manage mental health symptoms  During this session, this clinical used the following therapeutic modalities supportive psychotherapy, client-centered therapy and stress management skills  Assessment:  Jose Murray presents with a normal mood  her affect is normal range and intensity, appropriate  Jose Murray was oriented x3  She was focused and engaged  Jose Murray exhibits good therapeutic rapport with this clinician  she continues to exhibit willingness to work on treatment goals and objectives  Jose Murray presents with a minimal risk of suicide, minimal risk of self-harm and minimal of harm to others  Plan:  Jose Murray will return in 1 week for the next scheduled session  Between sessions, she  will be aware of the things she can and cannot control with her kids and do her best to avoid conflict and will report back during the next session re: success and barriers  At the next session, this clinical will use supportive psychotherapy, client-centered therapy, stress management skills, CBT techniques and problem solving skills to address her anxiety, in an effort to assist Jose Murray with meeting treatment goals  Behavioral Health Treatment Plan [facility]: Diagnosis and Treatment Plan explained to Debbie Gipson relates understanding diagnosis and is agreeable to Treatment Plan   Yes     Virtual Regular Visit    Verification of patient location:    Patient is located in the following state in which I hold an active license [place]      Assessment/Plan:    Problem List Items Addressed This Visit        Other    ZAINA (generalized anxiety disorder) - Primary    Bipolar 2 disorder ([facility] [address] )          Goals addressed in session: Goal 1          Reason for visit is   Chief Complaint   Patient presents with    Virtual Regular Visit        Encounter provider Reggie Luong LCSW    Provider located at [address] [place] [address] Sharri  [place] [id number] [phone]      Recent Visits  Date Type Provider Dept   22 Telephone Lawson Webber LCSW [facility]   Showing recent visits within past 7 days and meeting all other requirements  Future Appointments  No visits were found meeting these conditions  Showing future appointments within next 150 days and meeting all other requirements       The patient was identified by name and date of birth  Bhakti Dawn was informed that this is a telemedicine visit and that the visit is being conducted throughEpic Embedded and patient was informed this is a secure, HIPAA-complaint platform  She agrees to proceed     My office door was closed  No one else was in the room  She acknowledged consent and understanding of privacy and security of the video platform  The patient has agreed to participate and understands they can discontinue the visit at any time  Patient is aware this is a billable service  Subjective  Bhakti Dawn is a 49 y o  female  HPI     Past Medical History:   Diagnosis Date    Anxiety     Anxiety     Asthma     Bipolar disorder ([facility] [address] )     Carpal tunnel syndrome     Chronic pain     lower back    Depression     Disease of thyroid gland     Dyslipidemia     Fibromyalgia     Irritable bowel     Kidney stones     Kidney stones 2019    Migraine     Obesity (BMI 30 0-34  9)     Psychiatric disorder     depression/anxiety    Suicide attempt [facility])     as teen    Vitamin D deficiency        Past Surgical History:   Procedure Laterality Date    BUNIONECTOMY       SECTION      CHOLECYSTECTOMY      PARTIAL HYSTERECTOMY      TONSILLECTOMY      TUBAL LIGATION         Current Outpatient Medications   Medication Sig Dispense Refill    albuterol (2 5 mg/3 mL) 0 083 % nebulizer solution Take 1 vial (2 5 mg total) by nebulization every 6 (six) hours as needed for wheezing or shortness of breath 20 vial 0    azelastine (ASTELIN) 0 1 % nasal spray 1 spray into each nostril 2 (two) times a day Use in each nostril as directed 30 mL 0    Butalbital-APAP-Caffeine -40 MG CAPS TAKE 1 CAPSULE Daily PRN      cariprazine (Vraylar) 4 5 MG capsule Take 1 capsule (4 5 mg total) by mouth daily 30 capsule 1    celecoxib (CELEBREX) 200 mg capsule Take 1 capsule (200 mg total) by mouth daily 30 capsule 2    clonazePAM (KlonoPIN) 0 5 mg tablet Take 1 tablet (0 5 mg total) by mouth daily as needed for anxiety 30 tablet 1    colchicine (COLCRYS) 0 6 mg tablet Take 1 tablet (0 6 mg total) by mouth daily 30 tablet 5    dexamethasone (DECADRON) 4 mg tablet       divalproex sodium (DEPAKOTE) 250 mg EC tablet Take 250 mg by mouth daily      famotidine (PEPCID) 40 MG tablet famotidine 40 mg tablet      fremanezumab-vfrm (Ajovy) 225 MG/1 5ML auto-injector Inject 225 mg under the skin every 30 (thirty) days      hydroxychloroquine (PLAQUENIL) 200 mg tablet        MG tablet       levothyroxine 200 mcg tablet       levothyroxine 25 mcg tablet Take 1 5 tablets (37 5 mcg total) by mouth daily 45 tablet 3    lidocaine (LIDODERM) 5 % Apply 1 patch topically daily as needed (back pain) Remove & Discard patch within 12 hours or as directed by MD 6 patch 1    loratadine (CLARITIN) 10 mg tablet Take 1 pill daily for allergies 90 tablet 1    meclizine (ANTIVERT) 25 mg tablet Take 1 tablet (25 mg total) by mouth every 8 (eight) hours as needed for dizziness 30 tablet 1    meloxicam (MOBIC) 7 5 mg tablet Daily      methocarbamol (ROBAXIN) 750 mg tablet       mometasone (ELOCON) 0 1 % cream Apply topically daily 45 g 0    naratriptan (AMERGE) 2 5 MG tablet       ondansetron (Zofran ODT) 4 mg disintegrating tablet Take 1 tablet (4 mg total) by mouth every 6 (six) hours as needed for nausea or vomiting 20 tablet 0    Respiratory Therapy Supplies (NEBULIZER) DONA by Does not apply route every 4 (four) hours while awake 90 each 1    Semaglutide,0 25 or 0 5MG/DOS, 2 MG/1 5ML SOPN Inject 0 25 mg under the skin once a week 1 5 mL 0    Topiramate  MG CP24 Take 100 mg by mouth 2 (two) times a day      Ubrelvy 100 MG tablet       Ventolin  (90 Base) MCG/ACT inhaler INHALE TWO PUFFS EVERY 6 (SIX) HOURS AS NEEDED FOR WHEEZING OR SHORTNESS OF BREATH 18 g 1    XIFAXAN 550 MG tablet        No current facility-administered medications for this visit  Allergies   Allergen Reactions    Doxycycline Rash    Erythromycin Rash    Other Edema and Wheezing     jalapeno    Latex Rash    Duloxetine      Other reaction(s): Nausea, Loopy    Eletriptan      Pain in lower jaw with pressure in throat    Emgality [Galcanezumab-Gnlm]     Galcanezumab      rash       Review of Systems    Video Exam    There were no vitals filed for this visit  Physical Exam     I spent 53 minutes directly with the patient during this visit    VIRTUAL VISIT [address] [place] [address] verbally agrees to participate in [employer]  Pt is aware that [employer] could be limited without vital signs or the ability to perform a full hands-on physical exam  Belem Wellington understands she or the provider may request at any time to terminate the video visit and request the patient to seek care or treatment in person

## 2022-08-08 RX ORDER — OLANZAPINE 5 MG/1
TABLET ORAL
COMMUNITY
Start: 2022-08-03 | End: 2023-01-20

## 2022-08-08 RX ORDER — DIVALPROEX SODIUM 250 MG/1
TABLET, EXTENDED RELEASE ORAL
COMMUNITY
Start: 2022-08-03

## 2022-08-08 RX ORDER — MELOXICAM 15 MG/1
TABLET ORAL
COMMUNITY
Start: 2022-07-19 | End: 2023-02-01 | Stop reason: SDUPTHER

## 2022-08-09 ENCOUNTER — OFFICE VISIT (OUTPATIENT)
Dept: FAMILY MEDICINE CLINIC | Facility: CLINIC | Age: 52
End: 2022-08-09
Payer: MEDICARE

## 2022-08-09 VITALS
HEART RATE: 90 BPM | BODY MASS INDEX: 25.95 KG/M2 | RESPIRATION RATE: 12 BRPM | OXYGEN SATURATION: 99 % | HEIGHT: 64 IN | TEMPERATURE: 96.5 F | WEIGHT: 152 LBS | SYSTOLIC BLOOD PRESSURE: 120 MMHG | DIASTOLIC BLOOD PRESSURE: 70 MMHG

## 2022-08-09 DIAGNOSIS — R63.5 WEIGHT GAIN: Primary | ICD-10-CM

## 2022-08-09 DIAGNOSIS — R42 VERTIGO: ICD-10-CM

## 2022-08-09 DIAGNOSIS — G47.09 OTHER INSOMNIA: ICD-10-CM

## 2022-08-09 PROCEDURE — 99214 OFFICE O/P EST MOD 30 MIN: CPT

## 2022-08-09 NOTE — ASSESSMENT & PLAN NOTE
No recent weight loss, start daily calorie counting and do not exceed 1200 calories a day  Exercise at least 3 times a week    Start taking 0 5 mg of semeglutide weekly

## 2022-08-09 NOTE — PROGRESS NOTES
Assessment/Plan:         Problem List Items Addressed This Visit        Other    Vertigo     Episode last , now much improved         Insomnia     On and off, continue to take medication as prescribed         Weight gain - Primary     No recent weight loss, start daily calorie counting and do not exceed 1200 calories a day  Exercise at least 3 times a week  Start taking 0 5 mg of semeglutide weekly         Relevant Medications    Semaglutide,0 25 or 0 5MG/DOS, 2 MG/1 5ML SOPN    BMI 26 0-26 9,adult     Continue to take 0 5 mg of semaglutide weekly         Relevant Medications    Semaglutide,0 25 or 0 5MG/DOS, 2 MG/1 5ML SOPN            Subjective:      Patient ID: Arie Browne is a 49 y o  female  Patient presents to the office in order to follow-up on her weight last   We recently started her on 0 25 mg of semaglutide weekly  She has been tolerating the medication well and denies any adverse effect  Will go up to 0 5 mg weekly  Follow-up in 4 weeks      The following portions of the patient's history were reviewed and updated as appropriate:   Past Medical History:  She has a past medical history of Anxiety, Anxiety, Asthma, Bipolar disorder ([facility] [address] ), Carpal tunnel syndrome, Chronic pain, Depression, Disease of thyroid gland, Dyslipidemia, Fibromyalgia, Irritable bowel, Kidney stones, Kidney stones (2019), Migraine, Obesity (BMI 30 0-34 9), Psychiatric disorder, Suicide attempt ([address] ), and Vitamin D deficiency  ,  _______________________________________________________________________  Medical Problems:  does not have any pertinent problems on file ,  _______________________________________________________________________  Past Surgical History:   has a past surgical history that includes  section; Cholecystectomy; Tonsillectomy; Partial hysterectomy; Bunionectomy; and Tubal ligation  ,  _______________________________________________________________________  Family History:  family history includes Anxiety disorder in her mother; Arthritis in her mother; Bipolar disorder in her daughter and mother; Breast cancer in her paternal aunt and paternal grandmother; Cancer in her cousin, father, maternal uncle, paternal aunt, paternal grandmother, and paternal uncle; Dementia in her paternal grandmother; Depression in her mother; Diabetes in her mother; Heart disease in her brother; Hypertension in her brother and mother; Hypothyroidism in her mother and sister; Mental illness in her mother; Stroke in her mother ,  _______________________________________________________________________  Social History:   reports that she has never smoked  She has never used smokeless tobacco  She reports that she does not drink alcohol and does not use drugs  ,  _______________________________________________________________________  Allergies:  is allergic to doxycycline, erythromycin, other, latex, duloxetine, eletriptan, emgality [galcanezumab-gnlm], and galcanezumab     _______________________________________________________________________  Current Outpatient Medications   Medication Sig Dispense Refill    albuterol (2 5 mg/3 mL) 0 083 % nebulizer solution Take 1 vial (2 5 mg total) by nebulization every 6 (six) hours as needed for wheezing or shortness of breath 20 vial 0    azelastine (ASTELIN) 0 1 % nasal spray 1 spray into each nostril 2 (two) times a day Use in each nostril as directed 30 mL 0    Butalbital-APAP-Caffeine -40 MG CAPS TAKE 1 CAPSULE Daily PRN      cariprazine (Vraylar) 4 5 MG capsule Take 1 capsule (4 5 mg total) by mouth daily 30 capsule 1    celecoxib (CELEBREX) 200 mg capsule Take 1 capsule (200 mg total) by mouth daily 30 capsule 2    clonazePAM (KlonoPIN) 0 5 mg tablet Take 1 tablet (0 5 mg total) by mouth daily as needed for anxiety 30 tablet 1    colchicine (COLCRYS) 0 6 mg tablet Take 1 tablet (0 6 mg total) by mouth daily 30 tablet 5    divalproex sodium (DEPAKOTE ER) 250 mg 24 hr tablet       divalproex sodium (DEPAKOTE) 250 mg EC tablet Take 250 mg by mouth daily      famotidine (PEPCID) 40 MG tablet famotidine 40 mg tablet      fremanezumab-vfrm (Ajovy) 225 MG/1 5ML auto-injector Inject 225 mg under the skin every 30 (thirty) days      hydroxychloroquine (PLAQUENIL) 200 mg tablet        MG tablet       levothyroxine 200 mcg tablet       levothyroxine 25 mcg tablet Take 1 5 tablets (37 5 mcg total) by mouth daily 45 tablet 3    loratadine (CLARITIN) 10 mg tablet Take 1 pill daily for allergies 90 tablet 1    meclizine (ANTIVERT) 25 mg tablet Take 1 tablet (25 mg total) by mouth every 8 (eight) hours as needed for dizziness 30 tablet 1    naratriptan (AMERGE) 2 5 MG tablet       OLANZapine (ZyPREXA) 5 mg tablet       Semaglutide,0 25 or 0 5MG/DOS, 2 MG/1 5ML SOPN Inject 0 5 mg under the skin once a week 3 mL 0    Topiramate  MG CP24 Take 100 mg by mouth 2 (two) times a day      Ubrelvy 100 MG tablet       Ventolin  (90 Base) MCG/ACT inhaler INHALE TWO PUFFS EVERY 6 (SIX) HOURS AS NEEDED FOR WHEEZING OR SHORTNESS OF BREATH 18 g 1    XIFAXAN 550 MG tablet       dexamethasone (DECADRON) 4 mg tablet       lidocaine (LIDODERM) 5 % Apply 1 patch topically daily as needed (back pain) Remove & Discard patch within 12 hours or as directed by MD Castro patch 1    meloxicam (MOBIC) 15 mg tablet       meloxicam (MOBIC) 7 5 mg tablet Daily      methocarbamol (ROBAXIN) 750 mg tablet       mometasone (ELOCON) 0 1 % cream Apply topically daily 45 g 0    ondansetron (Zofran ODT) 4 mg disintegrating tablet Take 1 tablet (4 mg total) by mouth every 6 (six) hours as needed for nausea or vomiting 20 tablet 0    Respiratory Therapy Supplies (NEBULIZER) DONA by Does not apply route every 4 (four) hours while awake 90 each 1     No current facility-administered medications for this visit  _______________________________________________________________________  Review of Systems   Constitutional: Negative for chills and fever  HENT: Negative for ear pain and sore throat  Eyes: Negative for pain and visual disturbance  Respiratory: Negative for cough and shortness of breath  Cardiovascular: Negative for chest pain and palpitations  Gastrointestinal: Negative for abdominal pain and vomiting  Genitourinary: Negative for dysuria and hematuria  Musculoskeletal: Negative for arthralgias and back pain  Skin: Negative for color change and rash  Neurological: Positive for dizziness (Resolved)  Negative for seizures and syncope  Psychiatric/Behavioral: Positive for sleep disturbance  The patient is nervous/anxious  All other systems reviewed and are negative  Objective:  Vitals:    08/09/22 0923   BP: 120/70   Pulse: 90   Resp: 12   Temp: (!) 96 5 °F (35 8 °C)   SpO2: 99%   Weight: 68 9 kg (152 lb)   Height: 5' 3 5" (1 613 m)     Body mass index is 26 5 kg/m²  Physical Exam  Vitals and nursing note reviewed  Constitutional:       General: She is not in acute distress  Appearance: Normal appearance  She is not ill-appearing  Cardiovascular:      Rate and Rhythm: Normal rate and regular rhythm  Pulses: Normal pulses  Heart sounds: Normal heart sounds  Pulmonary:      Effort: Pulmonary effort is normal  No respiratory distress  Breath sounds: Normal breath sounds  No wheezing  Lymphadenopathy:      Cervical: No cervical adenopathy  Skin:     General: Skin is warm and dry  Neurological:      Mental Status: She is alert and oriented to person, place, and time  Psychiatric:         Mood and Affect: Mood normal          Behavior: Behavior normal          Thought Content:  Thought content normal          Judgment: Judgment normal

## 2022-08-11 ENCOUNTER — TELEMEDICINE (OUTPATIENT)
Dept: PSYCHIATRY | Facility: CLINIC | Age: 52
End: 2022-08-11
Payer: MEDICARE

## 2022-08-11 DIAGNOSIS — F41.1 GAD (GENERALIZED ANXIETY DISORDER): ICD-10-CM

## 2022-08-11 DIAGNOSIS — F31.81 BIPOLAR 2 DISORDER (HCC): Primary | ICD-10-CM

## 2022-08-11 PROCEDURE — 90834 PSYTX W PT 45 MINUTES: CPT

## 2022-08-11 NOTE — PSYCH
INDIVIDUAL SESSION NOTE     Length of time in session: 50 minutes, follow up in 1 week     Psychotherapy Provided: Individual      Diagnosis ICD-10-CM Associated Orders   1  Bipolar 2 disorder (HCC)  F31 81    2  ZAINA (generalized anxiety disorder)  F41 1           Goals addressed in session: Goal 1     Pain:      none    0    Current suicide risk:  minimal    Data: Met with Jasmina Florence for a scheduled individual session  Topics discussed included coping skills, relationship with significant other and mood regulation and symptoms  Shagufta Pierce is feeling "good"  She stated the man she had been dating turned out to be "psycho"  She stated she noticed increased irritability with him and what seemed to be the beginning of controlling behavior  She stated the one day he picked a fight with her and then called the  on her  She spoke to the police and let them know that there was no crisis or emergency and that she was concerned as to why he was acting out  Shagufta Pierce had let his dog inside to get water and he told the police she wouldn't give his dog back  She stated that was not the case and that she would bring the dog back out, the dog was just drinking water  The police did arrive and Shagufta Pierce said there was no issue; they got him to leave with his dog and "that was that"  He reached out to Shagufta Pierce later on and seemed to act like nothing was going on  She told him she wished him the best but this was not a good situation any more and she no longer wants to see him  Shagufta Pierce shows evidence of utilizing effective communication skills, emotion regulation skills, engaging in problem solving and maintaining emotional composure to manage mental health symptoms  During this session, this clinical used the following therapeutic modalities supportive psychotherapy, client-centered therapy and stress management skills  Assessment:  Shagufta Pierce presents with a normal mood    her affect is normal range and intensity, appropriate Manoj aPlma was oriented x3  She was focused and engaged  Manoj Palma exhibits good therapeutic rapport with this clinician  she continues to exhibit willingness to work on treatment goals and objectives  Manoj Palma presents with a minimal risk of suicide, minimal risk of self-harm and minimal of harm to others  Plan:  Manoj Palma will return in 1 week for the next scheduled session  Between sessions, she  will focus on herself and her grandson for the time being and take a break from dating and will report back during the next session re: success and barriers  At the next session, this clinical will use supportive psychotherapy and client-centered therapy to address her anxiety, in an effort to assist Manoj Palma with meeting treatment goals  Behavioral Health Treatment Plan [facility]: Diagnosis and Treatment Plan explained to Merle Martinez relates understanding diagnosis and is agreeable to Treatment Plan  Yes     Virtual Regular Visit    Verification of patient location:    Patient is located in the following state in which I hold an active license [place]      Assessment/Plan:    Problem List Items Addressed This Visit        Other    ZAINA (generalized anxiety disorder)    Bipolar 2 disorder ([facility] [address] ) - Primary          Goals addressed in session: Goal 1          Reason for visit is   Chief Complaint   Patient presents with    Virtual Regular Visit        Encounter provider Ned Aschoff, LCSW    Provider located at [address] [place] [id number] [phone]      Recent Visits  Date Type Provider Dept   08/04/22 Telemedicine Ned Aschoff, LCSW [facility]   Showing recent visits within past 7 days and meeting all other requirements  Future Appointments  No visits were found meeting these conditions    Showing future appointments within next 150 days and meeting all other requirements       The patient was identified by name and date of birth  Erick Preston was informed that this is a telemedicine visit and that the visit is being conducted through[employer] Embedded and patient was informed this is a secure, HIPAA-complaint platform  She agrees to proceed     My office door was closed  No one else was in the room  She acknowledged consent and understanding of privacy and security of the video platform  The patient has agreed to participate and understands they can discontinue the visit at any time  Patient is aware this is a billable service  Subjective  Erick Preston is a 49 y o  female  HPI     Past Medical History:   Diagnosis Date    Anxiety     Anxiety     Asthma     Bipolar disorder ([address] )     Carpal tunnel syndrome     Chronic pain     lower back    Depression     Disease of thyroid gland     Dyslipidemia     Fibromyalgia     Irritable bowel     Kidney stones     Kidney stones 2019    Migraine     Obesity (BMI 30 0-34  9)     Psychiatric disorder     depression/anxiety    Suicide [facility])     as teen    Vitamin D deficiency        Past Surgical History:   Procedure Laterality Date    BUNIONECTOMY       SECTION      CHOLECYSTECTOMY      PARTIAL HYSTERECTOMY      TONSILLECTOMY      TUBAL LIGATION         Current Outpatient Medications   Medication Sig Dispense Refill    albuterol (2 5 mg/3 mL) 0 083 % nebulizer solution Take 1 vial (2 5 mg total) by nebulization every 6 (six) hours as needed for wheezing or shortness of breath 20 vial 0    azelastine (ASTELIN) 0 1 % nasal spray 1 spray into each nostril 2 (two) times a day Use in each nostril as directed 30 mL 0    Butalbital-APAP-Caffeine -40 MG CAPS TAKE 1 CAPSULE Daily PRN      cariprazine (Vraylar) 4 5 MG capsule Take 1 capsule (4 5 mg total) by mouth daily 30 capsule 1    celecoxib (CELEBREX) 200 mg capsule Take 1 capsule (200 mg total) by mouth daily 30 capsule 2    clonazePAM (KlonoPIN) 0 5 mg tablet Take 1 tablet (0 5 mg total) by mouth daily as needed for anxiety 30 tablet 1    colchicine (COLCRYS) 0 6 mg tablet Take 1 tablet (0 6 mg total) by mouth daily 30 tablet 5    dexamethasone (DECADRON) 4 mg tablet       divalproex sodium (DEPAKOTE ER) 250 mg 24 hr tablet       divalproex sodium (DEPAKOTE) 250 mg EC tablet Take 250 mg by mouth daily      famotidine (PEPCID) 40 MG tablet famotidine 40 mg tablet      fremanezumab-vfrm (Ajovy) 225 MG/1 5ML auto-injector Inject 225 mg under the skin every 30 (thirty) days      hydroxychloroquine (PLAQUENIL) 200 mg tablet        MG tablet       levothyroxine 200 mcg tablet       levothyroxine 25 mcg tablet Take 1 5 tablets (37 5 mcg total) by mouth daily 45 tablet 3    lidocaine (LIDODERM) 5 % Apply 1 patch topically daily as needed (back pain) Remove & Discard patch within 12 hours or as directed by MD 6 patch 1    loratadine (CLARITIN) 10 mg tablet Take 1 pill daily for allergies 90 tablet 1    meclizine (ANTIVERT) 25 mg tablet Take 1 tablet (25 mg total) by mouth every 8 (eight) hours as needed for dizziness 30 tablet 1    meloxicam (MOBIC) 15 mg tablet       meloxicam (MOBIC) 7 5 mg tablet Daily      methocarbamol (ROBAXIN) 750 mg tablet       mometasone (ELOCON) 0 1 % cream Apply topically daily 45 g 0    naratriptan (AMERGE) 2 5 MG tablet       OLANZapine (ZyPREXA) 5 mg tablet       ondansetron (Zofran ODT) 4 mg disintegrating tablet Take 1 tablet (4 mg total) by mouth every 6 (six) hours as needed for nausea or vomiting 20 tablet 0    Respiratory Therapy Supplies (NEBULIZER) DONA by Does not apply route every 4 (four) hours while awake 90 each 1    Semaglutide,0 25 or 0 5MG/DOS, 2 MG/1 5ML SOPN Inject 0 5 mg under the skin once a week 3 mL 0    Topiramate  MG CP24 Take 100 mg by mouth 2 (two) times a day      Ubrelvy 100 MG tablet       Ventolin  (90 Base) MCG/ACT inhaler INHALE TWO PUFFS EVERY 6 (SIX) HOURS AS NEEDED FOR WHEEZING OR SHORTNESS OF BREATH 18 g 1    XIFAXAN 550 MG tablet        No current facility-administered medications for this visit  Allergies   Allergen Reactions    Doxycycline Rash    Erythromycin Rash    Other Edema and Wheezing     jalapeno    Latex Rash    Duloxetine      Other reaction(s): Nausea, Loopy    Eletriptan      Pain in lower jaw with pressure in throat    Emgality [Galcanezumab-Gnlm]     Galcanezumab      rash       Review of Systems    Video Exam    There were no vitals filed for this visit      Physical Exam     I spent 50 minutes directly with the patient during this visit

## 2022-08-18 ENCOUNTER — OFFICE VISIT (OUTPATIENT)
Dept: FAMILY MEDICINE CLINIC | Facility: CLINIC | Age: 52
End: 2022-08-18
Payer: MEDICARE

## 2022-08-18 ENCOUNTER — TELEMEDICINE (OUTPATIENT)
Dept: PSYCHIATRY | Facility: CLINIC | Age: 52
End: 2022-08-18

## 2022-08-18 VITALS
DIASTOLIC BLOOD PRESSURE: 78 MMHG | OXYGEN SATURATION: 99 % | SYSTOLIC BLOOD PRESSURE: 110 MMHG | TEMPERATURE: 97 F | HEART RATE: 78 BPM

## 2022-08-18 DIAGNOSIS — R42 DIZZINESS: ICD-10-CM

## 2022-08-18 DIAGNOSIS — R11.2 NAUSEA AND VOMITING, UNSPECIFIED VOMITING TYPE: ICD-10-CM

## 2022-08-18 DIAGNOSIS — F41.1 GAD (GENERALIZED ANXIETY DISORDER): Primary | ICD-10-CM

## 2022-08-18 DIAGNOSIS — F31.81 BIPOLAR 2 DISORDER (HCC): ICD-10-CM

## 2022-08-18 DIAGNOSIS — R23.2 HOT FLASHES: ICD-10-CM

## 2022-08-18 DIAGNOSIS — B35.1 FUNGAL INFECTION OF TOENAIL: Primary | ICD-10-CM

## 2022-08-18 PROBLEM — R07.9 CHEST PAIN: Status: RESOLVED | Noted: 2022-06-22 | Resolved: 2022-08-18

## 2022-08-18 PROCEDURE — 99214 OFFICE O/P EST MOD 30 MIN: CPT | Performed by: NURSE PRACTITIONER

## 2022-08-18 RX ORDER — ONDANSETRON 4 MG/1
4 TABLET, ORALLY DISINTEGRATING ORAL EVERY 6 HOURS PRN
Qty: 20 TABLET | Refills: 0 | Status: SHIPPED | OUTPATIENT
Start: 2022-08-18

## 2022-08-18 RX ORDER — MECLIZINE HYDROCHLORIDE 25 MG/1
25 TABLET ORAL EVERY 8 HOURS PRN
Qty: 30 TABLET | Refills: 1 | Status: SHIPPED | OUTPATIENT
Start: 2022-08-18

## 2022-08-18 NOTE — PSYCH
Called client for session; stated she had a lot going on and wondered if we could catch up for next week as we are already scheduled  Session cancelled

## 2022-08-18 NOTE — PROGRESS NOTES
Assessment/Plan:     Chronic Problems:  No problem-specific Assessment & Plan notes found for this encounter  Visit Diagnosis:  Diagnoses and all orders for this visit:    Fungal infection of toenail  Comments:  patient will call her [address] in [place]  Orders:  -     ciclopirox (PENLAC) 8 % solution; Apply topically daily at bedtime  -     Ambulatory Referral to Podiatry; Future    Nausea and vomiting, unspecified vomiting type  -     ondansetron (Zofran ODT) 4 mg disintegrating tablet; Take 1 tablet (4 mg total) by mouth every 6 (six) hours as needed for nausea or vomiting    Dizziness  Comments:  Discussed adequate hydration, moving positions slowly  Meclizine prn for dizziness  Orders:  -     meclizine (ANTIVERT) 25 mg tablet; Take 1 tablet (25 mg total) by mouth every 8 (eight) hours as needed for dizziness    Hot flashes  Comments: Will contact patient's psychiatrist about adding on Venlafaxine for hot flashes  Subjective:    Patient ID: Keara Koch is a 49 y o  female  Patient presents with concerns of hot flashes for several months  The hot flashes are all the time  Ears are red, constant sweating  She is also very fatigued, dizzy  She did call Neuro for migraines  She is also concerned with possible fungal infection of toe  She noticed this this morning when she took off her toenail polish  The following portions of the patient's history were reviewed and updated as appropriate: allergies, current medications, past family history, past medical history, past social history, past surgical history and problem list     Review of Systems   Constitutional: Positive for diaphoresis and fatigue  Negative for chills and fever  Hot sweats   Respiratory: Negative  Cardiovascular: Negative  Gastrointestinal: Positive for nausea  Negative for vomiting  Neurological: Positive for dizziness and headaches     Psychiatric/Behavioral: Positive for dysphoric mood  The patient is nervous/anxious  /78   Pulse 78   Temp (!) 97 °F (36 1 °C) (Tympanic)   SpO2 99%   Social History     Socioeconomic History    Marital status: Single     Spouse name: Not on file    Number of children: Not on file    Years of education: Not on file    Highest education level: Not on file   Occupational History    Occupation: Disability   Tobacco Use    Smoking status: Never Smoker    Smokeless tobacco: Never Used   Vaping Use    Vaping Use: Never used   Substance and Sexual Activity    Alcohol use: No    Drug use: Never    Sexual activity: Not Currently     Partners: Male     Birth control/protection: Abstinence, Female Sterilization     Comment: Hysterectomy   Other Topics Concern    Not on file   Social History Narrative    Not on file     Social Determinants of Health     Financial Resource Strain: Not on file   Food Insecurity: Not on file   Transportation Needs: Not on file   Physical Activity: Not on file   Stress: Not on file   Social Connections: Not on file   Intimate Partner Violence: Not on file   Housing Stability: Not on file     Past Medical History:   Diagnosis Date    Anxiety     Anxiety     Asthma     Bipolar disorder ([address] )     Carpal tunnel syndrome     Chronic pain     lower back    Depression     Disease of thyroid gland     Dyslipidemia     Fibromyalgia     Irritable bowel     Kidney stones     Kidney stones 5/20/2019    Migraine     Obesity (BMI 30 0-34  9)     Psychiatric disorder     depression/anxiety    Suicide attempt [facility])     as teen    Vitamin D deficiency      Family History   Problem Relation Age of Onset    Hypertension Mother     Diabetes Mother     Hypothyroidism Mother     Stroke Mother     Bipolar disorder Mother     Anxiety disorder Mother     Arthritis Mother     Depression Mother     Mental illness Mother     Cancer Father         pancreatic     Hypothyroidism Sister     Hypertension Brother    Emma Denise Heart disease Brother     Cancer Maternal Uncle         lung    Cancer Paternal Aunt         breast    Breast cancer Paternal Aunt     Cancer Paternal Uncle         testicular     Cancer Paternal Grandmother         breast    Dementia Paternal Grandmother     Breast cancer Paternal Grandmother     Cancer Cousin         brain    Bipolar disorder Daughter      Past Surgical History:   Procedure Laterality Date    BUNIONECTOMY       SECTION      CHOLECYSTECTOMY      PARTIAL HYSTERECTOMY      TONSILLECTOMY      TUBAL LIGATION         Current Outpatient Medications:     albuterol (2 5 mg/3 mL) 0 083 % nebulizer solution, Take 1 vial (2 5 mg total) by nebulization every 6 (six) hours as needed for wheezing or shortness of breath, Disp: 20 vial, Rfl: 0    azelastine (ASTELIN) 0 1 % nasal spray, 1 spray into each nostril 2 (two) times a day Use in each nostril as directed, Disp: 30 mL, Rfl: 0    Butalbital-APAP-Caffeine -40 MG CAPS, TAKE 1 CAPSULE Daily PRN, Disp: , Rfl:     cariprazine (Vraylar) 4 5 MG capsule, Take 1 capsule (4 5 mg total) by mouth daily, Disp: 30 capsule, Rfl: 1    celecoxib (CELEBREX) 200 mg capsule, Take 1 capsule (200 mg total) by mouth daily, Disp: 30 capsule, Rfl: 2    ciclopirox (PENLAC) 8 % solution, Apply topically daily at bedtime, Disp: 6 6 mL, Rfl: 0    colchicine (COLCRYS) 0 6 mg tablet, Take 1 tablet (0 6 mg total) by mouth daily, Disp: 30 tablet, Rfl: 5    dexamethasone (DECADRON) 4 mg tablet, , Disp: , Rfl:     divalproex sodium (DEPAKOTE ER) 250 mg 24 hr tablet, , Disp: , Rfl:     divalproex sodium (DEPAKOTE) 250 mg EC tablet, Take 250 mg by mouth daily, Disp: , Rfl:     famotidine (PEPCID) 40 MG tablet, famotidine 40 mg tablet, Disp: , Rfl:     fremanezumab-vfrm (Ajovy) 225 MG/1 5ML auto-injector, Inject 225 mg under the skin every 30 (thirty) days, Disp: , Rfl:     hydroxychloroquine (PLAQUENIL) 200 mg tablet, , Disp: , Rfl:      MG tablet, , Disp: , Rfl:     levothyroxine 200 mcg tablet, , Disp: , Rfl:     levothyroxine 25 mcg tablet, Take 1 5 tablets (37 5 mcg total) by mouth daily, Disp: 45 tablet, Rfl: 3    lidocaine (LIDODERM) 5 %, Apply 1 patch topically daily as needed (back pain) Remove & Discard patch within 12 hours or as directed by MD, Disp: 6 patch, Rfl: 1    loratadine (CLARITIN) 10 mg tablet, Take 1 pill daily for allergies, Disp: 90 tablet, Rfl: 1    meclizine (ANTIVERT) 25 mg tablet, Take 1 tablet (25 mg total) by mouth every 8 (eight) hours as needed for dizziness, Disp: 30 tablet, Rfl: 1    meloxicam (MOBIC) 15 mg tablet, , Disp: , Rfl:     meloxicam (MOBIC) 7 5 mg tablet, Daily, Disp: , Rfl:     methocarbamol (ROBAXIN) 750 mg tablet, , Disp: , Rfl:     mometasone (ELOCON) 0 1 % cream, Apply topically daily, Disp: 45 g, Rfl: 0    naratriptan (AMERGE) 2 5 MG tablet, , Disp: , Rfl:     OLANZapine (ZyPREXA) 5 mg tablet, , Disp: , Rfl:     ondansetron (Zofran ODT) 4 mg disintegrating tablet, Take 1 tablet (4 mg total) by mouth every 6 (six) hours as needed for nausea or vomiting, Disp: 20 tablet, Rfl: 0    Semaglutide,0 25 or 0 5MG/DOS, 2 MG/1 5ML SOPN, Inject 0 5 mg under the skin once a week, Disp: 3 mL, Rfl: 0    Topiramate  MG CP24, Take 100 mg by mouth 2 (two) times a day, Disp: , Rfl:     Ubrelvy 100 MG tablet, , Disp: , Rfl:     Ventolin  (90 Base) MCG/ACT inhaler, INHALE TWO PUFFS EVERY 6 (SIX) HOURS AS NEEDED FOR WHEEZING OR SHORTNESS OF BREATH, Disp: 18 g, Rfl: 1    XIFAXAN 550 MG tablet, , Disp: , Rfl:     clonazePAM (KlonoPIN) 0 5 mg tablet, Take 1 tablet (0 5 mg total) by mouth daily as needed for anxiety, Disp: 30 tablet, Rfl: 1    Respiratory Therapy Supplies (NEBULIZER) DONA by Does not apply route every 4 (four) hours while awake, Disp: 90 each, Rfl: 1    Allergies   Allergen Reactions    Doxycycline Rash    Erythromycin Rash    Other Edema and Wheezing     carmenlapeno    Latex Rash    Duloxetine      Other reaction(s): Nausea, Loopy    Eletriptan      Pain in lower jaw with pressure in throat    Emgality [Galcanezumab-Gnlm]     Galcanezumab      rash          Lab Review   Hospital Outpatient Visit on 06/24/2022   Component Date Value    LA size 06/24/2022 3 2     Triscuspid Valve Regurgi* 06/24/2022 14 0     Tricuspid valve peak reg* 06/24/2022 1 88     LVPWd 06/24/2022 0 80     Left Atrium Area-systoli* 06/24/2022 11 5     MV E' Tissue Velocity La* 06/24/2022 12     TR Peak Ehsan 06/24/2022 1 9     IVSd 06/24/2022 2 49     LV DIASTOLIC VOLUME (MOD* 82/95/5141 54     LEFT VENTRICLE SYSTOLIC * 94/60/3895 15     Left ventricular stroke * 06/24/2022 40 00     A4C EF 06/24/2022 72     LVIDd 06/24/2022 3 60     IVS 06/24/2022 0 9     LVIDS 06/24/2022 2 10     FS 06/24/2022 42     Ao root 06/24/2022 2 90     RVID d 06/24/2022 2 8     MV valve area p 1/2 meth* 06/24/2022 3 55     E wave deceleration time 06/24/2022 212     MV Peak E Ehsan 06/24/2022 98     MV Peak A Ehsan 06/24/2022 0 8     RAA A4C 06/24/2022 9 7     MV stenosis pressure 1/2* 06/24/2022 62     LVSV, 2D 06/24/2022 40     LV EF 06/24/2022 65     Est  RA pres 06/24/2022 3 0     Right Ventricular Peak S* 06/24/2022 17 00         Imaging: No results found  Objective:     Physical Exam  Constitutional:       Appearance: She is well-developed  Cardiovascular:      Rate and Rhythm: Normal rate and regular rhythm  Heart sounds: Normal heart sounds  No murmur heard  Pulmonary:      Effort: Pulmonary effort is normal  No respiratory distress  Breath sounds: Normal breath sounds  Feet:      Left foot:      Toenail Condition: Fungal disease present  Skin:     General: Skin is warm and dry  Neurological:      Mental Status: She is alert and oriented to person, place, and time  There are no Patient Instructions on file for this visit      ERICKA Lu    Portions of the record may have been created with voice recognition software  Occasional wrong word or "sound a like" substitutions may have occurred due to the inherent limitations of voice recognition software  Read the chart carefully and recognize, using context, where substitutions have occurred

## 2022-08-22 ENCOUNTER — RA CDI HCC (OUTPATIENT)
Dept: OTHER | Facility: HOSPITAL | Age: 52
End: 2022-08-22

## 2022-08-22 DIAGNOSIS — N95.1 HOT FLASHES DUE TO MENOPAUSE: Primary | ICD-10-CM

## 2022-08-22 RX ORDER — VENLAFAXINE HYDROCHLORIDE 37.5 MG/1
37.5 CAPSULE, EXTENDED RELEASE ORAL
Qty: 30 CAPSULE | Refills: 0 | Status: SHIPPED | OUTPATIENT
Start: 2022-08-22 | End: 2022-09-09

## 2022-08-22 NOTE — PROGRESS NOTES
D84 9  [facility] 75  coding opportunities          Chart Reviewed number of suggestions sent to Provider: 1     Patients Insurance     Medicare Insurance: [employer]

## 2022-08-25 ENCOUNTER — TELEMEDICINE (OUTPATIENT)
Dept: PSYCHIATRY | Facility: CLINIC | Age: 52
End: 2022-08-25
Payer: MEDICARE

## 2022-08-25 DIAGNOSIS — F41.1 GAD (GENERALIZED ANXIETY DISORDER): Primary | ICD-10-CM

## 2022-08-25 DIAGNOSIS — F31.81 BIPOLAR 2 DISORDER (HCC): ICD-10-CM

## 2022-08-25 PROCEDURE — 90834 PSYTX W PT 45 MINUTES: CPT

## 2022-08-25 NOTE — PSYCH
INDIVIDUAL SESSION NOTE     Length of time in session: 52 minutes, follow up in 1 week     Psychotherapy Provided: Individual      Diagnosis ICD-10-CM Associated Orders   1  ZAINA (generalized anxiety disorder)  F41 1    2  Bipolar 2 disorder ([facility] [address] )  F31 81           Goals addressed in session: Goal 1     Pain:      none    0    Current suicide risk:  minimal    Data: Met with Arie Browne for a scheduled individual session  Topics discussed included emotional wellness, coping skills, relationship with significant other, relationships with family, mood regulation and symptoms and grief and loss  Annalee Cloud is feeling "ok"; she said her temper has been flaring up more recently  Her daughter has been contacting her about seeing her son/Belem's grandson  The court order states she is to give Annalee Scales 72 hours notice and the visits are supervised  Annalee Cloud has been trying to get her grandson's school supplies and uniforms/clothes  She let her daughter know that and her daughter argued with her back and forth  Annalee Cloud is frustrated because she states this is her daughter's pattern  She said she told her daughter they would work something out now that she's reached out  Annalee Cloud then spoke about her medications and pharmacy; the  was rude to her when she called in to inquire about where her meds were  His response was to ask her why she doesn't just come pick them up  Annalee Cloud said he was disrespectful and short  She ended up going to the pharmacy the next day to address the issue  The  was at the pharmacy and continued to be rude; the pharmacist, however, was able to calm things down and get it straightened out  We discussed her anger for a while, and looking where it comes from historically but also, what is currently going on that might be contributing to this increase  Annalee Cloud has been really missing her mother; she cried for a while   She voiced wishing she could call her and talk to her, tell her things about what's going on in her life, get advice, share funny stories  She feels like that's where some of this anger is coming from; she sees that she's still grieving  We talked about grief and loss for a while and Tanya White visibly started to calm  She then updated this clinician on the man she's seeing and things are going well for the most part  Tanya White shows evidence of utilizing effective communication skills to manage mental health symptoms  During this session, this clinical used the following therapeutic modalities supportive psychotherapy, client-centered therapy, stress management skills and problem solving skills  Assessment:  Tanya White presents with a dysphoric mood  her affect is tearful  Tanya White was oriented x3  She was focused and engaged  Tanya White exhibits good therapeutic rapport with this clinician  she continues to exhibit willingness to work on treatment goals and objectives  Tanya White presents with a minimal risk of suicide, minimal risk of self-harm and minimal of harm to others  Plan:  Tanya White will return in 1 week for the next scheduled session  Between sessions, she  will let herself feel her emotions, let them come out and not avoid them and will report back during the next session re: success and barriers  At the next session, this clinical will use supportive psychotherapy and client-centered therapy to address her anxiety, in an effort to assist Tanya White with meeting treatment goals  Behavioral Health Treatment Plan [facility]: Diagnosis and Treatment Plan explained to Parker Villa relates understanding diagnosis and is agreeable to Treatment Plan   Yes     Virtual Regular Visit    Verification of patient location:    Patient is located in the following state in which I hold an active license [place]      Assessment/Plan:    Problem List Items Addressed This Visit        Other    ZAINA (generalized anxiety disorder) - Primary    Bipolar 2 disorder ([facility] [address] )          Goals addressed in session: Goal 1          Reason for visit is   Chief Complaint   Patient presents with    Virtual Regular Visit        Encounter provider Giulia Gonzalez LCSW    Provider located at [address] [address] [phone]      Recent Visits  Date Type Provider Dept   22 Office Visit [address] recent visits within past 7 days and meeting all other requirements  Future Appointments  No visits were found meeting these conditions  Showing future appointments within next 150 days and meeting all other requirements       The patient was identified by name and date of birth  Roni Trevino was informed that this is a telemedicine visit and that the visit is being conducted through[employer] and patient was informed this is a secure, HIPAA-complaint platform  She agrees to proceed     My office door was closed  No one else was in the room  She acknowledged consent and understanding of privacy and security of the video platform  The patient has agreed to participate and understands they can discontinue the visit at any time  Patient is aware this is a billable service  Subjective  Roni Trevino is a 49 y o  female  HPI     Past Medical History:   Diagnosis Date    Anxiety     Anxiety     Asthma     Bipolar disorder ([address] )     Carpal tunnel syndrome     Chronic pain     lower back    Depression     Disease of thyroid gland     Dyslipidemia     Fibromyalgia     Irritable bowel     Kidney stones     Kidney stones 2019    Migraine     Obesity (BMI 30 0-34  9)     Psychiatric disorder     depression/anxiety    Suicide attempt [facility])     as teen    Vitamin D deficiency        Past Surgical History:   Procedure Laterality Date    BUNIONECTOMY       SECTION      CHOLECYSTECTOMY      PARTIAL HYSTERECTOMY      TONSILLECTOMY      TUBAL LIGATION         Current Outpatient Medications   Medication Sig Dispense Refill    albuterol (2 5 mg/3 mL) 0 083 % nebulizer solution Take 1 vial (2 5 mg total) by nebulization every 6 (six) hours as needed for wheezing or shortness of breath 20 vial 0    azelastine (ASTELIN) 0 1 % nasal spray 1 spray into each nostril 2 (two) times a day Use in each nostril as directed 30 mL 0    Butalbital-APAP-Caffeine -40 MG CAPS TAKE 1 CAPSULE Daily PRN      cariprazine (Vraylar) 4 5 MG capsule Take 1 capsule (4 5 mg total) by mouth daily 30 capsule 1    celecoxib (CELEBREX) 200 mg capsule Take 1 capsule (200 mg total) by mouth daily 30 capsule 2    ciclopirox (PENLAC) 8 % solution Apply topically daily at bedtime 6 6 mL 0    clonazePAM (KlonoPIN) 0 5 mg tablet Take 1 tablet (0 5 mg total) by mouth daily as needed for anxiety 30 tablet 1    colchicine (COLCRYS) 0 6 mg tablet Take 1 tablet (0 6 mg total) by mouth daily 30 tablet 5    dexamethasone (DECADRON) 4 mg tablet       divalproex sodium (DEPAKOTE ER) 250 mg 24 hr tablet       divalproex sodium (DEPAKOTE) 250 mg EC tablet Take 250 mg by mouth daily      famotidine (PEPCID) 40 MG tablet famotidine 40 mg tablet      fremanezumab-vfrm (Ajovy) 225 MG/1 5ML auto-injector Inject 225 mg under the skin every 30 (thirty) days      hydroxychloroquine (PLAQUENIL) 200 mg tablet        MG tablet       levothyroxine 200 mcg tablet       levothyroxine 25 mcg tablet Take 1 5 tablets (37 5 mcg total) by mouth daily 45 tablet 3    lidocaine (LIDODERM) 5 % Apply 1 patch topically daily as needed (back pain) Remove & Discard patch within 12 hours or as directed by MD 6 patch 1    loratadine (CLARITIN) 10 mg tablet Take 1 pill daily for allergies 90 tablet 1    meclizine (ANTIVERT) 25 mg tablet Take 1 tablet (25 mg total) by mouth every 8 (eight) hours as needed for dizziness 30 tablet 1    meloxicam (MOBIC) 15 mg tablet       meloxicam (MOBIC) 7 5 mg tablet Daily      methocarbamol (ROBAXIN) 750 mg tablet       mometasone (ELOCON) 0 1 % cream Apply topically daily 45 g 0    naratriptan (AMERGE) 2 5 MG tablet       OLANZapine (ZyPREXA) 5 mg tablet       ondansetron (Zofran ODT) 4 mg disintegrating tablet Take 1 tablet (4 mg total) by mouth every 6 (six) hours as needed for nausea or vomiting 20 tablet 0    Respiratory Therapy Supplies (NEBULIZER) DONA by Does not apply route every 4 (four) hours while awake 90 each 1    Semaglutide,0 25 or 0 5MG/DOS, 2 MG/1 5ML SOPN Inject 0 5 mg under the skin once a week 3 mL 0    Topiramate  MG CP24 Take 100 mg by mouth 2 (two) times a day      Ubrelvy 100 MG tablet       venlafaxine (EFFEXOR-XR) 37 5 mg 24 hr capsule Take 1 capsule (37 5 mg total) by mouth daily with breakfast 30 capsule 0    Ventolin  (90 Base) MCG/ACT inhaler INHALE TWO PUFFS EVERY 6 (SIX) HOURS AS NEEDED FOR WHEEZING OR SHORTNESS OF BREATH 18 g 1    XIFAXAN 550 MG tablet        No current facility-administered medications for this visit  Allergies   Allergen Reactions    Doxycycline Rash    Erythromycin Rash    Other Edema and Wheezing     jalapeno    Latex Rash    Duloxetine      Other reaction(s): Nausea, Loopy    Eletriptan      Pain in lower jaw with pressure in throat    Emgality [Galcanezumab-Gnlm]     Galcanezumab      rash       Review of Systems    Video Exam    There were no vitals filed for this visit      Physical Exam     I spent 52 minutes directly with the patient during this visit

## 2022-09-01 ENCOUNTER — TELEMEDICINE (OUTPATIENT)
Dept: PSYCHIATRY | Facility: CLINIC | Age: 52
End: 2022-09-01
Payer: MEDICARE

## 2022-09-01 DIAGNOSIS — F41.1 GAD (GENERALIZED ANXIETY DISORDER): ICD-10-CM

## 2022-09-01 DIAGNOSIS — F31.81 BIPOLAR 2 DISORDER (HCC): Primary | ICD-10-CM

## 2022-09-01 PROCEDURE — 90834 PSYTX W PT 45 MINUTES: CPT

## 2022-09-01 NOTE — PSYCH
INDIVIDUAL SESSION NOTE     Length of time in session: 52 minutes, follow up in 1 week     Psychotherapy Provided: Individual      Diagnosis ICD-10-CM Associated Orders   1  Bipolar 2 disorder (HCC)  F31 81    2  ZAINA (generalized anxiety disorder)  F41 1           Goals addressed in session: Goal 1     Pain:      none    0    Current suicide risk:  minimal    Data: Met with Candance Blow for a scheduled individual session  Topics discussed included emotional wellness, relationship with significant other and relationships with others  Jeaneth Moreno is feeling "frustrated"  She had paid a contractor to do some outdoor work for her and he took off with her money  She's in the process of going through the court to get her money back  Jeaneth Moreno said it's been really stressful  She then stated that the man she was seeing turned out to be "an evil person"  She feels manipulated because he was seeing other women behind her back  She had talked to him about seeing other people and if that was something he wanted, to have that conversation with her  He said that wasn't the case, but Jeaneth Moreno ended up finding out otherwise  This man had been posting on some social media sight, pictures of other women and crude comments  Jeaneth Moreno finally broke it off and told him to not contact her anymore  In the mean time, Jeaneth Moreno met another man who she was friends with  She reported they didn't start off dating, but just as friends  She confided in him about this relationship ending and he's been very supportive  Jeaneth Moreno stated they have both voiced interest in dating but that she wants to take it very slow  Jeaneth Moreno shows evidence of utilizing effective communication skills and maintaining emotional composure to manage mental health symptoms  During this session, this clinical used the following therapeutic modalities supportive psychotherapy, client-centered therapy and problem solving skills  Assessment:  Jeaneth Moreno presents with a normal mood  her affect is normal range and intensity, appropriate  Sarita Clark was oriented x3  She was focused and engaged  Sarita Clark exhibits good therapeutic rapport with this clinician  she continues to exhibit willingness to work on treatment goals and objectives  Sarita Clark presents with a minimal risk of suicide, minimal risk of self-harm and minimal of harm to others  Plan:  Sarita Clark will return in 1 week for the next scheduled session  Between sessions, she  will maintain boundaries with others so that it doesn't affect her self care and the care of her grandson and will report back during the next session re: success and barriers  At the next session, this clinical will use supportive psychotherapy and client-centered therapy to address her anxiety, in an effort to assist Sarita Clark with meeting treatment goals  Behavioral Health Treatment Plan [facility]: Diagnosis and Treatment Plan explained to Michael Zamudio relates understanding diagnosis and is agreeable to Treatment Plan  Yes     Virtual Regular Visit    Verification of patient location:    Patient is located in the following state in which I hold an active license [place]      Assessment/Plan:    Problem List Items Addressed This Visit        Other    ZAINA (generalized anxiety disorder)    Bipolar 2 disorder ([facility] [address] ) - Primary          Goals addressed in session: Goal 1          Reason for visit is   Chief Complaint   Patient presents with    Virtual Regular Visit        Encounter provider Sheldon Leyva LCSW    Provider located at [address] [place] [id number] [phone]      Recent Visits  Date Type Provider Dept   08/25/22 Telemedicine Sheldon Leyva LCSW [facility]   Showing recent visits within past 7 days and meeting all other requirements  Future Appointments  No visits were found meeting these conditions    Showing future appointments within next 150 days and meeting all other requirements       The patient was identified by name and date of birth  Etta Mclaughlin was informed that this is a telemedicine visit and that the [facility] and patient was informed that this is a secure, HIPAA-compliant platform  She agrees to proceed     My office door was closed  No one else was in the room  She acknowledged consent and understanding of privacy and security of the video platform  The patient has agreed to participate and understands they can discontinue the visit at any time  Patient is aware this is a billable service  Subjective  Etta Mclaughlin is a 49 y o  female  HPI     Past Medical History:   Diagnosis Date    Anxiety     Anxiety     Asthma     B[facility] disorder ([facility] [address] )     Carpal tunnel syndrome     Chronic pain     lower back    Depression     Disease of thyroid gland     Dyslipidemia     Fibromyalgia     Irritable bowel     Kidney stones     Kidney stones 2019    Migraine     Obesity (BMI 30 0-34  9)     Psychiatric disorder     depression/anxiety    Suicide [facility])     as teen    Vitamin D deficiency        Past Surgical History:   Procedure Laterality Date    BUNIONECTOMY       SECTION      CHOLECYSTECTOMY      PARTIAL HYSTERECTOMY      TONSILLECTOMY      TUBAL LIGATION         Current Outpatient Medications   Medication Sig Dispense Refill    albuterol (2 5 mg/3 mL) 0 083 % nebulizer solution Take 1 vial (2 5 mg total) by nebulization every 6 (six) hours as needed for wheezing or shortness of breath 20 vial 0    azelastine (ASTELIN) 0 1 % nasal spray 1 spray into each nostril 2 (two) times a day Use in each nostril as directed 30 mL 0    Butalbital-APAP-Caffeine -40 MG CAPS TAKE 1 CAPSULE Daily PRN      cariprazine (Vraylar) 4 5 MG capsule Take 1 capsule (4 5 mg total) by mouth daily 30 capsule 1    celecoxib (CELEBREX) 200 mg capsule Take 1 capsule (200 mg total) by mouth daily 30 capsule 2    ciclopirox (PENLAC) 8 % solution Apply topically daily at bedtime 6 6 mL 0    clonazePAM (KlonoPIN) 0 5 mg tablet Take 1 tablet (0 5 mg total) by mouth daily as needed for anxiety 30 tablet 1    colchicine (COLCRYS) 0 6 mg tablet Take 1 tablet (0 6 mg total) by mouth daily 30 tablet 5    dexamethasone (DECADRON) 4 mg tablet       divalproex sodium (DEPAKOTE ER) 250 mg 24 hr tablet       divalproex sodium (DEPAKOTE) 250 mg EC tablet Take 250 mg by mouth daily      famotidine (PEPCID) 40 MG tablet famotidine 40 mg tablet      fremanezumab-vfrm (Ajovy) 225 MG/1 5ML auto-injector Inject 225 mg under the skin every 30 (thirty) days      hydroxychloroquine (PLAQUENIL) 200 mg tablet        MG tablet       levothyroxine 200 mcg tablet       levothyroxine 25 mcg tablet Take 1 5 tablets (37 5 mcg total) by mouth daily 45 tablet 3    lidocaine (LIDODERM) 5 % Apply 1 patch topically daily as needed (back pain) Remove & Discard patch within 12 hours or as directed by MD 6 patch 1    loratadine (CLARITIN) 10 mg tablet Take 1 pill daily for allergies 90 tablet 1    meclizine (ANTIVERT) 25 mg tablet Take 1 tablet (25 mg total) by mouth every 8 (eight) hours as needed for dizziness 30 tablet 1    meloxicam (MOBIC) 15 mg tablet       meloxicam (MOBIC) 7 5 mg tablet Daily      methocarbamol (ROBAXIN) 750 mg tablet       mometasone (ELOCON) 0 1 % cream Apply topically daily 45 g 0    naratriptan (AMERGE) 2 5 MG tablet       OLANZapine (ZyPREXA) 5 mg tablet       ondansetron (Zofran ODT) 4 mg disintegrating tablet Take 1 tablet (4 mg total) by mouth every 6 (six) hours as needed for nausea or vomiting 20 tablet 0    Respiratory Therapy Supplies (NEBULIZER) DONA by Does not apply route every 4 (four) hours while awake 90 each 1    Semaglutide,0 25 or 0 5MG/DOS, 2 MG/1 5ML SOPN Inject 0 5 mg under the skin once a week 3 mL 0    Topiramate  MG CP24 Take 100 mg by mouth 2 (two) times a day      Ubrelvy 100 MG tablet       venlafaxine (EFFEXOR-XR) 37 5 mg 24 hr capsule Take 1 capsule (37 5 mg total) by mouth daily with breakfast 30 capsule 0    Ventolin  (90 Base) MCG/ACT inhaler INHALE TWO PUFFS EVERY 6 (SIX) HOURS AS NEEDED FOR WHEEZING OR SHORTNESS OF BREATH 18 g 1    XIFAXAN 550 MG tablet        No current facility-administered medications for this visit  Allergies   Allergen Reactions    Doxycycline Rash    Erythromycin Rash    Other Edema and Wheezing     jalapeno    Latex Rash    Duloxetine      Other reaction(s): Nausea, Loopy    Eletriptan      Pain in lower jaw with pressure in throat    Emgality [Galcanezumab-Gnlm]     Galcanezumab      rash       Review of Systems    Video Exam    There were no vitals filed for this visit      Physical Exam     I spent 52 minutes directly with the patient during this visit

## 2022-09-08 ENCOUNTER — TELEMEDICINE (OUTPATIENT)
Dept: PSYCHIATRY | Facility: CLINIC | Age: 52
End: 2022-09-08
Payer: MEDICARE

## 2022-09-08 DIAGNOSIS — F41.1 GAD (GENERALIZED ANXIETY DISORDER): Primary | ICD-10-CM

## 2022-09-08 DIAGNOSIS — F31.81 BIPOLAR 2 DISORDER (HCC): ICD-10-CM

## 2022-09-08 PROCEDURE — 90834 PSYTX W PT 45 MINUTES: CPT

## 2022-09-08 NOTE — PSYCH
INDIVIDUAL SESSION NOTE     Length of time in session: 52 minutes, follow up in 1 week     Psychotherapy Provided: Individual      Diagnosis ICD-10-CM Associated Orders   1  ZAINA (generalized anxiety disorder)  F41 1    2  Bipolar 2 disorder ([facility] [address] )  F31 81           Goals addressed in session: Goal 1     Pain:      none    0    Current suicide risk:  minimal    Data: Met with Clevester [place] for a scheduled individual session  Topics discussed included relationship with significant other, relationships with family and physical health concerns  Radha Gardner is feeling under the weather; she stated her grandson was sick and passed it on to her  When she gets sick, it often times triggers a migraine so she's feeling pretty down  Her birthday is tomorrow so she's feeling down about not being able to follow through with her birthday plans  Her daughter came to visit her and brought her a "self-care" basket, with candles, face masks, etc  Radha Gardner really liked that she did that and it felt good to have her daughter visit her  Radha Gardner is still dealing with her ex; he's been dramatic and texting her multiple times a day  She stated she hasn't blocked him yet because he's not threatening or anything  Radha Gardner has been talking to a man she's known for a while now; she knows him through the [employer]  She feels they have a lot in common so she's hoping they can get to know one another and see where it goes  Radha Gardner shows evidence of utilizing effective communication skills to manage mental health symptoms  During this session, this clinical used the following therapeutic modalities supportive psychotherapy, client-centered therapy and stress management skills  Assessment:  Radha Gardner presents with a normal mood  her affect is normal range and intensity, appropriate  Radha Gardner was oriented x3  She was focused and engaged  Radha Gardner exhibits good therapeutic rapport with this clinician    she continues to exhibit willingness to work on treatment goals and objectives  Ming Briseno presents with a minimal risk of suicide, minimal risk of self-harm and minimal of harm to others  Plan:  Ming Briseno will return in 1 week for the next scheduled session  Between sessions, she  will take time to rest and take care of herself while feeling under the weather and will report back during the next session re: success and barriers  At the next session, this clinical will use supportive psychotherapy and client-centered therapy to address her anxiety, in an effort to assist Ming Briseno with meeting treatment goals  Behavioral Health Treatment Plan [facility]: Diagnosis and Treatment Plan explained to Brock Tomlin relates understanding diagnosis and is agreeable to Treatment Plan  Yes     Virtual Regular Visit    Verification of patient location:    Patient is located in the following state in which I hold an active license [place]      Assessment/Plan:    Problem List Items Addressed This Visit        Other    ZAINA (generalized anxiety disorder) - Primary    Bipolar 2 disorder ([facility] [address] )          Goals addressed in session: Goal 1          Reason for visit is   Chief Complaint   Patient presents with    Virtual Regular Visit        Encounter provider Zahida Moore LCSW    Provider located at [address] [place] [id number]  [phone]      Recent Visits  Date Type Provider Dept   09/01/22 Telemedicine Zahida Moore LCSW [facility]   Showing recent visits within past 7 days and meeting all other requirements  Future Appointments  No visits were found meeting these conditions  Showing future appointments within next 150 days and meeting all other requirements       The patient was identified by name and date of birth   Sima Hein was informed that this is a telemedicine visit and that [facility] and patient was informed that this is a secure, HIPAA-compliant platform  She agrees to proceed     My office door was closed  No one else was in the room  She acknowledged consent and understanding of privacy and security of the video platform  The patient has agreed to participate and understands they can discontinue the visit at any time  Patient is aware this is a billable service  Subjective  Kevan Chase is a 49 y o  female  HPI     Past Medical History:   Diagnosis Date    Anxiety     Anxiety     Asthma     Bipolar disorder ([address] )     Carpal tunnel syndrome     Chronic pain     lower back    Depression     Disease of thyroid gland     Dyslipidemia     Fibromyalgia     Irritable bowel     Kidney stones     Kidney stones 2019    Migraine     Obesity (BMI 30 0-34  9)     Psychiatric disorder     depression/anxiety    Suicide attempt [facility])     as teen    Vitamin D deficiency        Past Surgical History:   Procedure Laterality Date    BUNIONECTOMY       SECTION      CHOLECYSTECTOMY      PARTIAL HYSTERECTOMY      TONSILLECTOMY      TUBAL LIGATION         Current Outpatient Medications   Medication Sig Dispense Refill    albuterol (2 5 mg/3 mL) 0 083 % nebulizer solution Take 1 vial (2 5 mg total) by nebulization every 6 (six) hours as needed for wheezing or shortness of breath 20 vial 0    azelastine (ASTELIN) 0 1 % nasal spray 1 spray into each nostril 2 (two) times a day Use in each nostril as directed 30 mL 0    Butalbital-APAP-Caffeine -40 MG CAPS TAKE 1 CAPSULE Daily PRN      cariprazine (Vraylar) 4 5 MG capsule Take 1 capsule (4 5 mg total) by mouth daily 30 capsule 1    celecoxib (CELEBREX) 200 mg capsule Take 1 capsule (200 mg total) by mouth daily 30 capsule 2    ciclopirox (PENLAC) 8 % solution Apply topically daily at bedtime 6 6 mL 0    clonazePAM (KlonoPIN) 0 5 mg tablet Take 1 tablet (0 5 mg total) by mouth daily as needed for anxiety 30 tablet 1    colchicine (COLCRYS) 0 6 mg tablet Take 1 tablet (0 6 mg total) by mouth daily 30 tablet 5    dexamethasone (DECADRON) 4 mg tablet       divalproex sodium (DEPAKOTE ER) 250 mg 24 hr tablet       divalproex sodium (DEPAKOTE) 250 mg EC tablet Take 250 mg by mouth daily      famotidine (PEPCID) 40 MG tablet famotidine 40 mg tablet      fremanezumab-vfrm (Ajovy) 225 MG/1 5ML auto-injector Inject 225 mg under the skin every 30 (thirty) days      hydroxychloroquine (PLAQUENIL) 200 mg tablet        MG tablet       levothyroxine 200 mcg tablet       levothyroxine 25 mcg tablet Take 1 5 tablets (37 5 mcg total) by mouth daily 45 tablet 3    lidocaine (LIDODERM) 5 % Apply 1 patch topically daily as needed (back pain) Remove & Discard patch within 12 hours or as directed by MD Castro patch 1    loratadine (CLARITIN) 10 mg tablet Take 1 pill daily for allergies 90 tablet 1    meclizine (ANTIVERT) 25 mg tablet Take 1 tablet (25 mg total) by mouth every 8 (eight) hours as needed for dizziness 30 tablet 1    meloxicam (MOBIC) 15 mg tablet       meloxicam (MOBIC) 7 5 mg tablet Daily      methocarbamol (ROBAXIN) 750 mg tablet       mometasone (ELOCON) 0 1 % cream Apply topically daily 45 g 0    naratriptan (AMERGE) 2 5 MG tablet       OLANZapine (ZyPREXA) 5 mg tablet       ondansetron (Zofran ODT) 4 mg disintegrating tablet Take 1 tablet (4 mg total) by mouth every 6 (six) hours as needed for nausea or vomiting 20 tablet 0    Respiratory Therapy Supplies (NEBULIZER) DONA by Does not apply route every 4 (four) hours while awake 90 each 1    Semaglutide,0 25 or 0 5MG/DOS, 2 MG/1 5ML SOPN Inject 0 5 mg under the skin once a week 3 mL 0    Topiramate  MG CP24 Take 100 mg by mouth 2 (two) times a day      Ubrelvy 100 MG tablet       venlafaxine (EFFEXOR-XR) 37 5 mg 24 hr capsule Take 1 capsule (37 5 mg total) by mouth daily with breakfast 30 capsule 0    Ventolin  (90 Base) MCG/ACT inhaler INHALE TWO PUFFS EVERY 6 (SIX) HOURS AS NEEDED FOR WHEEZING OR SHORTNESS OF BREATH 18 g 1    XIFAXAN 550 MG tablet        No current facility-administered medications for this visit  Allergies   Allergen Reactions    Doxycycline Rash    Erythromycin Rash    Other Edema and Wheezing     jalapeno    Latex Rash    Duloxetine      Other reaction(s): Nausea, Loopy    Eletriptan      Pain in lower jaw with pressure in throat    Emgality [Galcanezumab-Gnlm]     Galcanezumab      rash       Review of Systems    Video Exam    There were no vitals filed for this visit      Physical Exam     I spent 52 minutes directly with the patient during this visit

## 2022-09-09 ENCOUNTER — OFFICE VISIT (OUTPATIENT)
Dept: FAMILY MEDICINE CLINIC | Facility: CLINIC | Age: 52
End: 2022-09-09
Payer: MEDICARE

## 2022-09-09 VITALS
SYSTOLIC BLOOD PRESSURE: 104 MMHG | DIASTOLIC BLOOD PRESSURE: 78 MMHG | HEART RATE: 82 BPM | OXYGEN SATURATION: 98 % | TEMPERATURE: 97.6 F

## 2022-09-09 DIAGNOSIS — R23.2 HOT FLASHES: Primary | ICD-10-CM

## 2022-09-09 PROBLEM — U07.1 COVID-19: Status: RESOLVED | Noted: 2022-06-22 | Resolved: 2022-09-09

## 2022-09-09 PROBLEM — R63.5 WEIGHT GAIN: Status: RESOLVED | Noted: 2022-06-13 | Resolved: 2022-09-09

## 2022-09-09 PROCEDURE — 99213 OFFICE O/P EST LOW 20 MIN: CPT | Performed by: NURSE PRACTITIONER

## 2022-09-09 RX ORDER — CLOTRIMAZOLE AND BETAMETHASONE DIPROPIONATE 10; .64 MG/G; MG/G
CREAM TOPICAL
COMMUNITY
Start: 2022-08-18

## 2022-09-09 NOTE — PROGRESS NOTES
Assessment/Plan:     Chronic Problems:  No problem-specific Assessment & Plan notes found for this encounter  Visit Diagnosis:  Diagnoses and all orders for this visit:    Hot flashes  Comments:  Patient did not do well with velafaxine  Declines gabapentin  Advised black cohash and evening primrose oil  Other orders  -     clotrimazole-betamethasone (LOTRISONE) 1-0 05 % cream          Subjective:    Patient ID: Nisa Escudero is a 47 y o  female  Patient presents for routine follow up  Venlafaxine was not good for her  She only took this for 2 days  She did not sleep  She does not want to be on gabapentin due to possible weight gain  She continues with hot flashes  Continues with fatigue post viral illness  The following portions of the patient's history were reviewed and updated as appropriate: allergies, current medications, past family history, past medical history, past social history, past surgical history and problem list     Review of Systems   Constitutional: Positive for fatigue  Negative for chills and fever  Hot flashes     HENT: Negative for ear pain and sore throat  Eyes: Negative for pain and visual disturbance  Respiratory: Negative for cough and shortness of breath  Cardiovascular: Negative for chest pain and palpitations  Gastrointestinal: Negative for abdominal pain and vomiting  Genitourinary: Negative for dysuria and hematuria  Neurological: Positive for headaches  All other systems reviewed and are negative          /78   Pulse 82   Temp 97 6 °F (36 4 °C) (Tympanic)   SpO2 98%   Social History     Socioeconomic History    Marital status: Single     Spouse name: Not on file    Number of children: Not on file    Years of education: Not on file    Highest education level: Not on file   Occupational History    Occupation: Disability   Tobacco Use    Smoking status: Never Smoker    Smokeless tobacco: Never Used   Vaping Use    Vaping Use: Never used   Substance and Sexual Activity    Alcohol use: No    Drug use: Never    Sexual activity: Not Currently     Partners: Male     Birth control/protection: Abstinence, Female Sterilization     Comment: Hysterectomy   Other Topics Concern    Not on file   Social History Narrative    Not on file     Social Determinants of Health     Financial Resource Strain: Not on file   Food Insecurity: Not on file   Transportation Needs: Not on file   Physical Activity: Not on file   Stress: Not on file   Social Connections: Not on file   Intimate Partner Violence: Not on file   Housing Stability: Not on file     Past Medical History:   Diagnosis Date    Anxiety     Anxiety     Asthma     B[facility] disorder ([facility] [address] )     Carpal tunnel syndrome     Chronic pain     lower back    Depression     Disease of thyroid gland     Dyslipidemia     Fibromyalgia     Irritable bowel     Kidney stones     Kidney stones 2019    Migraine     Obesity (BMI 30 0-34  5)     Psychiatric disorder     depression/anxiety    Suicide attempt [facility])     as teen    Vitamin D deficiency      Family History   Problem Relation Age of Onset    Hypertension Mother     Diabetes Mother     Hypothyroidism Mother     Stroke Mother     Bipolar disorder Mother     Anxiety disorder Mother     Arthritis Mother     Depression Mother     Mental illness Mother     Cancer Father         pancreatic     Hypothyroidism Sister     Hypertension Brother     Heart disease Brother     Cancer Maternal Uncle         lung    Cancer Paternal Aunt         breast    Breast cancer Paternal Aunt     Cancer Paternal Uncle         testicular     Cancer Paternal Grandmother         breast    Dementia Paternal Grandmother     Breast cancer Paternal Grandmother     Cancer Cousin         brain    Bipolar disorder Daughter      Past Surgical History:   Procedure Laterality Date    BUNIONECTOMY       SECTION      CHOLECYSTECTOMY      PARTIAL HYSTERECTOMY      TONSILLECTOMY      TUBAL LIGATION         Current Outpatient Medications:     albuterol (2 5 mg/3 mL) 0 083 % nebulizer solution, Take 1 vial (2 5 mg total) by nebulization every 6 (six) hours as needed for wheezing or shortness of breath, Disp: 20 vial, Rfl: 0    azelastine (ASTELIN) 0 1 % nasal spray, 1 spray into each nostril 2 (two) times a day Use in each nostril as directed, Disp: 30 mL, Rfl: 0    Butalbital-APAP-Caffeine -40 MG CAPS, TAKE 1 CAPSULE Daily PRN, Disp: , Rfl:     cariprazine (Vraylar) 4 5 MG capsule, Take 1 capsule (4 5 mg total) by mouth daily, Disp: 30 capsule, Rfl: 1    celecoxib (CELEBREX) 200 mg capsule, Take 1 capsule (200 mg total) by mouth daily, Disp: 30 capsule, Rfl: 2    ciclopirox (PENLAC) 8 % solution, Apply topically daily at bedtime, Disp: 6 6 mL, Rfl: 0    clotrimazole-betamethasone (LOTRISONE) 1-0 05 % cream, , Disp: , Rfl:     colchicine (COLCRYS) 0 6 mg tablet, Take 1 tablet (0 6 mg total) by mouth daily, Disp: 30 tablet, Rfl: 5    dexamethasone (DECADRON) 4 mg tablet, , Disp: , Rfl:     divalproex sodium (DEPAKOTE ER) 250 mg 24 hr tablet, , Disp: , Rfl:     divalproex sodium (DEPAKOTE) 250 mg EC tablet, Take 250 mg by mouth daily, Disp: , Rfl:     famotidine (PEPCID) 40 MG tablet, famotidine 40 mg tablet, Disp: , Rfl:     fremanezumab-vfrm (Ajovy) 225 MG/1 5ML auto-injector, Inject 225 mg under the skin every 30 (thirty) days, Disp: , Rfl:     hydroxychloroquine (PLAQUENIL) 200 mg tablet, , Disp: , Rfl:      MG tablet, , Disp: , Rfl:     levothyroxine 200 mcg tablet, , Disp: , Rfl:     levothyroxine 25 mcg tablet, Take 1 5 tablets (37 5 mcg total) by mouth daily, Disp: 45 tablet, Rfl: 3    lidocaine (LIDODERM) 5 %, Apply 1 patch topically daily as needed (back pain) Remove & Discard patch within 12 hours or as directed by MD, Disp: 6 patch, Rfl: 1    loratadine (CLARITIN) 10 mg tablet, Take 1 pill daily for allergies, Disp: 90 tablet, Rfl: 1    meclizine (ANTIVERT) 25 mg tablet, Take 1 tablet (25 mg total) by mouth every 8 (eight) hours as needed for dizziness, Disp: 30 tablet, Rfl: 1    meloxicam (MOBIC) 15 mg tablet, , Disp: , Rfl:     meloxicam (MOBIC) 7 5 mg tablet, Daily, Disp: , Rfl:     methocarbamol (ROBAXIN) 750 mg tablet, , Disp: , Rfl:     mometasone (ELOCON) 0 1 % cream, Apply topically daily, Disp: 45 g, Rfl: 0    naratriptan (AMERGE) 2 5 MG tablet, , Disp: , Rfl:     OLANZapine (ZyPREXA) 5 mg tablet, , Disp: , Rfl:     ondansetron (Zofran ODT) 4 mg disintegrating tablet, Take 1 tablet (4 mg total) by mouth every 6 (six) hours as needed for nausea or vomiting, Disp: 20 tablet, Rfl: 0    Semaglutide,0 25 or 0 5MG/DOS, 2 MG/1 5ML SOPN, Inject 0 5 mg under the skin once a week, Disp: 3 mL, Rfl: 0    Topiramate  MG CP24, Take 100 mg by mouth 2 (two) times a day, Disp: , Rfl:     Ubrelvy 100 MG tablet, , Disp: , Rfl:     Ventolin  (90 Base) MCG/ACT inhaler, INHALE TWO PUFFS EVERY 6 (SIX) HOURS AS NEEDED FOR WHEEZING OR SHORTNESS OF BREATH, Disp: 18 g, Rfl: 1    XIFAXAN 550 MG tablet, , Disp: , Rfl:     clonazePAM (KlonoPIN) 0 5 mg tablet, Take 1 tablet (0 5 mg total) by mouth daily as needed for anxiety, Disp: 30 tablet, Rfl: 1    Respiratory Therapy Supplies (NEBULIZER) DONA, by Does not apply route every 4 (four) hours while awake, Disp: 90 each, Rfl: 1    Allergies   Allergen Reactions    Doxycycline Rash    Erythromycin Rash    Other Edema and Wheezing     jalapeno    Latex Rash    Duloxetine      Other reaction(s): Nausea, Loopy    Eletriptan      Pain in lower jaw with pressure in throat    Emgality [Galcanezumab-Gnlm]     Galcanezumab      rash          Lab Review   No visits with results within 2 Month(s) from this visit     Latest known visit with results is:   Hospital Outpatient Visit on 06/24/2022   Component Date Value    LA size 06/24/2022 3 2     Triscuspid Valve Regurgi* 06/24/2022 14 0     Tricuspid valve peak reg* 06/24/2022 1 88     LVPWd 06/24/2022 0 80     Left Atrium Area-systoli* 06/24/2022 11 5     MV E' Tissue Velocity La* 06/24/2022 12     TR Peak Ehsan 06/24/2022 1 9     IVSd 06/24/2022 5 05     LV DIASTOLIC VOLUME (MOD* 41/17/1565 54     LEFT VENTRICLE SYSTOLIC * 31/27/0959 15     Left ventricular stroke * 06/24/2022 40 00     A4C EF 06/24/2022 72     LVIDd 06/24/2022 3 60     IVS 06/24/2022 0 9     LVIDS 06/24/2022 2 10     FS 06/24/2022 42     Ao root 06/24/2022 2 90     RVID d 06/24/2022 2 8     MV valve area p 1/2 meth* 06/24/2022 3 55     E wave deceleration time 06/24/2022 212     MV Peak E Ehsan 06/24/2022 98     MV Peak A Ehsan 06/24/2022 0 8     RAA A4C 06/24/2022 9 7     MV stenosis pressure 1/2* 06/24/2022 62     LVSV, 2D 06/24/2022 40     LV EF 06/24/2022 65     Est  RA pres 06/24/2022 3 0     Right Ventricular Peak S* 06/24/2022 17 00         Imaging: No results found  Objective:     Physical Exam  Constitutional:       Appearance: She is well-developed  Cardiovascular:      Rate and Rhythm: Normal rate and regular rhythm  Heart sounds: Normal heart sounds  No murmur heard  Pulmonary:      Effort: Pulmonary effort is normal  No respiratory distress  Breath sounds: Normal breath sounds  Skin:     General: Skin is warm and dry  Neurological:      Mental Status: She is alert and oriented to person, place, and time  Psychiatric:         Mood and Affect: Mood normal            There are no Patient Instructions on file for this visit  ERICKA Lu    Portions of the record may have been created with voice recognition software  Occasional wrong word or "sound a like" substitutions may have occurred due to the inherent limitations of voice recognition software  Read the chart carefully and recognize, using context, where substitutions have occurred

## 2022-09-13 ENCOUNTER — IMMUNIZATIONS (OUTPATIENT)
Dept: FAMILY MEDICINE CLINIC | Facility: CLINIC | Age: 52
End: 2022-09-13
Payer: MEDICARE

## 2022-09-13 DIAGNOSIS — Z23 NEED FOR INFLUENZA VACCINATION: Primary | ICD-10-CM

## 2022-09-13 PROCEDURE — 90682 RIV4 VACC RECOMBINANT DNA IM: CPT

## 2022-09-13 PROCEDURE — G0008 ADMIN INFLUENZA VIRUS VAC: HCPCS

## 2022-09-14 DIAGNOSIS — U07.1 COVID-19: ICD-10-CM

## 2022-09-14 DIAGNOSIS — R07.9 CHEST PAIN, UNSPECIFIED TYPE: ICD-10-CM

## 2022-09-14 RX ORDER — LEVOTHYROXINE SODIUM 0.03 MG/1
37.5 TABLET ORAL DAILY
Qty: 45 TABLET | Refills: 2 | Status: SHIPPED | OUTPATIENT
Start: 2022-09-14

## 2022-09-15 ENCOUNTER — TELEMEDICINE (OUTPATIENT)
Dept: PSYCHIATRY | Facility: CLINIC | Age: 52
End: 2022-09-15
Payer: MEDICARE

## 2022-09-15 ENCOUNTER — OFFICE VISIT (OUTPATIENT)
Dept: OBGYN CLINIC | Facility: MEDICAL CENTER | Age: 52
End: 2022-09-15
Payer: MEDICARE

## 2022-09-15 VITALS
HEIGHT: 63 IN | BODY MASS INDEX: 25.83 KG/M2 | DIASTOLIC BLOOD PRESSURE: 80 MMHG | SYSTOLIC BLOOD PRESSURE: 104 MMHG | WEIGHT: 145.8 LBS

## 2022-09-15 DIAGNOSIS — F41.1 GAD (GENERALIZED ANXIETY DISORDER): Primary | ICD-10-CM

## 2022-09-15 DIAGNOSIS — F31.81 BIPOLAR 2 DISORDER (HCC): ICD-10-CM

## 2022-09-15 DIAGNOSIS — B37.9 YEAST INFECTION: Primary | ICD-10-CM

## 2022-09-15 PROCEDURE — 99213 OFFICE O/P EST LOW 20 MIN: CPT | Performed by: STUDENT IN AN ORGANIZED HEALTH CARE EDUCATION/TRAINING PROGRAM

## 2022-09-15 PROCEDURE — 90834 PSYTX W PT 45 MINUTES: CPT

## 2022-09-15 RX ORDER — FLUCONAZOLE 150 MG/1
TABLET ORAL
Qty: 2 TABLET | Refills: 0 | Status: SHIPPED | OUTPATIENT
Start: 2022-09-15 | End: 2022-10-01

## 2022-09-15 NOTE — PROGRESS NOTES
Assessment/Plan:      Diagnoses and all orders for this visit:    Yeast infection  -     fluconazole (DIFLUCAN) 150 mg tablet; Take 1 tablet  If no improvement in symptoms in 24 hours, take second tablet  Subjective:     Patient ID: Dagmar Contreras is a 47 y o  female  48 yo [id number] for irritation, burning and white discharge a couple days after using a bath salt  No itching or odor  S/p hysterectomy  Has had yeast infections in the past but this feels different  No concerns for STD, not currently having intercourse  Only oral sex  Review of Systems   All other systems reviewed and are negative  Objective:     Physical Exam  Vitals reviewed  Pulmonary:      Effort: Pulmonary effort is normal    Genitourinary:     Comments: +white discharge, pH 4 5, hyphae on microscopy  Neurological:      Mental Status: She is oriented to person, place, and time     Psychiatric:         Behavior: Behavior normal

## 2022-09-15 NOTE — PSYCH
INDIVIDUAL SESSION NOTE     Length of time in session: 50 minutes, follow up in 2 weeks     Psychotherapy Provided: Individual      Diagnosis ICD-10-CM Associated Orders   1  ZAINA (generalized anxiety disorder)  F41 1    2  Bipolar 2 disorder ([address] )  F31 81           Goals addressed in session: Goal 1     Pain:      none    0    Current suicide risk:  minimal    Data: Met with Clara Crisostomo for a scheduled individual session  Topics discussed included emotional wellness, relationship with significant other, family stressors and mood regulation and symptoms  Jimbo Cat is feeling "good"  Jimbo Cat spoke about this new man she's talking to  He lives in [place] so they talk on the phone and video chat a lot  Jimbo Cat said she feels a special connection to him and they both have strong judith and Sabianist involvement as well  She feels like he's been a "breath of fresh air"  Jimbo Cat stated they have similar backgrounds, beliefs and life styles are very in line with one another so things feel "effortless with him"  She then stated her grandson has been acting out more recently and she had to have a meeting at his school  He's 11years old, on the Autism Spectrum and a lot of it is attention seeking  Jimbo Cat was really upset that one of the teachers said that she "fears for her safety" when he crosses his arm and won't listen  Jimbo Cat said he's general education classes and she thinks it was an attempt to get him into special education  She is against that; Jimbo Cat asserted that she's always available to discuss his behavior so she can support them and tell them how she handles things  Jimbo Cat shows evidence of utilizing effective communication skills and maintaining emotional composure to manage mental health symptoms  During this session, this clinical used the following therapeutic modalities supportive psychotherapy, client-centered therapy and solution-focused therapy  Assessment:  Jimbo Cat presents with a normal mood    her affect is normal range and intensity, appropriate  Radha Gardner was oriented x3  She was focused and engaged  Radha Gardner exhibits good therapeutic rapport with this clinician  she continues to exhibit willingness to work on treatment goals and objectives  Radha Gardner presents with a minimal risk of suicide, minimal risk of self-harm and minimal of harm to others  Plan:  Radha Gardner will return in 2 weeks for the next scheduled session  At the next session, this clinical will use supportive psychotherapy and client-centered therapy to address her anxiety, in an effort to assist Radha Gardner with meeting treatment goals  Behavioral Health Treatment Plan [facility] Replaced by [facility]: Diagnosis and Treatment Plan explained to Nader Galvan relates understanding diagnosis and is agreeable to Treatment Plan  Yes   Virtual Regular Visit    Verification of patient location:    Patient is located in the following state in which I hold an active license [place]      Assessment/Plan:    Problem List Items Addressed This Visit        Other    ZAINA (generalized anxiety disorder) - Primary    Bipolar 2 disorder ([facility] [address] )          Goals addressed in session: Goal 1          Reason for visit is   Chief Complaint   Patient presents with    Virtual Regular Visit        Encounter provider Jesusita Weaver LCSW    Provider located at [address] [place] [id number] [phone]      Recent Visits  Date Type Provider Dept   09/09/22 Office Visit [address] [address]   09/08/22 Telemedicine Jesusita Weaver LCSW [facility]   Showing recent visits within past 7 days and meeting all other requirements  Future Appointments  No visits were found meeting these conditions  Showing future appointments within next 150 days and meeting all other requirements       The patient was identified by name and date of birth   Tatiana Khan Janeth Ureña was informed that this is a telemedicine visit and that the [facility] and patient was informed that this is a secure, HIPAA-compliant platform  She agrees to proceed     My office door was closed  No one else was in the room  She acknowledged consent and understanding of privacy and security of the video platform  The patient has agreed to participate and understands they can discontinue the visit at any time  Patient is aware this is a billable service  Subjective  Yamileth Rico is a 47 y o  female  HPI     Past Medical History:   Diagnosis Date    Anxiety     Anxiety     Asthma     Bipolar disorder ([address] )     Carpal tunnel syndrome     Chronic pain     lower back    Depression     Disease of thyroid gland     Dyslipidemia     Fibromyalgia     Irritable bowel     Kidney stones     Kidney stones 2019    Migraine     Obesity (BMI 30 0-34  9)     Psychiatric disorder     depression/anxiety    Suicide attempt [facility])     as teen    Vitamin D deficiency        Past Surgical History:   Procedure Laterality Date    BUNIONECTOMY       SECTION      CHOLECYSTECTOMY      PARTIAL HYSTERECTOMY      TONSILLECTOMY      TUBAL LIGATION         Current Outpatient Medications   Medication Sig Dispense Refill    albuterol (2 5 mg/3 mL) 0 083 % nebulizer solution Take 1 vial (2 5 mg total) by nebulization every 6 (six) hours as needed for wheezing or shortness of breath 20 vial 0    azelastine (ASTELIN) 0 1 % nasal spray 1 spray into each nostril 2 (two) times a day Use in each nostril as directed 30 mL 0    Butalbital-APAP-Caffeine -40 MG CAPS TAKE 1 CAPSULE Daily PRN      cariprazine (Vraylar) 4 5 MG capsule Take 1 capsule (4 5 mg total) by mouth daily 30 capsule 1    celecoxib (CELEBREX) 200 mg capsule Take 1 capsule (200 mg total) by mouth daily 30 capsule 2    ciclopirox (PENLAC) 8 % solution Apply topically daily at bedtime 6 6 mL 0    clonazePAM (KlonoPIN) 0 5 mg tablet Take 1 tablet (0 5 mg total) by mouth daily as needed for anxiety 30 tablet 1    clotrimazole-betamethasone (LOTRISONE) 1-0 05 % cream       colchicine (COLCRYS) 0 6 mg tablet Take 1 tablet (0 6 mg total) by mouth daily 30 tablet 5    dexamethasone (DECADRON) 4 mg tablet       divalproex sodium (DEPAKOTE ER) 250 mg 24 hr tablet       divalproex sodium (DEPAKOTE) 250 mg EC tablet Take 250 mg by mouth daily      famotidine (PEPCID) 40 MG tablet famotidine 40 mg tablet      fremanezumab-vfrm (Ajovy) 225 MG/1 5ML auto-injector Inject 225 mg under the skin every 30 (thirty) days      hydroxychloroquine (PLAQUENIL) 200 mg tablet        MG tablet       levothyroxine 200 mcg tablet       levothyroxine 25 mcg tablet TAKE 1 5 TABLETS (37 5 MCG TOTAL) BY MOUTH DAILY 45 tablet 2    lidocaine (LIDODERM) 5 % Apply 1 patch topically daily as needed (back pain) Remove & Discard patch within 12 hours or as directed by MD 6 patch 1    loratadine (CLARITIN) 10 mg tablet Take 1 pill daily for allergies 90 tablet 1    meclizine (ANTIVERT) 25 mg tablet Take 1 tablet (25 mg total) by mouth every 8 (eight) hours as needed for dizziness 30 tablet 1    meloxicam (MOBIC) 15 mg tablet       meloxicam (MOBIC) 7 5 mg tablet Daily      methocarbamol (ROBAXIN) 750 mg tablet       mometasone (ELOCON) 0 1 % cream Apply topically daily 45 g 0    naratriptan (AMERGE) 2 5 MG tablet       OLANZapine (ZyPREXA) 5 mg tablet       ondansetron (Zofran ODT) 4 mg disintegrating tablet Take 1 tablet (4 mg total) by mouth every 6 (six) hours as needed for nausea or vomiting 20 tablet 0    Respiratory Therapy Supplies (NEBULIZER) DONA by Does not apply route every 4 (four) hours while awake 90 each 1    Semaglutide,0 25 or 0 5MG/DOS, 2 MG/1 5ML SOPN Inject 0 5 mg under the skin once a week 3 mL 0    Topiramate  MG CP24 Take 100 mg by mouth 2 (two) times a day      Ubrelvy 100 MG tablet       Ventolin  (90 Base) MCG/ACT inhaler INHALE TWO PUFFS EVERY 6 (SIX) HOURS AS NEEDED FOR WHEEZING OR SHORTNESS OF BREATH 18 g 1    XIFAXAN 550 MG tablet        No current facility-administered medications for this visit  Allergies   Allergen Reactions    Doxycycline Rash    Erythromycin Rash    Other Edema and Wheezing     jalapeno    Latex Rash    Duloxetine      Other reaction(s): Nausea, Loopy    Eletriptan      Pain in lower jaw with pressure in throat    Emgality [Galcanezumab-Gnlm]     Galcanezumab      rash       Review of Systems    Video Exam    There were no vitals filed for this visit      Physical Exam     I spent 50 minutes directly with the patient during this visit

## 2022-09-21 DIAGNOSIS — R63.5 WEIGHT GAIN: ICD-10-CM

## 2022-09-22 RX ORDER — SEMAGLUTIDE 1.34 MG/ML
INJECTION, SOLUTION SUBCUTANEOUS
Qty: 3 ML | Refills: 1 | Status: SHIPPED | OUTPATIENT
Start: 2022-09-22 | End: 2022-09-30 | Stop reason: SDUPTHER

## 2022-09-29 ENCOUNTER — TELEMEDICINE (OUTPATIENT)
Dept: PSYCHIATRY | Facility: CLINIC | Age: 52
End: 2022-09-29
Payer: MEDICARE

## 2022-09-29 DIAGNOSIS — F31.81 BIPOLAR 2 DISORDER (HCC): Primary | ICD-10-CM

## 2022-09-29 DIAGNOSIS — F41.1 GAD (GENERALIZED ANXIETY DISORDER): ICD-10-CM

## 2022-09-29 PROCEDURE — 90834 PSYTX W PT 45 MINUTES: CPT

## 2022-09-29 NOTE — PSYCH
INDIVIDUAL SESSION NOTE     Length of time in session: 52 minutes, follow up in 1 week     Session start time: 8:02am   Session End time: 8:54am    Psychotherapy Provided: Individual      Diagnosis ICD-10-CM Associated Orders   1  Bipolar 2 disorder (HCC)  F31 81    2  ZAINA (generalized anxiety disorder)  F41 1           Goals addressed in session: Goal 1     Pain:      none    0    Current suicide risk:  minimal    Data: Met with Roni Trevino for a scheduled individual session  Topics discussed included emotional wellness, relationship with significant other, relationships with family and mood regulation and symptoms  Royce Valdovinos is feeling "ok"  She got emotional talking about her new boyfriend; she stated she couldn't ask for anyone better  She started to cry when she said he has cooked for her, noting that no one had cooked for since her father was alive  Royce Valdovinos stated she always felt safe with her dad and because of her dad  Her new boyfriend makes her feel safe and protected and she hasn't felt that in a long time  She spoke at length about her father and her boyfriend reminds her of some of his qualities and it means very much to her  Royce Valdovinos states she feels scared because he is so kind and caring towards her and she's never really had that in a relationship  We talked at length about past relationships, how the affect the present and vulnerability  She was encouraged to let herself feel her emotions, let them move through her and move forward  Royce Valdovinos shows evidence of utilizing effective communication skills and maintaining emotional composure to manage mental health symptoms  During this session, this clinical used the following therapeutic modalities supportive psychotherapy and client-centered therapy  Assessment:  Royce Valdovinos presents with a normal, dysphoric mood  her affect is tearful  Intrinity Valdovinos was oriented x3  She was focused and engaged  Royce Valdovinos exhibits good therapeutic rapport with this clinician  she continues to exhibit willingness to work on treatment goals and objectives  Hodan Mendez presents with a minimal risk of suicide, minimal risk of self-harm and minimal of harm to others  Plan:  Hodan Mendez will return in 1 week for the next scheduled session  Between sessions, she  will allow herself to feel the emotions that come up that remind her of her father and process them as she is comfortable and will report back during the next session re: success and barriers  At the next session, this clinical will use supportive psychotherapy and client-centered therapy to address her anxiety, in an effort to assist Hodan Mendez with meeting treatment goals  Behavioral Health Treatment Plan [facility]: Diagnosis and Treatment Plan explained to Anish Lang relates understanding diagnosis and is agreeable to Treatment Plan  Yes     Virtual Regular Visit    Verification of patient location:    Patient is located in the following state in which I hold an active license [place]      Assessment/Plan:    Problem List Items Addressed This Visit        Other    ZAINA (generalized anxiety disorder)    Bipolar 2 disorder ([facility] [address] ) - Primary          Goals addressed in session: Goal 1          Reason for visit is   Chief Complaint   Patient presents with    Virtual Regular Visit        Encounter provider Robe Gloria LCSW    Provider located at [address] [place] [id number] [phone]      Recent Visits  No visits were found meeting these conditions  Showing recent visits within past 7 days and meeting all other requirements  Future Appointments  No visits were found meeting these conditions  Showing future appointments within next 150 days and meeting all other requirements       The patient was identified by name and date of birth   Rodney Esperanza was informed that this is a telemedicine visit and that the visit is being conducted through[employer] and patient was informed this is a secure, HIPAA-complaint platform  She agrees to proceed     My office door was closed  No one else was in the room  She acknowledged consent and understanding of privacy and security of the video platform  The patient has agreed to participate and understands they can discontinue the visit at any time  Patient is aware this is a billable service  Subjective  Bhakti Dawn is a 47 y o  female  HPI     Past Medical History:   Diagnosis Date    Anxiety     Anxiety     Asthma     Bipolar disorder ([address] )     Carpal tunnel syndrome     Chronic pain     lower back    Depression     Disease of thyroid gland     Dyslipidemia     Fibromyalgia     Irritable bowel     Kidney stones     Kidney stones 2019    Migraine     Obesity (BMI 30 0-34  9)     Psychiatric disorder     depression/anxiety    Suicide [facility])     as teen    Vitamin D deficiency        Past Surgical History:   Procedure Laterality Date    BUNIONECTOMY       SECTION      CHOLECYSTECTOMY      PARTIAL HYSTERECTOMY      TONSILLECTOMY      TUBAL LIGATION         Current Outpatient Medications   Medication Sig Dispense Refill    albuterol (2 5 mg/3 mL) 0 083 % nebulizer solution Take 1 vial (2 5 mg total) by nebulization every 6 (six) hours as needed for wheezing or shortness of breath 20 vial 0    azelastine (ASTELIN) 0 1 % nasal spray 1 spray into each nostril 2 (two) times a day Use in each nostril as directed (Patient not taking: Reported on 9/15/2022) 30 mL 0    Butalbital-APAP-Caffeine -40 MG CAPS TAKE 1 CAPSULE Daily PRN      cariprazine (Vraylar) 4 5 MG capsule Take 1 capsule (4 5 mg total) by mouth daily 30 capsule 1    celecoxib (CELEBREX) 200 mg capsule Take 1 capsule (200 mg total) by mouth daily 30 capsule 2    ciclopirox (PENLAC) 8 % solution Apply topically daily at bedtime 6 6 mL 0    clonazePAM (KlonoPIN) 0 5 mg tablet Take 1 tablet (0 5 mg total) by mouth daily as needed for anxiety 30 tablet 1    clotrimazole-betamethasone (LOTRISONE) 1-0 05 % cream       colchicine (COLCRYS) 0 6 mg tablet Take 1 tablet (0 6 mg total) by mouth daily 30 tablet 5    dexamethasone (DECADRON) 4 mg tablet       divalproex sodium (DEPAKOTE ER) 250 mg 24 hr tablet       divalproex sodium (DEPAKOTE) 250 mg EC tablet Take 250 mg by mouth daily      famotidine (PEPCID) 40 MG tablet famotidine 40 mg tablet      fluconazole (DIFLUCAN) 150 mg tablet Take 1 tablet  If no improvement in symptoms in 24 hours, take second tablet   2 tablet 0    fremanezumab-vfrm (Ajovy) 225 MG/1 5ML auto-injector Inject 225 mg under the skin every 30 (thirty) days      hydroxychloroquine (PLAQUENIL) 200 mg tablet        MG tablet       levothyroxine 200 mcg tablet       levothyroxine 25 mcg tablet TAKE 1 5 TABLETS (37 5 MCG TOTAL) BY MOUTH DAILY 45 tablet 2    lidocaine (LIDODERM) 5 % Apply 1 patch topically daily as needed (back pain) Remove & Discard patch within 12 hours or as directed by MD 6 patch 1    loratadine (CLARITIN) 10 mg tablet Take 1 pill daily for allergies 90 tablet 1    meclizine (ANTIVERT) 25 mg tablet Take 1 tablet (25 mg total) by mouth every 8 (eight) hours as needed for dizziness 30 tablet 1    meloxicam (MOBIC) 15 mg tablet       meloxicam (MOBIC) 7 5 mg tablet Daily      methocarbamol (ROBAXIN) 750 mg tablet       mometasone (ELOCON) 0 1 % cream Apply topically daily 45 g 0    naratriptan (AMERGE) 2 5 MG tablet       OLANZapine (ZyPREXA) 5 mg tablet       ondansetron (Zofran ODT) 4 mg disintegrating tablet Take 1 tablet (4 mg total) by mouth every 6 (six) hours as needed for nausea or vomiting 20 tablet 0    Ozempic, 1 MG/DOSE, 4 MG/3ML SOPN injection pen INJECT 0 5 MG UNDER THE SKIN ONCE A WEEK 3 mL 1    Respiratory Therapy Supplies (NEBULIZER) DONA by Does not apply route every 4 (four) hours while awake 90 each 1    Topiramate  MG CP24 Take 100 mg by mouth 2 (two) times a day      Ubrelvy 100 MG tablet       Ventolin  (90 Base) MCG/ACT inhaler INHALE TWO PUFFS EVERY 6 (SIX) HOURS AS NEEDED FOR WHEEZING OR SHORTNESS OF BREATH 18 g 1    XIFAXAN 550 MG tablet        No current facility-administered medications for this visit  Allergies   Allergen Reactions    Doxycycline Rash    Erythromycin Rash    Other Edema and Wheezing     jalapeno    Latex Rash    Duloxetine      Other reaction(s): Nausea, Loopy    Eletriptan      Pain in lower jaw with pressure in throat    Emgality [Galcanezumab-Gnlm]     Galcanezumab      rash       Review of Systems    Video Exam    There were no vitals filed for this visit      Physical Exam     I spent 52 minutes directly with the patient during this visit

## 2022-09-30 ENCOUNTER — TELEPHONE (OUTPATIENT)
Dept: FAMILY MEDICINE CLINIC | Facility: CLINIC | Age: 52
End: 2022-09-30

## 2022-09-30 ENCOUNTER — OFFICE VISIT (OUTPATIENT)
Dept: FAMILY MEDICINE CLINIC | Facility: CLINIC | Age: 52
End: 2022-09-30
Payer: MEDICARE

## 2022-09-30 VITALS
HEART RATE: 73 BPM | DIASTOLIC BLOOD PRESSURE: 78 MMHG | OXYGEN SATURATION: 100 % | SYSTOLIC BLOOD PRESSURE: 120 MMHG | TEMPERATURE: 96 F | HEIGHT: 63 IN | WEIGHT: 144.5 LBS | BODY MASS INDEX: 25.6 KG/M2

## 2022-09-30 DIAGNOSIS — K58.8 OTHER IRRITABLE BOWEL SYNDROME: ICD-10-CM

## 2022-09-30 DIAGNOSIS — M54.16 LUMBAR RADICULOPATHY: ICD-10-CM

## 2022-09-30 DIAGNOSIS — D84.9 IMMUNOCOMPROMISED (HCC): ICD-10-CM

## 2022-09-30 DIAGNOSIS — R63.5 WEIGHT GAIN: ICD-10-CM

## 2022-09-30 PROBLEM — M54.40 CHRONIC RIGHT-SIDED LOW BACK PAIN WITH SCIATICA: Status: ACTIVE | Noted: 2020-01-15

## 2022-09-30 PROBLEM — M54.40 CHRONIC RIGHT-SIDED LOW BACK PAIN WITH SCIATICA: Status: RESOLVED | Noted: 2020-01-15 | Resolved: 2022-09-30

## 2022-09-30 PROBLEM — G89.29 CHRONIC RIGHT-SIDED LOW BACK PAIN WITH SCIATICA: Status: RESOLVED | Noted: 2020-01-15 | Resolved: 2022-09-30

## 2022-09-30 PROCEDURE — 99214 OFFICE O/P EST MOD 30 MIN: CPT

## 2022-09-30 RX ORDER — OMEPRAZOLE 40 MG/1
40 CAPSULE, DELAYED RELEASE ORAL DAILY
Qty: 90 CAPSULE | Refills: 1 | Status: SHIPPED | OUTPATIENT
Start: 2022-09-30

## 2022-09-30 RX ORDER — SEMAGLUTIDE 1.34 MG/ML
1 INJECTION, SOLUTION SUBCUTANEOUS WEEKLY
Qty: 3 ML | Refills: 1 | Status: SHIPPED | OUTPATIENT
Start: 2022-09-30

## 2022-09-30 NOTE — ASSESSMENT & PLAN NOTE
Limit added sugar and carbohydrates in your diet  Increase Ozempic to 1 mg weekly  Follow-up in about 8 weeks

## 2022-09-30 NOTE — TELEPHONE ENCOUNTER
Forgot to ask you what supplement can she use for digestion and discomfort with going to the bathroom?

## 2022-09-30 NOTE — PROGRESS NOTES
Assessment/Plan:     Problem List Items Addressed This Visit        Digestive    IBS (irritable bowel syndrome)     Continue to see GI         Relevant Medications    omeprazole (PriLOSEC) 40 MG capsule       Nervous and Auditory    Lumbar radiculopathy     Continue to see orthopedics  Information provided regarding medical marijuana  Other    Weight gain     Recent weight loss of 1 lb  Continue to take 1 mg of Ozempic weekly  Continue to make healthy food choices and decrease added sugar and carbohydrates in your diet  Relevant Medications    semaglutide, 1 mg/dose, (Ozempic, 1 MG/DOSE,) 4 MG/3ML SOPN injection pen    BMI 26 0-26 9,adult - Primary     Limit added sugar and carbohydrates in your diet  Increase Ozempic to 1 mg weekly  Follow-up in about 8 weeks  Relevant Medications    semaglutide, 1 mg/dose, (Ozempic, 1 MG/DOSE,) 4 MG/3ML SOPN injection pen    Immunocompromised ([address] )     Continue to see Rheumatology                 Subjective:      Patient ID: Greg Klein is a 47 y o  female  Patient presents to the office for weight loss follow-up  She has been doing 0 5 mg of Ozempic weekly and she has been doing well on it  Will increase to 1 mg weekly, patient in agreement  She is also reporting chronic back pain and she is wondering if she would be a good candidate for medical marijuana, information provided  The following portions of the patient's history were reviewed and updated as appropriate:   Past Medical History:  She has a past medical history of Anxiety, Anxiety, Asthma, Bipolar disorder ([facility] [address] ), Carpal tunnel syndrome, Chronic pain, Depression, Disease of thyroid gland, Dyslipidemia, Fibromyalgia, Irritable bowel, Kidney stones, Kidney stones (5/20/2019), Migraine, Obesity (BMI 30 0-34 9), Psychiatric disorder, Suicide attempt ([address] ), and Vitamin D deficiency  ,  _______________________________________________________________________  Medical Problems:  does not have any pertinent problems on file ,  _______________________________________________________________________  Past Surgical History:   has a past surgical history that includes  section; Cholecystectomy; Tonsillectomy; Partial hysterectomy; Bunionectomy; and Tubal ligation  ,  _______________________________________________________________________  Family History:  family history includes Anxiety disorder in her mother; Arthritis in her mother; Bipolar disorder in her daughter and mother; Breast cancer in her paternal aunt and paternal grandmother; Cancer in her cousin, father, maternal uncle, paternal aunt, paternal grandmother, and paternal uncle; Dementia in her paternal grandmother; Depression in her mother; Diabetes in her mother; Heart disease in her brother; Hypertension in her brother and mother; Hypothyroidism in her mother and sister; Mental illness in her mother; Stroke in her mother ,  _______________________________________________________________________  Social History:   reports that she has never smoked  She has never used smokeless tobacco  She reports that she does not drink alcohol and does not use drugs  ,  _______________________________________________________________________  Allergies:  is allergic to doxycycline, erythromycin, other, latex, duloxetine, eletriptan, emgality [galcanezumab-gnlm], and galcanezumab     _______________________________________________________________________  Current Outpatient Medications   Medication Sig Dispense Refill    albuterol (2 5 mg/3 mL) 0 083 % nebulizer solution Take 1 vial (2 5 mg total) by nebulization every 6 (six) hours as needed for wheezing or shortness of breath 20 vial 0    Butalbital-APAP-Caffeine -40 MG CAPS TAKE 1 CAPSULE Daily PRN      cariprazine (Vraylar) 4 5 MG capsule Take 1 capsule (4 5 mg total) by mouth daily 30 capsule 1    celecoxib (CELEBREX) 200 mg capsule Take 1 capsule (200 mg total) by mouth daily 30 capsule 2    ciclopirox (PENLAC) 8 % solution Apply topically daily at bedtime 6 6 mL 0    clotrimazole-betamethasone (LOTRISONE) 1-0 05 % cream       colchicine (COLCRYS) 0 6 mg tablet Take 1 tablet (0 6 mg total) by mouth daily 30 tablet 5    dexamethasone (DECADRON) 4 mg tablet       divalproex sodium (DEPAKOTE ER) 250 mg 24 hr tablet       divalproex sodium (DEPAKOTE) 250 mg EC tablet Take 250 mg by mouth daily      famotidine (PEPCID) 40 MG tablet famotidine 40 mg tablet      fluconazole (DIFLUCAN) 150 mg tablet Take 1 tablet  If no improvement in symptoms in 24 hours, take second tablet   2 tablet 0    fremanezumab-vfrm (Ajovy) 225 MG/1 5ML auto-injector Inject 225 mg under the skin every 30 (thirty) days      hydroxychloroquine (PLAQUENIL) 200 mg tablet        MG tablet       levothyroxine 200 mcg tablet       levothyroxine 25 mcg tablet TAKE 1 5 TABLETS (37 5 MCG TOTAL) BY MOUTH DAILY 45 tablet 2    lidocaine (LIDODERM) 5 % Apply 1 patch topically daily as needed (back pain) Remove & Discard patch within 12 hours or as directed by MD 6 patch 1    loratadine (CLARITIN) 10 mg tablet Take 1 pill daily for allergies 90 tablet 1    meclizine (ANTIVERT) 25 mg tablet Take 1 tablet (25 mg total) by mouth every 8 (eight) hours as needed for dizziness 30 tablet 1    meloxicam (MOBIC) 15 mg tablet       meloxicam (MOBIC) 7 5 mg tablet Daily      methocarbamol (ROBAXIN) 750 mg tablet       mometasone (ELOCON) 0 1 % cream Apply topically daily 45 g 0    naratriptan (AMERGE) 2 5 MG tablet       OLANZapine (ZyPREXA) 5 mg tablet       omeprazole (PriLOSEC) 40 MG capsule Take 1 capsule (40 mg total) by mouth daily 90 capsule 1    ondansetron (Zofran ODT) 4 mg disintegrating tablet Take 1 tablet (4 mg total) by mouth every 6 (six) hours as needed for nausea or vomiting 20 tablet 0    semaglutide, 1 mg/dose, (Ozempic, 1 MG/DOSE,) 4 MG/3ML SOPN injection pen Inject 0 75 mL (1 mg total) under the skin once a week 3 mL 1    Topiramate  MG CP24 Take 100 mg by mouth 2 (two) times a day      Ubrelvy 100 MG tablet       Ventolin  (90 Base) MCG/ACT inhaler INHALE TWO PUFFS EVERY 6 (SIX) HOURS AS NEEDED FOR WHEEZING OR SHORTNESS OF BREATH 18 g 1    XIFAXAN 550 MG tablet       azelastine (ASTELIN) 0 1 % nasal spray 1 spray into each nostril 2 (two) times a day Use in each nostril as directed (Patient not taking: No sig reported) 30 mL 0    clonazePAM (KlonoPIN) 0 5 mg tablet Take 1 tablet (0 5 mg total) by mouth daily as needed for anxiety 30 tablet 1    Respiratory Therapy Supplies (NEBULIZER) DONA by Does not apply route every 4 (four) hours while awake 90 each 1     No current facility-administered medications for this visit      _______________________________________________________________________  Review of Systems   Constitutional: Positive for fatigue  Negative for chills and fever  HENT: Negative for ear pain and sore throat  Eyes: Negative for pain and visual disturbance  Respiratory: Negative for cough and shortness of breath  Cardiovascular: Negative for chest pain and palpitations  Gastrointestinal: Positive for abdominal pain  Negative for vomiting  Genitourinary: Negative for dysuria and hematuria  Musculoskeletal: Positive for arthralgias and back pain  Skin: Negative for color change and rash  Neurological: Negative for seizures and syncope  All other systems reviewed and are negative  Objective:  Vitals:    09/30/22 0905   BP: 120/78   BP Location: Left arm   Patient Position: Sitting   Cuff Size: Standard   Pulse: 73   Temp: (!) 96 °F (35 6 °C)   TempSrc: Tympanic   SpO2: 100%   Weight: 65 5 kg (144 lb 8 oz)   Height: 5' 2 5" (1 588 m)     Body mass index is 26 01 kg/m²  Physical Exam  Vitals and nursing note reviewed  Constitutional:       General: She is not in acute distress  Appearance: Normal appearance   She is not ill-appearing  Cardiovascular:      Rate and Rhythm: Normal rate and regular rhythm  Pulmonary:      Effort: Pulmonary effort is normal    Musculoskeletal:         General: Normal range of motion  Skin:     General: Skin is warm and dry  Neurological:      Mental Status: She is alert and oriented to person, place, and time  Psychiatric:         Mood and Affect: Mood normal          Behavior: Behavior normal          Thought Content:  Thought content normal          Judgment: Judgment normal

## 2022-09-30 NOTE — ASSESSMENT & PLAN NOTE
Recent weight loss of 1 lb  Continue to take 1 mg of Ozempic weekly  Continue to make healthy food choices and decrease added sugar and carbohydrates in your diet

## 2022-10-06 ENCOUNTER — TELEMEDICINE (OUTPATIENT)
Dept: PSYCHIATRY | Facility: CLINIC | Age: 52
End: 2022-10-06
Payer: MEDICARE

## 2022-10-06 DIAGNOSIS — F41.1 GAD (GENERALIZED ANXIETY DISORDER): ICD-10-CM

## 2022-10-06 DIAGNOSIS — F31.81 BIPOLAR 2 DISORDER (HCC): Primary | ICD-10-CM

## 2022-10-06 PROCEDURE — 90834 PSYTX W PT 45 MINUTES: CPT

## 2022-10-06 NOTE — PSYCH
INDIVIDUAL SESSION NOTE      Length of time in session: 35 minutes, follow up in 1 week      Session start time: 9:01am   Session End time: 9:36am (session interrupted and cut short due to IT issues)     Psychotherapy Provided: Individual        Diagnosis ICD-10-CM Associated Orders   1  Bipolar 2 disorder (HCC)  F31 81     2  ZAINA (generalized anxiety disorder)  F41  1              Goals addressed in session: Goal 1      Pain:       none     0     Current suicide risk:  minimal     Data: Met with Niko Marcelo for a scheduled individual session  Topics discussed included emotional wellness, relationship with significant other and family stressors  Denise Walker is feeling "tired"  She stated her grandson has been acting up recently; he's been hitting other kids at school and not listening  Denise Walker addresses these behaviors but she continues to get calls from school and she feels exhausted  Denise chas put on some bullying videos for him to watch to reinforce the talks she has with him and she stated he's a visual learner  Denise Walker is also trying to get him a therapist to work with him school  Denise Quirogachas then stated her IBS is really acting up and she has to get an endoscopy and swallow a camera so they can see what is going on  She also stated that her taste hasn't been the same since she had covid; she's been having trouble tolerating foods that she previously liked  Denise chas shows evidence of utilizing effective communication skills and maintaining emotional composure to manage mental health symptoms  During this session, this clinical used the following therapeutic modalities supportive psychotherapy, client-centered therapy and problem solving skills       Assessment:  Denise Walker presents with a normal, dysphoric mood  her affect is normal range and intensity, appropriate  Denise Walker was oriented x3  She was focused and engaged  Denise chas exhibits good therapeutic rapport with this clinician    she continues to exhibit willingness to work on treatment goals and objectives  Jasmina Hoffman presents with a minimal risk of suicide, minimal risk of self-harm and minimal of harm to others        Plan:  Jasmina Hoffman will return in 1 week for the next scheduled session  Between sessions, she  will focus on steps rather than the whole picture--such as focusing on getting the endoscopy  Completed versus thinking what will be a result of the endoscopy and will report back during the next session re: success and barriers  At the next session, this clinical will use supportive psychotherapy and client-centered therapy to address her anxiety, in an effort to assist Jasmina Hoffman with meeting treatment goals     [address] [place]: Diagnosis and Treatment Plan explained to Gregoria Sanchez relates understanding diagnosis and is agreeable to Treatment Plan  Yes      Virtual Regular Visit     Verification of patient location:     Patient is located in the following state in which I hold an active license [place]        Assessment/Plan:          Problem List Items Addressed This Visit                 Other      ZAINA (generalized anxiety disorder)      Bipolar 2 disorder ([facility] [address] ) - Primary              Goals addressed in session: Goal 1            Reason for visit is       Chief Complaint   Patient presents with    Virtual Regular Visit         Encounter provider Rakesh Talamantes LCSW     Provider located at [address] [place] [id number] [phone]        Recent Visits  Date Type Provider Dept   09/29/22 Telemedicine Rakesh Talamantes LCSW [facility]   Showing recent visits within past 7 days and meeting all other requirements  Future Appointments  No visits were found meeting these conditions  Showing future appointments within next 150 days and meeting all other requirements        The patient was identified by name and date of birth   Jasmina Hoffman Armen Ramon was informed that this is a telemedicine visit and that [facility] and patient was informed this is a secure, HIPAA-complaint platform  She agrees to proceed     My office door was closed  No one else was in the room  She acknowledged consent and understanding of privacy and security of the video platform  The patient has agreed to participate and understands they can discontinue the visit at any time      Patient is aware this is a billable service       Subjective  Kuldeep Reddy is a 47 y o  female         HPI           Past Medical History:   Diagnosis Date    Anxiety      Anxiety      Asthma      Bipolar disorder ([facility] [address] )      Carpal tunnel syndrome      Chronic pain       lower back    Depression      Disease of thyroid gland      Dyslipidemia      Fibromyalgia      Irritable bowel      Kidney stones      Kidney stones 2019    Migraine      Obesity (BMI 30 0-34  9)      Psychiatric disorder       depression/anxiety    Suicide attempt ([address] )       as teen    Vitamin D deficiency                 Past Surgical History:   Procedure Laterality Date    BUNIONECTOMY         SECTION        CHOLECYSTECTOMY        PARTIAL HYSTERECTOMY        TONSILLECTOMY        TUBAL LIGATION                    Current Outpatient Medications   Medication Sig Dispense Refill    albuterol (2 5 mg/3 mL) 0 083 % nebulizer solution Take 1 vial (2 5 mg total) by nebulization every 6 (six) hours as needed for wheezing or shortness of breath 20 vial 0    azelastine (ASTELIN) 0 1 % nasal spray 1 spray into each nostril 2 (two) times a day Use in each nostril as directed (Patient not taking: No sig reported) 30 mL 0    Butalbital-APAP-Caffeine -40 MG CAPS TAKE 1 CAPSULE Daily PRN        cariprazine (Vraylar) 4 5 MG capsule Take 1 capsule (4 5 mg total) by mouth daily 30 capsule 1    celecoxib (CELEBREX) 200 mg capsule Take 1 capsule (200 mg total) by mouth daily 30 capsule 2    ciclopirox (PENLAC) 8 % solution Apply topically daily at bedtime 6 6 mL 0    clonazePAM (KlonoPIN) 0 5 mg tablet Take 1 tablet (0 5 mg total) by mouth daily as needed for anxiety 30 tablet 1    clotrimazole-betamethasone (LOTRISONE) 1-0 05 % cream          colchicine (COLCRYS) 0 6 mg tablet Take 1 tablet (0 6 mg total) by mouth daily 30 tablet 5    dexamethasone (DECADRON) 4 mg tablet          divalproex sodium (DEPAKOTE ER) 250 mg 24 hr tablet          divalproex sodium (DEPAKOTE) 250 mg EC tablet Take 250 mg by mouth daily        famotidine (PEPCID) 40 MG tablet famotidine 40 mg tablet        fremanezumab-vfrm (Ajovy) 225 MG/1 5ML auto-injector Inject 225 mg under the skin every 30 (thirty) days        hydroxychloroquine (PLAQUENIL) 200 mg tablet           MG tablet          levothyroxine 200 mcg tablet          levothyroxine 25 mcg tablet TAKE 1 5 TABLETS (37 5 MCG TOTAL) BY MOUTH DAILY 45 tablet 2    lidocaine (LIDODERM) 5 % Apply 1 patch topically daily as needed (back pain) Remove & Discard patch within 12 hours or as directed by MD 6 patch 1    loratadine (CLARITIN) 10 mg tablet Take 1 pill daily for allergies 90 tablet 1    meclizine (ANTIVERT) 25 mg tablet Take 1 tablet (25 mg total) by mouth every 8 (eight) hours as needed for dizziness 30 tablet 1    meloxicam (MOBIC) 15 mg tablet          meloxicam (MOBIC) 7 5 mg tablet Daily        methocarbamol (ROBAXIN) 750 mg tablet          mometasone (ELOCON) 0 1 % cream Apply topically daily 45 g 0    naratriptan (AMERGE) 2 5 MG tablet          OLANZapine (ZyPREXA) 5 mg tablet          omeprazole (PriLOSEC) 40 MG capsule Take 1 capsule (40 mg total) by mouth daily 90 capsule 1    ondansetron (Zofran ODT) 4 mg disintegrating tablet Take 1 tablet (4 mg total) by mouth every 6 (six) hours as needed for nausea or vomiting 20 tablet 0    Respiratory Therapy Supplies (NEBULIZER) DONA by Does not apply route every 4 (four) hours while awake 90 each 1    semaglutide, 1 mg/dose, (Ozempic, 1 MG/DOSE,) 4 MG/3ML SOPN injection pen Inject 0 75 mL (1 mg total) under the skin once a week 3 mL 1    Topiramate  MG CP24 Take 100 mg by mouth 2 (two) times a day        Ubrelvy 100 MG tablet          Ventolin  (90 Base) MCG/ACT inhaler INHALE TWO PUFFS EVERY 6 (SIX) HOURS AS NEEDED FOR WHEEZING OR SHORTNESS OF BREATH 18 g 1    XIFAXAN 550 MG tablet            No current facility-administered medications for this visit                Allergies   Allergen Reactions    Doxycycline Rash    Erythromycin Rash    Other Edema and Wheezing       jalapeno    Latex Rash    Duloxetine         Other reaction(s): Nausea, Loopy    Eletriptan         Pain in lower jaw with pressure in throat    Emgality [Galcanezumab-Gnlm]      Galcanezumab         rash         Review of Systems     Video Exam     There were no vitals filed for this visit      Physical Exam      I spent 35 minutes directly with the patient during this visit

## 2022-10-09 ENCOUNTER — OFFICE VISIT (OUTPATIENT)
Dept: URGENT CARE | Facility: CLINIC | Age: 52
End: 2022-10-09
Payer: MEDICARE

## 2022-10-09 VITALS
HEART RATE: 83 BPM | RESPIRATION RATE: 18 BRPM | DIASTOLIC BLOOD PRESSURE: 64 MMHG | SYSTOLIC BLOOD PRESSURE: 112 MMHG | OXYGEN SATURATION: 99 % | TEMPERATURE: 97.2 F

## 2022-10-09 DIAGNOSIS — J06.9 VIRAL URI WITH COUGH: Primary | ICD-10-CM

## 2022-10-09 DIAGNOSIS — R06.02 SHORTNESS OF BREATH: ICD-10-CM

## 2022-10-09 PROCEDURE — 99213 OFFICE O/P EST LOW 20 MIN: CPT | Performed by: PHYSICIAN ASSISTANT

## 2022-10-09 PROCEDURE — G0463 HOSPITAL OUTPT CLINIC VISIT: HCPCS | Performed by: PHYSICIAN ASSISTANT

## 2022-10-09 RX ORDER — ALBUTEROL SULFATE 2.5 MG/3ML
2.5 SOLUTION RESPIRATORY (INHALATION) EVERY 6 HOURS PRN
Qty: 3 ML | Refills: 0 | Status: SHIPPED | OUTPATIENT
Start: 2022-10-09

## 2022-10-09 RX ORDER — BENZONATATE 200 MG/1
200 CAPSULE ORAL 3 TIMES DAILY PRN
Qty: 20 CAPSULE | Refills: 0 | Status: SHIPPED | OUTPATIENT
Start: 2022-10-09

## 2022-10-09 NOTE — PROGRESS NOTES
[facility]        NAME: Gregoria Beckford is a 47 y o  female  : 1970    MRN: [id number]  DATE: 2022  TIME: 8:37 AM    Assessment and Plan   Viral URI with cough [J06 9]  1  Viral URI with cough  benzonatate (TESSALON) 200 MG capsule   2  Shortness of breath  albuterol (2 5 mg/3 mL) 0 083 % nebulizer solution         Patient Instructions     Patient Instructions   Refilled prescription for albuterol solution to be used as needed for chest tightness /shortness of breath  Can take Tessalon every 8 hours as needed for cough  Recommend continuing over-the-counter Tylenol for discomfort and warm teas with honey  Follow up with PCP in 3-5 days  Proceed to  ER if symptoms worsen  Chief Complaint     Chief Complaint   Patient presents with   • Cough     Pt c/o cough with SOB and sore throat that started Friday night at home rapid covid negative         History of Present Illness       Patient is a 46-year-old female presenting today with cold symptoms x3 days  Patient notes over the last couple days she has been experiencing some nasal congestion, cough and chest tightness, has been taking over-the-counter vitamin supplements but notes no relief of her symptoms, notes that her grandchild who she takes care frequently has been experiencing cold symptoms for the last week and has been in close contact with them, performed an at home COVID test yesterday which was negative  Denies fever, chills, lightheadedness, dizziness, trouble swallowing  Review of Systems   Review of Systems   Constitutional: Negative for chills, fatigue and fever  HENT: Positive for congestion, postnasal drip and sore throat  Eyes: Negative for redness and itching  Respiratory: Positive for cough and chest tightness  Negative for shortness of breath  Cardiovascular: Negative for chest pain  Gastrointestinal: Negative for diarrhea, nausea and vomiting     Musculoskeletal: Negative for arthralgias and myalgias  Neurological: Negative for light-headedness and headaches           Current Medications       Current Outpatient Medications:   •  albuterol (2 5 mg/3 mL) 0 083 % nebulizer solution, Take 3 mL (2 5 mg total) by nebulization every 6 (six) hours as needed for wheezing or shortness of breath, Disp: 3 mL, Rfl: 0  •  benzonatate (TESSALON) 200 MG capsule, Take 1 capsule (200 mg total) by mouth 3 (three) times a day as needed for cough, Disp: 20 capsule, Rfl: 0  •  Butalbital-APAP-Caffeine -40 MG CAPS, TAKE 1 CAPSULE Daily PRN, Disp: , Rfl:   •  cariprazine (Vraylar) 4 5 MG capsule, Take 1 capsule (4 5 mg total) by mouth daily, Disp: 30 capsule, Rfl: 1  •  celecoxib (CELEBREX) 200 mg capsule, Take 1 capsule (200 mg total) by mouth daily, Disp: 30 capsule, Rfl: 2  •  divalproex sodium (DEPAKOTE ER) 250 mg 24 hr tablet, , Disp: , Rfl:   •  famotidine (PEPCID) 40 MG tablet, famotidine 40 mg tablet, Disp: , Rfl:   •  fremanezumab-vfrm (Ajovy) 225 MG/1 5ML auto-injector, Inject 225 mg under the skin every 30 (thirty) days, Disp: , Rfl:   •  hydroxychloroquine (PLAQUENIL) 200 mg tablet, , Disp: , Rfl:   •  levothyroxine 200 mcg tablet, , Disp: , Rfl:   •  levothyroxine 25 mcg tablet, TAKE 1 5 TABLETS (37 5 MCG TOTAL) BY MOUTH DAILY, Disp: 45 tablet, Rfl: 2  •  lidocaine (LIDODERM) 5 %, Apply 1 patch topically daily as needed (back pain) Remove & Discard patch within 12 hours or as directed by MD, Disp: 6 patch, Rfl: 1  •  meclizine (ANTIVERT) 25 mg tablet, Take 1 tablet (25 mg total) by mouth every 8 (eight) hours as needed for dizziness, Disp: 30 tablet, Rfl: 1  •  naratriptan (AMERGE) 2 5 MG tablet, , Disp: , Rfl:   •  OLANZapine (ZyPREXA) 5 mg tablet, , Disp: , Rfl:   •  omeprazole (PriLOSEC) 40 MG capsule, Take 1 capsule (40 mg total) by mouth daily, Disp: 90 capsule, Rfl: 1  •  semaglutide, 1 mg/dose, (Ozempic, 1 MG/DOSE,) 4 MG/3ML SOPN injection pen, Inject 0 75 mL (1 mg total) under the skin once a week, Disp: 3 mL, Rfl: 1  •  Topiramate  MG CP24, Take 100 mg by mouth 2 (two) times a day, Disp: , Rfl:   •  Ubrelvy 100 MG tablet, , Disp: , Rfl:   •  XIFAXAN 550 MG tablet, , Disp: , Rfl:   •  azelastine (ASTELIN) 0 1 % nasal spray, 1 spray into each nostril 2 (two) times a day Use in each nostril as directed (Patient not taking: No sig reported), Disp: 30 mL, Rfl: 0  •  ciclopirox (PENLAC) 8 % solution, Apply topically daily at bedtime, Disp: 6 6 mL, Rfl: 0  •  clonazePAM (KlonoPIN) 0 5 mg tablet, Take 1 tablet (0 5 mg total) by mouth daily as needed for anxiety, Disp: 30 tablet, Rfl: 1  •  clotrimazole-betamethasone (LOTRISONE) 1-0 05 % cream, , Disp: , Rfl:   •  colchicine (COLCRYS) 0 6 mg tablet, Take 1 tablet (0 6 mg total) by mouth daily, Disp: 30 tablet, Rfl: 5  •  dexamethasone (DECADRON) 4 mg tablet, , Disp: , Rfl:   •  divalproex sodium (DEPAKOTE) 250 mg EC tablet, Take 250 mg by mouth daily, Disp: , Rfl:   •   MG tablet, , Disp: , Rfl:   •  loratadine (CLARITIN) 10 mg tablet, Take 1 pill daily for allergies, Disp: 90 tablet, Rfl: 1  •  meloxicam (MOBIC) 15 mg tablet, , Disp: , Rfl:   •  meloxicam (MOBIC) 7 5 mg tablet, Daily, Disp: , Rfl:   •  methocarbamol (ROBAXIN) 750 mg tablet, , Disp: , Rfl:   •  mometasone (ELOCON) 0 1 % cream, Apply topically daily, Disp: 45 g, Rfl: 0  •  ondansetron (Zofran ODT) 4 mg disintegrating tablet, Take 1 tablet (4 mg total) by mouth every 6 (six) hours as needed for nausea or vomiting, Disp: 20 tablet, Rfl: 0  •  Respiratory Therapy Supplies (NEBULIZER) DONA, by Does not apply route every 4 (four) hours while awake, Disp: 90 each, Rfl: 1  •  Ventolin  (90 Base) MCG/ACT inhaler, INHALE TWO PUFFS EVERY 6 (SIX) HOURS AS NEEDED FOR WHEEZING OR SHORTNESS OF BREATH (Patient not taking: Reported on 10/9/2022), Disp: 18 g, Rfl: 1    Current Allergies     Allergies as of 10/09/2022 - Reviewed 10/09/2022   Allergen Reaction Noted   • Doxycycline Rash    • Erythromycin Rash 2018   • Other Edema and Wheezing 2008   • Latex Rash 2015   • Duloxetine  2020   • Eletriptan  2017   • Emgality [galcanezumab-gnlm]  2020   • Lindsay Katayama  10/06/2020            The following portions of the patient's history were reviewed and updated as appropriate: allergies, current medications, past family history, past medical history, past social history, past surgical history and problem list      Past Medical History:   Diagnosis Date   • Anxiety    • Anxiety    • Asthma    • Bipolar disorder ([facility] [address] )    • Carpal tunnel syndrome    • Chronic pain     lower back   • Depression    • Disease of thyroid gland    • Dyslipidemia    • Fibromyalgia    • Irritable bowel    • Kidney stones    • Kidney stones 2019   • Migraine    • Obesity (BMI 30 0-34  9)    • Psychiatric disorder     depression/anxiety   • Suicide attempt [facility])     as teen   • Vitamin D deficiency        Past Surgical History:   Procedure Laterality Date   • BUNIONECTOMY     •  SECTION     • CHOLECYSTECTOMY     • PARTIAL HYSTERECTOMY     • TONSILLECTOMY     • TUBAL LIGATION         Family History   Problem Relation Age of Onset   • Hypertension Mother    • Diabetes Mother    • Hypothyroidism Mother    • Stroke Mother    • Bipolar disorder Mother    • Anxiety disorder Mother    • Arthritis Mother    • Depression Mother    • Mental illness Mother    • Cancer Father         pancreatic    • Hypothyroidism Sister    • Hypertension Brother    • Heart disease Brother    • Cancer Maternal Uncle         lung   • Cancer Paternal Aunt         breast   • Breast cancer Paternal Aunt    • Cancer Paternal Uncle         testicular    • Cancer Paternal Grandmother         breast   • Dementia Paternal Grandmother    • Breast cancer Paternal Grandmother    • Cancer Cousin         brain   • Bipolar disorder Daughter          Medications have been verified          Objective   /64   Pulse 83 Temp (!) 97 2 °F (36 2 °C) (Temporal)   Resp 18   SpO2 99%        Physical Exam     Physical Exam  Vitals and nursing note reviewed  Constitutional:       General: She is not in acute distress  Appearance: Normal appearance  She is not ill-appearing  HENT:      Head: Normocephalic and atraumatic  Right Ear: Tympanic membrane, ear canal and external ear normal       Left Ear: Tympanic membrane, ear canal and external ear normal       Nose: Congestion present  Mouth/Throat:      Mouth: Mucous membranes are moist       Pharynx: Oropharynx is clear  No oropharyngeal exudate or posterior oropharyngeal erythema  Eyes:      Conjunctiva/sclera: Conjunctivae normal    Cardiovascular:      Rate and Rhythm: Normal rate and regular rhythm  Pulses: Normal pulses  Heart sounds: Normal heart sounds  Pulmonary:      Effort: Pulmonary effort is normal       Breath sounds: Normal breath sounds  Comments: SpO2 99% indicating adequate oxygenation  Lymphadenopathy:      Cervical: No cervical adenopathy  Skin:     General: Skin is warm  Neurological:      Mental Status: She is alert

## 2022-10-09 NOTE — PATIENT INSTRUCTIONS
Refilled prescription for albuterol solution to be used as needed for chest tightness /shortness of breath  Can take Tessalon every 8 hours as needed for cough  Recommend continuing over-the-counter Tylenol for discomfort and warm teas with honey

## 2022-10-13 ENCOUNTER — TELEMEDICINE (OUTPATIENT)
Dept: PSYCHIATRY | Facility: CLINIC | Age: 52
End: 2022-10-13
Payer: MEDICARE

## 2022-10-13 DIAGNOSIS — F31.81 BIPOLAR 2 DISORDER (HCC): ICD-10-CM

## 2022-10-13 DIAGNOSIS — F41.1 GAD (GENERALIZED ANXIETY DISORDER): Primary | ICD-10-CM

## 2022-10-13 PROCEDURE — 90834 PSYTX W PT 45 MINUTES: CPT

## 2022-10-13 NOTE — PSYCH
INDIVIDUAL SESSION NOTE     Length of time in session: 52 minutes, follow up in 1 week       Psychotherapy Provided: Individual      Diagnosis ICD-10-CM Associated Orders   1  ZAINA (generalized anxiety disorder)  F41 1    2  [facility] )  F31 81           Goals addressed in session: Goal 1     Pain:      none    0    Current suicide risk:  minimal    Data: Met with Keara Koch for a scheduled individual session  Topics discussed included emotional wellness, relationship with significant other and physical health concerns  Angel Funk is feeling "ok", she's been sick since last week  She had a really bad fever and ended up at Urgent Care  She was concerned she had covid because she has asthma  Angel Funk doesn't have covid but it's been a really bad cold  Her boyfriend hasn't been up because she's been sick but he's coming to visit this evening for several days  Angel Funk is excited to see him  Angel Funk talked at length about her relationship, her hesitations and tendency to self-sabotage  She is very sensitive and guarded; she cares a lot for him and it's scary for her  The more she feels, the more scared she gets that she's going to get hurt  We talked for a while about defense mechanisms and increasing her awareness of her feelings, acknowledging her fear and allowing it to move through her  Angel Funk shows evidence of utilizing effective communication skills and maintaining emotional composure to manage mental health symptoms  During this session, this clinical used the following therapeutic modalities supportive psychotherapy, client-centered therapy and problem solving skills  Assessment:  Angel uFnk presents with a normal mood  her affect is normal range and intensity, appropriate  Angel Funk was oriented x3  She was focused and engaged  Angel Funk exhibits good therapeutic rapport with this clinician  she continues to exhibit willingness to work on treatment goals and objectives    Angel Funk presents with a minimal risk of suicide, minimal risk of self-harm and minimal of harm to others  Plan:  Vipul Castillo will return in 1 week for the next scheduled session  Between sessions, she  will express her feelings with her boyfriend and allow him to help her navigate them and will report back during the next session re: success and barriers  At the next session, this clinical will use supportive psychotherapy and client-centered therapy to address her anxiety, in an effort to assist Vipul Castillo with meeting treatment goals  Behavioral Health Treatment Plan [facility]: Diagnosis and Treatment Plan explained to Cynthia Castillo relates understanding diagnosis and is agreeable to Treatment Plan  Yes     Virtual Regular Visit    Verification of patient location:    Patient is located in the following state in which I hold an active license [place]      Assessment/Plan:    Problem List Items Addressed This Visit        Other    ZAINA (generalized anxiety disorder) - Primary    Bipolar 2 disorder ([facility] [address] )          Goals addressed in session: Goal 1          Reason for visit is   Chief Complaint   Patient presents with   • Virtual Regular Visit        Encounter provider Lawson Webber LCSW    Provider located at [address] [place], [place] [id number] [phone]      Recent Visits  Date Type Provider Dept   10/06/22 Telemedicine Lawson Webber LCSW [facility]   Showing recent visits within past 7 days and meeting all other requirements  Future Appointments  No visits were found meeting these conditions  Showing future appointments within next 150 days and meeting all other requirements       The patient was identified by name and date of birth   Bhakti Dawn was informed that this is a telemedicine visit and [facility] and patient was informed this is a secure, HIPAA-complaint platform  She agrees to proceed     My office door was closed  No one else was in the room  She acknowledged consent and understanding of privacy and security of the video platform  The patient has agreed to participate and understands they can discontinue the visit at any time  Patient is aware this is a billable service  Subjective  Nisa Escudero is a 47 y o  female  HPI     Past Medical History:   Diagnosis Date   • Anxiety    • Anxiety    • Asthma    • Bipolar disorder [facility])    • Carpal tunnel syndrome    • Chronic pain     lower back   • Depression    • Disease of thyroid gland    • Dyslipidemia    • Fibromyalgia    • Irritable bowel    • Kidney stones    • Kidney stones 2019   • Migraine    • Obesity (BMI 30 0-34  9)    • Psychiatric disorder     depression/anxiety   • Suicide attempt [facility])     as teen   • Vitamin D deficiency        Past Surgical History:   Procedure Laterality Date   • BUNIONECTOMY     •  SECTION     • CHOLECYSTECTOMY     • PARTIAL HYSTERECTOMY     • TONSILLECTOMY     • TUBAL LIGATION         Current Outpatient Medications   Medication Sig Dispense Refill   • albuterol (2 5 mg/3 mL) 0 083 % nebulizer solution Take 3 mL (2 5 mg total) by nebulization every 6 (six) hours as needed for wheezing or shortness of breath 3 mL 0   • azelastine (ASTELIN) 0 1 % nasal spray 1 spray into each nostril 2 (two) times a day Use in each nostril as directed (Patient not taking: No sig reported) 30 mL 0   • benzonatate (TESSALON) 200 MG capsule Take 1 capsule (200 mg total) by mouth 3 (three) times a day as needed for cough 20 capsule 0   • Butalbital-APAP-Caffeine -40 MG CAPS TAKE 1 CAPSULE Daily PRN     • cariprazine (Vraylar) 4 5 MG capsule Take 1 capsule (4 5 mg total) by mouth daily 30 capsule 1   • celecoxib (CELEBREX) 200 mg capsule Take 1 capsule (200 mg total) by mouth daily 30 capsule 2   • ciclopirox (PENLAC) 8 % solution Apply topically daily at bedtime 6 6 mL 0 • clonazePAM (KlonoPIN) 0 5 mg tablet Take 1 tablet (0 5 mg total) by mouth daily as needed for anxiety 30 tablet 1   • clotrimazole-betamethasone (LOTRISONE) 1-0 05 % cream      • colchicine (COLCRYS) 0 6 mg tablet Take 1 tablet (0 6 mg total) by mouth daily 30 tablet 5   • dexamethasone (DECADRON) 4 mg tablet  (Patient not taking: Reported on 10/9/2022)     • divalproex sodium (DEPAKOTE ER) 250 mg 24 hr tablet      • divalproex sodium (DEPAKOTE) 250 mg EC tablet Take 250 mg by mouth daily     • famotidine (PEPCID) 40 MG tablet famotidine 40 mg tablet     • fremanezumab-vfrm (Ajovy) 225 MG/1 5ML auto-injector Inject 225 mg under the skin every 30 (thirty) days     • hydroxychloroquine (PLAQUENIL) 200 mg tablet      •  MG tablet      • levothyroxine 200 mcg tablet      • levothyroxine 25 mcg tablet TAKE 1 5 TABLETS (37 5 MCG TOTAL) BY MOUTH DAILY 45 tablet 2   • lidocaine (LIDODERM) 5 % Apply 1 patch topically daily as needed (back pain) Remove & Discard patch within 12 hours or as directed by MD 6 patch 1   • loratadine (CLARITIN) 10 mg tablet Take 1 pill daily for allergies 90 tablet 1   • meclizine (ANTIVERT) 25 mg tablet Take 1 tablet (25 mg total) by mouth every 8 (eight) hours as needed for dizziness 30 tablet 1   • meloxicam (MOBIC) 15 mg tablet      • meloxicam (MOBIC) 7 5 mg tablet Daily     • methocarbamol (ROBAXIN) 750 mg tablet      • mometasone (ELOCON) 0 1 % cream Apply topically daily 45 g 0   • naratriptan (AMERGE) 2 5 MG tablet      • OLANZapine (ZyPREXA) 5 mg tablet      • omeprazole (PriLOSEC) 40 MG capsule Take 1 capsule (40 mg total) by mouth daily 90 capsule 1   • ondansetron (Zofran ODT) 4 mg disintegrating tablet Take 1 tablet (4 mg total) by mouth every 6 (six) hours as needed for nausea or vomiting 20 tablet 0   • Respiratory Therapy Supplies (NEBULIZER) DONA by Does not apply route every 4 (four) hours while awake 90 each 1   • semaglutide, 1 mg/dose, (Ozempic, 1 MG/DOSE,) 4 MG/3ML SOPN injection pen Inject 0 75 mL (1 mg total) under the skin once a week 3 mL 1   • Topiramate  MG CP24 Take 100 mg by mouth 2 (two) times a day     • Ubrelvy 100 MG tablet      • Ventolin  (90 Base) MCG/ACT inhaler INHALE TWO PUFFS EVERY 6 (SIX) HOURS AS NEEDED FOR WHEEZING OR SHORTNESS OF BREATH (Patient not taking: Reported on 10/9/2022) 18 g 1   • XIFAXAN 550 MG tablet        No current facility-administered medications for this visit  Allergies   Allergen Reactions   • Doxycycline Rash   • Erythromycin Rash   • Other Edema and Wheezing     jalapeno   • Latex Rash   • Duloxetine      Other reaction(s): Nausea, Loopy   • Eletriptan      Pain in lower jaw with pressure in throat   • Emgality [Galcanezumab-Gnlm]    • Galcanezumab      rash       Review of Systems    Video Exam    There were no vitals filed for this visit      Physical Exam     I spent 52 minutes directly with the patient during this visit    Visit Time    Visit Start Time: 9:02 AM  Visit Stop Time: 9:54 AM  Total Visit Duration: 52 minutes

## 2022-10-18 ENCOUNTER — TELEPHONE (OUTPATIENT)
Dept: FAMILY MEDICINE CLINIC | Facility: CLINIC | Age: 52
End: 2022-10-18

## 2022-10-18 DIAGNOSIS — E03.8 OTHER SPECIFIED HYPOTHYROIDISM: Primary | ICD-10-CM

## 2022-10-18 DIAGNOSIS — R53.82 CHRONIC FATIGUE: ICD-10-CM

## 2022-10-18 DIAGNOSIS — E55.9 VITAMIN D DEFICIENCY: ICD-10-CM

## 2022-10-18 DIAGNOSIS — E53.8 B12 DEFICIENCY: ICD-10-CM

## 2022-10-18 DIAGNOSIS — E78.5 DYSLIPIDEMIA, GOAL LDL BELOW 130: ICD-10-CM

## 2022-10-18 DIAGNOSIS — D84.9 IMMUNOCOMPROMISED (HCC): ICD-10-CM

## 2022-10-18 NOTE — TELEPHONE ENCOUNTER
Pt called -     Pt wants to repeat her bw - pt reports she can't stay awake, feeling sleepy and falling to sleep spite she is getting a full night of sleep  Pt very grateful       Please advise

## 2022-10-20 ENCOUNTER — APPOINTMENT (OUTPATIENT)
Dept: LAB | Facility: HOSPITAL | Age: 52
End: 2022-10-20
Payer: MEDICARE

## 2022-10-20 ENCOUNTER — TELEMEDICINE (OUTPATIENT)
Dept: PSYCHIATRY | Facility: CLINIC | Age: 52
End: 2022-10-20
Payer: MEDICARE

## 2022-10-20 DIAGNOSIS — D84.9 IMMUNOCOMPROMISED (HCC): ICD-10-CM

## 2022-10-20 DIAGNOSIS — E55.9 VITAMIN D DEFICIENCY: ICD-10-CM

## 2022-10-20 DIAGNOSIS — E03.8 OTHER SPECIFIED HYPOTHYROIDISM: ICD-10-CM

## 2022-10-20 DIAGNOSIS — R53.82 CHRONIC FATIGUE: ICD-10-CM

## 2022-10-20 DIAGNOSIS — R07.9 CHEST PAIN, UNSPECIFIED TYPE: ICD-10-CM

## 2022-10-20 DIAGNOSIS — U07.1 COVID-19: ICD-10-CM

## 2022-10-20 DIAGNOSIS — F41.1 GAD (GENERALIZED ANXIETY DISORDER): Primary | ICD-10-CM

## 2022-10-20 DIAGNOSIS — F31.81 BIPOLAR 2 DISORDER (HCC): ICD-10-CM

## 2022-10-20 DIAGNOSIS — E53.8 B12 DEFICIENCY: ICD-10-CM

## 2022-10-20 DIAGNOSIS — E78.5 DYSLIPIDEMIA, GOAL LDL BELOW 130: ICD-10-CM

## 2022-10-20 LAB
25(OH)D3 SERPL-MCNC: 53.6 NG/ML (ref 30–100)
ALBUMIN SERPL BCP-MCNC: 3.7 G/DL (ref 3.5–5)
ALP SERPL-CCNC: 78 U/L (ref 46–116)
ALT SERPL W P-5'-P-CCNC: 24 U/L (ref 12–78)
ANION GAP SERPL CALCULATED.3IONS-SCNC: 7 MMOL/L (ref 4–13)
AST SERPL W P-5'-P-CCNC: 21 U/L (ref 5–45)
BASOPHILS # BLD AUTO: 0.01 THOUSANDS/ÂΜL (ref 0–0.1)
BASOPHILS NFR BLD AUTO: 0 % (ref 0–1)
BILIRUB SERPL-MCNC: 0.36 MG/DL (ref 0.2–1)
BUN SERPL-MCNC: 14 MG/DL (ref 5–25)
CALCIUM SERPL-MCNC: 8.7 MG/DL (ref 8.3–10.1)
CARDIAC TROPONIN I PNL SERPL HS: 2 NG/L (ref 8–18)
CHLORIDE SERPL-SCNC: 111 MMOL/L (ref 96–108)
CHOLEST SERPL-MCNC: 186 MG/DL
CO2 SERPL-SCNC: 27 MMOL/L (ref 21–32)
CREAT SERPL-MCNC: 0.72 MG/DL (ref 0.6–1.3)
D DIMER PPP FEU-MCNC: 0.3 UG/ML FEU
EOSINOPHIL # BLD AUTO: 0.04 THOUSAND/ÂΜL (ref 0–0.61)
EOSINOPHIL NFR BLD AUTO: 1 % (ref 0–6)
ERYTHROCYTE [DISTWIDTH] IN BLOOD BY AUTOMATED COUNT: 13.2 % (ref 11.6–15.1)
GFR SERPL CREATININE-BSD FRML MDRD: 97 ML/MIN/1.73SQ M
GLUCOSE P FAST SERPL-MCNC: 87 MG/DL (ref 65–99)
HCT VFR BLD AUTO: 37.7 % (ref 34.8–46.1)
HDLC SERPL-MCNC: 63 MG/DL
HGB BLD-MCNC: 12.4 G/DL (ref 11.5–15.4)
IMM GRANULOCYTES # BLD AUTO: 0 THOUSAND/UL (ref 0–0.2)
IMM GRANULOCYTES NFR BLD AUTO: 0 % (ref 0–2)
LDLC SERPL CALC-MCNC: 110 MG/DL (ref 0–100)
LYMPHOCYTES # BLD AUTO: 1.4 THOUSANDS/ÂΜL (ref 0.6–4.47)
LYMPHOCYTES NFR BLD AUTO: 32 % (ref 14–44)
MCH RBC QN AUTO: 30 PG (ref 26.8–34.3)
MCHC RBC AUTO-ENTMCNC: 32.9 G/DL (ref 31.4–37.4)
MCV RBC AUTO: 91 FL (ref 82–98)
MONOCYTES # BLD AUTO: 0.28 THOUSAND/ÂΜL (ref 0.17–1.22)
MONOCYTES NFR BLD AUTO: 6 % (ref 4–12)
NEUTROPHILS # BLD AUTO: 2.65 THOUSANDS/ÂΜL (ref 1.85–7.62)
NEUTS SEG NFR BLD AUTO: 61 % (ref 43–75)
NONHDLC SERPL-MCNC: 123 MG/DL
NRBC BLD AUTO-RTO: 0 /100 WBCS
PLATELET # BLD AUTO: 302 THOUSANDS/UL (ref 149–390)
PMV BLD AUTO: 9.4 FL (ref 8.9–12.7)
POTASSIUM SERPL-SCNC: 4.4 MMOL/L (ref 3.5–5.3)
PROT SERPL-MCNC: 6.9 G/DL (ref 6.4–8.4)
RBC # BLD AUTO: 4.14 MILLION/UL (ref 3.81–5.12)
SODIUM SERPL-SCNC: 145 MMOL/L (ref 135–147)
T4 FREE SERPL-MCNC: 0.77 NG/DL (ref 0.76–1.46)
TRIGL SERPL-MCNC: 66 MG/DL
TSH SERPL DL<=0.05 MIU/L-ACNC: 4.71 UIU/ML (ref 0.45–4.5)
VIT B12 SERPL-MCNC: 540 PG/ML (ref 100–900)
WBC # BLD AUTO: 4.38 THOUSAND/UL (ref 4.31–10.16)

## 2022-10-20 PROCEDURE — 84439 ASSAY OF FREE THYROXINE: CPT

## 2022-10-20 PROCEDURE — 85379 FIBRIN DEGRADATION QUANT: CPT

## 2022-10-20 PROCEDURE — 84443 ASSAY THYROID STIM HORMONE: CPT

## 2022-10-20 PROCEDURE — 80061 LIPID PANEL: CPT

## 2022-10-20 PROCEDURE — 85025 COMPLETE CBC W/AUTO DIFF WBC: CPT

## 2022-10-20 PROCEDURE — 84484 ASSAY OF TROPONIN QUANT: CPT

## 2022-10-20 PROCEDURE — 90834 PSYTX W PT 45 MINUTES: CPT

## 2022-10-20 PROCEDURE — 36415 COLL VENOUS BLD VENIPUNCTURE: CPT

## 2022-10-20 PROCEDURE — 82306 VITAMIN D 25 HYDROXY: CPT

## 2022-10-20 PROCEDURE — 82607 VITAMIN B-12: CPT

## 2022-10-20 PROCEDURE — 80053 COMPREHEN METABOLIC PANEL: CPT

## 2022-10-20 NOTE — PSYCH
INDIVIDUAL SESSION NOTE     Length of time in session: 52 minutes, follow up in 1 week     Psychotherapy Provided: Individual      Diagnosis ICD-10-CM Associated Orders   1  ZAINA (generalized anxiety disorder)  F41 1    2  Bipolar 2 disorder ([facility] [address] )  F31 81           Goals addressed in session: Goal 1     Pain:      none    0    Current suicide risk:  minimal    Data: Met with Clara Crisostomo for a scheduled individual session  Topics discussed included mental health condition(s), emotional wellness, coping skills, relationship with significant other, relationships with family, physical health concerns and mood regulation and symptoms  Jimbo Cat is feeling "ok"  She had an endoscopy recently and they found several issues going on; they removed a polyp, there's inflammation in her throat, she has gastritis and GERD  Jimbo Cat stated she still hasn't been feeling well  She's hoping they can get her figured out and on some medications that will helps  Jimbo Cat talked about her boyfriend for a while  She has been on her own for the most part, for a long time; she's become comfortable with being alone  When he comes to stay, she enjoys the time with him very much but she also struggles that she needs space at times  Jimbo Cat appreciates her alone time and there are times when she feels more emotional so being alone helps her sort it out  She tried to explain this to her boyfriend but he seemed to misunderstand  Jimbo Cat tried to explain that it's not him, this is how she's always been  She said they were able to talk about it further and he expressed that although he wants to help her during those times, he will respect her alone time  Jimbo Cat shows evidence of utilizing effective communication skills, maintaining emotional composure and effectively managing anger to manage mental health symptoms    During this session, this clinical used the following therapeutic modalities supportive psychotherapy, client-centered therapy and stress management skills  Assessment:  Meg Chaparro presents with a normal mood  her affect is normal range and intensity, appropriate  Meg Chaparro was oriented x3  She was focused and engaged  Meg Chaparro exhibits good therapeutic rapport with this clinician  she continues to exhibit willingness to work on treatment goals and objectives  Meg Chaparro presents with a minimal risk of suicide, minimal risk of self-harm and minimal of harm to others  Plan:  Meg Chaparro will return in 1 week for the next scheduled session  Between sessions, she  will keep communicating with her boyfriend about how she's feeling so she doesn't push him away and will report back during the next session re: success and barriers  At the next session, this clinical will use supportive psychotherapy and client-centered therapy to address her stress and anxiety, in an effort to assist Meg Chaparro with meeting treatment goals  Behavioral Health Treatment Plan [facility]: Diagnosis and Treatment Plan explained to Josekathyamelida Arenas relates understanding diagnosis and is agreeable to Treatment Plan   Yes     Virtual Regular Visit    Verification of patient location:    Patient is located in the following state in which I hold an active license [place]      Assessment/Plan:    Problem List Items Addressed This Visit        Other    ZAINA (generalized anxiety disorder) - Primary    Bipolar 2 disorder ([facility] [address] )          Goals addressed in session: Goal 1          Reason for visit is   Chief Complaint   Patient presents with   • Virtual Regular Visit        Encounter provider Marilyn Aquino LCSW    Provider located at [address] [place] [id number] [phone]      Recent Visits  Date Type Provider Dept   10/18/22 Telephone Elise Sierra [facility], [address] [place] [address]   10/13/22 Shobha Rodriguez LCSW [facility]   Showing recent visits within past 7 days and meeting all other requirements  Future Appointments  No visits were found meeting these conditions  Showing future appointments within next 150 days and meeting all other requirements       The patient was identified by name and date of birth  Deric Almendarez was informed that this is a telemedicine visit and [facility]  She agrees to proceed     My office door was closed  No one else was in the room  She acknowledged consent and understanding of privacy and security of the video platform  The patient has agreed to participate and understands they can discontinue the visit at any time  Patient is aware this is a billable service  Subjective  Deric Almendarez is a 47 y o  female  HPI     Past Medical History:   Diagnosis Date   • Anxiety    • Anxiety    • Asthma    • Bipolar disorder [facility])    • Carpal tunnel syndrome    • Chronic pain     lower back   • Depression    • Disease of thyroid gland    • Dyslipidemia    • Fibromyalgia    • Irritable bowel    • Kidney stones    • Kidney stones 2019   • Migraine    • Obesity (BMI 30 0-34  9)    • Psychiatric disorder     depression/anxiety   • Suicide attempt [facility])     as teen   • Vitamin D deficiency        Past Surgical History:   Procedure Laterality Date   • BUNIONECTOMY     •  SECTION     • CHOLECYSTECTOMY     • PARTIAL HYSTERECTOMY     • TONSILLECTOMY     • TUBAL LIGATION         Current Outpatient Medications   Medication Sig Dispense Refill   • albuterol (2 5 mg/3 mL) 0 083 % nebulizer solution Take 3 mL (2 5 mg total) by nebulization every 6 (six) hours as needed for wheezing or shortness of breath 3 mL 0   • azelastine (ASTELIN) 0 1 % nasal spray 1 spray into each nostril 2 (two) times a day Use in each nostril as directed (Patient not taking: No sig reported) 30 mL 0   • benzonatate (TESSALON) 200 MG capsule Take 1 capsule (200 mg total) by mouth 3 (three) times a day as needed for cough 20 capsule 0   • Butalbital-APAP-Caffeine -40 MG CAPS TAKE 1 CAPSULE Daily PRN     • cariprazine (Vraylar) 4 5 MG capsule Take 1 capsule (4 5 mg total) by mouth daily 30 capsule 1   • celecoxib (CELEBREX) 200 mg capsule Take 1 capsule (200 mg total) by mouth daily 30 capsule 2   • ciclopirox (PENLAC) 8 % solution Apply topically daily at bedtime 6 6 mL 0   • clonazePAM (KlonoPIN) 0 5 mg tablet Take 1 tablet (0 5 mg total) by mouth daily as needed for anxiety 30 tablet 1   • clotrimazole-betamethasone (LOTRISONE) 1-0 05 % cream      • colchicine (COLCRYS) 0 6 mg tablet Take 1 tablet (0 6 mg total) by mouth daily 30 tablet 5   • dexamethasone (DECADRON) 4 mg tablet  (Patient not taking: Reported on 10/9/2022)     • divalproex sodium (DEPAKOTE ER) 250 mg 24 hr tablet      • divalproex sodium (DEPAKOTE) 250 mg EC tablet Take 250 mg by mouth daily     • famotidine (PEPCID) 40 MG tablet famotidine 40 mg tablet     • fremanezumab-vfrm (Ajovy) 225 MG/1 5ML auto-injector Inject 225 mg under the skin every 30 (thirty) days     • hydroxychloroquine (PLAQUENIL) 200 mg tablet      •  MG tablet      • levothyroxine 200 mcg tablet      • levothyroxine 25 mcg tablet TAKE 1 5 TABLETS (37 5 MCG TOTAL) BY MOUTH DAILY 45 tablet 2   • lidocaine (LIDODERM) 5 % Apply 1 patch topically daily as needed (back pain) Remove & Discard patch within 12 hours or as directed by MD 6 patch 1   • loratadine (CLARITIN) 10 mg tablet Take 1 pill daily for allergies 90 tablet 1   • meclizine (ANTIVERT) 25 mg tablet Take 1 tablet (25 mg total) by mouth every 8 (eight) hours as needed for dizziness 30 tablet 1   • meloxicam (MOBIC) 15 mg tablet      • meloxicam (MOBIC) 7 5 mg tablet Daily     • methocarbamol (ROBAXIN) 750 mg tablet      • mometasone (ELOCON) 0 1 % cream Apply topically daily 45 g 0   • naratriptan (AMERGE) 2 5 MG tablet      • OLANZapine (ZyPREXA) 5 mg tablet      • omeprazole (PriLOSEC) 40 MG capsule Take 1 capsule (40 mg total) by mouth daily 90 capsule 1   • ondansetron (Zofran ODT) 4 mg disintegrating tablet Take 1 tablet (4 mg total) by mouth every 6 (six) hours as needed for nausea or vomiting 20 tablet 0   • Respiratory Therapy Supplies (NEBULIZER) DONA by Does not apply route every 4 (four) hours while awake 90 each 1   • semaglutide, 1 mg/dose, (Ozempic, 1 MG/DOSE,) 4 MG/3ML SOPN injection pen Inject 0 75 mL (1 mg total) under the skin once a week 3 mL 1   • Topiramate  MG CP24 Take 100 mg by mouth 2 (two) times a day     • Ubrelvy 100 MG tablet      • Ventolin  (90 Base) MCG/ACT inhaler INHALE TWO PUFFS EVERY 6 (SIX) HOURS AS NEEDED FOR WHEEZING OR SHORTNESS OF BREATH (Patient not taking: Reported on 10/9/2022) 18 g 1   • XIFAXAN 550 MG tablet        No current facility-administered medications for this visit  Allergies   Allergen Reactions   • Doxycycline Rash   • Erythromycin Rash   • Other Edema and Wheezing     jalapeno   • Latex Rash   • Duloxetine      Other reaction(s): Nausea, Loopy   • Eletriptan      Pain in lower jaw with pressure in throat   • Emgality [Galcanezumab-Gnlm]    • Galcanezumab      rash       Review of Systems    Video Exam    There were no vitals filed for this visit      Physical Exam     Visit Time    Visit Start Time: 9:03 AM  Visit Stop Time: 9:55 AM  Total Visit Duration: 52 minutes

## 2022-10-21 ENCOUNTER — OFFICE VISIT (OUTPATIENT)
Dept: FAMILY MEDICINE CLINIC | Facility: CLINIC | Age: 52
End: 2022-10-21
Payer: MEDICARE

## 2022-10-21 VITALS
DIASTOLIC BLOOD PRESSURE: 80 MMHG | HEIGHT: 63 IN | OXYGEN SATURATION: 98 % | WEIGHT: 145.8 LBS | SYSTOLIC BLOOD PRESSURE: 120 MMHG | TEMPERATURE: 98 F | BODY MASS INDEX: 25.83 KG/M2 | HEART RATE: 76 BPM | RESPIRATION RATE: 14 BRPM

## 2022-10-21 DIAGNOSIS — R63.5 WEIGHT GAIN: ICD-10-CM

## 2022-10-21 DIAGNOSIS — E03.8 OTHER SPECIFIED HYPOTHYROIDISM: Primary | ICD-10-CM

## 2022-10-21 PROCEDURE — 99213 OFFICE O/P EST LOW 20 MIN: CPT

## 2022-10-21 NOTE — ASSESSMENT & PLAN NOTE
Blood work reviewed, has yet to take 237 5mg of levothyroxine daily   Make an appt with endocrinology

## 2022-10-21 NOTE — PROGRESS NOTES
Assessment/Plan:         Problem List Items Addressed This Visit        Endocrine    Hypothyroidism - Primary     Blood work reviewed, has yet to take 237 5mg of levothyroxine daily  Make an appt with endocrinology             Other    Weight gain     Doing well on ozempic  BMI 26 0-26 9,adult     Continue make healthy food choices and stay active  Subjective:      Patient ID: Jennifer Justice is a 47 y o  female  Patient presents to the office for blood work review  Has been taking all of the medications as prescribed and denies any adverse effects  Recent studies and blood work reviewed  Denies any chest pain, shortness a breath, dizziness or increase in headaches  The following portions of the patient's history were reviewed and updated as appropriate:   Past Medical History:  She has a past medical history of Anxiety, Anxiety, Asthma, Bipolar disorder ([facility] [address] ), Carpal tunnel syndrome, Chronic pain, Depression, Disease of thyroid gland, Dyslipidemia, Fibromyalgia, Irritable bowel, Kidney stones, Kidney stones (2019), Migraine, Obesity (BMI 30 0-34 9), Psychiatric disorder, Suicide attempt ([facility] [address] ), and Vitamin D deficiency  ,  _______________________________________________________________________  Medical Problems:  does not have any pertinent problems on file ,  _______________________________________________________________________  Past Surgical History:   has a past surgical history that includes  section; Cholecystectomy; Tonsillectomy; Partial hysterectomy; Bunionectomy; and Tubal ligation  ,  _______________________________________________________________________  Family History:  family history includes Anxiety disorder in her mother; Arthritis in her mother; Bipolar disorder in her daughter and mother; Breast cancer in her paternal aunt and paternal grandmother; Cancer in her cousin, father, maternal uncle, paternal aunt, paternal grandmother, and paternal uncle; Dementia in her paternal grandmother; Depression in her mother; Diabetes in her mother; Heart disease in her brother; Hypertension in her brother and mother; Hypothyroidism in her mother and sister; Mental illness in her mother; Stroke in her mother ,  _______________________________________________________________________  Social History:   reports that she has never smoked  She has never used smokeless tobacco  She reports that she does not drink alcohol and does not use drugs  ,  _______________________________________________________________________  Allergies:  is allergic to doxycycline, erythromycin, other, latex, duloxetine, eletriptan, emgality [galcanezumab-gnlm], and galcanezumab     _______________________________________________________________________  Current Outpatient Medications   Medication Sig Dispense Refill   • albuterol (2 5 mg/3 mL) 0 083 % nebulizer solution Take 3 mL (2 5 mg total) by nebulization every 6 (six) hours as needed for wheezing or shortness of breath 3 mL 0   • azelastine (ASTELIN) 0 1 % nasal spray 1 spray into each nostril 2 (two) times a day Use in each nostril as directed (Patient not taking: No sig reported) 30 mL 0   • benzonatate (TESSALON) 200 MG capsule Take 1 capsule (200 mg total) by mouth 3 (three) times a day as needed for cough 20 capsule 0   • Butalbital-APAP-Caffeine -40 MG CAPS TAKE 1 CAPSULE Daily PRN     • cariprazine (Vraylar) 4 5 MG capsule Take 1 capsule (4 5 mg total) by mouth daily 30 capsule 1   • celecoxib (CELEBREX) 200 mg capsule Take 1 capsule (200 mg total) by mouth daily 30 capsule 2   • ciclopirox (PENLAC) 8 % solution Apply topically daily at bedtime 6 6 mL 0   • clonazePAM (KlonoPIN) 0 5 mg tablet Take 1 tablet (0 5 mg total) by mouth daily as needed for anxiety 30 tablet 1   • clotrimazole-betamethasone (LOTRISONE) 1-0 05 % cream      • colchicine (COLCRYS) 0 6 mg tablet Take 1 tablet (0 6 mg total) by mouth daily 30 tablet 5   • dexamethasone (DECADRON) 4 mg tablet  (Patient not taking: Reported on 10/9/2022)     • divalproex sodium (DEPAKOTE ER) 250 mg 24 hr tablet      • divalproex sodium (DEPAKOTE) 250 mg EC tablet Take 250 mg by mouth daily     • famotidine (PEPCID) 40 MG tablet famotidine 40 mg tablet     • fremanezumab-vfrm (Ajovy) 225 MG/1 5ML auto-injector Inject 225 mg under the skin every 30 (thirty) days     • hydroxychloroquine (PLAQUENIL) 200 mg tablet      •  MG tablet      • levothyroxine 200 mcg tablet      • levothyroxine 25 mcg tablet TAKE 1 5 TABLETS (37 5 MCG TOTAL) BY MOUTH DAILY 45 tablet 2   • lidocaine (LIDODERM) 5 % Apply 1 patch topically daily as needed (back pain) Remove & Discard patch within 12 hours or as directed by MD 6 patch 1   • loratadine (CLARITIN) 10 mg tablet Take 1 pill daily for allergies 90 tablet 1   • meclizine (ANTIVERT) 25 mg tablet Take 1 tablet (25 mg total) by mouth every 8 (eight) hours as needed for dizziness 30 tablet 1   • meloxicam (MOBIC) 15 mg tablet      • meloxicam (MOBIC) 7 5 mg tablet Daily     • methocarbamol (ROBAXIN) 750 mg tablet      • mometasone (ELOCON) 0 1 % cream Apply topically daily 45 g 0   • naratriptan (AMERGE) 2 5 MG tablet      • OLANZapine (ZyPREXA) 5 mg tablet      • omeprazole (PriLOSEC) 40 MG capsule Take 1 capsule (40 mg total) by mouth daily 90 capsule 1   • ondansetron (Zofran ODT) 4 mg disintegrating tablet Take 1 tablet (4 mg total) by mouth every 6 (six) hours as needed for nausea or vomiting 20 tablet 0   • Respiratory Therapy Supplies (NEBULIZER) DONA by Does not apply route every 4 (four) hours while awake 90 each 1   • semaglutide, 1 mg/dose, (Ozempic, 1 MG/DOSE,) 4 MG/3ML SOPN injection pen Inject 0 75 mL (1 mg total) under the skin once a week 3 mL 1   • Topiramate  MG CP24 Take 100 mg by mouth 2 (two) times a day     • Ubrelvy 100 MG tablet      • Ventolin  (90 Base) MCG/ACT inhaler INHALE TWO PUFFS EVERY 6 (SIX) HOURS AS NEEDED FOR WHEEZING OR SHORTNESS OF BREATH (Patient not taking: Reported on 10/9/2022) 18 g 1   • XIFAXAN 550 MG tablet        No current facility-administered medications for this visit      _______________________________________________________________________  Review of Systems   Constitutional: Negative for chills and fever  Cardiovascular: Negative for chest pain and palpitations  Gastrointestinal: Positive for abdominal pain and diarrhea  Genitourinary: Negative for dysuria and hematuria  Musculoskeletal: Negative for arthralgias and back pain  Skin: Negative for color change and rash  All other systems reviewed and are negative  Objective:  Vitals:    10/21/22 1132   BP: 120/80   Pulse: 76   Resp: 14   Temp: 98 °F (36 7 °C)   SpO2: 98%   Weight: 66 1 kg (145 lb 12 8 oz)   Height: 5' 2 5" (1 588 m)     Body mass index is 26 24 kg/m²  Physical Exam  Vitals and nursing note reviewed  Constitutional:       General: She is not in acute distress  Appearance: Normal appearance  She is not ill-appearing  Cardiovascular:      Rate and Rhythm: Normal rate and regular rhythm  Pulmonary:      Effort: Pulmonary effort is normal  No respiratory distress  Breath sounds: Normal breath sounds  No wheezing  Neurological:      Mental Status: She is alert and oriented to person, place, and time  Psychiatric:         Mood and Affect: Mood normal          Behavior: Behavior normal          Thought Content:  Thought content normal          Judgment: Judgment normal

## 2022-10-27 ENCOUNTER — TELEMEDICINE (OUTPATIENT)
Dept: PSYCHIATRY | Facility: CLINIC | Age: 52
End: 2022-10-27
Payer: MEDICARE

## 2022-10-27 DIAGNOSIS — F31.81 BIPOLAR 2 DISORDER (HCC): ICD-10-CM

## 2022-10-27 DIAGNOSIS — F41.1 GAD (GENERALIZED ANXIETY DISORDER): Primary | ICD-10-CM

## 2022-10-27 PROCEDURE — 90834 PSYTX W PT 45 MINUTES: CPT

## 2022-10-27 NOTE — PSYCH
INDIVIDUAL SESSION NOTE     Length of time in session: 43 minutes, follow up in 1 week     Psychotherapy Provided: Individual      Diagnosis ICD-10-CM Associated Orders   1  ZAINA (generalized anxiety disorder)  F41 1    2  Bipolar 2 disorder ([facility] [address] )  F31 81           Goals addressed in session: Goal 1     Pain:      none    0    Current suicide risk:  minimal    Data: Met with Kellie Serra for a scheduled individual session  Topics discussed included relationship with significant other  Иван Morgan is feeling frustrated; she and her boyfriend had a disagreement  He doesn't agree with her taking time for herself when she needs it  He thinks she's being cold and closing down  Иван Morgan explained she needs that time; it helps her processing and she feels she can clear her head  We talked about the relationship as a whole, and him as a whole; overall things are good and he's a great person  She was encouraged to think of how she processes conflict or perceived conflicts and try to resist shutting him out in favor of giving herself some time to think, [place] back to him to resolve it and move on from the conflict  Иван Morgan admits this is very difficult for her but states she feels he's worth it; it will just take work because that is her natural inclination  Иван Morgan shows evidence of utilizing maintaining emotional composure to manage mental health symptoms  During this session, this clinical used the following therapeutic modalities supportive psychotherapy, client-centered therapy and problem solving skills  Assessment:  Иван Morgan presents with a dysphoric mood  her affect is tearful  Иван Morgan was oriented x3  She was focused and engaged  Иван Morgan exhibits good therapeutic rapport with this clinician  she continues to exhibit willingness to work on treatment goals and objectives  Иван Morgan presents with a minimal risk of suicide, minimal risk of self-harm and minimal of harm to others        Plan:  Иван Morgan will return in 1 week for the next scheduled session  Between sessions, she  will work on how she resolves conflicts with her boyfriend and allow herself time to process before making any decisions and will report back during the next session re: success and barriers  At the next session, this clinical will use supportive psychotherapy and client-centered therapy to address her anxiety, in an effort to assist [place] with meeting treatment goals  Behavioral Health Treatment Plan [facility]: Diagnosis and Treatment Plan explained to Wilson Middleton relates understanding diagnosis and is agreeable to Treatment Plan  Yes     Virtual Regular Visit    Verification of patient location:    Patient is located in the following state in which I hold an active license [place]      Assessment/Plan:    Problem List Items Addressed This Visit        Other    ZAINA (generalized anxiety disorder) - Primary    Bipolar 2 disorder ([facility] [address] )          Goals addressed in session: Goal 1          Reason for visit is   Chief Complaint   Patient presents with   • Virtual Regular Visit        Encounter provider Alicia Banda LCSW    Provider located at [address] [place] [id number] [phone]      Recent Visits  Date Type Provider Dept   10/20/22 Telemedicine Alicia Banda LCSW [facility]   Showing recent visits within past 7 days and meeting all other requirements  Future Appointments  No visits were found meeting these conditions  Showing future appointments within next 150 days and meeting all other requirements       The patient was identified by name and date of birth  Merritt Barnes was informed that this is a telemedicine visit and [facility] the [facility]  She agrees to proceed     My office door was closed  No one else was in the room    She acknowledged consent and understanding of privacy and security of the video platform  The patient has agreed to participate and understands they can discontinue the visit at any time  Patient is aware this is a billable service  Subjective  Jamie Fair is a 47 y o  female  HPI     Past Medical History:   Diagnosis Date   • Anxiety    • Anxiety    • Asthma    • Bipolar disorder [facility])    • Carpal tunnel syndrome    • Chronic pain     lower back   • Depression    • Disease of thyroid gland    • Dyslipidemia    • Fibromyalgia    • Irritable bowel    • Kidney stones    • Kidney stones 2019   • Migraine    • Obesity (BMI 30 0-34  9)    • Psychiatric disorder     depression/anxiety   • Suicide attempt [facility])     as teen   • Vitamin D deficiency        Past Surgical History:   Procedure Laterality Date   • BUNIONECTOMY     •  SECTION     • CHOLECYSTECTOMY     • PARTIAL HYSTERECTOMY     • TONSILLECTOMY     • TUBAL LIGATION         Current Outpatient Medications   Medication Sig Dispense Refill   • albuterol (2 5 mg/3 mL) 0 083 % nebulizer solution Take 3 mL (2 5 mg total) by nebulization every 6 (six) hours as needed for wheezing or shortness of breath 3 mL 0   • azelastine (ASTELIN) 0 1 % nasal spray 1 spray into each nostril 2 (two) times a day Use in each nostril as directed (Patient not taking: No sig reported) 30 mL 0   • benzonatate (TESSALON) 200 MG capsule Take 1 capsule (200 mg total) by mouth 3 (three) times a day as needed for cough 20 capsule 0   • Butalbital-APAP-Caffeine -40 MG CAPS TAKE 1 CAPSULE Daily PRN     • cariprazine (Vraylar) 4 5 MG capsule Take 1 capsule (4 5 mg total) by mouth daily 30 capsule 1   • celecoxib (CELEBREX) 200 mg capsule Take 1 capsule (200 mg total) by mouth daily 30 capsule 2   • ciclopirox (PENLAC) 8 % solution Apply topically daily at bedtime 6 6 mL 0   • clonazePAM (KlonoPIN) 0 5 mg tablet Take 1 tablet (0 5 mg total) by mouth daily as needed for anxiety 30 tablet 1   • clotrimazole-betamethasone (LOTRISONE) 1-0 05 % cream      • colchicine (COLCRYS) 0 6 mg tablet Take 1 tablet (0 6 mg total) by mouth daily 30 tablet 5   • dexamethasone (DECADRON) 4 mg tablet  (Patient not taking: Reported on 10/9/2022)     • divalproex sodium (DEPAKOTE ER) 250 mg 24 hr tablet      • divalproex sodium (DEPAKOTE) 250 mg EC tablet Take 250 mg by mouth daily     • famotidine (PEPCID) 40 MG tablet famotidine 40 mg tablet     • fremanezumab-vfrm (Ajovy) 225 MG/1 5ML auto-injector Inject 225 mg under the skin every 30 (thirty) days     • hydroxychloroquine (PLAQUENIL) 200 mg tablet      •  MG tablet      • levothyroxine 200 mcg tablet      • levothyroxine 25 mcg tablet TAKE 1 5 TABLETS (37 5 MCG TOTAL) BY MOUTH DAILY 45 tablet 2   • lidocaine (LIDODERM) 5 % Apply 1 patch topically daily as needed (back pain) Remove & Discard patch within 12 hours or as directed by MD 6 patch 1   • loratadine (CLARITIN) 10 mg tablet Take 1 pill daily for allergies 90 tablet 1   • meclizine (ANTIVERT) 25 mg tablet Take 1 tablet (25 mg total) by mouth every 8 (eight) hours as needed for dizziness 30 tablet 1   • meloxicam (MOBIC) 15 mg tablet      • meloxicam (MOBIC) 7 5 mg tablet Daily     • methocarbamol (ROBAXIN) 750 mg tablet      • mometasone (ELOCON) 0 1 % cream Apply topically daily 45 g 0   • naratriptan (AMERGE) 2 5 MG tablet      • OLANZapine (ZyPREXA) 5 mg tablet      • omeprazole (PriLOSEC) 40 MG capsule Take 1 capsule (40 mg total) by mouth daily 90 capsule 1   • ondansetron (Zofran ODT) 4 mg disintegrating tablet Take 1 tablet (4 mg total) by mouth every 6 (six) hours as needed for nausea or vomiting 20 tablet 0   • Respiratory Therapy Supplies (NEBULIZER) DONA by Does not apply route every 4 (four) hours while awake 90 each 1   • semaglutide, 1 mg/dose, (Ozempic, 1 MG/DOSE,) 4 MG/3ML SOPN injection pen Inject 0 75 mL (1 mg total) under the skin once a week 3 mL 1   • Topiramate  MG CP24 Take 100 mg by mouth 2 (two) times a day     • Ubrelvy 100 MG tablet      • Ventolin  (90 Base) MCG/ACT inhaler INHALE TWO PUFFS EVERY 6 (SIX) HOURS AS NEEDED FOR WHEEZING OR SHORTNESS OF BREATH (Patient not taking: Reported on 10/9/2022) 18 g 1   • XIFAXAN 550 MG tablet        No current facility-administered medications for this visit  Allergies   Allergen Reactions   • Doxycycline Rash   • Erythromycin Rash   • Other Edema and Wheezing     jalapeno   • Latex Rash   • Duloxetine      Other reaction(s): Nausea, Loopy   • Eletriptan      Pain in lower jaw with pressure in throat   • Emgality [Galcanezumab-Gnlm]    • Galcanezumab      rash       Review of Systems    Video Exam    There were no vitals filed for this visit      Physical Exam     Visit Time    Visit Start Time: 9:05am (IT issues cause for late start)  Visit Stop Time: 9:48am  Total Visit Duration: 43 minutes minutes

## 2022-11-02 ENCOUNTER — OFFICE VISIT (OUTPATIENT)
Dept: FAMILY MEDICINE CLINIC | Facility: CLINIC | Age: 52
End: 2022-11-02

## 2022-11-02 VITALS
HEART RATE: 78 BPM | HEIGHT: 63 IN | OXYGEN SATURATION: 99 % | BODY MASS INDEX: 25.69 KG/M2 | TEMPERATURE: 98 F | DIASTOLIC BLOOD PRESSURE: 70 MMHG | SYSTOLIC BLOOD PRESSURE: 130 MMHG | WEIGHT: 145 LBS | RESPIRATION RATE: 16 BRPM

## 2022-11-02 DIAGNOSIS — R10.9 RIGHT FLANK PAIN: ICD-10-CM

## 2022-11-02 DIAGNOSIS — R31.9 HEMATURIA, UNSPECIFIED TYPE: Primary | ICD-10-CM

## 2022-11-02 DIAGNOSIS — R30.0 DYSURIA: ICD-10-CM

## 2022-11-02 DIAGNOSIS — N39.0 URINARY TRACT INFECTION WITH HEMATURIA, SITE UNSPECIFIED: ICD-10-CM

## 2022-11-02 DIAGNOSIS — R31.9 URINARY TRACT INFECTION WITH HEMATURIA, SITE UNSPECIFIED: ICD-10-CM

## 2022-11-02 LAB
SL AMB  POCT GLUCOSE, UA: ABNORMAL
SL AMB LEUKOCYTE ESTERASE,UA: 70
SL AMB POCT BILIRUBIN,UA: ABNORMAL
SL AMB POCT BLOOD,UA: 200
SL AMB POCT CLARITY,UA: ABNORMAL
SL AMB POCT COLOR,UA: ABNORMAL
SL AMB POCT KETONES,UA: ABNORMAL
SL AMB POCT NITRITE,UA: ABNORMAL
SL AMB POCT PH,UA: 6
SL AMB POCT SPECIFIC GRAVITY,UA: 1.03
SL AMB POCT URINE PROTEIN: ABNORMAL
SL AMB POCT UROBILINOGEN: ABNORMAL

## 2022-11-02 RX ORDER — CEPHALEXIN 500 MG/1
500 CAPSULE ORAL EVERY 12 HOURS SCHEDULED
Qty: 10 CAPSULE | Refills: 0 | Status: SHIPPED | OUTPATIENT
Start: 2022-11-02 | End: 2022-11-07

## 2022-11-02 RX ORDER — DICYCLOMINE HYDROCHLORIDE 10 MG/1
CAPSULE ORAL
COMMUNITY
Start: 2022-10-22

## 2022-11-02 RX ORDER — KETOROLAC TROMETHAMINE 30 MG/ML
60 INJECTION, SOLUTION INTRAMUSCULAR; INTRAVENOUS ONCE
Status: COMPLETED | OUTPATIENT
Start: 2022-11-02 | End: 2022-11-02

## 2022-11-02 RX ORDER — PHENAZOPYRIDINE HYDROCHLORIDE 100 MG/1
100 TABLET, FILM COATED ORAL 3 TIMES DAILY PRN
Qty: 10 TABLET | Refills: 0 | Status: SHIPPED | OUTPATIENT
Start: 2022-11-02

## 2022-11-02 RX ORDER — KETOCONAZOLE 20 MG/G
CREAM TOPICAL
COMMUNITY
Start: 2022-10-24

## 2022-11-02 RX ADMIN — KETOROLAC TROMETHAMINE 60 MG: 30 INJECTION, SOLUTION INTRAMUSCULAR; INTRAVENOUS at 13:34

## 2022-11-02 NOTE — ASSESSMENT & PLAN NOTE
Start taking antibiotic finished all the doses even if you feel better  May use Pyridium for bladder spasms  Increase water intake  Follow-up if no improvement in 1 week or sooner if symptoms worsen

## 2022-11-02 NOTE — PROGRESS NOTES
Assessment/Plan:       Problem List Items Addressed This Visit        Genitourinary    Urinary tract infection with hematuria     Start taking antibiotic finished all the doses even if you feel better  May use Pyridium for bladder spasms  Increase water intake  Follow-up if no improvement in 1 week or sooner if symptoms worsen  Relevant Medications    cephalexin (KEFLEX) 500 mg capsule    phenazopyridine (PYRIDIUM) 100 mg tablet      Other Visit Diagnoses     Hematuria, unspecified type    -  Primary    Does have history of kidney stones, worried about kidney infection  CT ordered  Relevant Orders    Urine culture    POCT urine dip (Completed)    CT abdomen pelvis wo contrast    Dysuria        Right flank pain        Obtain CT    Relevant Medications    ketorolac (TORADOL) 60 mg/2 mL IM injection 60 mg (Completed) (Start on 11/2/2022  1:45 PM)    Other Relevant Orders    CT abdomen pelvis wo contrast            Subjective:      Patient ID: Kellie Serra is a 47 y o  female  Blood in Urine  This is a new problem  The current episode started today  The problem has been waxing and waning since onset  She describes the hematuria as gross hematuria  The hematuria occurs during the initial portion of her urinary stream  The pain is moderate  She describes her urine color as light pink  Irritative symptoms include frequency and urgency  Obstructive symptoms do not include dribbling, incomplete emptying, an intermittent stream, a slower stream, straining or a weak stream  Associated symptoms include abdominal pain, chills, dysuria and flank pain  Pertinent negatives include no bladder pain, bone pain, facial swelling, fever, genital pain, hematospermia, hesitancy, inability to urinate, nausea, urinary retention, vomiting or weight loss  She is sexually active  Her past medical history is significant for kidney stones         The following portions of the patient's history were reviewed and updated as appropriate:   Past Medical History:  She has a past medical history of Anxiety, Anxiety, Asthma, [facility] ), Carpal tunnel syndrome, Chronic pain, Depression, Disease of thyroid gland, Dyslipidemia, Fibromyalgia, Irritable bowel, Kidney stones, Kidney stones (2019), Migraine, Obesity (BMI 30 0-34 9), Psychiatric disorder, Suicide attempt ([facility] ), and Vitamin D deficiency  ,  _______________________________________________________________________  Medical Problems:  does not have any pertinent problems on file ,  _______________________________________________________________________  Past Surgical History:   has a past surgical history that includes  section; Cholecystectomy; Tonsillectomy; Partial hysterectomy; Bunionectomy; and Tubal ligation  ,  _______________________________________________________________________  Family History:  family history includes Anxiety disorder in her mother; Arthritis in her mother; Bipolar disorder in her daughter and mother; Breast cancer in her paternal aunt and paternal grandmother; Cancer in her cousin, father, maternal uncle, paternal aunt, paternal grandmother, and paternal uncle; Dementia in her paternal grandmother; Depression in her mother; Diabetes in her mother; Heart disease in her brother; Hypertension in her brother and mother; Hypothyroidism in her mother and sister; Mental illness in her mother; Stroke in her mother ,  _______________________________________________________________________  Social History:   reports that she has never smoked  She has never used smokeless tobacco  She reports that she does not drink alcohol and does not use drugs  ,  _______________________________________________________________________  Allergies:  is allergic to doxycycline, erythromycin, other, latex, duloxetine, eletriptan, emgality [galcanezumab-gnlm], and galcanezumab     _______________________________________________________________________  Current Outpatient Medications   Medication Sig Dispense Refill   • albuterol (2 5 mg/3 mL) 0 083 % nebulizer solution Take 3 mL (2 5 mg total) by nebulization every 6 (six) hours as needed for wheezing or shortness of breath 3 mL 0   • Butalbital-APAP-Caffeine -40 MG CAPS TAKE 1 CAPSULE Daily PRN     • cariprazine (Vraylar) 4 5 MG capsule Take 1 capsule (4 5 mg total) by mouth daily 30 capsule 1   • celecoxib (CELEBREX) 200 mg capsule Take 1 capsule (200 mg total) by mouth daily 30 capsule 2   • cephalexin (KEFLEX) 500 mg capsule Take 1 capsule (500 mg total) by mouth every 12 (twelve) hours for 5 days 10 capsule 0   • clonazePAM (KlonoPIN) 0 5 mg tablet Take 1 tablet (0 5 mg total) by mouth daily as needed for anxiety 30 tablet 1   • colchicine (COLCRYS) 0 6 mg tablet Take 1 tablet (0 6 mg total) by mouth daily 30 tablet 5   • divalproex sodium (DEPAKOTE ER) 250 mg 24 hr tablet      • famotidine (PEPCID) 40 MG tablet famotidine 40 mg tablet     • fremanezumab-vfrm (Ajovy) 225 MG/1 5ML auto-injector Inject 225 mg under the skin every 30 (thirty) days     • hydroxychloroquine (PLAQUENIL) 200 mg tablet      • levothyroxine 200 mcg tablet      • loratadine (CLARITIN) 10 mg tablet Take 1 pill daily for allergies 90 tablet 1   • meclizine (ANTIVERT) 25 mg tablet Take 1 tablet (25 mg total) by mouth every 8 (eight) hours as needed for dizziness 30 tablet 1   • meloxicam (MOBIC) 15 mg tablet      • naratriptan (AMERGE) 2 5 MG tablet      • OLANZapine (ZyPREXA) 5 mg tablet      • omeprazole (PriLOSEC) 40 MG capsule Take 1 capsule (40 mg total) by mouth daily 90 capsule 1   • phenazopyridine (PYRIDIUM) 100 mg tablet Take 1 tablet (100 mg total) by mouth 3 (three) times a day as needed for bladder spasms 10 tablet 0   • semaglutide, 1 mg/dose, (Ozempic, 1 MG/DOSE,) 4 MG/3ML SOPN injection pen Inject 0 75 mL (1 mg total) under the skin once a week 3 mL 1   • Topiramate  MG CP24 Take 100 mg by mouth 2 (two) times a day     • Ubrelvy 100 MG tablet      • XIFAXAN 550 MG tablet      • azelastine (ASTELIN) 0 1 % nasal spray 1 spray into each nostril 2 (two) times a day Use in each nostril as directed (Patient not taking: No sig reported) 30 mL 0   • benzonatate (TESSALON) 200 MG capsule Take 1 capsule (200 mg total) by mouth 3 (three) times a day as needed for cough 20 capsule 0   • ciclopirox (PENLAC) 8 % solution Apply topically daily at bedtime 6 6 mL 0   • clotrimazole-betamethasone (LOTRISONE) 1-0 05 % cream      • dexamethasone (DECADRON) 4 mg tablet  (Patient not taking: Reported on 10/9/2022)     • dicyclomine (BENTYL) 10 mg capsule      • divalproex sodium (DEPAKOTE) 250 mg EC tablet Take 250 mg by mouth daily     •  MG tablet      • ketoconazole (NIZORAL) 2 % cream      • levothyroxine 25 mcg tablet TAKE 1 5 TABLETS (37 5 MCG TOTAL) BY MOUTH DAILY 45 tablet 2   • lidocaine (LIDODERM) 5 % Apply 1 patch topically daily as needed (back pain) Remove & Discard patch within 12 hours or as directed by MD 6 patch 1   • meloxicam (MOBIC) 7 5 mg tablet Daily     • methocarbamol (ROBAXIN) 750 mg tablet      • mometasone (ELOCON) 0 1 % cream Apply topically daily 45 g 0   • ondansetron (Zofran ODT) 4 mg disintegrating tablet Take 1 tablet (4 mg total) by mouth every 6 (six) hours as needed for nausea or vomiting 20 tablet 0   • Respiratory Therapy Supplies (NEBULIZER) DONA by Does not apply route every 4 (four) hours while awake 90 each 1   • Ventolin  (90 Base) MCG/ACT inhaler INHALE TWO PUFFS EVERY 6 (SIX) HOURS AS NEEDED FOR WHEEZING OR SHORTNESS OF BREATH (Patient not taking: Reported on 10/9/2022) 18 g 1     No current facility-administered medications for this visit      _______________________________________________________________________  Review of Systems   Constitutional: Positive for chills   Negative for fever and weight loss  HENT: Negative for facial swelling  Respiratory: Negative for cough and shortness of breath  Cardiovascular: Negative for chest pain  Gastrointestinal: Positive for abdominal pain  Negative for nausea and vomiting  Genitourinary: Positive for dysuria, flank pain, frequency, hematuria and urgency  Negative for hesitancy and incomplete emptying  Musculoskeletal: Negative for arthralgias and back pain  Neurological: Positive for headaches  All other systems reviewed and are negative  Objective:  Vitals:    11/02/22 1309   BP: 130/70   Pulse: 78   Resp: 16   Temp: 98 °F (36 7 °C)   SpO2: 99%   Weight: 65 8 kg (145 lb)   Height: 5' 2 5" (1 588 m)     Body mass index is 26 1 kg/m²  Physical Exam  Vitals and nursing note reviewed  Constitutional:       General: She is not in acute distress  Appearance: Normal appearance  She is not ill-appearing  Cardiovascular:      Rate and Rhythm: Normal rate and regular rhythm  Pulses: Normal pulses  Heart sounds: Normal heart sounds  Pulmonary:      Effort: Pulmonary effort is normal  No respiratory distress  Breath sounds: Normal breath sounds  No wheezing  Abdominal:      Tenderness: There is right CVA tenderness  Musculoskeletal:         General: Normal range of motion  Skin:     General: Skin is warm and dry  Neurological:      Mental Status: She is alert and oriented to person, place, and time  Psychiatric:         Mood and Affect: Mood normal          Behavior: Behavior normal          Thought Content:  Thought content normal          Judgment: Judgment normal

## 2022-11-03 ENCOUNTER — HOSPITAL ENCOUNTER (OUTPATIENT)
Dept: CT IMAGING | Facility: CLINIC | Age: 52
Discharge: HOME | End: 2022-11-03

## 2022-11-03 ENCOUNTER — TELEMEDICINE (OUTPATIENT)
Dept: PSYCHIATRY | Facility: CLINIC | Age: 52
End: 2022-11-03

## 2022-11-03 DIAGNOSIS — N32.89 BLADDER SPASMS: Primary | ICD-10-CM

## 2022-11-03 DIAGNOSIS — F41.1 GAD (GENERALIZED ANXIETY DISORDER): ICD-10-CM

## 2022-11-03 DIAGNOSIS — F31.81 BIPOLAR 2 DISORDER (HCC): Primary | ICD-10-CM

## 2022-11-03 DIAGNOSIS — R10.9 RIGHT FLANK PAIN: ICD-10-CM

## 2022-11-03 DIAGNOSIS — R31.9 HEMATURIA, UNSPECIFIED TYPE: ICD-10-CM

## 2022-11-03 RX ORDER — METHOCARBAMOL 750 MG/1
750 TABLET, FILM COATED ORAL EVERY 6 HOURS PRN
Qty: 20 TABLET | Refills: 0 | Status: SHIPPED | OUTPATIENT
Start: 2022-11-03 | End: 2023-01-20

## 2022-11-03 NOTE — PSYCH
INDIVIDUAL SESSION NOTE     Length of time in session: 52 minutes, follow up in 1 week     Psychotherapy Provided: Individual      Diagnosis ICD-10-CM Associated Orders   1  Bipolar 2 disorder (HCC)  F31 81    2  ZAINA (generalized anxiety disorder)  F41 1           Goals addressed in session: Goal 1     Pain:      none    0    Current suicide risk:  minimal    Data: Met with Edrie Fee for a scheduled individual session  Topics discussed included emotional wellness, coping skills, relationship with significant other and grief and loss  Brisa Killian is feeling "in pain"  She started having pain and bleeding when she went to the bathroom yesterday  She went to the doctor and a scan was ordered for today  She was started on antibiotic just in case as well as given a pain killer  Brisa Killian isn't sure if she has a kidney stone or what but she feels "like someone beat me up"  Brisa Killian said her boyfriend went back home to [place]  She really cares about him but she said there are some things that he does that really bother her; he's extremely messy and she likes things clean and in order  He also talks about his ex a lot  [place] was Belem's dad's birthday and at this time, they found out he was sick; he passed away on November 18th; November is a difficult month for her even though it's been 11 years since he passed  Brisa Killian needs her alone time and she was encouraged to express that to those in her life; it's not about anyone else, it's about her taking care of herself  Brisa Killian shows evidence of utilizing effective communication skills and maintaining emotional composure to manage mental health symptoms  During this session, this clinical used the following therapeutic modalities supportive psychotherapy, client-centered therapy and problem solving skills  Assessment:  Brisa Killian presents with a normal mood  her affect is normal range and intensity, appropriate  Brisa Killian was oriented x3  She was focused and engaged  Ayleen Black exhibits good therapeutic rapport with this clinician  she continues to exhibit willingness to work on treatment goals and objectives  Ayleen Black presents with a minimal risk of suicide, minimal risk of self-harm and minimal of harm to others  Plan:  Ayleen Black will return in 1 week for the next scheduled session  Between sessions, she  will take time to herself, now that her boyfriend left, and tend to herself and her needs, especially this month that is difficult for her and will report back during the next session re: success and barriers  At the next session, this clinical will use supportive psychotherapy and client-centered therapy to address her anxiety and moods, in an effort to assist Ayleen Black with meeting treatment goals  Behavioral Health Treatment Plan [facility]: Diagnosis and Treatment Plan explained to Naomi Puentes relates understanding diagnosis and is agreeable to Treatment Plan   Yes     Virtual Regular Visit    Verification of patient location:    Patient is located in the following state in which I hold an active license [place]      Assessment/Plan:    Problem List Items Addressed This Visit        Other    ZAINA (generalized anxiety disorder)    Bipolar 2 disorder ([facility] [address] ) - Primary          Goals addressed in session: Goal 1          Reason for visit is   Chief Complaint   Patient presents with   • Virtual Regular Visit        Encounter provider Andrea Keating LCSW    Provider located at [address] [place] [id number] [phone]      Recent Visits  Date Type Provider Dept   10/27/22 Telemedicine Andrea Keating LCSW [facility]   Showing recent visits within past 7 days and meeting all other requirements  Today's Visits  Date Type Provider Dept   11/03/22 Telemedicine Andrea Keating LCSW [facility]   Showing today's visits and meeting all other requirements  Future Appointments  No visits were found meeting these conditions  Showing future appointments within next 150 days and meeting all other requirements       The patient was identified by name and date of birth  Roshni Nogueira was informed that this is a telemedicine visit and that [facility]  She agrees to proceed     My office door was closed  No one else was in the room  She acknowledged consent and understanding of privacy and security of the video platform  The patient has agreed to participate and understands they can discontinue the visit at any time  Patient is aware this is a billable service  Subjective  Roshni Nogueira is a 47 y o  female  HPI     Past Medical History:   Diagnosis Date   • Anxiety    • Anxiety    • Asthma    • Bipolar disorder [facility])    • Carpal tunnel syndrome    • Chronic pain     lower back   • Depression    • Disease of thyroid gland    • Dyslipidemia    • Fibromyalgia    • Irritable bowel    • Kidney stones    • Kidney stones 2019   • Migraine    • Obesity (BMI 30 0-34  9)    • Psychiatric disorder     depression/anxiety   • Suicide attempt [facility])     as teen   • Vitamin D deficiency        Past Surgical History:   Procedure Laterality Date   • BUNIONECTOMY     •  SECTION     • CHOLECYSTECTOMY     • PARTIAL HYSTERECTOMY     • TONSILLECTOMY     • TUBAL LIGATION         Current Outpatient Medications   Medication Sig Dispense Refill   • albuterol (2 5 mg/3 mL) 0 083 % nebulizer solution Take 3 mL (2 5 mg total) by nebulization every 6 (six) hours as needed for wheezing or shortness of breath 3 mL 0   • azelastine (ASTELIN) 0 1 % nasal spray 1 spray into each nostril 2 (two) times a day Use in each nostril as directed (Patient not taking: No sig reported) 30 mL 0   • benzonatate (TESSALON) 200 MG capsule Take 1 capsule (200 mg total) by mouth 3 (three) times a day as needed for cough 20 capsule 0   • Butalbital-APAP-Caffeine -40 MG CAPS TAKE 1 CAPSULE Daily PRN     • cariprazine (Vraylar) 4 5 MG capsule Take 1 capsule (4 5 mg total) by mouth daily 30 capsule 1   • celecoxib (CELEBREX) 200 mg capsule Take 1 capsule (200 mg total) by mouth daily 30 capsule 2   • cephalexin (KEFLEX) 500 mg capsule Take 1 capsule (500 mg total) by mouth every 12 (twelve) hours for 5 days 10 capsule 0   • ciclopirox (PENLAC) 8 % solution Apply topically daily at bedtime 6 6 mL 0   • clonazePAM (KlonoPIN) 0 5 mg tablet Take 1 tablet (0 5 mg total) by mouth daily as needed for anxiety 30 tablet 1   • clotrimazole-betamethasone (LOTRISONE) 1-0 05 % cream      • colchicine (COLCRYS) 0 6 mg tablet Take 1 tablet (0 6 mg total) by mouth daily 30 tablet 5   • dexamethasone (DECADRON) 4 mg tablet  (Patient not taking: Reported on 10/9/2022)     • dicyclomine (BENTYL) 10 mg capsule      • divalproex sodium (DEPAKOTE ER) 250 mg 24 hr tablet      • divalproex sodium (DEPAKOTE) 250 mg EC tablet Take 250 mg by mouth daily     • famotidine (PEPCID) 40 MG tablet famotidine 40 mg tablet     • fremanezumab-vfrm (Ajovy) 225 MG/1 5ML auto-injector Inject 225 mg under the skin every 30 (thirty) days     • hydroxychloroquine (PLAQUENIL) 200 mg tablet      •  MG tablet      • ketoconazole (NIZORAL) 2 % cream      • levothyroxine 200 mcg tablet      • levothyroxine 25 mcg tablet TAKE 1 5 TABLETS (37 5 MCG TOTAL) BY MOUTH DAILY 45 tablet 2   • lidocaine (LIDODERM) 5 % Apply 1 patch topically daily as needed (back pain) Remove & Discard patch within 12 hours or as directed by MD 6 patch 1   • loratadine (CLARITIN) 10 mg tablet Take 1 pill daily for allergies 90 tablet 1   • meclizine (ANTIVERT) 25 mg tablet Take 1 tablet (25 mg total) by mouth every 8 (eight) hours as needed for dizziness 30 tablet 1   • meloxicam (MOBIC) 15 mg tablet      • meloxicam (MOBIC) 7 5 mg tablet Daily     • methocarbamol (ROBAXIN) 750 mg tablet      • mometasone (ELOCON) 0 1 % cream Apply topically daily 45 g 0   • naratriptan (AMERGE) 2 5 MG tablet      • OLANZapine (ZyPREXA) 5 mg tablet      • omeprazole (PriLOSEC) 40 MG capsule Take 1 capsule (40 mg total) by mouth daily 90 capsule 1   • ondansetron (Zofran ODT) 4 mg disintegrating tablet Take 1 tablet (4 mg total) by mouth every 6 (six) hours as needed for nausea or vomiting 20 tablet 0   • phenazopyridine (PYRIDIUM) 100 mg tablet Take 1 tablet (100 mg total) by mouth 3 (three) times a day as needed for bladder spasms 10 tablet 0   • Respiratory Therapy Supplies (NEBULIZER) DONA by Does not apply route every 4 (four) hours while awake 90 each 1   • semaglutide, 1 mg/dose, (Ozempic, 1 MG/DOSE,) 4 MG/3ML SOPN injection pen Inject 0 75 mL (1 mg total) under the skin once a week 3 mL 1   • Topiramate  MG CP24 Take 100 mg by mouth 2 (two) times a day     • Ubrelvy 100 MG tablet      • Ventolin  (90 Base) MCG/ACT inhaler INHALE TWO PUFFS EVERY 6 (SIX) HOURS AS NEEDED FOR WHEEZING OR SHORTNESS OF BREATH (Patient not taking: Reported on 10/9/2022) 18 g 1   • XIFAXAN 550 MG tablet        No current facility-administered medications for this visit  Allergies   Allergen Reactions   • Doxycycline Rash   • Erythromycin Rash   • Other Edema and Wheezing     jalapeno   • Latex Rash   • Duloxetine      Other reaction(s): Nausea, Loopy   • Eletriptan      Pain in lower jaw with pressure in throat   • Emgality [Galcanezumab-Gnlm]    • Galcanezumab      rash       Review of Systems    Video Exam    There were no vitals filed for this visit      Physical Exam     Visit Time    Visit Start Time: 8:05am  Visit Stop Time: 8:57am  Total Visit Duration: 52 minutes

## 2022-11-04 LAB — BACTERIA UR CULT: ABNORMAL

## 2022-11-10 ENCOUNTER — TELEMEDICINE (OUTPATIENT)
Dept: PSYCHIATRY | Facility: CLINIC | Age: 52
End: 2022-11-10

## 2022-11-10 DIAGNOSIS — F41.1 GAD (GENERALIZED ANXIETY DISORDER): Primary | ICD-10-CM

## 2022-11-10 DIAGNOSIS — F31.81 BIPOLAR 2 DISORDER (HCC): ICD-10-CM

## 2022-11-10 NOTE — PSYCH
INDIVIDUAL SESSION NOTE     Length of time in session: 52 minutes, follow up in 1 week     Psychotherapy Provided: Individual      Diagnosis ICD-10-CM Associated Orders   1  ZAINA (generalized anxiety disorder)  F41 1    2  Bipolar 2 disorder ([facility] [address] )  F31 81           Goals addressed in session: Goal 1     Pain:      none    0    Current suicide risk:  minimal    Data: Met with Daniel Hand for a scheduled individual session  Topics discussed included mental health condition(s), emotional wellness, relationship with significant other, relationships with family, mood regulation and symptoms and grief and loss  Jennifer Cordoba is feeling "not ok"  Next Friday is the anniversary of her father's death, so she continues to struggle emotionally  Belem's ex planned a cruise for himself, Jennfier Cordoba and her grandson  He did this months ago  She told her boyfriend about it and he voiced some insecurity  Jennifer Corodba tried to reinforce to him that her ex is her ex for a reason; they have been able to stay friends and he has a relationship with her grandson  Jennifer Cordoba talked at length about her boyfriend and how she just needs space right now  She explained to him that she struggles in November and she has since her father passed  Jennifer Cordoba isn't trying to make any decisions right now, she just wants to lay low and keep to herself at this time  She was encouraged to continue taking care of herself, reaching out to close friends and family when she's up to it and not feeling guilt for just wanting time alone  Jennifer Cordoba shows evidence of utilizing effective communication skills and engaging in problem solving to manage mental health symptoms  During this session, this clinical used the following therapeutic modalities supportive psychotherapy, client-centered therapy and stress management skills  Assessment:  Jennifer Cordoba presents with a dysphoric mood  her affect is tearful  Jennifer Cordoba was oriented x3  She was focused and engaged   Jennifer Cordoba exhibits good therapeutic rapport with this clinician  she continues to exhibit willingness to work on treatment goals and objectives  Bryon Garcia presents with a minimal risk of suicide, minimal risk of self-harm and minimal of harm to others  Plan:  Bryon Garcia will return in 1 week for the next scheduled session  Between sessions, she  will continue to take time to herself and will report back during the next session re: success and barriers  At the next session, this clinical will use supportive psychotherapy and client-centered therapy to address her anxiety, in an effort to assist Bryon Garcia with meeting treatment goals  Behavioral Health Treatment Plan [facility]: Diagnosis and Treatment Plan explained to Deepak Redd relates understanding diagnosis and is agreeable to Treatment Plan  Yes     Virtual Regular Visit    Verification of patient location:    Patient is located in the following state in which I hold an active license [place]      Assessment/Plan:    Problem List Items Addressed This Visit        Other    ZAINA (generalized anxiety disorder) - Primary    Bipolar 2 disorder ([facility] [address] )          Goals addressed in session: Goal 1          Reason for visit is   Chief Complaint   Patient presents with   • Virtual Regular Visit        Encounter provider Silvana Brian LCSW    Provider located at [address] [place] [id number]  [phone]      Recent Visits  Date Type Provider Dept   11/03/22 Telemedicine Silvana Brian LCSW [facility]   Showing recent visits within past 7 days and meeting all other requirements  Today's Visits  Date Type Provider Dept   11/10/22 Telemedicine Silvana Brian LCSW [facility]   Showing today's visits and meeting all other requirements  Future Appointments  No visits were found meeting these conditions    Showing future appointments within next 150 days and meeting all other requirements       The patient was identified by name and date of birth  Almaz Ontiveros was informed that this is a telemedicine visit and that the visit is being conducted throughthe [employer] [facility]  She agrees to proceed     My office door was closed  No one else was in the room  She acknowledged consent and understanding of privacy and security of the video platform  The patient has agreed to participate and understands they can discontinue the visit at any time  Patient is aware this is a billable service  Subjective  Almaz Ontiveros is a 47 y o  female  HPI     Past Medical History:   Diagnosis Date   • Anxiety    • Anxiety    • Asthma    • Bipolar disorder [facility])    • Carpal tunnel syndrome    • Chronic pain     lower back   • Depression    • Disease of thyroid gland    • Dyslipidemia    • Fibromyalgia    • Irritable bowel    • Kidney stones    • Kidney stones 2019   • Migraine    • Obesity (BMI 30 0-34  9)    • Psychiatric disorder     depression/anxiety   • Suicide attempt [facility])     as teen   • Vitamin D deficiency        Past Surgical History:   Procedure Laterality Date   • BUNIONECTOMY     •  SECTION     • CHOLECYSTECTOMY     • PARTIAL HYSTERECTOMY     • TONSILLECTOMY     • TUBAL LIGATION         Current Outpatient Medications   Medication Sig Dispense Refill   • albuterol (2 5 mg/3 mL) 0 083 % nebulizer solution Take 3 mL (2 5 mg total) by nebulization every 6 (six) hours as needed for wheezing or shortness of breath 3 mL 0   • azelastine (ASTELIN) 0 1 % nasal spray 1 spray into each nostril 2 (two) times a day Use in each nostril as directed (Patient not taking: No sig reported) 30 mL 0   • benzonatate (TESSALON) 200 MG capsule Take 1 capsule (200 mg total) by mouth 3 (three) times a day as needed for cough 20 capsule 0   • Butalbital-APAP-Caffeine -40 MG CAPS TAKE 1 CAPSULE Daily PRN     • cariprazine (Vraylar) 4 5 MG capsule Take 1 capsule (4 5 mg total) by mouth daily 30 capsule 1   • celecoxib (CELEBREX) 200 mg capsule Take 1 capsule (200 mg total) by mouth daily 30 capsule 2   • ciclopirox (PENLAC) 8 % solution Apply topically daily at bedtime 6 6 mL 0   • clonazePAM (KlonoPIN) 0 5 mg tablet Take 1 tablet (0 5 mg total) by mouth daily as needed for anxiety 30 tablet 1   • clotrimazole-betamethasone (LOTRISONE) 1-0 05 % cream      • colchicine (COLCRYS) 0 6 mg tablet Take 1 tablet (0 6 mg total) by mouth daily 30 tablet 5   • dexamethasone (DECADRON) 4 mg tablet  (Patient not taking: Reported on 10/9/2022)     • dicyclomine (BENTYL) 10 mg capsule      • divalproex sodium (DEPAKOTE ER) 250 mg 24 hr tablet      • divalproex sodium (DEPAKOTE) 250 mg EC tablet Take 250 mg by mouth daily     • famotidine (PEPCID) 40 MG tablet famotidine 40 mg tablet     • fremanezumab-vfrm (Ajovy) 225 MG/1 5ML auto-injector Inject 225 mg under the skin every 30 (thirty) days     • hydroxychloroquine (PLAQUENIL) 200 mg tablet      •  MG tablet      • ketoconazole (NIZORAL) 2 % cream      • levothyroxine 200 mcg tablet      • levothyroxine 25 mcg tablet TAKE 1 5 TABLETS (37 5 MCG TOTAL) BY MOUTH DAILY 45 tablet 2   • lidocaine (LIDODERM) 5 % Apply 1 patch topically daily as needed (back pain) Remove & Discard patch within 12 hours or as directed by MD Castro patch 1   • loratadine (CLARITIN) 10 mg tablet Take 1 pill daily for allergies 90 tablet 1   • meclizine (ANTIVERT) 25 mg tablet Take 1 tablet (25 mg total) by mouth every 8 (eight) hours as needed for dizziness 30 tablet 1   • meloxicam (MOBIC) 15 mg tablet      • meloxicam (MOBIC) 7 5 mg tablet Daily     • methocarbamol (ROBAXIN) 750 mg tablet Take 1 tablet (750 mg total) by mouth every 6 (six) hours as needed for muscle spasms 20 tablet 0   • mometasone (ELOCON) 0 1 % cream Apply topically daily 45 g 0   • naratriptan (AMERGE) 2 5 MG tablet      • OLANZapine (ZyPREXA) 5 mg tablet      • omeprazole (PriLOSEC) 40 MG capsule Take 1 capsule (40 mg total) by mouth daily 90 capsule 1   • ondansetron (Zofran ODT) 4 mg disintegrating tablet Take 1 tablet (4 mg total) by mouth every 6 (six) hours as needed for nausea or vomiting 20 tablet 0   • phenazopyridine (PYRIDIUM) 100 mg tablet Take 1 tablet (100 mg total) by mouth 3 (three) times a day as needed for bladder spasms 10 tablet 0   • Respiratory Therapy Supplies (NEBULIZER) DONA by Does not apply route every 4 (four) hours while awake 90 each 1   • semaglutide, 1 mg/dose, (Ozempic, 1 MG/DOSE,) 4 MG/3ML SOPN injection pen Inject 0 75 mL (1 mg total) under the skin once a week 3 mL 1   • Topiramate  MG CP24 Take 100 mg by mouth 2 (two) times a day     • Ubrelvy 100 MG tablet      • Ventolin  (90 Base) MCG/ACT inhaler INHALE TWO PUFFS EVERY 6 (SIX) HOURS AS NEEDED FOR WHEEZING OR SHORTNESS OF BREATH (Patient not taking: Reported on 10/9/2022) 18 g 1   • XIFAXAN 550 MG tablet        No current facility-administered medications for this visit  Allergies   Allergen Reactions   • Doxycycline Rash   • Erythromycin Rash   • Other Edema and Wheezing     jalapeno   • Latex Rash   • Duloxetine      Other reaction(s): Nausea, Loopy   • Eletriptan      Pain in lower jaw with pressure in throat   • Emgality [Galcanezumab-Gnlm]    • Galcanezumab      rash       Review of Systems    Video Exam    There were no vitals filed for this visit      Physical Exam     Visit Time    Visit Start Time: 8:02am  Visit Stop Time: 8:54am  Total Visit Duration: 52 minutes

## 2022-11-14 ENCOUNTER — TELEMEDICINE (OUTPATIENT)
Dept: FAMILY MEDICINE CLINIC | Facility: CLINIC | Age: 52
End: 2022-11-14

## 2022-11-14 VITALS — BODY MASS INDEX: 24.41 KG/M2 | WEIGHT: 143 LBS | HEIGHT: 64 IN

## 2022-11-14 DIAGNOSIS — Z03.818 ENCOUNTER FOR OBSERVATION FOR SUSPECTED EXPOSURE TO OTHER BIOLOGICAL AGENTS RULED OUT: ICD-10-CM

## 2022-11-14 DIAGNOSIS — B34.9 VIRAL INFECTION, UNSPECIFIED: ICD-10-CM

## 2022-11-14 NOTE — PROGRESS NOTES
COVID-19 Outpatient Progress Note    Assessment/Plan:    Problem List Items Addressed This Visit    None     Visit Diagnoses     Encounter for observation for suspected exposure to other biological agents ruled out        Relevant Orders    COVID/FLU/RSV    Viral infection, unspecified        Relevant Orders    COVID/FLU/RSV         Disposition:     Recommended patient to come to the office to test for COVID-19/Influenza/RSV  I have spent 8 minutes directly with the patient  Encounter provider: Lauro Messer MD     Provider located at: [address]  [address] [facility],[address]  [address]  [phone]     Recent Visits  No visits were found meeting these conditions  Showing recent visits within past 7 days and meeting all other requirements  Today's Visits  Date Type Provider Dept   11/14/22 Telemedicine Lauro Messer MD [facility] today's visits and meeting all other requirements  Future Appointments  No visits were found meeting these conditions  Showing future appointments within next 150 days and meeting all other requirements         Verification of patient location:  Patient is located in the following state in which I hold an active license: [place]    Subjective:   Gregoria Beckford is a 47 y o  female who is concerned about COVID-19  Patient's symptoms include fever, fatigue, malaise, abdominal pain, nausea, vomiting, diarrhea, myalgias and headache  Patient denies chills, congestion, rhinorrhea, sore throat, anosmia, loss of taste, cough, shortness of breath and chest tightness      - Date of symptom onset: 11/12/2022      COVID-19 vaccination status: Fully vaccinated (primary series)    Projectile vomiting on Saturday  Yesterday, felt very tired and run down  [place] has been sick (negative strep) and required abx  Is utd on flu and covid vaccinate   States vomit was darker (like coca cola)  Lab Results   Component Value Date    SARSCOV2 Positive (A) 05/05/2022    Brittney Meléndez Not Detected 09/18/2020       Review of Systems   Constitutional: Positive for fatigue and fever  Negative for chills  HENT: Negative for congestion, rhinorrhea and sore throat  Respiratory: Negative for cough, chest tightness and shortness of breath  Gastrointestinal: Positive for abdominal pain, diarrhea, nausea and vomiting  Musculoskeletal: Positive for myalgias  Neurological: Positive for headaches       Current Outpatient Medications on File Prior to Visit   Medication Sig   • albuterol (2 5 mg/3 mL) 0 083 % nebulizer solution Take 3 mL (2 5 mg total) by nebulization every 6 (six) hours as needed for wheezing or shortness of breath   • Butalbital-APAP-Caffeine -40 MG CAPS TAKE 1 CAPSULE Daily PRN   • cariprazine (Vraylar) 4 5 MG capsule Take 1 capsule (4 5 mg total) by mouth daily   • celecoxib (CELEBREX) 200 mg capsule Take 1 capsule (200 mg total) by mouth daily   • ciclopirox (PENLAC) 8 % solution Apply topically daily at bedtime   • clotrimazole-betamethasone (LOTRISONE) 1-0 05 % cream    • colchicine (COLCRYS) 0 6 mg tablet Take 1 tablet (0 6 mg total) by mouth daily   • dicyclomine (BENTYL) 10 mg capsule    • divalproex sodium (DEPAKOTE ER) 250 mg 24 hr tablet    • divalproex sodium (DEPAKOTE) 250 mg EC tablet Take 250 mg by mouth daily   • famotidine (PEPCID) 40 MG tablet famotidine 40 mg tablet   • fremanezumab-vfrm (Ajovy) 225 MG/1 5ML auto-injector Inject 225 mg under the skin every 30 (thirty) days   • hydroxychloroquine (PLAQUENIL) 200 mg tablet    •  MG tablet    • ketoconazole (NIZORAL) 2 % cream    • levothyroxine 200 mcg tablet    • levothyroxine 25 mcg tablet TAKE 1 5 TABLETS (37 5 MCG TOTAL) BY MOUTH DAILY   • lidocaine (LIDODERM) 5 % Apply 1 patch topically daily as needed (back pain) Remove & Discard patch within 12 hours or as directed by MD   • loratadine (CLARITIN) 10 mg tablet Take 1 pill daily for allergies   • meclizine (ANTIVERT) 25 mg tablet Take 1 tablet (25 mg total) by mouth every 8 (eight) hours as needed for dizziness   • meloxicam (MOBIC) 15 mg tablet    • meloxicam (MOBIC) 7 5 mg tablet Daily   • methocarbamol (ROBAXIN) 750 mg tablet Take 1 tablet (750 mg total) by mouth every 6 (six) hours as needed for muscle spasms   • mometasone (ELOCON) 0 1 % cream Apply topically daily   • naratriptan (AMERGE) 2 5 MG tablet    • OLANZapine (ZyPREXA) 5 mg tablet    • omeprazole (PriLOSEC) 40 MG capsule Take 1 capsule (40 mg total) by mouth daily   • ondansetron (Zofran ODT) 4 mg disintegrating tablet Take 1 tablet (4 mg total) by mouth every 6 (six) hours as needed for nausea or vomiting   • phenazopyridine (PYRIDIUM) 100 mg tablet Take 1 tablet (100 mg total) by mouth 3 (three) times a day as needed for bladder spasms   • semaglutide, 1 mg/dose, (Ozempic, 1 MG/DOSE,) 4 MG/3ML SOPN injection pen Inject 0 75 mL (1 mg total) under the skin once a week   • Topiramate  MG CP24 Take 100 mg by mouth 2 (two) times a day   • Ubrelvy 100 MG tablet    • XIFAXAN 550 MG tablet    • azelastine (ASTELIN) 0 1 % nasal spray 1 spray into each nostril 2 (two) times a day Use in each nostril as directed (Patient not taking: No sig reported)   • benzonatate (TESSALON) 200 MG capsule Take 1 capsule (200 mg total) by mouth 3 (three) times a day as needed for cough (Patient not taking: Reported on 11/14/2022)   • clonazePAM (KlonoPIN) 0 5 mg tablet Take 1 tablet (0 5 mg total) by mouth daily as needed for anxiety   • dexamethasone (DECADRON) 4 mg tablet  (Patient not taking: No sig reported)   • Respiratory Therapy Supplies (NEBULIZER) DONA by Does not apply route every 4 (four) hours while awake   • Ventolin  (90 Base) MCG/ACT inhaler INHALE TWO PUFFS EVERY 6 (SIX) HOURS AS NEEDED FOR WHEEZING OR SHORTNESS OF BREATH (Patient not taking: No sig reported) Objective:    Ht 5' 4" (1 626 m)   Wt 64 9 kg (143 lb)   BMI 24 55 kg/m²      Physical Exam  Constitutional:       General: She is not in acute distress  Appearance: Normal appearance  She is not ill-appearing  HENT:      Head: Normocephalic and atraumatic  Pulmonary:      Effort: Pulmonary effort is normal  No respiratory distress  Neurological:      Mental Status: She is alert  Psychiatric:         Mood and Affect: Mood normal          Thought Content:  Thought content normal        Justin Vega MD

## 2022-11-17 ENCOUNTER — TELEMEDICINE (OUTPATIENT)
Dept: PSYCHIATRY | Facility: CLINIC | Age: 52
End: 2022-11-17

## 2022-11-17 DIAGNOSIS — F41.1 GAD (GENERALIZED ANXIETY DISORDER): Primary | ICD-10-CM

## 2022-11-17 DIAGNOSIS — F31.81 BIPOLAR 2 DISORDER (HCC): ICD-10-CM

## 2022-11-17 DIAGNOSIS — F41.1 GAD (GENERALIZED ANXIETY DISORDER): ICD-10-CM

## 2022-11-17 NOTE — TELEPHONE ENCOUNTER
Medication Refill Request     Name of Medication Clonazepam  Dose/Frequency 0 5 mg 1 daily  Quantity 30  Verified pharmacy   [x]  Verified ordering Provider   [x]  Does patient have enough for the next 3 days? Yes [] No [x]  Does patient have a follow-up appointment scheduled? Yes [x] No []   If so when is appointment: 11/23/2022 at 10:00 a m

## 2022-11-17 NOTE — PSYCH
INDIVIDUAL SESSION NOTE     Length of time in session: 52 minutes, follow up in 1 week     Psychotherapy Provided: Individual      Diagnosis ICD-10-CM Associated Orders   1  ZAINA (generalized anxiety disorder)  F41 1       2  Bipolar 2 disorder ([facility] [address] )  F31 81              Goals addressed in session: Goal 1     Pain:      none    0    Current suicide risk:  minimal    Data: Met with Yamileth Rico for a scheduled individual session  Topics discussed included mental health condition(s), emotional wellness, coping skills, relationship with significant other and relationships with family  Jose Murray is feeling "tired"  She stated she got sick this past week with a fever and throwing up  She went to an urgent care but she's mostly had to rest  During all of this, she and her boyfriend got into an argument because he stated he was worried about her mental health as well as feeling upset about her possibly going on a cruise with her ex  Jose Murray admits that she gets defensive and she's trying to be understanding and see things from his perspective as well  However, Jose Murray is bothered that she's told him multiple times that November is a difficult month for her and she just needs some alone time; she's not trying to make any rash decisions while she's feeling so down  We talked for a bit about her contact with family and getting that additional support from them, as she is up for it  This clinician also sent an inbox message to psych for Jose Murray as she's not been able to reach them, regarding a refill on her Klonopin  Jose Murray shows evidence of utilizing effective communication skills and maintaining emotional composure to manage mental health symptoms  During this session, this clinical used the following therapeutic modalities supportive psychotherapy, client-centered therapy and problem solving skills  Assessment:  Jose Murray presents with a dysphoric mood    her affect is normal range and intensity, appropriate  Jose Murray was oriented x3  She was focused and engaged  Annalee Cloud exhibits good therapeutic rapport with this clinician  she continues to exhibit willingness to work on treatment goals and objectives  Annalee Cloud presents with a minimal risk of suicide, minimal risk of self-harm and minimal of harm to others  Plan:  Annalee Cloud will return in 1 week for the next scheduled session  Between sessions, she  will focus on resting so she can keep recovering from whatever virus she has been dealing with, as well as working her way through the rest of November and taking time for self-care and will report back during the next session re: success and barriers  At the next session, this clinical will use supportive psychotherapy and client-centered therapy to address her anxiety, in an effort to assist Annalee Cloud with meeting treatment goals  Behavioral Health Treatment Plan [facility]: Diagnosis and Treatment Plan explained to Mk Ba relates understanding diagnosis and is agreeable to Treatment Plan   Yes     Virtual Regular Visit    Verification of patient location:    Patient is located in the following state in which I hold an active license [place]      Assessment/Plan:    Problem List Items Addressed This Visit        Other    ZAINA (generalized anxiety disorder) - Primary    Bipolar 2 disorder ([facility] [address] )       Goals addressed in session: Goal 1          Reason for visit is   Chief Complaint   Patient presents with   • Virtual Regular Visit        Encounter provider Dalia Reece LCSW    Provider located at [address] [place] [id number] [phone]      Recent Visits  Date Type Provider Dept   11/10/22 Telemedicine Dalia Reece LCSW [facility]   Showing recent visits within past 7 days and meeting all other requirements  Today's Visits  Date Type Provider Dept   11/17/22 Telemedicine Dalia Reece LCSW [facility]   Showing today's visits and meeting all other requirements  Future Appointments  No visits were found meeting these conditions  Showing future appointments within next 150 days and meeting all other requirements       The patient was identified by name and date of birth  Sima Hein was informed that this is a telemedicine visit and that [facility]  She agrees to proceed     My office door was closed  No one else was in the room  She acknowledged consent and understanding of privacy and security of the video platform  The patient has agreed to participate and understands they can discontinue the visit at any time  Patient is aware this is a billable service  Subjective  Sima Hein is a 47 y o  female  HPI     Past Medical History:   Diagnosis Date   • Anxiety    • Anxiety    • Asthma    • Bipolar disorder [facility])    • Carpal tunnel syndrome    • Chronic pain     lower back   • Depression    • Disease of thyroid gland    • Dyslipidemia    • Fibromyalgia    • Irritable bowel    • Kidney stones    • Kidney stones 2019   • Migraine    • Obesity (BMI 30 0-34  9)    • Psychiatric disorder     depression/anxiety   • Suicide attempt [facility])     as teen   • Vitamin D deficiency        Past Surgical History:   Procedure Laterality Date   • BUNIONECTOMY     •  SECTION     • CHOLECYSTECTOMY     • PARTIAL HYSTERECTOMY     • TONSILLECTOMY     • TUBAL LIGATION         Current Outpatient Medications   Medication Sig Dispense Refill   • albuterol (2 5 mg/3 mL) 0 083 % nebulizer solution Take 3 mL (2 5 mg total) by nebulization every 6 (six) hours as needed for wheezing or shortness of breath 3 mL 0   • azelastine (ASTELIN) 0 1 % nasal spray 1 spray into each nostril 2 (two) times a day Use in each nostril as directed (Patient not taking: No sig reported) 30 mL 0   • benzonatate (TESSALON) 200 MG capsule Take 1 capsule (200 mg total) by mouth 3 (three) times a day as needed for cough (Patient not taking: Reported on 11/14/2022) 20 capsule 0   • Butalbital-APAP-Caffeine -40 MG CAPS TAKE 1 CAPSULE Daily PRN     • cariprazine (Vraylar) 4 5 MG capsule Take 1 capsule (4 5 mg total) by mouth daily 30 capsule 1   • celecoxib (CELEBREX) 200 mg capsule Take 1 capsule (200 mg total) by mouth daily 30 capsule 2   • ciclopirox (PENLAC) 8 % solution Apply topically daily at bedtime 6 6 mL 0   • clonazePAM (KlonoPIN) 0 5 mg tablet Take 1 tablet (0 5 mg total) by mouth daily as needed for anxiety 30 tablet 1   • clotrimazole-betamethasone (LOTRISONE) 1-0 05 % cream      • colchicine (COLCRYS) 0 6 mg tablet Take 1 tablet (0 6 mg total) by mouth daily 30 tablet 5   • dexamethasone (DECADRON) 4 mg tablet  (Patient not taking: No sig reported)     • dicyclomine (BENTYL) 10 mg capsule      • divalproex sodium (DEPAKOTE ER) 250 mg 24 hr tablet      • divalproex sodium (DEPAKOTE) 250 mg EC tablet Take 250 mg by mouth daily     • famotidine (PEPCID) 40 MG tablet famotidine 40 mg tablet     • fremanezumab-vfrm (Ajovy) 225 MG/1 5ML auto-injector Inject 225 mg under the skin every 30 (thirty) days     • hydroxychloroquine (PLAQUENIL) 200 mg tablet      •  MG tablet      • ketoconazole (NIZORAL) 2 % cream      • levothyroxine 200 mcg tablet      • levothyroxine 25 mcg tablet TAKE 1 5 TABLETS (37 5 MCG TOTAL) BY MOUTH DAILY 45 tablet 2   • lidocaine (LIDODERM) 5 % Apply 1 patch topically daily as needed (back pain) Remove & Discard patch within 12 hours or as directed by MD 6 patch 1   • loratadine (CLARITIN) 10 mg tablet Take 1 pill daily for allergies 90 tablet 1   • meclizine (ANTIVERT) 25 mg tablet Take 1 tablet (25 mg total) by mouth every 8 (eight) hours as needed for dizziness 30 tablet 1   • meloxicam (MOBIC) 15 mg tablet      • meloxicam (MOBIC) 7 5 mg tablet Daily     • methocarbamol (ROBAXIN) 750 mg tablet Take 1 tablet (750 mg total) by mouth every 6 (six) hours as needed for muscle spasms 20 tablet 0   • mometasone (ELOCON) 0 1 % cream Apply topically daily 45 g 0   • naratriptan (AMERGE) 2 5 MG tablet      • OLANZapine (ZyPREXA) 5 mg tablet      • omeprazole (PriLOSEC) 40 MG capsule Take 1 capsule (40 mg total) by mouth daily 90 capsule 1   • ondansetron (Zofran ODT) 4 mg disintegrating tablet Take 1 tablet (4 mg total) by mouth every 6 (six) hours as needed for nausea or vomiting 20 tablet 0   • phenazopyridine (PYRIDIUM) 100 mg tablet Take 1 tablet (100 mg total) by mouth 3 (three) times a day as needed for bladder spasms 10 tablet 0   • Respiratory Therapy Supplies (NEBULIZER) DONA by Does not apply route every 4 (four) hours while awake 90 each 1   • semaglutide, 1 mg/dose, (Ozempic, 1 MG/DOSE,) 4 MG/3ML SOPN injection pen Inject 0 75 mL (1 mg total) under the skin once a week 3 mL 1   • Topiramate  MG CP24 Take 100 mg by mouth 2 (two) times a day     • Ubrelvy 100 MG tablet      • Ventolin  (90 Base) MCG/ACT inhaler INHALE TWO PUFFS EVERY 6 (SIX) HOURS AS NEEDED FOR WHEEZING OR SHORTNESS OF BREATH (Patient not taking: No sig reported) 18 g 1   • XIFAXAN 550 MG tablet        No current facility-administered medications for this visit  Allergies   Allergen Reactions   • Doxycycline Rash   • Erythromycin Rash   • Other Edema and Wheezing     carmenlapeno   • Latex Rash   • Duloxetine      Other reaction(s): Nausea, Loopy   • Eletriptan      Pain in lower jaw with pressure in throat   • Emgality [Galcanezumab-Gnlm]    • Galcanezumab      rash       Review of Systems    Video Exam    There were no vitals filed for this visit      Physical Exam     Visit Time    Visit Start Time: 8:02am  Visit Stop Time: 8:54am  Total Visit Duration: 52 minutes

## 2022-11-18 ENCOUNTER — OFFICE VISIT (OUTPATIENT)
Dept: FAMILY MEDICINE CLINIC | Facility: CLINIC | Age: 52
End: 2022-11-18

## 2022-11-18 VITALS
TEMPERATURE: 96 F | WEIGHT: 144.8 LBS | DIASTOLIC BLOOD PRESSURE: 76 MMHG | SYSTOLIC BLOOD PRESSURE: 110 MMHG | OXYGEN SATURATION: 97 % | HEART RATE: 72 BPM | HEIGHT: 64 IN | BODY MASS INDEX: 24.72 KG/M2

## 2022-11-18 DIAGNOSIS — R11.2 NAUSEA AND VOMITING, UNSPECIFIED VOMITING TYPE: Primary | ICD-10-CM

## 2022-11-18 DIAGNOSIS — R31.9 URINARY TRACT INFECTION WITH HEMATURIA, SITE UNSPECIFIED: ICD-10-CM

## 2022-11-18 DIAGNOSIS — R63.5 WEIGHT GAIN: ICD-10-CM

## 2022-11-18 DIAGNOSIS — M25.50 ARTHRALGIA, UNSPECIFIED JOINT: ICD-10-CM

## 2022-11-18 DIAGNOSIS — N39.0 URINARY TRACT INFECTION WITH HEMATURIA, SITE UNSPECIFIED: ICD-10-CM

## 2022-11-18 RX ORDER — SEMAGLUTIDE 1.34 MG/ML
1 INJECTION, SOLUTION SUBCUTANEOUS WEEKLY
Qty: 3 ML | Refills: 3 | Status: SHIPPED | OUTPATIENT
Start: 2022-11-18

## 2022-11-18 RX ORDER — ONDANSETRON 4 MG/1
4 TABLET, ORALLY DISINTEGRATING ORAL EVERY 6 HOURS PRN
Qty: 20 TABLET | Refills: 0 | Status: SHIPPED | OUTPATIENT
Start: 2022-11-18

## 2022-11-18 RX ORDER — CLONAZEPAM 0.5 MG/1
0.5 TABLET ORAL DAILY PRN
Qty: 30 TABLET | Refills: 1 | Status: SHIPPED | OUTPATIENT
Start: 2022-11-18 | End: 2022-12-18

## 2022-11-18 NOTE — LETTER
November 18, 2022     Patient: Yamileth Rico  YOB: 1970  Date of Visit: 11/18/2022      To Whom it May Concern:    Fabiano Kinsey is under my professional care  Ramerjanice Murray was seen in my office on 11/18/2022  According to my professional opinion due to medical reasons  Jose Murray should not be traveling at this time  If you have any questions or concerns, please don't hesitate to call           Sincerely,          ERICKA Young        CC: No Recipients

## 2022-11-18 NOTE — PATIENT INSTRUCTIONS
Degenerative changes and muscle strain recommendations:  Start taking turmeric at least 1000 mg daily, tart cherry at least 1000 mg daily, and glucosamine-chondroitin 2 to 3 times a day  May apply topical diclofenac 3 to 4 times a day   May also apply topical CBD  Incorporate physical therapy and home exercise program

## 2022-11-18 NOTE — ASSESSMENT & PLAN NOTE
Mostly to right hip/leg   Degenerative changes and muscle strain recommendations:  - Start taking turmeric at least 1000 mg daily, tart cherry at least 1000 mg daily, and glucosamine-chondroitin 2 to 3 times a day  - May apply topical diclofenac 3 to 4 times a day   - May also apply topical CBD  - Incorporate physical therapy and home exercise program

## 2022-11-23 DIAGNOSIS — F31.81 BIPOLAR 2 DISORDER (HCC): ICD-10-CM
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
## 2022-11-23 NOTE — TELEPHONE ENCOUNTER
Alan Parks was supposed to have a visit this AM  -- Pt joined on video, then wayne, then Pt tried connecting through [employer] and was able, but there was no video  It was decided to forgo the visit and she agreed to return in person on Friday 12/2/2022  Receptionists were made aware to call her to select a time slot  During my conversation with Pt, she felt mood stable overall but felt the Shakir Anthony was not helping quite enough at 4 5mg  Also she has been off of the medicine completely due to not having had an appt since 7/2022  She agreed to restart the Vraylar ae 3mg and will likely titrate further at next visit  The Clonazepam was just renewed on 11/18/2022    I e-scribed the following to her designated [facility]:  Vraylar 3mg (1) cap po qd # 30

## 2022-11-25 ENCOUNTER — TELEPHONE (OUTPATIENT)
Dept: FAMILY MEDICINE CLINIC | Facility: CLINIC | Age: 52
End: 2022-11-25

## 2022-11-25 DIAGNOSIS — J02.0 STREP PHARYNGITIS: Primary | ICD-10-CM

## 2022-11-25 RX ORDER — AMOXICILLIN AND CLAVULANATE POTASSIUM 875; 125 MG/1; MG/1
1 TABLET, FILM COATED ORAL EVERY 12 HOURS SCHEDULED
Qty: 20 TABLET | Refills: 0 | Status: SHIPPED | OUTPATIENT
Start: 2022-11-25 | End: 2022-11-28 | Stop reason: ALTCHOICE

## 2022-11-25 NOTE — TELEPHONE ENCOUNTER
Melinda Langford thinks she might have strep like her grandson who just tested positive today, low grade fever still and headaches sore throat nausea has thrown up can't come back because traffic is crazy  Please advise

## 2022-11-28 ENCOUNTER — TELEPHONE (OUTPATIENT)
Dept: FAMILY MEDICINE CLINIC | Facility: CLINIC | Age: 52
End: 2022-11-28

## 2022-11-28 DIAGNOSIS — J02.0 STREP PHARYNGITIS: Primary | ICD-10-CM

## 2022-11-28 RX ORDER — AMOXICILLIN 500 MG/1
500 TABLET, FILM COATED ORAL 2 TIMES DAILY
Qty: 10 TABLET | Refills: 0 | Status: SHIPPED | OUTPATIENT
Start: 2022-11-28 | End: 2022-12-03

## 2022-11-30 ENCOUNTER — TELEPHONE (OUTPATIENT)
Dept: OTHER | Facility: OTHER | Age: 52
End: 2022-11-30

## 2022-11-30 ENCOUNTER — TELEMEDICINE (OUTPATIENT)
Dept: PSYCHIATRY | Facility: CLINIC | Age: 52
End: 2022-11-30

## 2022-11-30 DIAGNOSIS — F41.1 GAD (GENERALIZED ANXIETY DISORDER): ICD-10-CM

## 2022-11-30 DIAGNOSIS — J03.01 RECURRENT STREPTOCOCCAL TONSILLITIS: Primary | ICD-10-CM

## 2022-11-30 DIAGNOSIS — F31.81 BIPOLAR 2 DISORDER (HCC): Primary | ICD-10-CM

## 2022-11-30 NOTE — PSYCH
INDIVIDUAL SESSION NOTE     Length of time in session: 45 minutes, follow up in 1 week     Psychotherapy Provided: Individual      Diagnosis ICD-10-CM Associated Orders   1  Bipolar 2 disorder (HCC)  F31 81       2  ZAINA (generalized anxiety disorder)  F41 1              Goals addressed in session: Goal 1     Pain:      none    0    Current suicide risk:  minimal    Data: Met with Amanda Khan for a scheduled individual session  Topics discussed included emotional wellness, relationship with significant other and mood regulation and symptoms  Adriana Alonso is feeling "ok"  She stated her Thanksgiving was good  Her grandson ate different foods, which she considers a "win" because he's a picky eater  She did say that she and her boyfriend broke up  She is disappointed but feels it was for the best   Adriana Alonso then talked about getting pulled over for her window tint  She had a medical excuse for her migraines but the  gave her a really hard time  The session ended up freezing and ended the session with Adriana Alonso abruptly  Adriana Alonso shows evidence of utilizing effective communication skills and maintaining emotional composure to manage mental health symptoms  During this session, this clinical used the following therapeutic modalities supportive psychotherapy, client-centered therapy and stress management skills  Assessment:  Adriana Alonso presents with a irritable mood  her affect is normal range and intensity, appropriate  Adriana Alonso was oriented x3  She was focused and engaged  Adriana Alonso exhibits good therapeutic rapport with this clinician  she continues to exhibit willingness to work on treatment goals and objectives  Adriana Alonso presents with a minimal risk of suicide, minimal risk of self-harm and minimal of harm to others  Plan:  Adriana Alonso will return in 1 week for the next scheduled session    At the next session, this clinical will use supportive psychotherapy and client-centered therapy to address her anxiety, in an effort to assist [place] with meeting treatment goals  Behavioral Health Treatment Plan [facility]: Diagnosis and Treatment Plan explained to Deepak Redd relates understanding diagnosis and is agreeable to Treatment Plan  Yes     Virtual Regular Visit    Verification of patient location:    Patient is located in the following state in which I hold an active license [place]      Assessment/Plan:    Problem List Items Addressed This Visit        Other    ZAINA (generalized anxiety disorder)    Bipolar 2 disorder ([facility] [address] ) - Primary       Goals addressed in session: Goal 1          Reason for visit is   Chief Complaint   Patient presents with   • Virtual Regular Visit        Encounter provider Silvana Brian LCSW    Provider located at [address] [place] [id number] [phone]      Recent Visits  No visits were found meeting these conditions  Showing recent visits within past 7 days and meeting all other requirements  Future Appointments  No visits were found meeting these conditions  Showing future appointments within next 150 days and meeting all other requirements       The patient was identified by name and date of birth  Reynaldo Trinidad was informed that this is a telemedicine visit and that the visit is being conducted throughthe [employer] platform  She agrees to proceed     My office door was closed  No one else was in the room  She acknowledged consent and understanding of privacy and security of the video platform  The patient has agreed to participate and understands they can discontinue the visit at any time  Patient is aware this is a billable service  Subjective  Reynaldo Trinidad is a 47 y o  female        HPI     Past Medical History:   Diagnosis Date   • Anxiety    • Anxiety    • Asthma    • Bipolar disorder ([facility] [address] )    • Carpal tunnel syndrome    • Chronic pain     lower back   • Depression    • Disease of thyroid gland    • Dyslipidemia    • Fibromyalgia    • Irritable bowel    • Kidney stones    • Kidney stones 2019   • Migraine    • Obesity (BMI 30 0-34  9)    • Psychiatric disorder     depression/anxiety   • Suicide attempt [facility])     as teen   • Vitamin D deficiency        Past Surgical History:   Procedure Laterality Date   • BUNIONECTOMY     •  SECTION     • CHOLECYSTECTOMY     • PARTIAL HYSTERECTOMY     • TONSILLECTOMY     • TUBAL LIGATION         Current Outpatient Medications   Medication Sig Dispense Refill   • albuterol (2 5 mg/3 mL) 0 083 % nebulizer solution Take 3 mL (2 5 mg total) by nebulization every 6 (six) hours as needed for wheezing or shortness of breath 3 mL 0   • amoxicillin (AMOXIL) 500 MG tablet Take 1 tablet (500 mg total) by mouth 2 (two) times a day for 5 days 10 tablet 0   • azelastine (ASTELIN) 0 1 % nasal spray 1 spray into each nostril 2 (two) times a day Use in each nostril as directed (Patient not taking: Reported on 9/15/2022) 30 mL 0   • benzonatate (TESSALON) 200 MG capsule Take 1 capsule (200 mg total) by mouth 3 (three) times a day as needed for cough (Patient not taking: Reported on 2022) 20 capsule 0   • Butalbital-APAP-Caffeine -40 MG CAPS TAKE 1 CAPSULE Daily PRN     • cariprazine (Vraylar) 3 MG capsule Take 1 capsule (3 mg total) by mouth daily 30 capsule 0   • celecoxib (CELEBREX) 200 mg capsule Take 1 capsule (200 mg total) by mouth daily 30 capsule 2   • ciclopirox (PENLAC) 8 % solution Apply topically daily at bedtime 6 6 mL 0   • clonazePAM (KlonoPIN) 0 5 mg tablet Take 1 tablet (0 5 mg total) by mouth daily as needed for anxiety 30 tablet 1   • clotrimazole-betamethasone (LOTRISONE) 1-0 05 % cream      • colchicine (COLCRYS) 0 6 mg tablet Take 1 tablet (0 6 mg total) by mouth daily 30 tablet 5   • dexamethasone (DECADRON) 4 mg tablet  (Patient not taking: Reported on 10/9/2022)     • dicyclomine (BENTYL) 10 mg capsule      • divalproex sodium (DEPAKOTE ER) 250 mg 24 hr tablet      • divalproex sodium (DEPAKOTE) 250 mg EC tablet Take 250 mg by mouth daily     • famotidine (PEPCID) 40 MG tablet famotidine 40 mg tablet     • fremanezumab-vfrm (Ajovy) 225 MG/1 5ML auto-injector Inject 225 mg under the skin every 30 (thirty) days     • hydroxychloroquine (PLAQUENIL) 200 mg tablet      •  MG tablet      • ketoconazole (NIZORAL) 2 % cream      • levothyroxine 200 mcg tablet      • levothyroxine 25 mcg tablet TAKE 1 5 TABLETS (37 5 MCG TOTAL) BY MOUTH DAILY 45 tablet 2   • lidocaine (LIDODERM) 5 % Apply 1 patch topically daily as needed (back pain) Remove & Discard patch within 12 hours or as directed by MD 6 patch 1   • loratadine (CLARITIN) 10 mg tablet Take 1 pill daily for allergies 90 tablet 1   • meclizine (ANTIVERT) 25 mg tablet Take 1 tablet (25 mg total) by mouth every 8 (eight) hours as needed for dizziness 30 tablet 1   • meloxicam (MOBIC) 15 mg tablet      • meloxicam (MOBIC) 7 5 mg tablet Daily     • methocarbamol (ROBAXIN) 750 mg tablet Take 1 tablet (750 mg total) by mouth every 6 (six) hours as needed for muscle spasms 20 tablet 0   • mometasone (ELOCON) 0 1 % cream Apply topically daily 45 g 0   • naratriptan (AMERGE) 2 5 MG tablet      • OLANZapine (ZyPREXA) 5 mg tablet      • omeprazole (PriLOSEC) 40 MG capsule Take 1 capsule (40 mg total) by mouth daily 90 capsule 1   • ondansetron (Zofran ODT) 4 mg disintegrating tablet Take 1 tablet (4 mg total) by mouth every 6 (six) hours as needed for nausea or vomiting 20 tablet 0   • phenazopyridine (PYRIDIUM) 100 mg tablet Take 1 tablet (100 mg total) by mouth 3 (three) times a day as needed for bladder spasms 10 tablet 0   • Respiratory Therapy Supplies (NEBULIZER) DONA by Does not apply route every 4 (four) hours while awake 90 each 1   • semaglutide, 1 mg/dose, (Ozempic, 1 MG/DOSE,) 4 MG/3ML SOPN injection pen Inject 0 75 mL (1 mg total) under the skin once a week 3 mL 3   • Topiramate ER 100 MG CP24 Take 100 mg by mouth 2 (two) times a day     • Ubrelvy 100 MG tablet      • Ventolin  (90 Base) MCG/ACT inhaler INHALE TWO PUFFS EVERY 6 (SIX) HOURS AS NEEDED FOR WHEEZING OR SHORTNESS OF BREATH (Patient not taking: Reported on 10/9/2022) 18 g 1   • XIFAXAN 550 MG tablet        No current facility-administered medications for this visit  Allergies   Allergen Reactions   • Doxycycline Rash   • Erythromycin Rash   • Other Edema and Wheezing     jalapeno   • Latex Rash   • Duloxetine      Other reaction(s): Nausea, Loopy   • Eletriptan      Pain in lower jaw with pressure in throat   • Emgality [Galcanezumab-Gnlm]    • Galcanezumab      rash       Review of Systems    Video Exam    There were no vitals filed for this visit      Physical Exam     Visit Time    Visit Start Time: 9:22am (late due to multiple tech issues that made it very difficult to connect)  Visit Stop Time: 10:07am  Total Visit Duration: 45 minutes

## 2022-12-01 ENCOUNTER — TELEPHONE (OUTPATIENT)
Dept: FAMILY MEDICINE CLINIC | Facility: CLINIC | Age: 52
End: 2022-12-01

## 2022-12-01 NOTE — TELEPHONE ENCOUNTER
Patient called stating she has puffiness under her right eye  She woke up with it  She requested that I send a message to you to see what she should do?

## 2022-12-02 ENCOUNTER — TELEMEDICINE (OUTPATIENT)
Dept: PSYCHIATRY | Facility: CLINIC | Age: 52
End: 2022-12-02

## 2022-12-02 DIAGNOSIS — G47.00 INSOMNIA, UNSPECIFIED TYPE: ICD-10-CM

## 2022-12-02 DIAGNOSIS — F31.81 BIPOLAR 2 DISORDER (HCC): Primary | ICD-10-CM

## 2022-12-02 DIAGNOSIS — F41.1 GAD (GENERALIZED ANXIETY DISORDER): ICD-10-CM

## 2022-12-02 RX ORDER — CLONAZEPAM 0.5 MG/1
0.5 TABLET ORAL DAILY PRN
Qty: 30 TABLET | Refills: 2 | Status: SHIPPED | OUTPATIENT
Start: 2022-12-02 | End: 2023-01-01

## 2022-12-02 NOTE — BH TREATMENT PLAN
TREATMENT PLAN (Medication Management Only)        [facility]    Name/Date of Birth/MRN:  John Meredith 47 y o  1970 MRN: [id number]  Date of Treatment Plan: December 2, 2022  Diagnosis/Diagnoses:   1  Bipolar 2 disorder ([address] )    2  ZAINA (generalized anxiety disorder)    3  Insomnia, unspecified type      Strengths/Personal Resources for Self-Care: "Ability to express myself"  Area/Areas of need (in own words): "Tired"  1  Long Term Goal:   Maintain mood stability and control of anxiety  Target Date: 3-6 months  Person/Persons responsible for completion of goal: Idolina Dubin and Reena Beyer (Therapist)  2  Short Term Objective (s) - How will we reach this goal?:   A  Provider new recommended medication/dosage changes and/or continue medication(s): continue current medications as prescribed  Vraylar 3mg (1) cap po qd # 30 R2  Clonazepam 0 5mg (1) tab po qd prn anxiety # 30 R2  Trokendi XR 100mg (1) tab po bid for migraines--per Neurology  Vit D--per Rheumatology  Continue psychotherapy with Dorothy Celis  Return 8 weeks, call sooner prn    Target Date: 3-6 months  Person/Persons Responsible for Completion of Goal: Idolina Dubin and Uli Raygoza   Progress Towards Goals: stable, continuing treatment  Treatment Modality: Medication mgt and psychotherapy  Review due 180 days from date of this plan: 6 months - 6/2/2023  Expected length of service: ongoing treatment unless revised  My Physician/PA/NP and I have developed this plan together and I agree to work on the goals and objectives  I understand the treatment goals that were developed for my treatment    Electronic Signatures: on file (unless signed below)    Jose Lees PA-C 12/02/22

## 2022-12-05 DIAGNOSIS — R07.9 CHEST PAIN, UNSPECIFIED TYPE: ICD-10-CM

## 2022-12-05 DIAGNOSIS — U07.1 COVID-19: ICD-10-CM

## 2022-12-05 RX ORDER — LEVOTHYROXINE SODIUM 0.03 MG/1
37.5 TABLET ORAL DAILY
Qty: 45 TABLET | Refills: 1 | Status: SHIPPED | OUTPATIENT
Start: 2022-12-05 | End: 2022-12-14

## 2022-12-07 ENCOUNTER — TELEPHONE (OUTPATIENT)
Dept: FAMILY MEDICINE CLINIC | Facility: CLINIC | Age: 52
End: 2022-12-07

## 2022-12-07 NOTE — TELEPHONE ENCOUNTER
Albuterol will no longer be covered by her insurance starting jan 1 2023  She can get ventalin hfa  She needs that sent to pharmacy so she has it when she needs it

## 2022-12-08 ENCOUNTER — TELEMEDICINE (OUTPATIENT)
Dept: PSYCHIATRY | Facility: CLINIC | Age: 52
End: 2022-12-08

## 2022-12-08 DIAGNOSIS — F31.81 BIPOLAR 2 DISORDER (HCC): Primary | ICD-10-CM

## 2022-12-08 DIAGNOSIS — F41.1 GAD (GENERALIZED ANXIETY DISORDER): ICD-10-CM

## 2022-12-08 DIAGNOSIS — B34.9 VIRAL INFECTION: ICD-10-CM

## 2022-12-08 RX ORDER — ALBUTEROL SULFATE 90 UG/1
2 AEROSOL, METERED RESPIRATORY (INHALATION) EVERY 4 HOURS PRN
Qty: 18 G | Refills: 3 | Status: SHIPPED | OUTPATIENT
Start: 2022-12-08 | End: 2023-01-20
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
## 2022-12-14 ENCOUNTER — TELEPHONE (OUTPATIENT)
Dept: FAMILY MEDICINE CLINIC | Facility: CLINIC | Age: 52
End: 2022-12-14

## 2022-12-14 ENCOUNTER — OFFICE VISIT (OUTPATIENT)
Dept: FAMILY MEDICINE CLINIC | Facility: CLINIC | Age: 52
End: 2022-12-14

## 2022-12-14 VITALS
HEIGHT: 64 IN | WEIGHT: 139.2 LBS | DIASTOLIC BLOOD PRESSURE: 60 MMHG | OXYGEN SATURATION: 99 % | SYSTOLIC BLOOD PRESSURE: 110 MMHG | TEMPERATURE: 97 F | RESPIRATION RATE: 14 BRPM | BODY MASS INDEX: 23.76 KG/M2 | HEART RATE: 78 BPM

## 2022-12-14 DIAGNOSIS — H57.89 EYE SWELLING, RIGHT: ICD-10-CM

## 2022-12-14 DIAGNOSIS — K58.8 OTHER IRRITABLE BOWEL SYNDROME: Primary | ICD-10-CM

## 2022-12-14 DIAGNOSIS — E03.8 OTHER SPECIFIED HYPOTHYROIDISM: ICD-10-CM

## 2022-12-14 RX ORDER — OFLOXACIN 3 MG/ML
1 SOLUTION/ DROPS OPHTHALMIC 4 TIMES DAILY
Qty: 5 ML | Refills: 0 | Status: SHIPPED | OUTPATIENT
Start: 2022-12-14

## 2022-12-14 RX ORDER — SUCRALFATE ORAL 1 G/10ML
1 SUSPENSION ORAL 4 TIMES DAILY
Qty: 414 ML | Refills: 0 | Status: SHIPPED | OUTPATIENT
Start: 2022-12-14

## 2022-12-14 RX ORDER — THYROID 60 MG
TABLET ORAL
COMMUNITY
Start: 2022-12-09

## 2022-12-14 RX ORDER — METOCLOPRAMIDE 5 MG/1
TABLET ORAL
COMMUNITY
Start: 2022-12-06

## 2022-12-14 NOTE — ASSESSMENT & PLAN NOTE
Start with warm chamomile tea bags compresses 3xdaily  May start with abx eye drops  Keep the appt with eye doctor that you already have next week

## 2022-12-14 NOTE — ASSESSMENT & PLAN NOTE
Was put back on armor thyroid 60mg daily as this worked for her really good in the past  Requesting new referral to endo

## 2022-12-14 NOTE — ASSESSMENT & PLAN NOTE
Start with Carafate before meals in order to prevent irritation, nausea and vomiting  Does have Reglan to use BID, but it makes her sleepy and cannot take it during the day   Continue to follow up with GI

## 2022-12-14 NOTE — PROGRESS NOTES
Assessment/Plan:         Problem List Items Addressed This Visit        Digestive    IBS (irritable bowel syndrome) - Primary     Start with Carafate before meals in order to prevent irritation, nausea and vomiting  Does have Reglan to use BID, but it makes her sleepy and cannot take it during the day  Continue to follow up with GI  Relevant Medications    sucralfate (CARAFATE) 1 g/10 mL suspension       Endocrine    Hypothyroidism     Was put back on armor thyroid 60mg daily as this worked for her really good in the past  Requesting new referral to endo  Relevant Medications    [place] Thyroid 60 MG tablet    Other Relevant Orders    Ambulatory Referral to Endocrinology       Other    Eye swelling, right     Start with warm chamomile tea bags compresses 3xdaily  May start with abx eye drops  Keep the appt with eye doctor that you already have next week  Relevant Medications    ofloxacin (OCUFLOX) 0 3 % ophthalmic solution         Subjective:      Patient ID: Vivien Barcenas is a 47 y o  female  Patient presents to the office complaining of R lower eyelid swelling that started weeks ago, is getting better  Eye Problem   The right eye is affected  This is a new problem  The current episode started 1 to 4 weeks ago  The problem has been gradually improving  There was no injury mechanism  The pain is mild  There is no known exposure to pink eye  She does not wear contacts  Associated symptoms include blurred vision, nausea and vomiting  Pertinent negatives include no eye discharge, double vision, eye redness, fever, foreign body sensation, itching, photophobia or recent URI  Treatments tried: cool eye compressess  The treatment provided mild relief         The following portions of the patient's history were reviewed and updated as appropriate:   Past Medical History:  She has a past medical history of Anxiety, Anxiety, Asthma, Bipolar disorder ([address] ), Carpal tunnel syndrome, Chronic pain, Depression, Disease of thyroid gland, Dyslipidemia, Fibromyalgia, Irritable bowel, Kidney stones, Kidney stones (2019), Migraine, Obesity (BMI 30 0-34 9), Psychiatric disorder, Suicide attempt ([address] ), and Vitamin D deficiency  ,  _______________________________________________________________________  Medical Problems:  does not have any pertinent problems on file ,  _______________________________________________________________________  Past Surgical History:   has a past surgical history that includes  section; Cholecystectomy; Tonsillectomy; Partial hysterectomy; Bunionectomy; and Tubal ligation  ,  _______________________________________________________________________  Family History:  family history includes Anxiety disorder in her mother; Arthritis in her mother; Bipolar disorder in her daughter and mother; Breast cancer in her paternal aunt and paternal grandmother; Cancer in her cousin, father, maternal uncle, paternal aunt, paternal grandmother, and paternal uncle; Dementia in her paternal grandmother; Depression in her mother; Diabetes in her mother; Heart disease in her brother; Hypertension in her brother and mother; Hypothyroidism in her mother and sister; Mental illness in her mother; Stroke in her mother ,  _______________________________________________________________________  Social History:   reports that she has never smoked  She has never used smokeless tobacco  She reports that she does not drink alcohol and does not use drugs  ,  _______________________________________________________________________  Allergies:  is allergic to doxycycline, erythromycin, other, latex, duloxetine, eletriptan, emgality [galcanezumab-gnlm], and galcanezumab     _______________________________________________________________________  Current Outpatient Medications   Medication Sig Dispense Refill   • albuterol (2 5 mg/3 mL) 0 083 % nebulizer solution Take 3 mL (2 5 mg total) by nebulization every 6 (six) hours as needed for wheezing or shortness of breath 3 mL 0   • albuterol (Ventolin HFA) 90 mcg/act inhaler Inhale 2 puffs every 4 (four) hours as needed for wheezing or shortness of breath 18 g 3   • [place] Thyroid 60 MG tablet      • Butalbital-APAP-Caffeine -40 MG CAPS TAKE 1 CAPSULE Daily PRN     • cariprazine (Vraylar) 3 MG capsule Take 1 capsule (3 mg total) by mouth daily 30 capsule 2   • celecoxib (CELEBREX) 200 mg capsule Take 1 capsule (200 mg total) by mouth daily 30 capsule 2   • clonazePAM (KlonoPIN) 0 5 mg tablet Take 1 tablet (0 5 mg total) by mouth daily as needed for anxiety 30 tablet 2   • clotrimazole-betamethasone (LOTRISONE) 1-0 05 % cream      • colchicine (COLCRYS) 0 6 mg tablet Take 1 tablet (0 6 mg total) by mouth daily 30 tablet 5   • divalproex sodium (DEPAKOTE) 250 mg EC tablet Take 250 mg by mouth daily     • famotidine (PEPCID) 40 MG tablet famotidine 40 mg tablet     • fremanezumab-vfrm (Ajovy) 225 MG/1 5ML auto-injector Inject 225 mg under the skin every 30 (thirty) days     • hydroxychloroquine (PLAQUENIL) 200 mg tablet      •  MG tablet      • lidocaine (LIDODERM) 5 % Apply 1 patch topically daily as needed (back pain) Remove & Discard patch within 12 hours or as directed by MD 6 patch 1   • loratadine (CLARITIN) 10 mg tablet Take 1 pill daily for allergies 90 tablet 1   • meclizine (ANTIVERT) 25 mg tablet Take 1 tablet (25 mg total) by mouth every 8 (eight) hours as needed for dizziness 30 tablet 1   • meloxicam (MOBIC) 15 mg tablet      • methocarbamol (ROBAXIN) 750 mg tablet Take 1 tablet (750 mg total) by mouth every 6 (six) hours as needed for muscle spasms 20 tablet 0   • naratriptan (AMERGE) 2 5 MG tablet      • ofloxacin (OCUFLOX) 0 3 % ophthalmic solution Administer 1 drop to the right eye 4 (four) times a day 5 mL 0   • OLANZapine (ZyPREXA) 5 mg tablet      • omeprazole (PriLOSEC) 40 MG capsule Take 1 capsule (40 mg total) by mouth daily 90 capsule 1   • phenazopyridine (PYRIDIUM) 100 mg tablet Take 1 tablet (100 mg total) by mouth 3 (three) times a day as needed for bladder spasms 10 tablet 0   • semaglutide, 1 mg/dose, (Ozempic, 1 MG/DOSE,) 4 MG/3ML SOPN injection pen Inject 0 75 mL (1 mg total) under the skin once a week 3 mL 3   • sucralfate (CARAFATE) 1 g/10 mL suspension Take 10 mL (1 g total) by mouth 4 (four) times a day 414 mL 0   • Topiramate  MG CP24 Take 100 mg by mouth 2 (two) times a day     • Ubrelvy 100 MG tablet      • azelastine (ASTELIN) 0 1 % nasal spray 1 spray into each nostril 2 (two) times a day Use in each nostril as directed 30 mL 0   • ciclopirox (PENLAC) 8 % solution Apply topically daily at bedtime 6 6 mL 0   • dicyclomine (BENTYL) 10 mg capsule      • divalproex sodium (DEPAKOTE ER) 250 mg 24 hr tablet      • ketoconazole (NIZORAL) 2 % cream      • meloxicam (MOBIC) 7 5 mg tablet Daily     • metoclopramide (REGLAN) 5 mg tablet      • mometasone (ELOCON) 0 1 % cream Apply topically daily 45 g 0   • ondansetron (Zofran ODT) 4 mg disintegrating tablet Take 1 tablet (4 mg total) by mouth every 6 (six) hours as needed for nausea or vomiting 20 tablet 0   • Respiratory Therapy Supplies (NEBULIZER) DONA by Does not apply route every 4 (four) hours while awake 90 each 1   • XIFAXAN 550 MG tablet        No current facility-administered medications for this visit      _______________________________________________________________________  Review of Systems   Constitutional: Negative for chills and fever  Eyes: Positive for blurred vision  Negative for double vision, photophobia, discharge, redness and itching  Respiratory: Negative for cough and shortness of breath  Cardiovascular: Negative for chest pain  Gastrointestinal: Positive for diarrhea, nausea and vomiting  Negative for abdominal pain  Genitourinary: Negative for dysuria  Musculoskeletal: Negative for arthralgias and back pain     Neurological: Negative for headaches  All other systems reviewed and are negative  Objective:  Vitals:    12/14/22 0925   BP: 110/60   Pulse: 78   Resp: 14   Temp: (!) 97 °F (36 1 °C)   SpO2: 99%   Weight: 63 1 kg (139 lb 3 2 oz)   Height: 5' 4" (1 626 m)     Body mass index is 23 89 kg/m²  Physical Exam  Vitals and nursing note reviewed  Constitutional:       General: She is not in acute distress  Appearance: Normal appearance  She is not ill-appearing  Eyes:      Comments: R under eye swelling, no erythema    Cardiovascular:      Rate and Rhythm: Normal rate and regular rhythm  Heart sounds: Normal heart sounds  Pulmonary:      Effort: Pulmonary effort is normal       Breath sounds: Normal breath sounds  Musculoskeletal:         General: Normal range of motion  Skin:     General: Skin is warm and dry  Neurological:      Mental Status: She is alert and oriented to person, place, and time  Psychiatric:         Mood and Affect: Mood normal          Behavior: Behavior normal          Thought Content:  Thought content normal          Judgment: Judgment normal

## 2022-12-15 ENCOUNTER — TELEMEDICINE (OUTPATIENT)
Dept: PSYCHIATRY | Facility: CLINIC | Age: 52
End: 2022-12-15

## 2022-12-15 DIAGNOSIS — F31.81 BIPOLAR 2 DISORDER (HCC): Primary | ICD-10-CM

## 2022-12-15 DIAGNOSIS — F41.1 GAD (GENERALIZED ANXIETY DISORDER): ICD-10-CM
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
## 2022-12-22 ENCOUNTER — TELEMEDICINE (OUTPATIENT)
Dept: PSYCHIATRY | Facility: CLINIC | Age: 52
End: 2022-12-22

## 2022-12-22 DIAGNOSIS — F41.1 GAD (GENERALIZED ANXIETY DISORDER): ICD-10-CM

## 2022-12-22 DIAGNOSIS — F31.81 BIPOLAR 2 DISORDER (HCC): Primary | ICD-10-CM

## 2022-12-22 NOTE — PSYCH
INDIVIDUAL SESSION NOTE      Length of time in session: 52 minutes, follow up in 1 week      Psychotherapy Provided: Individual        Diagnosis ICD-10-CM Associated Orders   1  Bipolar 2 disorder (HCC)  F31 81         2  ZAINA (generalized anxiety disorder)  F41  1                  Goals addressed in session: Goal 1      Pain:       none     0     Current suicide risk:  minimal     Data: Met with Mauro Ruffin for a scheduled individual session  Topics discussed included emotional wellness, coping skills, relationship with significant other, family stressors and relationships with family  Brigitte Bojorquez is feeling "ok"  She stated her grandson is sick again  He's on antibiotics and is doing better, but she reported she's exhausted as he's not been sleeping well  Brigitte Bojorquez spoke about Jaylan--the man she'd been seeing but it's been on and off  She stated they've tried to have discussions to further understand each other  She feels they initially really connected but things he struggles with (past relationships) seem to interfere with how he handles himself in current relationships  Brigitte Bojorquez wants to maintain their friendship and hopes over time that's where things will end up  Brigitte Bojorquez shows evidence of utilizing effective communication skills, engaging in problem solving and maintaining emotional composure to manage mental health symptoms  During this session, this clinical used the following therapeutic modalities supportive psychotherapy and client-centered therapy       Assessment:  Brigitte Bojorquez presents with a normal mood  her affect is normal range and intensity, appropriate  Brigittesuni Bojorquez was oriented x3  She was focused and engaged  Brigitte Bojorquez exhibits good therapeutic rapport with this clinician  she continues to exhibit willingness to work on treatment goals and objectives    Brigitte Bojorquez presents with a minimal risk of suicide, minimal risk of self-harm and minimal of harm to others        Plan:  Brigitte Bojorquez will return in 1 week for the next scheduled session  At the next session, this clinical will use supportive psychotherapy and client-centered therapy to address her anxiety, in an effort to assist Alejandra Damico with meeting treatment goals     [address] [facility]: Diagnosis and Treatment Plan explained to Emily Gardner relates understanding diagnosis and is agreeable to Treatment Plan  Yes      Virtual Regular Visit     Verification of patient location:     Patient is located in the following state in which I hold an active license [place]        Assessment/Plan:         Problem List Items Addressed This Visit               Other     ZAINA (generalized anxiety disorder)     Bipolar 2 disorder ([facility] [address] ) - Primary         Goals addressed in session: Goal 1            Reason for visit is       Chief Complaint   Patient presents with   • Virtual Regular Visit         Encounter provider Lavonne Horton LCSW     Provider located at [address] [place] [id number] [phone]        Recent Visits  Date Type Provider Dept   12/15/22 Telemedicine Lavonne Horton LCSW [facility]   Showing recent visits within past 7 days and meeting all other requirements  Future Appointments  No visits were found meeting these conditions  Showing future appointments within next 150 days and meeting all other requirements        The patient was identified by name and date of birth  Sally Fields was informed that this is a telemedicine visit and that the [facility]  She agrees to proceed     My office door was closed  No one else was in the room  She acknowledged consent and understanding of privacy and security of the video platform  The patient has agreed to participate and understands they can discontinue the visit at any time      Patient is aware this is a billable service     Sergio Umana is a 47 y o  female         HPI           Past Medical History:   Diagnosis Date   • Anxiety     • Anxiety     • Asthma     • Bipolar disorder ([facility] )     • Carpal tunnel syndrome     • Chronic pain       lower back   • Depression     • Disease of thyroid gland     • Dyslipidemia     • Fibromyalgia     • Irritable bowel     • Kidney stones     • Kidney stones 2019   • Migraine     • Obesity (BMI 30 0-34  9)     • Psychiatric disorder       depression/anxiety   • Suicide attempt ([facility] )       as teen   • Vitamin D deficiency                 Past Surgical History:   Procedure Laterality Date   • BUNIONECTOMY       •  SECTION       • CHOLECYSTECTOMY       • PARTIAL HYSTERECTOMY       • TONSILLECTOMY       • TUBAL LIGATION                    Current Outpatient Medications   Medication Sig Dispense Refill   • albuterol (2 5 mg/3 mL) 0 083 % nebulizer solution Take 3 mL (2 5 mg total) by nebulization every 6 (six) hours as needed for wheezing or shortness of breath 3 mL 0   • albuterol (Ventolin HFA) 90 mcg/act inhaler Inhale 2 puffs every 4 (four) hours as needed for wheezing or shortness of breath 18 g 3   • [place] Thyroid 60 MG tablet         • azelastine (ASTELIN) 0 1 % nasal spray 1 spray into each nostril 2 (two) times a day Use in each nostril as directed 30 mL 0   • Butalbital-APAP-Caffeine -40 MG CAPS TAKE 1 CAPSULE Daily PRN       • cariprazine (Vraylar) 3 MG capsule Take 1 capsule (3 mg total) by mouth daily 30 capsule 2   • celecoxib (CELEBREX) 200 mg capsule Take 1 capsule (200 mg total) by mouth daily 30 capsule 2   • ciclopirox (PENLAC) 8 % solution Apply topically daily at bedtime 6 6 mL 0   • clonazePAM (KlonoPIN) 0 5 mg tablet Take 1 tablet (0 5 mg total) by mouth daily as needed for anxiety 30 tablet 2   • clotrimazole-betamethasone (LOTRISONE) 1-0 05 % cream         • colchicine (COLCRYS) 0 6 mg tablet Take 1 tablet (0 6 mg total) by mouth daily 30 tablet 5   • dicyclomine (BENTYL) 10 mg capsule         • divalproex sodium (DEPAKOTE ER) 250 mg 24 hr tablet         • divalproex sodium (DEPAKOTE) 250 mg EC tablet Take 250 mg by mouth daily       • famotidine (PEPCID) 40 MG tablet famotidine 40 mg tablet       • fremanezumab-vfrm (Ajovy) 225 MG/1 5ML auto-injector Inject 225 mg under the skin every 30 (thirty) days       • hydroxychloroquine (PLAQUENIL) 200 mg tablet         •  MG tablet         • ketoconazole (NIZORAL) 2 % cream         • lidocaine (LIDODERM) 5 % Apply 1 patch topically daily as needed (back pain) Remove & Discard patch within 12 hours or as directed by MD 6 patch 1   • loratadine (CLARITIN) 10 mg tablet Take 1 pill daily for allergies 90 tablet 1   • meclizine (ANTIVERT) 25 mg tablet Take 1 tablet (25 mg total) by mouth every 8 (eight) hours as needed for dizziness 30 tablet 1   • meloxicam (MOBIC) 15 mg tablet         • meloxicam (MOBIC) 7 5 mg tablet Daily       • methocarbamol (ROBAXIN) 750 mg tablet Take 1 tablet (750 mg total) by mouth every 6 (six) hours as needed for muscle spasms 20 tablet 0   • metoclopramide (REGLAN) 5 mg tablet         • mometasone (ELOCON) 0 1 % cream Apply topically daily 45 g 0   • naratriptan (AMERGE) 2 5 MG tablet         • ofloxacin (OCUFLOX) 0 3 % ophthalmic solution Administer 1 drop to the right eye 4 (four) times a day 5 mL 0   • OLANZapine (ZyPREXA) 5 mg tablet         • omeprazole (PriLOSEC) 40 MG capsule Take 1 capsule (40 mg total) by mouth daily 90 capsule 1   • ondansetron (Zofran ODT) 4 mg disintegrating tablet Take 1 tablet (4 mg total) by mouth every 6 (six) hours as needed for nausea or vomiting 20 tablet 0   • phenazopyridine (PYRIDIUM) 100 mg tablet Take 1 tablet (100 mg total) by mouth 3 (three) times a day as needed for bladder spasms 10 tablet 0   • Respiratory Therapy Supplies (NEBULIZER) DONA by Does not apply route every 4 (four) hours while awake 90 each 1   • semaglutide, 1 mg/dose, (Ozempic, 1 MG/DOSE,) 4 MG/3ML SOPN injection pen Inject 0 75 mL (1 mg total) under the skin once a week 3 mL 3   • sucralfate (CARAFATE) 1 g/10 mL suspension Take 10 mL (1 g total) by mouth 4 (four) times a day 414 mL 0   • Topiramate  MG CP24 Take 100 mg by mouth 2 (two) times a day       • Ubrelvy 100 MG tablet         • XIFAXAN 550 MG tablet            No current facility-administered medications for this visit                Allergies   Allergen Reactions   • Doxycycline Rash   • Erythromycin Rash   • Other Edema and Wheezing       jalapeno   • Latex Rash   • Duloxetine         Other reaction(s): Nausea, Loopy   • Eletriptan         Pain in lower jaw with pressure in throat   • Emgality [Galcanezumab-Gnlm]     • Galcanezumab         rash         Review of Systems     Video Exam     There were no vitals filed for this visit      Physical Exam      Visit Time     Visit Start Time: 8:06am  Visit Stop Time: 8:58am  Total Visit Duration: 52 minutes

## 2022-12-27 ENCOUNTER — TELEPHONE (OUTPATIENT)
Dept: OTHER | Facility: OTHER | Age: 52
End: 2022-12-27

## 2022-12-27 NOTE — TELEPHONE ENCOUNTER
Patient is calling regarding cancelling an appointment      Date/Time:12/28 @09:00    Patient was rescheduled: YES [] NO [x]    Patient requesting call back to reschedule: YES [x] NO []

## 2023-01-01 PROBLEM — N39.0 URINARY TRACT INFECTION WITH HEMATURIA: Status: RESOLVED | Noted: 2022-11-02 | Resolved: 2023-01-01

## 2023-01-01 PROBLEM — R31.9 URINARY TRACT INFECTION WITH HEMATURIA: Status: RESOLVED | Noted: 2022-11-02 | Resolved: 2023-01-01

## 2023-01-04 ENCOUNTER — TELEPHONE (OUTPATIENT)
Dept: FAMILY MEDICINE CLINIC | Facility: CLINIC | Age: 53
End: 2023-01-04

## 2023-01-04 DIAGNOSIS — J45.40 MODERATE PERSISTENT ASTHMA WITHOUT COMPLICATION: Primary | ICD-10-CM

## 2023-01-04 DIAGNOSIS — E03.8 OTHER SPECIFIED HYPOTHYROIDISM: ICD-10-CM

## 2023-01-04 NOTE — TELEPHONE ENCOUNTER
Patient called requesting labs to check her thyroid please  Her eye doctor thinks that her thyroid levels could be causing the swelling under her eye

## 2023-01-05 ENCOUNTER — TELEMEDICINE (OUTPATIENT)
Dept: PSYCHIATRY | Facility: CLINIC | Age: 53
End: 2023-01-05

## 2023-01-05 DIAGNOSIS — F41.1 GAD (GENERALIZED ANXIETY DISORDER): Primary | ICD-10-CM

## 2023-01-05 DIAGNOSIS — F31.81 BIPOLAR 2 DISORDER (HCC): ICD-10-CM

## 2023-01-05 NOTE — PSYCH
INDIVIDUAL SESSION NOTE      Length of time in session: 52 minutes, follow up in 1 week      Psychotherapy Provided: Individual        Diagnosis ICD-10-CM Associated Orders   1  ZAINA (generalized anxiety disorder)  F41  1         2  Bipolar 2 disorder (HCC)  F31 81                  Goals addressed in session: Goal 1      Pain:       none     0     Current suicide risk:  minimal     Data: Met with Jasmina Florence for a scheduled individual session  Topics discussed included emotional wellness, relationship with significant other and family stressors  Shagufta Pierce is feeling "upset"  She stated her grandson had surgery to get his tonsils out  It ended up taking much longer than usual and when Shagufta Pierce when finally was able to see him, she noticed his tooth was missing  She began to question the doctor and nurses  Shagufta Pierce stated the doctor told her it was already missing; Shagufta Pierce told them she brushes his teeth everyday so she knows that's not the case  She felt the medical personnel weren't honest or respectful  Shagufta Pierce is really struggling with this situation  Shagufta Pierce shows evidence of utilizing effective communication skills and maintaining emotional composure to manage mental health symptoms  During this session, this clinical used the following therapeutic modalities supportive psychotherapy, client-centered therapy, stress management skills and problem solving skills       Assessment:  Shagufta Pierce presents with a normal mood  her affect is normal range and intensity, appropriate  Shagufta Pierce was oriented x3  She was focused and engaged  Shagufta Pierce exhibits good therapeutic rapport with this clinician  she continues to exhibit willingness to work on treatment goals and objectives  Shagufta Pierce presents with a minimal risk of suicide, minimal risk of self-harm and minimal of harm to others        Plan:  Shagufta Pierce will return in 1 week for the next scheduled session    At the next session, this clinical will use supportive psychotherapy and client-centered therapy to address her depression and anxious feelings, in an effort to assist Shagufta Pierce with meeting treatment goals     [address] [facility],[address]: Diagnosis and Treatment Plan explained to Dicksonleland Phelan relates understanding diagnosis and is agreeable to Treatment Plan  Yes      Virtual Regular Visit     Verification of patient location:     Patient is located in the following state in which I hold an active license [place]        Assessment/Plan:         Problem List Items Addressed This Visit               Other     ZAINA (generalized anxiety disorder) - Primary     Bipolar 2 disorder ([facility] [address] )         Goals addressed in session: Goal 1            Reason for visit is       Chief Complaint   Patient presents with   • Virtual Regular Visit         Encounter provider Moe Johnson LCSW     Provider located at [address] [place] [id number] [phone]        Recent Visits  No visits were found meeting these conditions  Showing recent visits within past 7 days and meeting all other requirements  Future Appointments  No visits were found meeting these conditions  Showing future appointments within next 150 days and meeting all other requirements        The patient was identified by name and date of birth  Jasmina Florence was informed that this is a telemedicine visit and that [facility]  She agrees to proceed     My office door was closed  No one else was in the room  She acknowledged consent and understanding of privacy and security of the video platform   The patient has agreed to participate and understands they can discontinue the visit at any time      Patient is aware this is a billable service       Subjective  Jasmina Florence is a 47 y o  female         HPI           Past Medical History:   Diagnosis Date   • Anxiety     • Anxiety   • Asthma     • Bipolar disorder ([facility] )     • Carpal tunnel syndrome     • Chronic pain       lower back   • Depression     • Disease of thyroid gland     • Dyslipidemia     • Fibromyalgia     • Irritable bowel     • Kidney stones     • Kidney stones 2019   • Migraine     • Obesity (BMI 30 0-34  9)     • Psychiatric disorder       depression/anxiety   • Suicide attempt ([facility] )       as teen   • Vitamin D deficiency           Past Surgical History:   Procedure Laterality Date   • BUNIONECTOMY       •  SECTION       • CHOLECYSTECTOMY       • PARTIAL HYSTERECTOMY       • TONSILLECTOMY       • TUBAL LIGATION                    Current Outpatient Medications   Medication Sig Dispense Refill   • albuterol (2 5 mg/3 mL) 0 083 % nebulizer solution Take 3 mL (2 5 mg total) by nebulization every 6 (six) hours as needed for wheezing or shortness of breath 3 mL 0   • albuterol (Ventolin HFA) 90 mcg/act inhaler Inhale 2 puffs every 4 (four) hours as needed for wheezing or shortness of breath 18 g 3   • [place] Thyroid 60 MG tablet         • azelastine (ASTELIN) 0 1 % nasal spray 1 spray into each nostril 2 (two) times a day Use in each nostril as directed 30 mL 0   • Butalbital-APAP-Caffeine -40 MG CAPS TAKE 1 CAPSULE Daily PRN       • cariprazine (Vraylar) 3 MG capsule Take 1 capsule (3 mg total) by mouth daily 30 capsule 2   • celecoxib (CELEBREX) 200 mg capsule Take 1 capsule (200 mg total) by mouth daily 30 capsule 2   • ciclopirox (PENLAC) 8 % solution Apply topically daily at bedtime 6 6 mL 0   • clonazePAM (KlonoPIN) 0 5 mg tablet Take 1 tablet (0 5 mg total) by mouth daily as needed for anxiety 30 tablet 2   • clotrimazole-betamethasone (LOTRISONE) 1-0 05 % cream         • colchicine (COLCRYS) 0 6 mg tablet Take 1 tablet (0 6 mg total) by mouth daily 30 tablet 5   • dicyclomine (BENTYL) 10 mg capsule         • divalproex sodium (DEPAKOTE ER) 250 mg 24 hr tablet         • divalproex sodium (DEPAKOTE) 250 mg EC tablet Take 250 mg by mouth daily       • famotidine (PEPCID) 40 MG tablet famotidine 40 mg tablet       • fremanezumab-vfrm (Ajovy) 225 MG/1 5ML auto-injector Inject 225 mg under the skin every 30 (thirty) days       • hydroxychloroquine (PLAQUENIL) 200 mg tablet         •  MG tablet         • ketoconazole (NIZORAL) 2 % cream         • lidocaine (LIDODERM) 5 % Apply 1 patch topically daily as needed (back pain) Remove & Discard patch within 12 hours or as directed by MD 6 patch 1   • loratadine (CLARITIN) 10 mg tablet Take 1 pill daily for allergies 90 tablet 1   • meclizine (ANTIVERT) 25 mg tablet Take 1 tablet (25 mg total) by mouth every 8 (eight) hours as needed for dizziness 30 tablet 1   • meloxicam (MOBIC) 15 mg tablet         • meloxicam (MOBIC) 7 5 mg tablet Daily       • methocarbamol (ROBAXIN) 750 mg tablet Take 1 tablet (750 mg total) by mouth every 6 (six) hours as needed for muscle spasms 20 tablet 0   • metoclopramide (REGLAN) 5 mg tablet         • mometasone (ELOCON) 0 1 % cream Apply topically daily 45 g 0   • naratriptan (AMERGE) 2 5 MG tablet         • ofloxacin (OCUFLOX) 0 3 % ophthalmic solution Administer 1 drop to the right eye 4 (four) times a day 5 mL 0   • OLANZapine (ZyPREXA) 5 mg tablet         • omeprazole (PriLOSEC) 40 MG capsule Take 1 capsule (40 mg total) by mouth daily 90 capsule 1   • ondansetron (Zofran ODT) 4 mg disintegrating tablet Take 1 tablet (4 mg total) by mouth every 6 (six) hours as needed for nausea or vomiting 20 tablet 0   • phenazopyridine (PYRIDIUM) 100 mg tablet Take 1 tablet (100 mg total) by mouth 3 (three) times a day as needed for bladder spasms 10 tablet 0   • Respiratory Therapy Supplies (NEBULIZER) DONA by Does not apply route every 4 (four) hours while awake 90 each 1   • semaglutide, 1 mg/dose, (Ozempic, 1 MG/DOSE,) 4 MG/3ML SOPN injection pen Inject 0 75 mL (1 mg total) under the skin once a week 3 mL 3   • sucralfate (CARAFATE) 1 g/10 mL suspension Take 10 mL (1 g total) by mouth 4 (four) times a day 414 mL 0   • Topiramate  MG CP24 Take 100 mg by mouth 2 (two) times a day       • Ubrelvy 100 MG tablet         • XIFAXAN 550 MG tablet            No current facility-administered medications for this visit                Allergies   Allergen Reactions   • Doxycycline Rash   • Erythromycin Rash   • Other Edema and Wheezing       jalapeno   • Latex Rash   • Duloxetine         Other reaction(s): Nausea, Loopy   • Eletriptan         Pain in lower jaw with pressure in throat   • Emgality [Galcanezumab-Gnlm]     • Galcanezumab         rash         Review of Systems     Video Exam     There were no vitals filed for this visit      Physical Exam      Visit Time     Visit Start Time: 8:03am  Visit Stop Time: 8:55am  Total Visit Duration: 52 minutes

## 2023-01-06 ENCOUNTER — APPOINTMENT (OUTPATIENT)
Dept: LAB | Facility: HOSPITAL | Age: 53
End: 2023-01-06

## 2023-01-06 DIAGNOSIS — E03.8 OTHER SPECIFIED HYPOTHYROIDISM: ICD-10-CM

## 2023-01-06 LAB — TSH SERPL DL<=0.05 MIU/L-ACNC: 3.94 UIU/ML (ref 0.45–4.5)

## 2023-01-12 ENCOUNTER — TELEMEDICINE (OUTPATIENT)
Dept: PSYCHIATRY | Facility: CLINIC | Age: 53
End: 2023-01-12

## 2023-01-12 DIAGNOSIS — F31.81 BIPOLAR 2 DISORDER (HCC): ICD-10-CM

## 2023-01-12 DIAGNOSIS — F41.1 GAD (GENERALIZED ANXIETY DISORDER): Primary | ICD-10-CM

## 2023-01-12 NOTE — BH TREATMENT PLAN
Roni Trevino  1970         Date of Initial Treatment Plan: 6/10/22   Date of Current Treatment Plan: 1/12/23         Treatment Plan Number: 2     Strengths/Personal Resources for Self Care: Royce Valdovinos has a good sense of humor and independent as well as setting and continues to pursue goals and managing surrounding demands and opportunities      Diagnosis:   1  ZAINA (generalized anxiety disorder)      2  Bipolar 2 disorder ([address] )                Area of Needs: stress management, challenging maladaptive behaviors and thinking patterns and to talk to someone who will give her "advice and     direction objectively"     Long Term Goal 1:  Royce Valdovinos will work to implement coping skills that result in a reduction of anxiety and worry, and improved daily functioning  Royce Valdovinos has done well here; goal is done  Updated Long Term Goal: Royce Valdovinos states are to "have someone she feels that can help her reframe perspectives and alternatives to handling her own feelings and resulting actions"        Target Date: 1/12/23  Completion Date: to be determined         Short Term Objectives for Goal 1:                                       1  Royce Valdovinos identify 4-5 symptoms of anxiety that impacts her daily functioning (done)                                      2  Royce Valdovinos will learn and implement calming skills to reduce overall anxiety and manage anxiety, such has mindfulness, progressive                                          muscle relaxation, mindful breathing, cognitive reframing, awareness of physical sensations from emotions  (objective continued)  3  Royce Valdovinos will learn and implement personal and interpersonal skills to reduce anxiety and improve interpersonal relationships (done)  Updated Short Term Objectives for Goal 1:    1   Royce Valdovinos will learn and implement calming skills to reduce overall anxiety and manage anxiety, such has mindfulness, progressive muscle relaxation, mindful breathing, cognitive reframing, awareness of physical sensations from emotions  2  Irma Lakhani will learn and implement problem-solving and decision-making skills (defining a problem, generating possible solutions, evaluating      pros and cons, selecting and implementing a plan, accepting or revising the plan )     3  Irma Lakhani will learn and exhibit understanding of a minimum of three effective coping skills to assist with symptom reduction       Goal 1: Modality: Individual 4x per month   Completion Date to be determined and The person(s) responsible for carrying out the plan is  this clinician and [address]: Diagnosis and Treatment Plan explained to Juan Francisco Caro relates understanding diagnosis and is agreeable to updated Treatment Plan          Client Comments : Please share your thoughts, feelings, need and/or experiences regarding your updated treatment plan: agreeable

## 2023-01-12 NOTE — PSYCH
INDIVIDUAL SESSION NOTE     Length of time in session: 52 minutes, follow up in 1 week     Psychotherapy Provided: Individual      Diagnosis ICD-10-CM Associated Orders   1  ZAINA (generalized anxiety disorder)  F41 1       2  Bipolar 2 disorder ([facility] [address] )  F31 81              Goals addressed in session: Goal 1     Pain:      none    0    Current suicide risk:  minimal    Data: Met with Suzi Chandler for a scheduled individual session  Topics discussed included emotional wellness, relationship with significant other, relationships with family and relationships with friends  Dyana Alves is feeling "like I have a migraine"  Dyana Alves said she's been ok overall, but she had the beginnings of a migraine  Treatment plan updated  Dyana Alves spoke at length about recent events with her boyfriend  There have been a lot of ups and downs  We talked through her feelings, validation was provided and we also role played some conversations  Dyana Alves shows evidence of utilizing effective communication skills, engaging in problem solving and maintaining emotional composure to manage mental health symptoms  During this session, this clinical used the following therapeutic modalities supportive psychotherapy, client-centered therapy and stress management skills  Assessment:  Dyana Alves presents with a normal mood  her affect is normal range and intensity, appropriate  Dyana Alves was oriented x3  She was focused and engaged  Dyana Alves exhibits good therapeutic rapport with this clinician  she continues to exhibit willingness to work on treatment goals and objectives  Dyana Alves presents with a minimal risk of suicide, minimal risk of self-harm and minimal of harm to others  Plan:  Dyana Alves will return in 1 week for the next scheduled session  At the next session, this clinical will use supportive psychotherapy and client-centered therapy to address her anxiety and stress, in an effort to assist Dyana Alves with meeting treatment goals        Behavioral Health Treatment Plan [facility]: Diagnosis and Treatment Plan explained to Anish Lang relates understanding diagnosis and is agreeable to Treatment Plan  Yes     Virtual Regular Visit    Verification of patient location:    Patient is located in the following state in which I hold an active license [place]      Assessment/Plan:    Problem List Items Addressed This Visit        Other    ZAINA (generalized anxiety disorder) - Primary    Bipolar 2 disorder ([address] )       Goals addressed in session: Goal 1          Reason for visit is   Chief Complaint   Patient presents with   • Virtual Regular Visit        Encounter provider Robe Gloria LCSW    Provider located at [address] [place] [id number] [phone]      Recent Visits  Date Type Provider Dept   01/05/23 Telemedicine Robe Gloria LCSW [facility]   Showing recent visits within past 7 days and meeting all other requirements  Today's Visits  Date Type Provider Dept   01/12/23 Telemedicine Robe Gloria LCSW [facility]   Showing today's visits and meeting all other requirements  Future Appointments  No visits were found meeting these conditions  Showing future appointments within next 150 days and meeting all other requirements       The patient was identified by name and date of birth  Rodney Mata was informed that this is a telemedicine visit and that the visit is being conducted throughthe Clear[employer] platform  She agrees to proceed     My office door was closed  No one else was in the room  She acknowledged consent and understanding of privacy and security of the video platform  The patient has agreed to participate and understands they can discontinue the visit at any time  Patient is aware this is a billable service  Subjective  Rodney Mata is a 47 y o  female        HPI     Past Medical History:   Diagnosis Date   • Anxiety    • Anxiety    • Asthma    • Bipolar disorder [facility])    • Carpal tunnel syndrome    • Chronic pain     lower back   • Depression    • Disease of thyroid gland    • Dyslipidemia    • Fibromyalgia    • Irritable bowel    • Kidney stones    • Kidney stones 2019   • Migraine    • Obesity (BMI 30 0-34  9)    • Psychiatric disorder     depression/anxiety   • Suicide attempt [facility])     as teen   • Vitamin D deficiency        Past Surgical History:   Procedure Laterality Date   • BUNIONECTOMY     •  SECTION     • CHOLECYSTECTOMY     • PARTIAL HYSTERECTOMY     • TONSILLECTOMY     • TUBAL LIGATION         Current Outpatient Medications   Medication Sig Dispense Refill   • albuterol (2 5 mg/3 mL) 0 083 % nebulizer solution Take 3 mL (2 5 mg total) by nebulization every 6 (six) hours as needed for wheezing or shortness of breath 3 mL 0   • albuterol (Ventolin HFA) 90 mcg/act inhaler Inhale 2 puffs every 4 (four) hours as needed for wheezing or shortness of breath 18 g 3   • [place] Thyroid 60 MG tablet      • azelastine (ASTELIN) 0 1 % nasal spray 1 spray into each nostril 2 (two) times a day Use in each nostril as directed 30 mL 0   • Butalbital-APAP-Caffeine -40 MG CAPS TAKE 1 CAPSULE Daily PRN     • cariprazine (Vraylar) 3 MG capsule Take 1 capsule (3 mg total) by mouth daily 30 capsule 2   • celecoxib (CELEBREX) 200 mg capsule Take 1 capsule (200 mg total) by mouth daily 30 capsule 2   • ciclopirox (PENLAC) 8 % solution Apply topically daily at bedtime 6 6 mL 0   • clonazePAM (KlonoPIN) 0 5 mg tablet Take 1 tablet (0 5 mg total) by mouth daily as needed for anxiety 30 tablet 2   • clotrimazole-betamethasone (LOTRISONE) 1-0 05 % cream      • colchicine (COLCRYS) 0 6 mg tablet Take 1 tablet (0 6 mg total) by mouth daily 30 tablet 5   • dicyclomine (BENTYL) 10 mg capsule      • divalproex sodium (DEPAKOTE ER) 250 mg 24 hr tablet      • divalproex sodium (DEPAKOTE) 250 mg EC tablet Take 250 mg by mouth daily     • famotidine (PEPCID) 40 MG tablet famotidine 40 mg tablet     • fremanezumab-vfrm (Ajovy) 225 MG/1 5ML auto-injector Inject 225 mg under the skin every 30 (thirty) days     • hydroxychloroquine (PLAQUENIL) 200 mg tablet      •  MG tablet      • ketoconazole (NIZORAL) 2 % cream      • lidocaine (LIDODERM) 5 % Apply 1 patch topically daily as needed (back pain) Remove & Discard patch within 12 hours or as directed by MD 6 patch 1   • loratadine (CLARITIN) 10 mg tablet Take 1 pill daily for allergies 90 tablet 1   • meclizine (ANTIVERT) 25 mg tablet Take 1 tablet (25 mg total) by mouth every 8 (eight) hours as needed for dizziness 30 tablet 1   • meloxicam (MOBIC) 15 mg tablet      • meloxicam (MOBIC) 7 5 mg tablet Daily     • methocarbamol (ROBAXIN) 750 mg tablet Take 1 tablet (750 mg total) by mouth every 6 (six) hours as needed for muscle spasms 20 tablet 0   • metoclopramide (REGLAN) 5 mg tablet      • mometasone (ELOCON) 0 1 % cream Apply topically daily 45 g 0   • naratriptan (AMERGE) 2 5 MG tablet      • ofloxacin (OCUFLOX) 0 3 % ophthalmic solution Administer 1 drop to the right eye 4 (four) times a day 5 mL 0   • OLANZapine (ZyPREXA) 5 mg tablet      • omeprazole (PriLOSEC) 40 MG capsule Take 1 capsule (40 mg total) by mouth daily 90 capsule 1   • ondansetron (Zofran ODT) 4 mg disintegrating tablet Take 1 tablet (4 mg total) by mouth every 6 (six) hours as needed for nausea or vomiting 20 tablet 0   • phenazopyridine (PYRIDIUM) 100 mg tablet Take 1 tablet (100 mg total) by mouth 3 (three) times a day as needed for bladder spasms 10 tablet 0   • Respiratory Therapy Supplies (NEBULIZER) DONA by Does not apply route every 4 (four) hours while awake 90 each 1   • semaglutide, 1 mg/dose, (Ozempic, 1 MG/DOSE,) 4 MG/3ML SOPN injection pen Inject 0 75 mL (1 mg total) under the skin once a week 3 mL 3   • sucralfate (CARAFATE) 1 g/10 mL suspension Take 10 mL (1 g total) by mouth 4 (four) times a day 414 mL 0   • Topiramate  MG CP24 Take 100 mg by mouth 2 (two) times a day     • Ubrelvy 100 MG tablet      • XIFAXAN 550 MG tablet        No current facility-administered medications for this visit  Allergies   Allergen Reactions   • Doxycycline Rash   • Erythromycin Rash   • Other Edema and Wheezing     jalapeno   • Latex Rash   • Duloxetine      Other reaction(s): Nausea, Loopy   • Eletriptan      Pain in lower jaw with pressure in throat   • Emgality [Galcanezumab-Gnlm]    • Galcanezumab      rash       Review of Systems    Video Exam    There were no vitals filed for this visit      Physical Exam     IT issues--late start to session  01/12/23  Start Time: 0806  Stop Time: 8411  Total Visit Time: 52 minutes

## 2023-01-17 ENCOUNTER — TELEPHONE (OUTPATIENT)
Dept: FAMILY MEDICINE CLINIC | Facility: CLINIC | Age: 53
End: 2023-01-17

## 2023-01-18 ENCOUNTER — OFFICE VISIT (OUTPATIENT)
Dept: OBGYN CLINIC | Facility: MEDICAL CENTER | Age: 53
End: 2023-01-18

## 2023-01-18 VITALS
HEIGHT: 64 IN | SYSTOLIC BLOOD PRESSURE: 122 MMHG | TEMPERATURE: 98.2 F | WEIGHT: 142.8 LBS | BODY MASS INDEX: 24.38 KG/M2 | DIASTOLIC BLOOD PRESSURE: 72 MMHG

## 2023-01-18 DIAGNOSIS — R10.2 PELVIC PAIN IN FEMALE: Primary | ICD-10-CM

## 2023-01-18 DIAGNOSIS — Z11.3 SCREEN FOR STD (SEXUALLY TRANSMITTED DISEASE): ICD-10-CM

## 2023-01-18 DIAGNOSIS — N89.8 LEUKORRHEA: ICD-10-CM

## 2023-01-18 DIAGNOSIS — M05.9 SEROPOSITIVE RHEUMATOID ARTHRITIS (HCC): ICD-10-CM

## 2023-01-18 LAB
BV WHIFF TEST VAG QL: ABNORMAL
CLUE CELLS SPEC QL WET PREP: ABNORMAL
PH SMN: 5 [PH]
SL AMB LEUKOCYTE ESTERASE,UA: ABNORMAL
SL AMB POCT BILIRUBIN,UA: ABNORMAL
SL AMB POCT BLOOD,UA: 1.01
SL AMB POCT KETONES,UA: ABNORMAL
SL AMB POCT NITRITE,UA: ABNORMAL
SL AMB POCT PH,UA: 6.5
SL AMB POCT SPECIFIC GRAVITY,UA: ABNORMAL
SL AMB POCT URINE PROTEIN: ABNORMAL
SL AMB POCT UROBILINOGEN: ABNORMAL
SL AMB POCT WET MOUNT: ABNORMAL
T VAGINALIS VAG QL WET PREP: ABNORMAL
YEAST VAG QL WET PREP: ABNORMAL

## 2023-01-18 RX ORDER — METRONIDAZOLE 500 MG/1
500 TABLET ORAL 2 TIMES DAILY
Qty: 14 TABLET | Refills: 0 | Status: SHIPPED | OUTPATIENT
Start: 2023-01-18 | End: 2023-01-25

## 2023-01-18 RX ORDER — FLUCONAZOLE 150 MG/1
150 TABLET ORAL ONCE
Qty: 2 TABLET | Refills: 1 | Status: SHIPPED | OUTPATIENT
Start: 2023-01-18 | End: 2023-01-18

## 2023-01-18 NOTE — PROGRESS NOTES
Assessment/Plan:       1  Pelvic pain in female  Assessment & Plan:  Pt w/ c/o lower back pain and pelvic pain that started a few days ago  Exam today with nml UA, Abnormal d/c c/w Yeast and BV, + TTP R>L  Cultures collected along with US   For now will treat for Yeast and BV with diflucan and flagyl  F/U after results  Orders:  -     fluconazole (DIFLUCAN) 150 mg tablet; Take 1 tablet (150 mg total) by mouth once for 1 dose May repeat dose in 3 days if still symptomatic  -     metroNIDAZOLE (FLAGYL) 500 mg tablet; Take 1 tablet (500 mg total) by mouth 2 (two) times a day for 7 days  -     POCT wet mount  -     US pelvis complete w transvaginal; Future; Expected date: 01/18/2023  -     POCT urine dip    2  Leukorrhea  -     fluconazole (DIFLUCAN) 150 mg tablet; Take 1 tablet (150 mg total) by mouth once for 1 dose May repeat dose in 3 days if still symptomatic  -     metroNIDAZOLE (FLAGYL) 500 mg tablet; Take 1 tablet (500 mg total) by mouth 2 (two) times a day for 7 days  -     POCT wet mount          Subjective:      Patient ID: Sean Pickens is a 47 y o  female  She is here for Vaginitis (Discharge, odor, abd tenderness since Monday 1/16/23)    HPI  Pt c/o lower abd/pelvic pain  Increased vaginal d/c- initially milky, but today thick and cottage cheese like   + itching  Malodor  sxs started Monday  Low grade fever- 99 0  Malaise  Bloating      Menstrual History:  No LMP recorded  Patient has had a hysterectomy            The following portions of the patient's history were reviewed and updated as appropriate: allergies, current medications, past family history, past medical history, past social history, past surgical history, and problem list     Review of Systems  See HPI for pertinent positives          Objective:    /72 (BP Location: Left arm, Patient Position: Sitting, Cuff Size: Standard)   Temp 98 2 °F (36 8 °C)   Ht 5' 3 5" (1 613 m)   Wt 64 8 kg (142 lb 12 8 oz)   BMI 24 90 kg/m² Physical Exam  Constitutional:       General: She is not in acute distress  Appearance: Normal appearance  Genitourinary:      Urethral meatus normal       No lesions in the vagina  Right Labia: No rash, lesions or skin changes  Left Labia: No lesions, skin changes or rash  Vaginal discharge and erythema present  No vaginal tenderness, bleeding or ulceration  Right Adnexa: tender  Right Adnexa: not full and no mass present  Left Adnexa: not tender, not full and no mass present  No cervical motion tenderness, discharge, friability or lesion  Uterus is absent  Bladder is not tender  Pelvic exam was performed with patient in the lithotomy position  Rectum:      No external hemorrhoid  HENT:      Head: Normocephalic  Cardiovascular:      Rate and Rhythm: Normal rate  Pulmonary:      Effort: Pulmonary effort is normal    Neurological:      Mental Status: She is alert and oriented to person, place, and time  Psychiatric:         Mood and Affect: Mood normal          Behavior: Behavior normal    Vitals reviewed

## 2023-01-18 NOTE — ASSESSMENT & PLAN NOTE
Pt w/ c/o lower back pain and pelvic pain that started a few days ago  Exam today with nml UA, Abnormal d/c c/w Yeast and BV, + TTP R>L  Cultures collected along with US   For now will treat for Yeast and BV with diflucan and flagyl  F/U after results

## 2023-01-19 ENCOUNTER — TELEMEDICINE (OUTPATIENT)
Dept: PSYCHIATRY | Facility: CLINIC | Age: 53
End: 2023-01-19

## 2023-01-19 DIAGNOSIS — F41.1 GAD (GENERALIZED ANXIETY DISORDER): ICD-10-CM

## 2023-01-19 DIAGNOSIS — F31.81 BIPOLAR 2 DISORDER (HCC): Primary | ICD-10-CM

## 2023-01-19 LAB
C GLABRATA DNA VAG QL NAA+PROBE: NEGATIVE
C KRUSEI DNA VAG QL NAA+PROBE: NEGATIVE
C TRACH DNA SPEC QL NAA+PROBE: ABNORMAL
CANDIDA SP 6 PNL VAG NAA+PROBE: NEGATIVE
N GONORRHOEA DNA SPEC QL NAA+PROBE: ABNORMAL
T VAGINALIS DNA VAG QL NAA+PROBE: NEGATIVE
VAGINOSIS/ITIS DNA PNL VAG PROBE+SIG AMP: NEGATIVE

## 2023-01-19 NOTE — PSYCH
INDIVIDUAL SESSION NOTE     Length of time in session: 52 minutes, follow up in 1 week     Psychotherapy Provided: Individual      Diagnosis ICD-10-CM Associated Orders   1  Bipolar 2 disorder (HCC)  F31 81       2  ZAINA (generalized anxiety disorder)  F41 1              Goals addressed in session: Goal 1     Pain:      none    0    Current suicide risk:  minimal    Data: Met with Kanchan Watkins for a scheduled individual session  Topics discussed included emotional wellness, coping skills, relationship with significant other, family stressors and relationships with family  [facility] is feeling "tired"  She stated her grandson hasn't been sleeping well; he's been waking up in the middle of the night and then he wakes her up  She stated it's been a rough couple weeks  She's also still having trouble getting him services in school from an outside agency; they were supposed to go in, monitor and work with him  Elizabeth Angélica now saying he is meeting the school's goals; she feels it's the school not wanting the agency to come in and monitor  [facility] is a strong advocate for her grandson  [facility] then spoke about her on/off boyfriend, Azeb Olivo  She and him still talk a lot and they seem to be continuously trying to work things out  He has a lot of unresolved issues from a past relationship so there are insecurities he presents with and [facility] has pointed those out  She has concerns with all of his baggage and him not dealing with it  Azeb Olivo keeps insisting they can make it work but [facility] has been firm on what she's able to handle in a relationship  [facility] shows evidence of utilizing effective communication skills and maintaining emotional composure to manage mental health symptoms  During this session, this clinical used the following therapeutic modalities supportive psychotherapy, client-centered therapy and stress management skills  Assessment:  [facility] presents with a normal mood    her affect is normal range and intensity, appropriate  Kelly Bowser was oriented x3  She was focused and engaged  Kelly Bowser exhibits good therapeutic rapport with this clinician  she continues to exhibit willingness to work on treatment goals and objectives  Kelly Bowser presents with a minimal risk of suicide, minimal risk of self-harm and minimal of harm to others  Plan:  Kelly Bowser will return in 1 week for the next scheduled session  Between sessions, she  will continue to maintain her boundaries with Amy Irby and halting the progression of their relationship and will report back during the next session re: success and barriers  At the next session, this clinical will use supportive psychotherapy and client-centered therapy to address her anxiety, in an effort to assist Kelly Bowser with meeting treatment goals  Behavioral Health Treatment Plan [facility]: Diagnosis and Treatment Plan explained to Myronbisi Imain relates understanding diagnosis and is agreeable to Treatment Plan   Yes     Virtual Regular Visit    Verification of patient location:    Patient is located in the following state in which I hold an active license [place]      Assessment/Plan:    Problem List Items Addressed This Visit        Other    ZAINA (generalized anxiety disorder)    Bipolar 2 disorder ([facility] [address] ) - Primary       Goals addressed in session: Goal 1          Reason for visit is   Chief Complaint   Patient presents with   • Virtual Regular Visit        Encounter provider Gato Hughes LCSW    Provider located at [address] [place] [id number] [phone]      Recent Visits  Date Type Provider Dept   01/12/23 Telemedicine Gato Hughes LCSW [facility]   Showing recent visits within past 7 days and meeting all other requirements  Today's Visits  Date Type Provider Dept   01/19/23 Telemedicine Gato Hughes LCSW [facility]   Showing today's visits and meeting all other requirements  Future Appointments  No visits were found meeting these conditions  Showing future appointments within next 150 days and meeting all other requirements       The patient was identified by name and date of birth  Kalyn Serrato was informed that this is a telemedicine visit and that [facility]  She agrees to proceed     My office door was closed  No one else was in the room  She acknowledged consent and understanding of privacy and security of the video platform  The patient has agreed to participate and understands they can discontinue the visit at any time  Patient is aware this is a billable service  Subjective  Kalyn Serrato is a 47 y o  female  HPI     Past Medical History:   Diagnosis Date   • Anxiety    • Anxiety    • Asthma    • Bipolar disorder [facility])    • Carpal tunnel syndrome    • Chronic pain     lower back   • Depression    • Disease of thyroid gland    • Dyslipidemia    • Fibromyalgia    • Irritable bowel    • Kidney stones    • Kidney stones 2019   • Migraine    • Obesity (BMI 30 0-34  9)    • Psychiatric disorder     depression/anxiety   • Suicide attempt [facility])     as teen   • Vitamin D deficiency        Past Surgical History:   Procedure Laterality Date   • BUNIONECTOMY     •  SECTION     • CHOLECYSTECTOMY     • PARTIAL HYSTERECTOMY     • TONSILLECTOMY     • TUBAL LIGATION         Current Outpatient Medications   Medication Sig Dispense Refill   • albuterol (2 5 mg/3 mL) 0 083 % nebulizer solution Take 3 mL (2 5 mg total) by nebulization every 6 (six) hours as needed for wheezing or shortness of breath 3 mL 0   • albuterol (Ventolin HFA) 90 mcg/act inhaler Inhale 2 puffs every 4 (four) hours as needed for wheezing or shortness of breath 18 g 3   • [place] Thyroid 60 MG tablet      • azelastine (ASTELIN) 0 1 % nasal spray 1 spray into each nostril 2 (two) times a day Use in each nostril as directed 30 mL 0   • Butalbital-APAP-Caffeine -40 MG CAPS TAKE 1 CAPSULE Daily PRN     • cariprazine (Vraylar) 3 MG capsule Take 1 capsule (3 mg total) by mouth daily 30 capsule 2   • celecoxib (CELEBREX) 200 mg capsule Take 1 capsule (200 mg total) by mouth daily 30 capsule 2   • ciclopirox (PENLAC) 8 % solution Apply topically daily at bedtime 6 6 mL 0   • clonazePAM (KlonoPIN) 0 5 mg tablet Take 1 tablet (0 5 mg total) by mouth daily as needed for anxiety 30 tablet 2   • clotrimazole-betamethasone (LOTRISONE) 1-0 05 % cream      • colchicine (COLCRYS) 0 6 mg tablet Take 1 tablet (0 6 mg total) by mouth daily 30 tablet 5   • dicyclomine (BENTYL) 10 mg capsule      • divalproex sodium (DEPAKOTE ER) 250 mg 24 hr tablet      • divalproex sodium (DEPAKOTE) 250 mg EC tablet Take 250 mg by mouth daily     • famotidine (PEPCID) 40 MG tablet famotidine 40 mg tablet     • fremanezumab-vfrm (Ajovy) 225 MG/1 5ML auto-injector Inject 225 mg under the skin every 30 (thirty) days     • hydroxychloroquine (PLAQUENIL) 200 mg tablet      •  MG tablet      • ketoconazole (NIZORAL) 2 % cream      • lidocaine (LIDODERM) 5 % Apply 1 patch topically daily as needed (back pain) Remove & Discard patch within 12 hours or as directed by MD 6 patch 1   • loratadine (CLARITIN) 10 mg tablet Take 1 pill daily for allergies 90 tablet 1   • meclizine (ANTIVERT) 25 mg tablet Take 1 tablet (25 mg total) by mouth every 8 (eight) hours as needed for dizziness 30 tablet 1   • meloxicam (MOBIC) 15 mg tablet      • meloxicam (MOBIC) 7 5 mg tablet Daily     • methocarbamol (ROBAXIN) 750 mg tablet Take 1 tablet (750 mg total) by mouth every 6 (six) hours as needed for muscle spasms 20 tablet 0   • metoclopramide (REGLAN) 5 mg tablet      • metroNIDAZOLE (FLAGYL) 500 mg tablet Take 1 tablet (500 mg total) by mouth 2 (two) times a day for 7 days 14 tablet 0   • mometasone (ELOCON) 0 1 % cream Apply topically daily 45 g 0   • naratriptan (AMERGE) 2 5 MG tablet      • ofloxacin (OCUFLOX) 0 3 % ophthalmic solution Administer 1 drop to the right eye 4 (four) times a day 5 mL 0   • OLANZapine (ZyPREXA) 5 mg tablet      • omeprazole (PriLOSEC) 40 MG capsule Take 1 capsule (40 mg total) by mouth daily 90 capsule 1   • ondansetron (Zofran ODT) 4 mg disintegrating tablet Take 1 tablet (4 mg total) by mouth every 6 (six) hours as needed for nausea or vomiting 20 tablet 0   • phenazopyridine (PYRIDIUM) 100 mg tablet Take 1 tablet (100 mg total) by mouth 3 (three) times a day as needed for bladder spasms 10 tablet 0   • Respiratory Therapy Supplies (NEBULIZER) DONA by Does not apply route every 4 (four) hours while awake 90 each 1   • semaglutide, 1 mg/dose, (Ozempic, 1 MG/DOSE,) 4 MG/3ML SOPN injection pen Inject 0 75 mL (1 mg total) under the skin once a week 3 mL 3   • sucralfate (CARAFATE) 1 g/10 mL suspension Take 10 mL (1 g total) by mouth 4 (four) times a day 414 mL 0   • Topiramate  MG CP24 Take 100 mg by mouth 2 (two) times a day     • Ubrelvy 100 MG tablet      • XIFAXAN 550 MG tablet        No current facility-administered medications for this visit  Allergies   Allergen Reactions   • Doxycycline Rash   • Erythromycin Rash   • Other Edema and Wheezing     jalapeno   • Latex Rash   • Duloxetine      Other reaction(s): Nausea, Loopy   • Eletriptan      Pain in lower jaw with pressure in throat   • Emgality [Galcanezumab-Gnlm]    • Galcanezumab      rash       Review of Systems    Video Exam    There were no vitals filed for this visit      Physical Exam     01/19/23  Start Time: 0803  Stop Time: 0669  Total Visit Time: 52 minutes

## 2023-01-20 ENCOUNTER — TELEPHONE (OUTPATIENT)
Dept: OBGYN CLINIC | Facility: CLINIC | Age: 53
End: 2023-01-20

## 2023-01-20 ENCOUNTER — OFFICE VISIT (OUTPATIENT)
Dept: FAMILY MEDICINE CLINIC | Facility: CLINIC | Age: 53
End: 2023-01-20

## 2023-01-20 ENCOUNTER — APPOINTMENT (OUTPATIENT)
Dept: LAB | Facility: HOSPITAL | Age: 53
End: 2023-01-20

## 2023-01-20 VITALS
HEIGHT: 64 IN | HEART RATE: 78 BPM | DIASTOLIC BLOOD PRESSURE: 80 MMHG | BODY MASS INDEX: 24.9 KG/M2 | RESPIRATION RATE: 16 BRPM | SYSTOLIC BLOOD PRESSURE: 120 MMHG | TEMPERATURE: 96.8 F | OXYGEN SATURATION: 98 %

## 2023-01-20 DIAGNOSIS — Z11.3 SCREEN FOR STD (SEXUALLY TRANSMITTED DISEASE): ICD-10-CM

## 2023-01-20 DIAGNOSIS — D84.9 IMMUNOCOMPROMISED (HCC): ICD-10-CM

## 2023-01-20 DIAGNOSIS — R52 BODY ACHES: ICD-10-CM

## 2023-01-20 DIAGNOSIS — J02.9 SORE THROAT: Primary | ICD-10-CM

## 2023-01-20 DIAGNOSIS — F31.81 BIPOLAR 2 DISORDER (HCC): ICD-10-CM

## 2023-01-20 LAB — S PYO AG THROAT QL: NEGATIVE

## 2023-01-20 RX ORDER — AZITHROMYCIN 250 MG/1
TABLET, FILM COATED ORAL
Qty: 6 TABLET | Refills: 0 | Status: SHIPPED | OUTPATIENT
Start: 2023-01-20 | End: 2023-01-25

## 2023-01-20 NOTE — RESULT ENCOUNTER NOTE
GC/CT invalid  I do recommend repeating this and can be done through urine  New order placed for GC/CT via urine to be done at lab    Please notify patient and have her go ASAP

## 2023-01-20 NOTE — TELEPHONE ENCOUNTER
----- Message from Rosie Espino, [address] sent at 1/20/2023  7:19 AM EST -----  GC/CT invalid  I do recommend repeating this and can be done through urine  New order placed for GC/CT via urine to be done at lab    Please notify patient and have her go ASAP

## 2023-01-20 NOTE — PROGRESS NOTES
Assessment/Plan:       Problem List Items Addressed This Visit        Other    Bipolar 2 disorder ([facility] [[email or web address] 75 )     Stable on current medication regimen, continue to see psychiatry  Immunocompromised ([facility] [[email or web address] 75 )     Stable, will continue to monitor  Other Visit Diagnoses     Sore throat    -  Primary    Increase hydration, vitamin C vitamin D and zinc   Tea with lemon and honey  If the symptoms do not improve might start azithromycin tomorrow    Relevant Medications    azithromycin (Zithromax) 250 mg tablet    Other Relevant Orders    POCT rapid strepA (Completed)    Covid/Flu- Office Collect    Body aches        Obtain blood work in order to rule out mono    Relevant Medications    azithromycin (Zithromax) 250 mg tablet    Other Relevant Orders    Mononucleosis screen    EBV acute panel            Subjective:      Patient ID: Kanchan Watkins is a 47 y o  female  Sore Throat   This is a new problem  The current episode started in the past 7 days  The problem has been gradually improving  The maximum temperature recorded prior to her arrival was 100 4 - 100 9 F  The pain is mild  Associated symptoms include diarrhea and a hoarse voice  Pertinent negatives include no abdominal pain, congestion, coughing, drooling, ear discharge, ear pain, headaches, plugged ear sensation, neck pain, shortness of breath, stridor, swollen glands, trouble swallowing or vomiting  She has had exposure to strep  She has had no exposure to mono  She has tried acetaminophen and NSAIDs for the symptoms  The treatment provided mild relief         The following portions of the patient's history were reviewed and updated as appropriate:   Past Medical History:  She has a past medical history of Anxiety, Anxiety, Asthma, Bipolar disorder ([facility] [[email or web address] 75 ), Carpal tunnel syndrome, Chronic pain, Depression, Disease of thyroid gland, Dyslipidemia, Fibromyalgia, Irritable bowel, Kidney stones, Kidney stones (5/20/2019), Migraine, Obesity (BMI 30 0-34 9), Psychiatric disorder, Suicide attempt ([address] ), and Vitamin D deficiency  ,  _______________________________________________________________________  Medical Problems:  does not have any pertinent problems on file ,  _______________________________________________________________________  Past Surgical History:   has a past surgical history that includes  section; Cholecystectomy; Tonsillectomy; Partial hysterectomy; Bunionectomy; and Tubal ligation  ,  _______________________________________________________________________  Family History:  family history includes Anxiety disorder in her mother; Arthritis in her mother; Bipolar disorder in her daughter and mother; Breast cancer in her paternal aunt and paternal grandmother; Cancer in her cousin, father, maternal uncle, paternal aunt, paternal grandmother, and paternal uncle; Dementia in her paternal grandmother; Depression in her mother; Diabetes in her mother; Heart disease in her brother; Hypertension in her brother and mother; Hypothyroidism in her mother and sister; Mental illness in her mother; Stroke in her mother ,  _______________________________________________________________________  Social History:   reports that she has never smoked  She has never used smokeless tobacco  She reports that she does not drink alcohol and does not use drugs  ,  _______________________________________________________________________  Allergies:  is allergic to doxycycline, erythromycin, other, latex, duloxetine, eletriptan, emgality [galcanezumab-gnlm], and galcanezumab     _______________________________________________________________________  Current Outpatient Medications   Medication Sig Dispense Refill   • albuterol (2 5 mg/3 mL) 0 083 % nebulizer solution Take 3 mL (2 5 mg total) by nebulization every 6 (six) hours as needed for wheezing or shortness of breath 3 mL 0   • [place]oo Thyroid 60 MG tablet      • azelastine (ASTELIN) 0 1 % nasal spray 1 spray into each nostril 2 (two) times a day Use in each nostril as directed 30 mL 0   • azithromycin (Zithromax) 250 mg tablet Take 2 tablets (500 mg total) by mouth daily for 1 day, THEN 1 tablet (250 mg total) daily for 4 days   6 tablet 0   • Butalbital-APAP-Caffeine -40 MG CAPS TAKE 1 CAPSULE Daily PRN     • cariprazine (Vraylar) 3 MG capsule Take 1 capsule (3 mg total) by mouth daily 30 capsule 2   • celecoxib (CELEBREX) 200 mg capsule Take 1 capsule (200 mg total) by mouth daily 30 capsule 2   • clotrimazole-betamethasone (LOTRISONE) 1-0 05 % cream      • dicyclomine (BENTYL) 10 mg capsule      • divalproex sodium (DEPAKOTE ER) 250 mg 24 hr tablet      • divalproex sodium (DEPAKOTE) 250 mg EC tablet Take 250 mg by mouth daily     • fremanezumab-vfrm (Ajovy) 225 MG/1 5ML auto-injector Inject 225 mg under the skin every 30 (thirty) days     • hydroxychloroquine (PLAQUENIL) 200 mg tablet      •  MG tablet      • ketoconazole (NIZORAL) 2 % cream      • loratadine (CLARITIN) 10 mg tablet Take 1 pill daily for allergies 90 tablet 1   • meclizine (ANTIVERT) 25 mg tablet Take 1 tablet (25 mg total) by mouth every 8 (eight) hours as needed for dizziness 30 tablet 1   • meloxicam (MOBIC) 15 mg tablet      • metoclopramide (REGLAN) 5 mg tablet      • metroNIDAZOLE (FLAGYL) 500 mg tablet Take 1 tablet (500 mg total) by mouth 2 (two) times a day for 7 days 14 tablet 0   • mometasone (ELOCON) 0 1 % cream Apply topically daily 45 g 0   • naratriptan (AMERGE) 2 5 MG tablet      • omeprazole (PriLOSEC) 40 MG capsule Take 1 capsule (40 mg total) by mouth daily 90 capsule 1   • ondansetron (Zofran ODT) 4 mg disintegrating tablet Take 1 tablet (4 mg total) by mouth every 6 (six) hours as needed for nausea or vomiting 20 tablet 0   • semaglutide, 1 mg/dose, (Ozempic, 1 MG/DOSE,) 4 MG/3ML SOPN injection pen Inject 0 75 mL (1 mg total) under the skin once a week 3 mL 3   • Topiramate  MG CP24 Take 100 mg by mouth 2 (two) times a day     • Ubrelvy 100 MG tablet      • XIFAXAN 550 MG tablet      • clonazePAM (KlonoPIN) 0 5 mg tablet Take 1 tablet (0 5 mg total) by mouth daily as needed for anxiety 30 tablet 2   • lidocaine (LIDODERM) 5 % Apply 1 patch topically daily as needed (back pain) Remove & Discard patch within 12 hours or as directed by MD Castro patch 1   • Respiratory Therapy Supplies (NEBULIZER) DONA by Does not apply route every 4 (four) hours while awake 90 each 1     No current facility-administered medications for this visit      _______________________________________________________________________  Review of Systems   HENT: Positive for hoarse voice and sore throat  Negative for congestion, drooling, ear discharge, ear pain and trouble swallowing  Respiratory: Negative for cough, shortness of breath and stridor  Gastrointestinal: Positive for diarrhea  Negative for abdominal pain and vomiting  Musculoskeletal: Negative for neck pain  Neurological: Negative for headaches  Objective:  Vitals:    01/20/23 0929   BP: 120/80   Pulse: 78   Resp: 16   Temp: (!) 96 8 °F (36 °C)   SpO2: 98%   Height: 5' 3 5" (1 613 m)     Body mass index is 24 9 kg/m²  Physical Exam  Vitals and nursing note reviewed  Constitutional:       General: She is not in acute distress  Appearance: Normal appearance  She is ill-appearing (tired looking )  HENT:      Right Ear: Tympanic membrane, ear canal and external ear normal       Left Ear: Tympanic membrane, ear canal and external ear normal       Mouth/Throat:      Pharynx: No oropharyngeal exudate or posterior oropharyngeal erythema  Cardiovascular:      Rate and Rhythm: Normal rate and regular rhythm  Heart sounds: Normal heart sounds  Pulmonary:      Effort: Pulmonary effort is normal       Breath sounds: Normal breath sounds  Musculoskeletal:         General: Normal range of motion  Lymphadenopathy:      Cervical: No cervical adenopathy     Skin: General: Skin is warm and dry  Neurological:      Mental Status: She is alert and oriented to person, place, and time  Psychiatric:         Mood and Affect: Mood normal          Behavior: Behavior normal          Thought Content:  Thought content normal          Judgment: Judgment normal

## 2023-01-21 LAB
C TRACH DNA SPEC QL NAA+PROBE: NEGATIVE
EBV NA IGG SER IA-ACNC: 186 U/ML (ref 0–17.9)
EBV VCA IGG SER IA-ACNC: 196 U/ML (ref 0–17.9)
EBV VCA IGM SER IA-ACNC: <36 U/ML (ref 0–35.9)
FLUAV RNA RESP QL NAA+PROBE: NEGATIVE
FLUBV RNA RESP QL NAA+PROBE: NEGATIVE
HETEROPH AB SER QL: NEGATIVE
INTERPRETATION: ABNORMAL
N GONORRHOEA DNA SPEC QL NAA+PROBE: NEGATIVE
SARS-COV-2 RNA RESP QL NAA+PROBE: NEGATIVE

## 2023-01-24 ENCOUNTER — HOSPITAL ENCOUNTER (OUTPATIENT)
Dept: ULTRASOUND IMAGING | Facility: CLINIC | Age: 53
Discharge: HOME | End: 2023-01-24

## 2023-01-24 DIAGNOSIS — U07.1 COVID-19: ICD-10-CM

## 2023-01-24 DIAGNOSIS — R07.9 CHEST PAIN, UNSPECIFIED TYPE: ICD-10-CM

## 2023-01-24 DIAGNOSIS — R10.2 PELVIC PAIN IN FEMALE: ICD-10-CM

## 2023-01-25 RX ORDER — LEVOTHYROXINE SODIUM 0.03 MG/1
37.5 TABLET ORAL DAILY
Qty: 45 TABLET | Refills: 0 | Status: SHIPPED | OUTPATIENT
Start: 2023-01-25 | End: 2023-02-01

## 2023-01-26 ENCOUNTER — TELEMEDICINE (OUTPATIENT)
Dept: PSYCHIATRY | Facility: CLINIC | Age: 53
End: 2023-01-26

## 2023-01-26 ENCOUNTER — TELEPHONE (OUTPATIENT)
Dept: PSYCHIATRY | Facility: CLINIC | Age: 53
End: 2023-01-26

## 2023-01-26 DIAGNOSIS — F31.81 BIPOLAR 2 DISORDER (HCC): Primary | ICD-10-CM

## 2023-01-26 DIAGNOSIS — F41.1 GAD (GENERALIZED ANXIETY DISORDER): ICD-10-CM

## 2023-01-26 NOTE — PSYCH
This clinician received a message that Brooklynn Cartagena has been in the hospital for her grandson and couldn't make it this morning

## 2023-01-26 NOTE — TELEPHONE ENCOUNTER
Pt called to state that she has been in the hospital with her grandson and therefore could not make appointment  Please reach out to reschedule

## 2023-01-30 DIAGNOSIS — N83.201 RIGHT OVARIAN CYST: Primary | ICD-10-CM

## 2023-01-30 NOTE — RESULT ENCOUNTER NOTE
Uterus and ET WNL  Simple GISELL seen  May be source of discomfort  Usually resolves on its own- rpt US in 6-8 wks  New order placed  Inquired RE: hx of hysterectomy- Uterus seen on US and cervix seen on Exam  B/L ovaries present  Pt reports phone was breaking up and couldn't hear me well, disconnected    Will try again later    Called back and went to vm- msg left for pt

## 2023-01-30 NOTE — RESULT ENCOUNTER NOTE
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

## 2023-02-01 ENCOUNTER — TELEPHONE (OUTPATIENT)
Dept: OBGYN CLINIC | Facility: CLINIC | Age: 53
End: 2023-02-01

## 2023-02-01 ENCOUNTER — OFFICE VISIT (OUTPATIENT)
Dept: FAMILY MEDICINE CLINIC | Facility: CLINIC | Age: 53
End: 2023-02-01

## 2023-02-01 ENCOUNTER — TELEPHONE (OUTPATIENT)
Dept: FAMILY MEDICINE CLINIC | Facility: CLINIC | Age: 53
End: 2023-02-01

## 2023-02-01 VITALS
HEIGHT: 64 IN | BODY MASS INDEX: 24.38 KG/M2 | SYSTOLIC BLOOD PRESSURE: 110 MMHG | WEIGHT: 142.8 LBS | DIASTOLIC BLOOD PRESSURE: 70 MMHG | HEART RATE: 82 BPM | TEMPERATURE: 98.4 F | OXYGEN SATURATION: 98 % | RESPIRATION RATE: 14 BRPM

## 2023-02-01 DIAGNOSIS — Z12.31 ENCOUNTER FOR SCREENING MAMMOGRAM FOR MALIGNANT NEOPLASM OF BREAST: ICD-10-CM

## 2023-02-01 DIAGNOSIS — Z59.9 FINANCIAL DIFFICULTIES: ICD-10-CM

## 2023-02-01 DIAGNOSIS — E78.5 DYSLIPIDEMIA, GOAL LDL BELOW 130: ICD-10-CM

## 2023-02-01 DIAGNOSIS — U09.9 COVID-19 LONG HAULER: ICD-10-CM

## 2023-02-01 DIAGNOSIS — Z00.00 MEDICARE ANNUAL WELLNESS VISIT, SUBSEQUENT: Primary | ICD-10-CM

## 2023-02-01 DIAGNOSIS — M94.0 COSTOCHONDRITIS: ICD-10-CM

## 2023-02-01 DIAGNOSIS — R53.82 CHRONIC FATIGUE: ICD-10-CM

## 2023-02-01 DIAGNOSIS — R07.89 CHEST DISCOMFORT: ICD-10-CM

## 2023-02-01 DIAGNOSIS — E03.8 OTHER SPECIFIED HYPOTHYROIDISM: ICD-10-CM

## 2023-02-01 RX ORDER — PREDNISONE 20 MG/1
20 TABLET ORAL 2 TIMES DAILY WITH MEALS
Qty: 10 TABLET | Refills: 0 | Status: SHIPPED | OUTPATIENT
Start: 2023-02-01

## 2023-02-01 RX ORDER — SUCRALFATE 1 G/1
TABLET ORAL
COMMUNITY
Start: 2023-01-28

## 2023-02-01 RX ORDER — TIZANIDINE 4 MG/1
TABLET ORAL
COMMUNITY
Start: 2023-01-24

## 2023-02-01 RX ORDER — MELOXICAM 15 MG/1
15 TABLET ORAL DAILY
Qty: 30 TABLET | Refills: 1 | Status: SHIPPED | OUTPATIENT
Start: 2023-02-01

## 2023-02-01 SDOH — ECONOMIC STABILITY - INCOME SECURITY: PROBLEM RELATED TO HOUSING AND ECONOMIC CIRCUMSTANCES, UNSPECIFIED: Z59.9

## 2023-02-01 NOTE — TELEPHONE ENCOUNTER
Patient does not feel she needs the social work care management program referral  Is there any way you can delete it?

## 2023-02-01 NOTE — TELEPHONE ENCOUNTER
----- Message from Jerrell Garcia, [address] sent at 2/1/2023 12:21 PM EST -----  Please let pt know we did find her old records which confirms supracervical hysterectomy  I did have Radiologist review images again and  US is showing some uterus does still remain (as in they didn't remove it all in 2011)- but is normal appearing- no action needed at this point

## 2023-02-01 NOTE — RESULT ENCOUNTER NOTE
Please let pt know we did find her old records which confirms supracervical hysterectomy  I did have Radiologist review images again and  US is showing some uterus does still remain (as in they didn't remove it all in 2011)- but is normal appearing- no action needed at this point

## 2023-02-01 NOTE — PROGRESS NOTES
Assessment and Plan:     Problem List Items Addressed This Visit        Endocrine    Hypothyroidism     Continue on current medication regiment  Repeat blood work ordered  Relevant Medications    predniSONE 20 mg tablet    Other Relevant Orders    TSH, 3rd generation with Free T4 reflex       Other    Dyslipidemia, goal LDL below 130     Fasting blood work ordered  Continue to make healthy food choices  Relevant Orders    Lipid panel    Chronic fatigue     Worsening after COVID  Referral to physical therapy provided  Relevant Orders    Ambulatory referral to social work care management program    CBC and differential    Comprehensive metabolic panel    Lipid panel    TSH, 3rd generation with Free T4 reflex    COVID-19 long hauler    Relevant Medications    predniSONE 20 mg tablet    meloxicam (MOBIC) 15 mg tablet    Other Relevant Orders    Ambulatory Referral to Physical Therapy   Other Visit Diagnoses     Medicare annual wellness visit, subsequent    -  Primary    Financial difficulties        Relevant Orders    Ambulatory referral to social work care management program    Chest discomfort        EKG in office shows normal sinus rhythm  Will trial steroid for costochondritis, referral for PT was provided  Relevant Orders    POCT ECG (Completed)    Encounter for screening mammogram for malignant neoplasm of breast        Relevant Orders    Mammo screening bilateral w 3d & cad    Costochondritis        Start prednisone 40 mg for 5 days  With meloxicam daily  Relevant Medications    predniSONE 20 mg tablet    meloxicam (MOBIC) 15 mg tablet           Preventive health issues were discussed with patient, and age appropriate screening tests were ordered as noted in patient's After Visit Summary  Personalized health advice and appropriate referrals for health education or preventive services given if needed, as noted in patient's After Visit Summary       History of Present Illness: Patient presents for a Medicare Wellness Visit    Patient presents in office for Medicare wellness visit  She is also complaining of chest discomfort that started about 2 weeks ago  Denying any shortness of breath or palpitations  Chest Pain   This is a new problem  The current episode started 1 to 4 weeks ago  The onset quality is undetermined  The problem occurs intermittently  The problem has been waxing and waning  The pain is present in the lateral region  The pain is moderate  The quality of the pain is described as sharp  The pain radiates to the left shoulder  Associated symptoms include nausea (chronic)  Pertinent negatives include no abdominal pain, back pain, claudication, cough, diaphoresis, dizziness, exertional chest pressure, fever, headaches, hemoptysis, irregular heartbeat, leg pain, lower extremity edema, malaise/fatigue, near-syncope, numbness, orthopnea, palpitations, PND, shortness of breath, sputum production, syncope, vomiting or weakness  The pain is aggravated by nothing  She has tried rest for the symptoms  The treatment provided mild relief  Risk factors include stress  Patient Care Team:  Radha Tracy as PCP - General (Nurse Practitioner)  Jenniffer Thomas MD as PCP - [address] [place] [address] (RTE)  Jenniffer Thomas MD as PCP - PCP-[employer] (RTE)     Review of Systems:     Review of Systems   Constitutional: Positive for fatigue  Negative for chills, diaphoresis, fever and malaise/fatigue  Respiratory: Negative for cough, hemoptysis, sputum production and shortness of breath  Cardiovascular: Positive for chest pain  Negative for palpitations, orthopnea, claudication, syncope, PND and near-syncope  Gastrointestinal: Positive for nausea (chronic)  Negative for abdominal pain and vomiting  Genitourinary: Negative for dysuria  Musculoskeletal: Positive for arthralgias  Negative for back pain     Neurological: Negative for dizziness, weakness, numbness and headaches  All other systems reviewed and are negative  Problem List:     Patient Active Problem List   Diagnosis   • ZAINA (generalized anxiety disorder)   • B12 deficiency   • Carpal tunnel syndrome   • Cervical disc disorder   • Degeneration of lumbar intervertebral disc   • Dyslipidemia, goal LDL below 130   • Fibromyalgia   • Hypothyroidism   • IBS (irritable bowel syndrome)   • Intractable chronic migraine without aura and without status migrainosus   • Primary fibromyalgia syndrome   • Vitamin D deficiency   • Moderate persistent asthma without complication   • Kidney stones   • Bipolar 2 disorder (HCC)   • Chronic fatigue   • Lumbar radiculopathy   • Sacroiliitis (HCC)   • Seropositive rheumatoid arthritis (HCC)   • Vertigo   • History of COVID-19   • Insomnia   • Weight gain   • BMI 26 0-26 9,adult   • Immunocompromised (HCC)   • Arthralgia   • Eye swelling, right   • Pelvic pain in female   • Leukorrhea   • Right ovarian cyst   • COVID-19 long hauler      Past Medical and Surgical History:     Past Medical History:   Diagnosis Date   • Anxiety    • Anxiety    • Asthma    • Bipolar disorder ([facility] [address] )    • Carpal tunnel syndrome    • Chronic pain     lower back   • Depression    • Disease of thyroid gland    • Dyslipidemia    • Fibromyalgia    • Irritable bowel    • Kidney stones    • Kidney stones 2019   • Migraine    • Obesity (BMI 30 0-34  9)    • Psychiatric disorder     depression/anxiety   • Suicide attempt [facility])     as teen   • Vitamin D deficiency      Past Surgical History:   Procedure Laterality Date   • BUNIONECTOMY     •  SECTION     • CHOLECYSTECTOMY     • PARTIAL HYSTERECTOMY      Likely myomectomy only- per US 2023- uterus and b/l ovaries present   will try to request records   • TONSILLECTOMY     • TUBAL LIGATION        Family History:     Family History   Problem Relation Age of Onset   • Hypertension Mother    • Diabetes Mother    • Hypothyroidism Mother    • Stroke Mother • Bipolar disorder Mother    • Anxiety disorder Mother    • Arthritis Mother    • Depression Mother    • Mental illness Mother    • Cancer Father         pancreatic    • Hypothyroidism Sister    • Hypertension Brother    • Heart disease Brother    • Cancer Maternal Uncle         lung   • Cancer Paternal Aunt         breast   • Breast cancer Paternal Aunt    • Cancer Paternal Uncle         testicular    • Cancer Paternal Grandmother         breast   • Dementia Paternal Grandmother    • Breast cancer Paternal Grandmother    • Cancer Cousin         brain   • Bipolar disorder Daughter       Social History:     Social History     Socioeconomic History   • Marital status: Single     Spouse name: None   • Number of children: None   • Years of education: None   • Highest education level: None   Occupational History   • Occupation: Disability   Tobacco Use   • Smoking status: Never   • Smokeless tobacco: Never   Vaping Use   • Vaping Use: Never used   Substance and Sexual Activity   • Alcohol use: No   • Drug use: Never   • Sexual activity: Not Currently     Partners: Male     Birth control/protection: Abstinence, Female Sterilization     Comment: Hysterectomy   Other Topics Concern   • None   Social History Narrative   • None     Social Determinants of Health     Financial Resource Strain: High Risk   • Difficulty of Paying Living Expenses: Hard   Food Insecurity: Not on file   Transportation Needs: No Transportation Needs   • Lack of Transportation (Medical): No   • Lack of Transportation (Non-Medical):  No   Physical Activity: Not on file   Stress: Not on file   Social Connections: Not on file   Intimate Partner Violence: Not on file   Housing Stability: Not on file      Medications and Allergies:     Current Outpatient Medications   Medication Sig Dispense Refill   • meloxicam (MOBIC) 15 mg tablet Take 1 tablet (15 mg total) by mouth daily 30 tablet 1   • predniSONE 20 mg tablet Take 1 tablet (20 mg total) by mouth 2 (two) times a day with meals 10 tablet 0   • albuterol (2 5 mg/3 mL) 0 083 % nebulizer solution Take 3 mL (2 5 mg total) by nebulization every 6 (six) hours as needed for wheezing or shortness of breath 3 mL 0   • [employer] Thyroid 60 MG tablet      • azelastine (ASTELIN) 0 1 % nasal spray 1 spray into each nostril 2 (two) times a day Use in each nostril as directed 30 mL 0   • Butalbital-APAP-Caffeine -40 MG CAPS TAKE 1 CAPSULE Daily PRN     • cariprazine (Vraylar) 3 MG capsule Take 1 capsule (3 mg total) by mouth daily 30 capsule 2   • celecoxib (CELEBREX) 200 mg capsule Take 1 capsule (200 mg total) by mouth daily 30 capsule 2   • clonazePAM (KlonoPIN) 0 5 mg tablet Take 1 tablet (0 5 mg total) by mouth daily as needed for anxiety 30 tablet 2   • clotrimazole-betamethasone (LOTRISONE) 1-0 05 % cream      • dicyclomine (BENTYL) 10 mg capsule      • divalproex sodium (DEPAKOTE ER) 250 mg 24 hr tablet      • fremanezumab-vfrm (Ajovy) 225 MG/1 5ML auto-injector Inject 225 mg under the skin every 30 (thirty) days     • hydroxychloroquine (PLAQUENIL) 200 mg tablet      •  MG tablet      • ketoconazole (NIZORAL) 2 % cream      • lidocaine (LIDODERM) 5 % Apply 1 patch topically daily as needed (back pain) Remove & Discard patch within 12 hours or as directed by MD 6 patch 1   • loratadine (CLARITIN) 10 mg tablet Take 1 pill daily for allergies 90 tablet 1   • meclizine (ANTIVERT) 25 mg tablet Take 1 tablet (25 mg total) by mouth every 8 (eight) hours as needed for dizziness 30 tablet 1   • metoclopramide (REGLAN) 5 mg tablet      • mometasone (ELOCON) 0 1 % cream Apply topically daily 45 g 0   • naratriptan (AMERGE) 2 5 MG tablet      • omeprazole (PriLOSEC) 40 MG capsule Take 1 capsule (40 mg total) by mouth daily 90 capsule 1   • ondansetron (Zofran ODT) 4 mg disintegrating tablet Take 1 tablet (4 mg total) by mouth every 6 (six) hours as needed for nausea or vomiting 20 tablet 0   • Respiratory Therapy Supplies (NEBULIZER) DONA by Does not apply route every 4 (four) hours while awake 90 each 1   • semaglutide, 1 mg/dose, (Ozempic, 1 MG/DOSE,) 4 MG/3ML SOPN injection pen Inject 0 75 mL (1 mg total) under the skin once a week 3 mL 3   • sucralfate (CARAFATE) 1 g tablet      • tiZANidine (ZANAFLEX) 4 mg tablet      • Topiramate  MG CP24 Take 100 mg by mouth 2 (two) times a day     • Ubrelvy 100 MG tablet      • Ventolin  (90 Base) MCG/ACT inhaler      • XIFAXAN 550 MG tablet        No current facility-administered medications for this visit       Allergies   Allergen Reactions   • Doxycycline Rash   • Erythromycin Rash   • Other Edema and Wheezing     jalapeno   • Latex Rash   • Duloxetine      Other reaction(s): Nausea, Loopy   • Eletriptan      Pain in lower jaw with pressure in throat   • Emgality [Galcanezumab-Gnlm]    • Galcanezumab      rash      Immunizations:     Immunization History   Administered Date(s) Administered   • COVID-19 PFIZER VACCINE 0 3 ML IM 04/16/2021, 05/10/2021   • Fluzone Split Quad 0 5 mL 09/20/2016, 09/20/2016, 09/08/2017, 09/08/2017   • INFLUENZA 11/29/2007, 10/16/2008, 09/21/2009, 10/04/2010, 09/17/2011, 09/06/2012, 09/20/2016, 09/08/2017, 07/10/2019, 09/01/2019, 09/05/2020, 09/24/2020, 09/13/2022   • Influenza Quadrivalent 3 years and older 09/20/2016   • Influenza, injectable, quadrivalent, preservative free 0 5 mL 10/05/2018   • Influenza, recombinant, quadrivalent,injectable, preservative free 09/06/2019, 09/24/2021, 09/13/2022   • Influenza, seasonal, injectable 11/29/2007, 10/16/2008, 09/21/2009, 10/04/2010, 09/17/2011, 09/06/2012, 09/20/2013, 09/23/2014, 09/08/2015   • Pneumococcal Polysaccharide PPV23 09/21/2009   • Td (adult), adsorbed 04/03/2004   • Tdap 03/01/2003, 11/12/2014   • Unknown 06/22/2012, 07/23/2012      Health Maintenance:         Topic Date Due   • Breast Cancer Screening: Mammogram  03/15/2023   • Cervical Cancer Screening  04/11/2027   • Colorectal Cancer Screening  06/28/2031   • HIV Screening  Completed   • Hepatitis C Screening  Completed         Topic Date Due   • Pneumococcal Vaccine: Pediatrics (0 to 5 Years) and At-Risk Patients (6 to 59 Years) (2 - PCV) 09/21/2010   • COVID-19 Vaccine (3 - Pfizer risk series) 06/07/2021      Medicare Screening Tests and Risk Assessments:     Anupam Murray is here for her Subsequent Wellness visit  Last Medicare Wellness visit information reviewed, patient interviewed, no change since last AWV  Health Risk Assessment:   Patient rates overall health as good  Patient feels that their physical health rating is slightly worse  Patient is satisfied with their life  Eyesight was rated as same  Hearing was rated as same  Patient feels that their emotional and mental health rating is slightly worse  Patients states they are often angry  Patient states they are always unusually tired/fatigued  Pain experienced in the last 7 days has been a lot  Patient's pain rating has been 8/10  Patient states that she has experienced weight loss or gain in last 6 months  Depression Screening:   PHQ-2 Score: 5  PHQ-9 Score: 17      Fall Risk Screening: In the past year, patient has experienced: no history of falling in past year      Urinary Incontinence Screening:   Patient has not leaked urine accidently in the last six months  Home Safety:  Patient has trouble with stairs inside or outside of their home  Patient has working smoke alarms and has working carbon monoxide detector  Home safety hazards include: poor household lighting, medications that cause fatigue and not having non-slip bath and/or shower mats  Nutrition:   Current diet is Low Saturated Fat, Low Carb and No Added Salt  Medications:   Patient is not currently taking any over-the-counter supplements  Patient is able to manage medications       Activities of Daily Living (ADLs)/Instrumental Activities of Daily Living (IADLs):   Walk and transfer into and out of bed and chair?: Yes  Dress and groom yourself?: Yes    Bathe or shower yourself?: Yes    Feed yourself? Yes  Do your laundry/housekeeping?: No  Manage your money, pay your bills and track your expenses?: Yes  Make your own meals?: Yes    Do your own shopping?: Yes    Durable Medical Equipment Suppliers  For your health medical supplies    Previous Hospitalizations:   Any hospitalizations or ED visits within the last 12 months?: No      Advance Care Planning:   Living will: No    Durable POA for healthcare: No    Advanced directive: No    Advanced directive counseling given: Yes      PREVENTIVE SCREENINGS      Cardiovascular Screening:    General: Screening Not Indicated and History Lipid Disorder      Diabetes Screening:     General: Screening Current      Colorectal Cancer Screening:     General: Screening Current      Breast Cancer Screening:     General: Screening Current      Cervical Cancer Screening:    General: Screening Current      Lung Cancer Screening:     General: Screening Not Indicated      Hepatitis C Screening:    General: Screening Current    Screening, Brief Intervention, and Referral to Treatment (SBIRT)    Screening  Typical number of drinks in a day: 0  Typical number of drinks in a week: 0  Interpretation: Low risk drinking behavior      AUDIT-C Screenin) How often did you have a drink containing alcohol in the past year? never  2) How many drinks did you have on a typical day when you were drinking in the past year? 0  3) How often did you have 6 or more drinks on one occasion in the past year? never    AUDIT-C Score: 0  Interpretation: Score 0-2 (female): Negative screen for alcohol misuse    Single Item Drug Screening:  How often have you used an illegal drug (including marijuana) or a prescription medication for non-medical reasons in the past year? never    Single Item Drug Screen Score: 0  Interpretation: Negative screen for possible drug use disorder    Other Counseling Topics:   Calcium and vitamin D intake and regular weightbearing exercise  No results found  Physical Exam:     /70   Pulse 82   Temp 98 4 °F (36 9 °C)   Resp 14   Ht 5' 3 5" (1 613 m)   Wt 64 8 kg (142 lb 12 8 oz)   SpO2 98%   BMI 24 90 kg/m²     Physical Exam  Vitals and nursing note reviewed  Constitutional:       General: She is not in acute distress  Appearance: Normal appearance  She is well-developed  She is not ill-appearing  HENT:      Right Ear: Tympanic membrane, ear canal and external ear normal       Left Ear: Tympanic membrane, ear canal and external ear normal       Nose: No congestion  Mouth/Throat:      Pharynx: No posterior oropharyngeal erythema  Eyes:      Pupils: Pupils are equal, round, and reactive to light  Cardiovascular:      Rate and Rhythm: Normal rate and regular rhythm  Pulses: Normal pulses  Heart sounds: Normal heart sounds  No murmur heard  Pulmonary:      Effort: Pulmonary effort is normal  No respiratory distress  Breath sounds: Normal breath sounds  Musculoskeletal:         General: No swelling or tenderness  Normal range of motion  Right lower leg: No edema  Left lower leg: No edema  Lymphadenopathy:      Cervical: No cervical adenopathy  Skin:     General: Skin is warm and dry  Capillary Refill: Capillary refill takes less than 2 seconds  Neurological:      Mental Status: She is alert and oriented to person, place, and time  Cranial Nerves: No cranial nerve deficit  Sensory: No sensory deficit  Motor: No weakness  Gait: Gait normal    Psychiatric:         Mood and Affect: Mood normal          Behavior: Behavior normal          Thought Content:  Thought content normal          Judgment: Judgment normal           ERICKA Chance

## 2023-02-01 NOTE — PATIENT INSTRUCTIONS
Medicare Preventive Visit Patient Instructions  Thank you for completing your Welcome to Medicare Visit or Medicare Annual Wellness Visit today  Your next wellness visit will be due in one year (2/2/2024)  The screening/preventive services that you may require over the next 5-10 years are detailed below  Some tests may not apply to you based off risk factors and/or age  Screening tests ordered at today's visit but not completed yet may show as past due  Also, please note that scanned in results may not display below  Preventive Screenings:  Service Recommendations Previous Testing/Comments   Colorectal Cancer Screening  * Colonoscopy    * Fecal Occult Blood Test (FOBT)/Fecal Immunochemical Test (FIT)  * Fecal DNA/Cologuard Test  * Flexible Sigmoidoscopy Age: 39-70 years old   Colonoscopy: every 10 years (may be performed more frequently if at higher risk)  OR  FOBT/FIT: every 1 year  OR  Cologuard: every 3 years  OR  Sigmoidoscopy: every 5 years  Screening may be recommended earlier than age 39 if at higher risk for colorectal cancer  Also, an individualized decision between you and your healthcare provider will decide whether screening between the ages of 74-80 would be appropriate  Colonoscopy: 06/28/2021  FOBT/FIT: 07/28/2020  Cologuard: Not on file  Sigmoidoscopy: Not on file    Screening Current     Breast Cancer Screening Age: 36 years old  Frequency: every 1-2 years  Not required if history of left and right mastectomy Mammogram: 03/15/2022    Screening Current   Cervical Cancer Screening Between the ages of 21-29, pap smear recommended once every 3 years  Between the ages of 33-67, can perform pap smear with HPV co-testing every 5 years     Recommendations may differ for women with a history of total hysterectomy, cervical cancer, or abnormal pap smears in past  Pap Smear: 04/11/2022    Screening Current   Hepatitis C Screening Once for adults born between 1945 and 1965  More frequently in patients at high risk for Hepatitis C Hep C Antibody: 01/10/2019    Screening Current   Diabetes Screening 1-2 times per year if you're at risk for diabetes or have pre-diabetes Fasting glucose: 87 mg/dL (10/20/2022)  A1C: 5 3 % (4/13/2021)  Screening Current   Cholesterol Screening Once every 5 years if you don't have a lipid disorder  May order more often based on risk factors  Lipid panel: 10/20/2022    Screening Not Indicated  History Lipid Disorder     Other Preventive Screenings Covered by Medicare:  1  Abdominal Aortic Aneurysm (AAA) Screening: covered once if your at risk  You're considered to be at risk if you have a family history of AAA  2  Lung Cancer Screening: covers low dose CT scan once per year if you meet all of the following conditions: (1) Age 50-69; (2) No signs or symptoms of lung cancer; (3) Current smoker or have quit smoking within the last 15 years; (4) You have a tobacco smoking history of at least 20 pack years (packs per day multiplied by number of years you smoked); (5) You get a written order from a healthcare provider  3  Glaucoma Screening: covered annually if you're considered high risk: (1) You have diabetes OR (2) Family history of glaucoma OR (3)  aged 48 and older OR (3)  American aged 72 and older  3  Osteoporosis Screening: covered every 2 years if you meet one of the following conditions: (1) You're estrogen deficient and at risk for osteoporosis based off medical history and other findings; (2) Have a vertebral abnormality; (3) On glucocorticoid therapy for more than 3 months; (4) Have primary hyperparathyroidism; (5) On osteoporosis medications and need to assess response to drug therapy  · Last bone density test (DXA Scan): Not on file  5  HIV Screening: covered annually if you're between the age of 12-76  Also covered annually if you are younger than 13 and older than 72 with risk factors for HIV infection   For pregnant patients, it is covered up to 3 times per pregnancy  Immunizations:  Immunization Recommendations   Influenza Vaccine Annual influenza vaccination during flu season is recommended for all persons aged >= 6 months who do not have contraindications   Pneumococcal Vaccine   * Pneumococcal conjugate vaccine = PCV13 (Prevnar 13), PCV15 (Vaxneuvance), PCV20 (Prevnar 20)  * Pneumococcal polysaccharide vaccine = PPSV23 (Pneumovax) Adults 25-60 years old: 1-3 doses may be recommended based on certain risk factors  Adults 72 years old: 1-2 doses may be recommended based off what pneumonia vaccine you previously received   Hepatitis B Vaccine 3 dose series if at intermediate or high risk (ex: diabetes, end stage renal disease, liver disease)   Tetanus (Td) Vaccine - COST NOT COVERED BY MEDICARE PART B Following completion of primary series, a booster dose should be given every 10 years to maintain immunity against tetanus  Td may also be given as tetanus wound prophylaxis  Tdap Vaccine - COST NOT COVERED BY MEDICARE PART B Recommended at least once for all adults  For pregnant patients, recommended with each pregnancy  Shingles Vaccine (Shingrix) - COST NOT COVERED BY MEDICARE PART B  2 shot series recommended in those aged 48 and above     Health Maintenance Due:      Topic Date Due   • Breast Cancer Screening: Mammogram  03/15/2023   • Cervical Cancer Screening  04/11/2027   • Colorectal Cancer Screening  06/28/2031   • HIV Screening  Completed   • Hepatitis C Screening  Completed     Immunizations Due:      Topic Date Due   • Pneumococcal Vaccine: Pediatrics (0 to 5 Years) and At-Risk Patients (6 to 59 Years) (2 - PCV) 09/21/2010   • COVID-19 Vaccine (3 - Pfizer risk series) 06/07/2021     Advance Directives   What are advance directives? Advance directives are legal documents that state your wishes and plans for medical care  These plans are made ahead of time in case you lose your ability to make decisions for yourself   Advance directives can apply to any medical decision, such as the treatments you want, and if you want to donate organs  What are the types of advance directives? There are many types of advance directives, and each state has rules about how to use them  You may choose a combination of any of the following:  · Living will: This is a written record of the treatment you want  You can also choose which treatments you do not want, which to limit, and which to stop at a certain time  This includes surgery, medicine, IV fluid, and tube feedings  · Durable power of  for healthcare [facility]): This is a written record that states who you want to make healthcare choices for you when you are unable to make them for yourself  This person, called a proxy, is usually a family member or a friend  You may choose more than 1 proxy  · Do not resuscitate (DNR) order:  A DNR order is used in case your heart stops beating or you stop breathing  It is a request not to have certain forms of treatment, such as CPR  A DNR order may be included in other types of advance directives  · Medical directive: This covers the care that you want if you are in a coma, near death, or unable to make decisions for yourself  You can list the treatments you want for each condition  Treatment may include pain medicine, surgery, blood transfusions, dialysis, IV or tube feedings, and a ventilator (breathing machine)  · Values history: This document has questions about your views, beliefs, and how you feel and think about life  This information can help others choose the care that you would choose  Why are advance directives important? An advance directive helps you control your care  Although spoken wishes may be used, it is better to have your wishes written down  Spoken wishes can be misunderstood, or not followed  Treatments may be given even if you do not want them   An advance directive may make it easier for your family to make difficult choices about your care        © Copyright [employer] 2018 Information is for End User's use only and may not be sold, redistributed or otherwise used for commercial purposes   All illustrations and images included in CareNotes® are the copyrighted property of A D A M , Inc  or [address] [place], [place] [address]dari adiel

## 2023-02-02 ENCOUNTER — TELEMEDICINE (OUTPATIENT)
Dept: PSYCHIATRY | Facility: CLINIC | Age: 53
End: 2023-02-02

## 2023-02-02 DIAGNOSIS — F41.1 GAD (GENERALIZED ANXIETY DISORDER): ICD-10-CM

## 2023-02-02 DIAGNOSIS — F31.81 BIPOLAR 2 DISORDER (HCC): Primary | ICD-10-CM

## 2023-02-02 NOTE — PSYCH
INDIVIDUAL SESSION NOTE   Length of time in session: 50 minutes, follow up in 1 week   Psychotherapy Provided: Individual    Diagnosis ICD-10-CM Associated Orders   1  Bipolar 2 disorder (HCC)  F31 81       2  ZAINA (generalized anxiety disorder)  F41 1         Goals addressed in session: Goal 1   Pain:   none   0   Current suicide risk: minimal   Data: Met with Cr Umana for a scheduled individual session  Topics discussed included mental health condition(s), emotional wellness, relationship with significant other, family stressors and relationships with family  Jessee Wright is feeling "tired"  She said her grandson had a bad asthma attack last week and was hospitalized  She said he's not been sleeping well and it's been exhausting because he wakes up yelling, which wakes Jessee Wright up  Jessee Wright then spoke at length about her boyfriend; they had an argument but seemed to be able to talk it out  She said things have been good  Jessee Wright shows evidence of utilizing emotion regulation skills, engaging in problem solving and maintaining emotional composure to manage mental health symptoms  During this session, this clinical used the following therapeutic modalities supportive psychotherapy, client-centered therapy and stress management skills  Assessment: Jessee Wright presents with a normal mood  her affect is normal range and intensity, appropriate Jessee Wright was oriented x3  She was focused and engaged  Jessee Wright exhibits good therapeutic rapport with this clinician  she continues to exhibit willingness to work on treatment goals and objectives  Jessee Wright presents with a minimal risk of suicide, minimal risk of self-harm and minimal of harm to others  Plan: Jessee Wright will return in 1 week for the next scheduled session  At the next session, this clinical will use supportive psychotherapy and client-centered therapy to address her anxiety, in an effort to assist Jessee Wright with meeting treatment goals     [address] [place] [address] Peterson: Diagnosis and Treatment Plan explained to Pascale Spijason relates understanding diagnosis and is agreeable to Treatment Plan  Yes   Virtual Regular Visit   Verification of patient location:   Patient is located in the following state in which I hold an active license [place]   Assessment/Plan:       Problem List Items Addressed This Visit               Other    ZAINA (generalized anxiety disorder)    Bipolar 2 disorder ([facility] [address] ) - Primary     Goals addressed in session: Goal 1   Reason for visit is       Chief Complaint   Patient presents with   • Virtual Regular Visit     Encounter provider Shan Hassan LCSW   Provider located at [address] [place] [id number] [phone]   Recent Visits   Date Type Provider Dept   02/01/23 Office Visit Autumn Perez, [facility]   01/26/23 Telephone Shan Hassan, [facility] recent visits within past 7 days and meeting all other requirements   Today's Visits   Date Type Provider Dept   02/02/23 Telemedicine Shan Hassan LCSW [facility]   Showing today's visits and meeting all other requirements   Future Appointments   No visits were found meeting these conditions  Showing future appointments within next 150 days and meeting all other requirements     The patient was identified by name and date of birth  Sean Pickens was informed that this is a telemedicine visit and that the visit is being conducted throughthe [employer] platform  She agrees to proceed    My office door was closed  No one else was in the room  She acknowledged consent and understanding of privacy and security of the video platform  The patient has agreed to participate and understands they can discontinue the visit at any time  Patient is aware this is a billable service     Subjective   Sean Pickens is a 47 y o  female  HPI   Medical History        Past Medical History:   Diagnosis Date   • Anxiety    • Anxiety    • Asthma    • Bipolar disorder [facility])    • Carpal tunnel syndrome    • Chronic pain     lower back   • Depression    • Disease of thyroid gland    • Dyslipidemia    • Fibromyalgia    • Irritable bowel    • Kidney stones    • Kidney stones 2019   • Migraine    • Obesity (BMI 30 0-34  9)    • Psychiatric disorder     depression/anxiety   • Suicide attempt [facility])     as teen   • Vitamin D deficiency      Surgical History         Past Surgical History:   Procedure Laterality Date   • BUNIONECTOMY     •  SECTION     • CHOLECYSTECTOMY     • PARTIAL HYSTERECTOMY      portion of uterus still present   • TONSILLECTOMY     • TUBAL LIGATION       Current Medications          Current Outpatient Medications   Medication Sig Dispense Refill   • albuterol (2 5 mg/3 mL) 0 083 % nebulizer solution Take 3 mL (2 5 mg total) by nebulization every 6 (six) hours as needed for wheezing or shortness of breath 3 mL 0   • [place] Thyroid 60 MG tablet      • azelastine (ASTELIN) 0 1 % nasal spray 1 spray into each nostril 2 (two) times a day Use in each nostril as directed 30 mL 0   • Butalbital-APAP-Caffeine -40 MG CAPS TAKE 1 CAPSULE Daily PRN     • cariprazine (Vraylar) 3 MG capsule Take 1 capsule (3 mg total) by mouth daily 30 capsule 2   • celecoxib (CELEBREX) 200 mg capsule Take 1 capsule (200 mg total) by mouth daily 30 capsule 2   • clonazePAM (KlonoPIN) 0 5 mg tablet Take 1 tablet (0 5 mg total) by mouth daily as needed for anxiety 30 tablet 2   • clotrimazole-betamethasone (LOTRISONE) 1-0 05 % cream      • dicyclomine (BENTYL) 10 mg capsule      • divalproex sodium (DEPAKOTE ER) 250 mg 24 hr tablet      • fremanezumab-vfrm (Ajovy) 225 MG/1 5ML auto-injector Inject 225 mg under the skin every 30 (thirty) days     • hydroxychloroquine (PLAQUENIL) 200 mg tablet      •  MG tablet      • ketoconazole (NIZORAL) 2 % cream      • lidocaine (LIDODERM) 5 % Apply 1 patch topically daily as needed (back pain) Remove & Discard patch within 12 hours or as directed by MD 6 patch 1   • loratadine (CLARITIN) 10 mg tablet Take 1 pill daily for allergies 90 tablet 1   • meclizine (ANTIVERT) 25 mg tablet Take 1 tablet (25 mg total) by mouth every 8 (eight) hours as needed for dizziness 30 tablet 1   • meloxicam (MOBIC) 15 mg tablet Take 1 tablet (15 mg total) by mouth daily 30 tablet 1   • metoclopramide (REGLAN) 5 mg tablet      • mometasone (ELOCON) 0 1 % cream Apply topically daily 45 g 0   • naratriptan (AMERGE) 2 5 MG tablet      • omeprazole (PriLOSEC) 40 MG capsule Take 1 capsule (40 mg total) by mouth daily 90 capsule 1   • ondansetron (Zofran ODT) 4 mg disintegrating tablet Take 1 tablet (4 mg total) by mouth every 6 (six) hours as needed for nausea or vomiting 20 tablet 0   • predniSONE 20 mg tablet Take 1 tablet (20 mg total) by mouth 2 (two) times a day with meals 10 tablet 0   • Respiratory Therapy Supplies (NEBULIZER) DONA by Does not apply route every 4 (four) hours while awake 90 each 1   • semaglutide, 1 mg/dose, (Ozempic, 1 MG/DOSE,) 4 MG/3ML SOPN injection pen Inject 0 75 mL (1 mg total) under the skin once a week 3 mL 3   • sucralfate (CARAFATE) 1 g tablet      • tiZANidine (ZANAFLEX) 4 mg tablet      • Topiramate  MG CP24 Take 100 mg by mouth 2 (two) times a day     • Ubrelvy 100 MG tablet      • Ventolin  (90 Base) MCG/ACT inhaler      • XIFAXAN 550 MG tablet      No current facility-administered medications for this visit              Allergies   Allergen Reactions   • Doxycycline Rash   • Erythromycin Rash   • Other Edema and Wheezing     jalapeno   • Latex Rash   • Duloxetine      Other reaction(s): Nausea, Loopy   • Eletriptan      Pain in lower jaw with pressure in throat   • Emgality [Galcanezumab-Gnlm]    • Galcanezumab      rash     Review of Systems   Video Exam   Vitals   There were no vitals filed for this visit     Physical Exam   02/02/23   Start Time: 0802   Stop Time: 8207   Total Visit Time: 50 minutes

## 2023-02-03 ENCOUNTER — TELEPHONE (OUTPATIENT)
Dept: FAMILY MEDICINE CLINIC | Facility: CLINIC | Age: 53
End: 2023-02-03

## 2023-02-03 DIAGNOSIS — R05.1 ACUTE COUGH: Primary | ICD-10-CM

## 2023-02-03 RX ORDER — BENZONATATE 200 MG/1
200 CAPSULE ORAL 3 TIMES DAILY PRN
Qty: 20 CAPSULE | Refills: 0 | Status: SHIPPED | OUTPATIENT
Start: 2023-02-03 | End: 2023-04-13 | Stop reason: ALTCHOICE

## 2023-02-03 NOTE — TELEPHONE ENCOUNTER
Patient called stating she saw online that you're not supposed to take meloxicam and prednisone together  She wants to make sure she is ok to take them together? She also wants to know if she would benefit from tessalon perles? She started to develop a cough

## 2023-02-03 NOTE — TELEPHONE ENCOUNTER
She need to separate those 2 medications just do not take them together at the same time- I will send a prescription for Tessalon Perles - TY

## 2023-02-05 ENCOUNTER — OFFICE VISIT (OUTPATIENT)
Dept: URGENT CARE | Facility: CLINIC | Age: 53
End: 2023-02-05

## 2023-02-05 VITALS
HEART RATE: 85 BPM | RESPIRATION RATE: 16 BRPM | WEIGHT: 142 LBS | OXYGEN SATURATION: 99 % | TEMPERATURE: 97 F | BODY MASS INDEX: 24.76 KG/M2

## 2023-02-05 DIAGNOSIS — J06.9 VIRAL UPPER RESPIRATORY TRACT INFECTION: Primary | ICD-10-CM

## 2023-02-05 RX ORDER — BROMPHENIRAMINE MALEATE, PSEUDOEPHEDRINE HYDROCHLORIDE, AND DEXTROMETHORPHAN HYDROBROMIDE 2; 30; 10 MG/5ML; MG/5ML; MG/5ML
5 SYRUP ORAL 4 TIMES DAILY PRN
Qty: 70 ML | Refills: 0 | Status: SHIPPED | OUTPATIENT
Start: 2023-02-05 | End: 2023-02-12

## 2023-02-05 NOTE — PATIENT INSTRUCTIONS
Take medication as prescribed up to 4 times per day for cough  May also take over-the-counter products as needed for further symptom management: tylenol for fevers, ibuprofen for body aches, flonase (fluticasone) for nasal congestion  Follow-up with PCP in 3-5 days if no improvement of symptoms  Report to ER if symptoms worsen or develop shortness of breath

## 2023-02-05 NOTE — PROGRESS NOTES
[address] [facility]        NAME: Ayana Burnham is a 47 y o  female  : 1970    MRN: [id number]  DATE: 2023  TIME: 8:42 AM    Assessment and Plan   Viral upper respiratory tract infection [J06 9]  1  Viral upper respiratory tract infection  brompheniramine-pseudoephedrine-DM 30-2-10 MG/5ML syrup        Patient declined COVID/flu testing as she doesn't believe that is what she has  Patient Instructions     Take medication as prescribed up to 4 times per day for cough  May also take over-the-counter products as needed for further symptom management: tylenol for fevers, ibuprofen for body aches, flonase (fluticasone) for nasal congestion  Follow-up with PCP in 3-5 days if no improvement of symptoms  Report to ER if symptoms worsen or develop shortness of breath  Chief Complaint     Chief Complaint   Patient presents with   • Cough     4 days cough with congestion Hurts when coughing  History of Present Illness       47year old female presents with cough, congestion, and sore throat that started Friday  Son is also sick with similar symptoms  Prescribed prednisone previously but didn't take medication due to interaction with meloxicam  Already prescribed tessalon perles which haven't provided her relief  Cough  This is a new problem  The current episode started in the past 7 days  The problem has been unchanged  The problem occurs constantly  The cough is non-productive  Associated symptoms include nasal congestion, postnasal drip and a sore throat  Pertinent negatives include no chest pain, chills, ear congestion, ear pain, fever, headaches, heartburn, hemoptysis, myalgias, rash, rhinorrhea, shortness of breath, sweats or wheezing  The symptoms are aggravated by lying down  She has tried nothing for the symptoms  The treatment provided no relief  There is no history of asthma, bronchitis, environmental allergies or pneumonia         Review of Systems   Review of Systems   Constitutional: Negative for activity change, appetite change, chills and fever  HENT: Positive for congestion, postnasal drip and sore throat  Negative for ear pain, rhinorrhea, sinus pressure, sinus pain and sneezing  Respiratory: Positive for cough  Negative for hemoptysis, shortness of breath and wheezing  Cardiovascular: Negative for chest pain and palpitations  Gastrointestinal: Negative for abdominal pain, diarrhea, heartburn, nausea and vomiting  Genitourinary: Negative for difficulty urinating  Musculoskeletal: Negative for arthralgias and myalgias  Skin: Negative for color change and rash  Allergic/Immunologic: Negative for environmental allergies  Neurological: Negative for dizziness and headaches           Current Medications       Current Outpatient Medications:   •  albuterol (2 5 mg/3 mL) 0 083 % nebulizer solution, Take 3 mL (2 5 mg total) by nebulization every 6 (six) hours as needed for wheezing or shortness of breath, Disp: 3 mL, Rfl: 0  •  [place] Thyroid 60 MG tablet, , Disp: , Rfl:   •  azelastine (ASTELIN) 0 1 % nasal spray, 1 spray into each nostril 2 (two) times a day Use in each nostril as directed, Disp: 30 mL, Rfl: 0  •  benzonatate (TESSALON) 200 MG capsule, Take 1 capsule (200 mg total) by mouth 3 (three) times a day as needed for cough, Disp: 20 capsule, Rfl: 0  •  brompheniramine-pseudoephedrine-DM 30-2-10 MG/5ML syrup, Take 5 mL by mouth 4 (four) times a day as needed for allergies for up to 7 days, Disp: 70 mL, Rfl: 0  •  Butalbital-APAP-Caffeine -40 MG CAPS, TAKE 1 CAPSULE Daily PRN, Disp: , Rfl:   •  cariprazine (Vraylar) 3 MG capsule, Take 1 capsule (3 mg total) by mouth daily, Disp: 30 capsule, Rfl: 2  •  celecoxib (CELEBREX) 200 mg capsule, Take 1 capsule (200 mg total) by mouth daily, Disp: 30 capsule, Rfl: 2  •  clotrimazole-betamethasone (LOTRISONE) 1-0 05 % cream, , Disp: , Rfl:   •  dicyclomine (BENTYL) 10 mg capsule, , Disp: , Rfl:   • divalproex sodium (DEPAKOTE ER) 250 mg 24 hr tablet, , Disp: , Rfl:   •  fremanezumab-vfrm (Ajovy) 225 MG/1 5ML auto-injector, Inject 225 mg under the skin every 30 (thirty) days, Disp: , Rfl:   •  hydroxychloroquine (PLAQUENIL) 200 mg tablet, , Disp: , Rfl:   •   MG tablet, , Disp: , Rfl:   •  ketoconazole (NIZORAL) 2 % cream, , Disp: , Rfl:   •  lidocaine (LIDODERM) 5 %, Apply 1 patch topically daily as needed (back pain) Remove & Discard patch within 12 hours or as directed by MD, Disp: 6 patch, Rfl: 1  •  loratadine (CLARITIN) 10 mg tablet, Take 1 pill daily for allergies, Disp: 90 tablet, Rfl: 1  •  meclizine (ANTIVERT) 25 mg tablet, Take 1 tablet (25 mg total) by mouth every 8 (eight) hours as needed for dizziness, Disp: 30 tablet, Rfl: 1  •  meloxicam (MOBIC) 15 mg tablet, Take 1 tablet (15 mg total) by mouth daily, Disp: 30 tablet, Rfl: 1  •  metoclopramide (REGLAN) 5 mg tablet, , Disp: , Rfl:   •  mometasone (ELOCON) 0 1 % cream, Apply topically daily, Disp: 45 g, Rfl: 0  •  naratriptan (AMERGE) 2 5 MG tablet, , Disp: , Rfl:   •  omeprazole (PriLOSEC) 40 MG capsule, Take 1 capsule (40 mg total) by mouth daily, Disp: 90 capsule, Rfl: 1  •  ondansetron (Zofran ODT) 4 mg disintegrating tablet, Take 1 tablet (4 mg total) by mouth every 6 (six) hours as needed for nausea or vomiting, Disp: 20 tablet, Rfl: 0  •  semaglutide, 1 mg/dose, (Ozempic, 1 MG/DOSE,) 4 MG/3ML SOPN injection pen, Inject 0 75 mL (1 mg total) under the skin once a week, Disp: 3 mL, Rfl: 3  •  sucralfate (CARAFATE) 1 g tablet, , Disp: , Rfl:   •  tiZANidine (ZANAFLEX) 4 mg tablet, , Disp: , Rfl:   •  Topiramate  MG CP24, Take 100 mg by mouth 2 (two) times a day, Disp: , Rfl:   •  Ubrelvy 100 MG tablet, , Disp: , Rfl:   •  Ventolin  (90 Base) MCG/ACT inhaler, , Disp: , Rfl:   •  XIFAXAN 550 MG tablet, , Disp: , Rfl:   •  clonazePAM (KlonoPIN) 0 5 mg tablet, Take 1 tablet (0 5 mg total) by mouth daily as needed for anxiety, Disp: 30 tablet, Rfl: 2  •  predniSONE 20 mg tablet, Take 1 tablet (20 mg total) by mouth 2 (two) times a day with meals (Patient not taking: Reported on 2023), Disp: 10 tablet, Rfl: 0  •  Respiratory Therapy Supplies (NEBULIZER) DONA, by Does not apply route every 4 (four) hours while awake, Disp: 90 each, Rfl: 1    Current Allergies     Allergies as of 2023 - Reviewed 2023   Allergen Reaction Noted   • Doxycycline Rash    • Erythromycin Rash 2018   • Other Edema and Wheezing 2008   • Latex Rash 2015   • Duloxetine  2020   • Eletriptan  2017   • Emgality [galcanezumab-gnlm]  2020   • Juliaette Rogelio  10/06/2020            The following portions of the patient's history were reviewed and updated as appropriate: allergies, current medications, past family history, past medical history, past social history, past surgical history and problem list      Past Medical History:   Diagnosis Date   • Anxiety    • Anxiety    • Asthma    • Bipolar disorder ([facility] [address] )    • Carpal tunnel syndrome    • Chronic pain     lower back   • Depression    • Disease of thyroid gland    • Dyslipidemia    • Fibromyalgia    • Irritable bowel    • Kidney stones    • Kidney stones 2019   • Migraine    • Obesity (BMI 30 0-34  9)    • Psychiatric disorder     depression/anxiety   • Suicide attempt [facility])     as teen   • Vitamin D deficiency        Past Surgical History:   Procedure Laterality Date   • BUNIONECTOMY     •  SECTION     • CHOLECYSTECTOMY     • PARTIAL HYSTERECTOMY      portion of  uterus still present   • TONSILLECTOMY     • TUBAL LIGATION         Family History   Problem Relation Age of Onset   • Hypertension Mother    • Diabetes Mother    • Hypothyroidism Mother    • Stroke Mother    • Bipolar disorder Mother    • Anxiety disorder Mother    • Arthritis Mother    • Depression Mother    • Mental illness Mother    • Cancer Father         pancreatic    • Hypothyroidism Sister    • Hypertension Brother    • Heart disease Brother    • Cancer Maternal Uncle         lung   • Cancer Paternal Aunt         breast   • Breast cancer Paternal Aunt    • Cancer Paternal Uncle         testicular    • Cancer Paternal Grandmother         breast   • Dementia Paternal Grandmother    • Breast cancer Paternal Grandmother    • Cancer Cousin         brain   • Bipolar disorder Daughter          Medications have been verified  Objective   Pulse 85   Temp (!) 97 °F (36 1 °C)   Resp 16   Wt 64 4 kg (142 lb)   SpO2 99%   BMI 24 76 kg/m²        Physical Exam     Physical Exam  Vitals and nursing note reviewed  Constitutional:       General: She is awake  Appearance: Normal appearance  She is well-developed and normal weight  HENT:      Head: Normocephalic and atraumatic  Right Ear: Hearing, tympanic membrane, ear canal and external ear normal       Left Ear: Hearing, tympanic membrane, ear canal and external ear normal       Nose: Congestion present  No rhinorrhea  Right Turbinates: Enlarged  Not swollen or pale  Left Turbinates: Enlarged  Not swollen or pale  Right Sinus: No maxillary sinus tenderness or frontal sinus tenderness  Left Sinus: No maxillary sinus tenderness or frontal sinus tenderness  Mouth/Throat:      Mouth: Mucous membranes are moist       Pharynx: Posterior oropharyngeal erythema present  No oropharyngeal exudate  Eyes:      Extraocular Movements: Extraocular movements intact  Conjunctiva/sclera: Conjunctivae normal       Pupils: Pupils are equal, round, and reactive to light  Cardiovascular:      Rate and Rhythm: Normal rate and regular rhythm  Pulses: Normal pulses  Heart sounds: Normal heart sounds  Pulmonary:      Effort: Pulmonary effort is normal       Breath sounds: Normal breath sounds  Abdominal:      General: Bowel sounds are normal       Palpations: Abdomen is soft  Skin:     General: Skin is warm and dry  Neurological:      Mental Status: She is alert and oriented to person, place, and time  Psychiatric:         Mood and Affect: Mood normal          Behavior: Behavior normal  Behavior is cooperative  Thought Content:  Thought content normal          Judgment: Judgment normal

## 2023-02-06 ENCOUNTER — PATIENT OUTREACH (OUTPATIENT)
Dept: FAMILY MEDICINE CLINIC | Facility: CLINIC | Age: 53
End: 2023-02-06

## 2023-02-06 NOTE — PROGRESS NOTES
OPCM  is responding to referral from PCP regarding patient reports having financial concerns on her SDOH assessment  Telephone call placed to patient and I left a message on her VM with my contact information  I requested that she return my call so that I can provide needed assistance

## 2023-02-07 ENCOUNTER — OFFICE VISIT (OUTPATIENT)
Dept: FAMILY MEDICINE CLINIC | Facility: CLINIC | Age: 53
End: 2023-02-07

## 2023-02-07 VITALS
TEMPERATURE: 96.1 F | RESPIRATION RATE: 14 BRPM | DIASTOLIC BLOOD PRESSURE: 70 MMHG | HEART RATE: 90 BPM | HEIGHT: 63 IN | BODY MASS INDEX: 24.8 KG/M2 | SYSTOLIC BLOOD PRESSURE: 104 MMHG | WEIGHT: 140 LBS | OXYGEN SATURATION: 96 %

## 2023-02-07 DIAGNOSIS — R63.5 WEIGHT GAIN: ICD-10-CM

## 2023-02-07 DIAGNOSIS — J06.9 UPPER RESPIRATORY TRACT INFECTION, UNSPECIFIED TYPE: Primary | ICD-10-CM

## 2023-02-07 DIAGNOSIS — J40 BRONCHITIS: ICD-10-CM

## 2023-02-07 DIAGNOSIS — K52.9 GASTROENTERITIS: ICD-10-CM

## 2023-02-07 DIAGNOSIS — R11.2 NAUSEA AND VOMITING, UNSPECIFIED VOMITING TYPE: ICD-10-CM

## 2023-02-07 RX ORDER — AMOXICILLIN 875 MG/1
875 TABLET, COATED ORAL 2 TIMES DAILY
Qty: 20 TABLET | Refills: 0 | Status: SHIPPED | OUTPATIENT
Start: 2023-02-07 | End: 2023-02-17

## 2023-02-07 RX ORDER — SEMAGLUTIDE 1.34 MG/ML
1 INJECTION, SOLUTION SUBCUTANEOUS WEEKLY
Qty: 3 ML | Refills: 2 | Status: SHIPPED | OUTPATIENT
Start: 2023-02-07

## 2023-02-07 NOTE — PATIENT INSTRUCTIONS
Acute Bronchitis   AMBULATORY CARE:   Acute bronchitis  is swelling and irritation in your lungs  It is usually caused by a virus and most often happens in the winter  Bronchitis may also be caused by bacteria or by a chemical irritant, such as smoke  Common symptoms include the following:   Cough that lasts up to 3 weeks, stuffy nose    Hoarseness, sore throat    A fever and chills    Feeling more tired than usual, and body aches    Wheezing or pain when you breathe or cough    Seek care immediately if:   You cough up blood  Your lips or fingernails turn blue  You feel like you are not getting enough air when you breathe  Call your doctor if:   Your symptoms do not go away or get worse, even after treatment  Your cough does not get better within 4 weeks  You have questions or concerns about your condition or care  Medicines: You may  need any of the following:  Cough suppressants  decrease your urge to cough  Decongestants  help loosen mucus in your lungs and make it easier to cough up  This can help you breathe easier  Inhalers  may be given  Your healthcare provider may give you one or more inhalers to help you breathe easier and cough less  An inhaler gives your medicine to open your airways  Ask your healthcare provider to show you how to use your inhaler correctly  Antibiotics  may be given for up to 5 days if your bronchitis is caused by bacteria  Acetaminophen  decreases pain and fever  It is available without a doctor's order  Ask how much to take and how often to take it  Follow directions  Read the labels of all other medicines you are using to see if they also contain acetaminophen, or ask your doctor or pharmacist  Acetaminophen can cause liver damage if not taken correctly  Do not use more than 4 grams (4,000 milligrams) total of acetaminophen in one day  NSAIDs  help decrease swelling and pain or fever   This medicine is available with or without a doctor's order  NSAIDs can cause stomach bleeding or kidney problems in certain people  If you take blood thinner medicine, always ask your healthcare provider if NSAIDs are safe for you  Always read the medicine label and follow directions  Take your medicine as directed  Contact your healthcare provider if you think your medicine is not helping or if you have side effects  Tell him of her if you are allergic to any medicine  Keep a list of the medicines, vitamins, and herbs you take  Include the amounts, and when and why you take them  Bring the list or the pill bottles to follow-up visits  Carry your medicine list with you in case of an emergency  Self-care:   Drink liquids as directed  You may need to drink more liquids than usual to stay hydrated  Ask how much liquid to drink each day and which liquids are best for you  Use a cool mist humidifier  to increase air moisture in your home  This may make it easier for you to breathe and help decrease your cough  Get more rest   Rest helps your body to heal  Slowly start to do more each day  Rest when you feel it is needed  Avoid irritants in the air  Avoid chemicals, fumes, and dust  Wear a face mask if you must work around dust or fumes  Stay inside on days when air pollution levels are high  If you have allergies, stay inside when pollen counts are high  Do not use aerosol products, such as spray-on deodorant, bug spray, and hair spray  Do not smoke or be around others who are smoking  Nicotine and other chemicals in cigarettes and cigars can cause lung damage  Ask your healthcare provider for information if you currently smoke and need help to quit  E-cigarettes or smokeless tobacco still contain nicotine  Talk to your healthcare provider before you use these products  Prevent acute bronchitis:       Ask about vaccines you may need  Get a flu vaccine each year as soon as recommended, usually in September or October   Ask your healthcare provider if you should also get a pneumonia or COVID-19 vaccine  Your healthcare provider can tell you if you should also get other vaccines, and when to get them  Prevent the spread of germs  You can decrease your risk for acute bronchitis and other illnesses by doing the following:    Wash your hands often with soap and water  Carry germ-killing hand lotion or gel with you  You can use the lotion or gel to clean your hands when soap and water are not available  Do not touch your eyes, nose, or mouth unless you have washed your hands first     Always cover your mouth when you cough to prevent the spread of germs  It is best to cough into a tissue or your shirt sleeve instead of into your hand  Ask those around you to cover their mouths when they cough  Try to avoid people who have a cold or the flu  If you are sick, stay away from others as much as possible  Follow up with your doctor as directed:  Write down questions you have so you will remember to ask them during your follow-up visits  © Copyright [address] 2022 Information is for End User's use only and may not be sold, redistributed or otherwise used for commercial purposes  All illustrations and images included in CareNotes® are the copyrighted property of A D A M , Inc  or [address] [place] [address]  The above information is an  only  It is not intended as medical advice for individual conditions or treatments  Talk to your doctor, nurse or pharmacist before following any medical regimen to see if it is safe and effective for you  Gastritis   AMBULATORY CARE:   Gastritis  is inflammation or irritation of the lining of your stomach          Common symptoms include the following:   Stomach pain, burning, or tenderness when you press on it    Stomach fullness or tightness    Nausea or vomiting    Loss of appetite, or feeling full quickly when you eat    Bad breath    Fatigue or feeling more tired than usual    Heartburn    Call 910 for any of the following: You develop chest pain or shortness of breath  Seek care immediately if:   You vomit blood  You have black or bloody bowel movements  You have severe stomach or back pain  Contact your healthcare provider if:   You have a fever  You have new or worsening symptoms, even after treatment  You have questions or concerns about your condition or care  Treatment for gastritis:  Your symptoms may go away without treatment  Treatment will depend on what is causing your gastritis  Your healthcare provider may recommend changes to the medicines you take  Medicines may be given to help treat a bacterial infection or decrease stomach acid  Manage or prevent gastritis:   Do not smoke  Nicotine and other chemicals in cigarettes and cigars can make your symptoms worse and cause lung damage  Ask your healthcare provider for information if you currently smoke and need help to quit  E-cigarettes or smokeless tobacco still contain nicotine  Talk to your healthcare provider before you use these products  Do not drink alcohol  Alcohol can prevent healing and make your gastritis worse  Talk to your healthcare provider if you need help to stop drinking  Do not take NSAIDs or aspirin unless directed  These and similar medicines can cause irritation  If your healthcare provider says it is okay to take NSAIDs, take them with food  Do not eat foods that cause irritation  Foods such as oranges and salsa can cause burning or pain  Eat a variety of healthy foods  Examples include fruits (not citrus), vegetables, low-fat dairy products, beans, whole-grain breads, and lean meats and fish  Try to eat small meals, and drink water with your meals  Do not eat for at least 3 hours before you go to bed  Find ways to relax and decrease stress  Stress can increase stomach acid and make gastritis worse  Activities such as yoga, meditation, or listening to music can help you relax   Spend time with friends, or do things you enjoy  Follow up with your healthcare provider as directed: You may need ongoing tests or treatment, or referral to a gastroenterologist  Write down your questions so you remember to ask them during your visits  © Copyright [employer] 2022 Information is for End User's use only and may not be sold, redistributed or otherwise used for commercial purposes  All illustrations and images included in CareNotes® are the copyrighted property of A D A M [employer] Inc  or Jagdish Harper   The above information is an  only  It is not intended as medical advice for individual conditions or treatments  Talk to your doctor, nurse or pharmacist before following any medical regimen to see if it is safe and effective for you

## 2023-02-07 NOTE — PROGRESS NOTES
Assessment/Plan:         Problem List Items Addressed This Visit    None  Visit Diagnoses     Upper respiratory tract infection, unspecified type    -  Primary    Start abx, finish all the doses even if you feel better  Relevant Medications    amoxicillin (AMOXIL) 875 mg tablet    Other Relevant Orders    Covid/Flu- Office Collect    Bronchitis        consider starting steroids that you already have at home     Nausea and vomiting, unspecified vomiting type        Use zofran as needed, BRAT diet, hydration     Relevant Orders    Covid/Flu- Office Collect    Gastroenteritis        Start probiotics, may use zofran as needed for nausea and vomiting, stay hydrated             Subjective:      Patient ID: Crystal Franco is a 47 y o  female  URI   This is a new problem  The current episode started in the past 7 days  The problem has been waxing and waning  The maximum temperature recorded prior to her arrival was 100 4 - 100 9 F  Associated symptoms include abdominal pain, congestion, coughing (improving ), diarrhea and vomiting  Pertinent negatives include no chest pain, dysuria, ear pain, headaches, joint pain, joint swelling, nausea, neck pain, plugged ear sensation, rash, rhinorrhea, sinus pain, sneezing, sore throat, swollen glands or wheezing  She has tried acetaminophen and NSAIDs for the symptoms  The treatment provided mild relief  The following portions of the patient's history were reviewed and updated as appropriate:   Past Medical History:  She has a past medical history of Anxiety, Anxiety, Asthma, Bipolar disorder ([address] ), Carpal tunnel syndrome, Chronic pain, Depression, Disease of thyroid gland, Dyslipidemia, Fibromyalgia, Irritable bowel, Kidney stones, Kidney stones (5/20/2019), Migraine, Obesity (BMI 30 0-34 9), Psychiatric disorder, Suicide attempt ([address] ), and Vitamin D deficiency  ,  _______________________________________________________________________  Medical Problems:  does not have any pertinent problems on file ,  _______________________________________________________________________  Past Surgical History:   has a past surgical history that includes  section; Cholecystectomy; Tonsillectomy; Partial hysterectomy; Bunionectomy; and Tubal ligation  ,  _______________________________________________________________________  Family History:  family history includes Anxiety disorder in her mother; Arthritis in her mother; Bipolar disorder in her daughter and mother; Breast cancer in her paternal aunt and paternal grandmother; Cancer in her cousin, father, maternal uncle, paternal aunt, paternal grandmother, and paternal uncle; Dementia in her paternal grandmother; Depression in her mother; Diabetes in her mother; Heart disease in her brother; Hypertension in her brother and mother; Hypothyroidism in her mother and sister; Mental illness in her mother; Stroke in her mother ,  _______________________________________________________________________  Social History:   reports that she has never smoked  She has never used smokeless tobacco  She reports that she does not drink alcohol and does not use drugs  ,  _______________________________________________________________________  Allergies:  is allergic to doxycycline, erythromycin, other, latex, duloxetine, eletriptan, emgality [galcanezumab-gnlm], and galcanezumab     _______________________________________________________________________  Current Outpatient Medications   Medication Sig Dispense Refill   • albuterol (2 5 mg/3 mL) 0 083 % nebulizer solution Take 3 mL (2 5 mg total) by nebulization every 6 (six) hours as needed for wheezing or shortness of breath 3 mL 0   • amoxicillin (AMOXIL) 875 mg tablet Take 1 tablet (875 mg total) by mouth 2 (two) times a day for 10 days 20 tablet 0   • [place] Thyroid 60 MG tablet      • azelastine (ASTELIN) 0 1 % nasal spray 1 spray into each nostril 2 (two) times a day Use in each nostril as directed 30 mL 0   • Butalbital-APAP-Caffeine -40 MG CAPS TAKE 1 CAPSULE Daily PRN     • cariprazine (Vraylar) 3 MG capsule Take 1 capsule (3 mg total) by mouth daily 30 capsule 2   • celecoxib (CELEBREX) 200 mg capsule Take 1 capsule (200 mg total) by mouth daily 30 capsule 2   • clotrimazole-betamethasone (LOTRISONE) 1-0 05 % cream      • dicyclomine (BENTYL) 10 mg capsule      • divalproex sodium (DEPAKOTE ER) 250 mg 24 hr tablet      • fremanezumab-vfrm (Ajovy) 225 MG/1 5ML auto-injector Inject 225 mg under the skin every 30 (thirty) days     • hydroxychloroquine (PLAQUENIL) 200 mg tablet      •  MG tablet      • ketoconazole (NIZORAL) 2 % cream      • lidocaine (LIDODERM) 5 % Apply 1 patch topically daily as needed (back pain) Remove & Discard patch within 12 hours or as directed by MD 6 patch 1   • loratadine (CLARITIN) 10 mg tablet Take 1 pill daily for allergies 90 tablet 1   • meclizine (ANTIVERT) 25 mg tablet Take 1 tablet (25 mg total) by mouth every 8 (eight) hours as needed for dizziness 30 tablet 1   • meloxicam (MOBIC) 15 mg tablet Take 1 tablet (15 mg total) by mouth daily 30 tablet 1   • metoclopramide (REGLAN) 5 mg tablet      • naratriptan (AMERGE) 2 5 MG tablet      • omeprazole (PriLOSEC) 40 MG capsule Take 1 capsule (40 mg total) by mouth daily 90 capsule 1   • semaglutide, 1 mg/dose, (Ozempic, 1 MG/DOSE,) 4 MG/3ML SOPN injection pen Inject 0 75 mL (1 mg total) under the skin once a week 3 mL 3   • sucralfate (CARAFATE) 1 g tablet      • tiZANidine (ZANAFLEX) 4 mg tablet      • Topiramate  MG CP24 Take 100 mg by mouth 2 (two) times a day     • Ubrelvy 100 MG tablet      • Ventolin  (90 Base) MCG/ACT inhaler      • XIFAXAN 550 MG tablet      • benzonatate (TESSALON) 200 MG capsule Take 1 capsule (200 mg total) by mouth 3 (three) times a day as needed for cough 20 capsule 0   • brompheniramine-pseudoephedrine-DM 30-2-10 MG/5ML syrup Take 5 mL by mouth 4 (four) times a day as needed for allergies for up to 7 days 70 mL 0   • clonazePAM (KlonoPIN) 0 5 mg tablet Take 1 tablet (0 5 mg total) by mouth daily as needed for anxiety 30 tablet 2   • mometasone (ELOCON) 0 1 % cream Apply topically daily 45 g 0   • ondansetron (Zofran ODT) 4 mg disintegrating tablet Take 1 tablet (4 mg total) by mouth every 6 (six) hours as needed for nausea or vomiting 20 tablet 0   • predniSONE 20 mg tablet Take 1 tablet (20 mg total) by mouth 2 (two) times a day with meals 10 tablet 0   • Respiratory Therapy Supplies (NEBULIZER) DONA by Does not apply route every 4 (four) hours while awake 90 each 1     No current facility-administered medications for this visit      _______________________________________________________________________  Review of Systems   Constitutional: Positive for fatigue  Negative for chills and fever  HENT: Positive for congestion  Negative for ear pain, rhinorrhea, sinus pain, sneezing and sore throat  Respiratory: Positive for cough (improving )  Negative for shortness of breath and wheezing  Cardiovascular: Negative for chest pain  Gastrointestinal: Positive for abdominal pain, diarrhea and vomiting  Negative for nausea  Genitourinary: Negative for dysuria  Musculoskeletal: Negative for arthralgias, back pain, joint pain and neck pain  Skin: Negative for rash  Neurological: Negative for headaches  All other systems reviewed and are negative  Objective:  Vitals:    02/07/23 0927   BP: 104/70   Pulse: 90   Resp: 14   Temp: (!) 96 1 °F (35 6 °C)   SpO2: 96%   Weight: 63 5 kg (140 lb)   Height: 5' 3" (1 6 m)     Body mass index is 24 8 kg/m²  Physical Exam  Vitals and nursing note reviewed  Constitutional:       General: She is not in acute distress  Appearance: Normal appearance  She is ill-appearing     HENT:      Right Ear: Tympanic membrane, ear canal and external ear normal       Left Ear: Tympanic membrane, ear canal and external ear normal    Cardiovascular:      Rate and Rhythm: Normal rate and regular rhythm  Heart sounds: Normal heart sounds  Pulmonary:      Effort: Pulmonary effort is normal       Breath sounds: Normal breath sounds  Musculoskeletal:         General: Normal range of motion  Skin:     General: Skin is warm and dry  Neurological:      Mental Status: She is alert and oriented to person, place, and time  Psychiatric:         Mood and Affect: Mood normal          Behavior: Behavior normal          Thought Content:  Thought content normal          Judgment: Judgment normal

## 2023-02-08 ENCOUNTER — TELEPHONE (OUTPATIENT)
Dept: PSYCHIATRY | Facility: CLINIC | Age: 53
End: 2023-02-08

## 2023-02-08 LAB
FLUAV RNA RESP QL NAA+PROBE: NEGATIVE
FLUBV RNA RESP QL NAA+PROBE: NEGATIVE
SARS-COV-2 RNA RESP QL NAA+PROBE: NEGATIVE

## 2023-02-08 NOTE — TELEPHONE ENCOUNTER
Left message for patient to call our office to schedule an appointment with Natty Holloway at her earliest convenience

## 2023-02-09 ENCOUNTER — TELEMEDICINE (OUTPATIENT)
Dept: PSYCHIATRY | Facility: CLINIC | Age: 53
End: 2023-02-09

## 2023-02-09 DIAGNOSIS — F41.1 GAD (GENERALIZED ANXIETY DISORDER): Primary | ICD-10-CM

## 2023-02-09 DIAGNOSIS — F31.81 BIPOLAR 2 DISORDER (HCC): ICD-10-CM

## 2023-02-13 ENCOUNTER — PATIENT OUTREACH (OUTPATIENT)
Dept: FAMILY MEDICINE CLINIC | Facility: CLINIC | Age: 53
End: 2023-02-13

## 2023-02-13 NOTE — PROGRESS NOTES
Outreach #2 to patient who reports having financial concerns on her SDOH assessment  I left another message on her VM requesting that she return my call so that I can provide needed assistance

## 2023-02-14 ENCOUNTER — TELEPHONE (OUTPATIENT)
Dept: ENDOCRINOLOGY | Age: 53
End: 2023-02-14

## 2023-02-16 ENCOUNTER — PATIENT OUTREACH (OUTPATIENT)
Dept: FAMILY MEDICINE CLINIC | Facility: CLINIC | Age: 53
End: 2023-02-16

## 2023-02-16 ENCOUNTER — TELEMEDICINE (OUTPATIENT)
Dept: PSYCHIATRY | Facility: CLINIC | Age: 53
End: 2023-02-16

## 2023-02-16 DIAGNOSIS — F31.81 BIPOLAR 2 DISORDER (HCC): ICD-10-CM

## 2023-02-16 DIAGNOSIS — F41.1 GAD (GENERALIZED ANXIETY DISORDER): Primary | ICD-10-CM

## 2023-02-16 NOTE — LETTER
[address] [facility] [address] [phone]    Re:Unable to reach you   2/16/2023       Dear Nathan Sims am a [address] [employer] [address] [facility]  and wanted to be certain you had information to contact me should you desire assistance with or have questions about non-medical aspects of your care such as [but not limited to] medical insurance, housing, transportation, material needs, or emergency needs  If I do not have an answer I will assist you in finding the appropriate agency or individual who can help  Please feel free to contact me at [phone]  Thank You      Sincerely,         Radha Parham

## 2023-02-16 NOTE — PSYCH
INDIVIDUAL SESSION NOTE     Length of time in session: 52 minutes, follow up in 1 week     Psychotherapy Provided: Individual      Diagnosis ICD-10-CM Associated Orders   1  ZAINA (generalized anxiety disorder)  F41 1       2  Bipolar 2 disorder ([facility] )  F31 81              Goals addressed in session: Goal 1     Pain:      none    0    Current suicide risk:  minimal    Data: Met with Hannah Durham for a scheduled individual session  Topics discussed included emotional wellness, coping skills, relationship with significant other, relationships with family and physical health concerns  Marleni Kwok is feeling "alright"; she stated "a lot has been going on"  Her health has been up and down  She was on steroids and gained some weight, which she isn't happy about  Marleni Kwok stated her body has been hurting her, she's not sure if it's some sort of fibromyalgia flare up or issues due to long covid  She's also had a lot of stomach upset  Her doctor stated stress and anxiety can worsen stomach upset; he also suggested a medication change so she's going to look into that  Her boyfriend, Odell Akers, is currently visiting her  She said it's been a good visit so far; no bickering or pettiness and communication has been a lot better  Marleni Kwok shows evidence of utilizing effective communication skills and maintaining emotional composure to manage mental health symptoms  During this session, this clinical used the following therapeutic modalities supportive psychotherapy, client-centered therapy, stress management skills and problem solving skills  Assessment:  Marleni Kwok presents with a normal mood  her affect is normal range and intensity, appropriate  Marleni Kwok was oriented x3  She was focused and engaged  Marleni Kwok exhibits good therapeutic rapport with this clinician  she continues to exhibit willingness to work on treatment goals and objectives    Marleni Kwok presents with a minimal risk of suicide, minimal risk of self-harm and minimal of harm to others  Plan:  Catia Jolly will return in 1 week for the next scheduled session  Between sessions, she  will do her best to keep her stress down and will report back during the next session re: success and barriers  At the next session, this clinical will use supportive psychotherapy and client-centered therapy to address her anxiety, in an effort to assist Catia Jolly with meeting treatment goals  Behavioral Health Treatment Plan [facility]: Diagnosis and Treatment Plan explained to Jose Luis Gutierrez relates understanding diagnosis and is agreeable to Treatment Plan  Yes     Virtual Regular Visit    Verification of patient location:    Patient is located in the following state in which I hold an active license [place]      Assessment/Plan:    Problem List Items Addressed This Visit        Other    ZAINA (generalized anxiety disorder) - Primary    Bipolar 2 disorder ([facility] [address] )       Goals addressed in session: Goal 1          Reason for visit is   Chief Complaint   Patient presents with   • Virtual Regular Visit        Encounter provider Deanne Bello LCSW    Provider located at [address] [place] [id number] [phone]      Recent Visits  No visits were found meeting these conditions  Showing recent visits within past 7 days and meeting all other requirements  Today's Visits  Date Type Provider Dept   02/16/23 Telemedicine Deanne Bello LCSW [facility]   Showing today's visits and meeting all other requirements  Future Appointments  No visits were found meeting these conditions  Showing future appointments within next 150 days and meeting all other requirements       The patient was identified by name and date of birth  Alistair Koch was informed that this is a telemedicine visit and [facility]  She agrees to proceed     My office door was closed  No one else was in the room  She acknowledged consent and understanding of privacy and security of the video platform  The patient has agreed to participate and understands they can discontinue the visit at any time  Patient is aware this is a billable service  Subjective  Eleonora Vanegas is a 47 y o  female  HPI     Past Medical History:   Diagnosis Date   • Anxiety    • Anxiety    • Asthma    • Bipolar disorder [facility])    • Carpal tunnel syndrome    • Chronic pain     lower back   • Depression    • Disease of thyroid gland    • Dyslipidemia    • Fibromyalgia    • Irritable bowel    • Kidney stones    • Kidney stones 2019   • Migraine    • Obesity (BMI 30 0-34  9)    • Psychiatric disorder     depression/anxiety   • Suicide attempt [facility])     as teen   • Vitamin D deficiency        Past Surgical History:   Procedure Laterality Date   • BUNIONECTOMY     •  SECTION     • CHOLECYSTECTOMY     • PARTIAL HYSTERECTOMY      portion of  uterus still present   • TONSILLECTOMY     • TUBAL LIGATION         Current Outpatient Medications   Medication Sig Dispense Refill   • albuterol (2 5 mg/3 mL) 0 083 % nebulizer solution Take 3 mL (2 5 mg total) by nebulization every 6 (six) hours as needed for wheezing or shortness of breath 3 mL 0   • amoxicillin (AMOXIL) 875 mg tablet Take 1 tablet (875 mg total) by mouth 2 (two) times a day for 10 days 20 tablet 0   • [place] Thyroid 60 MG tablet      • azelastine (ASTELIN) 0 1 % nasal spray 1 spray into each nostril 2 (two) times a day Use in each nostril as directed 30 mL 0   • benzonatate (TESSALON) 200 MG capsule Take 1 capsule (200 mg total) by mouth 3 (three) times a day as needed for cough 20 capsule 0   • Butalbital-APAP-Caffeine -40 MG CAPS TAKE 1 CAPSULE Daily PRN     • cariprazine (Vraylar) 3 MG capsule Take 1 capsule (3 mg total) by mouth daily 30 capsule 2   • celecoxib (CELEBREX) 200 mg capsule Take 1 capsule (200 mg total) by mouth daily 30 capsule 2   • clonazePAM (KlonoPIN) 0 5 mg tablet Take 1 tablet (0 5 mg total) by mouth daily as needed for anxiety 30 tablet 2   • clotrimazole-betamethasone (LOTRISONE) 1-0 05 % cream      • dicyclomine (BENTYL) 10 mg capsule      • divalproex sodium (DEPAKOTE ER) 250 mg 24 hr tablet      • fremanezumab-vfrm (Ajovy) 225 MG/1 5ML auto-injector Inject 225 mg under the skin every 30 (thirty) days     • hydroxychloroquine (PLAQUENIL) 200 mg tablet      •  MG tablet      • ketoconazole (NIZORAL) 2 % cream      • lidocaine (LIDODERM) 5 % Apply 1 patch topically daily as needed (back pain) Remove & Discard patch within 12 hours or as directed by MD 6 patch 1   • loratadine (CLARITIN) 10 mg tablet Take 1 pill daily for allergies 90 tablet 1   • meclizine (ANTIVERT) 25 mg tablet Take 1 tablet (25 mg total) by mouth every 8 (eight) hours as needed for dizziness 30 tablet 1   • meloxicam (MOBIC) 15 mg tablet Take 1 tablet (15 mg total) by mouth daily 30 tablet 1   • metoclopramide (REGLAN) 5 mg tablet      • mometasone (ELOCON) 0 1 % cream Apply topically daily 45 g 0   • naratriptan (AMERGE) 2 5 MG tablet      • omeprazole (PriLOSEC) 40 MG capsule Take 1 capsule (40 mg total) by mouth daily 90 capsule 1   • ondansetron (Zofran ODT) 4 mg disintegrating tablet Take 1 tablet (4 mg total) by mouth every 6 (six) hours as needed for nausea or vomiting 20 tablet 0   • Ozempic, 1 MG/DOSE, 4 MG/3ML injection pen INJECT 0 75 ML (1 MG TOTAL) UNDER THE SKIN ONCE A WEEK 3 mL 2   • predniSONE 20 mg tablet Take 1 tablet (20 mg total) by mouth 2 (two) times a day with meals 10 tablet 0   • Respiratory Therapy Supplies (NEBULIZER) DONA by Does not apply route every 4 (four) hours while awake 90 each 1   • sucralfate (CARAFATE) 1 g tablet      • tiZANidine (ZANAFLEX) 4 mg tablet      • Topiramate  MG CP24 Take 100 mg by mouth 2 (two) times a day     • Ubrelvy 100 MG tablet      • Ventolin  (90 Base) MCG/ACT inhaler      • XIFAXAN 550 MG tablet        No current facility-administered medications for this visit  Allergies   Allergen Reactions   • Doxycycline Rash   • Erythromycin Rash   • Other Edema and Wheezing     jalapeno   • Latex Rash   • Duloxetine      Other reaction(s): Nausea, Loopy   • Eletriptan      Pain in lower jaw with pressure in throat   • Emgality [Galcanezumab-Gnlm]    • Galcanezumab      rash       Review of Systems    Video Exam    There were no vitals filed for this visit      Physical Exam     02/16/23  Start Time: 0402  Stop Time: 1092  Total Visit Time: 52 minutes

## 2023-02-16 NOTE — PROGRESS NOTES
Unable to reach letter sent to patient with the telephone number for the CM Dept so that she can call if she needs any assistance

## 2023-02-23 ENCOUNTER — TELEPHONE (OUTPATIENT)
Dept: PSYCHIATRY | Facility: CLINIC | Age: 53
End: 2023-02-23

## 2023-02-23 ENCOUNTER — TELEMEDICINE (OUTPATIENT)
Dept: PSYCHIATRY | Facility: CLINIC | Age: 53
End: 2023-02-23

## 2023-02-23 DIAGNOSIS — F41.1 GAD (GENERALIZED ANXIETY DISORDER): Primary | ICD-10-CM

## 2023-02-23 DIAGNOSIS — F31.81 BIPOLAR 2 DISORDER (HCC): ICD-10-CM

## 2023-02-23 NOTE — TELEPHONE ENCOUNTER
Patient called to inform provider that she was unsuccessful in connecting with her for today's appointment  Screen was frozen and could not hear the provider  Writer suggested that on next virtual appointment, 3/02, that if she has problems connecting and or hearing/seeing provider to call office as soon as possible to try and solve the issue

## 2023-02-24 ENCOUNTER — TELEPHONE (OUTPATIENT)
Dept: FAMILY MEDICINE CLINIC | Facility: CLINIC | Age: 53
End: 2023-02-24

## 2023-02-24 NOTE — TELEPHONE ENCOUNTER
She wants to know if there is any screening that can be done to detect pancreatic cancer  Her symptoms are not getting any better and it runs in her family

## 2023-03-01 ENCOUNTER — PATIENT OUTREACH (OUTPATIENT)
Dept: FAMILY MEDICINE CLINIC | Facility: CLINIC | Age: 53
End: 2023-03-01

## 2023-03-01 NOTE — PROGRESS NOTES
[facility] notes she is covering for [address] office until further notice  [facility] had completed a chart review  Per chart, Ericka Fuentes had called back last month due to financial concerns  Per chart, Ericka Alfredo sent out an unable to reach letter on 2/16  SW received an in basket on 2/27 from , Cuong Osorio that patient called the office about letter sent  SW had called the patient via phone  SW introduced herself and reason for consult  SWCM asked patient how she was doing  Patient stated she is doing well  SWCM asked patient what is happening that is causing her financial concerns  Patient said "who isn't having financial difficulties " Patient stated her propane bill is a little tough to afford but she is scrapping by  SW asked patient if she applied for [facility]  Patient stated she applied and was denied due to being over income  SW had suggested patient try to apply with [address] [place] [address] [phone] for their CAP program  [facility] provided patient their contact information  [facility] informed the patient that other than this she will need to call the company and see about an assistance program through them  Patient stated she lives with her grandson who is autistic  Patient stated she is disabled, receives SS income so does her grandson  Patient stated she drives her own car  Patient stated she is over income for [facility] but is making ends meet with food  Patient stated she is using space heaters in the home  Patient stated she has to properly insolate the attic to help  Patient asked if [facility] knew of any agency that could do this for her at a low cost  SW asked the patient if she owed the home  Patient stated she does own the home  [facility] had suggested [facility] [phone] and provided contact information  Patient thanked [facility] for the information  Patient denied any other needs at this time  [facility] provided her contact information   [facility] advised patient to reach out if she has any other needs  Patient agreed  Patient denied any continued SW outreach  SWCM will keep referral closed  Please reconsult SW for future needs

## 2023-03-02 ENCOUNTER — OFFICE VISIT (OUTPATIENT)
Dept: FAMILY MEDICINE CLINIC | Facility: CLINIC | Age: 53
End: 2023-03-02

## 2023-03-02 ENCOUNTER — TELEPHONE (OUTPATIENT)
Dept: PSYCHIATRY | Facility: CLINIC | Age: 53
End: 2023-03-02

## 2023-03-02 ENCOUNTER — TELEMEDICINE (OUTPATIENT)
Dept: PSYCHIATRY | Facility: CLINIC | Age: 53
End: 2023-03-02

## 2023-03-02 ENCOUNTER — APPOINTMENT (OUTPATIENT)
Dept: LAB | Facility: HOSPITAL | Age: 53
End: 2023-03-02

## 2023-03-02 ENCOUNTER — APPOINTMENT (OUTPATIENT)
Dept: LAB | Facility: CLINIC | Age: 53
End: 2023-03-02

## 2023-03-02 ENCOUNTER — HOSPITAL ENCOUNTER (OUTPATIENT)
Dept: CT IMAGING | Facility: CLINIC | Age: 53
Discharge: HOME | End: 2023-03-02

## 2023-03-02 VITALS
SYSTOLIC BLOOD PRESSURE: 120 MMHG | TEMPERATURE: 97 F | DIASTOLIC BLOOD PRESSURE: 80 MMHG | WEIGHT: 142 LBS | HEIGHT: 63 IN | OXYGEN SATURATION: 99 % | BODY MASS INDEX: 25.16 KG/M2 | HEART RATE: 68 BPM

## 2023-03-02 DIAGNOSIS — R10.84 GENERALIZED ABDOMINAL PAIN: Primary | ICD-10-CM

## 2023-03-02 DIAGNOSIS — F31.81 BIPOLAR 2 DISORDER (HCC): ICD-10-CM

## 2023-03-02 DIAGNOSIS — R10.84 GENERALIZED ABDOMINAL PAIN: ICD-10-CM

## 2023-03-02 DIAGNOSIS — F41.1 GAD (GENERALIZED ANXIETY DISORDER): Primary | ICD-10-CM

## 2023-03-02 DIAGNOSIS — R53.82 CHRONIC FATIGUE: ICD-10-CM

## 2023-03-02 DIAGNOSIS — E78.5 DYSLIPIDEMIA, GOAL LDL BELOW 130: ICD-10-CM

## 2023-03-02 DIAGNOSIS — R11.14 BILIOUS VOMITING WITH NAUSEA: ICD-10-CM

## 2023-03-02 LAB
ALBUMIN SERPL BCP-MCNC: 4.1 G/DL (ref 3.5–5)
ALP SERPL-CCNC: 64 U/L (ref 46–116)
ALT SERPL W P-5'-P-CCNC: 42 U/L (ref 12–78)
AMYLASE SERPL-CCNC: 47 IU/L (ref 25–115)
ANION GAP SERPL CALCULATED.3IONS-SCNC: 5 MMOL/L (ref 4–13)
AST SERPL W P-5'-P-CCNC: 18 U/L (ref 5–45)
BASOPHILS # BLD AUTO: 0.01 THOUSANDS/ÂΜL (ref 0–0.1)
BASOPHILS NFR BLD AUTO: 0 % (ref 0–1)
BILIRUB SERPL-MCNC: 0.56 MG/DL (ref 0.2–1)
BILIRUB UR QL STRIP: NEGATIVE
BUN SERPL-MCNC: 12 MG/DL (ref 5–25)
CALCIUM SERPL-MCNC: 9.5 MG/DL (ref 8.3–10.1)
CHLORIDE SERPL-SCNC: 111 MMOL/L (ref 96–108)
CHOLEST SERPL-MCNC: 210 MG/DL
CLARITY UR: CLEAR
CO2 SERPL-SCNC: 24 MMOL/L (ref 21–32)
COLOR UR: COLORLESS
CREAT SERPL-MCNC: 0.73 MG/DL (ref 0.6–1.3)
EOSINOPHIL # BLD AUTO: 0.09 THOUSAND/ÂΜL (ref 0–0.61)
EOSINOPHIL NFR BLD AUTO: 2 % (ref 0–6)
ERYTHROCYTE [DISTWIDTH] IN BLOOD BY AUTOMATED COUNT: 13.2 % (ref 11.6–15.1)
GFR SERPL CREATININE-BSD FRML MDRD: 95 ML/MIN/1.73SQ M
GLUCOSE P FAST SERPL-MCNC: 79 MG/DL (ref 65–99)
GLUCOSE UR STRIP-MCNC: NEGATIVE MG/DL
HCT VFR BLD AUTO: 38.8 % (ref 34.8–46.1)
HDLC SERPL-MCNC: 74 MG/DL
HGB BLD-MCNC: 12.7 G/DL (ref 11.5–15.4)
HGB UR QL STRIP.AUTO: NEGATIVE
IMM GRANULOCYTES # BLD AUTO: 0.01 THOUSAND/UL (ref 0–0.2)
IMM GRANULOCYTES NFR BLD AUTO: 0 % (ref 0–2)
KETONES UR STRIP-MCNC: NEGATIVE MG/DL
LDLC SERPL CALC-MCNC: 123 MG/DL (ref 0–100)
LEUKOCYTE ESTERASE UR QL STRIP: NEGATIVE
LIPASE SERPL-CCNC: 129 U/L (ref 73–393)
LYMPHOCYTES # BLD AUTO: 1.85 THOUSANDS/ÂΜL (ref 0.6–4.47)
LYMPHOCYTES NFR BLD AUTO: 45 % (ref 14–44)
MCH RBC QN AUTO: 30 PG (ref 26.8–34.3)
MCHC RBC AUTO-ENTMCNC: 32.7 G/DL (ref 31.4–37.4)
MCV RBC AUTO: 92 FL (ref 82–98)
MONOCYTES # BLD AUTO: 0.25 THOUSAND/ÂΜL (ref 0.17–1.22)
MONOCYTES NFR BLD AUTO: 6 % (ref 4–12)
NEUTROPHILS # BLD AUTO: 1.93 THOUSANDS/ÂΜL (ref 1.85–7.62)
NEUTS SEG NFR BLD AUTO: 47 % (ref 43–75)
NITRITE UR QL STRIP: NEGATIVE
NONHDLC SERPL-MCNC: 136 MG/DL
NRBC BLD AUTO-RTO: 0 /100 WBCS
PH UR STRIP.AUTO: 7 [PH]
PLATELET # BLD AUTO: 241 THOUSANDS/UL (ref 149–390)
PMV BLD AUTO: 10.5 FL (ref 8.9–12.7)
POTASSIUM SERPL-SCNC: 4.7 MMOL/L (ref 3.5–5.3)
PROT SERPL-MCNC: 7.1 G/DL (ref 6.4–8.4)
PROT UR STRIP-MCNC: NEGATIVE MG/DL
RBC # BLD AUTO: 4.23 MILLION/UL (ref 3.81–5.12)
SODIUM SERPL-SCNC: 140 MMOL/L (ref 135–147)
SP GR UR STRIP.AUTO: 1.01 (ref 1–1.03)
TRIGL SERPL-MCNC: 64 MG/DL
UROBILINOGEN UR STRIP-ACNC: <2 MG/DL
WBC # BLD AUTO: 4.14 THOUSAND/UL (ref 4.31–10.16)

## 2023-03-02 RX ORDER — BUTALBITAL, ACETAMINOPHEN AND CAFFEINE 50; 325; 40 MG/1; MG/1; MG/1
TABLET ORAL
COMMUNITY
Start: 2023-02-10

## 2023-03-02 RX ORDER — CYCLOBENZAPRINE HCL 5 MG
TABLET ORAL
COMMUNITY
Start: 2023-02-10

## 2023-03-02 NOTE — PROGRESS NOTES
BMI Counseling: Body mass index is 25 15 kg/m²  The BMI is above normal  Nutrition recommendations include decreasing portion sizes, encouraging healthy choices of fruits and vegetables and moderation in carbohydrate intake  Exercise recommendations include moderate physical activity 150 minutes/week and exercising 3-5 times per week  Rationale for BMI follow-up plan is due to patient being overweight or obese  Assessment/Plan:     Chronic Problems:  No problem-specific Assessment & Plan notes found for this encounter  Visit Diagnosis:  Diagnoses and all orders for this visit:    Generalized abdominal pain  Comments: We will obtain blood work and stat CT scan to assess  Advised to follow-up with GI  Orders:  -     CT abdomen pelvis wo contrast; Future  -     UA w Reflex to Microscopic w Reflex to Culture -Lab Collect; Future  -     CBC and differential; Future  -     Comprehensive metabolic panel; Future  -     Amylase; Future  -     Lipase; Future    Bilious vomiting with nausea  Comments:  Advised to follow-up with GI  Other orders  -     cyclobenzaprine (FLEXERIL) 5 mg tablet  -     butalbital-acetaminophen-caffeine (FIORICET,ESGIC) -40 mg per tablet          Subjective:    Patient ID: Heidy Kong is a 47 y o  female  Patient presents with concerns of abdominal pain, fevers, nausea and vomiting  She is very concerned as her father passed from pancreatic cancer  Brooklynn Cartagena is worried about this  She had hysterectomy in 2011  She has now had 2 months of severe upper abdominal pain, vomiting bile  Fatigue is beyond fibromyalgia  She is also spiking fevers for the past few weeks, Tmax 101         The following portions of the patient's history were reviewed and updated as appropriate: allergies, current medications, past family history, past medical history, past social history, past surgical history and problem list     Review of Systems   Constitutional: Positive for appetite change, chills, diaphoresis, fatigue and fever (101)  HENT: Negative for ear pain and sore throat  Eyes: Negative for pain and visual disturbance  Respiratory: Negative for cough and shortness of breath  Cardiovascular: Positive for chest pain  Negative for palpitations and leg swelling  Gastrointestinal: Positive for abdominal distention, abdominal pain, diarrhea, nausea and vomiting (bile)  Negative for blood in stool and constipation  Genitourinary: Negative for dysuria and hematuria  Skin: Negative for color change and rash  Neurological: Positive for dizziness and headaches  Negative for light-headedness  All other systems reviewed and are negative  /80 (BP Location: Left arm, Patient Position: Sitting)   Pulse 68   Temp (!) 97 °F (36 1 °C) (Tympanic)   Ht 5' 3" (1 6 m)   Wt 64 4 kg (142 lb) Comment: patient made aware  SpO2 99%   BMI 25 15 kg/m²   Social History     Socioeconomic History   • Marital status: Single     Spouse name: Not on file   • Number of children: Not on file   • Years of education: Not on file   • Highest education level: Not on file   Occupational History   • Occupation: Disability   Tobacco Use   • Smoking status: Never   • Smokeless tobacco: Never   Vaping Use   • Vaping Use: Never used   Substance and Sexual Activity   • Alcohol use: No   • Drug use: Never   • Sexual activity: Not Currently     Partners: Male     Birth control/protection: Abstinence, Female Sterilization     Comment: Hysterectomy   Other Topics Concern   • Not on file   Social History Narrative   • Not on file     Social Determinants of Health     Financial Resource Strain: Medium Risk   • Difficulty of Paying Living Expenses: Somewhat hard   Food Insecurity: No Food Insecurity   • Worried About Running Out of Food in the Last Year: Never true   • Ran Out of Food in the Last Year: Never true   Transportation Needs: No Transportation Needs   • Lack of Transportation (Medical):  No   • Lack of Transportation (Non-Medical): No   Physical Activity: Not on file   Stress: No Stress Concern Present   • Feeling of Stress : Not at all   Social Connections: Not on file   Intimate Partner Violence: Not on file   Housing Stability: Not on file     Past Medical History:   Diagnosis Date   • Anxiety    • Anxiety    • Asthma    • Bipolar disorder [facility])    • Carpal tunnel syndrome    • Chronic pain     lower back   • Depression    • Disease of thyroid gland    • Dyslipidemia    • Fibromyalgia    • Irritable bowel    • Kidney stones    • Kidney stones 2019   • Migraine    • Obesity (BMI 30 0-34  9)    • Psychiatric disorder     depression/anxiety   • Suicide attempt [facility])     as teen   • Vitamin D deficiency      Family History   Problem Relation Age of Onset   • Hypertension Mother    • Diabetes Mother    • Hypothyroidism Mother    • Stroke Mother    • Bipolar disorder Mother    • Anxiety disorder Mother    • Arthritis Mother    • Depression Mother    • Mental illness Mother    • Cancer Father         pancreatic    • Hypothyroidism Sister    • Hypertension Brother    • Heart disease Brother    • Cancer Maternal Uncle         lung   • Cancer Paternal Aunt         breast   • Breast cancer Paternal Aunt    • Cancer Paternal Uncle         testicular    • Cancer Paternal Grandmother         breast   • Dementia Paternal Grandmother    • Breast cancer Paternal Grandmother    • Cancer Cousin         brain   • Bipolar disorder Daughter      Past Surgical History:   Procedure Laterality Date   • BUNIONECTOMY     •  SECTION     • CHOLECYSTECTOMY     • PARTIAL HYSTERECTOMY      portion of  uterus still present   • TONSILLECTOMY     • TUBAL LIGATION         Current Outpatient Medications:   •  albuterol (2 5 mg/3 mL) 0 083 % nebulizer solution, Take 3 mL (2 5 mg total) by nebulization every 6 (six) hours as needed for wheezing or shortness of breath, Disp: 3 mL, Rfl: 0  •  [place] Thyroid 60 MG tablet, , Disp: , Rfl: •  azelastine (ASTELIN) 0 1 % nasal spray, 1 spray into each nostril 2 (two) times a day Use in each nostril as directed, Disp: 30 mL, Rfl: 0  •  butalbital-acetaminophen-caffeine (FIORICET,ESGIC) -40 mg per tablet, , Disp: , Rfl:   •  cariprazine (Vraylar) 3 MG capsule, Take 1 capsule (3 mg total) by mouth daily, Disp: 30 capsule, Rfl: 2  •  celecoxib (CELEBREX) 200 mg capsule, Take 1 capsule (200 mg total) by mouth daily, Disp: 30 capsule, Rfl: 2  •  clonazePAM (KlonoPIN) 0 5 mg tablet, Take 1 tablet (0 5 mg total) by mouth daily as needed for anxiety, Disp: 30 tablet, Rfl: 2  •  clotrimazole-betamethasone (LOTRISONE) 1-0 05 % cream, , Disp: , Rfl:   •  cyclobenzaprine (FLEXERIL) 5 mg tablet, , Disp: , Rfl:   •  dicyclomine (BENTYL) 10 mg capsule, , Disp: , Rfl:   •  divalproex sodium (DEPAKOTE ER) 250 mg 24 hr tablet, , Disp: , Rfl:   •  fremanezumab-vfrm (Ajovy) 225 MG/1 5ML auto-injector, Inject 225 mg under the skin every 30 (thirty) days, Disp: , Rfl:   •  hydroxychloroquine (PLAQUENIL) 200 mg tablet, , Disp: , Rfl:   •   MG tablet, , Disp: , Rfl:   •  ketoconazole (NIZORAL) 2 % cream, , Disp: , Rfl:   •  lidocaine (LIDODERM) 5 %, Apply 1 patch topically daily as needed (back pain) Remove & Discard patch within 12 hours or as directed by MD, Disp: 6 patch, Rfl: 1  •  loratadine (CLARITIN) 10 mg tablet, Take 1 pill daily for allergies, Disp: 90 tablet, Rfl: 1  •  meclizine (ANTIVERT) 25 mg tablet, Take 1 tablet (25 mg total) by mouth every 8 (eight) hours as needed for dizziness, Disp: 30 tablet, Rfl: 1  •  meloxicam (MOBIC) 15 mg tablet, Take 1 tablet (15 mg total) by mouth daily, Disp: 30 tablet, Rfl: 1  •  metoclopramide (REGLAN) 5 mg tablet, , Disp: , Rfl:   •  mometasone (ELOCON) 0 1 % cream, Apply topically daily, Disp: 45 g, Rfl: 0  •  naratriptan (AMERGE) 2 5 MG tablet, , Disp: , Rfl:   •  omeprazole (PriLOSEC) 40 MG capsule, Take 1 capsule (40 mg total) by mouth daily, Disp: 90 capsule, Rfl: 1  •  ondansetron (Zofran ODT) 4 mg disintegrating tablet, Take 1 tablet (4 mg total) by mouth every 6 (six) hours as needed for nausea or vomiting, Disp: 20 tablet, Rfl: 0  •  Ozempic, 1 MG/DOSE, 4 MG/3ML injection pen, INJECT 0 75 ML (1 MG TOTAL) UNDER THE SKIN ONCE A WEEK, Disp: 3 mL, Rfl: 2  •  sucralfate (CARAFATE) 1 g tablet, , Disp: , Rfl:   •  tiZANidine (ZANAFLEX) 4 mg tablet, , Disp: , Rfl:   •  Topiramate  MG CP24, Take 100 mg by mouth 2 (two) times a day, Disp: , Rfl:   •  Ubrelvy 100 MG tablet, , Disp: , Rfl:   •  Ventolin  (90 Base) MCG/ACT inhaler, , Disp: , Rfl:   •  XIFAXAN 550 MG tablet, , Disp: , Rfl:   •  benzonatate (TESSALON) 200 MG capsule, Take 1 capsule (200 mg total) by mouth 3 (three) times a day as needed for cough (Patient not taking: Reported on 3/2/2023), Disp: 20 capsule, Rfl: 0  •  Butalbital-APAP-Caffeine -40 MG CAPS, TAKE 1 CAPSULE Daily PRN (Patient not taking: Reported on 3/2/2023), Disp: , Rfl:   •  predniSONE 20 mg tablet, Take 1 tablet (20 mg total) by mouth 2 (two) times a day with meals (Patient not taking: Reported on 3/2/2023), Disp: 10 tablet, Rfl: 0  •  Respiratory Therapy Supplies (NEBULIZER) DONA, by Does not apply route every 4 (four) hours while awake, Disp: 90 each, Rfl: 1    Allergies   Allergen Reactions   • Doxycycline Rash   • Erythromycin Rash   • Other Edema and Wheezing     jalapeno   • Latex Rash   • Duloxetine      Other reaction(s): Nausea, Loopy   • Eletriptan      Pain in lower jaw with pressure in throat   • Emgality [Galcanezumab-Gnlm]    • Galcanezumab      rash          Lab Review   Office Visit on 02/07/2023   Component Date Value   • SARS-CoV-2 02/07/2023 Negative    • INFLUENZA A PCR 02/07/2023 Negative    • INFLUENZA B PCR 02/07/2023 Negative    Appointment on 01/20/2023   Component Date Value   • N gonorrhoeae, DNA Probe 01/20/2023 Negative    • Chlamydia trachomatis, D* 01/20/2023 Negative    • Monotest 01/20/2023 Negative    • EBV VCA IgG 01/20/2023 196 0 (H)    • EBV VCA IgM 01/20/2023 <36 0    • EBV Nuclear Ag Ab 01/20/2023 186 0 (H)    • INTERPRETATION 01/20/2023 Comment    Office Visit on 01/20/2023   Component Date Value   •  RAPID STREP A 01/20/2023 Negative    • SARS-CoV-2 01/20/2023 Negative    • INFLUENZA A PCR 01/20/2023 Negative    • INFLUENZA B PCR 01/20/2023 Negative    Office Visit on 01/18/2023   Component Date Value   • WET MOUNT 01/18/2023 neg    • Yeast, Wet Prep 01/18/2023 pos    • pH 01/18/2023 5    • Whiff Test 01/18/2023 pos    • Clue Cells 01/18/2023 pos    • Trich, Wet Prep 01/18/2023 neg    • LEUKOCYTE ESTERASE,UA 01/18/2023 neg    • NITRITE,UA 01/18/2023 neg    • SL AMB POCT UROBILINOGEN 01/18/2023 neg    • POCT URINE PROTEIN 01/18/2023 trace    •  PH,UA 01/18/2023 6 5    • BLOOD,UA 01/18/2023 1 015    • SPECIFIC GRAVITY,UA 01/18/2023 neg    • KETONES,UA 01/18/2023 neg    • BILIRUBIN,UA 01/18/2023 neg    • Bacterial Vaginosis 01/18/2023 Negative    • Candida species 01/18/2023 Negative    • Candida glabrata 01/18/2023 Negative    • Tasia krusei 01/18/2023 Negative    • Trichomonas vaginalis 01/18/2023 Negative    • N gonorrhoeae, DNA Probe 01/18/2023 Invalid (A)    • Chlamydia trachomatis, D* 01/18/2023 Invalid (A)    Appointment on 01/06/2023   Component Date Value   • TSH 3RD GENERATON 01/06/2023 3 938         Imaging: No results found  Objective:     Physical Exam  Constitutional:       General: She is not in acute distress  Appearance: She is not ill-appearing  Abdominal:      General: Abdomen is flat  Bowel sounds are normal       Palpations: Abdomen is soft  Tenderness: There is generalized abdominal tenderness and tenderness in the epigastric area and left lower quadrant  There is guarding  Skin:     General: Skin is warm and dry  Neurological:      General: No focal deficit present  There are no Patient Instructions on file for this visit      [id number] [address], CRNP    Portions of the record may have been created with voice recognition software  Occasional wrong word or "sound a like" substitutions may have occurred due to the inherent limitations of voice recognition software  Read the chart carefully and recognize, using context, where substitutions have occurred

## 2023-03-02 NOTE — TELEPHONE ENCOUNTER
Writer attempted to contact pt in regards to a vm we received requesting to cancel appt today at 8:00 am with Shavon Neil  Unable to contact pt  Lvm informing pt that message was sent too late and provide phone number to contact them

## 2023-03-07 ENCOUNTER — TELEPHONE (OUTPATIENT)
Dept: OBGYN CLINIC | Facility: CLINIC | Age: 53
End: 2023-03-07

## 2023-03-07 ENCOUNTER — TELEPHONE (OUTPATIENT)
Dept: PSYCHIATRY | Facility: CLINIC | Age: 53
End: 2023-03-07

## 2023-03-07 NOTE — TELEPHONE ENCOUNTER
Patient contacted the office to schedule a follow up visit with provider  Patient is now scheduled for 4/10/2023 at 3 pm virtually

## 2023-03-07 NOTE — TELEPHONE ENCOUNTER
Pt has had a hysterectomy and is having cramping - she had found out that a piece of her uterus was still seen on her last sonogram  Would like to get a message to [facility]  She has a pelvic ultrasound scheduled for next week

## 2023-03-07 NOTE — TELEPHONE ENCOUNTER
Patient states she was to call if any pain or cramping    Complaining of enlarged inguinal lymph nodes on 1 side  Requesting further instructions

## 2023-03-09 ENCOUNTER — TELEMEDICINE (OUTPATIENT)
Dept: PSYCHIATRY | Facility: CLINIC | Age: 53
End: 2023-03-09

## 2023-03-09 DIAGNOSIS — F31.81 BIPOLAR 2 DISORDER (HCC): Primary | ICD-10-CM

## 2023-03-09 DIAGNOSIS — F41.1 GAD (GENERALIZED ANXIETY DISORDER): ICD-10-CM

## 2023-03-09 NOTE — PSYCH
INDIVIDUAL SESSION NOTE     Length of time in session: 52 minutes, follow up in 1 week     Psychotherapy Provided: Individual      Diagnosis ICD-10-CM Associated Orders   1  Bipolar 2 disorder (HCC)  F31 81       2  ZAINA (generalized anxiety disorder)  F41 1              Goals addressed in session: Goal 1     Pain:      none    0    Current suicide risk:  minimal    Data: Met with Josh Naranjo for a scheduled individual session  Topics discussed included emotional wellness, coping skills, relationship with significant other and breakup/divorce  Virgilio Harvey is feeling "not good"  She stated that her boyfriend had been up and visiting for weeks  He proposed and it's a beautiful ring; this clinician told her congratulations and she said "well hold on a minute"  She then spoke at length about how their relationship fell apart  Her boyfriend has a lot of unresolved issues related to trauma, being a  and previous relationships  He tends to be extremely sensitive when it comes to his ego; he often mistakes things Virgilio Harvey does or says as an attack on him  Virgilio Harvey stated they had an argument and he just kept unraveling and it became clear to her that they weren't going to be able to work it out  He ended up flying back to [place] and she has heard from him since Tuesday  Virgilio Harvey shows evidence of utilizing effective communication skills, engaging in problem solving and maintaining emotional composure to manage mental health symptoms  During this session, this clinical used the following therapeutic modalities supportive psychotherapy, client-centered therapy, stress management skills and problem solving skills  Assessment:  Virgilio Harvey presents with a depressed mood  her affect is tearful  Marthayudelka Harvey was oriented x3  She was focused and engaged  Virgilio Harvey exhibits good therapeutic rapport with this clinician  she continues to exhibit willingness to work on treatment goals and objectives    Virgilio Harvey presents with a minimal risk of suicide, minimal risk of self-harm and minimal of harm to others  Plan:  Yulisa Freeman will return in 1 week for the next scheduled session  Between sessions, she  will keep in close touch with friends and family for support while she deals with this break up and will report back during the next session re: success and barriers  At the next session, this clinical will use supportive psychotherapy and client-centered therapy to address her anxiety, in an effort to assist Yulisa Freeman with meeting treatment goals  Behavioral Health Treatment Plan [facility]: Diagnosis and Treatment Plan explained to Cele Casillas relates understanding diagnosis and is agreeable to Treatment Plan  Yes     Virtual Regular Visit    Verification of patient location:    Patient is located in the following state in which I hold an active license [place]      Assessment/Plan:    Problem List Items Addressed This Visit        Other    ZAINA (generalized anxiety disorder)    Bipolar 2 disorder ([facility] [address] ) - Primary       Goals addressed in session: Goal 1          Reason for visit is   Chief Complaint   Patient presents with   • Virtual Regular Visit        Encounter provider Joaquin Tafoya LCSW    Provider located at [address] [place] [id number] [phone]      Recent Visits  Date Type Provider Dept   03/02/23 Telephone Joaquin Tafoya, [address] [place] [address] recent visits within past 7 days and meeting all other requirements  Today's Visits  Date Type Provider Dept   03/09/23 Telemedicine Joaquin Tafoya LCSW [facility]   Showing today's visits and meeting all other requirements  Future Appointments  No visits were found meeting these conditions  Showing future appointments within next 150 days and meeting all other requirements       The patient was identified by name and date of birth  Cr Umana was informed that this is a telemedicine visit and that the visit is being conducted throughthe Retrieve platform  She agrees to proceed     My office door was closed  No one else was in the room  She acknowledged consent and understanding of privacy and security of the video platform  The patient has agreed to participate and understands they can discontinue the visit at any time  Patient is aware this is a billable service  Subjective  Cr Umana is a 47 y o  female  HPI     Past Medical History:   Diagnosis Date   • Anxiety    • Anxiety    • Asthma    • Bipolar disorder [facility])    • Carpal tunnel syndrome    • Chronic pain     lower back   • Depression    • Disease of thyroid gland    • Dyslipidemia    • Fibromyalgia    • Irritable bowel    • Kidney stones    • Kidney stones 2019   • Migraine    • Obesity (BMI 30 0-34  9)    • Psychiatric disorder     depression/anxiety   • Suicide attempt [facility])     as teen   • Vitamin D deficiency        Past Surgical History:   Procedure Laterality Date   • BUNIONECTOMY     •  SECTION     • CHOLECYSTECTOMY     • PARTIAL HYSTERECTOMY      portion of  uterus still present   • TONSILLECTOMY     • TUBAL LIGATION         Current Outpatient Medications   Medication Sig Dispense Refill   • albuterol (2 5 mg/3 mL) 0 083 % nebulizer solution Take 3 mL (2 5 mg total) by nebulization every 6 (six) hours as needed for wheezing or shortness of breath 3 mL 0   • [place] Thyroid 60 MG tablet      • azelastine (ASTELIN) 0 1 % nasal spray 1 spray into each nostril 2 (two) times a day Use in each nostril as directed 30 mL 0   • benzonatate (TESSALON) 200 MG capsule Take 1 capsule (200 mg total) by mouth 3 (three) times a day as needed for cough 20 capsule 0   • butalbital-acetaminophen-caffeine (FIORICET,ESGIC) -40 mg per tablet      • Butalbital-APAP-Caffeine -40 MG CAPS      • cariprazine (Vraylar) 3 MG capsule Take 1 capsule (3 mg total) by mouth daily 30 capsule 2   • celecoxib (CELEBREX) 200 mg capsule Take 1 capsule (200 mg total) by mouth daily 30 capsule 2   • clonazePAM (KlonoPIN) 0 5 mg tablet Take 1 tablet (0 5 mg total) by mouth daily as needed for anxiety 30 tablet 2   • clotrimazole-betamethasone (LOTRISONE) 1-0 05 % cream      • cyclobenzaprine (FLEXERIL) 5 mg tablet      • dicyclomine (BENTYL) 10 mg capsule      • divalproex sodium (DEPAKOTE ER) 250 mg 24 hr tablet      • fremanezumab-vfrm (Ajovy) 225 MG/1 5ML auto-injector Inject 225 mg under the skin every 30 (thirty) days     • hydroxychloroquine (PLAQUENIL) 200 mg tablet      •  MG tablet      • ketoconazole (NIZORAL) 2 % cream      • lidocaine (LIDODERM) 5 % Apply 1 patch topically daily as needed (back pain) Remove & Discard patch within 12 hours or as directed by MD 6 patch 1   • loratadine (CLARITIN) 10 mg tablet Take 1 pill daily for allergies 90 tablet 1   • meclizine (ANTIVERT) 25 mg tablet Take 1 tablet (25 mg total) by mouth every 8 (eight) hours as needed for dizziness 30 tablet 1   • meloxicam (MOBIC) 15 mg tablet Take 1 tablet (15 mg total) by mouth daily 30 tablet 1   • metoclopramide (REGLAN) 5 mg tablet      • mometasone (ELOCON) 0 1 % cream Apply topically daily 45 g 0   • naratriptan (AMERGE) 2 5 MG tablet      • omeprazole (PriLOSEC) 40 MG capsule Take 1 capsule (40 mg total) by mouth daily 90 capsule 1   • ondansetron (Zofran ODT) 4 mg disintegrating tablet Take 1 tablet (4 mg total) by mouth every 6 (six) hours as needed for nausea or vomiting 20 tablet 0   • Ozempic, 1 MG/DOSE, 4 MG/3ML injection pen INJECT 0 75 ML (1 MG TOTAL) UNDER THE SKIN ONCE A WEEK 3 mL 2   • predniSONE 20 mg tablet Take 1 tablet (20 mg total) by mouth 2 (two) times a day with meals 10 tablet 0   • Respiratory Therapy Supplies (NEBULIZER) DONA by Does not apply route every 4 (four) hours while awake 90 each 1   • sucralfate (CARAFATE) 1 g tablet      • tiZANidine (ZANAFLEX) 4 mg tablet      • Topiramate  MG CP24 Take 100 mg by mouth 2 (two) times a day     • Ubrelvy 100 MG tablet      • Ventolin  (90 Base) MCG/ACT inhaler      • XIFAXAN 550 MG tablet        No current facility-administered medications for this visit  Allergies   Allergen Reactions   • Doxycycline Rash   • Erythromycin Rash   • Other Edema and Wheezing     jalapeno   • Latex Rash   • Duloxetine      Other reaction(s): Nausea, Loopy   • Eletriptan      Pain in lower jaw with pressure in throat   • Emgality [Galcanezumab-Gnlm]    • Galcanezumab      rash       Review of Systems    Video Exam    There were no vitals filed for this visit      Physical Exam     03/09/23  Start Time: 3482  Stop Time: 7411  Total Visit Time: 52 minutes

## 2023-03-15 ENCOUNTER — TELEPHONE (OUTPATIENT)
Dept: FAMILY MEDICINE CLINIC | Facility: CLINIC | Age: 53
End: 2023-03-15

## 2023-03-15 DIAGNOSIS — R63.5 WEIGHT GAIN: ICD-10-CM

## 2023-03-15 NOTE — TELEPHONE ENCOUNTER
Pt  Called in regards script for Ozempic and she needs her shot   If you can call it in to the [facility], [address]

## 2023-03-16 ENCOUNTER — TELEMEDICINE (OUTPATIENT)
Dept: PSYCHIATRY | Facility: CLINIC | Age: 53
End: 2023-03-16

## 2023-03-16 DIAGNOSIS — F41.1 GAD (GENERALIZED ANXIETY DISORDER): Primary | ICD-10-CM

## 2023-03-16 DIAGNOSIS — F31.81 BIPOLAR 2 DISORDER (HCC): ICD-10-CM

## 2023-03-16 NOTE — PSYCH
INDIVIDUAL SESSION NOTE     Length of time in session: 52 minutes, follow up in 1 week     Psychotherapy Provided: Individual      Diagnosis ICD-10-CM Associated Orders   1  ZAINA (generalized anxiety disorder)  F41 1       2  Bipolar 2 disorder ([facility] [address] )  F31 81              Goals addressed in session: Goal 1     Pain:      none    0    Current suicide risk:  minimal    Data: Met with Josh Naranjo for a scheduled individual session  Topics discussed included emotional wellness, coping skills, relationship with significant other, breakup/divorce and relationships with family  Virgilio Harvey is feeling "ok"  She stated she hadn't talked with her significant other when we spoke out our session last week; she has since spoken with him  Virgilio Harvey drove to where his hotel room was while talking to him on the phone; she told him she was going to stop short and stay at a hotel and they agreed to meet in the morning  She heard from him in the morning and she picked up to go for coffee  They talked things out a bit and came to the agreement that they'd work on their relationship  Virgilio Harvey does feel he tends to get very defensive and she told him every time they disagree, she doesn't want everything to fall apart  She understands he's struggled in his past relationships but also needs him to not hold her accountable to what others did to him in the past  Virgilio Harvey shows evidence of utilizing effective communication skills, engaging in problem solving and maintaining emotional composure to manage mental health symptoms  During this session, this clinical used the following therapeutic modalities supportive psychotherapy, client-centered therapy, stress management skills and problem solving skills  Assessment:  Virgilio Harvey presents with a normal mood  her affect is normal range and intensity, appropriate  Marthaniecyin Wes was oriented x3  She was focused and engaged  Virgilio Harvey exhibits good therapeutic rapport with this clinician    she continues to exhibit willingness to work on treatment goals and objectives  Sandra Coy presents with a minimal risk of suicide, minimal risk of self-harm and minimal of harm to others  Plan:  Sandra Coy will return in 1 week for the next scheduled session  Between sessions, she  will continue to communicate with her significant other and see things from his point of view while also telling him what she needs from him and will report back during the next session re: success and barriers  At the next session, this clinical will use supportive psychotherapy and client-centered therapy to address her anxiety, in an effort to assist Sandra Coy with meeting treatment goals  Behavioral Health Treatment Plan [facility]: Diagnosis and Treatment Plan explained to Renetta Moe relates understanding diagnosis and is agreeable to Treatment Plan   Yes     Virtual Regular Visit    Verification of patient location:    Patient is located in the following state in which I hold an active license [place]      Assessment/Plan:    Problem List Items Addressed This Visit        Other    ZAINA (generalized anxiety disorder) - Primary    Bipolar 2 disorder ([facility] [address] )       Goals addressed in session: Goal 1          Reason for visit is   Chief Complaint   Patient presents with   • Virtual Regular Visit        Encounter provider Amelia Meléndez LCSW    Provider located at [address] [place] [id number] [phone]      Recent Visits  Date Type Provider Dept   03/15/23 Telephone Idalia Gaona, [facility] [phone] [address]   03/09/23 Shobha Rodriguez, [facility]   Showing recent visits within past 7 days and meeting all other requirements  Today's Visits  Date Type Provider Dept   03/16/23 Telemedicine Amelia Meléndez LCSW [facility]   Showing today's visits and meeting all other requirements  Future Appointments  No visits were found meeting these conditions  Showing future appointments within next 150 days and meeting all other requirements       The patient was identified by name and date of birth  Servando Calderon was informed that this is a telemedicine visit and that the visit is being conducted throughthe R&V platform  She agrees to proceed     My office door was closed  No one else was in the room  She acknowledged consent and understanding of privacy and security of the video platform  The patient has agreed to participate and understands they can discontinue the visit at any time  Patient is aware this is a billable service  Subjective  Servando Calderon is a 47 y o  female  HPI     Past Medical History:   Diagnosis Date   • Anxiety    • Anxiety    • Asthma    • Bipolar disorder [facility])    • Carpal tunnel syndrome    • Chronic pain     lower back   • Depression    • Disease of thyroid gland    • Dyslipidemia    • Fibromyalgia    • Irritable bowel    • Kidney stones    • Kidney stones 2019   • Migraine    • Obesity (BMI 30 0-34  9)    • Psychiatric disorder     depression/anxiety   • Suicide attempt [facility])     as teen   • Vitamin D deficiency        Past Surgical History:   Procedure Laterality Date   • BUNIONECTOMY     •  SECTION     • CHOLECYSTECTOMY     • PARTIAL HYSTERECTOMY      portion of  uterus still present   • TONSILLECTOMY     • TUBAL LIGATION         Current Outpatient Medications   Medication Sig Dispense Refill   • semaglutide, 1 mg/dose, (Ozempic, 1 MG/DOSE,) 4 mg/3 mL injection pen Inject 0 75 mL (1 mg total) under the skin once a week 3 mL 0   • albuterol (2 5 mg/3 mL) 0 083 % nebulizer solution Take 3 mL (2 5 mg total) by nebulization every 6 (six) hours as needed for wheezing or shortness of breath 3 mL 0   • [place] Thyroid 60 MG tablet      • azelastine (ASTELIN) 0 1 % nasal spray 1 spray into each nostril 2 (two) times a day Use in each nostril as directed 30 mL 0   • benzonatate (TESSALON) 200 MG capsule Take 1 capsule (200 mg total) by mouth 3 (three) times a day as needed for cough 20 capsule 0   • butalbital-acetaminophen-caffeine (FIORICET,ESGIC) -40 mg per tablet      • Butalbital-APAP-Caffeine -40 MG CAPS      • celecoxib (CELEBREX) 200 mg capsule Take 1 capsule (200 mg total) by mouth daily 30 capsule 2   • clonazePAM (KlonoPIN) 0 5 mg tablet Take 1 tablet (0 5 mg total) by mouth daily as needed for anxiety 30 tablet 2   • clotrimazole-betamethasone (LOTRISONE) 1-0 05 % cream      • cyclobenzaprine (FLEXERIL) 5 mg tablet      • dicyclomine (BENTYL) 10 mg capsule      • divalproex sodium (DEPAKOTE ER) 250 mg 24 hr tablet      • fremanezumab-vfrm (Ajovy) 225 MG/1 5ML auto-injector Inject 225 mg under the skin every 30 (thirty) days     • hydroxychloroquine (PLAQUENIL) 200 mg tablet      •  MG tablet      • ketoconazole (NIZORAL) 2 % cream      • lidocaine (LIDODERM) 5 % Apply 1 patch topically daily as needed (back pain) Remove & Discard patch within 12 hours or as directed by MD Castro patch 1   • loratadine (CLARITIN) 10 mg tablet Take 1 pill daily for allergies 90 tablet 1   • meclizine (ANTIVERT) 25 mg tablet Take 1 tablet (25 mg total) by mouth every 8 (eight) hours as needed for dizziness 30 tablet 1   • meloxicam (MOBIC) 15 mg tablet Take 1 tablet (15 mg total) by mouth daily 30 tablet 1   • metoclopramide (REGLAN) 5 mg tablet      • mometasone (ELOCON) 0 1 % cream Apply topically daily 45 g 0   • naratriptan (AMERGE) 2 5 MG tablet      • omeprazole (PriLOSEC) 40 MG capsule Take 1 capsule (40 mg total) by mouth daily 90 capsule 1   • ondansetron (Zofran ODT) 4 mg disintegrating tablet Take 1 tablet (4 mg total) by mouth every 6 (six) hours as needed for nausea or vomiting 20 tablet 0   • predniSONE 20 mg tablet Take 1 tablet (20 mg total) by mouth 2 (two) times a day with meals 10 tablet 0   • Respiratory Therapy Supplies (NEBULIZER) DONA by Does not apply route every 4 (four) hours while awake 90 each 1   • sucralfate (CARAFATE) 1 g tablet      • tiZANidine (ZANAFLEX) 4 mg tablet      • Topiramate  MG CP24 Take 100 mg by mouth 2 (two) times a day     • Ubrelvy 100 MG tablet      • Ventolin  (90 Base) MCG/ACT inhaler      • Vraylar 3 MG capsule TAKE 1 CAPSULE (3 MG TOTAL) BY MOUTH DAILY 30 capsule 0   • XIFAXAN 550 MG tablet        No current facility-administered medications for this visit  Allergies   Allergen Reactions   • Doxycycline Rash   • Erythromycin Rash   • Other Edema and Wheezing     jalapeno   • Latex Rash   • Duloxetine      Other reaction(s): Nausea, Loopy   • Eletriptan      Pain in lower jaw with pressure in throat   • Emgality [Galcanezumab-Gnlm]    • Galcanezumab      rash       Review of Systems    Video Exam    There were no vitals filed for this visit      Physical Exam     03/16/23  Start Time: 0374  Stop Time: 5725  Total Visit Time: 52 minutes

## 2023-03-19 ENCOUNTER — OFFICE VISIT (OUTPATIENT)
Dept: URGENT CARE | Facility: CLINIC | Age: 53
End: 2023-03-19

## 2023-03-19 VITALS
TEMPERATURE: 98.2 F | WEIGHT: 142 LBS | BODY MASS INDEX: 25.15 KG/M2 | OXYGEN SATURATION: 99 % | HEART RATE: 82 BPM | RESPIRATION RATE: 16 BRPM

## 2023-03-19 DIAGNOSIS — J02.9 SORE THROAT: ICD-10-CM

## 2023-03-19 DIAGNOSIS — J06.9 ACUTE URI: Primary | ICD-10-CM

## 2023-03-19 LAB — S PYO AG THROAT QL: NEGATIVE

## 2023-03-19 RX ORDER — AMOXICILLIN 500 MG/1
500 CAPSULE ORAL EVERY 12 HOURS SCHEDULED
Qty: 14 CAPSULE | Refills: 0 | Status: SHIPPED | OUTPATIENT
Start: 2023-03-19 | End: 2023-03-26

## 2023-03-19 NOTE — PROGRESS NOTES
[facility]        NAME: Keara Koch is a 47 y o  female  : 1970    MRN: [id number]  DATE: 2023  TIME: 11:47 AM    Assessment and Plan   Acute URI [J06 9]  1  Acute URI  amoxicillin (AMOXIL) 500 mg capsule      2  Sore throat  POCT rapid strepA            Patient Instructions       Follow up with PCP in 3-5 days  Proceed to  ER if symptoms worsen  Chief Complaint     Chief Complaint   Patient presents with   • Sore Throat     Sore throat 4 days fever  Tylenol, Dayquil, Nyquil  Ear pain, swollen tonsil Lside         History of Present Illness       Patient is a 46 yo female who presents for evaluation of sore throat, cough, nasal congestion, PND x 6 days  States she is producing lots of green mucus  She has been taking Tylenol and Dayquil/Nyquil without relief  No SOB or CP  Review of Systems   Review of Systems   Constitutional: Negative for activity change, appetite change and fever  HENT: Positive for congestion, postnasal drip and sore throat  Negative for trouble swallowing and voice change  Respiratory: Positive for cough  Negative for shortness of breath  Cardiovascular: Negative for chest pain  Gastrointestinal: Negative for abdominal pain, diarrhea, nausea and vomiting  Musculoskeletal: Negative for arthralgias and myalgias  Neurological: Negative for headaches           Current Medications       Current Outpatient Medications:   •  albuterol (2 5 mg/3 mL) 0 083 % nebulizer solution, Take 3 mL (2 5 mg total) by nebulization every 6 (six) hours as needed for wheezing or shortness of breath, Disp: 3 mL, Rfl: 0  •  amoxicillin (AMOXIL) 500 mg capsule, Take 1 capsule (500 mg total) by mouth every 12 (twelve) hours for 7 days, Disp: 14 capsule, Rfl: 0  •  [place] Thyroid 60 MG tablet, , Disp: , Rfl:   •  azelastine (ASTELIN) 0 1 % nasal spray, 1 spray into each nostril 2 (two) times a day Use in each nostril as directed, Disp: 30 mL, Rfl: 0  • butalbital-acetaminophen-caffeine (FIORICET,ESGIC) -40 mg per tablet, , Disp: , Rfl:   •  Butalbital-APAP-Caffeine -40 MG CAPS, , Disp: , Rfl:   •  celecoxib (CELEBREX) 200 mg capsule, Take 1 capsule (200 mg total) by mouth daily, Disp: 30 capsule, Rfl: 2  •  cyclobenzaprine (FLEXERIL) 5 mg tablet, , Disp: , Rfl:   •  dicyclomine (BENTYL) 10 mg capsule, , Disp: , Rfl:   •  divalproex sodium (DEPAKOTE ER) 250 mg 24 hr tablet, , Disp: , Rfl:   •  fremanezumab-vfrm (Ajovy) 225 MG/1 5ML auto-injector, Inject 225 mg under the skin every 30 (thirty) days, Disp: , Rfl:   •  hydroxychloroquine (PLAQUENIL) 200 mg tablet, , Disp: , Rfl:   •  ketoconazole (NIZORAL) 2 % cream, , Disp: , Rfl:   •  lidocaine (LIDODERM) 5 %, Apply 1 patch topically daily as needed (back pain) Remove & Discard patch within 12 hours or as directed by MD, Disp: 6 patch, Rfl: 1  •  loratadine (CLARITIN) 10 mg tablet, Take 1 pill daily for allergies, Disp: 90 tablet, Rfl: 1  •  meclizine (ANTIVERT) 25 mg tablet, Take 1 tablet (25 mg total) by mouth every 8 (eight) hours as needed for dizziness, Disp: 30 tablet, Rfl: 1  •  meloxicam (MOBIC) 15 mg tablet, Take 1 tablet (15 mg total) by mouth daily, Disp: 30 tablet, Rfl: 1  •  metoclopramide (REGLAN) 5 mg tablet, , Disp: , Rfl:   •  mometasone (ELOCON) 0 1 % cream, Apply topically daily, Disp: 45 g, Rfl: 0  •  naratriptan (AMERGE) 2 5 MG tablet, , Disp: , Rfl:   •  omeprazole (PriLOSEC) 40 MG capsule, Take 1 capsule (40 mg total) by mouth daily, Disp: 90 capsule, Rfl: 1  •  ondansetron (Zofran ODT) 4 mg disintegrating tablet, Take 1 tablet (4 mg total) by mouth every 6 (six) hours as needed for nausea or vomiting, Disp: 20 tablet, Rfl: 0  •  semaglutide, 1 mg/dose, (Ozempic, 1 MG/DOSE,) 4 mg/3 mL injection pen, Inject 0 75 mL (1 mg total) under the skin once a week, Disp: 3 mL, Rfl: 0  •  tiZANidine (ZANAFLEX) 4 mg tablet, , Disp: , Rfl:   •  Topiramate  MG CP24, Take 100 mg by mouth 2 (two) times a day, Disp: , Rfl:   •  Ubrelvy 100 MG tablet, , Disp: , Rfl:   •  Ventolin  (90 Base) MCG/ACT inhaler, , Disp: , Rfl:   •  Vraylar 3 MG capsule, TAKE 1 CAPSULE (3 MG TOTAL) BY MOUTH DAILY, Disp: 30 capsule, Rfl: 0  •  benzonatate (TESSALON) 200 MG capsule, Take 1 capsule (200 mg total) by mouth 3 (three) times a day as needed for cough (Patient not taking: Reported on 3/19/2023), Disp: 20 capsule, Rfl: 0  •  clonazePAM (KlonoPIN) 0 5 mg tablet, Take 1 tablet (0 5 mg total) by mouth daily as needed for anxiety, Disp: 30 tablet, Rfl: 2  •  clotrimazole-betamethasone (LOTRISONE) 1-0 05 % cream, , Disp: , Rfl:   •   MG tablet, , Disp: , Rfl:   •  predniSONE 20 mg tablet, Take 1 tablet (20 mg total) by mouth 2 (two) times a day with meals (Patient not taking: Reported on 3/19/2023), Disp: 10 tablet, Rfl: 0  •  Respiratory Therapy Supplies (NEBULIZER) DONA, by Does not apply route every 4 (four) hours while awake, Disp: 90 each, Rfl: 1  •  sucralfate (CARAFATE) 1 g tablet, , Disp: , Rfl:   •  XIFAXAN 550 MG tablet, , Disp: , Rfl:     Current Allergies     Allergies as of 03/19/2023 - Reviewed 03/19/2023   Allergen Reaction Noted   • Doxycycline Rash    • Erythromycin Rash 06/08/2018   • Other Edema and Wheezing 04/14/2008   • Latex Rash 04/27/2015   • Duloxetine  09/26/2020   • Eletriptan  07/31/2017   • Emgality [galcanezumab-gnlm]  11/13/2020   • Ngozi Speed  10/06/2020            The following portions of the patient's history were reviewed and updated as appropriate: allergies, current medications, past family history, past medical history, past social history, past surgical history and problem list      Past Medical History:   Diagnosis Date   • Anxiety    • Anxiety    • Asthma    • Bipolar disorder ([facility] [address] )    • Carpal tunnel syndrome    • Chronic pain     lower back   • Depression    • Disease of thyroid gland    • Dyslipidemia    • Fibromyalgia    • Irritable bowel    • Kidney stones    • Kidney stones 2019   • Migraine    • Obesity (BMI 30 0-34  9)    • Psychiatric disorder     depression/anxiety   • Suicide attempt [facility])     as teen   • Vitamin D deficiency        Past Surgical History:   Procedure Laterality Date   • BUNIONECTOMY     •  SECTION     • CHOLECYSTECTOMY     • PARTIAL HYSTERECTOMY      portion of  uterus still present   • TONSILLECTOMY     • TUBAL LIGATION         Family History   Problem Relation Age of Onset   • Hypertension Mother    • Diabetes Mother    • Hypothyroidism Mother    • Stroke Mother    • Bipolar disorder Mother    • Anxiety disorder Mother    • Arthritis Mother    • Depression Mother    • Mental illness Mother    • Cancer Father         pancreatic    • Hypothyroidism Sister    • Hypertension Brother    • Heart disease Brother    • Cancer Maternal Uncle         lung   • Cancer Paternal Aunt         breast   • Breast cancer Paternal Aunt    • Cancer Paternal Uncle         testicular    • Cancer Paternal Grandmother         breast   • Dementia Paternal Grandmother    • Breast cancer Paternal Grandmother    • Cancer Cousin         brain   • Bipolar disorder Daughter          Medications have been verified  Objective   Pulse 82   Temp 98 2 °F (36 8 °C)   Resp 16   Wt 64 4 kg (142 lb)   SpO2 99%   BMI 25 15 kg/m²        Physical Exam     Physical Exam  Constitutional:       General: She is not in acute distress  Appearance: Normal appearance  She is not ill-appearing or diaphoretic  HENT:      Right Ear: Tympanic membrane, ear canal and external ear normal       Left Ear: Tympanic membrane, ear canal and external ear normal       Nose: Congestion present  Mouth/Throat:      Mouth: Mucous membranes are moist       Pharynx: Uvula midline  Posterior oropharyngeal erythema present  Eyes:      Conjunctiva/sclera: Conjunctivae normal    Cardiovascular:      Rate and Rhythm: Normal rate and regular rhythm  Heart sounds: Normal heart sounds  Pulmonary:      Effort: Pulmonary effort is normal       Breath sounds: Normal breath sounds  Skin:     General: Skin is warm and dry  Neurological:      Mental Status: She is alert     Psychiatric:         Mood and Affect: Mood normal          Behavior: Behavior normal

## 2023-03-20 DIAGNOSIS — R63.5 WEIGHT GAIN: ICD-10-CM

## 2023-03-21 DIAGNOSIS — Z12.11 SCREEN FOR COLON CANCER: Primary | ICD-10-CM

## 2023-03-21 DIAGNOSIS — U09.9 COVID-19 LONG HAULER: ICD-10-CM

## 2023-03-21 DIAGNOSIS — M94.0 COSTOCHONDRITIS: ICD-10-CM

## 2023-03-21 RX ORDER — MELOXICAM 15 MG/1
15 TABLET ORAL DAILY
Qty: 30 TABLET | Refills: 0 | Status: SHIPPED | OUTPATIENT
Start: 2023-03-21

## 2023-03-21 RX ORDER — SEMAGLUTIDE 1.34 MG/ML
INJECTION, SOLUTION SUBCUTANEOUS
Qty: 3 ML | Refills: 2 | Status: SHIPPED | OUTPATIENT
Start: 2023-03-21

## 2023-03-23 ENCOUNTER — TELEMEDICINE (OUTPATIENT)
Dept: PSYCHIATRY | Facility: CLINIC | Age: 53
End: 2023-03-23

## 2023-03-23 DIAGNOSIS — F31.81 BIPOLAR 2 DISORDER (HCC): ICD-10-CM

## 2023-03-23 DIAGNOSIS — F41.1 GAD (GENERALIZED ANXIETY DISORDER): Primary | ICD-10-CM

## 2023-03-30 ENCOUNTER — TELEMEDICINE (OUTPATIENT)
Dept: PSYCHIATRY | Facility: CLINIC | Age: 53
End: 2023-03-30

## 2023-03-30 DIAGNOSIS — F41.1 GAD (GENERALIZED ANXIETY DISORDER): ICD-10-CM

## 2023-03-30 DIAGNOSIS — F31.81 BIPOLAR 2 DISORDER (HCC): Primary | ICD-10-CM

## 2023-03-30 NOTE — PSYCH
"INDIVIDUAL SESSION NOTE     Length of time in session: 52 minutes, follow up in 1 week     Psychotherapy Provided: Individual      Diagnosis ICD-10-CM Associated Orders   1  Bipolar 2 disorder (HCC)  F31 81       2  ZAINA (generalized anxiety disorder)  F41 1              Goals addressed in session: Goal 1     Pain:      none    0    Current suicide risk:  minimal    Data: Met with Cindy Goodwin for a scheduled individual session  Topics discussed included emotional wellness, coping skills, relationship with significant other, relationships with family and physical health concerns  Justice Ash is feeling \"about 90% better\"  She stated she applied for a medical marijuana card; she's tired of taking pills and being in pain so she's hoping this will help  Justice Ash talked about her fiance for a bit; they continue to work on their communication  She also spoke about her daughters and her grandson  Justcie Ash feels overall she's doing ok; she feels discouraged by her pain level but tries to push through the best she can  Justice Ash shows evidence of utilizing effective communication skills, engaging in problem solving, maintaining emotional composure and effectively managing anger to manage mental health symptoms  During this session, this clinical used the following therapeutic modalities supportive psychotherapy, client-centered therapy, stress management skills and problem solving skills  Assessment:  Justice Ash presents with a normal mood  her affect is normal range and intensity, appropriate  Justice Ash was oriented x3  She was focused and engaged  Justice Ash exhibits good therapeutic rapport with this clinician  she continues to exhibit willingness to work on treatment goals and objectives  Justice Ash presents with a minimal risk of suicide, minimal risk of self-harm and minimal of harm to others  Plan:  Justice Ash will return in 1 week for the next scheduled session    At the next session, this clinical will use supportive " psychotherapy and client-centered therapy to address her anxiety and depression, in an effort to [address] with meeting treatment goals  Behavioral Health Treatment Plan [facility] the [facility]: Diagnosis and Treatment Plan explained to Dipesh Ramirez relates understanding diagnosis and is agreeable to Treatment Plan  Yes     Virtual Regular Visit    Verification of patient location:    Patient is located in the following state in which I hold an active license [place]      Assessment/Plan:    Problem List Items Addressed This Visit        Other    ZAINA (generalized anxiety disorder)    Bipolar 2 disorder ([facility] ) - Primary       Goals addressed in session: Goal 1          Reason for visit is   Chief Complaint   Patient presents with   • Virtual Regular Visit        Encounter provider ALBERT Fowler    Provider located at [address] [place] [id number] [phone]      Recent Visits  No visits were found meeting these conditions  Showing recent visits within past 7 days and meeting all other requirements  Future Appointments  No visits were found meeting these conditions  Showing future appointments within next 150 days and meeting all other requirements       The patient was identified by name and date of birth  Ren Park was informed that this is a telemedicine visit and that the visit is being conducted throughthe [employer] platform  She agrees to proceed     My office door was closed  No one else was in the room  She acknowledged consent and understanding of privacy and security of the video platform  The patient has agreed to participate and understands they can discontinue the visit at any time  Patient is aware this is a billable service  Subjective  Ren Park is a 47 y o  female        HPI     Past Medical History:   Diagnosis Date   • Anxiety    • Anxiety    • Asthma    • Bipolar disorder ([facility] ) • Carpal tunnel syndrome    • Chronic pain     lower back   • Depression    • Disease of thyroid gland    • Dyslipidemia    • Fibromyalgia    • Irritable bowel    • Kidney stones    • Kidney stones 2019   • Migraine    • Obesity (BMI 30 0-34  9)    • Psychiatric disorder     depression/anxiety   • Suicide attempt [facility])     as teen   • Vitamin D deficiency        Past Surgical History:   Procedure Laterality Date   • BUNIONECTOMY     •  SECTION     • CHOLECYSTECTOMY     • PARTIAL HYSTERECTOMY      portion of  uterus still present   • TONSILLECTOMY     • TUBAL LIGATION         Current Outpatient Medications   Medication Sig Dispense Refill   • meloxicam (MOBIC) 15 mg tablet TAKE 1 TABLET (15 MG TOTAL) BY MOUTH DAILY 30 tablet 0   • albuterol (2 5 mg/3 mL) 0 083 % nebulizer solution Take 3 mL (2 5 mg total) by nebulization every 6 (six) hours as needed for wheezing or shortness of breath 3 mL 0   • [place] Thyroid 60 MG tablet      • azelastine (ASTELIN) 0 1 % nasal spray 1 spray into each nostril 2 (two) times a day Use in each nostril as directed 30 mL 0   • benzonatate (TESSALON) 200 MG capsule Take 1 capsule (200 mg total) by mouth 3 (three) times a day as needed for cough (Patient not taking: Reported on 3/19/2023) 20 capsule 0   • butalbital-acetaminophen-caffeine (FIORICET,ESGIC) -40 mg per tablet      • Butalbital-APAP-Caffeine -40 MG CAPS      • celecoxib (CELEBREX) 200 mg capsule Take 1 capsule (200 mg total) by mouth daily 30 capsule 2   • clonazePAM (KlonoPIN) 0 5 mg tablet Take 1 tablet (0 5 mg total) by mouth daily as needed for anxiety 30 tablet 2   • clotrimazole-betamethasone (LOTRISONE) 1-0 05 % cream  (Patient not taking: Reported on 3/19/2023)     • cyclobenzaprine (FLEXERIL) 5 mg tablet      • dicyclomine (BENTYL) 10 mg capsule      • divalproex sodium (DEPAKOTE ER) 250 mg 24 hr tablet      • fremanezumab-vfrm (Ajovy) 225 MG/1 5ML auto-injector Inject 225 mg under the skin every 30 (thirty) days     • hydroxychloroquine (PLAQUENIL) 200 mg tablet      •  MG tablet  (Patient not taking: Reported on 3/19/2023)     • ketoconazole (NIZORAL) 2 % cream      • lidocaine (LIDODERM) 5 % Apply 1 patch topically daily as needed (back pain) Remove & Discard patch within 12 hours or as directed by MD Castro patch 1   • loratadine (CLARITIN) 10 mg tablet Take 1 pill daily for allergies 90 tablet 1   • meclizine (ANTIVERT) 25 mg tablet Take 1 tablet (25 mg total) by mouth every 8 (eight) hours as needed for dizziness 30 tablet 1   • metoclopramide (REGLAN) 5 mg tablet      • mometasone (ELOCON) 0 1 % cream Apply topically daily 45 g 0   • naratriptan (AMERGE) 2 5 MG tablet      • omeprazole (PriLOSEC) 40 MG capsule Take 1 capsule (40 mg total) by mouth daily 90 capsule 1   • ondansetron (Zofran ODT) 4 mg disintegrating tablet Take 1 tablet (4 mg total) by mouth every 6 (six) hours as needed for nausea or vomiting 20 tablet 0   • Ozempic, 1 MG/DOSE, 4 MG/3ML injection pen INJECT 0 75 ML (1 MG TOTAL) UNDER THE SKIN ONCE A WEEK 3 mL 2   • predniSONE 20 mg tablet Take 1 tablet (20 mg total) by mouth 2 (two) times a day with meals (Patient not taking: Reported on 3/19/2023) 10 tablet 0   • Respiratory Therapy Supplies (NEBULIZER) DONA by Does not apply route every 4 (four) hours while awake 90 each 1   • sucralfate (CARAFATE) 1 g tablet  (Patient not taking: Reported on 3/19/2023)     • tiZANidine (ZANAFLEX) 4 mg tablet      • Topiramate  MG CP24 Take 100 mg by mouth 2 (two) times a day     • Ubrelvy 100 MG tablet      • Ventolin  (90 Base) MCG/ACT inhaler      • Vraylar 3 MG capsule TAKE 1 CAPSULE (3 MG TOTAL) BY MOUTH DAILY 30 capsule 0   • XIFAXAN 550 MG tablet        No current facility-administered medications for this visit          Allergies   Allergen Reactions   • Doxycycline Rash   • Erythromycin Rash   • Other Edema and Wheezing     carmenlapeno   • Latex Rash   • Duloxetine Other reaction(s): Nausea, Loopy   • Eletriptan      Pain in lower jaw with pressure in throat   • Emgality [Galcanezumab-Gnlm]    • Galcanezumab      rash       Review of Systems    Video Exam    There were no vitals filed for this visit      Physical Exam     03/30/23  Start Time: 4418  Stop Time: 4050  Total Visit Time: 52 minutes

## 2023-03-31 ENCOUNTER — APPOINTMENT (OUTPATIENT)
Dept: LAB | Facility: CLINIC | Age: 53
End: 2023-03-31

## 2023-03-31 DIAGNOSIS — R09.82 PND (POST-NASAL DRIP): ICD-10-CM

## 2023-04-02 LAB — COLOGUARD RESULT REPORTABLE: NEGATIVE

## 2023-04-06 ENCOUNTER — TELEMEDICINE (OUTPATIENT)
Dept: PSYCHIATRY | Facility: CLINIC | Age: 53
End: 2023-04-06

## 2023-04-06 ENCOUNTER — TELEPHONE (OUTPATIENT)
Dept: FAMILY MEDICINE CLINIC | Facility: CLINIC | Age: 53
End: 2023-04-06

## 2023-04-06 DIAGNOSIS — F31.81 BIPOLAR 2 DISORDER (HCC): ICD-10-CM

## 2023-04-06 DIAGNOSIS — F41.1 GAD (GENERALIZED ANXIETY DISORDER): Primary | ICD-10-CM

## 2023-04-06 NOTE — PSYCH
"INDIVIDUAL SESSION NOTE     Length of time in session: 52 minutes, follow up in 1 week     Psychotherapy Provided: Individual      Diagnosis ICD-10-CM Associated Orders   1  ZAINA (generalized anxiety disorder)  F41 1       2  Bipolar 2 disorder ([facility] )  F31 81              Goals addressed in session: Goal 1     Pain:      none    0    Current suicide risk:  minimal    Data: Met with Joseph Gan for a scheduled individual session  Topics discussed included emotional wellness, coping skills and relationship with significant other  Adriana Singh is feeling \"good\"  We spoke about her fiance most of the session and their relationship  We talked through some of their ups and downs and role played some scenarios to try to resolve certain conflicts between them  Adriana Singh shows evidence of utilizing effective communication skills and maintaining emotional composure to manage mental health symptoms  During this session, this clinical used the following therapeutic modalities supportive psychotherapy, client-centered therapy, stress management skills and problem solving skills  Assessment:  Adriana Singh presents with a normal mood  her affect is normal range and intensity, appropriate  Adriana Singh was oriented x3  She was focused and engaged  Adriana Singh exhibits good therapeutic rapport with this clinician  she continues to exhibit willingness to work on treatment goals and objectives  Adriana Singh presents with a minimal risk of suicide, minimal risk of self-harm and minimal of harm to others  Plan:  Adriana Singh will return in 1 week for the next scheduled session  At the next session, this clinical will use supportive psychotherapy and client-centered therapy to address her anxiety, in an effort to assist Adriana Singh with meeting treatment goals  Behavioral Health Treatment Plan [facility]: Diagnosis and Treatment Plan explained to Sonya Yen relates understanding diagnosis and is agreeable to Treatment Plan   Yes     Virtual Regular " Visit    Verification of patient location:    Patient is located in the following state in which I hold an active license [place]      Assessment/Plan:    Problem List Items Addressed This Visit        Other    ZAINA (generalized anxiety disorder) - Primary    Bipolar 2 disorder ([facility] )       Goals addressed in session: Goal 1          Reason for visit is   Chief Complaint   Patient presents with   • Virtual Regular Visit        Encounter provider Mercy Pena LCSW    Provider located at [address] [place] [id number] [phone]      Recent Visits  Date Type Provider Dept   03/30/23 Telemedicine Mercy Pena LCSW [facility]   Showing recent visits within past 7 days and meeting all other requirements  Today's Visits  Date Type Provider Dept   04/06/23 Telemedicine Mercy Pena LCSW [facility]   Showing today's visits and meeting all other requirements  Future Appointments  No visits were found meeting these conditions  Showing future appointments within next 150 days and meeting all other requirements       The patient was identified by name and date of birth  Fran Pickard was informed that this is a telemedicine visit and that the visit is being conducted throughthe [employer] platform  She agrees to proceed     My office door was closed  No one else was in the room  She acknowledged consent and understanding of privacy and security of the video platform  The patient has agreed to participate and understands they can discontinue the visit at any time  Patient is aware this is a billable service  Subjective  Fran Pickard is a 47 y o  female        HPI     Past Medical History:   Diagnosis Date   • Anxiety    • Anxiety    • Asthma    • Bipolar disorder ([facility] )    • Carpal tunnel syndrome    • Chronic pain     lower back   • Depression    • Disease of thyroid gland    • Dyslipidemia    • Fibromyalgia    • Irritable bowel    • Kidney stones    • Kidney stones 2019   • Migraine    • Obesity (BMI 30 0-34  9)    • Psychiatric disorder     depression/anxiety   • Suicide attempt [facility])     as teen   • Vitamin D deficiency        Past Surgical History:   Procedure Laterality Date   • BUNIONECTOMY     •  SECTION     • CHOLECYSTECTOMY     • PARTIAL HYSTERECTOMY      portion of  uterus still present   • TONSILLECTOMY     • TUBAL LIGATION         Current Outpatient Medications   Medication Sig Dispense Refill   • meloxicam (MOBIC) 15 mg tablet TAKE 1 TABLET (15 MG TOTAL) BY MOUTH DAILY 30 tablet 0   • albuterol (2 5 mg/3 mL) 0 083 % nebulizer solution Take 3 mL (2 5 mg total) by nebulization every 6 (six) hours as needed for wheezing or shortness of breath 3 mL 0   • [place] Thyroid 60 MG tablet      • azelastine (ASTELIN) 0 1 % nasal spray 1 spray into each nostril 2 (two) times a day Use in each nostril as directed 30 mL 0   • benzonatate (TESSALON) 200 MG capsule Take 1 capsule (200 mg total) by mouth 3 (three) times a day as needed for cough (Patient not taking: Reported on 3/19/2023) 20 capsule 0   • butalbital-acetaminophen-caffeine (FIORICET,ESGIC) -40 mg per tablet      • Butalbital-APAP-Caffeine -40 MG CAPS      • celecoxib (CELEBREX) 200 mg capsule Take 1 capsule (200 mg total) by mouth daily 30 capsule 2   • clonazePAM (KlonoPIN) 0 5 mg tablet Take 1 tablet (0 5 mg total) by mouth daily as needed for anxiety 30 tablet 2   • clotrimazole-betamethasone (LOTRISONE) 1-0 05 % cream  (Patient not taking: Reported on 3/19/2023)     • cyclobenzaprine (FLEXERIL) 5 mg tablet      • dicyclomine (BENTYL) 10 mg capsule      • divalproex sodium (DEPAKOTE ER) 250 mg 24 hr tablet      • fremanezumab-vfrm (Ajovy) 225 MG/1 5ML auto-injector Inject 225 mg under the skin every 30 (thirty) days     • hydroxychloroquine (PLAQUENIL) 200 mg tablet      •  MG tablet  (Patient not taking: Reported on 3/19/2023)     • ketoconazole (NIZORAL) 2 % cream      • lidocaine (LIDODERM) 5 % Apply 1 patch topically daily as needed (back pain) Remove & Discard patch within 12 hours or as directed by MD 6 patch 1   • loratadine (CLARITIN) 10 mg tablet Take 1 pill daily for allergies 90 tablet 1   • meclizine (ANTIVERT) 25 mg tablet Take 1 tablet (25 mg total) by mouth every 8 (eight) hours as needed for dizziness 30 tablet 1   • metoclopramide (REGLAN) 5 mg tablet      • mometasone (ELOCON) 0 1 % cream Apply topically daily 45 g 0   • naratriptan (AMERGE) 2 5 MG tablet      • omeprazole (PriLOSEC) 40 MG capsule Take 1 capsule (40 mg total) by mouth daily 90 capsule 1   • ondansetron (Zofran ODT) 4 mg disintegrating tablet Take 1 tablet (4 mg total) by mouth every 6 (six) hours as needed for nausea or vomiting 20 tablet 0   • Ozempic, 1 MG/DOSE, 4 MG/3ML injection pen INJECT 0 75 ML (1 MG TOTAL) UNDER THE SKIN ONCE A WEEK 3 mL 2   • predniSONE 20 mg tablet Take 1 tablet (20 mg total) by mouth 2 (two) times a day with meals (Patient not taking: Reported on 3/19/2023) 10 tablet 0   • Respiratory Therapy Supplies (NEBULIZER) DONA by Does not apply route every 4 (four) hours while awake 90 each 1   • sucralfate (CARAFATE) 1 g tablet  (Patient not taking: Reported on 3/19/2023)     • tiZANidine (ZANAFLEX) 4 mg tablet      • Topiramate  MG CP24 Take 100 mg by mouth 2 (two) times a day     • Ubrelvy 100 MG tablet      • Ventolin  (90 Base) MCG/ACT inhaler      • Vraylar 3 MG capsule TAKE 1 CAPSULE (3 MG TOTAL) BY MOUTH DAILY 30 capsule 0   • XIFAXAN 550 MG tablet        No current facility-administered medications for this visit          Allergies   Allergen Reactions   • Doxycycline Rash   • Erythromycin Rash   • Other Edema and Wheezing     jalapeno   • Latex Rash   • Duloxetine      Other reaction(s): Nausea, Loopy   • Eletriptan      Pain in lower jaw with pressure in throat • Emgality [Galcanezumab-Gnlm]    • Galcanezumab      rash       Review of Systems    Video Exam    There were no vitals filed for this visit      Physical Exam     04/06/23  Start Time: 0800  Stop Time: 7057  Total Visit Time: 52 minutes

## 2023-04-06 NOTE — TELEPHONE ENCOUNTER
Pt left a message to call her back in regards of her lab results  I called the Pt back, no response  Left a message to call us back again

## 2023-04-07 DIAGNOSIS — J45.40 MODERATE PERSISTENT ASTHMA WITHOUT COMPLICATION: Primary | ICD-10-CM

## 2023-04-07 RX ORDER — ALBUTEROL SULFATE 90 UG/1
AEROSOL, METERED RESPIRATORY (INHALATION)
Qty: 18 G | Refills: 2 | Status: SHIPPED | OUTPATIENT
Start: 2023-04-07

## 2023-04-26 ENCOUNTER — TELEPHONE (OUTPATIENT)
Age: 53
End: 2023-04-26

## 2023-04-26 DIAGNOSIS — E03.8 OTHER SPECIFIED HYPOTHYROIDISM: ICD-10-CM

## 2023-04-26 RX ORDER — THYROID 60 MG
TABLET ORAL
Qty: 135 TABLET | Refills: 2 | Status: SHIPPED | OUTPATIENT
Start: 2023-04-26

## 2023-04-26 NOTE — TELEPHONE ENCOUNTER
----- Message from Yakelin Angeles sent at 4/25/2023 11:42 AM EDT -----  Regarding: lab result  Contact: [phone]  Good morning  I would need a new prescription  I prefer to stay on [place] and adjust the levels    Thank you for understanding

## 2023-04-27 ENCOUNTER — TELEMEDICINE (OUTPATIENT)
Dept: PSYCHIATRY | Facility: CLINIC | Age: 53
End: 2023-04-27

## 2023-04-27 DIAGNOSIS — F31.81 BIPOLAR 2 DISORDER (HCC): Primary | ICD-10-CM

## 2023-04-27 DIAGNOSIS — F41.1 GAD (GENERALIZED ANXIETY DISORDER): ICD-10-CM

## 2023-04-27 NOTE — PSYCH
"INDIVIDUAL SESSION NOTE     Length of time in session: 52 minutes, follow up in 1 week     Psychotherapy Provided: Individual      Diagnosis ICD-10-CM Associated Orders   1  Bipolar 2 disorder (HCC)  F31 81       2  ZAINA (generalized anxiety disorder)  F41 1              Goals addressed in session: Goal 1     Pain:      none    0    Current suicide risk:  minimal    Data: Met with Sasha Hernandez for a scheduled individual session  Topics discussed included emotional wellness, coping skills and relationship with significant other  Cherylene Skeens is feeling \"ok\"  She's feeling frustrated with her fiance  He's been visiting for the last two weeks  She stated he continues to make it difficult to live with  He stays up very late, sleeps very late into the day and is messy  Cherylene Skeens goes to bed early and is an early riser; she has her grandson and cares for him so this works best for his schedule and it also ensures that she's getting adequate sleep, which helps her health and migraines  When he's up late, it wakes her up on and off; in the mornings, she feels like she has to tiptoe around while he's still asleep  She doesn't feel she should have to clean up after him all the time  Cherylene Skeens feels he is in this power struggle with her  Her routine is what works for her and her grandson, it keeps them on track and healthy  Cherylene Skeens shows evidence of utilizing effective communication skills, engaging in problem solving and maintaining emotional composure to manage mental health symptoms  During this session, this clinical used the following therapeutic modalities supportive psychotherapy, client-centered therapy, stress management skills and problem solving skills  Assessment:  Cherylene Skeens presents with a normal mood  her affect is normal range and intensity, appropriate  Cherylene Skeens was oriented x3  She was focused and engaged  Cherylene Skeens exhibits good therapeutic rapport with this clinician    she continues to exhibit willingness to work on " treatment goals and objectives  Justice Ash presents with a minimal risk of suicide, minimal risk of self-harm and minimal of harm to others  Plan:  Justice Ash will return in 1 week for the next scheduled session  At the next session, this clinical will use supportive psychotherapy and client-centered therapy to address her anxiety, in an effort to assist Justice Ash with meeting treatment goals  Behavioral Health Treatment Plan [facility]: Diagnosis and Treatment Plan explained to Loan Kunz relates understanding diagnosis and is agreeable to Treatment Plan  Yes     Virtual Regular Visit    Verification of patient location:    Patient is located at Home in the following state in which I hold an active license [place]      Assessment/Plan:    Problem List Items Addressed This Visit        Other    ZAINA (generalized anxiety disorder)    Bipolar 2 disorder ([facility] [address] ) - Primary       Goals addressed in session: Goal 1          Reason for visit is No chief complaint on file  Encounter provider Sherri Rosario LCSW    Provider located at [address] [place] [id number] [phone]      Recent Visits  No visits were found meeting these conditions  Showing recent visits within past 7 days and meeting all other requirements  Today's Visits  Date Type Provider Dept   04/27/23 Telemedicine Sherri Rosario LCSW [facility]   Showing today's visits and meeting all other requirements  Future Appointments  No visits were found meeting these conditions  Showing future appointments within next 150 days and meeting all other requirements       The patient was identified by name and date of birth  Cindy Goodwin was informed that this is a telemedicine visit and that the visit is being conducted throughthe [employer] platform  She agrees to proceed     My office door was closed  No one else was in the room    She acknowledged consent and understanding of privacy and security of the video platform  The patient has agreed to participate and understands they can discontinue the visit at any time  Patient is aware this is a billable service  Subjective  Annabella Mcbride is a 47 y o  female  HPI     Past Medical History:   Diagnosis Date   • Anxiety    • Anxiety    • Asthma    • Bipolar disorder [facility])    • Carpal tunnel syndrome    • Chronic pain     lower back   • Depression    • Disease of thyroid gland    • Dyslipidemia    • Fibromyalgia    • Irritable bowel    • Kidney stones    • Kidney stones 2019   • Migraine    • Obesity (BMI 30 0-34  9)    • Psychiatric disorder     depression/anxiety   • Suicide attempt [facility])     as teen   • Vitamin D deficiency        Past Surgical History:   Procedure Laterality Date   • BUNIONECTOMY     •  SECTION     • CHOLECYSTECTOMY     • PARTIAL HYSTERECTOMY      portion of  uterus still present   • TONSILLECTOMY     • TUBAL LIGATION         Current Outpatient Medications   Medication Sig Dispense Refill   • [place] Thyroid 60 MG tablet Take one and 1/2 tabs daily 135 tablet 2   • azelastine (ASTELIN) 0 1 % nasal spray 1 spray into each nostril 2 (two) times a day Use in each nostril as directed 30 mL 0   • butalbital-acetaminophen-caffeine (FIORICET,ESGIC) -40 mg per tablet      • Butalbital-APAP-Caffeine -40 MG CAPS      • cariprazine (Vraylar) 4 5 MG capsule Take 1 capsule (4 5 mg total) by mouth daily I am increasing the dose 30 capsule 3   • celecoxib (CELEBREX) 200 mg capsule Take 1 capsule (200 mg total) by mouth daily 30 capsule 2   • clonazePAM (KlonoPIN) 1 mg tablet Take 1 tablet (1 mg total) by mouth daily as needed for anxiety 30 tablet 3   • clotrimazole-betamethasone (LOTRISONE) 1-0 05 % cream  (Patient not taking: Reported on 3/19/2023)     • cyclobenzaprine (FLEXERIL) 5 mg tablet      • dicyclomine (BENTYL) 10 mg capsule      • divalproex sodium (DEPAKOTE ER) 250 mg 24 hr tablet      • fremanezumab-vfrm (Ajovy) 225 MG/1 5ML auto-injector Inject 225 mg under the skin every 30 (thirty) days     • hydroxychloroquine (PLAQUENIL) 200 mg tablet  (Patient not taking: Reported on 4/18/2023)     • ketoconazole (NIZORAL) 2 % cream      • lidocaine (LIDODERM) 5 % Apply 1 patch topically daily as needed (back pain) Remove & Discard patch within 12 hours or as directed by MD Castro patch 1   • loratadine (CLARITIN) 10 mg tablet Take 1 pill daily for allergies 90 tablet 1   • meclizine (ANTIVERT) 25 mg tablet Take 1 tablet (25 mg total) by mouth every 8 (eight) hours as needed for dizziness 30 tablet 1   • meloxicam (MOBIC) 15 mg tablet TAKE 1 TABLET (15 MG TOTAL) BY MOUTH DAILY 30 tablet 0   • metoclopramide (REGLAN) 5 mg tablet      • mometasone (ELOCON) 0 1 % cream Apply topically daily 45 g 0   • naratriptan (AMERGE) 2 5 MG tablet      • omeprazole (PriLOSEC) 40 MG capsule Take 1 capsule (40 mg total) by mouth daily 90 capsule 1   • ondansetron (Zofran ODT) 4 mg disintegrating tablet Take 1 tablet (4 mg total) by mouth every 6 (six) hours as needed for nausea or vomiting 20 tablet 0   • Ozempic, 1 MG/DOSE, 4 MG/3ML injection pen INJECT 0 75 ML (1 MG TOTAL) UNDER THE SKIN ONCE A WEEK 3 mL 2   • Respiratory Therapy Supplies (NEBULIZER) DONA by Does not apply route every 4 (four) hours while awake 90 each 1   • sucralfate (CARAFATE) 1 g tablet      • tiZANidine (ZANAFLEX) 4 mg tablet      • Topiramate  MG CP24 Take 100 mg by mouth 2 (two) times a day     • Ubrelvy 100 MG tablet      • Ventolin  (90 Base) MCG/ACT inhaler INHALE TWO PUFFS EVERY 4 (FOUR) HOURS AS NEEDED FOR WHEEZING OR SHORTNESS OF BREATH 18 g 2   • XIFAXAN 550 MG tablet        No current facility-administered medications for this visit          Allergies   Allergen Reactions   • Doxycycline Rash   • Erythromycin Rash   • Other Edema and Wheezing     jalapeno   • Latex Rash   • Duloxetine Other reaction(s): Nausea, Loopy   • Eletriptan      Pain in lower jaw with pressure in throat   • Emgality [Galcanezumab-Gnlm]    • Galcanezumab      rash       Review of Systems    Video Exam    There were no vitals filed for this visit      Physical Exam     04/27/23  Start Time: 0044  Stop Time: 5261  Total Visit Time: 52 minutes

## 2023-05-01 ENCOUNTER — OFFICE VISIT (OUTPATIENT)
Dept: URGENT CARE | Facility: CLINIC | Age: 53
End: 2023-05-01

## 2023-05-01 VITALS
SYSTOLIC BLOOD PRESSURE: 108 MMHG | RESPIRATION RATE: 19 BRPM | OXYGEN SATURATION: 100 % | TEMPERATURE: 97 F | DIASTOLIC BLOOD PRESSURE: 76 MMHG | HEART RATE: 76 BPM

## 2023-05-01 DIAGNOSIS — R68.89 MULTIPLE COMPLAINTS: ICD-10-CM

## 2023-05-01 DIAGNOSIS — R39.9 UTI SYMPTOMS: Primary | ICD-10-CM

## 2023-05-01 LAB
SL AMB  POCT GLUCOSE, UA: NEGATIVE
SL AMB LEUKOCYTE ESTERASE,UA: NEGATIVE
SL AMB POCT BILIRUBIN,UA: NEGATIVE
SL AMB POCT BLOOD,UA: NEGATIVE
SL AMB POCT CLARITY,UA: CLEAR
SL AMB POCT COLOR,UA: NORMAL
SL AMB POCT KETONES,UA: NEGATIVE
SL AMB POCT NITRITE,UA: NEGATIVE
SL AMB POCT PH,UA: 7.5
SL AMB POCT SPECIFIC GRAVITY,UA: 1
SL AMB POCT URINE PROTEIN: NEGATIVE
SL AMB POCT UROBILINOGEN: 0.2

## 2023-05-01 NOTE — PROGRESS NOTES
[facility]        NAME: Colten Rodrigues is a 47 y o  female  : 1970    MRN: [id number]  DATE: May 1, 2023  TIME: 11:25 AM    Assessment and Plan   UTI symptoms [R39 9]  1  UTI symptoms  POCT urine dip      2  Multiple complaints  Transfer to other facility            Patient Instructions   Patient has multiple vague complaints with no real source of noted infection  UA in clinic unremarkable  Patient unable to see pcp today for further work up  Patient will be taken to ED by significant other for further workup of vague symptoms  Follow up with PCP in 3-5 days  Proceed to  ER if symptoms worsen  Chief Complaint     Chief Complaint   Patient presents with    Generalized Body Aches     Pt  Reports this morning having generalized body aches  Pt  Reports feeling dizzy at times, swollen lymph nodes in pelvis, left leg pain, right neck pain with h/o migraines, frequent urination with no burning  History of Present Illness       HPI  This is 46 y/o female here c/o lightheadedness and dizziness, right sided neck pain, left inguinal lymph node swelling, and left upper leg pain since last night  Notes having urinary frequency and urgency but denies dysuria  Denies any abnormal vaginal changes/discharge  Left upper leg pain she is rating a 10/10 can still bear weight  Denies numbness/tingling of the left lower extremity  Rates right sided neck pain a 7/10  + chills  Denies fevers, shortness of breath, chest pain, vomiting, diarrhea  Denies any trauma  Review of Systems   Review of Systems   Constitutional: Positive for chills  Negative for fever  HENT: Negative for congestion, ear pain and sore throat  Respiratory: Negative for cough and shortness of breath  Cardiovascular: Negative for chest pain  Gastrointestinal: Negative for diarrhea, nausea and vomiting  Genitourinary: Positive for frequency and urgency   Negative for dysuria, pelvic pain, vaginal bleeding, vaginal discharge and vaginal pain  Musculoskeletal: Positive for myalgias and neck pain  Negative for neck stiffness  Skin: Negative for rash and wound  Neurological: Positive for dizziness and light-headedness  Negative for weakness and headaches           Current Medications       Current Outpatient Medications:     [place] Thyroid 60 MG tablet, Take one and 1/2 tabs daily, Disp: 135 tablet, Rfl: 2    azelastine (ASTELIN) 0 1 % nasal spray, 1 spray into each nostril 2 (two) times a day Use in each nostril as directed, Disp: 30 mL, Rfl: 0    butalbital-acetaminophen-caffeine (FIORICET,ESGIC) -40 mg per tablet, , Disp: , Rfl:     cariprazine (Vraylar) 4 5 MG capsule, Take 1 capsule (4 5 mg total) by mouth daily I am increasing the dose, Disp: 30 capsule, Rfl: 3    celecoxib (CELEBREX) 200 mg capsule, Take 1 capsule (200 mg total) by mouth daily, Disp: 30 capsule, Rfl: 2    clonazePAM (KlonoPIN) 1 mg tablet, Take 1 tablet (1 mg total) by mouth daily as needed for anxiety, Disp: 30 tablet, Rfl: 3    cyclobenzaprine (FLEXERIL) 5 mg tablet, , Disp: , Rfl:     dicyclomine (BENTYL) 10 mg capsule, , Disp: , Rfl:     divalproex sodium (DEPAKOTE ER) 250 mg 24 hr tablet, , Disp: , Rfl:     fremanezumab-vfrm (Ajovy) 225 MG/1 5ML auto-injector, Inject 225 mg under the skin every 30 (thirty) days, Disp: , Rfl:     ketoconazole (NIZORAL) 2 % cream, , Disp: , Rfl:     lidocaine (LIDODERM) 5 %, Apply 1 patch topically daily as needed (back pain) Remove & Discard patch within 12 hours or as directed by MD, Disp: 6 patch, Rfl: 1    loratadine (CLARITIN) 10 mg tablet, Take 1 pill daily for allergies, Disp: 90 tablet, Rfl: 1    meclizine (ANTIVERT) 25 mg tablet, Take 1 tablet (25 mg total) by mouth every 8 (eight) hours as needed for dizziness, Disp: 30 tablet, Rfl: 1    meloxicam (MOBIC) 15 mg tablet, TAKE 1 TABLET (15 MG TOTAL) BY MOUTH DAILY, Disp: 30 tablet, Rfl: 0    metoclopramide (REGLAN) 5 mg tablet, , Disp: , Rfl:     mometasone (ELOCON) 0 1 % cream, Apply topically daily, Disp: 45 g, Rfl: 0    naratriptan (AMERGE) 2 5 MG tablet, , Disp: , Rfl:     omeprazole (PriLOSEC) 40 MG capsule, Take 1 capsule (40 mg total) by mouth daily, Disp: 90 capsule, Rfl: 1    ondansetron (Zofran ODT) 4 mg disintegrating tablet, Take 1 tablet (4 mg total) by mouth every 6 (six) hours as needed for nausea or vomiting, Disp: 20 tablet, Rfl: 0    Ozempic, 1 MG/DOSE, 4 MG/3ML injection pen, INJECT 0 75 ML (1 MG TOTAL) UNDER THE SKIN ONCE A WEEK, Disp: 3 mL, Rfl: 2    tiZANidine (ZANAFLEX) 4 mg tablet, , Disp: , Rfl:     Topiramate  MG CP24, Take 100 mg by mouth 2 (two) times a day, Disp: , Rfl:     Ubrelvy 100 MG tablet, , Disp: , Rfl:     Ventolin  (90 Base) MCG/ACT inhaler, INHALE TWO PUFFS EVERY 4 (FOUR) HOURS AS NEEDED FOR WHEEZING OR SHORTNESS OF BREATH, Disp: 18 g, Rfl: 2    XIFAXAN 550 MG tablet, , Disp: , Rfl:     Butalbital-APAP-Caffeine -40 MG CAPS, , Disp: , Rfl:     clotrimazole-betamethasone (LOTRISONE) 1-0 05 % cream, , Disp: , Rfl:     hydroxychloroquine (PLAQUENIL) 200 mg tablet, , Disp: , Rfl:     Respiratory Therapy Supplies (NEBULIZER) DONA, by Does not apply route every 4 (four) hours while awake, Disp: 90 each, Rfl: 1    sucralfate (CARAFATE) 1 g tablet, , Disp: , Rfl:     Current Allergies     Allergies as of 05/01/2023 - Reviewed 05/01/2023   Allergen Reaction Noted    Doxycycline Rash     Erythromycin Rash 06/08/2018    Other Edema and Wheezing 04/14/2008    Latex Rash 04/27/2015    Duloxetine  09/26/2020    Eletriptan  07/31/2017    Emgality [galcanezumab-gnlm]  11/13/2020    Galcanezumab  10/06/2020            The following portions of the patient's history were reviewed and updated as appropriate: allergies, current medications, past family history, past medical history, past social history, past surgical history and problem list      Past Medical History:   Diagnosis Date  Anxiety     Anxiety     Asthma     Bipolar disorder (HCC)     Carpal tunnel syndrome     Chronic pain     lower back    Depression     Disease of thyroid gland     Dyslipidemia     Fibromyalgia     Irritable bowel     Kidney stones     Kidney stones 2019    Migraine     Obesity (BMI 30 0-34  9)     Psychiatric disorder     depression/anxiety    Suicide attempt [facility])     as teen    Vitamin D deficiency        Past Surgical History:   Procedure Laterality Date    BUNIONECTOMY       SECTION      CHOLECYSTECTOMY      PARTIAL HYSTERECTOMY      portion of  uterus still present    TONSILLECTOMY      TUBAL LIGATION         Family History   Problem Relation Age of Onset    Hypertension Mother     Diabetes Mother     Hypothyroidism Mother     Stroke Mother     Bipolar disorder Mother     Anxiety disorder Mother     Arthritis Mother     Depression Mother     Mental illness Mother     Cancer Father         pancreatic     Hypothyroidism Sister     Hypertension Brother     Heart disease Brother     Cancer Maternal Uncle         lung    Cancer Paternal Aunt         breast    Breast cancer Paternal Aunt     Cancer Paternal Uncle         testicular     Cancer Paternal Grandmother         breast    Dementia Paternal [de-identified]     Breast cancer Paternal Grandmother     Cancer Cousin         brain    Bipolar disorder Daughter          Medications have been verified  Objective   /76   Pulse 76   Temp (!) 97 °F (36 1 °C)   Resp 19   SpO2 100%        Physical Exam     Physical Exam  Vitals and nursing note reviewed  Constitutional:       General: She is not in acute distress  Appearance: Normal appearance  She is not toxic-appearing     HENT:      Right Ear: Tympanic membrane, ear canal and external ear normal       Left Ear: Tympanic membrane, ear canal and external ear normal       Nose: Nose normal       Mouth/Throat:      Mouth: Mucous membranes are moist       Pharynx: No oropharyngeal exudate or posterior oropharyngeal erythema  Eyes:      Extraocular Movements: Extraocular movements intact  Cardiovascular:      Rate and Rhythm: Normal rate and regular rhythm  Pulmonary:      Effort: Pulmonary effort is normal       Breath sounds: Normal breath sounds  Musculoskeletal:      Cervical back: Normal range of motion and neck supple  Right upper leg: Normal       Left upper leg: Normal    Lymphadenopathy:      Cervical: No cervical adenopathy  Neurological:      Mental Status: She is alert  Gait: Gait normal    Psychiatric:         Mood and Affect: Mood normal          Thought Content:  Thought content normal

## 2023-05-02 ENCOUNTER — APPOINTMENT (OUTPATIENT)
Dept: LAB | Facility: CLINIC | Age: 53
End: 2023-05-02

## 2023-05-02 ENCOUNTER — OFFICE VISIT (OUTPATIENT)
Dept: FAMILY MEDICINE CLINIC | Facility: CLINIC | Age: 53
End: 2023-05-02

## 2023-05-02 VITALS
HEART RATE: 52 BPM | DIASTOLIC BLOOD PRESSURE: 78 MMHG | BODY MASS INDEX: 25.06 KG/M2 | HEIGHT: 64 IN | SYSTOLIC BLOOD PRESSURE: 112 MMHG | TEMPERATURE: 97.8 F | OXYGEN SATURATION: 100 %

## 2023-05-02 DIAGNOSIS — E03.8 OTHER SPECIFIED HYPOTHYROIDISM: ICD-10-CM

## 2023-05-02 DIAGNOSIS — R11.0 NAUSEA: ICD-10-CM

## 2023-05-02 DIAGNOSIS — R53.83 OTHER FATIGUE: ICD-10-CM

## 2023-05-02 DIAGNOSIS — T14.8XXA BRUISING: ICD-10-CM

## 2023-05-02 DIAGNOSIS — R53.83 OTHER FATIGUE: Primary | ICD-10-CM

## 2023-05-02 LAB
B BURGDOR IGG+IGM SER-ACNC: 0.3 AI
BASOPHILS # BLD AUTO: 0.02 THOUSANDS/ΜL (ref 0–0.1)
BASOPHILS NFR BLD AUTO: 1 % (ref 0–1)
EOSINOPHIL # BLD AUTO: 0.24 THOUSAND/ΜL (ref 0–0.61)
EOSINOPHIL NFR BLD AUTO: 6 % (ref 0–6)
ERYTHROCYTE [DISTWIDTH] IN BLOOD BY AUTOMATED COUNT: 12.4 % (ref 11.6–15.1)
HCT VFR BLD AUTO: 44.9 % (ref 34.8–46.1)
HGB BLD-MCNC: 14.2 G/DL (ref 11.5–15.4)
IMM GRANULOCYTES # BLD AUTO: 0 THOUSAND/UL (ref 0–0.2)
IMM GRANULOCYTES NFR BLD AUTO: 0 % (ref 0–2)
INR PPP: 0.94 (ref 0.84–1.19)
LYMPHOCYTES # BLD AUTO: 1.67 THOUSANDS/ΜL (ref 0.6–4.47)
LYMPHOCYTES NFR BLD AUTO: 39 % (ref 14–44)
MCH RBC QN AUTO: 29.5 PG (ref 26.8–34.3)
MCHC RBC AUTO-ENTMCNC: 31.6 G/DL (ref 31.4–37.4)
MCV RBC AUTO: 93 FL (ref 82–98)
MONOCYTES # BLD AUTO: 0.23 THOUSAND/ΜL (ref 0.17–1.22)
MONOCYTES NFR BLD AUTO: 5 % (ref 4–12)
NEUTROPHILS # BLD AUTO: 2.16 THOUSANDS/ΜL (ref 1.85–7.62)
NEUTS SEG NFR BLD AUTO: 49 % (ref 43–75)
NRBC BLD AUTO-RTO: 0 /100 WBCS
PLATELET # BLD AUTO: 281 THOUSANDS/UL (ref 149–390)
PMV BLD AUTO: 10.1 FL (ref 8.9–12.7)
PROTHROMBIN TIME: 12.8 SECONDS (ref 11.6–14.5)
RBC # BLD AUTO: 4.82 MILLION/UL (ref 3.81–5.12)
SARS-COV-2 AG UPPER RESP QL IA: NEGATIVE
VALID CONTROL: NORMAL
WBC # BLD AUTO: 4.32 THOUSAND/UL (ref 4.31–10.16)

## 2023-05-02 RX ORDER — ONDANSETRON 4 MG/1
4 TABLET, FILM COATED ORAL EVERY 8 HOURS PRN
Qty: 20 TABLET | Refills: 0 | Status: SHIPPED | OUTPATIENT
Start: 2023-05-02

## 2023-05-02 RX ORDER — PROCHLORPERAZINE MALEATE 5 MG/1
5 TABLET ORAL EVERY 6 HOURS PRN
Qty: 30 TABLET | Refills: 0 | Status: SHIPPED | OUTPATIENT
Start: 2023-05-02

## 2023-05-02 NOTE — PROGRESS NOTES
Assessment/Plan:         Problem List Items Addressed This Visit    None  Visit Diagnoses     Other fatigue    -  Primary    Will do lab work and check for covid/flu  will call with results  for now suggest staying well hydrated with pedilyte and using zofran/compazine as needed     Relevant Orders    CBC and differential    Comprehensive metabolic panel    Blood culture    Lyme Antibody Profile with reflex to WB    Bruising        Have lab work, will call with results  Relevant Orders    Protime-INR    Nausea        Will treat with zofran and compazine alternating every 4 hours  Relevant Medications    ondansetron (ZOFRAN) 4 mg tablet    prochlorperazine (COMPAZINE) 5 mg tablet            Subjective:      Patient ID: Nabeel Frye is a 47 y o  female  On  Sheltitus Lazo started to feel really rundown, she took a nap and felt worse after the nap  She did go to urgent care and they sent her to the ER, she did not stay at the ER  She had antibiotic left and she took that and she felt worse  She was not checked for covid  When she eats her stomach hurts and she feels like when she drinks she gets nauseaous  The following portions of the patient's history were reviewed and updated as appropriate:   Past Medical History:  She has a past medical history of Anxiety, Anxiety, Asthma, Bipolar disorder ([address] ), Carpal tunnel syndrome, Chronic pain, Depression, Disease of thyroid gland, Dyslipidemia, Fibromyalgia, Irritable bowel, Kidney stones, Kidney stones (2019), Migraine, Obesity (BMI 30 0-34 9), Psychiatric disorder, Suicide attempt ([address] ), and Vitamin D deficiency  ,  _______________________________________________________________________  Medical Problems:  does not have any pertinent problems on file ,  _______________________________________________________________________  Past Surgical History:   has a past surgical history that includes  section; Cholecystectomy;  Tonsillectomy; Partial hysterectomy; Bunionectomy; and Tubal ligation  ,  _______________________________________________________________________  Family History:  family history includes Anxiety disorder in her mother; Arthritis in her mother; Bipolar disorder in her daughter and mother; Breast cancer in her paternal aunt and paternal grandmother; Cancer in her cousin, father, maternal uncle, paternal aunt, paternal grandmother, and paternal uncle; Dementia in her paternal grandmother; Depression in her mother; Diabetes in her mother; Heart disease in her brother; Hypertension in her brother and mother; Hypothyroidism in her mother and sister; Mental illness in her mother; Stroke in her mother ,  _______________________________________________________________________  Social History:   reports that she has never smoked  She has never used smokeless tobacco  She reports that she does not drink alcohol and does not use drugs  ,  _______________________________________________________________________  Allergies:  is allergic to doxycycline, erythromycin, other, latex, duloxetine, eletriptan, emgality [galcanezumab-gnlm], and galcanezumab     _______________________________________________________________________  Current Outpatient Medications   Medication Sig Dispense Refill    ondansetron (ZOFRAN) 4 mg tablet Take 1 tablet (4 mg total) by mouth every 8 (eight) hours as needed for nausea or vomiting 20 tablet 0    prochlorperazine (COMPAZINE) 5 mg tablet Take 1 tablet (5 mg total) by mouth every 6 (six) hours as needed for nausea or vomiting 30 tablet 0    Armbrust Thyroid 60 MG tablet Take one and 1/2 tabs daily 135 tablet 2    azelastine (ASTELIN) 0 1 % nasal spray 1 spray into each nostril 2 (two) times a day Use in each nostril as directed 30 mL 0    butalbital-acetaminophen-caffeine (FIORICET,ESGIC) -40 mg per tablet       cariprazine (Vraylar) 4 5 MG capsule Take 1 capsule (4 5 mg total) by mouth daily I am increasing the dose 30 capsule 3    celecoxib (CELEBREX) 200 mg capsule Take 1 capsule (200 mg total) by mouth daily 30 capsule 2    clonazePAM (KlonoPIN) 1 mg tablet Take 1 tablet (1 mg total) by mouth daily as needed for anxiety 30 tablet 3    clotrimazole-betamethasone (LOTRISONE) 1-0 05 % cream  (Patient not taking: Reported on 3/19/2023)      cyclobenzaprine (FLEXERIL) 5 mg tablet       dicyclomine (BENTYL) 10 mg capsule       divalproex sodium (DEPAKOTE ER) 250 mg 24 hr tablet       fremanezumab-vfrm (Ajovy) 225 MG/1 5ML auto-injector Inject 225 mg under the skin every 30 (thirty) days      hydroxychloroquine (PLAQUENIL) 200 mg tablet  (Patient not taking: Reported on 4/18/2023)      ketoconazole (NIZORAL) 2 % cream       lidocaine (LIDODERM) 5 % Apply 1 patch topically daily as needed (back pain) Remove & Discard patch within 12 hours or as directed by MD 6 patch 1    loratadine (CLARITIN) 10 mg tablet Take 1 pill daily for allergies 90 tablet 1    meclizine (ANTIVERT) 25 mg tablet Take 1 tablet (25 mg total) by mouth every 8 (eight) hours as needed for dizziness 30 tablet 1    meloxicam (MOBIC) 15 mg tablet TAKE 1 TABLET (15 MG TOTAL) BY MOUTH DAILY 30 tablet 0    mometasone (ELOCON) 0 1 % cream Apply topically daily 45 g 0    naratriptan (AMERGE) 2 5 MG tablet       omeprazole (PriLOSEC) 40 MG capsule Take 1 capsule (40 mg total) by mouth daily 90 capsule 1    ondansetron (Zofran ODT) 4 mg disintegrating tablet Take 1 tablet (4 mg total) by mouth every 6 (six) hours as needed for nausea or vomiting 20 tablet 0    Ozempic, 1 MG/DOSE, 4 MG/3ML injection pen INJECT 0 75 ML (1 MG TOTAL) UNDER THE SKIN ONCE A WEEK 3 mL 2    Respiratory Therapy Supplies (NEBULIZER) DONA by Does not apply route every 4 (four) hours while awake 90 each 1    sucralfate (CARAFATE) 1 g tablet  (Patient not taking: Reported on 5/1/2023)      tiZANidine (ZANAFLEX) 4 mg tablet       Topiramate  MG CP24 Take 100 mg by mouth 2 "(two) times a day      Ubrelvy 100 MG tablet       Ventolin  (90 Base) MCG/ACT inhaler INHALE TWO PUFFS EVERY 4 (FOUR) HOURS AS NEEDED FOR WHEEZING OR SHORTNESS OF BREATH 18 g 2    XIFAXAN 550 MG tablet        No current facility-administered medications for this visit      _______________________________________________________________________  Review of Systems   Constitutional: Positive for chills, diaphoresis and fatigue  Negative for fever  HENT: Negative for congestion, ear pain, postnasal drip, rhinorrhea, sinus pressure, sinus pain and sore throat  Eyes: Negative for pain and visual disturbance  Respiratory: Negative for cough, chest tightness, shortness of breath and wheezing  Cardiovascular: Negative for chest pain and palpitations  Gastrointestinal: Positive for diarrhea and nausea  Negative for abdominal pain and vomiting  Genitourinary: Negative for dysuria, frequency, hematuria and urgency  Musculoskeletal: Positive for arthralgias and myalgias  Negative for back pain  Skin: Negative for color change and rash  Neurological: Positive for light-headedness and headaches  Negative for seizures and syncope  Psychiatric/Behavioral: Positive for sleep disturbance  All other systems reviewed and are negative  Objective:  Vitals:    05/02/23 0909   BP: 112/78   Pulse: (!) 52   Temp: 97 8 °F (36 6 °C)   SpO2: 100%   Height: 5' 4\" (1 626 m)     Body mass index is 25 06 kg/m²  Physical Exam  Vitals and nursing note reviewed  Constitutional:       General: She is not in acute distress  Appearance: Normal appearance  She is not ill-appearing  HENT:      Head: Normocephalic  Right Ear: Tympanic membrane, ear canal and external ear normal  There is no impacted cerumen  Left Ear: Tympanic membrane, ear canal and external ear normal  There is no impacted cerumen  Nose: Nose normal  No congestion        Mouth/Throat:      Mouth: Mucous membranes are " moist    Eyes:      Extraocular Movements: Extraocular movements intact  Conjunctiva/sclera: Conjunctivae normal       Pupils: Pupils are equal, round, and reactive to light  Cardiovascular:      Rate and Rhythm: Normal rate and regular rhythm  Pulses: Normal pulses  Heart sounds: Normal heart sounds  Pulmonary:      Effort: Pulmonary effort is normal  No respiratory distress  Breath sounds: Normal breath sounds  No wheezing  Abdominal:      General: Bowel sounds are normal       Palpations: Abdomen is soft  Musculoskeletal:         General: No swelling or tenderness  Normal range of motion  Cervical back: Normal range of motion  No tenderness  Right lower leg: No edema  Left lower leg: No edema  Lymphadenopathy:      Cervical: No cervical adenopathy  Skin:     General: Skin is warm and dry  Neurological:      General: No focal deficit present  Mental Status: She is alert and oriented to person, place, and time  Psychiatric:         Mood and Affect: Mood normal          Behavior: Behavior normal          Thought Content:  Thought content normal          Judgment: Judgment normal

## 2023-05-03 LAB
ALBUMIN SERPL BCP-MCNC: 4.5 G/DL (ref 3.5–5)
ALP SERPL-CCNC: 75 U/L (ref 46–116)
ALT SERPL W P-5'-P-CCNC: 68 U/L (ref 12–78)
ANION GAP SERPL CALCULATED.3IONS-SCNC: 4 MMOL/L (ref 4–13)
AST SERPL W P-5'-P-CCNC: 32 U/L (ref 5–45)
BACTERIA UR CULT: NORMAL
BILIRUB SERPL-MCNC: 0.17 MG/DL (ref 0.2–1)
BUN SERPL-MCNC: 18 MG/DL (ref 5–25)
CALCIUM SERPL-MCNC: 9.7 MG/DL (ref 8.3–10.1)
CHLORIDE SERPL-SCNC: 110 MMOL/L (ref 96–108)
CO2 SERPL-SCNC: 25 MMOL/L (ref 21–32)
CREAT SERPL-MCNC: 0.84 MG/DL (ref 0.6–1.3)
FLUAV RNA RESP QL NAA+PROBE: NEGATIVE
FLUBV RNA RESP QL NAA+PROBE: NEGATIVE
GFR SERPL CREATININE-BSD FRML MDRD: 80 ML/MIN/1.73SQ M
GLUCOSE P FAST SERPL-MCNC: 91 MG/DL (ref 65–99)
POTASSIUM SERPL-SCNC: 4.5 MMOL/L (ref 3.5–5.3)
PROT SERPL-MCNC: 8.1 G/DL (ref 6.4–8.4)
SARS-COV-2 RNA RESP QL NAA+PROBE: NEGATIVE
SODIUM SERPL-SCNC: 139 MMOL/L (ref 135–147)
TSH SERPL DL<=0.05 MIU/L-ACNC: 3.72 UIU/ML (ref 0.45–4.5)

## 2023-05-04 ENCOUNTER — TELEMEDICINE (OUTPATIENT)
Dept: PSYCHIATRY | Facility: CLINIC | Age: 53
End: 2023-05-04

## 2023-05-04 DIAGNOSIS — F31.81 BIPOLAR 2 DISORDER (HCC): ICD-10-CM

## 2023-05-04 DIAGNOSIS — F41.1 GAD (GENERALIZED ANXIETY DISORDER): Primary | ICD-10-CM

## 2023-05-04 NOTE — PSYCH
Virtual Regular Visit    Verification of patient location:    Patient is located at Home in the following state in which I hold an active license [place]      Assessment/Plan:    Problem List Items Addressed This Visit        Other    ZAINA (generalized anxiety disorder) - Primary    Bipolar 2 disorder ([facility] [address] )       Goals addressed in session: Goal 1          Reason for visit is No chief complaint on file  Encounter provider Yessica Calix LCSW    Provider located at [address] [place] [id number] [phone]      Recent Visits  Date Type Provider Dept   04/27/23 Telemedicine Yessica Calix LCSW [facility]   Showing recent visits within past 7 days and meeting all other requirements  Today's Visits  Date Type Provider Dept   05/04/23 Telemedicine Yessica Calix LCSW [facility]   Showing today's visits and meeting all other requirements  Future Appointments  No visits were found meeting these conditions  Showing future appointments within next 150 days and meeting all other requirements       The patient was identified by name and date of birth  Veena Tapia was informed that this is a telemedicine visit and that the visit is being conducted throughthe [employer] platform  She agrees to proceed     My office door was closed  No one else was in the room  She acknowledged consent and understanding of privacy and security of the video platform  The patient has agreed to participate and understands they can discontinue the visit at any time  Patient is aware this is a billable service  Subjective  Veena Tapia is a 47 y o  female        HPI     Past Medical History:   Diagnosis Date    Anxiety     Anxiety     Asthma     Bipolar disorder ([facility] [address] )     Carpal tunnel syndrome     Chronic pain     lower back    Depression     Disease of thyroid gland  Dyslipidemia     Fibromyalgia     Irritable bowel     Kidney stones     Kidney stones 2019    Migraine     Obesity (BMI 30 0-34  9)     Psychiatric disorder     depression/anxiety    Suicide attempt [facility])     as teen    Vitamin D deficiency        Past Surgical History:   Procedure Laterality Date    BUNIONECTOMY       SECTION      CHOLECYSTECTOMY      PARTIAL HYSTERECTOMY      portion of  uterus still present    TONSILLECTOMY      TUBAL LIGATION         Current Outpatient Medications   Medication Sig Dispense Refill    Howard Thyroid 60 MG tablet Take one and 1/2 tabs daily 135 tablet 2    azelastine (ASTELIN) 0 1 % nasal spray 1 spray into each nostril 2 (two) times a day Use in each nostril as directed 30 mL 0    butalbital-acetaminophen-caffeine (FIORICET,ESGIC) -40 mg per tablet       cariprazine (Vraylar) 4 5 MG capsule Take 1 capsule (4 5 mg total) by mouth daily I am increasing the dose 30 capsule 3    celecoxib (CELEBREX) 200 mg capsule Take 1 capsule (200 mg total) by mouth daily 30 capsule 2    clonazePAM (KlonoPIN) 1 mg tablet Take 1 tablet (1 mg total) by mouth daily as needed for anxiety 30 tablet 3    clotrimazole-betamethasone (LOTRISONE) 1-0 05 % cream  (Patient not taking: Reported on 3/19/2023)      cyclobenzaprine (FLEXERIL) 5 mg tablet       dicyclomine (BENTYL) 10 mg capsule       divalproex sodium (DEPAKOTE ER) 250 mg 24 hr tablet       fremanezumab-vfrm (Ajovy) 225 MG/1 5ML auto-injector Inject 225 mg under the skin every 30 (thirty) days      hydroxychloroquine (PLAQUENIL) 200 mg tablet  (Patient not taking: Reported on 2023)      ketoconazole (NIZORAL) 2 % cream       lidocaine (LIDODERM) 5 % Apply 1 patch topically daily as needed (back pain) Remove & Discard patch within 12 hours or as directed by MD 6 patch 1    loratadine (CLARITIN) 10 mg tablet Take 1 pill daily for allergies 90 tablet 1    meclizine (ANTIVERT) 25 mg tablet Take 1 tablet (25 mg total) by mouth every 8 (eight) hours as needed for dizziness 30 tablet 1    meloxicam (MOBIC) 15 mg tablet TAKE 1 TABLET (15 MG TOTAL) BY MOUTH DAILY 30 tablet 0    mometasone (ELOCON) 0 1 % cream Apply topically daily 45 g 0    naratriptan (AMERGE) 2 5 MG tablet       omeprazole (PriLOSEC) 40 MG capsule Take 1 capsule (40 mg total) by mouth daily 90 capsule 1    ondansetron (Zofran ODT) 4 mg disintegrating tablet Take 1 tablet (4 mg total) by mouth every 6 (six) hours as needed for nausea or vomiting 20 tablet 0    ondansetron (ZOFRAN) 4 mg tablet Take 1 tablet (4 mg total) by mouth every 8 (eight) hours as needed for nausea or vomiting 20 tablet 0    Ozempic, 1 MG/DOSE, 4 MG/3ML injection pen INJECT 0 75 ML (1 MG TOTAL) UNDER THE SKIN ONCE A WEEK 3 mL 2    prochlorperazine (COMPAZINE) 5 mg tablet Take 1 tablet (5 mg total) by mouth every 6 (six) hours as needed for nausea or vomiting 30 tablet 0    Respiratory Therapy Supplies (NEBULIZER) DONA by Does not apply route every 4 (four) hours while awake 90 each 1    sucralfate (CARAFATE) 1 g tablet  (Patient not taking: Reported on 5/1/2023)      tiZANidine (ZANAFLEX) 4 mg tablet       Topiramate  MG CP24 Take 100 mg by mouth 2 (two) times a day      Ubrelvy 100 MG tablet       Ventolin  (90 Base) MCG/ACT inhaler INHALE TWO PUFFS EVERY 4 (FOUR) HOURS AS NEEDED FOR WHEEZING OR SHORTNESS OF BREATH 18 g 2    XIFAXAN 550 MG tablet        No current facility-administered medications for this visit  Allergies   Allergen Reactions    Doxycycline Rash    Erythromycin Rash    Other Edema and Wheezing     jalapeno    Latex Rash    Duloxetine      Other reaction(s): Nausea, Loopy    Eletriptan      Pain in lower jaw with pressure in throat    Emgality [Galcanezumab-Gnlm]     Galcanezumab      rash       Review of Systems    Video Exam    There were no vitals filed for this visit      Physical Exam     Behavioral Health "Psychotherapy Progress Note    Psychotherapy Provided: Individual Psychotherapy     1  ZAINA (generalized anxiety disorder)        2  Bipolar 2 disorder ([address] )            Goals addressed in session: Goal 1     DATA: Nirmal Lyn isn't feeling well today; she went to urgent care who sent her to the ER  Nirmal Lyn went but she said it was so busy, she ended up going home  Nirmal Lyn is coughing and states her whole body hurts; she wants to continue with the session at this time  Nirmal Lyn is feeling frustrated with her fiance; he gets upset when she's sick  She feels like he doesn't take it seriously and tells her she's \"in a bad mood\"  This is very frustrating to her  She has tried having conversations with him about how he comes across to her; she feels it's disrespectful when he reduces her illnesses to being \"in a bad mood\"  During this session, this clinician used the following therapeutic modalities: Engagement Strategies, Cognitive Behavioral Therapy and Supportive Psychotherapy    Substance Abuse was not addressed during this session  If the client is diagnosed with a co-occurring substance use disorder, please indicate any changes in the frequency or amount of use: N/A  Stage of change for addressing substance use diagnoses: No substance use/Not applicable    ASSESSMENT:  Raheem Killian presents with a Euthymic/ normal mood  her affect is Normal range and intensity, which is congruent, with her mood and the content of the session  The client has made progress on their goals  Raheem Killian presents with a none risk of suicide, none risk of self-harm, and none risk of harm to others  For any risk assessment that surpasses a \"low\" rating, a safety plan must be developed  A safety plan was indicated: no  If yes, describe in detail: N/A    PLAN: At the next session, the therapist will use Engagement Strategies and Supportive Psychotherapy to address her anxiety      Behavioral Health Treatment Plan and " Discharge Planning: Raheem Killian is aware of and agrees to continue to work on their treatment plan  They have identified and are working toward their discharge goals   yes    Visit start and stop times:    05/04/23  Start Time: 0804  Stop Time: 2003  Total Visit Time: 52 minutes

## 2023-05-07 LAB — BACTERIA BLD CULT: NORMAL

## 2023-05-08 ENCOUNTER — OFFICE VISIT (OUTPATIENT)
Dept: FAMILY MEDICINE CLINIC | Facility: CLINIC | Age: 53
End: 2023-05-08

## 2023-05-08 ENCOUNTER — TELEPHONE (OUTPATIENT)
Dept: FAMILY MEDICINE CLINIC | Facility: CLINIC | Age: 53
End: 2023-05-08

## 2023-05-08 VITALS
BODY MASS INDEX: 25.06 KG/M2 | TEMPERATURE: 97.8 F | OXYGEN SATURATION: 99 % | HEART RATE: 84 BPM | SYSTOLIC BLOOD PRESSURE: 116 MMHG | HEIGHT: 64 IN | DIASTOLIC BLOOD PRESSURE: 82 MMHG

## 2023-05-08 DIAGNOSIS — J06.9 UPPER RESPIRATORY TRACT INFECTION, UNSPECIFIED TYPE: Primary | ICD-10-CM

## 2023-05-08 RX ORDER — IPRATROPIUM BROMIDE AND ALBUTEROL SULFATE 2.5; .5 MG/3ML; MG/3ML
3 SOLUTION RESPIRATORY (INHALATION) 4 TIMES DAILY
Qty: 90 ML | Refills: 1 | Status: SHIPPED | OUTPATIENT
Start: 2023-05-08

## 2023-05-08 RX ORDER — COVID-19 ANTIGEN TEST
1 KIT MISCELLANEOUS DAILY PRN
Qty: 5 KIT | Refills: 1 | Status: SHIPPED | OUTPATIENT
Start: 2023-05-08 | End: 2023-05-30 | Stop reason: ALTCHOICE

## 2023-05-08 RX ORDER — ALBUTEROL SULFATE 90 UG/1
2 AEROSOL, METERED RESPIRATORY (INHALATION) EVERY 6 HOURS PRN
Qty: 18 G | Refills: 1 | Status: SHIPPED | OUTPATIENT
Start: 2023-05-08

## 2023-05-08 RX ORDER — LEVOCETIRIZINE DIHYDROCHLORIDE 5 MG/1
5 TABLET, FILM COATED ORAL EVERY EVENING
Qty: 90 TABLET | Refills: 1 | Status: SHIPPED | OUTPATIENT
Start: 2023-05-08

## 2023-05-08 RX ORDER — AZELASTINE 1 MG/ML
1 SPRAY, METERED NASAL 2 TIMES DAILY
Qty: 1 ML | Refills: 3 | Status: SHIPPED | OUTPATIENT
Start: 2023-05-08

## 2023-05-08 RX ORDER — FLUTICASONE PROPIONATE 220 UG/1
2 AEROSOL, METERED RESPIRATORY (INHALATION) 2 TIMES DAILY
Qty: 12 G | Refills: 1 | Status: SHIPPED | OUTPATIENT
Start: 2023-05-08

## 2023-05-08 RX ORDER — DEXTROMETHORPHAN HYDROBROMIDE AND PROMETHAZINE HYDROCHLORIDE 15; 6.25 MG/5ML; MG/5ML
5 SOLUTION ORAL 4 TIMES DAILY PRN
Qty: 240 ML | Refills: 0 | Status: SHIPPED | OUTPATIENT
Start: 2023-05-08

## 2023-05-08 RX ORDER — AMOXICILLIN AND CLAVULANATE POTASSIUM 875; 125 MG/1; MG/1
1 TABLET, FILM COATED ORAL EVERY 12 HOURS SCHEDULED
Qty: 20 TABLET | Refills: 0 | Status: SHIPPED | OUTPATIENT
Start: 2023-05-08 | End: 2023-05-09

## 2023-05-08 NOTE — PROGRESS NOTES
Assessment/Plan:         Problem List Items Addressed This Visit    None  Visit Diagnoses     Upper respiratory tract infection, unspecified type    -  Primary    Will treat with abx and inhaled steroids and albuterol, xyzal and nasal spray  Stay well hydrated  Mucinex  vitamin C D and Zinc  cough syrup  Relevant Medications    ipratropium-albuterol (DUO-NEB) 0 5-2 5 mg/3 mL nebulizer solution    fluticasone (FLOVENT HFA) 220 mcg/act inhaler    albuterol (PROVENTIL HFA,VENTOLIN HFA) 90 mcg/act inhaler    amoxicillin-clavulanate (Augmentin) 875-125 mg per tablet    levocetirizine (XYZAL) 5 MG tablet    azelastine (ASTELIN) 0 1 % nasal spray    Promethazine-DM (PHENERGAN-DM) 6 25-15 mg/5 mL oral syrup            Subjective:      Patient ID: Gail Jacques is a 47 y o  female  Frankey Pettis started to cough and have fevers on Saturday/  She can barely catch her breath after coughing  The following portions of the patient's history were reviewed and updated as appropriate:   Past Medical History:  She has a past medical history of Anxiety, Anxiety, Asthma, Bipolar disorder ([address] ), Carpal tunnel syndrome, Chronic pain, Depression, Disease of thyroid gland, Dyslipidemia, Fibromyalgia, Irritable bowel, Kidney stones, Kidney stones (2019), Migraine, Obesity (BMI 30 0-34 9), Psychiatric disorder, Suicide attempt ([address] ), and Vitamin D deficiency  ,  _______________________________________________________________________  Medical Problems:  does not have any pertinent problems on file ,  _______________________________________________________________________  Past Surgical History:   has a past surgical history that includes  section; Cholecystectomy; Tonsillectomy; Partial hysterectomy; Bunionectomy; and Tubal ligation  ,  _______________________________________________________________________  Family History:  family history includes Anxiety disorder in her mother; Arthritis in her mother; Bipolar disorder in her daughter and mother; Breast cancer in her paternal aunt and paternal grandmother; Cancer in her cousin, father, maternal uncle, paternal aunt, paternal grandmother, and paternal uncle; Dementia in her paternal grandmother; Depression in her mother; Diabetes in her mother; Heart disease in her brother; Hypertension in her brother and mother; Hypothyroidism in her mother and sister; Mental illness in her mother; Stroke in her mother ,  _______________________________________________________________________  Social History:   reports that she has never smoked  She has never used smokeless tobacco  She reports that she does not drink alcohol and does not use drugs  ,  _______________________________________________________________________  Allergies:  is allergic to doxycycline, erythromycin, other, latex, duloxetine, eletriptan, emgality [galcanezumab-gnlm], and galcanezumab     _______________________________________________________________________  Current Outpatient Medications   Medication Sig Dispense Refill   • albuterol (PROVENTIL HFA,VENTOLIN HFA) 90 mcg/act inhaler Inhale 2 puffs every 6 (six) hours as needed for wheezing 18 g 1   • amoxicillin-clavulanate (Augmentin) 875-125 mg per tablet Take 1 tablet by mouth every 12 (twelve) hours for 10 days 20 tablet 0   • azelastine (ASTELIN) 0 1 % nasal spray 1 spray into each nostril 2 (two) times a day Use in each nostril as directed 1 mL 3   • fluticasone (FLOVENT HFA) 220 mcg/act inhaler Inhale 2 puffs 2 (two) times a day Rinse mouth after use   12 g 1   • ipratropium-albuterol (DUO-NEB) 0 5-2 5 mg/3 mL nebulizer solution Take 3 mL by nebulization 4 (four) times a day 90 mL 1   • levocetirizine (XYZAL) 5 MG tablet Take 1 tablet (5 mg total) by mouth every evening 90 tablet 1   • Promethazine-DM (PHENERGAN-DM) 6 25-15 mg/5 mL oral syrup Take 5 mL by mouth 4 (four) times a day as needed for cough 240 mL 0   • [place] Thyroid 60 MG tablet Take one and 1/2 tabs daily 135 tablet 2   • butalbital-acetaminophen-caffeine (FIORICET,ESGIC) -40 mg per tablet      • cariprazine (Vraylar) 4 5 MG capsule Take 1 capsule (4 5 mg total) by mouth daily I am increasing the dose 30 capsule 3   • celecoxib (CELEBREX) 200 mg capsule Take 1 capsule (200 mg total) by mouth daily 30 capsule 2   • clonazePAM (KlonoPIN) 1 mg tablet Take 1 tablet (1 mg total) by mouth daily as needed for anxiety 30 tablet 3   • clotrimazole-betamethasone (LOTRISONE) 1-0 05 % cream  (Patient not taking: Reported on 3/19/2023)     • cyclobenzaprine (FLEXERIL) 5 mg tablet      • dicyclomine (BENTYL) 10 mg capsule      • divalproex sodium (DEPAKOTE ER) 250 mg 24 hr tablet      • fremanezumab-vfrm (Ajovy) 225 MG/1 5ML auto-injector Inject 225 mg under the skin every 30 (thirty) days     • hydroxychloroquine (PLAQUENIL) 200 mg tablet  (Patient not taking: Reported on 4/18/2023)     • ketoconazole (NIZORAL) 2 % cream      • lidocaine (LIDODERM) 5 % Apply 1 patch topically daily as needed (back pain) Remove & Discard patch within 12 hours or as directed by MD 6 patch 1   • meclizine (ANTIVERT) 25 mg tablet Take 1 tablet (25 mg total) by mouth every 8 (eight) hours as needed for dizziness 30 tablet 1   • meloxicam (MOBIC) 15 mg tablet TAKE 1 TABLET (15 MG TOTAL) BY MOUTH DAILY 30 tablet 0   • mometasone (ELOCON) 0 1 % cream Apply topically daily 45 g 0   • naratriptan (AMERGE) 2 5 MG tablet      • omeprazole (PriLOSEC) 40 MG capsule Take 1 capsule (40 mg total) by mouth daily 90 capsule 1   • ondansetron (Zofran ODT) 4 mg disintegrating tablet Take 1 tablet (4 mg total) by mouth every 6 (six) hours as needed for nausea or vomiting 20 tablet 0   • ondansetron (ZOFRAN) 4 mg tablet Take 1 tablet (4 mg total) by mouth every 8 (eight) hours as needed for nausea or vomiting 20 tablet 0   • Ozempic, 1 MG/DOSE, 4 MG/3ML injection pen INJECT 0 75 ML (1 MG TOTAL) UNDER THE SKIN ONCE A WEEK 3 mL 2   • prochlorperazine "(COMPAZINE) 5 mg tablet Take 1 tablet (5 mg total) by mouth every 6 (six) hours as needed for nausea or vomiting 30 tablet 0   • Respiratory Therapy Supplies (NEBULIZER) DONA by Does not apply route every 4 (four) hours while awake 90 each 1   • sucralfate (CARAFATE) 1 g tablet  (Patient not taking: Reported on 5/1/2023)     • tiZANidine (ZANAFLEX) 4 mg tablet      • Topiramate  MG CP24 Take 100 mg by mouth 2 (two) times a day     • Ubrelvy 100 MG tablet      • XIFAXAN 550 MG tablet        No current facility-administered medications for this visit      _______________________________________________________________________  Review of Systems   Constitutional: Positive for chills, diaphoresis, fatigue and fever  HENT: Positive for congestion  Negative for ear pain and sore throat  Eyes: Negative for pain and visual disturbance  Respiratory: Positive for cough, chest tightness, shortness of breath and wheezing  Cardiovascular: Negative for chest pain and palpitations  Gastrointestinal: Positive for nausea  Negative for abdominal pain, constipation, diarrhea and vomiting  Genitourinary: Negative for dysuria, frequency, hematuria and urgency  Musculoskeletal: Positive for arthralgias and myalgias  Negative for back pain  Skin: Negative for color change and rash  Neurological: Positive for dizziness and headaches  Negative for seizures and syncope  Psychiatric/Behavioral: Positive for sleep disturbance  Negative for dysphoric mood  The patient is not nervous/anxious  All other systems reviewed and are negative  Objective:  Vitals:    05/08/23 0908   BP: 116/82   Pulse: 84   Temp: 97 8 °F (36 6 °C)   SpO2: 99%   Height: 5' 4\" (1 626 m)     Body mass index is 25 06 kg/m²  Physical Exam  Vitals and nursing note reviewed  Constitutional:       Appearance: Normal appearance  She is not ill-appearing  HENT:      Head: Normocephalic  Right Ear: There is impacted cerumen        " Left Ear: There is impacted cerumen  Nose: Congestion present  Mouth/Throat:      Mouth: Mucous membranes are moist       Pharynx: No posterior oropharyngeal erythema  Eyes:      Extraocular Movements: Extraocular movements intact  Conjunctiva/sclera: Conjunctivae normal       Pupils: Pupils are equal, round, and reactive to light  Cardiovascular:      Rate and Rhythm: Normal rate and regular rhythm  Heart sounds: Normal heart sounds  No murmur heard  Pulmonary:      Effort: Pulmonary effort is normal       Breath sounds: Normal breath sounds  No wheezing  Abdominal:      General: Bowel sounds are normal       Palpations: Abdomen is soft  Tenderness: There is no abdominal tenderness  Musculoskeletal:         General: Normal range of motion  Cervical back: Normal range of motion  Right lower leg: No edema  Left lower leg: No edema  Skin:     General: Skin is warm and dry  Neurological:      General: No focal deficit present  Mental Status: She is alert     Psychiatric:         Mood and Affect: Mood normal          Behavior: Behavior normal

## 2023-05-08 NOTE — PATIENT INSTRUCTIONS
Problem List Items Addressed This Visit    None  Visit Diagnoses       Upper respiratory tract infection, unspecified type    -  Primary    Will treat with abx and inhaled steroids and albuterol, xyzal and nasal spray  Stay well hydrated  Mucinex  vitamin C D and Zinc  cough syrup       Relevant Medications    ipratropium-albuterol (DUO-NEB) 0 5-2 5 mg/3 mL nebulizer solution    fluticasone (FLOVENT HFA) 220 mcg/act inhaler    albuterol (PROVENTIL HFA,VENTOLIN HFA) 90 mcg/act inhaler    amoxicillin-clavulanate (Augmentin) 875-125 mg per tablet    levocetirizine (XYZAL) 5 MG tablet    azelastine (ASTELIN) 0 1 % nasal spray    Promethazine-DM (PHENERGAN-DM) 6 25-15 mg/5 mL oral syrup

## 2023-05-09 DIAGNOSIS — R42 DIZZINESS: ICD-10-CM

## 2023-05-09 DIAGNOSIS — R63.5 WEIGHT GAIN: ICD-10-CM

## 2023-05-09 DIAGNOSIS — J06.9 UPPER RESPIRATORY TRACT INFECTION, UNSPECIFIED TYPE: Primary | ICD-10-CM

## 2023-05-09 RX ORDER — SEMAGLUTIDE 1.34 MG/ML
INJECTION, SOLUTION SUBCUTANEOUS
Qty: 3 ML | Refills: 1 | Status: SHIPPED | OUTPATIENT
Start: 2023-05-09

## 2023-05-09 RX ORDER — AMOXICILLIN 500 MG/1
500 CAPSULE ORAL EVERY 8 HOURS SCHEDULED
Qty: 21 CAPSULE | Refills: 0 | Status: SHIPPED | OUTPATIENT
Start: 2023-05-09 | End: 2023-05-16

## 2023-05-09 RX ORDER — MECLIZINE HYDROCHLORIDE 25 MG/1
25 TABLET ORAL EVERY 8 HOURS PRN
Qty: 30 TABLET | Refills: 0 | Status: SHIPPED | OUTPATIENT
Start: 2023-05-09

## 2023-05-11 ENCOUNTER — TELEMEDICINE (OUTPATIENT)
Dept: PSYCHIATRY | Facility: CLINIC | Age: 53
End: 2023-05-11

## 2023-05-11 DIAGNOSIS — F41.1 GAD (GENERALIZED ANXIETY DISORDER): Primary | ICD-10-CM

## 2023-05-11 DIAGNOSIS — F31.81 BIPOLAR 2 DISORDER (HCC): ICD-10-CM

## 2023-05-11 PROCEDURE — NC001 PR NO CHARGE

## 2023-05-11 NOTE — PSYCH
Virtual Regular Visit    Verification of patient location:    Patient is located at {Amb Virtual Patient Location:[address]15} in the following state in which I hold an active license { amb virtual patient location:[address]}      Assessment/Plan:    Problem List Items Addressed This Visit        Other    ZAINA (generalized anxiety disorder) - Primary    Bipolar 2 disorder ([address])       Goals addressed in session: {GOALS:08968}          Reason for visit is No chief complaint on file. Encounter provider Kristy Jackson LCSW    Provider located at [address] [place] [id number] [phone]      Recent Visits  Date Type Provider Dept   05/04/23 Telemedicine Kristy Jackson LCSW [facility]   Showing recent visits within past 7 days and meeting all other requirements  Future Appointments  No visits were found meeting these conditions. Showing future appointments within next 150 days and meeting all other requirements       The patient was identified by name and date of birth. Lemuel Adhikari was informed that this is a telemedicine visit and that the visit is being conducted through{[facility] VIRTUAL VISIT SVWQFI:50781}. {Telemedicine confidentiality :26618} {Telemedicine participants:54395}  She acknowledged consent and understanding of privacy and security of the video platform. The patient has agreed to participate and understands they can discontinue the visit at any time. Patient is aware this is a billable service. Subjective  Lemuel Adhikari is a 47 y.o. female *** .       HPI     Past Medical History:   Diagnosis Date   • Anxiety    • Anxiety    • Asthma    • Bipolar disorder ([address])    • Carpal tunnel syndrome    • Chronic pain     lower back   • Depression    • Disease of thyroid gland    • Dyslipidemia    • Fibromyalgia    • Irritable bowel    • Kidney stones    • Kidney stones 2019   • Migraine    • Obesity (BMI 30.0-34. 9)    • Psychiatric disorder     depression/anxiety   • Suicide attempt [facility])     as teen   • Vitamin D deficiency        Past Surgical History:   Procedure Laterality Date   • BUNIONECTOMY     •  SECTION     • CHOLECYSTECTOMY     • PARTIAL HYSTERECTOMY      portion of  uterus still present   • TONSILLECTOMY     • TUBAL LIGATION         Current Outpatient Medications   Medication Sig Dispense Refill   • albuterol (PROVENTIL HFA,VENTOLIN HFA) 90 mcg/act inhaler Inhale 2 puffs every 6 (six) hours as needed for wheezing 18 g 1   • amoxicillin (AMOXIL) 500 mg capsule Take 1 capsule (500 mg total) by mouth every 8 (eight) hours for 7 days 21 capsule 0   • [place] Thyroid 60 MG tablet Take one and 1/2 tabs daily 135 tablet 2   • azelastine (ASTELIN) 0.1 % nasal spray 1 spray into each nostril 2 (two) times a day Use in each nostril as directed 1 mL 3   • butalbital-acetaminophen-caffeine (FIORICET,ESGIC) -40 mg per tablet      • cariprazine (Vraylar) 4.5 MG capsule Take 1 capsule (4.5 mg total) by mouth daily I am increasing the dose 30 capsule 3   • celecoxib (CELEBREX) 200 mg capsule Take 1 capsule (200 mg total) by mouth daily 30 capsule 2   • clonazePAM (KlonoPIN) 1 mg tablet Take 1 tablet (1 mg total) by mouth daily as needed for anxiety 30 tablet 3   • clotrimazole-betamethasone (LOTRISONE) 1-0.05 % cream  (Patient not taking: Reported on 3/19/2023)     • COVID-19 At Home Antigen Test ([facility] COVID-19 Ag Home Test) KIT Test 1 swab. daily as needed (URI symptoms) 5 kit 1   • cyclobenzaprine (FLEXERIL) 5 mg tablet      • dicyclomine (BENTYL) 10 mg capsule      • divalproex sodium (DEPAKOTE ER) 250 mg 24 hr tablet      • fluticasone (FLOVENT HFA) 220 mcg/act inhaler Inhale 2 puffs 2 (two) times a day Rinse mouth after use.  12 g 1   • fremanezumab-vfrm (Ajovy) 225 MG/1.5ML auto-injector Inject 225 mg under the skin every 30 (thirty) days     • hydroxychloroquine (PLAQUENIL) 200 mg tablet  (Patient not taking: Reported on 4/18/2023)     • ipratropium-albuterol (DUO-NEB) 0.5-2.5 mg/3 mL nebulizer solution Take 3 mL by nebulization 4 (four) times a day 90 mL 1   • ketoconazole (NIZORAL) 2 % cream      • levocetirizine (XYZAL) 5 MG tablet Take 1 tablet (5 mg total) by mouth every evening 90 tablet 1   • lidocaine (LIDODERM) 5 % Apply 1 patch topically daily as needed (back pain) Remove & Discard patch within 12 hours or as directed by MD 6 patch 1   • meclizine (ANTIVERT) 25 mg tablet Take 1 tablet (25 mg total) by mouth every 8 (eight) hours as needed for dizziness 30 tablet 0   • meloxicam (MOBIC) 15 mg tablet TAKE 1 TABLET (15 MG TOTAL) BY MOUTH DAILY 30 tablet 0   • mometasone (ELOCON) 0.1 % cream Apply topically daily 45 g 0   • naratriptan (AMERGE) 2.5 MG tablet      • omeprazole (PriLOSEC) 40 MG capsule Take 1 capsule (40 mg total) by mouth daily 90 capsule 1   • ondansetron (Zofran ODT) 4 mg disintegrating tablet Take 1 tablet (4 mg total) by mouth every 6 (six) hours as needed for nausea or vomiting 20 tablet 0   • ondansetron (ZOFRAN) 4 mg tablet Take 1 tablet (4 mg total) by mouth every 8 (eight) hours as needed for nausea or vomiting 20 tablet 0   • Ozempic, 1 MG/DOSE, 4 MG/3ML injection pen INJECT 0.75 ML (1 MG TOTAL) UNDER THE SKIN ONCE A WEEK 3 mL 1   • prochlorperazine (COMPAZINE) 5 mg tablet Take 1 tablet (5 mg total) by mouth every 6 (six) hours as needed for nausea or vomiting 30 tablet 0   • Promethazine-DM (PHENERGAN-DM) 6.25-15 mg/5 mL oral syrup Take 5 mL by mouth 4 (four) times a day as needed for cough 240 mL 0   • Respiratory Therapy Supplies (NEBULIZER) DONA by Does not apply route every 4 (four) hours while awake 90 each 1   • sucralfate (CARAFATE) 1 g tablet  (Patient not taking: Reported on 5/1/2023)     • tiZANidine (ZANAFLEX) 4 mg tablet      • Topiramate  MG CP24 Take 100 mg by mouth 2 (two) times a day     • Ubrelvy 100 MG tablet      • XIFAXAN 550 MG tablet        No current facility-administered medications for this visit. Allergies   Allergen Reactions   • Doxycycline Rash   • Erythromycin Rash   • Other Edema and Wheezing     jalapeno   • Augmentin [Amoxicillin-Pot Clavulanate] GI Intolerance   • Latex Rash   • Duloxetine      Other reaction(s): Nausea, Loopy   • Eletriptan      Pain in lower jaw with pressure in throat   • Emgality [Galcanezumab-Gnlm]    • Galcanezumab      rash       Review of Systems    Video Exam    There were no vitals filed for this visit.     Physical Exam     Visit Time    Visit Start Time: ***  Visit Stop Time: ***  Total Visit Duration: {Psych Total Visit Time:33140}

## 2023-05-18 ENCOUNTER — TELEMEDICINE (OUTPATIENT)
Dept: PSYCHIATRY | Facility: CLINIC | Age: 53
End: 2023-05-18

## 2023-05-18 DIAGNOSIS — F31.81 BIPOLAR 2 DISORDER (HCC): Primary | ICD-10-CM

## 2023-05-18 DIAGNOSIS — F41.1 GAD (GENERALIZED ANXIETY DISORDER): ICD-10-CM

## 2023-05-18 NOTE — PSYCH
Virtual Regular Visit    Verification of patient location:    Patient is located at Home in the following state in which I hold an active license [place]      Assessment/Plan:    Problem List Items Addressed This Visit        Other    ZAINA (generalized anxiety disorder)    Bipolar 2 disorder ([facility] ) - Primary       Goals addressed in session: Goal 1          Reason for visit is No chief complaint on file  Encounter provider Juno Allen LCSW    Provider located at [address] [place] [id number] [phone]      Recent Visits  No visits were found meeting these conditions  Showing recent visits within past 7 days and meeting all other requirements  Today's Visits  Date Type Provider Dept   05/18/23 Telemedicine Juno Allen LCSW [facility]   Showing today's visits and meeting all other requirements  Future Appointments  No visits were found meeting these conditions  Showing future appointments within next 150 days and meeting all other requirements       The patient was identified by name and date of birth  Tk Miramontes was informed that this is a telemedicine visit and that the visit is being conducted throughthe [employer] platform  She agrees to proceed     My office door was closed  No one else was in the room  She acknowledged consent and understanding of privacy and security of the video platform  The patient has agreed to participate and understands they can discontinue the visit at any time  Patient is aware this is a billable service  Subjective  Tk Miramontes is a 47 y o  female        HPI     Past Medical History:   Diagnosis Date   • Anxiety    • Anxiety    • Asthma    • Bipolar disorder ([facility] )    • Carpal tunnel syndrome    • Chronic pain     lower back   • Depression    • Disease of thyroid gland    • Dyslipidemia    • Fibromyalgia    • Irritable bowel    • Kidney stones    • Kidney stones 2019   • Migraine    • Obesity (BMI 30 0-34  9)    • Psychiatric disorder     depression/anxiety   • Suicide attempt [facility])     as teen   • Vitamin D deficiency        Past Surgical History:   Procedure Laterality Date   • BUNIONECTOMY     •  SECTION     • CHOLECYSTECTOMY     • PARTIAL HYSTERECTOMY      portion of  uterus still present   • TONSILLECTOMY     • TUBAL LIGATION         Current Outpatient Medications   Medication Sig Dispense Refill   • albuterol (PROVENTIL HFA,VENTOLIN HFA) 90 mcg/act inhaler Inhale 2 puffs every 6 (six) hours as needed for wheezing 18 g 1   • [place] Thyroid 60 MG tablet Take one and 1/2 tabs daily 135 tablet 2   • azelastine (ASTELIN) 0 1 % nasal spray 1 spray into each nostril 2 (two) times a day Use in each nostril as directed 1 mL 3   • butalbital-acetaminophen-caffeine (FIORICET,ESGIC) -40 mg per tablet      • cariprazine (Vraylar) 4 5 MG capsule Take 1 capsule (4 5 mg total) by mouth daily I am increasing the dose 30 capsule 3   • celecoxib (CELEBREX) 200 mg capsule Take 1 capsule (200 mg total) by mouth daily 30 capsule 2   • clonazePAM (KlonoPIN) 1 mg tablet Take 1 tablet (1 mg total) by mouth daily as needed for anxiety 30 tablet 3   • clotrimazole-betamethasone (LOTRISONE) 1-0 05 % cream  (Patient not taking: Reported on 3/19/2023)     • COVID-19 At Home Antigen Test ([facility] COVID-19 Ag Home Test) KIT Test 1 swab  daily as needed (URI symptoms) 5 kit 1   • cyclobenzaprine (FLEXERIL) 5 mg tablet      • dicyclomine (BENTYL) 10 mg capsule      • divalproex sodium (DEPAKOTE ER) 250 mg 24 hr tablet      • fluticasone (FLOVENT HFA) 220 mcg/act inhaler Inhale 2 puffs 2 (two) times a day Rinse mouth after use   12 g 1   • fremanezumab-vfrm (Ajovy) 225 MG/1 5ML auto-injector Inject 225 mg under the skin every 30 (thirty) days     • hydroxychloroquine (PLAQUENIL) 200 mg tablet  (Patient not taking: Reported on 2023)     • ipratropium-albuterol (DUO-NEB) 0 5-2 5 mg/3 mL nebulizer solution Take 3 mL by nebulization 4 (four) times a day 90 mL 1   • ketoconazole (NIZORAL) 2 % cream      • levocetirizine (XYZAL) 5 MG tablet Take 1 tablet (5 mg total) by mouth every evening 90 tablet 1   • lidocaine (LIDODERM) 5 % Apply 1 patch topically daily as needed (back pain) Remove & Discard patch within 12 hours or as directed by MD 6 patch 1   • meclizine (ANTIVERT) 25 mg tablet Take 1 tablet (25 mg total) by mouth every 8 (eight) hours as needed for dizziness 30 tablet 0   • meloxicam (MOBIC) 15 mg tablet TAKE 1 TABLET (15 MG TOTAL) BY MOUTH DAILY 30 tablet 0   • mometasone (ELOCON) 0 1 % cream Apply topically daily 45 g 0   • naratriptan (AMERGE) 2 5 MG tablet      • omeprazole (PriLOSEC) 40 MG capsule Take 1 capsule (40 mg total) by mouth daily 90 capsule 1   • ondansetron (Zofran ODT) 4 mg disintegrating tablet Take 1 tablet (4 mg total) by mouth every 6 (six) hours as needed for nausea or vomiting 20 tablet 0   • ondansetron (ZOFRAN) 4 mg tablet Take 1 tablet (4 mg total) by mouth every 8 (eight) hours as needed for nausea or vomiting 20 tablet 0   • Ozempic, 1 MG/DOSE, 4 MG/3ML injection pen INJECT 0 75 ML (1 MG TOTAL) UNDER THE SKIN ONCE A WEEK 3 mL 1   • prochlorperazine (COMPAZINE) 5 mg tablet Take 1 tablet (5 mg total) by mouth every 6 (six) hours as needed for nausea or vomiting 30 tablet 0   • Promethazine-DM (PHENERGAN-DM) 6 25-15 mg/5 mL oral syrup Take 5 mL by mouth 4 (four) times a day as needed for cough 240 mL 0   • Respiratory Therapy Supplies (NEBULIZER) DONA by Does not apply route every 4 (four) hours while awake 90 each 1   • sucralfate (CARAFATE) 1 g tablet  (Patient not taking: Reported on 5/1/2023)     • tiZANidine (ZANAFLEX) 4 mg tablet      • Topiramate  MG CP24 Take 100 mg by mouth 2 (two) times a day     • Ubrelvy 100 MG tablet      • XIFAXAN 550 MG tablet        No current facility-administered medications for "this visit  Allergies   Allergen Reactions   • Doxycycline Rash   • Erythromycin Rash   • Other Edema and Wheezing     jalapeno   • Augmentin [Amoxicillin-Pot Clavulanate] GI Intolerance   • Latex Rash   • Duloxetine      Other reaction(s): Nausea, Loopy   • Eletriptan      Pain in lower jaw with pressure in throat   • Emgality [Galcanezumab-Gnlm]    • Galcanezumab      rash       Review of Systems    Video Exam    There were no vitals filed for this visit  Physical Exam     Behavioral Health Psychotherapy Progress Note    Psychotherapy Provided: Individual Psychotherapy      Diagnosis ICD-10-CM Associated Orders   1  Bipolar 2 disorder (HCC)  F31 81       2  ZAINA (generalized anxiety disorder)  F41 1            Goals addressed in session: Goal 1     DATA: Met with Darin Monreal for a scheduled individual session  Topics discussed included emotional wellness, coping skills, relationship with significant other and family stressors  Burgess Esteban is feeling \"tired\", she stated it's been a long several weeks with her fiance  She said they had another big argument but were able to talk it out  It would seem that they are both strong personalities and adjusting to one another has been a process; it's a process that they are both trying to work through  Burgess Esteban shows evidence of utilizing effective communication skills, engaging in problem solving and maintaining emotional composure to manage mental health symptoms  During this session, this clinical used the following therapeutic modalities supportive psychotherapy, client-centered therapy and stress management skills  Substance Abuse was not addressed during this session  If the client is diagnosed with a co-occurring substance use disorder, please indicate any changes in the frequency or amount of use: N/A  Stage of change for addressing substance use diagnoses: No substance use/Not applicable    ASSESSMENT:  Burgess Esteban presents with a Euthymic/ normal mood    " "her affect is normal range and intensity, appropriate, which is congruent, with his mood and the content of the session  Sylvester Moore was oriented x3  She was focused and engaged  Sylvester Moore exhibits good therapeutic rapport with this clinician  The client has made progress on their goals  Merissa Peterson presents with a none risk of suicide, none risk of self-harm, and none risk of harm to others  For any risk assessment that surpasses a \"low\" rating, a safety plan must be developed  A safety plan was indicated: no  If yes, describe in detail: N/A    PLAN: Sylvester Moore will return in 1 week for the next scheduled session  At the next session, the therapist will use engagement strategies and supportive psychotherapy to address her anxiety  Behavioral Health Treatment Plan and Discharge Planning: Merissa Peterson is aware of and agrees to continue to work on their treatment plan  They have identified and are working toward their discharge goals   yes    Visit start and stop times:    05/18/23  Start Time: 0805  Stop Time: 1561  Total Visit Time: 52 minutes      "

## 2023-05-24 ENCOUNTER — TELEMEDICINE (OUTPATIENT)
Dept: FAMILY MEDICINE CLINIC | Facility: CLINIC | Age: 53
End: 2023-05-24

## 2023-05-24 DIAGNOSIS — R10.84 GENERALIZED ABDOMINAL PAIN: ICD-10-CM

## 2023-05-24 DIAGNOSIS — R11.0 NAUSEA: ICD-10-CM

## 2023-05-24 DIAGNOSIS — R19.7 DIARRHEA, UNSPECIFIED TYPE: Primary | ICD-10-CM

## 2023-05-24 RX ORDER — ONDANSETRON 4 MG/1
4 TABLET, FILM COATED ORAL EVERY 8 HOURS PRN
Qty: 20 TABLET | Refills: 0 | Status: SHIPPED | OUTPATIENT
Start: 2023-05-24

## 2023-05-24 RX ORDER — DICYCLOMINE HYDROCHLORIDE 10 MG/1
10 CAPSULE ORAL 3 TIMES DAILY PRN
Qty: 30 CAPSULE | Refills: 0 | Status: SHIPPED | OUTPATIENT
Start: 2023-05-24

## 2023-05-24 RX ORDER — LOPERAMIDE HYDROCHLORIDE 2 MG/1
2 TABLET ORAL 4 TIMES DAILY PRN
Qty: 30 TABLET | Refills: 0 | Status: SHIPPED | OUTPATIENT
Start: 2023-05-24

## 2023-05-24 NOTE — PROGRESS NOTES
Virtual Regular Visit    Verification of patient location:    Patient is located at Home in the following state in which I hold an active license [place]      Assessment/Plan:    Problem List Items Addressed This Visit    None  Visit Diagnoses     Diarrhea, unspecified type    -  Primary    Imodium, continue with bland diet  Avoid dairy  Stay hydrated  Relevant Medications    loperamide (IMODIUM A-D) 2 MG tablet    Nausea        Continue with Zofran as needed every 6 hours  Relevant Medications    ondansetron (ZOFRAN) 4 mg tablet    Generalized abdominal pain        May use Bentyl as needed up to 3 times a day  Relevant Medications    dicyclomine (BENTYL) 10 mg capsule        Follow-up if no improvement in 1 week or sooner if symptoms worsen  Reason for visit is   Chief Complaint   Patient presents with   • Virtual Regular Visit        Encounter provider ERICKA Camargo    Provider located at [facility] [address] [id number] [phone]      Recent Visits  No visits were found meeting these conditions  Showing recent visits within past 7 days and meeting all other requirements  Today's Visits  Date Type Provider Dept   05/24/23 Telemedicine ERICKA Camargo  [address] today's visits and meeting all other requirements  Future Appointments  No visits were found meeting these conditions  Showing future appointments within next 150 days and meeting all other requirements       The patient was identified by name and date of birth  Shai Prasad was informed that this is a telemedicine visit and that the visit is being conducted through the [address] Now platform  She agrees to proceed     My office door was closed  No one else was in the room  She acknowledged consent and understanding of privacy and security of the video platform   The patient has agreed to participate and understands they can discontinue the visit at any time  Patient is aware this is a billable service  Subjective  Marcia Fontaine is a 47 y o  female    Diarrhea   This is a new problem  The current episode started yesterday  The problem occurs 2 to 4 times per day  The problem has been waxing and waning  The stool consistency is described as watery  The patient states that diarrhea awakens her from sleep  Associated symptoms include abdominal pain, arthralgias, bloating, a fever and vomiting  Pertinent negatives include no chills, coughing, headaches, increased  flatus, myalgias, sweats, URI or weight loss  Nothing aggravates the symptoms  There are no known risk factors  She has tried increased fluids, electrolyte solution and change of diet for the symptoms  The treatment provided mild relief  There is no history of bowel resection, inflammatory bowel disease, irritable bowel syndrome, malabsorption, a recent abdominal surgery or short gut syndrome  Past Medical History:   Diagnosis Date   • Anxiety    • Anxiety    • Asthma    • Bipolar disorder [facility])    • Carpal tunnel syndrome    • Chronic pain     lower back   • Depression    • Disease of thyroid gland    • Dyslipidemia    • Fibromyalgia    • Irritable bowel    • Kidney stones    • Kidney stones 2019   • Migraine    • Obesity (BMI 30 0-34  9)    • Psychiatric disorder     depression/anxiety   • Suicide attempt [facility])     as teen   • Vitamin D deficiency        Past Surgical History:   Procedure Laterality Date   • BUNIONECTOMY     •  SECTION     • CHOLECYSTECTOMY     • PARTIAL HYSTERECTOMY      portion of  uterus still present   • TONSILLECTOMY     • TUBAL LIGATION         Current Outpatient Medications   Medication Sig Dispense Refill   • dicyclomine (BENTYL) 10 mg capsule Take 1 capsule (10 mg total) by mouth 3 (three) times a day as needed (abd cramping) 30 capsule 0   • loperamide (IMODIUM A-D) 2 MG tablet Take 1 tablet (2 mg total) by mouth 4 (four) times a day as needed for diarrhea 30 tablet 0   • ondansetron (ZOFRAN) 4 mg tablet Take 1 tablet (4 mg total) by mouth every 8 (eight) hours as needed for nausea or vomiting 20 tablet 0   • albuterol (PROVENTIL HFA,VENTOLIN HFA) 90 mcg/act inhaler Inhale 2 puffs every 6 (six) hours as needed for wheezing 18 g 1   • [place] Thyroid 60 MG tablet Take one and 1/2 tabs daily 135 tablet 2   • azelastine (ASTELIN) 0 1 % nasal spray 1 spray into each nostril 2 (two) times a day Use in each nostril as directed 1 mL 3   • butalbital-acetaminophen-caffeine (FIORICET,ESGIC) -40 mg per tablet      • cariprazine (Vraylar) 4 5 MG capsule Take 1 capsule (4 5 mg total) by mouth daily I am increasing the dose 30 capsule 3   • celecoxib (CELEBREX) 200 mg capsule Take 1 capsule (200 mg total) by mouth daily 30 capsule 2   • clonazePAM (KlonoPIN) 1 mg tablet Take 1 tablet (1 mg total) by mouth daily as needed for anxiety 30 tablet 3   • clotrimazole-betamethasone (LOTRISONE) 1-0 05 % cream  (Patient not taking: Reported on 3/19/2023)     • COVID-19 At Home Antigen Test ([facility] COVID-19 Ag Home Test) KIT Test 1 swab  daily as needed (URI symptoms) 5 kit 1   • cyclobenzaprine (FLEXERIL) 5 mg tablet      • divalproex sodium (DEPAKOTE ER) 250 mg 24 hr tablet      • fluticasone (FLOVENT HFA) 220 mcg/act inhaler Inhale 2 puffs 2 (two) times a day Rinse mouth after use   12 g 1   • fremanezumab-vfrm (Ajovy) 225 MG/1 5ML auto-injector Inject 225 mg under the skin every 30 (thirty) days     • hydroxychloroquine (PLAQUENIL) 200 mg tablet  (Patient not taking: Reported on 4/18/2023)     • ipratropium-albuterol (DUO-NEB) 0 5-2 5 mg/3 mL nebulizer solution Take 3 mL by nebulization 4 (four) times a day 90 mL 1   • ketoconazole (NIZORAL) 2 % cream      • levocetirizine (XYZAL) 5 MG tablet Take 1 tablet (5 mg total) by mouth every evening 90 tablet 1   • lidocaine (LIDODERM) 5 % Apply 1 patch topically daily as needed (back pain) Remove & Discard patch within 12 hours or as directed by MD Castro patch 1   • meclizine (ANTIVERT) 25 mg tablet Take 1 tablet (25 mg total) by mouth every 8 (eight) hours as needed for dizziness 30 tablet 0   • meloxicam (MOBIC) 15 mg tablet TAKE 1 TABLET (15 MG TOTAL) BY MOUTH DAILY 30 tablet 0   • mometasone (ELOCON) 0 1 % cream Apply topically daily 45 g 0   • naratriptan (AMERGE) 2 5 MG tablet      • omeprazole (PriLOSEC) 40 MG capsule Take 1 capsule (40 mg total) by mouth daily 90 capsule 1   • Ozempic, 1 MG/DOSE, 4 MG/3ML injection pen INJECT 0 75 ML (1 MG TOTAL) UNDER THE SKIN ONCE A WEEK 3 mL 1   • prochlorperazine (COMPAZINE) 5 mg tablet Take 1 tablet (5 mg total) by mouth every 6 (six) hours as needed for nausea or vomiting 30 tablet 0   • Promethazine-DM (PHENERGAN-DM) 6 25-15 mg/5 mL oral syrup Take 5 mL by mouth 4 (four) times a day as needed for cough 240 mL 0   • Respiratory Therapy Supplies (NEBULIZER) DONA by Does not apply route every 4 (four) hours while awake 90 each 1   • sucralfate (CARAFATE) 1 g tablet  (Patient not taking: Reported on 5/1/2023)     • tiZANidine (ZANAFLEX) 4 mg tablet      • Topiramate  MG CP24 Take 100 mg by mouth 2 (two) times a day     • Ubrelvy 100 MG tablet      • XIFAXAN 550 MG tablet        No current facility-administered medications for this visit  Allergies   Allergen Reactions   • Doxycycline Rash   • Erythromycin Rash   • Other Edema and Wheezing     jalapeno   • Augmentin [Amoxicillin-Pot Clavulanate] GI Intolerance   • Latex Rash   • Duloxetine      Other reaction(s): Nausea, Loopy   • Eletriptan      Pain in lower jaw with pressure in throat   • Emgality [Galcanezumab-Gnlm]    • Galcanezumab      rash       Review of Systems   Constitutional: Positive for fatigue and fever  Negative for chills and weight loss  Respiratory: Negative for cough      Gastrointestinal: Positive for abdominal pain, bloating, diarrhea and vomiting  Negative for flatus  Musculoskeletal: Positive for arthralgias  Negative for myalgias  Neurological: Negative for headaches  Video Exam    There were no vitals filed for this visit  Physical Exam  Constitutional:       General: She is not in acute distress  Appearance: Normal appearance  She is not ill-appearing  HENT:      Head: Normocephalic  Pulmonary:      Effort: Pulmonary effort is normal  No respiratory distress  Neurological:      Mental Status: She is alert and oriented to person, place, and time  Psychiatric:         Mood and Affect: Mood normal          Behavior: Behavior normal          Thought Content:  Thought content normal          Judgment: Judgment normal           Visit Time  Total Visit Duration: 15

## 2023-05-24 NOTE — PATIENT INSTRUCTIONS
Gastroenteritis   AMBULATORY CARE:   Gastroenteritis , or stomach flu, is an infection of the stomach and intestines  It is caused by bacteria, parasites, or viruses  Common symptoms include the following:   Diarrhea or gas    Nausea, vomiting, or poor appetite    Abdominal cramps, pain, or gurgling    Fever    Tiredness or weakness    Headaches or muscle aches with any of the above symptoms    Call 911 for any of the following: You have trouble breathing or a very fast pulse  Seek care immediately if:   You see blood in your diarrhea  You cannot stop vomiting  You have not urinated for 12 hours  You feel like you are going to faint  Contact your healthcare provider if:   You have a fever  You continue to vomit or have diarrhea, even after treatment  You see worms in your diarrhea  Your mouth or eyes are dry  You are not urinating as much or as often  You have questions or concerns about your condition or care  Treatment for gastroenteritis  may include medicines to slow or stop your diarrhea or vomiting  You may also need medicines to treat an infection caused by bacteria or a parasite  Manage your symptoms:   Drink liquids as directed  Ask your healthcare provider how much liquid to drink each day, and which liquids are best for you  You may also need to drink an oral rehydration solution (ORS)  An ORS has the right amounts of sugar, salt, and minerals in water to replace body fluids  Eat bland foods  When you feel hungry, begin eating soft, bland foods  Examples are bananas, clear soup, potatoes, and applesauce  Do not have dairy products, alcohol, sugary drinks, or drinks with caffeine until you feel better  Rest as much as possible  Slowly start to do more each day when you begin to feel better  Prevent the spread of germs:  Gastroenteritis can spread easily   Keep yourself, your family, and your surroundings clean to help prevent the spread of gastroenteritis:  Wash your hands often  Use soap and water  Wash your hands after you use the bathroom, change a child's diapers, or sneeze  Wash your hands before you prepare or eat food  Clean surfaces and do laundry often  Wash your clothes and towels separately from the rest of the laundry  Clean surfaces in your home with antibacterial  or bleach  Clean food thoroughly and cook safely  Wash raw vegetables before you cook  Cook meat, fish, and eggs fully  Do not use the same dishes for raw meat as you do for other foods  Refrigerate any leftover food immediately  Be aware when you camp or travel  Drink only clean water  Do not drink from rivers or lakes unless you purify or boil the water first  When you travel, drink bottled water and do not add ice  Do not eat fruit that has not been peeled  Do not eat raw fish or meat that is not fully cooked  Follow up with your doctor as directed:  Write down your questions so you remember to ask them during your visits  © Copyright [employer] 2022 Information is for End User's use only and may not be sold, redistributed or otherwise used for commercial purposes  The above information is an  only  It is not intended as medical advice for individual conditions or treatments  Talk to your doctor, nurse or pharmacist before following any medical regimen to see if it is safe and effective for you

## 2023-05-25 ENCOUNTER — RA CDI HCC (OUTPATIENT)
Dept: OTHER | Facility: HOSPITAL | Age: 53
End: 2023-05-25

## 2023-05-25 ENCOUNTER — TELEMEDICINE (OUTPATIENT)
Dept: PSYCHIATRY | Facility: CLINIC | Age: 53
End: 2023-05-25

## 2023-05-25 DIAGNOSIS — F41.1 GAD (GENERALIZED ANXIETY DISORDER): Primary | ICD-10-CM

## 2023-05-25 DIAGNOSIS — F31.81 BIPOLAR 2 DISORDER (HCC): ICD-10-CM

## 2023-05-25 NOTE — PSYCH
Virtual Regular Visit    Verification of patient location:    Patient is located at Home in the following state in which I hold an active license [place]      Assessment/Plan:    Problem List Items Addressed This Visit        Other    ZAINA (generalized anxiety disorder) - Primary    Bipolar 2 disorder ([facility] )       Goals addressed in session: Goal 1          Reason for visit is No chief complaint on file  Encounter provider Percy Penaloza LCSW    Provider located at [address] [place] [id number] [phone]      Recent Visits  Date Type Provider Dept   05/24/23 Telemedicine ERICKA Cartwright [facility]   05/18/23 Telemedicine Percy Penaloza LCSW [facility]   Showing recent visits within past 7 days and meeting all other requirements  Today's Visits  Date Type Provider Dept   05/25/23 Telemedicine Percy Penaloza LCSW [facility]   Showing today's visits and meeting all other requirements  Future Appointments  No visits were found meeting these conditions  Showing future appointments within next 150 days and meeting all other requirements       The patient was identified by name and date of birth  Mirta Vieyra was informed that this is a telemedicine visit and that the visit is being conducted throughthe [employer] platform  She agrees to proceed     My office door was closed  No one else was in the room  She acknowledged consent and understanding of privacy and security of the video platform  The patient has agreed to participate and understands they can discontinue the visit at any time  Patient is aware this is a billable service  Subjective  iMrta Vieyra is a 47 y o  female        HPI     Past Medical History:   Diagnosis Date   • Anxiety    • Anxiety    • Asthma    • [facility] ([facility]    • Carpal tunnel syndrome    • Chronic pain     lower back   • Depression    • Disease of thyroid gland    • Dyslipidemia    • Fibromyalgia    • Irritable bowel    • Kidney stones    • Kidney stones 2019   • Migraine    • Obesity (BMI 30 0-34  9)    • Psychiatric disorder     depression/anxiety   • Suicide attempt [facility])     as teen   • Vitamin D deficiency        Past Surgical History:   Procedure Laterality Date   • BUNIONECTOMY     •  SECTION     • CHOLECYSTECTOMY     • PARTIAL HYSTERECTOMY      portion of  uterus still present   • TONSILLECTOMY     • TUBAL LIGATION         Current Outpatient Medications   Medication Sig Dispense Refill   • albuterol (PROVENTIL HFA,VENTOLIN HFA) 90 mcg/act inhaler Inhale 2 puffs every 6 (six) hours as needed for wheezing 18 g 1   • Rew Thyroid 60 MG tablet Take one and 1/2 tabs daily 135 tablet 2   • azelastine (ASTELIN) 0 1 % nasal spray 1 spray into each nostril 2 (two) times a day Use in each nostril as directed 1 mL 3   • butalbital-acetaminophen-caffeine (FIORICET,ESGIC) -40 mg per tablet      • cariprazine (Vraylar) 4 5 MG capsule Take 1 capsule (4 5 mg total) by mouth daily I am increasing the dose 30 capsule 3   • celecoxib (CELEBREX) 200 mg capsule Take 1 capsule (200 mg total) by mouth daily 30 capsule 2   • clonazePAM (KlonoPIN) 1 mg tablet Take 1 tablet (1 mg total) by mouth daily as needed for anxiety 30 tablet 3   • clotrimazole-betamethasone (LOTRISONE) 1-0 05 % cream  (Patient not taking: Reported on 3/19/2023)     • COVID-19 At Home Antigen Test ([facility] COVID-19 Ag Home Test) KIT Test 1 swab  daily as needed (URI symptoms) 5 kit 1   • cyclobenzaprine (FLEXERIL) 5 mg tablet      • dicyclomine (BENTYL) 10 mg capsule Take 1 capsule (10 mg total) by mouth 3 (three) times a day as needed (abd cramping) 30 capsule 0   • divalproex sodium (DEPAKOTE ER) 250 mg 24 hr tablet      • fluticasone (FLOVENT HFA) 220 mcg/act inhaler Inhale 2 puffs 2 (two) times a day Rinse mouth after use   12 g 1   • fremanezumab-vfrm (Ajovy) 225 MG/1 5ML auto-injector Inject 225 mg under the skin every 30 (thirty) days     • hydroxychloroquine (PLAQUENIL) 200 mg tablet  (Patient not taking: Reported on 4/18/2023)     • ipratropium-albuterol (DUO-NEB) 0 5-2 5 mg/3 mL nebulizer solution Take 3 mL by nebulization 4 (four) times a day 90 mL 1   • ketoconazole (NIZORAL) 2 % cream      • levocetirizine (XYZAL) 5 MG tablet Take 1 tablet (5 mg total) by mouth every evening 90 tablet 1   • lidocaine (LIDODERM) 5 % Apply 1 patch topically daily as needed (back pain) Remove & Discard patch within 12 hours or as directed by MD 6 patch 1   • loperamide (IMODIUM A-D) 2 MG tablet Take 1 tablet (2 mg total) by mouth 4 (four) times a day as needed for diarrhea 30 tablet 0   • meclizine (ANTIVERT) 25 mg tablet Take 1 tablet (25 mg total) by mouth every 8 (eight) hours as needed for dizziness 30 tablet 0   • meloxicam (MOBIC) 15 mg tablet TAKE 1 TABLET (15 MG TOTAL) BY MOUTH DAILY 30 tablet 0   • mometasone (ELOCON) 0 1 % cream Apply topically daily 45 g 0   • naratriptan (AMERGE) 2 5 MG tablet      • omeprazole (PriLOSEC) 40 MG capsule Take 1 capsule (40 mg total) by mouth daily 90 capsule 1   • ondansetron (ZOFRAN) 4 mg tablet Take 1 tablet (4 mg total) by mouth every 8 (eight) hours as needed for nausea or vomiting 20 tablet 0   • Ozempic, 1 MG/DOSE, 4 MG/3ML injection pen INJECT 0 75 ML (1 MG TOTAL) UNDER THE SKIN ONCE A WEEK 3 mL 1   • prochlorperazine (COMPAZINE) 5 mg tablet Take 1 tablet (5 mg total) by mouth every 6 (six) hours as needed for nausea or vomiting 30 tablet 0   • Promethazine-DM (PHENERGAN-DM) 6 25-15 mg/5 mL oral syrup Take 5 mL by mouth 4 (four) times a day as needed for cough 240 mL 0   • Respiratory Therapy Supplies (NEBULIZER) DONA by Does not apply route every 4 (four) hours while awake 90 each 1   • sucralfate (CARAFATE) 1 g tablet  (Patient not taking: Reported on 5/1/2023)     • tiZANidine "(ZANAFLEX) 4 mg tablet      • Topiramate  MG CP24 Take 100 mg by mouth 2 (two) times a day     • Ubrelvy 100 MG tablet      • XIFAXAN 550 MG tablet        No current facility-administered medications for this visit  Allergies   Allergen Reactions   • Doxycycline Rash   • Erythromycin Rash   • Other Edema and Wheezing     jalapeno   • Augmentin [Amoxicillin-Pot Clavulanate] GI Intolerance   • Latex Rash   • Duloxetine      Other reaction(s): Nausea, Loopy   • Eletriptan      Pain in lower jaw with pressure in throat   • Emgality [Galcanezumab-Gnlm]    • Galcanezumab      rash       Review of Systems    Video Exam    There were no vitals filed for this visit  Physical Exam     Behavioral Health Psychotherapy Progress Note    Psychotherapy Provided: Individual Psychotherapy      Diagnosis ICD-10-CM Associated Orders   1  ZAINA (generalized anxiety disorder)  F41 1       2  B[facility] 2 disorder ([facility] )  F31 81            Goals addressed in session: Goal 1     DATA: Met with Kristi Tijerina for a scheduled individual session  Topics discussed included emotional wellness, coping skills and relationship with significant other  Tiana Hernandez is feeling \"tired\"  Her and her grandson have been sick  She spoke about her relationship with her fiance  They continue to have their ups and downs; they both have strong personalities and are used to living a certain way  He gets frustrated with her when she gives him feedback about taking his shoes off or not making a mess with food everywhere  Tiana Hernandez has worked hard to have a nice home and keeps it very clean; she doesn't understand why he doesn't want to have a clean home as well  It seems to be a power struggle on his part  We talked this out for the remainder of the session  Tiana Hernandez shows evidence of utilizing effective communication skills, engaging in problem solving and maintaining emotional composure to manage mental health symptoms    During this session, this " "clinical used the following therapeutic modalities supportive psychotherapy, client-centered therapy, stress management skills and problem solving skills  Substance Abuse was not addressed during this session  If the client is diagnosed with a co-occurring substance use disorder, please indicate any changes in the frequency or amount of use: N/A  Stage of change for addressing substance use diagnoses: No substance use/Not applicable    ASSESSMENT:  Светлана Renteria presents with a Euthymic/ normal mood  her affect is normal range and intensity, appropriate, which is congruent, with his mood and the content of the session  Светлана Renteria was oriented x3  She was focused and engaged  Светлана Renteria exhibits good therapeutic rapport with this clinician  The client has made progress on their goals  Judi Tompkins presents with a none risk of suicide, none risk of self-harm, and none risk of harm to others  For any risk assessment that surpasses a \"low\" rating, a safety plan must be developed  A safety plan was indicated: no  If yes, describe in detail: N/A    PLAN: Светлана Renteria will return in 1 week for the next scheduled session  At the next session, the therapist will use engagement strategies, supportive psychotherapy and client-centered therapy to address her anxiety  Behavioral Health Treatment Plan and Discharge Planning: Judi Tompkins is aware of and agrees to continue to work on their treatment plan  They have identified and are working toward their discharge goals   yes    Visit start and stop times:    05/25/23  Start Time: 0806  Stop Time: 9744  Total Visit Time: 52 minutes    "

## 2023-05-25 NOTE — PROGRESS NOTES
Previous   [facility]  coding opportunities       Chart reviewed, no opportunity found: CHART REVIEWED, NO OPPORTUNITY FOUND        Patients Insurance     Medicare Insurance: Medicare        suggetsion used

## 2023-05-30 ENCOUNTER — APPOINTMENT (OUTPATIENT)
Dept: LAB | Facility: CLINIC | Age: 53
End: 2023-05-30

## 2023-05-30 ENCOUNTER — TELEMEDICINE (OUTPATIENT)
Dept: FAMILY MEDICINE CLINIC | Facility: CLINIC | Age: 53
End: 2023-05-30

## 2023-05-30 VITALS — WEIGHT: 146 LBS | BODY MASS INDEX: 24.92 KG/M2 | HEIGHT: 64 IN

## 2023-05-30 DIAGNOSIS — R10.33 PERIUMBILICAL ABDOMINAL PAIN: ICD-10-CM

## 2023-05-30 DIAGNOSIS — R19.7 VOMITING AND DIARRHEA: ICD-10-CM

## 2023-05-30 DIAGNOSIS — R11.10 VOMITING AND DIARRHEA: ICD-10-CM

## 2023-05-30 DIAGNOSIS — R10.33 PERIUMBILICAL ABDOMINAL PAIN: Primary | ICD-10-CM

## 2023-05-30 LAB
ALBUMIN SERPL BCP-MCNC: 3.7 G/DL (ref 3.5–5)
ALP SERPL-CCNC: 73 U/L (ref 46–116)
ALT SERPL W P-5'-P-CCNC: 52 U/L (ref 12–78)
ANION GAP SERPL CALCULATED.3IONS-SCNC: 4 MMOL/L (ref 4–13)
AST SERPL W P-5'-P-CCNC: 34 U/L (ref 5–45)
BACTERIA UR QL AUTO: ABNORMAL /HPF
BASOPHILS # BLD AUTO: 0.01 THOUSANDS/ÂΜL (ref 0–0.1)
BASOPHILS NFR BLD AUTO: 0 % (ref 0–1)
BILIRUB SERPL-MCNC: 0.28 MG/DL (ref 0.2–1)
BILIRUB UR QL STRIP: NEGATIVE
BUN SERPL-MCNC: 14 MG/DL (ref 5–25)
CALCIUM SERPL-MCNC: 9.7 MG/DL (ref 8.3–10.1)
CHLORIDE SERPL-SCNC: 107 MMOL/L (ref 96–108)
CLARITY UR: CLEAR
CO2 SERPL-SCNC: 27 MMOL/L (ref 21–32)
COLOR UR: ABNORMAL
CREAT SERPL-MCNC: 0.81 MG/DL (ref 0.6–1.3)
EOSINOPHIL # BLD AUTO: 0.16 THOUSAND/ÂΜL (ref 0–0.61)
EOSINOPHIL NFR BLD AUTO: 5 % (ref 0–6)
ERYTHROCYTE [DISTWIDTH] IN BLOOD BY AUTOMATED COUNT: 12.1 % (ref 11.6–15.1)
GFR SERPL CREATININE-BSD FRML MDRD: 84 ML/MIN/1.73SQ M
GLUCOSE P FAST SERPL-MCNC: 86 MG/DL (ref 65–99)
GLUCOSE UR STRIP-MCNC: NEGATIVE MG/DL
HCT VFR BLD AUTO: 38.8 % (ref 34.8–46.1)
HGB BLD-MCNC: 12.8 G/DL (ref 11.5–15.4)
HGB UR QL STRIP.AUTO: NEGATIVE
IMM GRANULOCYTES # BLD AUTO: 0.01 THOUSAND/UL (ref 0–0.2)
IMM GRANULOCYTES NFR BLD AUTO: 0 % (ref 0–2)
KETONES UR STRIP-MCNC: NEGATIVE MG/DL
LEUKOCYTE ESTERASE UR QL STRIP: NEGATIVE
LYMPHOCYTES # BLD AUTO: 1.23 THOUSANDS/ÂΜL (ref 0.6–4.47)
LYMPHOCYTES NFR BLD AUTO: 36 % (ref 14–44)
MCH RBC QN AUTO: 29.9 PG (ref 26.8–34.3)
MCHC RBC AUTO-ENTMCNC: 33 G/DL (ref 31.4–37.4)
MCV RBC AUTO: 91 FL (ref 82–98)
MONOCYTES # BLD AUTO: 0.61 THOUSAND/ÂΜL (ref 0.17–1.22)
MONOCYTES NFR BLD AUTO: 18 % (ref 4–12)
NEUTROPHILS # BLD AUTO: 1.39 THOUSANDS/ÂΜL (ref 1.85–7.62)
NEUTS SEG NFR BLD AUTO: 41 % (ref 43–75)
NITRITE UR QL STRIP: NEGATIVE
NON-SQ EPI CELLS URNS QL MICRO: ABNORMAL /HPF
NRBC BLD AUTO-RTO: 0 /100 WBCS
PH UR STRIP.AUTO: 6 [PH]
PLATELET # BLD AUTO: 230 THOUSANDS/UL (ref 149–390)
PMV BLD AUTO: 10.1 FL (ref 8.9–12.7)
POTASSIUM SERPL-SCNC: 4.3 MMOL/L (ref 3.5–5.3)
PROT SERPL-MCNC: 7.2 G/DL (ref 6.4–8.4)
PROT UR STRIP-MCNC: ABNORMAL MG/DL
RBC # BLD AUTO: 4.28 MILLION/UL (ref 3.81–5.12)
RBC #/AREA URNS AUTO: ABNORMAL /HPF
SODIUM SERPL-SCNC: 138 MMOL/L (ref 135–147)
SP GR UR STRIP.AUTO: 1.02 (ref 1–1.03)
UROBILINOGEN UR STRIP-ACNC: <2 MG/DL
WBC # BLD AUTO: 3.41 THOUSAND/UL (ref 4.31–10.16)
WBC #/AREA URNS AUTO: ABNORMAL /HPF

## 2023-05-30 NOTE — PATIENT INSTRUCTIONS
Gastroenteritis   AMBULATORY CARE:   Gastroenteritis , or stomach flu, is an infection of the stomach and intestines  It is caused by bacteria, parasites, or viruses  Common symptoms include the following:   Diarrhea or gas    Nausea, vomiting, or poor appetite    Abdominal cramps, pain, or gurgling    Fever    Tiredness or weakness    Headaches or muscle aches with any of the above symptoms    Call 911 for any of the following: You have trouble breathing or a very fast pulse  Seek care immediately if:   You see blood in your diarrhea  You cannot stop vomiting  You have not urinated for 12 hours  You feel like you are going to faint  Contact your healthcare provider if:   You have a fever  You continue to vomit or have diarrhea, even after treatment  You see worms in your diarrhea  Your mouth or eyes are dry  You are not urinating as much or as often  You have questions or concerns about your condition or care  Treatment for gastroenteritis  may include medicines to slow or stop your diarrhea or vomiting  You may also need medicines to treat an infection caused by bacteria or a parasite  Manage your symptoms:   Drink liquids as directed  Ask your healthcare provider how much liquid to drink each day, and which liquids are best for you  You may also need to drink an oral rehydration solution (ORS)  An ORS has the right amounts of sugar, salt, and minerals in water to replace body fluids  Eat bland foods  When you feel hungry, begin eating soft, bland foods  Examples are bananas, clear soup, potatoes, and applesauce  Do not have dairy products, alcohol, sugary drinks, or drinks with caffeine until you feel better  Rest as much as possible  Slowly start to do more each day when you begin to feel better  Prevent the spread of germs:  Gastroenteritis can spread easily   Keep yourself, your family, and your surroundings clean to help prevent the spread of gastroenteritis:  Wash your hands often  Use soap and water  Wash your hands after you use the bathroom, change a child's diapers, or sneeze  Wash your hands before you prepare or eat food  Clean surfaces and do laundry often  Wash your clothes and towels separately from the rest of the laundry  Clean surfaces in your home with antibacterial  or bleach  Clean food thoroughly and cook safely  Wash raw vegetables before you cook  Cook meat, fish, and eggs fully  Do not use the same dishes for raw meat as you do for other foods  Refrigerate any leftover food immediately  Be aware when you camp or travel  Drink only clean water  Do not drink from rivers or lakes unless you purify or boil the water first  When you travel, drink bottled water and do not add ice  Do not eat fruit that has not been peeled  Do not eat raw fish or meat that is not fully cooked  Follow up with your doctor as directed:  Write down your questions so you remember to ask them during your visits  © Copyright [employer] 2022 Information is for End User's use only and may not be sold, redistributed or otherwise used for commercial purposes  The above information is an  only  It is not intended as medical advice for individual conditions or treatments  Talk to your doctor, nurse or pharmacist before following any medical regimen to see if it is safe and effective for you

## 2023-05-30 NOTE — PROGRESS NOTES
Virtual Regular Visit    Verification of patient location:    Patient is located at Home in the following state in which I hold an active license [place]      Assessment/Plan:    Problem List Items Addressed This Visit    None  Visit Diagnoses     Periumbilical abdominal pain    -  Primary    Relevant Orders    CBC and differential    Comprehensive metabolic panel    Urinalysis with microscopic    Urine culture    US abdomen complete    Vomiting and diarrhea        Relevant Orders    CBC and differential    Comprehensive metabolic panel               Reason for visit is   Chief Complaint   Patient presents with   • Dizziness   • Abdominal Pain   • Nausea   • Virtual Regular Visit        Encounter provider ERICKA Ibarra    Provider located at [facility] [address] [place] [address] [address] [facility] [address] [phone]      Recent Visits  Date Type Provider Dept   05/24/23 [address] recent visits within past 7 days and meeting all other requirements  Today's Visits  Date Type Provider Dept   [facility] [address]   Showing today's visits and meeting all other requirements  Future Appointments  No visits were found meeting these conditions  Showing future appointments within next 150 days and meeting all other requirements       The patient was identified by name and date of birth  Alen Gooden was informed that this is a telemedicine visit and that the visit is being conducted through the [employer]  She agrees to proceed     My office door was closed  No one else was in the room  She acknowledged consent and understanding of privacy and security of the video platform  The patient has agreed to participate and understands they can discontinue the visit at any time      [facility]  Sergio Rose is a 47 y o  female    Patient presents via video evaluation for abdominal pain, nausea, vomiting, diarrhea  Patient was seen virtually 6 days ago by PCP for similar complaints  Patient was prescribed Zofran, Reglan, Imodium  Patient notes symptoms began to improve, then 3 days ago diarrhea worsened  Patient notes periumbilical abdominal pain, denies radiation  Patient states her last episode of diarrhea was yesterday  Denies any symptoms today  After patient, has been drinking fluids, but the first time she ate solids was yesterday  Patient notes dizziness and lightheadedness  Patient denies syncope, chest pain, shortness of breath  Patient denies fever, chills, urinary symptoms, decreased urine output  Past Medical History:   Diagnosis Date   • Anxiety    • Anxiety    • Asthma    • Bipolar disorder [facility])    • Carpal tunnel syndrome    • Chronic pain     lower back   • Depression    • Disease of thyroid gland    • Dyslipidemia    • Fibromyalgia    • Irritable bowel    • Kidney stones    • Kidney stones 2019   • Migraine    • Obesity (BMI 30 0-34  9)    • Psychiatric disorder     depression/anxiety   • Suicide attempt [facility])     as teen   • Vitamin D deficiency        Past Surgical History:   Procedure Laterality Date   • BUNIONECTOMY     •  SECTION     • CHOLECYSTECTOMY     • PARTIAL HYSTERECTOMY      portion of  uterus still present   • TONSILLECTOMY     • TUBAL LIGATION         Current Outpatient Medications   Medication Sig Dispense Refill   • albuterol (PROVENTIL HFA,VENTOLIN HFA) 90 mcg/act inhaler Inhale 2 puffs every 6 (six) hours as needed for wheezing 18 g 1   • [place] Thyroid 60 MG tablet Take one and 1/2 tabs daily 135 tablet 2   • azelastine (ASTELIN) 0 1 % nasal spray 1 spray into each nostril 2 (two) times a day Use in each nostril as directed 1 mL 3   • butalbital-acetaminophen-caffeine (FIORICET,ESGIC) -40 mg per tablet      • cariprazine (Vraylar) 4 5 MG capsule Take 1 capsule (4 5 mg total) by mouth daily I am increasing the dose 30 capsule 3   • celecoxib (CELEBREX) 200 mg capsule Take 1 capsule (200 mg total) by mouth daily 30 capsule 2   • cyclobenzaprine (FLEXERIL) 5 mg tablet      • dicyclomine (BENTYL) 10 mg capsule Take 1 capsule (10 mg total) by mouth 3 (three) times a day as needed (abd cramping) 30 capsule 0   • divalproex sodium (DEPAKOTE ER) 250 mg 24 hr tablet      • fluticasone (FLOVENT HFA) 220 mcg/act inhaler Inhale 2 puffs 2 (two) times a day Rinse mouth after use   12 g 1   • fremanezumab-vfrm (Ajovy) 225 MG/1 5ML auto-injector Inject 225 mg under the skin every 30 (thirty) days     • ipratropium-albuterol (DUO-NEB) 0 5-2 5 mg/3 mL nebulizer solution Take 3 mL by nebulization 4 (four) times a day 90 mL 1   • ketoconazole (NIZORAL) 2 % cream      • levocetirizine (XYZAL) 5 MG tablet Take 1 tablet (5 mg total) by mouth every evening 90 tablet 1   • lidocaine (LIDODERM) 5 % Apply 1 patch topically daily as needed (back pain) Remove & Discard patch within 12 hours or as directed by MD 6 patch 1   • loperamide (IMODIUM A-D) 2 MG tablet Take 1 tablet (2 mg total) by mouth 4 (four) times a day as needed for diarrhea 30 tablet 0   • meclizine (ANTIVERT) 25 mg tablet Take 1 tablet (25 mg total) by mouth every 8 (eight) hours as needed for dizziness 30 tablet 0   • meloxicam (MOBIC) 15 mg tablet TAKE 1 TABLET (15 MG TOTAL) BY MOUTH DAILY 30 tablet 0   • mometasone (ELOCON) 0 1 % cream Apply topically daily 45 g 0   • naratriptan (AMERGE) 2 5 MG tablet      • omeprazole (PriLOSEC) 40 MG capsule Take 1 capsule (40 mg total) by mouth daily 90 capsule 1   • ondansetron (ZOFRAN) 4 mg tablet Take 1 tablet (4 mg total) by mouth every 8 (eight) hours as needed for nausea or vomiting 20 tablet 0   • Ozempic, 1 MG/DOSE, 4 MG/3ML injection pen INJECT 0 75 ML (1 MG TOTAL) UNDER THE SKIN ONCE A WEEK 3 mL 1   • tiZANidine (ZANAFLEX) 4 mg tablet      • Topiramate  MG CP24 Take 100 mg by mouth 2 (two) times a day     • Ubrelvy 100 MG tablet      • XIFAXAN 550 MG tablet      • clonazePAM (KlonoPIN) 1 mg tablet Take 1 tablet (1 mg total) by mouth daily as needed for anxiety 30 tablet 3   • clotrimazole-betamethasone (LOTRISONE) 1-0 05 % cream  (Patient not taking: Reported on 5/30/2023)     • prochlorperazine (COMPAZINE) 5 mg tablet Take 1 tablet (5 mg total) by mouth every 6 (six) hours as needed for nausea or vomiting (Patient not taking: Reported on 5/30/2023) 30 tablet 0   • Promethazine-DM (PHENERGAN-DM) 6 25-15 mg/5 mL oral syrup Take 5 mL by mouth 4 (four) times a day as needed for cough (Patient not taking: Reported on 5/30/2023) 240 mL 0   • Respiratory Therapy Supplies (NEBULIZER) DONA by Does not apply route every 4 (four) hours while awake 90 each 1     No current facility-administered medications for this visit  Allergies   Allergen Reactions   • Doxycycline Rash   • Erythromycin Rash   • Other Edema and Wheezing     jalapeno   • Augmentin [Amoxicillin-Pot Clavulanate] GI Intolerance   • Latex Rash   • Duloxetine      Other reaction(s): Nausea, Loopy   • Eletriptan      Pain in lower jaw with pressure in throat   • Emgality [Galcanezumab-Gnlm]    • Galcanezumab      rash       Review of Systems   Constitutional: Negative for chills and fever  HENT: Negative for congestion, sore throat and trouble swallowing  Respiratory: Negative for cough and shortness of breath  Cardiovascular: Negative for chest pain and palpitations  Gastrointestinal: Positive for abdominal pain, diarrhea, nausea and vomiting  Negative for blood in stool  Genitourinary: Negative for decreased urine volume, dysuria, flank pain, frequency and urgency  Musculoskeletal: Negative for arthralgias, back pain and myalgias  Skin: Negative for color change and rash  Neurological: Positive for dizziness and light-headedness   Negative "for syncope, weakness and headaches  Hematological: Negative for adenopathy  Psychiatric/Behavioral: Negative for confusion  Video Exam    Vitals:    05/30/23 1105   Weight: 66 2 kg (146 lb)   Height: 5' 4\" (1 626 m)       Physical Exam  Constitutional:       General: She is not in acute distress  Appearance: She is well-developed  She is not ill-appearing  HENT:      Head: Normocephalic  Right Ear: External ear normal       Left Ear: External ear normal    Pulmonary:      Effort: Pulmonary effort is normal    Musculoskeletal:         General: Normal range of motion  Skin:     General: Skin is warm  Capillary Refill: Capillary refill takes less than 2 seconds  Coloration: Skin is not pale  Neurological:      General: No focal deficit present  Mental Status: She is alert and oriented to person, place, and time     Psychiatric:         Mood and Affect: Mood normal          Behavior: Behavior normal           Visit Time  Total Visit Duration: 12 minutes      "

## 2023-05-31 LAB — BACTERIA UR CULT: NORMAL

## 2023-06-01 ENCOUNTER — TELEMEDICINE (OUTPATIENT)
Dept: PSYCHIATRY | Facility: CLINIC | Age: 53
End: 2023-06-01

## 2023-06-01 ENCOUNTER — HOSPITAL ENCOUNTER (EMERGENCY)
Facility: HOSPITAL | Age: 53
Discharge: HOME | End: 2023-06-01
Attending: EMERGENCY MEDICINE
Payer: MEDICARE

## 2023-06-01 ENCOUNTER — APPOINTMENT (EMERGENCY)
Dept: RADIOLOGY | Facility: HOSPITAL | Age: 53
End: 2023-06-01
Payer: MEDICARE

## 2023-06-01 VITALS
SYSTOLIC BLOOD PRESSURE: 113 MMHG | DIASTOLIC BLOOD PRESSURE: 57 MMHG | HEART RATE: 65 BPM | RESPIRATION RATE: 17 BRPM | TEMPERATURE: 97.2 F | OXYGEN SATURATION: 100 %

## 2023-06-01 DIAGNOSIS — R42 LIGHTHEADED: ICD-10-CM

## 2023-06-01 DIAGNOSIS — F41.1 GAD (GENERALIZED ANXIETY DISORDER): Primary | ICD-10-CM

## 2023-06-01 DIAGNOSIS — F31.81 BIPOLAR 2 DISORDER (HCC): ICD-10-CM

## 2023-06-01 DIAGNOSIS — R11.0 NAUSEA: Primary | ICD-10-CM

## 2023-06-01 LAB
ALBUMIN SERPL BCP-MCNC: 4.7 G/DL (ref 3.5–5)
ALP SERPL-CCNC: 70 U/L (ref 34–104)
ALT SERPL W P-5'-P-CCNC: 32 U/L (ref 7–52)
ANION GAP SERPL CALCULATED.3IONS-SCNC: 4 MMOL/L (ref 4–13)
AST SERPL W P-5'-P-CCNC: 22 U/L (ref 13–39)
ATRIAL RATE: 71 BPM
BASOPHILS # BLD MANUAL: 0 THOUSAND/UL (ref 0–0.1)
BASOPHILS NFR MAR MANUAL: 0 % (ref 0–1)
BILIRUB SERPL-MCNC: 0.28 MG/DL (ref 0.2–1)
BILIRUB UR QL STRIP: NEGATIVE
BUN SERPL-MCNC: 13 MG/DL (ref 5–25)
CALCIUM SERPL-MCNC: 9.8 MG/DL (ref 8.4–10.2)
CARDIAC TROPONIN I PNL SERPL HS: <2 NG/L
CHLORIDE SERPL-SCNC: 108 MMOL/L (ref 96–108)
CLARITY UR: CLEAR
CO2 SERPL-SCNC: 29 MMOL/L (ref 21–32)
COLOR UR: YELLOW
CREAT SERPL-MCNC: 0.84 MG/DL (ref 0.6–1.3)
D DIMER PPP FEU-MCNC: 0.37 UG/ML FEU
EOSINOPHIL # BLD MANUAL: 0.2 THOUSAND/UL (ref 0–0.4)
EOSINOPHIL NFR BLD MANUAL: 6 % (ref 0–6)
ERYTHROCYTE [DISTWIDTH] IN BLOOD BY AUTOMATED COUNT: 11.9 % (ref 11.6–15.1)
GFR SERPL CREATININE-BSD FRML MDRD: 80 ML/MIN/1.73SQ M
GLUCOSE SERPL-MCNC: 91 MG/DL (ref 65–140)
GLUCOSE UR STRIP-MCNC: NEGATIVE MG/DL
HCG SERPL QL: NEGATIVE
HCT VFR BLD AUTO: 40.8 % (ref 34.8–46.1)
HGB BLD-MCNC: 13.5 G/DL (ref 11.5–15.4)
HGB UR QL STRIP.AUTO: NEGATIVE
KETONES UR STRIP-MCNC: NEGATIVE MG/DL
LEUKOCYTE ESTERASE UR QL STRIP: NEGATIVE
LYMPHOCYTES # BLD AUTO: 1.87 THOUSAND/UL (ref 0.6–4.47)
LYMPHOCYTES # BLD AUTO: 55 % (ref 14–44)
MCH RBC QN AUTO: 30.1 PG (ref 26.8–34.3)
MCHC RBC AUTO-ENTMCNC: 33.1 G/DL (ref 31.4–37.4)
MCV RBC AUTO: 91 FL (ref 82–98)
MONOCYTES # BLD AUTO: 0.17 THOUSAND/UL (ref 0–1.22)
MONOCYTES NFR BLD: 5 % (ref 4–12)
NEUTROPHILS # BLD MANUAL: 1.05 THOUSAND/UL (ref 1.85–7.62)
NEUTS SEG NFR BLD AUTO: 31 % (ref 43–75)
NITRITE UR QL STRIP: NEGATIVE
P AXIS: 42 DEGREES
PH UR STRIP.AUTO: 7.5 [PH]
PLATELET # BLD AUTO: 226 THOUSANDS/UL (ref 149–390)
PLATELET BLD QL SMEAR: ADEQUATE
PMV BLD AUTO: 9.1 FL (ref 8.9–12.7)
POTASSIUM SERPL-SCNC: 4.5 MMOL/L (ref 3.5–5.3)
PR INTERVAL: 192 MS
PROT SERPL-MCNC: 7.6 G/DL (ref 6.4–8.4)
PROT UR STRIP-MCNC: NEGATIVE MG/DL
QRS AXIS: 65 DEGREES
QRSD INTERVAL: 86 MS
QT INTERVAL: 388 MS
QTC INTERVAL: 421 MS
RBC # BLD AUTO: 4.49 MILLION/UL (ref 3.81–5.12)
RBC MORPH BLD: NORMAL
SODIUM SERPL-SCNC: 141 MMOL/L (ref 135–147)
SP GR UR STRIP.AUTO: 1.01 (ref 1–1.03)
T WAVE AXIS: 29 DEGREES
UROBILINOGEN UR QL STRIP.AUTO: 0.2 E.U./DL
VARIANT LYMPHS # BLD AUTO: 3 %
VENTRICULAR RATE: 71 BPM
WBC # BLD AUTO: 3.4 THOUSAND/UL (ref 4.31–10.16)

## 2023-06-01 PROCEDURE — 84703 CHORIONIC GONADOTROPIN ASSAY: CPT | Performed by: EMERGENCY MEDICINE

## 2023-06-01 PROCEDURE — 71045 X-RAY EXAM CHEST 1 VIEW: CPT

## 2023-06-01 PROCEDURE — 96374 THER/PROPH/DIAG INJ IV PUSH: CPT

## 2023-06-01 PROCEDURE — 93010 ELECTROCARDIOGRAM REPORT: CPT | Performed by: INTERNAL MEDICINE

## 2023-06-01 PROCEDURE — 36415 COLL VENOUS BLD VENIPUNCTURE: CPT | Performed by: EMERGENCY MEDICINE

## 2023-06-01 PROCEDURE — 84484 ASSAY OF TROPONIN QUANT: CPT | Performed by: EMERGENCY MEDICINE

## 2023-06-01 PROCEDURE — 81003 URINALYSIS AUTO W/O SCOPE: CPT | Performed by: EMERGENCY MEDICINE

## 2023-06-01 PROCEDURE — 99285 EMERGENCY DEPT VISIT HI MDM: CPT

## 2023-06-01 PROCEDURE — 96361 HYDRATE IV INFUSION ADD-ON: CPT

## 2023-06-01 PROCEDURE — 85007 BL SMEAR W/DIFF WBC COUNT: CPT | Performed by: EMERGENCY MEDICINE

## 2023-06-01 PROCEDURE — 85027 COMPLETE CBC AUTOMATED: CPT | Performed by: EMERGENCY MEDICINE

## 2023-06-01 PROCEDURE — 93005 ELECTROCARDIOGRAM TRACING: CPT

## 2023-06-01 PROCEDURE — 85379 FIBRIN DEGRADATION QUANT: CPT | Performed by: EMERGENCY MEDICINE

## 2023-06-01 PROCEDURE — 80053 COMPREHEN METABOLIC PANEL: CPT | Performed by: EMERGENCY MEDICINE

## 2023-06-01 RX ORDER — ONDANSETRON 2 MG/ML
4 INJECTION INTRAMUSCULAR; INTRAVENOUS ONCE
Status: COMPLETED | OUTPATIENT
Start: 2023-06-01 | End: 2023-06-01

## 2023-06-01 RX ORDER — ONDANSETRON 4 MG/1
4 TABLET, ORALLY DISINTEGRATING ORAL EVERY 8 HOURS PRN
Qty: 20 TABLET | Refills: 0 | Status: SHIPPED | OUTPATIENT
Start: 2023-06-01 | End: 2023-06-05 | Stop reason: SDUPTHER

## 2023-06-01 RX ADMIN — SODIUM CHLORIDE 1000 ML: 0.9 INJECTION, SOLUTION INTRAVENOUS at 10:12

## 2023-06-01 RX ADMIN — ONDANSETRON 4 MG: 2 INJECTION INTRAMUSCULAR; INTRAVENOUS at 10:12

## 2023-06-01 NOTE — ED PROVIDER NOTES
History  Chief Complaint   Patient presents with   • Dizziness     Pt reports lightheadedness, chest pain for the past few days  Chest pain described as sharp in nature, pt states when she was taking a shower, she thought she was going to pass out with sudden movements  HPI  48 yo F presents with lightheadedness and chest pain for the past week  She reports she and her family have been sick with vomiting and diarrhea for the past week  Patient reports that when she stands up she feels like she might pass out  Prior to Admission Medications   Prescriptions Last Dose Informant Patient Reported? Taking?    [place] Thyroid 60 MG tablet   No No   Sig: Take one and 1/2 tabs daily   Ozempic, 1 MG/DOSE, 4 MG/3ML injection pen   No No   Sig: INJECT 0 75 ML (1 MG TOTAL) UNDER THE SKIN ONCE A WEEK   Promethazine-DM (PHENERGAN-DM) 6 25-15 mg/5 mL oral syrup   No No   Sig: Take 5 mL by mouth 4 (four) times a day as needed for cough   Patient not taking: Reported on 2023   Respiratory Therapy Supplies (NEBULIZER) DONA  Self No No   Sig: by Does not apply route every 4 (four) hours while awake   Topiramate  MG CP24  Self Yes No   Sig: Take 100 mg by mouth 2 (two) times a day   Ubrelvy 100 MG tablet  Self Yes No   XIFAXAN 550 MG tablet  Self Yes No   albuterol (PROVENTIL HFA,VENTOLIN HFA) 90 mcg/act inhaler   No No   Sig: Inhale 2 puffs every 6 (six) hours as needed for wheezing   azelastine (ASTELIN) 0 1 % nasal spray   No No   Si spray into each nostril 2 (two) times a day Use in each nostril as directed   butalbital-acetaminophen-caffeine (FIORICET,ESGIC) -40 mg per tablet  Self Yes No   cariprazine (Vraylar) 4 5 MG capsule  Self No No   Sig: Take 1 capsule (4 5 mg total) by mouth daily I am increasing the dose   celecoxib (CELEBREX) 200 mg capsule  Self No No   Sig: Take 1 capsule (200 mg total) by mouth daily   clonazePAM (KlonoPIN) 1 mg tablet  Self No No   Sig: Take 1 tablet (1 mg total) by mouth daily as needed for anxiety   clotrimazole-betamethasone (LOTRISONE) 1-0 05 % cream  Self Yes No   Patient not taking: Reported on 5/30/2023   cyclobenzaprine (FLEXERIL) 5 mg tablet  Self Yes No   dicyclomine (BENTYL) 10 mg capsule   No No   Sig: Take 1 capsule (10 mg total) by mouth 3 (three) times a day as needed (abd cramping)   divalproex sodium (DEPAKOTE ER) 250 mg 24 hr tablet  Self Yes No   fluticasone (FLOVENT HFA) 220 mcg/act inhaler   No No   Sig: Inhale 2 puffs 2 (two) times a day Rinse mouth after use     fremanezumab-vfrm (Ajovy) 225 MG/1 5ML auto-injector  Self Yes No   Sig: Inject 225 mg under the skin every 30 (thirty) days   ipratropium-albuterol (DUO-NEB) 0 5-2 5 mg/3 mL nebulizer solution   No No   Sig: Take 3 mL by nebulization 4 (four) times a day   ketoconazole (NIZORAL) 2 % cream  Self Yes No   levocetirizine (XYZAL) 5 MG tablet   No No   Sig: Take 1 tablet (5 mg total) by mouth every evening   lidocaine (LIDODERM) 5 %  Self No No   Sig: Apply 1 patch topically daily as needed (back pain) Remove & Discard patch within 12 hours or as directed by MD   loperamide (IMODIUM A-D) 2 MG tablet   No No   Sig: Take 1 tablet (2 mg total) by mouth 4 (four) times a day as needed for diarrhea   meclizine (ANTIVERT) 25 mg tablet   No No   Sig: Take 1 tablet (25 mg total) by mouth every 8 (eight) hours as needed for dizziness   meloxicam (MOBIC) 15 mg tablet  Self No No   Sig: TAKE 1 TABLET (15 MG TOTAL) BY MOUTH DAILY   mometasone (ELOCON) 0 1 % cream  Self No No   Sig: Apply topically daily   naratriptan (AMERGE) 2 5 MG tablet  Self Yes No   omeprazole (PriLOSEC) 40 MG capsule  Self No No   Sig: Take 1 capsule (40 mg total) by mouth daily   ondansetron (ZOFRAN) 4 mg tablet   No No   Sig: Take 1 tablet (4 mg total) by mouth every 8 (eight) hours as needed for nausea or vomiting   prochlorperazine (COMPAZINE) 5 mg tablet   No No   Sig: Take 1 tablet (5 mg total) by mouth every 6 (six) hours as needed for nausea or vomiting   Patient not taking: Reported on 2023   tiZANidine (ZANAFLEX) 4 mg tablet  Self Yes No      Facility-Administered Medications: None       Past Medical History:   Diagnosis Date   • Anxiety    • Anxiety    • Asthma    • Bipolar disorder [facility])    • Carpal tunnel syndrome    • Chronic pain     lower back   • Depression    • Disease of thyroid gland    • Dyslipidemia    • Fibromyalgia    • Irritable bowel    • Kidney stones    • Kidney stones 2019   • Migraine    • Obesity (BMI 30 0-34  9)    • Psychiatric disorder     depression/anxiety   • Suicide attempt [facility])     as teen   • Vitamin D deficiency        Past Surgical History:   Procedure Laterality Date   • BUNIONECTOMY     •  SECTION     • CHOLECYSTECTOMY     • PARTIAL HYSTERECTOMY      portion of  uterus still present   • TONSILLECTOMY     • TUBAL LIGATION         Family History   Problem Relation Age of Onset   • Hypertension Mother    • Diabetes Mother    • Hypothyroidism Mother    • Stroke Mother    • Bipolar disorder Mother    • Anxiety disorder Mother    • Arthritis Mother    • Depression Mother    • Mental illness Mother    • Cancer Father         pancreatic    • Hypothyroidism Sister    • Hypertension Brother    • Heart disease Brother    • Cancer Maternal Uncle         lung   • Cancer Paternal Aunt         breast   • Breast cancer Paternal Aunt    • Cancer Paternal Uncle         testicular    • Cancer Paternal Grandmother         breast   • Dementia Paternal Grandmother    • Breast cancer Paternal Grandmother    • Cancer Cousin         brain   • Bipolar disorder Daughter      I have reviewed and agree with the history as documented      E-Cigarette/Vaping   • E-Cigarette Use Never User      E-Cigarette/Vaping Substances   • Nicotine No    • THC No    • CBD No    • Flavoring No    • Other No    • Unknown No      Social History     Tobacco Use   • Smoking status: Never   • Smokeless tobacco: Never   Vaping Use   • Vaping Use: Never used   Substance Use Topics   • Alcohol use: No   • Drug use: Never       Review of Systems   Constitutional: Negative for chills and fever  HENT: Negative for dental problem and ear pain  Eyes: Negative for pain and redness  Respiratory: Negative for cough and shortness of breath  Cardiovascular: Positive for chest pain  Negative for palpitations  Gastrointestinal: Positive for diarrhea and nausea  Negative for abdominal pain  Endocrine: Negative for polydipsia and polyphagia  Genitourinary: Negative for dysuria and frequency  Musculoskeletal: Negative for arthralgias and joint swelling  Skin: Negative for color change and rash  Neurological: Positive for light-headedness  Negative for dizziness and headaches  Psychiatric/Behavioral: Negative for behavioral problems and confusion  All other systems reviewed and are negative  Physical Exam  Physical Exam  Vitals and nursing note reviewed  Constitutional:       General: She is not in acute distress  Appearance: She is well-developed  She is not diaphoretic  HENT:      Head: Atraumatic  Right Ear: External ear normal       Left Ear: External ear normal       Nose: Nose normal    Eyes:      Conjunctiva/sclera: Conjunctivae normal       Pupils: Pupils are equal, round, and reactive to light  Neck:      Vascular: No JVD  Cardiovascular:      Rate and Rhythm: Normal rate and regular rhythm  Heart sounds: Normal heart sounds  No murmur heard  Pulmonary:      Effort: Pulmonary effort is normal  No respiratory distress  Breath sounds: Normal breath sounds  No wheezing  Abdominal:      General: Bowel sounds are normal  There is no distension  Palpations: Abdomen is soft  Tenderness: There is no abdominal tenderness  Musculoskeletal:         General: Normal range of motion  Cervical back: Normal range of motion and neck supple  Skin:     General: Skin is warm and dry        Capillary Refill: Capillary refill takes less than 2 seconds  Neurological:      General: No focal deficit present  Mental Status: She is alert and oriented to person, place, and time  Mental status is at baseline  Cranial Nerves: No cranial nerve deficit  Sensory: No sensory deficit  Motor: No weakness        Gait: Gait normal    Psychiatric:         Behavior: Behavior normal          Vital Signs  ED Triage Vitals [06/01/23 0930]   Temperature Pulse Respirations Blood Pressure SpO2   (!) 97 2 °F (36 2 °C) 70 16 130/68 100 %      Temp src Heart Rate Source Patient Position - Orthostatic VS BP Location FiO2 (%)   -- Monitor Sitting Left arm --      Pain Score       --           Vitals:    06/01/23 0930 06/01/23 1030 06/01/23 1100   BP: 130/68 123/75 113/57   Pulse: 70 66 65   Patient Position - Orthostatic VS: Sitting Lying Lying         Visual Acuity      ED Medications  Medications   sodium chloride 0 9 % bolus 1,000 mL (0 mL Intravenous Stopped 6/1/23 1213)   ondansetron (ZOFRAN) injection 4 mg (4 mg Intravenous Given 6/1/23 1012)       Diagnostic Studies  Results Reviewed     Procedure Component Value Units Date/Time    HS Troponin I 4hr [[id number]]     Lab Status: No result Specimen: Blood     Manual Differential(PHLEBS Do Not Order) [[id number]]  (Abnormal) Collected: 06/01/23 1006    Lab Status: Final result Specimen: Blood from Arm, Right Updated: 06/01/23 1101     Segmented % 31 %      Lymphocytes % 55 %      Monocytes % 5 %      Eosinophils, % 6 %      Basophils % 0 %      Atypical Lymphocytes % 3 %      Absolute Neutrophils 1 05 Thousand/uL      Lymphocytes Absolute 1 87 Thousand/uL      Monocytes Absolute 0 17 Thousand/uL      Eosinophils Absolute 0 20 Thousand/uL      Basophils Absolute 0 00 Thousand/uL      Total Counted --     RBC Morphology Normal     Platelet Estimate Adequate    Pregnancy Test (HCG Qualitative) [[id number]]  (Normal) Collected: 06/01/23 1006    Lab Status: Final result Specimen: Blood from Arm, Right Updated: 06/01/23 1049     Preg, Serum Negative    HS Troponin 0hr (reflex protocol) [[id number]]  (Normal) Collected: 06/01/23 1006    Lab Status: Final result Specimen: Blood from Arm, Right Updated: 06/01/23 1047     hs TnI 0hr <2 ng/L     HS Troponin I 2hr [[id number]]     Lab Status: No result Specimen: Blood     Comprehensive metabolic panel [[id number]] Collected: 06/01/23 1006    Lab Status: Final result Specimen: Blood from Arm, Right Updated: 06/01/23 1043     Sodium 141 mmol/L      Potassium 4 5 mmol/L      Chloride 108 mmol/L      CO2 29 mmol/L      ANION GAP 4 mmol/L      BUN 13 mg/dL      Creatinine 0 84 mg/dL      Glucose 91 mg/dL      Calcium 9 8 mg/dL      AST 22 U/L      ALT 32 U/L      Alkaline Phosphatase 70 U/L      Total Protein 7 6 g/dL      Albumin 4 7 g/dL      Total Bilirubin 0 28 mg/dL      eGFR 80 ml/min/1 73sq m     Narrative:      [facility] guidelines for Chronic Kidney Disease (CKD):   •  Stage 1 with normal or high GFR (GFR > 90 mL/min/1 73 square meters)  •  Stage 2 Mild CKD (GFR = 60-89 mL/min/1 73 square meters)  •  Stage 3A Moderate CKD (GFR = 45-59 mL/min/1 73 square meters)  •  Stage 3B Moderate CKD (GFR = 30-44 mL/min/1 73 square meters)  •  Stage 4 Severe CKD (GFR = 15-29 mL/min/1 73 square meters)  •  Stage 5 End Stage CKD (GFR <15 mL/min/1 73 square meters)  Note: GFR calculation is accurate only with a steady state creatinine    D-dimer, quantitative [[id number]]  (Normal) Collected: 06/01/23 1006    Lab Status: Final result Specimen: Blood from Arm, Right Updated: 06/01/23 1037     D-Dimer, Quant 0 37 ug/ml FEU     Narrative: In the evaluation for possible pulmonary embolism, in the appropriate (Well's Score of 4 or less) patient, the age adjusted d-dimer cutoff for this patient can be calculated as:    Age x 0 01 (in ug/mL) for Age-adjusted D-dimer exclusion threshold for a patient over 50 years      UA w Reflex to Microscopic w Reflex to Culture [[id number]] Collected: 06/01/23 1007    Lab Status: Final result Specimen: Urine, Clean Catch Updated: 06/01/23 1031     Color, UA Yellow     Clarity, UA Clear     Specific Gravity, UA 1 010     pH, UA 7 5     Leukocytes, UA Negative     Nitrite, UA Negative     Protein, UA Negative mg/dl      Glucose, UA Negative mg/dl      Ketones, UA Negative mg/dl      Urobilinogen, UA 0 2 E U /dl      Bilirubin, UA Negative     Occult Blood, UA Negative    CBC and differential [[id number]]  (Abnormal) Collected: 06/01/23 1006    Lab Status: Final result Specimen: Blood from Arm, Right Updated: 06/01/23 1023     WBC 3 40 Thousand/uL      RBC 4 49 Million/uL      Hemoglobin 13 5 g/dL      Hematocrit 40 8 %      MCV 91 fL      MCH 30 1 pg      MCHC 33 1 g/dL      RDW 11 9 %      MPV 9 1 fL      Platelets 462 Thousands/uL                  XR chest 1 view portable    (Results Pending)              Procedures  Procedures         ED Course                                             MDM  Patient appears well, improved with zofran and IV fluids  Normal neuro exam, presentation not consistent with CVA  Disposition  Final diagnoses:   Nausea   Lightheaded     Time reflects when diagnosis was documented in both MDM as applicable and the Disposition within this note     Time User Action Codes Description Comment    6/1/2023 11:54 AM Lelon Bran [R11 0] Nausea     6/1/2023 11:54 AM Niecy  Add [R42] Lightheaded       ED Disposition     ED Disposition   Discharge    Condition   Stable    Date/Time   Thu Jun 1, 2023 11:54 AM    Comment   Jolanta Borges discharge to home/self care                 Follow-up Information     Follow up With Specialties Details Why Contact Info    Sally Stout, [address]  Nurse Practitioner Call   Karlene Estrada [address] [place] [address] 89  [phone]            Discharge Medication List as of 6/1/2023 11:56 AM      START taking these medications    Details   ondansetron (ZOFRAN-ODT) 4 mg disintegrating tablet Take 1 tablet (4 mg total) by mouth every 8 (eight) hours as needed for nausea, Starting Thu 6/1/2023, Normal         CONTINUE these medications which have NOT CHANGED    Details   albuterol (PROVENTIL HFA,VENTOLIN HFA) 90 mcg/act inhaler Inhale 2 puffs every 6 (six) hours as needed for wheezing, Starting Mon 5/8/2023, Normal      [place] Thyroid 60 MG tablet Take one and 1/2 tabs daily, Normal      azelastine (ASTELIN) 0 1 % nasal spray 1 spray into each nostril 2 (two) times a day Use in each nostril as directed, Starting Mon 5/8/2023, Normal      butalbital-acetaminophen-caffeine (FIORICET,ESGIC) -40 mg per tablet Starting Fri 2/10/2023, Historical Med      cariprazine (Vraylar) 4 5 MG capsule Take 1 capsule (4 5 mg total) by mouth daily I am increasing the dose, Starting Tue 4/11/2023, Normal      celecoxib (CELEBREX) 200 mg capsule Take 1 capsule (200 mg total) by mouth daily, Starting Tue 10/23/2018, Normal      clonazePAM (KlonoPIN) 1 mg tablet Take 1 tablet (1 mg total) by mouth daily as needed for anxiety, Starting Tue 4/11/2023, Until Thu 5/11/2023 at 2359, Normal      clotrimazole-betamethasone (LOTRISONE) 1-0 05 % cream Starting Thu 8/18/2022, Historical Med      cyclobenzaprine (FLEXERIL) 5 mg tablet Starting Fri 2/10/2023, Historical Med      dicyclomine (BENTYL) 10 mg capsule Take 1 capsule (10 mg total) by mouth 3 (three) times a day as needed (abd cramping), Starting Wed 5/24/2023, Normal      divalproex sodium (DEPAKOTE ER) 250 mg 24 hr tablet Starting Wed 8/3/2022, Historical Med      fluticasone (FLOVENT HFA) 220 mcg/act inhaler Inhale 2 puffs 2 (two) times a day Rinse mouth after use , Starting Mon 5/8/2023, Normal      fremanezumab-vfrm (Ajovy) 225 MG/1 5ML auto-injector Inject 225 mg under the skin every 30 (thirty) days, Starting Mon 10/26/2020, Historical Med      ipratropium-albuterol (DUO-NEB) 0 5-2 5 mg/3 mL nebulizer solution Take 3 mL by nebulization 4 (four) times a day, Starting Mon 5/8/2023, Normal      ketoconazole (NIZORAL) 2 % cream Starting Mon 10/24/2022, Historical Med      levocetirizine (XYZAL) 5 MG tablet Take 1 tablet (5 mg total) by mouth every evening, Starting Mon 5/8/2023, Normal      lidocaine (LIDODERM) 5 % Apply 1 patch topically daily as needed (back pain) Remove & Discard patch within 12 hours or as directed by MD, Starting Thu 5/6/2021, Print      loperamide (IMODIUM A-D) 2 MG tablet Take 1 tablet (2 mg total) by mouth 4 (four) times a day as needed for diarrhea, Starting Wed 5/24/2023, Normal      meclizine (ANTIVERT) 25 mg tablet Take 1 tablet (25 mg total) by mouth every 8 (eight) hours as needed for dizziness, Starting Tue 5/9/2023, Normal      meloxicam (MOBIC) 15 mg tablet TAKE 1 TABLET (15 MG TOTAL) BY MOUTH DAILY, Starting Tue 3/21/2023, Normal      mometasone (ELOCON) 0 1 % cream Apply topically daily, Starting Tue 4/13/2021, Normal      naratriptan (AMERGE) 2 5 MG tablet Starting Thu 4/28/2022, Historical Med      omeprazole (PriLOSEC) 40 MG capsule Take 1 capsule (40 mg total) by mouth daily, Starting Fri 9/30/2022, Normal      ondansetron (ZOFRAN) 4 mg tablet Take 1 tablet (4 mg total) by mouth every 8 (eight) hours as needed for nausea or vomiting, Starting Wed 5/24/2023, Normal      Ozempic, 1 MG/DOSE, 4 MG/3ML injection pen INJECT 0 75 ML (1 MG TOTAL) UNDER THE SKIN ONCE A WEEK, Normal      prochlorperazine (COMPAZINE) 5 mg tablet Take 1 tablet (5 mg total) by mouth every 6 (six) hours as needed for nausea or vomiting, Starting Tue 5/2/2023, Normal      Promethazine-DM (PHENERGAN-DM) 6 25-15 mg/5 mL oral syrup Take 5 mL by mouth 4 (four) times a day as needed for cough, Starting Mon 5/8/2023, Normal      Respiratory Therapy Supplies (NEBULIZER) DONA by Does not apply route every 4 (four) hours while awake, Starting Mon 7/27/2020, Until Tue 4/18/2023, Normal      tiZANidine (ZANAFLEX) 4 mg tablet Starting Tue 1/24/2023, Historical Med      Topiramate  MG CP24 Take 100 mg by mouth 2 (two) times a day, Starting Tue 3/10/2020, Historical Med      Ubrelvy 100 MG tablet Historical Med      XIFAXAN 550 MG tablet Starting Wed 1/22/2020, Historical Med             No discharge procedures on file      PDMP Review       Value Time User    PDMP Reviewed  Yes 11/18/2022  8:04 PM Marcell Bosworth, PA-C          ED Provider  Electronically Signed by           Kori Laguerre MD  06/01/23 9155

## 2023-06-01 NOTE — PSYCH
Virtual Regular Visit    Verification of patient location:    Patient is located at Home in the following state in which I hold an active license [place]      Assessment/Plan:    Problem List Items Addressed This Visit        Other    ZAINA (generalized anxiety disorder) - Primary    Bipolar 2 disorder ([facility] )       Goals addressed in session: Goal 1          Reason for visit is No chief complaint on file  Encounter provider Milagros Mari LCSW    Provider located at [address] [place] [id number] [phone]      Recent Visits  Date Type Provider Dept   05/25/23 Telemedicine Milagros Mari LCSW [facility]   Showing recent visits within past 7 days and meeting all other requirements  Today's Visits  Date Type Provider Dept   06/01/23 Telemedicine Milagros Mari LCSW [facility]   Showing today's visits and meeting all other requirements  Future Appointments  No visits were found meeting these conditions  Showing future appointments within next 150 days and meeting all other requirements       The patient was identified by name and date of birth  Jani Carrion was informed that this is a telemedicine visit and that the visit is being conducted throughthe Opez platform  She agrees to proceed     My office door was closed  No one else was in the room  She acknowledged consent and understanding of privacy and security of the video platform  The patient has agreed to participate and understands they can discontinue the visit at any time  Patient is aware this is a billable service  Subjective  Jani Carrion is a 47 y o  female        HPI     Past Medical History:   Diagnosis Date   • Anxiety    • Anxiety    • Asthma    • Bipolar disorder ([facility] )    • Carpal tunnel syndrome    • Chronic pain     lower back   • Depression    • Disease of thyroid gland • Dyslipidemia    • Fibromyalgia    • Irritable bowel    • Kidney stones    • Kidney stones 2019   • Migraine    • Obesity (BMI 30 0-34  9)    • Psychiatric disorder     depression/anxiety   • Suicide attempt [facility])     as teen   • Vitamin D deficiency        Past Surgical History:   Procedure Laterality Date   • BUNIONECTOMY     •  SECTION     • CHOLECYSTECTOMY     • PARTIAL HYSTERECTOMY      portion of  uterus still present   • TONSILLECTOMY     • TUBAL LIGATION         Current Outpatient Medications   Medication Sig Dispense Refill   • albuterol (PROVENTIL HFA,VENTOLIN HFA) 90 mcg/act inhaler Inhale 2 puffs every 6 (six) hours as needed for wheezing 18 g 1   • [place] Thyroid 60 MG tablet Take one and 1/2 tabs daily 135 tablet 2   • azelastine (ASTELIN) 0 1 % nasal spray 1 spray into each nostril 2 (two) times a day Use in each nostril as directed 1 mL 3   • butalbital-acetaminophen-caffeine (FIORICET,ESGIC) -40 mg per tablet      • cariprazine (Vraylar) 4 5 MG capsule Take 1 capsule (4 5 mg total) by mouth daily I am increasing the dose 30 capsule 3   • celecoxib (CELEBREX) 200 mg capsule Take 1 capsule (200 mg total) by mouth daily 30 capsule 2   • clonazePAM (KlonoPIN) 1 mg tablet Take 1 tablet (1 mg total) by mouth daily as needed for anxiety 30 tablet 3   • clotrimazole-betamethasone (LOTRISONE) 1-0 05 % cream  (Patient not taking: Reported on 2023)     • cyclobenzaprine (FLEXERIL) 5 mg tablet      • dicyclomine (BENTYL) 10 mg capsule Take 1 capsule (10 mg total) by mouth 3 (three) times a day as needed (abd cramping) 30 capsule 0   • divalproex sodium (DEPAKOTE ER) 250 mg 24 hr tablet      • fluticasone (FLOVENT HFA) 220 mcg/act inhaler Inhale 2 puffs 2 (two) times a day Rinse mouth after use   12 g 1   • fremanezumab-vfrm (Ajovy) 225 MG/1 5ML auto-injector Inject 225 mg under the skin every 30 (thirty) days     • ipratropium-albuterol (DUO-NEB) 0 5-2 5 mg/3 mL nebulizer solution Take 3 mL by nebulization 4 (four) times a day 90 mL 1   • ketoconazole (NIZORAL) 2 % cream      • levocetirizine (XYZAL) 5 MG tablet Take 1 tablet (5 mg total) by mouth every evening 90 tablet 1   • lidocaine (LIDODERM) 5 % Apply 1 patch topically daily as needed (back pain) Remove & Discard patch within 12 hours or as directed by MD Castro patch 1   • loperamide (IMODIUM A-D) 2 MG tablet Take 1 tablet (2 mg total) by mouth 4 (four) times a day as needed for diarrhea 30 tablet 0   • meclizine (ANTIVERT) 25 mg tablet Take 1 tablet (25 mg total) by mouth every 8 (eight) hours as needed for dizziness 30 tablet 0   • meloxicam (MOBIC) 15 mg tablet TAKE 1 TABLET (15 MG TOTAL) BY MOUTH DAILY 30 tablet 0   • mometasone (ELOCON) 0 1 % cream Apply topically daily 45 g 0   • naratriptan (AMERGE) 2 5 MG tablet      • omeprazole (PriLOSEC) 40 MG capsule Take 1 capsule (40 mg total) by mouth daily 90 capsule 1   • ondansetron (ZOFRAN) 4 mg tablet Take 1 tablet (4 mg total) by mouth every 8 (eight) hours as needed for nausea or vomiting 20 tablet 0   • Ozempic, 1 MG/DOSE, 4 MG/3ML injection pen INJECT 0 75 ML (1 MG TOTAL) UNDER THE SKIN ONCE A WEEK 3 mL 1   • prochlorperazine (COMPAZINE) 5 mg tablet Take 1 tablet (5 mg total) by mouth every 6 (six) hours as needed for nausea or vomiting (Patient not taking: Reported on 5/30/2023) 30 tablet 0   • Promethazine-DM (PHENERGAN-DM) 6 25-15 mg/5 mL oral syrup Take 5 mL by mouth 4 (four) times a day as needed for cough (Patient not taking: Reported on 5/30/2023) 240 mL 0   • Respiratory Therapy Supplies (NEBULIZER) DONA by Does not apply route every 4 (four) hours while awake 90 each 1   • tiZANidine (ZANAFLEX) 4 mg tablet      • Topiramate  MG CP24 Take 100 mg by mouth 2 (two) times a day     • Ubrelvy 100 MG tablet      • XIFAXAN 550 MG tablet        No current facility-administered medications for this visit          Allergies   Allergen Reactions   • Doxycycline Rash   • Erythromycin "Rash   • Other Edema and Wheezing     jalapeno   • Augmentin [Amoxicillin-Pot Clavulanate] GI Intolerance   • Latex Rash   • Duloxetine      Other reaction(s): Nausea, Loopy   • Eletriptan      Pain in lower jaw with pressure in throat   • Emgality [Galcanezumab-Gnlm]    • Galcanezumab      rash       Review of Systems    Video Exam    There were no vitals filed for this visit  Physical Exam     Behavioral Health Psychotherapy Progress Note    Psychotherapy Provided: Individual Psychotherapy      Diagnosis ICD-10-CM Associated Orders   1  ZAINA (generalized anxiety disorder)  F41 1       2  Bipolar 2 disorder ([facility] )  F31 81            Goals addressed in session: Goal 1     DATA: Met with Marbella Vela for a scheduled individual session  Topics discussed included emotional wellness, relationship with significant other and physical health concerns  Mingo Sanders is feeling \"ok\", stating she's still trying to recover from being sick  She spoke about her fiance for a while, stating she's questioning whether she feels the relationship is a good fit  She spoke about her ex for a bit and then stated she didn't feel good  She stated she felt lightheaded and was going to call her doctor or possibly go to the ER  We ended the session early  Mingo Sanders shows evidence of utilizing effective communication skills to manage mental health symptoms  During this session, this clinical used the following therapeutic modalities engagement strategies and supportive psychotherapy  Substance Abuse was not addressed during this session  If the client is diagnosed with a co-occurring substance use disorder, please indicate any changes in the frequency or amount of use: N/A  Stage of change for addressing substance use diagnoses: No substance use/Not applicable    ASSESSMENT:  Mingo Sanders presents with a Euthymic/ normal mood    her affect is normal range and intensity, appropriate, which is congruent, with his mood and the content of the " "session  Trevin Mast was oriented x3  She was focused and engaged  Trevin Mast exhibits good therapeutic rapport with this clinician  The client has made progress on their goals  Bianca Vargas presents with a none risk of suicide, none risk of self-harm, and none risk of harm to others  For any risk assessment that surpasses a \"low\" rating, a safety plan must be developed  A safety plan was indicated: no  If yes, describe in detail: N/A    PLAN: Trevin Mast will return in 1 week for the next scheduled session  At the next session, the therapist will use engagement strategies, supportive psychotherapy and client-centered therapy to address her anxiety  Behavioral Health Treatment Plan and Discharge Planning: Bianca Vargas is aware of and agrees to continue to work on their treatment plan  They have identified and are working toward their discharge goals   yes    Visit start and stop times:    Ended early due to client not feeling well    06/01/23  Start Time: 0800  Stop Time: 0827  Total Visit Time: 27 minutes      "

## 2023-06-02 DIAGNOSIS — J06.9 UPPER RESPIRATORY TRACT INFECTION, UNSPECIFIED TYPE: ICD-10-CM

## 2023-06-02 RX ORDER — FLUTICASONE PROPIONATE 50 MCG
SPRAY, SUSPENSION (ML) NASAL
Qty: 16 G | Refills: 0 | Status: SHIPPED | OUTPATIENT
Start: 2023-06-02

## 2023-06-05 ENCOUNTER — OFFICE VISIT (OUTPATIENT)
Dept: FAMILY MEDICINE CLINIC | Facility: CLINIC | Age: 53
End: 2023-06-05
Payer: MEDICARE

## 2023-06-05 ENCOUNTER — TELEPHONE (OUTPATIENT)
Dept: FAMILY MEDICINE CLINIC | Facility: CLINIC | Age: 53
End: 2023-06-05

## 2023-06-05 VITALS
HEART RATE: 64 BPM | WEIGHT: 150 LBS | SYSTOLIC BLOOD PRESSURE: 130 MMHG | BODY MASS INDEX: 25.61 KG/M2 | RESPIRATION RATE: 16 BRPM | HEIGHT: 64 IN | DIASTOLIC BLOOD PRESSURE: 80 MMHG | OXYGEN SATURATION: 98 %

## 2023-06-05 DIAGNOSIS — K58.8 OTHER IRRITABLE BOWEL SYNDROME: ICD-10-CM

## 2023-06-05 DIAGNOSIS — Z09 HOSPITAL DISCHARGE FOLLOW-UP: ICD-10-CM

## 2023-06-05 DIAGNOSIS — E16.2 HYPOGLYCEMIA: Primary | ICD-10-CM

## 2023-06-05 DIAGNOSIS — R11.0 NAUSEA: ICD-10-CM

## 2023-06-05 PROBLEM — H57.89 EYE SWELLING, RIGHT: Status: RESOLVED | Noted: 2022-12-14 | Resolved: 2023-06-05

## 2023-06-05 PROCEDURE — 99214 OFFICE O/P EST MOD 30 MIN: CPT

## 2023-06-05 RX ORDER — LANCETS 33 GAUGE
EACH MISCELLANEOUS
Qty: 100 EACH | Refills: 3 | Status: SHIPPED | OUTPATIENT
Start: 2023-06-05 | End: 2023-06-06 | Stop reason: SDUPTHER

## 2023-06-05 RX ORDER — ONDANSETRON 4 MG/1
4 TABLET, ORALLY DISINTEGRATING ORAL EVERY 8 HOURS PRN
Qty: 20 TABLET | Refills: 1 | Status: SHIPPED | OUTPATIENT
Start: 2023-06-05

## 2023-06-05 RX ORDER — BLOOD SUGAR DIAGNOSTIC
STRIP MISCELLANEOUS
Qty: 100 EACH | Refills: 3 | Status: SHIPPED | OUTPATIENT
Start: 2023-06-05 | End: 2023-06-06 | Stop reason: SDUPTHER

## 2023-06-05 RX ORDER — BLOOD-GLUCOSE METER
KIT MISCELLANEOUS
Qty: 1 KIT | Refills: 0 | Status: SHIPPED | OUTPATIENT
Start: 2023-06-05 | End: 2023-06-06 | Stop reason: SDUPTHER

## 2023-06-05 NOTE — PATIENT INSTRUCTIONS
Non-diabetic Hypoglycemia   WHAT YOU NEED TO KNOW:   What is non-diabetic hypoglycemia? Non-diabetic hypoglycemia is a condition that causes the sugar (glucose) in your blood to drop too low  This can happen in people who do not have diabetes  The 2 types of non-diabetic hypoglycemia are fasting hypoglycemia and reactive hypoglycemia  Fasting hypoglycemia often happens after the person goes without food for 8 hours or longer  Reactive hypoglycemia usually happens about 2 to 4 hours after a meal  When your blood sugar level is low, your muscles and brain cells do not have enough energy to work well  What causes non-diabetic hypoglycemia? Fasting hypoglycemia:      Certain medicines or herbal supplements such as fenugreek, ginseng, or cinnamon    Alcohol     Exercise    Medical conditions such as liver disease, hypothyroidism, and tumors    Eating disorders or malnutrition    Stomach surgery or hemodialysis    Reactive hypoglycemia:  The causes of reactive hypoglycemia may be unknown  Hyperinsulinism    Meals high in refined carbohydrates such as white bread or foods high in sugar    Prediabetes    Any surgery of the digestive system    What are the signs and symptoms of non-diabetic hypoglycemia? Blurred vision or changes in vision    Dizziness, lightheadedness, or shakiness    Fatigue and weakness    Fast or pounding heartbeat    Sweating more than usual    Headache     Nausea or hunger    Anxiety, Irritability, or confusion    How is non-diabetic hypoglycemia diagnosed? Blood tests  are done to measure your blood sugar levels  These tests may also be done to find the cause of your hypoglycemia  Fasting tests  may be done  You may have an overnight fasting test or a 72-hour fasting test  After you have fasted overnight, your blood sugar levels will be tested 2 times  For a 72-hour fasting test, you will not be given food for a period of up to 72 hours   During this time, healthcare providers will check to see if your blood sugar drops to a certain level  An oral glucose tolerance test  may be done  After you have fasted for 8 hours, your blood sugar level is tested  You are then given a glucose drink  Your blood sugar level is checked after 1 hour and again after 2 hours  Healthcare providers look at how much your blood sugar level increases from the first check  How is non-diabetic hypoglycemia treated? Keep food or drinks that contain carbohydrates on hand  Carbohydrates will raise your blood sugar level when you have hypoglycemia  Carbohydrates are found in bread, rice, cereal, fruits, juice, and milk  If you cannot eat or drink, your healthcare provider will give you glucose through an IV  An IV is a small tube placed in your vein  You may also receive a hormone called glucagon that helps raise your blood sugar level  Treatment will depend on the cause of the hypoglycemia  For example, if a medicine you take is causing hypoglycemia, healthcare providers may change or stop giving you the medicine  If hypoglycemia is caused by low hormone levels, you may need to take hormones  How can I prevent hypoglycemia? You may need to change what and when you eat to prevent low blood sugar levels  Follow the meal plan that you and the dietitian have planned  The following guidelines may help you keep your blood sugar levels under control  Eat 5 to 6 small meals each day instead of 3 large meals  Eat the same amount of carbohydrate at meals and snacks each day  Most people need about 3 to 4 servings of carbohydrate at meals and 1 to 2 servings for snacks  Do not skip meals  Carbohydrate counting can be used plan your meals  Ask your healthcare provider or dietitian for information about carbohydrate counting  Limit refined carbohydrates  Examples are white bread, pastries (pies and cakes), regular sodas, syrups, and candy  Do not have drinks or foods that contain caffeine    Examples are coffee, tea, and certain types of sodas  Caffeine may cause you to have the same symptoms as hypoglycemia, and may cause you to feel worse  Limit or do not drink alcohol  Women should limit alcohol to 1 drink a day  Men should limit alcohol to 2 drinks a day  A drink of alcohol is 12 ounces of beer, 5 ounces of wine, or 1½ ounces of liquor  Do not drink alcohol on an empty stomach  Drink alcohol with meals to avoid hypoglycemia  Include protein foods and vegetables in your meals  Some foods that are high in protein include beef, pork, fish, poultry (chicken and turkey), beans, and nuts  Eat a variety of vegetables with your meals  When should I contact my healthcare provider? You have blurred vision or vision changes  You feel very tired and weak  You are sweating more than usual for you  You have a fast heartbeat  You feel dizzy, lightheaded, and shaky  You have questions about your condition or care  When should I seek immediate care or call 911? You have symptoms of hypoglycemia and cannot eat  You have trouble thinking clearly  You have a seizure or faint  CARE AGREEMENT:   You have the right to help plan your care  Learn about your health condition and how it may be treated  Discuss treatment options with your healthcare providers to decide what care you want to receive  You always have the right to refuse treatment  The above information is an  only  It is not intended as medical advice for individual conditions or treatments  Talk to your doctor, nurse or pharmacist before following any medical regimen to see if it is safe and effective for you  © Copyright [employer] 2022 Information is for End User's use only and may not be sold, redistributed or otherwise used for commercial purposes

## 2023-06-05 NOTE — ASSESSMENT & PLAN NOTE
"Described feeling of lightheadedness, dizziness, \" like I have to eat something right away\"  Suspecting hypoglycemia specially with ongoing use of semaglutide  Please start checking your blood sugar daily in the morning and with any signs and symptoms of hypoglycemia  Written information provided    " Addended by: Nicolasa Garcia on: 10/20/2020 08:20 AM     Modules accepted: Orders

## 2023-06-05 NOTE — PROGRESS NOTES
"   Assessment/Plan:         Problem List Items Addressed This Visit        Digestive    IBS (irritable bowel syndrome)     Has been using Xifaxan at home provided by GI  If you continue to suffer with nausea and diarrhea, abdominal pain, vomiting returns please make an appointment with GI for follow-up            Endocrine    Hypoglycemia - Primary     Described feeling of lightheadedness, dizziness, \" like I have to eat something right away\"  Suspecting hypoglycemia specially with ongoing use of semaglutide  Please start checking your blood sugar daily in the morning and with any signs and symptoms of hypoglycemia  Written information provided  Relevant Medications    Blood Glucose Monitoring Suppl (OneTouch Verio Reflect) w/Device KIT    glucose blood (OneTouch Verio) test strip    OneTouch Delica Lancets 79Z MISC    Other Relevant Orders    CBC and differential    Comprehensive metabolic panel    Hemoglobin A1C       Other    Nausea     Relief with Zofran as needed  Refill sent to the pharmacy         Relevant Medications    ondansetron (ZOFRAN-ODT) 4 mg disintegrating tablet    Other Relevant Orders    CBC and differential    Comprehensive metabolic panel   Other Visit Diagnoses     Hospital discharge follow-up                Subjective:      Patient ID: Mandi Diaz is a 47 y o  female  Patient presents to the office for a routine follow-up  Has been taking all of the medications as prescribed and denies adverse effects  Recent/relevant studies and blood work reviewed  Recently has been seen in the emergency room for dizziness/lightheadedness  Determined that it was caused by viral infection  She continues to complain of nausea, diarrhea/vomiting has subsided  Refill Zofran sent today  Discussed if she continues to feel this way she needs to follow-up with GI for further evaluation  Nausea  This is a new problem  The current episode started 1 to 4 weeks ago   The problem occurs " intermittently  The problem has been waxing and waning  Associated symptoms include abdominal pain (improved), fatigue, myalgias, nausea and vertigo  Pertinent negatives include no anorexia, arthralgias, change in bowel habit, chest pain, chills, congestion, coughing, diaphoresis, fever, headaches, joint swelling, neck pain, numbness, rash, sore throat, swollen glands, urinary symptoms, visual change or vomiting  She has tried lying down, drinking, relaxation and rest for the symptoms  The treatment provided mild relief  The following portions of the patient's history were reviewed and updated as appropriate:   Past Medical History:  She has a past medical history of Anxiety, Anxiety, Asthma, Bipolar disorder ([facility] ), Carpal tunnel syndrome, Chronic pain, Depression, Disease of thyroid gland, Dyslipidemia, Fibromyalgia, Irritable bowel, Kidney stones, Kidney stones (2019), Migraine, Obesity (BMI 30 0-34 9), Psychiatric disorder, Suicide attempt ([facility] ), and Vitamin D deficiency  ,  _______________________________________________________________________  Medical Problems:  does not have any pertinent problems on file ,  _______________________________________________________________________  Past Surgical History:   has a past surgical history that includes  section; Cholecystectomy; Tonsillectomy; Partial hysterectomy; Bunionectomy; and Tubal ligation  ,  _______________________________________________________________________  Family History:  family history includes Anxiety disorder in her mother; Arthritis in her mother; Bipolar disorder in her daughter and mother; Breast cancer in her paternal aunt and paternal grandmother; Cancer in her cousin, father, maternal uncle, paternal aunt, paternal grandmother, and paternal uncle; Dementia in her paternal grandmother; Depression in her mother; Diabetes in her mother; Heart disease in her brother; Hypertension in her brother and mother; Hypothyroidism in her mother and sister; Mental illness in her mother; Stroke in her mother ,  _______________________________________________________________________  Social History:   reports that she has never smoked  She has never used smokeless tobacco  She reports that she does not drink alcohol and does not use drugs  ,  _______________________________________________________________________  Allergies:  is allergic to doxycycline, erythromycin, other, augmentin [amoxicillin-pot clavulanate], latex, duloxetine, eletriptan, emgality [galcanezumab-gnlm], and galcanezumab     _______________________________________________________________________  Current Outpatient Medications   Medication Sig Dispense Refill   • albuterol (PROVENTIL HFA,VENTOLIN HFA) 90 mcg/act inhaler Inhale 2 puffs every 6 (six) hours as needed for wheezing 18 g 1   • [place] Thyroid 60 MG tablet Take one and 1/2 tabs daily 135 tablet 2   • azelastine (ASTELIN) 0 1 % nasal spray 1 spray into each nostril 2 (two) times a day Use in each nostril as directed 1 mL 3   • Blood Glucose Monitoring Suppl (OneTouch Verio Reflect) w/Device KIT Check blood sugars once daily  Please substitute with appropriate alternative as covered by patient's insurance   Dx: E11 65 1 kit 0   • butalbital-acetaminophen-caffeine (FIORICET,ESGIC) -40 mg per tablet      • cariprazine (Vraylar) 4 5 MG capsule Take 1 capsule (4 5 mg total) by mouth daily I am increasing the dose 30 capsule 3   • celecoxib (CELEBREX) 200 mg capsule Take 1 capsule (200 mg total) by mouth daily 30 capsule 2   • cyclobenzaprine (FLEXERIL) 5 mg tablet      • divalproex sodium (DEPAKOTE ER) 250 mg 24 hr tablet      • fluticasone (FLONASE) 50 mcg/act nasal spray INHALE TWO PUFFS TWO (TWO) TIMES A DAY RINSE MOUTH AFTER USE  16 g 0   • fremanezumab-vfrm (Ajovy) 225 MG/1 5ML auto-injector Inject 225 mg under the skin every 30 (thirty) days     • glucose blood (OneTouch Verio) test strip Check blood sugars once daily  Please substitute with appropriate alternative as covered by patient's insurance  Dx: E11 65 100 each 3   • levocetirizine (XYZAL) 5 MG tablet Take 1 tablet (5 mg total) by mouth every evening 90 tablet 1   • meclizine (ANTIVERT) 25 mg tablet Take 1 tablet (25 mg total) by mouth every 8 (eight) hours as needed for dizziness 30 tablet 0   • meloxicam (MOBIC) 15 mg tablet TAKE 1 TABLET (15 MG TOTAL) BY MOUTH DAILY 30 tablet 0   • mometasone (ELOCON) 0 1 % cream Apply topically daily 45 g 0   • naratriptan (AMERGE) 2 5 MG tablet      • omeprazole (PriLOSEC) 40 MG capsule Take 1 capsule (40 mg total) by mouth daily 90 capsule 1   • ondansetron (ZOFRAN-ODT) 4 mg disintegrating tablet Take 1 tablet (4 mg total) by mouth every 8 (eight) hours as needed for nausea 20 tablet 1   • OneTouch Delica Lancets 60X MISC Check blood sugars once daily  Please substitute with appropriate alternative as covered by patient's insurance   Dx: E11 65 100 each 3   • Ozempic, 1 MG/DOSE, 4 MG/3ML injection pen INJECT 0 75 ML (1 MG TOTAL) UNDER THE SKIN ONCE A WEEK 3 mL 1   • tiZANidine (ZANAFLEX) 4 mg tablet      • Topiramate  MG CP24 Take 100 mg by mouth 2 (two) times a day     • Ubrelvy 100 MG tablet      • XIFAXAN 550 MG tablet      • clonazePAM (KlonoPIN) 1 mg tablet Take 1 tablet (1 mg total) by mouth daily as needed for anxiety 30 tablet 3   • dicyclomine (BENTYL) 10 mg capsule Take 1 capsule (10 mg total) by mouth 3 (three) times a day as needed (abd cramping) 30 capsule 0   • ipratropium-albuterol (DUO-NEB) 0 5-2 5 mg/3 mL nebulizer solution Take 3 mL by nebulization 4 (four) times a day 90 mL 1   • ketoconazole (NIZORAL) 2 % cream      • lidocaine (LIDODERM) 5 % Apply 1 patch topically daily as needed (back pain) Remove & Discard patch within 12 hours or as directed by MD 6 patch 1   • Respiratory Therapy Supplies (NEBULIZER) DONA by Does not apply route every 4 (four) hours while awake 90 each 1     No current "facility-administered medications for this visit      _______________________________________________________________________  Review of Systems   Constitutional: Positive for fatigue  Negative for chills, diaphoresis and fever  HENT: Negative for congestion and sore throat  Respiratory: Negative for cough and shortness of breath  Cardiovascular: Negative for chest pain  Gastrointestinal: Positive for abdominal pain (improved), diarrhea (improved) and nausea  Negative for anorexia, change in bowel habit and vomiting  Genitourinary: Negative for dysuria  Musculoskeletal: Positive for myalgias  Negative for arthralgias, back pain, joint swelling and neck pain  Skin: Negative for rash  Neurological: Positive for vertigo and light-headedness  Negative for numbness and headaches  All other systems reviewed and are negative  Objective:  Vitals:    06/05/23 0853   BP: 130/80   Pulse: 64   Resp: 16   SpO2: 98%   Weight: 68 kg (150 lb)   Height: 5' 4\" (1 626 m)     Body mass index is 25 75 kg/m²  Physical Exam  Vitals and nursing note reviewed  Constitutional:       General: She is not in acute distress  Appearance: Normal appearance  She is not ill-appearing  Cardiovascular:      Rate and Rhythm: Normal rate and regular rhythm  Heart sounds: Normal heart sounds  Pulmonary:      Effort: Pulmonary effort is normal       Breath sounds: Normal breath sounds  Abdominal:      General: Bowel sounds are decreased  Tenderness: There is abdominal tenderness  Skin:     General: Skin is warm and dry  Neurological:      Mental Status: She is alert and oriented to person, place, and time  Psychiatric:         Mood and Affect: Mood normal          Behavior: Behavior normal          Thought Content: Thought content normal          Judgment: Judgment normal                Portions of the above record have been created with voice recognition software   Occasional wrong word or \"sound " "alike\" substitution may have occurred due to the inherent limitations of voice recognition software  Read the chart carefully and recognize, using context, where substitution may have occurred    "

## 2023-06-05 NOTE — ASSESSMENT & PLAN NOTE
Has been using Xifaxan at home provided by GI    If you continue to suffer with nausea and diarrhea, abdominal pain, vomiting returns please make an appointment with GI for follow-up

## 2023-06-05 NOTE — TELEPHONE ENCOUNTER
Issued script for diabetic supplies- they were sent to the pharmacy  However, patient's insurance will not pay for them through a pharmacy but they will pay for them if they are issued through a medical supply company  Please send the scripts to: For [facility]  Ph [phone]    Thank you!

## 2023-06-06 ENCOUNTER — TELEPHONE (OUTPATIENT)
Dept: FAMILY MEDICINE CLINIC | Facility: CLINIC | Age: 53
End: 2023-06-06

## 2023-06-06 DIAGNOSIS — E16.2 HYPOGLYCEMIA: ICD-10-CM

## 2023-06-06 RX ORDER — LANCETS 33 GAUGE
EACH MISCELLANEOUS
Qty: 100 EACH | Refills: 3 | Status: SHIPPED | OUTPATIENT
Start: 2023-06-06

## 2023-06-06 RX ORDER — BLOOD SUGAR DIAGNOSTIC
STRIP MISCELLANEOUS
Qty: 100 EACH | Refills: 3 | Status: SHIPPED | OUTPATIENT
Start: 2023-06-06

## 2023-06-06 RX ORDER — BLOOD-GLUCOSE METER
KIT MISCELLANEOUS
Qty: 1 KIT | Refills: 0 | Status: SHIPPED | OUTPATIENT
Start: 2023-06-06

## 2023-06-06 NOTE — TELEPHONE ENCOUNTER
Blood glucose monitoring supply needs to be sent to a medical supply  For [address] [place] [address] in [place] [phone]

## 2023-06-08 ENCOUNTER — TELEMEDICINE (OUTPATIENT)
Dept: PSYCHIATRY | Facility: CLINIC | Age: 53
End: 2023-06-08

## 2023-06-08 DIAGNOSIS — F41.1 GAD (GENERALIZED ANXIETY DISORDER): Primary | ICD-10-CM

## 2023-06-08 DIAGNOSIS — F31.81 BIPOLAR 2 DISORDER (HCC): ICD-10-CM

## 2023-06-08 NOTE — PSYCH
This clinician connected to the session with Caitie Danny; she would connect and then her screen would freeze and she was unable to be heard  She attempted multiple times to connect and then disconnected  Session unable to be held and will be cancelled due to client IT issues

## 2023-06-09 ENCOUNTER — HOSPITAL ENCOUNTER (OUTPATIENT)
Age: 53
Discharge: HOME | End: 2023-06-09
Payer: MEDICARE

## 2023-06-09 DIAGNOSIS — Z12.31 ENCOUNTER FOR SCREENING MAMMOGRAM FOR MALIGNANT NEOPLASM OF BREAST: ICD-10-CM

## 2023-06-09 PROCEDURE — 77063 BREAST TOMOSYNTHESIS BI: CPT

## 2023-06-09 PROCEDURE — 77067 SCR MAMMO BI INCL CAD: CPT

## 2023-06-15 ENCOUNTER — TELEMEDICINE (OUTPATIENT)
Dept: PSYCHIATRY | Facility: CLINIC | Age: 53
End: 2023-06-15
Payer: MEDICARE

## 2023-06-15 DIAGNOSIS — F31.81 BIPOLAR 2 DISORDER (HCC): ICD-10-CM

## 2023-06-15 DIAGNOSIS — F41.1 GAD (GENERALIZED ANXIETY DISORDER): Primary | ICD-10-CM

## 2023-06-15 PROCEDURE — 90837 PSYTX W PT 60 MINUTES: CPT

## 2023-06-15 NOTE — PSYCH
Virtual Regular Visit    Verification of patient location:    Patient is located at Home in the following state in which I hold an active license [place]      Assessment/Plan:    Problem List Items Addressed This Visit        Other    ZAINA (generalized anxiety disorder) - Primary    Bipolar 2 disorder ([facility] )       Goals addressed in session: Goal 1          Reason for visit is No chief complaint on file  Encounter provider Tamica Jules LCSW    Provider located at [address] [place] [address] [phone]      Recent Visits  No visits were found meeting these conditions  Showing recent visits within past 7 days and meeting all other requirements  Today's Visits  Date Type Provider Dept   06/15/23 Telemedicine Tamica Jules LCSW [facility]   Showing today's visits and meeting all other requirements  Future Appointments  No visits were found meeting these conditions  Showing future appointments within next 150 days and meeting all other requirements       The patient was identified by name and date of birth  Shalini Velarde was informed that this is a telemedicine visit and that the visit is being conducted throughthe [employer] platform  She agrees to proceed     My office door was closed  No one else was in the room  She acknowledged consent and understanding of privacy and security of the video platform  The patient has agreed to participate and understands they can discontinue the visit at any time  Patient is aware this is a billable service  Subjective  Shalini Velarde is a 47 y o  female        HPI     Past Medical History:   Diagnosis Date   • Anxiety    • Anxiety    • Asthma    • Bipolar disorder ([facility] )    • Carpal tunnel syndrome    • Chronic pain     lower back   • Depression    • Disease of thyroid gland    • Dyslipidemia    • Fibromyalgia    • Irritable bowel    • Kidney stones    • Kidney stones 2019   • Migraine    • Obesity (BMI 30 0-34  9)    • Psychiatric disorder     depression/anxiety   • Suicide attempt [facility])     as teen   • Vitamin D deficiency        Past Surgical History:   Procedure Laterality Date   • BUNIONECTOMY     •  SECTION     • CHOLECYSTECTOMY     • PARTIAL HYSTERECTOMY      portion of  uterus still present   • TONSILLECTOMY     • TUBAL LIGATION         Current Outpatient Medications   Medication Sig Dispense Refill   • albuterol (PROVENTIL HFA,VENTOLIN HFA) 90 mcg/act inhaler Inhale 2 puffs every 6 (six) hours as needed for wheezing 18 g 1   • [place] Thyroid 60 MG tablet Take one and 1/2 tabs daily 135 tablet 2   • azelastine (ASTELIN) 0 1 % nasal spray 1 spray into each nostril 2 (two) times a day Use in each nostril as directed 1 mL 3   • Blood Glucose Monitoring Suppl (OneTouch Verio Reflect) w/Device KIT Check blood sugars once daily  Please substitute with appropriate alternative as covered by patient's insurance   Dx: E11 65 1 kit 0   • butalbital-acetaminophen-caffeine (FIORICET,ESGIC) -40 mg per tablet      • cariprazine (Vraylar) 4 5 MG capsule Take 1 capsule (4 5 mg total) by mouth daily I am increasing the dose 30 capsule 3   • celecoxib (CELEBREX) 200 mg capsule Take 1 capsule (200 mg total) by mouth daily 30 capsule 2   • clonazePAM (KlonoPIN) 1 mg tablet Take 1 tablet (1 mg total) by mouth daily as needed for anxiety 30 tablet 3   • cyclobenzaprine (FLEXERIL) 5 mg tablet      • dicyclomine (BENTYL) 10 mg capsule Take 1 capsule (10 mg total) by mouth 3 (three) times a day as needed (abd cramping) 30 capsule 0   • divalproex sodium (DEPAKOTE ER) 250 mg 24 hr tablet      • fluticasone (FLONASE) 50 mcg/act nasal spray INHALE TWO PUFFS TWO (TWO) TIMES A DAY RINSE MOUTH AFTER USE  16 g 0   • fremanezumab-vfrm (Ajovy) 225 MG/1 5ML auto-injector Inject 225 mg under the skin every 30 (thirty) days     • glucose blood (OneTouch Verio) test strip Check blood sugars once daily  Please substitute with appropriate alternative as covered by patient's insurance  Dx: E11 65 100 each 3   • ipratropium-albuterol (DUO-NEB) 0 5-2 5 mg/3 mL nebulizer solution Take 3 mL by nebulization 4 (four) times a day 90 mL 1   • ketoconazole (NIZORAL) 2 % cream      • levocetirizine (XYZAL) 5 MG tablet Take 1 tablet (5 mg total) by mouth every evening 90 tablet 1   • lidocaine (LIDODERM) 5 % Apply 1 patch topically daily as needed (back pain) Remove & Discard patch within 12 hours or as directed by MD 6 patch 1   • meclizine (ANTIVERT) 25 mg tablet Take 1 tablet (25 mg total) by mouth every 8 (eight) hours as needed for dizziness 30 tablet 0   • meloxicam (MOBIC) 15 mg tablet TAKE 1 TABLET (15 MG TOTAL) BY MOUTH DAILY 30 tablet 0   • mometasone (ELOCON) 0 1 % cream Apply topically daily 45 g 0   • naratriptan (AMERGE) 2 5 MG tablet      • omeprazole (PriLOSEC) 40 MG capsule Take 1 capsule (40 mg total) by mouth daily 90 capsule 1   • ondansetron (ZOFRAN-ODT) 4 mg disintegrating tablet Take 1 tablet (4 mg total) by mouth every 8 (eight) hours as needed for nausea 20 tablet 1   • OneTouch Delica Lancets 63A MISC Check blood sugars once daily  Please substitute with appropriate alternative as covered by patient's insurance  Dx: E11 65 100 each 3   • Ozempic, 1 MG/DOSE, 4 MG/3ML injection pen INJECT 0 75 ML (1 MG TOTAL) UNDER THE SKIN ONCE A WEEK 3 mL 1   • Respiratory Therapy Supplies (NEBULIZER) DONA by Does not apply route every 4 (four) hours while awake 90 each 1   • tiZANidine (ZANAFLEX) 4 mg tablet      • Topiramate  MG CP24 Take 100 mg by mouth 2 (two) times a day     • Ubrelvy 100 MG tablet      • XIFAXAN 550 MG tablet        No current facility-administered medications for this visit          Allergies   Allergen Reactions   • Doxycycline Rash   • Erythromycin Rash   • Other Edema and Wheezing     jalapeno   • Augmentin [Amoxicillin-Pot Clavulanate] GI "Intolerance   • Latex Rash   • Duloxetine      Other reaction(s): Nausea, Loopy   • Eletriptan      Pain in lower jaw with pressure in throat   • Emgality [Galcanezumab-Gnlm]    • Galcanezumab      rash       Review of Systems    Video Exam    There were no vitals filed for this visit  Physical Exam     Behavioral Health Psychotherapy Progress Note    Psychotherapy Provided: Individual Psychotherapy      Diagnosis ICD-10-CM Associated Orders   1  ZAINA (generalized anxiety disorder)  F41 1       2  Bipolar 2 disorder ([facility] )  F31 81            Goals addressed in session: Goal 1     DATA: Met with Mouna Maddox for a scheduled individual session  Topics discussed included emotional wellness, coping skills, relationship with significant other and physical health concerns  Tahir Orta is feeling \"ok\", stating she's working on feeling better  She's still having a lot of stomach upset and has to get more testing done  Tahir Orta spoke about her fiance and stated that she doesn't think she wants to get   The more time she spends with him, the more she realizes that they are just very different that it causes her stress  She also feels he wants to rush getting  and she doesn't agree with doing that  Tahir Orta shows evidence of utilizing effective communication skills, engaging in problem solving and maintaining emotional composure to manage mental health symptoms  During this session, this clinical used the following therapeutic modalities supportive psychotherapy, client-centered therapy and stress management skills  Substance Abuse was not addressed during this session  If the client is diagnosed with a co-occurring substance use disorder, please indicate any changes in the frequency or amount of use: N/A  Stage of change for addressing substance use diagnoses: No substance use/Not applicable    ASSESSMENT:  Tahir Orta presents with a Euthymic/ normal mood    her affect is normal range and intensity, appropriate, " "which is congruent, with her  mood and the content of the session  Penny Sweeney was oriented x3  She was focused and engaged  Penny Sweeney exhibits good therapeutic rapport with this clinician  The client has made progress on their goals  Jun Oakley presents with a none risk of suicide, none risk of self-harm, and none risk of harm to others  For any risk assessment that surpasses a \"low\" rating, a safety plan must be developed  A safety plan was indicated: no  If yes, describe in detail: N/A    PLAN: Penny Sweeney will return in 1 week for the next scheduled session  At the next session, the therapist will use engagement strategies, supportive psychotherapy and client-centered therapy to address her anxiety  Behavioral Health Treatment Plan and Discharge Planning: Jun Oakley is aware of and agrees to continue to work on their treatment plan  They have identified and are working toward their discharge goals   yes    Visit start and stop times:    06/15/23  Start Time: 0803  Stop Time: 0900  Total Visit Time: 57 minutes    "

## 2023-06-22 ENCOUNTER — TELEMEDICINE (OUTPATIENT)
Dept: PSYCHIATRY | Facility: CLINIC | Age: 53
End: 2023-06-22
Payer: MEDICARE

## 2023-06-22 DIAGNOSIS — F31.81 BIPOLAR 2 DISORDER (HCC): ICD-10-CM

## 2023-06-22 DIAGNOSIS — F41.1 GAD (GENERALIZED ANXIETY DISORDER): Primary | ICD-10-CM

## 2023-06-22 PROCEDURE — 90834 PSYTX W PT 45 MINUTES: CPT

## 2023-06-22 NOTE — PSYCH
Virtual Regular Visit    Verification of patient location:    Patient is located at Home in the following state in which I hold an active license [place]      Assessment/Plan:    Problem List Items Addressed This Visit        Other    ZAINA (generalized anxiety disorder) - Primary    Bipolar 2 disorder ([facility] )       Goals addressed in session: Goal 1          Reason for visit is No chief complaint on file  Encounter provider Juanis Ovalles LCSW    Provider located at [address] [place] [id number] [phone]      Recent Visits  Date Type Provider Dept   06/15/23 Telemedicine Juanis Ovalles LCSW [facility]   Showing recent visits within past 7 days and meeting all other requirements  Today's Visits  Date Type Provider Dept   06/22/23 Telemedicine Juanis Ovalles LCSW [facility]   Showing today's visits and meeting all other requirements  Future Appointments  No visits were found meeting these conditions  Showing future appointments within next 150 days and meeting all other requirements       The patient was identified by name and date of birth  Martha Mccall was informed that this is a telemedicine visit and that the visit is being conducted throughthe [employer] platform  She agrees to proceed     My office door was closed  No one else was in the room  She acknowledged consent and understanding of privacy and security of the video platform  The patient has agreed to participate and understands they can discontinue the visit at any time  Patient is aware this is a billable service  Subjective  Martha Mccall is a 47 y o  female        HPI     Past Medical History:   Diagnosis Date   • Anxiety    • Anxiety    • Asthma    • B[facility] disorder ([facility] )    • Carpal tunnel syndrome    • Chronic pain     lower back   • Depression    • Disease of thyroid gland • Dyslipidemia    • Fibromyalgia    • Irritable bowel    • Kidney stones    • Kidney stones 2019   • Migraine    • Obesity (BMI 30 0-34  9)    • Psychiatric disorder     depression/anxiety   • Suicide attempt [facility])     as teen   • Vitamin D deficiency        Past Surgical History:   Procedure Laterality Date   • BUNIONECTOMY     •  SECTION     • CHOLECYSTECTOMY     • PARTIAL HYSTERECTOMY      portion of  uterus still present   • TONSILLECTOMY     • TUBAL LIGATION         Current Outpatient Medications   Medication Sig Dispense Refill   • albuterol (PROVENTIL HFA,VENTOLIN HFA) 90 mcg/act inhaler Inhale 2 puffs every 6 (six) hours as needed for wheezing 18 g 1   • [place] Thyroid 60 MG tablet Take one and 1/2 tabs daily 135 tablet 2   • azelastine (ASTELIN) 0 1 % nasal spray 1 spray into each nostril 2 (two) times a day Use in each nostril as directed 1 mL 3   • Blood Glucose Monitoring Suppl (OneTouch Verio Reflect) w/Device KIT Check blood sugars once daily  Please substitute with appropriate alternative as covered by patient's insurance   Dx: E11 65 1 kit 0   • butalbital-acetaminophen-caffeine (FIORICET,ESGIC) -40 mg per tablet      • cariprazine (Vraylar) 4 5 MG capsule Take 1 capsule (4 5 mg total) by mouth daily I am increasing the dose 30 capsule 3   • celecoxib (CELEBREX) 200 mg capsule Take 1 capsule (200 mg total) by mouth daily 30 capsule 2   • clonazePAM (KlonoPIN) 1 mg tablet Take 1 tablet (1 mg total) by mouth daily as needed for anxiety 30 tablet 3   • cyclobenzaprine (FLEXERIL) 5 mg tablet      • dicyclomine (BENTYL) 10 mg capsule Take 1 capsule (10 mg total) by mouth 3 (three) times a day as needed (abd cramping) 30 capsule 0   • divalproex sodium (DEPAKOTE ER) 250 mg 24 hr tablet      • fluticasone (FLONASE) 50 mcg/act nasal spray INHALE TWO PUFFS TWO (TWO) TIMES A DAY RINSE MOUTH AFTER USE  16 g 0   • fremanezumab-vfrm (Ajovy) 225 MG/1 5ML auto-injector Inject 225 mg under the skin every 30 (thirty) days     • glucose blood (OneTouch Verio) test strip Check blood sugars once daily  Please substitute with appropriate alternative as covered by patient's insurance  Dx: E11 65 100 each 3   • ipratropium-albuterol (DUO-NEB) 0 5-2 5 mg/3 mL nebulizer solution Take 3 mL by nebulization 4 (four) times a day 90 mL 1   • ketoconazole (NIZORAL) 2 % cream      • levocetirizine (XYZAL) 5 MG tablet Take 1 tablet (5 mg total) by mouth every evening 90 tablet 1   • lidocaine (LIDODERM) 5 % Apply 1 patch topically daily as needed (back pain) Remove & Discard patch within 12 hours or as directed by MD 6 patch 1   • meclizine (ANTIVERT) 25 mg tablet Take 1 tablet (25 mg total) by mouth every 8 (eight) hours as needed for dizziness 30 tablet 0   • meloxicam (MOBIC) 15 mg tablet TAKE 1 TABLET (15 MG TOTAL) BY MOUTH DAILY 30 tablet 0   • mometasone (ELOCON) 0 1 % cream Apply topically daily 45 g 0   • naratriptan (AMERGE) 2 5 MG tablet      • omeprazole (PriLOSEC) 40 MG capsule Take 1 capsule (40 mg total) by mouth daily 90 capsule 1   • ondansetron (ZOFRAN-ODT) 4 mg disintegrating tablet Take 1 tablet (4 mg total) by mouth every 8 (eight) hours as needed for nausea 20 tablet 1   • OneTouch Delica Lancets 47G MISC Check blood sugars once daily  Please substitute with appropriate alternative as covered by patient's insurance  Dx: E11 65 100 each 3   • Ozempic, 1 MG/DOSE, 4 MG/3ML injection pen INJECT 0 75 ML (1 MG TOTAL) UNDER THE SKIN ONCE A WEEK 3 mL 1   • Respiratory Therapy Supplies (NEBULIZER) DONA by Does not apply route every 4 (four) hours while awake 90 each 1   • tiZANidine (ZANAFLEX) 4 mg tablet      • Topiramate  MG CP24 Take 100 mg by mouth 2 (two) times a day     • Ubrelvy 100 MG tablet      • XIFAXAN 550 MG tablet        No current facility-administered medications for this visit          Allergies   Allergen Reactions   • Doxycycline Rash   • Erythromycin Rash   • Other Edema and Wheezing "    jalapeno   • Augmentin [Amoxicillin-Pot Clavulanate] GI Intolerance   • Latex Rash   • Duloxetine      Other reaction(s): Nausea, Loopy   • Eletriptan      Pain in lower jaw with pressure in throat   • Emgality [Galcanezumab-Gnlm]    • Galcanezumab      rash       Review of Systems    Video Exam    There were no vitals filed for this visit  Physical Exam     Behavioral Health Psychotherapy Progress Note    Psychotherapy Provided: Individual Psychotherapy      Diagnosis ICD-10-CM Associated Orders   1  ZAINA (generalized anxiety disorder)  F41 1       2  B[facility] 2 disorder ([facility] )  F31 81            Goals addressed in session: Goal 1     DATA: Met with Martha Mccall for a scheduled individual session  Topics discussed included emotional wellness, coping skills and relationship with significant other  Kevin Armando is feeling \"ok\", she stated she's feel very tired  Her fiance, Daron Matters, has been there for almost two and a half months  He'll be leaving soon and she states she \"hates to admit it but\" she's looking forward to the break  A lot of interactions with him feel like a power struggle  She stated he's said some nasty things to her when he gets mad but then later apologizes  She's been very clear that a change in his actions needs to occur versus all the apologies  She states she needs time to reflect and rest after he returns home  Kevin Armando shows evidence of utilizing effective communication skills, engaging in problem solving and maintaining emotional composure to manage mental health symptoms  During this session, this clinical used the following therapeutic modalities supportive psychotherapy, client-centered therapy, CBT techniques and stress management skills  Substance Abuse was not addressed during this session  If the client is diagnosed with a co-occurring substance use disorder, please indicate any changes in the frequency or amount of use: N/A   Stage of change for addressing substance use diagnoses: " "No substance use/Not applicable    ASSESSMENT:  Jeanine Mejia presents with a Euthymic/ normal mood  her affect is normal range and intensity, appropriate, which is congruent, with her  mood and the content of the session  Jeanine Mejia was oriented x3  She was focused and engaged  Jeanine Mejia exhibits good therapeutic rapport with this clinician  The client has made progress on their goals  Sade Barrios presents with a none risk of suicide, none risk of self-harm, and none risk of harm to others  For any risk assessment that surpasses a \"low\" rating, a safety plan must be developed  A safety plan was indicated: no  If yes, describe in detail: N/A    PLAN: Jeanine Mejia will return in 1 week for the next scheduled session  At the next session, the therapist will use engagement strategies, supportive psychotherapy and client-centered therapy to address her anxiety  Behavioral Health Treatment Plan and Discharge Planning: Sade Barrios is aware of and agrees to continue to work on their treatment plan  They have identified and are working toward their discharge goals   yes    Visit start and stop times:    Late start to session; client had trouble connecting to session    06/22/23  Start Time: 0808  Stop Time: 6136  Total Visit Time: 46 minutes    "

## 2023-06-28 ENCOUNTER — TELEPHONE (OUTPATIENT)
Dept: FAMILY MEDICINE CLINIC | Facility: CLINIC | Age: 53
End: 2023-06-28

## 2023-06-28 DIAGNOSIS — R63.5 WEIGHT GAIN: ICD-10-CM

## 2023-06-28 NOTE — TELEPHONE ENCOUNTER
Patient would like to know what else can she do to help her lose weight and not go into this diabetic silverio?  Please call patient back and discuss with her

## 2023-06-28 NOTE — TELEPHONE ENCOUNTER
Patient should have protein rich snack  Eat frequent small meals  We will have to decrease her Ozempic

## 2023-06-28 NOTE — TELEPHONE ENCOUNTER
Spoke with patient- she stated that she was told that if her glucose drops, she is to call the office  She just tested herself and she is at 76  She does not feel well at all  Please advise patient to let her know what she should do next      Thank you

## 2023-07-13 ENCOUNTER — TELEMEDICINE (OUTPATIENT)
Dept: PSYCHIATRY | Facility: CLINIC | Age: 53
End: 2023-07-13
Payer: MEDICARE

## 2023-07-13 DIAGNOSIS — F31.81 BIPOLAR 2 DISORDER (HCC): ICD-10-CM

## 2023-07-13 DIAGNOSIS — F41.1 GAD (GENERALIZED ANXIETY DISORDER): Primary | ICD-10-CM

## 2023-07-13 PROCEDURE — 90837 PSYTX W PT 60 MINUTES: CPT

## 2023-07-13 RX ORDER — CARIPRAZINE 4.5 MG/1
CAPSULE, GELATIN COATED ORAL
Qty: 30 CAPSULE | Refills: 1 | Status: SHIPPED | OUTPATIENT
Start: 2023-07-13 | End: 2023-09-05

## 2023-07-13 NOTE — TELEPHONE ENCOUNTER
Writer left message for patient to schedule follow up appointment with provider in regards to a medication refill. Office number was left and patient was asked to call back.

## 2023-07-13 NOTE — PSYCH
Virtual Regular Visit    Verification of patient location:    Patient is located at Home in the following state in which I hold an active license [place]      Assessment/Plan:    Problem List Items Addressed This Visit        Other    ZAINA (generalized anxiety disorder) - Primary    Bipolar 2 disorder ([address])       Goals addressed in session: Goal 1          Reason for visit is No chief complaint on file. Encounter provider Franc Lopez LCSW    Provider located at [address] [place] [id number] [phone]      Recent Visits  No visits were found meeting these conditions. Showing recent visits within past 7 days and meeting all other requirements  Today's Visits  Date Type Provider Dept   07/13/23 Telemedicine Franc Lopez LCSW [facility]   Showing today's visits and meeting all other requirements  Future Appointments  No visits were found meeting these conditions. Showing future appointments within next 150 days and meeting all other requirements       The patient was identified by name and date of birth. Jaqui Allen was informed that this is a telemedicine visit and that the visit is being conducted throughthe [employer] platform. She agrees to proceed. .  My office door was closed. No one else was in the room. She acknowledged consent and understanding of privacy and security of the video platform. The patient has agreed to participate and understands they can discontinue the visit at any time. Patient is aware this is a billable service. Subjective  Jaqui Allen is a 47 y.o. female.       HPI     Past Medical History:   Diagnosis Date   • Anxiety    • Anxiety    • Asthma    • Bipolar disorder ([address])    • Carpal tunnel syndrome    • Chronic pain     lower back   • Depression    • Disease of thyroid gland    • Dyslipidemia    • Fibromyalgia    • Irritable bowel    • Kidney stones    • Kidney stones 2019   • Migraine    • Obesity (BMI 30.0-34. 9)    • Psychiatric disorder     depression/anxiety   • Suicide attempt [facility])     as teen   • Vitamin D deficiency        Past Surgical History:   Procedure Laterality Date   • BUNIONECTOMY     •  SECTION     • CHOLECYSTECTOMY     • PARTIAL HYSTERECTOMY      portion of  uterus still present   • TONSILLECTOMY     • TUBAL LIGATION         Current Outpatient Medications   Medication Sig Dispense Refill   • semaglutide, 0.25 or 0.5 mg/dose, (Ozempic, 0.25 or 0.5 MG/DOSE,) 2 mg/3 mL injection pen Inject 0.75 mL (0.5 mg total) under the skin every 7 days 9 mL 1   • albuterol (PROVENTIL HFA,VENTOLIN HFA) 90 mcg/act inhaler Inhale 2 puffs every 6 (six) hours as needed for wheezing 18 g 1   • [place] Thyroid 60 MG tablet Take one and 1/2 tabs daily 135 tablet 2   • azelastine (ASTELIN) 0.1 % nasal spray 1 spray into each nostril 2 (two) times a day Use in each nostril as directed 1 mL 3   • Blood Glucose Monitoring Suppl (OneTouch Verio Reflect) w/Device KIT Check blood sugars once daily. Please substitute with appropriate alternative as covered by patient's insurance.  Dx: E11.65 1 kit 0   • butalbital-acetaminophen-caffeine (FIORICET,ESGIC) -40 mg per tablet      • cariprazine (Vraylar) 4.5 MG capsule Take 1 capsule (4.5 mg total) by mouth daily I am increasing the dose 30 capsule 3   • celecoxib (CELEBREX) 200 mg capsule Take 1 capsule (200 mg total) by mouth daily 30 capsule 2   • clonazePAM (KlonoPIN) 1 mg tablet Take 1 tablet (1 mg total) by mouth daily as needed for anxiety 30 tablet 3   • cyclobenzaprine (FLEXERIL) 5 mg tablet      • dicyclomine (BENTYL) 10 mg capsule Take 1 capsule (10 mg total) by mouth 3 (three) times a day as needed (abd cramping) 30 capsule 0   • divalproex sodium (DEPAKOTE ER) 250 mg 24 hr tablet      • fluticasone (FLONASE) 50 mcg/act nasal spray INHALE TWO PUFFS TWO (TWO) TIMES A DAY RINSE MOUTH AFTER USE. 16 g 0   • fremanezumab-vfrm (Ajovy) 225 MG/1.5ML auto-injector Inject 225 mg under the skin every 30 (thirty) days     • glucose blood (OneTouch Verio) test strip Check blood sugars once daily. Please substitute with appropriate alternative as covered by patient's insurance. Dx: E11.65 100 each 3   • ipratropium-albuterol (DUO-NEB) 0.5-2.5 mg/3 mL nebulizer solution Take 3 mL by nebulization 4 (four) times a day 90 mL 1   • ketoconazole (NIZORAL) 2 % cream      • levocetirizine (XYZAL) 5 MG tablet Take 1 tablet (5 mg total) by mouth every evening 90 tablet 1   • lidocaine (LIDODERM) 5 % Apply 1 patch topically daily as needed (back pain) Remove & Discard patch within 12 hours or as directed by MD Castro patch 1   • meclizine (ANTIVERT) 25 mg tablet Take 1 tablet (25 mg total) by mouth every 8 (eight) hours as needed for dizziness 30 tablet 0   • meloxicam (MOBIC) 15 mg tablet TAKE 1 TABLET (15 MG TOTAL) BY MOUTH DAILY 30 tablet 0   • mometasone (ELOCON) 0.1 % cream Apply topically daily 45 g 0   • naratriptan (AMERGE) 2.5 MG tablet      • omeprazole (PriLOSEC) 40 MG capsule Take 1 capsule (40 mg total) by mouth daily 90 capsule 1   • ondansetron (ZOFRAN-ODT) 4 mg disintegrating tablet Take 1 tablet (4 mg total) by mouth every 8 (eight) hours as needed for nausea 20 tablet 1   • OneTouch Delica Lancets 09G MISC Check blood sugars once daily. Please substitute with appropriate alternative as covered by patient's insurance. Dx: E11.65 100 each 3   • Respiratory Therapy Supplies (NEBULIZER) DONA by Does not apply route every 4 (four) hours while awake 90 each 1   • tiZANidine (ZANAFLEX) 4 mg tablet      • Topiramate  MG CP24 Take 100 mg by mouth 2 (two) times a day     • Ubrelvy 100 MG tablet      • XIFAXAN 550 MG tablet        No current facility-administered medications for this visit.         Allergies   Allergen Reactions   • Doxycycline Rash   • Erythromycin Rash   • Other Edema and Wheezing terry   • Augmentin [Amoxicillin-Pot Clavulanate] GI Intolerance   • Latex Rash   • Duloxetine      Other reaction(s): Nausea, Loopy   • Eletriptan      Pain in lower jaw with pressure in throat   • Emgality [Galcanezumab-Gnlm]    • Galcanezumab      rash       Review of Systems    Video Exam    There were no vitals filed for this visit. Physical Exam     Behavioral Health Psychotherapy Progress Note    Psychotherapy Provided: Individual Psychotherapy      Diagnosis ICD-10-CM Associated Orders   1. ZAINA (generalized anxiety disorder)  F41.1       2. Bipolar 2 disorder ([facility])  F31.81            Goals addressed in session: Goal 1     DATA: Met with Francisca Bautista for a scheduled individual session. Topics discussed included emotional wellness, coping skills and relationship with significant other. Be Diamond is feeling "tired". She stated she has had a rough couple weeks. She's been struggling a lot with migraines. When her fiance left to go back to [place], she took him to the airport. He missed his flight and blamed it on her. She said she got him there on time but--as she's observed before--when things "go wrong", she feels he starts to blame her for everything "wrong in his life". Be Diamond stated when she points this out to him and that she doesn't want to deal with him and his inability to handle his frustration, he starts to back peddle and beg her to not break up with him. She does feel some relief that he went back to [place] so she can have some down time; she stated they have a lot of conflicting habits and it throws her off. Be Diamond shows evidence of utilizing effective communication skills, engaging in problem solving and maintaining emotional composure to manage mental health symptoms. During this session, this clinical used the following therapeutic modalities supportive psychotherapy, client-centered therapy, CBT techniques and stress management skills.     Substance Abuse was not addressed during this session. If the client is diagnosed with a co-occurring substance use disorder, please indicate any changes in the frequency or amount of use: N/A. Stage of change for addressing substance use diagnoses: No substance use/Not applicable    ASSESSMENT:  Hema Shields presents with a Euthymic/ normal mood. her affect is normal range and intensity, appropriate, which is congruent, with her  mood and the content of the session. Hema Shields was oriented x3. She was focused and engaged. Hema Shields exhibits good therapeutic rapport with this clinician. The client has made progress on their goals. Lois Ferguson presents with a none risk of suicide, none risk of self-harm, and none risk of harm to others. For any risk assessment that surpasses a "low" rating, a safety plan must be developed. A safety plan was indicated: no  If yes, describe in detail: N/A    PLAN: Hema Shields will return in 1 week for the next scheduled session. At the next session, the therapist will use engagement strategies, supportive psychotherapy and client-centered therapy to address her anxiety. Behavioral Health Treatment Plan and Discharge Planning: Lois Ferguson is aware of and agrees to continue to work on their treatment plan. They have identified and are working toward their discharge goals.  yes    Visit start and stop times:    07/13/23  Start Time: 0807  Stop Time: 0902  Total Visit Time: 55 minutes     Session started late due to client having IT problems

## 2023-07-14 DIAGNOSIS — R11.0 NAUSEA: ICD-10-CM

## 2023-07-14 RX ORDER — ONDANSETRON 4 MG/1
TABLET, ORALLY DISINTEGRATING ORAL
Qty: 20 TABLET | Refills: 0 | Status: SHIPPED | OUTPATIENT
Start: 2023-07-14

## 2023-07-17 ENCOUNTER — TELEPHONE (OUTPATIENT)
Dept: FAMILY MEDICINE CLINIC | Facility: CLINIC | Age: 53
End: 2023-07-17

## 2023-07-17 NOTE — TELEPHONE ENCOUNTER
Patient called in regards to her blood sugar levels. She wants to know the normal range it should be and what she needs as her regular reading are in the 80's and now it's at 101. Please advise.   Thanks

## 2023-07-17 NOTE — TELEPHONE ENCOUNTER
Both of those numbers are good ,we usually go by ranges nonspecific numbers-as long she is feeling good-thank you

## 2023-07-20 ENCOUNTER — TELEMEDICINE (OUTPATIENT)
Dept: PSYCHIATRY | Facility: CLINIC | Age: 53
End: 2023-07-20
Payer: MEDICARE

## 2023-07-20 ENCOUNTER — TELEPHONE (OUTPATIENT)
Dept: FAMILY MEDICINE CLINIC | Facility: CLINIC | Age: 53
End: 2023-07-20

## 2023-07-20 DIAGNOSIS — F41.1 GAD (GENERALIZED ANXIETY DISORDER): Primary | ICD-10-CM

## 2023-07-20 DIAGNOSIS — F31.81 BIPOLAR 2 DISORDER (HCC): ICD-10-CM

## 2023-07-20 PROCEDURE — 90834 PSYTX W PT 45 MINUTES: CPT

## 2023-07-20 NOTE — TELEPHONE ENCOUNTER
Patient called stating feeling very run down. Would you order blood work or would you rather she make an appt first.   Please advise.

## 2023-07-20 NOTE — PSYCH
Virtual Regular Visit    Verification of patient location:    Patient is located at Home in the following state in which I hold an active license [place]      Assessment/Plan:    Problem List Items Addressed This Visit        Other    ZAINA (generalized anxiety disorder) - Primary    Bipolar 2 disorder ([address])       Goals addressed in session: Goal 1          Reason for visit is No chief complaint on file. [facility]    Provider located at [address] [place] [address]  [place] [id number] [phone]      Recent Visits  Date Type Provider Dept   07/17/23 Telephone Del[place]et Mo, 95 [facility]   07/13/23 Telemedicine [facility]   Showing recent visits within past 7 days and meeting all other requirements  Today's Visits  Date Type Provider Dept   07/20/23 Telephone Delbra Mo, 95 [facility]   07/20/23 Telemedicine [facility]   Showing today's visits and meeting all other requirements  Future Appointments  No visits were found meeting these conditions. Showing future appointments within next 150 days and meeting all other requirements       The patient was identified by name and date of birth. Fernando Murillo was informed that this is a telemedicine visit and that the visit is being conducted throughthe tripJane platform. She agrees to proceed. .  My office door was closed. No one else was in the room. She acknowledged consent and understanding of privacy and security of the video platform. The patient has agreed to participate and understands they can discontinue the visit at any time. Patient is aware this is a billable service. Subjective  Fernando Murillo is a 47 y.o. female.       HPI     Past Medical History:   Diagnosis Date   • Anxiety    • Anxiety    • Asthma    • Bipolar disorder [facility])    • Carpal tunnel syndrome    • Chronic pain     lower back   • Depression    • Disease of thyroid gland    • Dyslipidemia    • Fibromyalgia    • Irritable bowel    • Kidney stones    • Kidney stones 2019   • Migraine    • Obesity (BMI 30.0-34. 9)    • Psychiatric disorder     depression/anxiety   • Suicide attempt [facility])     as teen   • Vitamin D deficiency        Past Surgical History:   Procedure Laterality Date   • BUNIONECTOMY     •  SECTION     • CHOLECYSTECTOMY     • PARTIAL HYSTERECTOMY      portion of  uterus still present   • TONSILLECTOMY     • TUBAL LIGATION         Current Outpatient Medications   Medication Sig Dispense Refill   • semaglutide, 0.25 or 0.5 mg/dose, (Ozempic, 0.25 or 0.5 MG/DOSE,) 2 mg/3 mL injection pen Inject 0.75 mL (0.5 mg total) under the skin every 7 days 9 mL 1   • albuterol (PROVENTIL HFA,VENTOLIN HFA) 90 mcg/act inhaler Inhale 2 puffs every 6 (six) hours as needed for wheezing 18 g 1   • [place] Thyroid 60 MG tablet Take one and 1/2 tabs daily 135 tablet 2   • azelastine (ASTELIN) 0.1 % nasal spray 1 spray into each nostril 2 (two) times a day Use in each nostril as directed 1 mL 3   • Blood Glucose Monitoring Suppl (OneTouch Verio Reflect) w/Device KIT Check blood sugars once daily. Please substitute with appropriate alternative as covered by patient's insurance.  Dx: E11.65 1 kit 0   • butalbital-acetaminophen-caffeine (FIORICET,ESGIC) -40 mg per tablet      • celecoxib (CELEBREX) 200 mg capsule Take 1 capsule (200 mg total) by mouth daily 30 capsule 2   • clonazePAM (KlonoPIN) 1 mg tablet Take 1 tablet (1 mg total) by mouth daily as needed for anxiety 30 tablet 3   • cyclobenzaprine (FLEXERIL) 5 mg tablet      • dicyclomine (BENTYL) 10 mg capsule Take 1 capsule (10 mg total) by mouth 3 (three) times a day as needed (abd cramping) 30 capsule 0   • divalproex sodium (DEPAKOTE ER) 250 mg 24 hr tablet • fluticasone (FLONASE) 50 mcg/act nasal spray INHALE TWO PUFFS TWO (TWO) TIMES A DAY RINSE MOUTH AFTER USE. 16 g 0   • fremanezumab-vfrm (Ajovy) 225 MG/1.5ML auto-injector Inject 225 mg under the skin every 30 (thirty) days     • glucose blood (OneTouch Verio) test strip Check blood sugars once daily. Please substitute with appropriate alternative as covered by patient's insurance. Dx: E11.65 100 each 3   • ipratropium-albuterol (DUO-NEB) 0.5-2.5 mg/3 mL nebulizer solution Take 3 mL by nebulization 4 (four) times a day 90 mL 1   • ketoconazole (NIZORAL) 2 % cream      • levocetirizine (XYZAL) 5 MG tablet Take 1 tablet (5 mg total) by mouth every evening 90 tablet 1   • lidocaine (LIDODERM) 5 % Apply 1 patch topically daily as needed (back pain) Remove & Discard patch within 12 hours or as directed by MD 6 patch 1   • meclizine (ANTIVERT) 25 mg tablet Take 1 tablet (25 mg total) by mouth every 8 (eight) hours as needed for dizziness 30 tablet 0   • meloxicam (MOBIC) 15 mg tablet TAKE 1 TABLET (15 MG TOTAL) BY MOUTH DAILY 30 tablet 0   • mometasone (ELOCON) 0.1 % cream Apply topically daily 45 g 0   • naratriptan (AMERGE) 2.5 MG tablet      • omeprazole (PriLOSEC) 40 MG capsule Take 1 capsule (40 mg total) by mouth daily 90 capsule 1   • ondansetron (ZOFRAN-ODT) 4 mg disintegrating tablet TAKE 1 TABLET (4 MG TOTAL) BY MOUTH EVERY EIGHT (EIGHT) HOURS AS NEEDED FOR NAUSEA 20 tablet 0   • OneTouch Delica Lancets 58Z MISC Check blood sugars once daily. Please substitute with appropriate alternative as covered by patient's insurance.  Dx: E11.65 100 each 3   • Respiratory Therapy Supplies (NEBULIZER) DONA by Does not apply route every 4 (four) hours while awake 90 each 1   • tiZANidine (ZANAFLEX) 4 mg tablet      • Topiramate  MG CP24 Take 100 mg by mouth 2 (two) times a day     • Ubrelvy 100 MG tablet      • Vraylar 4.5 MG capsule TAKE 1 CAPSULE (4.5 MG TOTAL) BY MOUTH DAILY INSERT IN THE MORNING INCREASING THE DOSE 30 capsule 1   • XIFAXAN 550 MG tablet        No current facility-administered medications for this visit. Allergies   Allergen Reactions   • Doxycycline Rash   • Erythromycin Rash   • Other Edema and Wheezing     jalapeno   • Augmentin [Amoxicillin-Pot Clavulanate] GI Intolerance   • Latex Rash   • Duloxetine      Other reaction(s): Nausea, Loopy   • Eletriptan      Pain in lower jaw with pressure in throat   • Emgality [Galcanezumab-Gnlm]    • Galcanezumab      rash       Review of Systems    Video Exam    There were no vitals filed for this visit. Physical Exam     Behavioral Health Psychotherapy Progress Note    Psychotherapy Provided: Individual Psychotherapy      Diagnosis ICD-10-CM Associated Orders   1. ZAINA (generalized anxiety disorder)  F41.1       2. Bipolar 2 disorder ([address])  F31.81            Goals addressed in session: Goal 1     DATA: Met with Leelee Brand for a scheduled individual session. Topics discussed included emotional wellness, coping skills, relationship with significant other and physical health concerns. Obdulio Mcneill is feeling "so tired". She is planning to contact her doctor because she's not sure why she is so exhausted, even upon waking up. She's wondering if her vitamin B12 is low or her thyroid is off. Obdulio Mcneill did state that she called off her engagement. She said she feels so much relief, like a weight was lifted. He got upset and tried to apologize for multiple things that have happened. She told him it's not about apologies, it's about not changing your actions and repeating the same mistakes. They are pretty different people and trying to blend their lives together has been very stressful for Obdulio Mcneill. She's still talking to him but has said she needs time and space, especially because she's concerned that the stress of their relationship has possibly contributed to a flare up in her physical symptoms.    Obdulio Mcnelil shows evidence of utilizing effective communication skills, engaging in problem solving and maintaining emotional composure to manage mental health symptoms. During this session, this clinical used the following therapeutic modalities supportive psychotherapy, client-centered therapy, CBT techniques and stress management skills. Substance Abuse was not addressed during this session. If the client is diagnosed with a co-occurring substance use disorder, please indicate any changes in the frequency or amount of use: N/A. Stage of change for addressing substance use diagnoses: No substance use/Not applicable    ASSESSMENT:  Perico Holt presents with a Euthymic/ normal mood. her affect is normal range and intensity, appropriate, which is congruent, with her  mood and the content of the session. Perico Holt was oriented x3. She was focused and engaged. Perico Holt exhibits good therapeutic rapport with this clinician. The client has made progress on their goals. Lemuel Adhikari presents with a none risk of suicide, none risk of self-harm, and none risk of harm to others. For any risk assessment that surpasses a "low" rating, a safety plan must be developed. A safety plan was indicated: no  If yes, describe in detail: N/A    PLAN: Perico Holt will return in 1 week for the next scheduled session. At the next session, the therapist will use engagement strategies, supportive psychotherapy and client-centered therapy to address her anxiety. Behavioral Health Treatment Plan and Discharge Planning: Lemuel Adhikari is aware of and agrees to continue to work on their treatment plan. They have identified and are working toward their discharge goals.  yes    Visit start and stop times:    07/20/23  Start Time: 0820  Stop Time: 2872  Total Visit Time: 44 minutes

## 2023-07-24 ENCOUNTER — OFFICE VISIT (OUTPATIENT)
Dept: FAMILY MEDICINE CLINIC | Facility: CLINIC | Age: 53
End: 2023-07-24
Payer: MEDICARE

## 2023-07-24 ENCOUNTER — APPOINTMENT (OUTPATIENT)
Dept: LAB | Facility: CLINIC | Age: 53
End: 2023-07-24
Payer: MEDICARE

## 2023-07-24 VITALS
HEART RATE: 96 BPM | BODY MASS INDEX: 26.15 KG/M2 | WEIGHT: 153.2 LBS | RESPIRATION RATE: 14 BRPM | TEMPERATURE: 96.2 F | HEIGHT: 64 IN | OXYGEN SATURATION: 98 % | DIASTOLIC BLOOD PRESSURE: 82 MMHG | SYSTOLIC BLOOD PRESSURE: 138 MMHG

## 2023-07-24 DIAGNOSIS — E03.8 OTHER SPECIFIED HYPOTHYROIDISM: ICD-10-CM

## 2023-07-24 DIAGNOSIS — R53.83 OTHER FATIGUE: Primary | ICD-10-CM

## 2023-07-24 DIAGNOSIS — R11.0 NAUSEA: ICD-10-CM

## 2023-07-24 DIAGNOSIS — R79.9 ABNORMAL FINDING OF BLOOD CHEMISTRY, UNSPECIFIED: ICD-10-CM

## 2023-07-24 DIAGNOSIS — E16.2 HYPOGLYCEMIA: ICD-10-CM

## 2023-07-24 DIAGNOSIS — R53.83 OTHER FATIGUE: ICD-10-CM

## 2023-07-24 DIAGNOSIS — E55.9 VITAMIN D DEFICIENCY: ICD-10-CM

## 2023-07-24 DIAGNOSIS — E53.8 B12 DEFICIENCY: ICD-10-CM

## 2023-07-24 DIAGNOSIS — M79.7 FIBROMYALGIA: ICD-10-CM

## 2023-07-24 DIAGNOSIS — E16.2 HYPOGLYCEMIA, UNSPECIFIED: ICD-10-CM

## 2023-07-24 LAB
25(OH)D3 SERPL-MCNC: 39.7 NG/ML (ref 30–100)
ALBUMIN SERPL BCP-MCNC: 4.1 G/DL (ref 3.5–5)
ALP SERPL-CCNC: 73 U/L (ref 46–116)
ALT SERPL W P-5'-P-CCNC: 36 U/L (ref 12–78)
ANION GAP SERPL CALCULATED.3IONS-SCNC: 4 MMOL/L
AST SERPL W P-5'-P-CCNC: 18 U/L (ref 5–45)
BASOPHILS # BLD AUTO: 0.02 THOUSANDS/ÂΜL (ref 0–0.1)
BASOPHILS NFR BLD AUTO: 1 % (ref 0–1)
BILIRUB SERPL-MCNC: 0.44 MG/DL (ref 0.2–1)
BUN SERPL-MCNC: 15 MG/DL (ref 5–25)
CALCIUM SERPL-MCNC: 9.6 MG/DL (ref 8.3–10.1)
CHLORIDE SERPL-SCNC: 109 MMOL/L (ref 96–108)
CO2 SERPL-SCNC: 27 MMOL/L (ref 21–32)
CREAT SERPL-MCNC: 0.78 MG/DL (ref 0.6–1.3)
EOSINOPHIL # BLD AUTO: 0.06 THOUSAND/ÂΜL (ref 0–0.61)
EOSINOPHIL NFR BLD AUTO: 2 % (ref 0–6)
ERYTHROCYTE [DISTWIDTH] IN BLOOD BY AUTOMATED COUNT: 12.8 % (ref 11.6–15.1)
EST. AVERAGE GLUCOSE BLD GHB EST-MCNC: 105 MG/DL
FERRITIN SERPL-MCNC: 87 NG/ML (ref 11–307)
GFR SERPL CREATININE-BSD FRML MDRD: 88 ML/MIN/1.73SQ M
GLUCOSE P FAST SERPL-MCNC: 80 MG/DL (ref 65–99)
HBA1C MFR BLD: 5.3 %
HCT VFR BLD AUTO: 38.4 % (ref 34.8–46.1)
HGB BLD-MCNC: 12.3 G/DL (ref 11.5–15.4)
IMM GRANULOCYTES # BLD AUTO: 0.01 THOUSAND/UL (ref 0–0.2)
IMM GRANULOCYTES NFR BLD AUTO: 0 % (ref 0–2)
IRON SATN MFR SERPL: 33 % (ref 15–50)
IRON SERPL-MCNC: 134 UG/DL (ref 50–170)
LYMPHOCYTES # BLD AUTO: 1.38 THOUSANDS/ÂΜL (ref 0.6–4.47)
LYMPHOCYTES NFR BLD AUTO: 36 % (ref 14–44)
MCH RBC QN AUTO: 29.5 PG (ref 26.8–34.3)
MCHC RBC AUTO-ENTMCNC: 32 G/DL (ref 31.4–37.4)
MCV RBC AUTO: 92 FL (ref 82–98)
MONOCYTES # BLD AUTO: 0.26 THOUSAND/ÂΜL (ref 0.17–1.22)
MONOCYTES NFR BLD AUTO: 7 % (ref 4–12)
NEUTROPHILS # BLD AUTO: 2.13 THOUSANDS/ÂΜL (ref 1.85–7.62)
NEUTS SEG NFR BLD AUTO: 54 % (ref 43–75)
NRBC BLD AUTO-RTO: 0 /100 WBCS
PLATELET # BLD AUTO: 258 THOUSANDS/UL (ref 149–390)
PMV BLD AUTO: 10.2 FL (ref 8.9–12.7)
POTASSIUM SERPL-SCNC: 4.4 MMOL/L (ref 3.5–5.3)
PROT SERPL-MCNC: 7.6 G/DL (ref 6.4–8.4)
RBC # BLD AUTO: 4.17 MILLION/UL (ref 3.81–5.12)
SODIUM SERPL-SCNC: 140 MMOL/L (ref 135–147)
TIBC SERPL-MCNC: 406 UG/DL (ref 250–450)
TSH SERPL DL<=0.05 MIU/L-ACNC: 3.03 UIU/ML (ref 0.45–4.5)
VIT B12 SERPL-MCNC: 395 PG/ML (ref 180–914)
WBC # BLD AUTO: 3.86 THOUSAND/UL (ref 4.31–10.16)

## 2023-07-24 PROCEDURE — 83550 IRON BINDING TEST: CPT

## 2023-07-24 PROCEDURE — 84443 ASSAY THYROID STIM HORMONE: CPT

## 2023-07-24 PROCEDURE — 85025 COMPLETE CBC W/AUTO DIFF WBC: CPT

## 2023-07-24 PROCEDURE — 83918 ORGANIC ACIDS TOTAL QUANT: CPT

## 2023-07-24 PROCEDURE — 82607 VITAMIN B-12: CPT

## 2023-07-24 PROCEDURE — 83036 HEMOGLOBIN GLYCOSYLATED A1C: CPT

## 2023-07-24 PROCEDURE — 36415 COLL VENOUS BLD VENIPUNCTURE: CPT

## 2023-07-24 PROCEDURE — 80053 COMPREHEN METABOLIC PANEL: CPT

## 2023-07-24 PROCEDURE — 99214 OFFICE O/P EST MOD 30 MIN: CPT

## 2023-07-24 PROCEDURE — 83540 ASSAY OF IRON: CPT

## 2023-07-24 PROCEDURE — 82728 ASSAY OF FERRITIN: CPT

## 2023-07-24 PROCEDURE — 82306 VITAMIN D 25 HYDROXY: CPT

## 2023-07-24 NOTE — PROGRESS NOTES
Assessment/Plan:         Problem List Items Addressed This Visit        Endocrine    Hypothyroidism     Continue to take medications as prescribed. Obtain blood work. Relevant Orders    TSH, 3rd generation with Free T4 reflex       Other    B12 deficiency     Obtain blood work. Relevant Orders    Vitamin B12    Fibromyalgia     Continue to follow-up with rheumatology. Obtain blood work. Relevant Orders    CBC and differential    Comprehensive metabolic panel    Hemoglobin A1C    TSH, 3rd generation with Free T4 reflex    Vitamin D 25 hydroxy    Iron Panel (Includes Ferritin, Iron Sat%, Iron, and TIBC)    Vitamin B12    Methylmalonic acid, serum    Vitamin D deficiency     Please obtain blood work as discussed. Relevant Orders    Vitamin D 25 hydroxy   Other Visit Diagnoses     Other fatigue    -  Primary    Relevant Orders    CBC and differential    Comprehensive metabolic panel    Hemoglobin A1C    TSH, 3rd generation with Free T4 reflex    Vitamin D 25 hydroxy    Iron Panel (Includes Ferritin, Iron Sat%, Iron, and TIBC)    Vitamin B12    Methylmalonic acid, serum    UA w Reflex to Microscopic w Reflex to Culture -Lab Collect    Hypoglycemia, unspecified        Relevant Orders    Hemoglobin A1C    Abnormal finding of blood chemistry, unspecified        Relevant Orders    Iron Panel (Includes Ferritin, Iron Sat%, Iron, and TIBC)            Subjective:      Patient ID: Lois Ferguson is a 47 y.o. female. Presents to the office concerned regarding recent worsening fatigue. Denying any fevers or chills. Continues to follow-up with rheumatology and endocrinology.   Is denying any chest pain, palpitations, shortness of breath      The following portions of the patient's history were reviewed and updated as appropriate:   Past Medical History:  She has a past medical history of Anxiety, Anxiety, Asthma, Bipolar disorder ([address]), Carpal tunnel syndrome, Chronic pain, Depression, Disease of thyroid gland, Dyslipidemia, Fibromyalgia, Irritable bowel, Kidney stones, Kidney stones (2019), Migraine, Obesity (BMI 30.0-34.9), Psychiatric disorder, Suicide attempt ([address]), and Vitamin D deficiency. ,  _______________________________________________________________________  Medical Problems:  does not have any pertinent problems on file.,  _______________________________________________________________________  Past Surgical History:   has a past surgical history that includes  section; Cholecystectomy; Tonsillectomy; Partial hysterectomy; Bunionectomy; and Tubal ligation. ,  _______________________________________________________________________  Family History:  family history includes Anxiety disorder in her mother; Arthritis in her mother; Bipolar disorder in her daughter and mother; Breast cancer in her paternal aunt and paternal grandmother; Cancer in her cousin, father, maternal uncle, paternal aunt, paternal grandmother, and paternal uncle; Dementia in her paternal grandmother; Depression in her mother; Diabetes in her mother; Heart disease in her brother; Hypertension in her brother and mother; Hypothyroidism in her mother and sister; Mental illness in her mother; Stroke in her mother.,  _______________________________________________________________________  Social History:   reports that she has never smoked. She has never used smokeless tobacco. She reports that she does not drink alcohol and does not use drugs. ,  _______________________________________________________________________  Allergies:  is allergic to doxycycline, erythromycin, other, augmentin [amoxicillin-pot clavulanate], latex, duloxetine, eletriptan, emgality [galcanezumab-gnlm], and galcanezumab. .  _______________________________________________________________________  Current Outpatient Medications   Medication Sig Dispense Refill   • albuterol (PROVENTIL HFA,VENTOLIN HFA) 90 mcg/act inhaler Inhale 2 puffs every 6 (six) hours as needed for wheezing 18 g 1   • [place] Thyroid 60 MG tablet Take one and 1/2 tabs daily 135 tablet 2   • azelastine (ASTELIN) 0.1 % nasal spray 1 spray into each nostril 2 (two) times a day Use in each nostril as directed 1 mL 3   • butalbital-acetaminophen-caffeine (FIORICET,ESGIC) -40 mg per tablet      • celecoxib (CELEBREX) 200 mg capsule Take 1 capsule (200 mg total) by mouth daily 30 capsule 2   • divalproex sodium (DEPAKOTE ER) 250 mg 24 hr tablet      • fluticasone (FLONASE) 50 mcg/act nasal spray INHALE TWO PUFFS TWO (TWO) TIMES A DAY RINSE MOUTH AFTER USE. 16 g 0   • fremanezumab-vfrm (Ajovy) 225 MG/1.5ML auto-injector Inject 225 mg under the skin every 30 (thirty) days     • ipratropium-albuterol (DUO-NEB) 0.5-2.5 mg/3 mL nebulizer solution Take 3 mL by nebulization 4 (four) times a day 90 mL 1   • levocetirizine (XYZAL) 5 MG tablet Take 1 tablet (5 mg total) by mouth every evening 90 tablet 1   • lidocaine (LIDODERM) 5 % Apply 1 patch topically daily as needed (back pain) Remove & Discard patch within 12 hours or as directed by MD 6 patch 1   • meclizine (ANTIVERT) 25 mg tablet Take 1 tablet (25 mg total) by mouth every 8 (eight) hours as needed for dizziness 30 tablet 0   • meloxicam (MOBIC) 15 mg tablet TAKE 1 TABLET (15 MG TOTAL) BY MOUTH DAILY 30 tablet 0   • mometasone (ELOCON) 0.1 % cream Apply topically daily 45 g 0   • naratriptan (AMERGE) 2.5 MG tablet      • omeprazole (PriLOSEC) 40 MG capsule Take 1 capsule (40 mg total) by mouth daily 90 capsule 1   • ondansetron (ZOFRAN-ODT) 4 mg disintegrating tablet TAKE 1 TABLET (4 MG TOTAL) BY MOUTH EVERY EIGHT (EIGHT) HOURS AS NEEDED FOR NAUSEA 20 tablet 0   • semaglutide, 0.25 or 0.5 mg/dose, (Ozempic, 0.25 or 0.5 MG/DOSE,) 2 mg/3 mL injection pen Inject 0.75 mL (0.5 mg total) under the skin every 7 days 9 mL 1   • tiZANidine (ZANAFLEX) 4 mg tablet      • Topiramate  MG CP24 Take 100 mg by mouth 2 (two) times a day • Ubrelvy 100 MG tablet      • Vraylar 4.5 MG capsule TAKE 1 CAPSULE (4.5 MG TOTAL) BY MOUTH DAILY INSERT IN THE MORNING INCREASING THE DOSE 30 capsule 1   • Blood Glucose Monitoring Suppl (OneTouch Verio Reflect) w/Device KIT Check blood sugars once daily. Please substitute with appropriate alternative as covered by patient's insurance. Dx: E11.65 1 kit 0   • clonazePAM (KlonoPIN) 1 mg tablet Take 1 tablet (1 mg total) by mouth daily as needed for anxiety 30 tablet 3   • cyclobenzaprine (FLEXERIL) 5 mg tablet      • dicyclomine (BENTYL) 10 mg capsule Take 1 capsule (10 mg total) by mouth 3 (three) times a day as needed (abd cramping) 30 capsule 0   • glucose blood (OneTouch Verio) test strip Check blood sugars once daily. Please substitute with appropriate alternative as covered by patient's insurance. Dx: E11.65 100 each 3   • ketoconazole (NIZORAL) 2 % cream      • OneTouch Delica Lancets 97J MISC Check blood sugars once daily. Please substitute with appropriate alternative as covered by patient's insurance. Dx: E11.65 100 each 3   • Respiratory Therapy Supplies (NEBULIZER) DONA by Does not apply route every 4 (four) hours while awake 90 each 1   • XIFAXAN 550 MG tablet        No current facility-administered medications for this visit.     _______________________________________________________________________  Review of Systems   Constitutional: Positive for fatigue (improving ). Negative for chills and fever. Respiratory: Negative for cough and shortness of breath. Cardiovascular: Negative for chest pain. Gastrointestinal: Positive for diarrhea and nausea. Negative for abdominal pain and vomiting. Genitourinary: Negative for dysuria. Musculoskeletal: Positive for arthralgias. Negative for back pain. Neurological: Negative for headaches. Psychiatric/Behavioral: Positive for sleep disturbance (sleeping more ). All other systems reviewed and are negative.         Objective:  Vitals:    07/24/23 0959   BP: 138/82   Pulse: 96   Resp: 14   Temp: (!) 96.2 °F (35.7 °C)   SpO2: 98%   Weight: 69.5 kg (153 lb 3.2 oz)   Height: 5' 4" (1.626 m)     Body mass index is 26.3 kg/m². Physical Exam  Vitals and nursing note reviewed. Constitutional:       General: She is not in acute distress. Appearance: Normal appearance. She is not ill-appearing. Cardiovascular:      Rate and Rhythm: Normal rate and regular rhythm. Heart sounds: Normal heart sounds. Pulmonary:      Effort: Pulmonary effort is normal.      Breath sounds: Normal breath sounds. Musculoskeletal:         General: Normal range of motion. Skin:     General: Skin is warm and dry. Neurological:      Mental Status: She is alert and oriented to person, place, and time. Psychiatric:         Mood and Affect: Mood normal.         Behavior: Behavior normal.         Thought Content: Thought content normal.         Judgment: Judgment normal.               Portions of the above record have been created with voice recognition software. Occasional wrong word or "sound alike" substitution may have occurred due to the inherent limitations of voice recognition software. Read the chart carefully and recognize, using context, where substitution may have occurred.

## 2023-07-27 ENCOUNTER — TELEMEDICINE (OUTPATIENT)
Dept: PSYCHIATRY | Facility: CLINIC | Age: 53
End: 2023-07-27
Payer: MEDICARE

## 2023-07-27 ENCOUNTER — CLINICAL SUPPORT (OUTPATIENT)
Dept: FAMILY MEDICINE CLINIC | Facility: CLINIC | Age: 53
End: 2023-07-27
Payer: MEDICARE

## 2023-07-27 DIAGNOSIS — E53.8 B12 DEFICIENCY: Primary | ICD-10-CM

## 2023-07-27 DIAGNOSIS — F31.81 BIPOLAR 2 DISORDER (HCC): ICD-10-CM

## 2023-07-27 DIAGNOSIS — F41.1 GAD (GENERALIZED ANXIETY DISORDER): Primary | ICD-10-CM

## 2023-07-27 LAB — METHYLMALONATE SERPL-SCNC: 138 NMOL/L (ref 0–378)

## 2023-07-27 PROCEDURE — 90837 PSYTX W PT 60 MINUTES: CPT

## 2023-07-27 PROCEDURE — 96372 THER/PROPH/DIAG INJ SC/IM: CPT

## 2023-07-27 RX ORDER — CYANOCOBALAMIN 1000 UG/ML
1000 INJECTION, SOLUTION INTRAMUSCULAR; SUBCUTANEOUS
Status: SHIPPED | OUTPATIENT
Start: 2023-07-27

## 2023-07-27 RX ADMIN — CYANOCOBALAMIN 1000 MCG: 1000 INJECTION, SOLUTION INTRAMUSCULAR; SUBCUTANEOUS at 10:25

## 2023-07-27 NOTE — PSYCH
Virtual Regular Visit    Verification of patient location:    Patient is located at Home in the following state in which I hold an active license [place]      Assessment/Plan:    Problem List Items Addressed This Visit        Other    ZAINA (generalized anxiety disorder) - Primary    Bipolar 2 disorder ([address])       Goals addressed in session: Goal 1          Reason for visit is No chief complaint on file. Encounter provider Matt Barillas LCSW    Provider located at [address] [place] [id number] [phone]      Recent Visits  Date Type Provider Dept   07/24/23 Office Visit ERICKA Sprague [facility]   07/20/23 Telephone Phillip Muro [facility]   07/20/23 Telemedicine Matt Barillas LCSW [facility]   Showing recent visits within past 7 days and meeting all other requirements  Today's Visits  Date Type Provider Dept   07/27/23 Telemedicine Matt Barillas LCSW [facility]   Showing today's visits and meeting all other requirements  Future Appointments  No visits were found meeting these conditions. Showing future appointments within next 150 days and meeting all other requirements       The patient was identified by name and date of birth. Kleber Fournier was informed that this is a telemedicine visit and that the visit is being conducted throughthe [employer] platform. She agrees to proceed. .  My office door was closed. No one else was in the room. She acknowledged consent and understanding of privacy and security of the video platform. The patient has agreed to participate and understands they can discontinue the visit at any time. Patient is aware this is a billable service. Subjective  Kleber Fournier is a 47 y.o. female.       HPI     Past Medical History:   Diagnosis Date   • Anxiety    • Anxiety    • Asthma    • Bipolar disorder [facility])    • Carpal tunnel syndrome    • Chronic pain     lower back   • Depression    • Disease of thyroid gland    • Dyslipidemia    • Fibromyalgia    • Irritable bowel    • Kidney stones    • Kidney stones 2019   • Migraine    • Obesity (BMI 30.0-34. 9)    • Psychiatric disorder     depression/anxiety   • Suicide attempt [facility])     as teen   • Vitamin D deficiency        Past Surgical History:   Procedure Laterality Date   • BUNIONECTOMY     •  SECTION     • CHOLECYSTECTOMY     • PARTIAL HYSTERECTOMY      portion of  uterus still present   • TONSILLECTOMY     • TUBAL LIGATION         Current Outpatient Medications   Medication Sig Dispense Refill   • albuterol (PROVENTIL HFA,VENTOLIN HFA) 90 mcg/act inhaler Inhale 2 puffs every 6 (six) hours as needed for wheezing 18 g 1   • [facility] [place] Thyroid 60 MG tablet Take one and 1/2 tabs daily 135 tablet 2   • azelastine (ASTELIN) 0.1 % nasal spray 1 spray into each nostril 2 (two) times a day Use in each nostril as directed 1 mL 3   • Blood Glucose Monitoring Suppl (OneTouch Verio Reflect) w/Device KIT Check blood sugars once daily. Please substitute with appropriate alternative as covered by patient's insurance.  Dx: E11.65 1 kit 0   • butalbital-acetaminophen-caffeine (FIORICET,ESGIC) -40 mg per tablet      • celecoxib (CELEBREX) 200 mg capsule Take 1 capsule (200 mg total) by mouth daily 30 capsule 2   • clonazePAM (KlonoPIN) 1 mg tablet Take 1 tablet (1 mg total) by mouth daily as needed for anxiety 30 tablet 3   • cyclobenzaprine (FLEXERIL) 5 mg tablet      • dicyclomine (BENTYL) 10 mg capsule Take 1 capsule (10 mg total) by mouth 3 (three) times a day as needed (abd cramping) 30 capsule 0   • divalproex sodium (DEPAKOTE ER) 250 mg 24 hr tablet      • fluticasone (FLONASE) 50 mcg/act nasal spray INHALE TWO PUFFS TWO (TWO) TIMES A DAY RINSE MOUTH AFTER USE. 16 g 0   • fremanezumab-vfrm (Ajovy) 225 MG/1.5ML auto-injector Inject 225 mg under the skin every 30 (thirty) days     • glucose blood (OneTouch Verio) test strip Check blood sugars once daily. Please substitute with appropriate alternative as covered by patient's insurance. Dx: E11.65 100 each 3   • ipratropium-albuterol (DUO-NEB) 0.5-2.5 mg/3 mL nebulizer solution Take 3 mL by nebulization 4 (four) times a day 90 mL 1   • ketoconazole (NIZORAL) 2 % cream      • levocetirizine (XYZAL) 5 MG tablet Take 1 tablet (5 mg total) by mouth every evening 90 tablet 1   • lidocaine (LIDODERM) 5 % Apply 1 patch topically daily as needed (back pain) Remove & Discard patch within 12 hours or as directed by MD 6 patch 1   • meclizine (ANTIVERT) 25 mg tablet Take 1 tablet (25 mg total) by mouth every 8 (eight) hours as needed for dizziness 30 tablet 0   • meloxicam (MOBIC) 15 mg tablet TAKE 1 TABLET (15 MG TOTAL) BY MOUTH DAILY 30 tablet 0   • mometasone (ELOCON) 0.1 % cream Apply topically daily 45 g 0   • naratriptan (AMERGE) 2.5 MG tablet      • omeprazole (PriLOSEC) 40 MG capsule Take 1 capsule (40 mg total) by mouth daily 90 capsule 1   • ondansetron (ZOFRAN-ODT) 4 mg disintegrating tablet TAKE 1 TABLET (4 MG TOTAL) BY MOUTH EVERY EIGHT (EIGHT) HOURS AS NEEDED FOR NAUSEA 20 tablet 0   • OneTouch Delica Lancets 74A MISC Check blood sugars once daily. Please substitute with appropriate alternative as covered by patient's insurance.  Dx: E11.65 100 each 3   • Respiratory Therapy Supplies (NEBULIZER) DONA by Does not apply route every 4 (four) hours while awake 90 each 1   • semaglutide, 0.25 or 0.5 mg/dose, (Ozempic, 0.25 or 0.5 MG/DOSE,) 2 mg/3 mL injection pen Inject 0.75 mL (0.5 mg total) under the skin every 7 days 9 mL 1   • tiZANidine (ZANAFLEX) 4 mg tablet      • Topiramate  MG CP24 Take 100 mg by mouth 2 (two) times a day     • Ubrelvy 100 MG tablet      • Vraylar 4.5 MG capsule TAKE 1 CAPSULE (4.5 MG TOTAL) BY MOUTH DAILY INSERT IN THE MORNING INCREASING THE DOSE 30 capsule 1   • XIFAXAN 550 MG tablet        No current facility-administered medications for this visit. Allergies   Allergen Reactions   • Doxycycline Rash   • Erythromycin Rash   • Other Edema and Wheezing     jalapeno   • Augmentin [Amoxicillin-Pot Clavulanate] GI Intolerance   • Latex Rash   • Duloxetine      Other reaction(s): Nausea, Loopy   • Eletriptan      Pain in lower jaw with pressure in throat   • Emgality [Galcanezumab-Gnlm]    • Galcanezumab      rash       Review of Systems    Video Exam    There were no vitals filed for this visit. Physical Exam     Behavioral Health Psychotherapy Progress Note    Psychotherapy Provided: Individual Psychotherapy      Diagnosis ICD-10-CM Associated Orders   1. ZAINA (generalized anxiety disorder)  F41.1       2. Bipolar 2 disorder ([address])  F31.81            Goals addressed in session: Goal 1     DATA: Met with Brian Kauffman for a scheduled individual session. Topics discussed included emotional wellness, coping skills, relationship with significant other, family stressors and relationships with family. Darrick Mckeon is feeling "ok", stating she saw her doctor and will start B12 shots. She also needs to increase her water intake because they found she was dehydrated. Darrick Mckeon spoke about her boyfriend; she has since called off the engagement. They are still in contact and trying to work through things. She and him have been having long conversations about their relationship and differences. She spoke about her daughter and son for a bit; they seem to have issues with Darrick Mckeon raising her grandson, stating he's her "favorite". She disagrees and discussed her feelings. Darrick Mckeon shows evidence of utilizing effective communication skills and maintaining emotional composure to manage mental health symptoms.   During this session, this clinical used the following therapeutic modalities supportive psychotherapy, client-centered therapy and stress management skills. Substance Abuse was not addressed during this session. If the client is diagnosed with a co-occurring substance use disorder, please indicate any changes in the frequency or amount of use: N/A. Stage of change for addressing substance use diagnoses: No substance use/Not applicable    ASSESSMENT:  Perico Holt presents with a Euthymic/ normal mood. her affect is normal range and intensity, appropriate, which is congruent, with her  mood and the content of the session. Perico Holt was oriented x3. She was focused and engaged. Perico Holt exhibits good therapeutic rapport with this clinician. The client has made progress on their goals. Lemuel Adhikari presents with a none risk of suicide, none risk of self-harm, and none risk of harm to others. For any risk assessment that surpasses a "low" rating, a safety plan must be developed. A safety plan was indicated: no  If yes, describe in detail: N/A    PLAN: Perico Holt will return in 1 week for the next scheduled session. At the next session, the therapist will use engagement strategies, supportive psychotherapy and client-centered therapy to address her anxiety. Behavioral Health Treatment Plan and Discharge Planning: Lemuel Adhikari is aware of and agrees to continue to work on their treatment plan. They have identified and are working toward their discharge goals.  yes    Visit start and stop times:    07/27/23  Start Time: 0800  Stop Time: 0900  Total Visit Time: 60 minutes

## 2023-07-28 DIAGNOSIS — E53.8 B12 DEFICIENCY: Primary | ICD-10-CM

## 2023-07-28 RX ORDER — CYANOCOBALAMIN 1000 UG/ML
1000 INJECTION, SOLUTION INTRAMUSCULAR; SUBCUTANEOUS ONCE
Qty: 1 ML | Refills: 6 | Status: SHIPPED | OUTPATIENT
Start: 2023-07-28 | End: 2023-08-24 | Stop reason: SDUPTHER

## 2023-07-28 RX ORDER — SYRINGE W-NEEDLE,DISPOSAB,3 ML 25GX5/8"
SYRINGE, EMPTY DISPOSABLE MISCELLANEOUS WEEKLY
Qty: 7 EACH | Refills: 0 | Status: SHIPPED | OUTPATIENT
Start: 2023-07-28

## 2023-07-31 ENCOUNTER — TELEPHONE (OUTPATIENT)
Dept: FAMILY MEDICINE CLINIC | Facility: CLINIC | Age: 53
End: 2023-07-31

## 2023-07-31 DIAGNOSIS — Z20.822 EXPOSURE TO COVID-19 VIRUS: Primary | ICD-10-CM

## 2023-07-31 NOTE — TELEPHONE ENCOUNTER
Patient was in contact with someone who tested positive for Covid today  Patient  was exposed to them last week Wednesday.

## 2023-07-31 NOTE — TELEPHONE ENCOUNTER
Patient called back she let me know that this person she as in close contact with was in her home, I asked her is she was having any sx's and she let me know a runny nose and congestion. She just want to test her self just to make sure just incase she needs any antiviral med's. Can we please send a script to her pharmacy for home covid swabs please. She will send a copy of the results in her my chart after she swabs her self.

## 2023-08-03 ENCOUNTER — TELEMEDICINE (OUTPATIENT)
Dept: PSYCHIATRY | Facility: CLINIC | Age: 53
End: 2023-08-03
Payer: MEDICARE

## 2023-08-03 DIAGNOSIS — K58.8 OTHER IRRITABLE BOWEL SYNDROME: ICD-10-CM

## 2023-08-03 DIAGNOSIS — R63.5 WEIGHT GAIN: ICD-10-CM

## 2023-08-03 DIAGNOSIS — F31.81 BIPOLAR 2 DISORDER (HCC): ICD-10-CM

## 2023-08-03 DIAGNOSIS — F41.1 GAD (GENERALIZED ANXIETY DISORDER): Primary | ICD-10-CM

## 2023-08-03 PROCEDURE — 90837 PSYTX W PT 60 MINUTES: CPT

## 2023-08-03 RX ORDER — OMEPRAZOLE 40 MG/1
40 CAPSULE, DELAYED RELEASE ORAL DAILY
Qty: 90 CAPSULE | Refills: 0 | Status: SHIPPED | OUTPATIENT
Start: 2023-08-03

## 2023-08-03 NOTE — PSYCH
Virtual Regular Visit    Verification of patient location:    Patient is located at Home in the following state in which I hold an active license [place]      Assessment/Plan:    Problem List Items Addressed This Visit        Other    ZAINA (generalized anxiety disorder) - Primary    Bipolar 2 disorder ([address])       Goals addressed in session: Goal 1          Reason for visit is No chief complaint on file. Encounter provider Matt Barillas LCSW    Provider located at [address] [place] [address] Road [address] [phone]      Recent Visits  Date Type Provider Dept   07/31/23 [facility] [place]   07/27/23 Telemedicine Matt Barillas LCSW [facility]   Showing recent visits within past 7 days and meeting all other requirements  Today's Visits  Date Type Provider Dept   08/03/23 Telemedicine Matt Barillas LCSW [facility]   Showing today's visits and meeting all other requirements  Future Appointments  No visits were found meeting these conditions. Showing future appointments within next 150 days and meeting all other requirements       The patient was identified by name and date of birth. Kleber Fournier was informed that this is a telemedicine visit and that the visit is being conducted throughthe [employer] platform. She agrees to proceed. .  My office door was closed. No one else was in the room. She acknowledged consent and understanding of privacy and security of the video platform. The patient has agreed to participate and understands they can discontinue the visit at any time. Patient is aware this is a billable service. Subjective  Kleber Fournier is a 47 y.o. female.       HPI     Past Medical History:   Diagnosis Date   • Anxiety    • Anxiety    • Asthma    • Bipolar disorder ([address])    • Carpal tunnel syndrome    • Chronic pain     lower back   • Depression    • Disease of thyroid gland    • Dyslipidemia    • Fibromyalgia    • Irritable bowel    • Kidney stones    • Kidney stones 2019   • Migraine    • Obesity (BMI 30.0-34. 9)    • Psychiatric disorder     depression/anxiety   • Suicide attempt [facility])     as teen   • Vitamin D deficiency        Past Surgical History:   Procedure Laterality Date   • BUNIONECTOMY     •  SECTION     • CHOLECYSTECTOMY     • PARTIAL HYSTERECTOMY      portion of  uterus still present   • TONSILLECTOMY     • TUBAL LIGATION         Current Outpatient Medications   Medication Sig Dispense Refill   • albuterol (PROVENTIL HFA,VENTOLIN HFA) 90 mcg/act inhaler Inhale 2 puffs every 6 (six) hours as needed for wheezing 18 g 1   • [place] Thyroid 60 MG tablet Take one and 1/2 tabs daily 135 tablet 2   • azelastine (ASTELIN) 0.1 % nasal spray 1 spray into each nostril 2 (two) times a day Use in each nostril as directed 1 mL 3   • Blood Glucose Monitoring Suppl (OneTouch Verio Reflect) w/Device KIT Check blood sugars once daily. Please substitute with appropriate alternative as covered by patient's insurance.  Dx: E11.65 1 kit 0   • butalbital-acetaminophen-caffeine (FIORICET,ESGIC) -40 mg per tablet      • celecoxib (CELEBREX) 200 mg capsule Take 1 capsule (200 mg total) by mouth daily 30 capsule 2   • clonazePAM (KlonoPIN) 1 mg tablet Take 1 tablet (1 mg total) by mouth daily as needed for anxiety 30 tablet 3   • cyanocobalamin 1,000 mcg/mL Inject 1 mL (1,000 mcg total) into a muscle once for 1 dose 1 mL 6   • cyclobenzaprine (FLEXERIL) 5 mg tablet      • dicyclomine (BENTYL) 10 mg capsule Take 1 capsule (10 mg total) by mouth 3 (three) times a day as needed (abd cramping) 30 capsule 0   • divalproex sodium (DEPAKOTE ER) 250 mg 24 hr tablet      • fluticasone (FLONASE) 50 mcg/act nasal spray INHALE TWO PUFFS TWO (TWO) TIMES A DAY RINSE MOUTH AFTER USE. 16 g 0   • fremanezumab-vfrm (Ajovy) 225 MG/1.5ML auto-injector Inject 225 mg under the skin every 30 (thirty) days     • glucose blood (OneTouch Verio) test strip Check blood sugars once daily. Please substitute with appropriate alternative as covered by patient's insurance. Dx: E11.65 100 each 3   • ipratropium-albuterol (DUO-NEB) 0.5-2.5 mg/3 mL nebulizer solution Take 3 mL by nebulization 4 (four) times a day 90 mL 1   • ketoconazole (NIZORAL) 2 % cream      • levocetirizine (XYZAL) 5 MG tablet Take 1 tablet (5 mg total) by mouth every evening 90 tablet 1   • lidocaine (LIDODERM) 5 % Apply 1 patch topically daily as needed (back pain) Remove & Discard patch within 12 hours or as directed by MD 6 patch 1   • meclizine (ANTIVERT) 25 mg tablet Take 1 tablet (25 mg total) by mouth every 8 (eight) hours as needed for dizziness 30 tablet 0   • meloxicam (MOBIC) 15 mg tablet TAKE 1 TABLET (15 MG TOTAL) BY MOUTH DAILY 30 tablet 0   • mometasone (ELOCON) 0.1 % cream Apply topically daily 45 g 0   • naratriptan (AMERGE) 2.5 MG tablet      • omeprazole (PriLOSEC) 40 MG capsule Take 1 capsule (40 mg total) by mouth daily 90 capsule 1   • ondansetron (ZOFRAN-ODT) 4 mg disintegrating tablet TAKE 1 TABLET (4 MG TOTAL) BY MOUTH EVERY EIGHT (EIGHT) HOURS AS NEEDED FOR NAUSEA 20 tablet 0   • OneTouch Delica Lancets 89W MISC Check blood sugars once daily. Please substitute with appropriate alternative as covered by patient's insurance.  Dx: E11.65 100 each 3   • Respiratory Therapy Supplies (NEBULIZER) DONA by Does not apply route every 4 (four) hours while awake 90 each 1   • semaglutide, 0.25 or 0.5 mg/dose, (Ozempic, 0.25 or 0.5 MG/DOSE,) 2 mg/3 mL injection pen Inject 0.75 mL (0.5 mg total) under the skin every 7 days 9 mL 1   • Syringe/Needle, Disp, (SYRINGE 3CC/90QM5-3/2") 20G X 1-1/2" 3 ML MISC Use once a week With b12 injection 7 each 0   • tiZANidine (ZANAFLEX) 4 mg tablet      • Topiramate  MG CP24 Take 100 mg by mouth 2 (two) times a day     • Ubrelvy 100 MG tablet      • Vraylar 4.5 MG capsule TAKE 1 CAPSULE (4.5 MG TOTAL) BY MOUTH DAILY INSERT IN THE MORNING INCREASING THE DOSE 30 capsule 1   • XIFAXAN 550 MG tablet        Current Facility-Administered Medications   Medication Dose Route Frequency Provider Last Rate Last Admin   • cyanocobalamin injection 1,000 mcg  1,000 mcg Intramuscular Q30 Days ERICKA Vázquez   1,000 mcg at 07/27/23 1025        Allergies   Allergen Reactions   • Doxycycline Rash   • Erythromycin Rash   • Other Edema and Wheezing     jalapeno   • Augmentin [Amoxicillin-Pot Clavulanate] GI Intolerance   • Latex Rash   • Duloxetine      Other reaction(s): Nausea, Loopy   • Eletriptan      Pain in lower jaw with pressure in throat   • Emgality [Galcanezumab-Gnlm]    • Galcanezumab      rash       Review of Systems    Video Exam    There were no vitals filed for this visit. Physical Exam     Behavioral Health Psychotherapy Progress Note    Psychotherapy Provided: Individual Psychotherapy      Diagnosis ICD-10-CM Associated Orders   1. ZAINA (generalized anxiety disorder)  F41.1       2. Bipolar 2 disorder ([facility])  F31.81            Goals addressed in session: Goal 1     DATA: Met with Paula López for a scheduled individual session. Topics discussed included emotional wellness, coping skills and relationship with significant other. Nancy Ortega is feeling "ok", stating that she feels her B12 shots are helping. She's taken them in the past and felt they helped. She's on her second shot and definitely feels an increase in her energy. She did state her grandson has been having trouble sleeping and waking her up throughout the night. Nancy Ortega spoke about her boyfriend/fiance. She stated things are really coming apart for her. She stated she's caught him in so many lies that she feels so untrusting of him; she doesn't want to live like that. He sounds like a toxic person with a pattern of unhealthy relationships.  Nancy Ortega can't ignore her gut that something is "really wrong with him". Norma Real shows evidence of utilizing effective communication skills, engaging in problem solving and maintaining emotional composure to manage mental health symptoms. During this session, this clinical used the following therapeutic modalities supportive psychotherapy, client-centered therapy, CBT techniques and stress management skills. Substance Abuse was not addressed during this session. If the client is diagnosed with a co-occurring substance use disorder, please indicate any changes in the frequency or amount of use: N/A. Stage of change for addressing substance use diagnoses: No substance use/Not applicable    ASSESSMENT:  Norma Real presents with a Euthymic/ normal mood. her affect is normal range and intensity, appropriate, which is congruent, with her  mood and the content of the session. Norma Real was oriented x3. She was focused and engaged. Norma Real exhibits good therapeutic rapport with this clinician. The client has made progress on their goals. Nancy Tian presents with a none risk of suicide, none risk of self-harm, and none risk of harm to others. For any risk assessment that surpasses a "low" rating, a safety plan must be developed. A safety plan was indicated: no  If yes, describe in detail: N/A    PLAN: Norma Real will return in 1 week for the next scheduled session. At the next session, the therapist will use engagement strategies, supportive psychotherapy and client-centered therapy to address her anxiety. Behavioral Health Treatment Plan and Discharge Planning: Nancy Tian is aware of and agrees to continue to work on their treatment plan. They have identified and are working toward their discharge goals.  yes    Visit start and stop times:    08/03/23  Start Time: 0802  Stop Time: 9277  Total Visit Time: 63 minutes

## 2023-08-04 RX ORDER — SEMAGLUTIDE 1.34 MG/ML
INJECTION, SOLUTION SUBCUTANEOUS
Qty: 4.5 ML | Refills: 0 | OUTPATIENT
Start: 2023-08-04

## 2023-08-08 DIAGNOSIS — R63.5 WEIGHT GAIN: ICD-10-CM

## 2023-08-08 RX ORDER — SEMAGLUTIDE 1.34 MG/ML
INJECTION, SOLUTION SUBCUTANEOUS
Qty: 3 ML | Refills: 0 | Status: SHIPPED | OUTPATIENT
Start: 2023-08-08

## 2023-08-09 ENCOUNTER — TELEPHONE (OUTPATIENT)
Dept: PSYCHIATRY | Facility: CLINIC | Age: 53
End: 2023-08-09

## 2023-08-09 NOTE — TELEPHONE ENCOUNTER
Patient is calling regarding cancelling an appointment.     Date/Time: 8/10 @ 8am    Reason: pt not feeling well    Patient was rescheduled: YES [] NO [x]  If yes, when was Patient reschedule for:   Patient requesting call back to reschedule: YES [] NO [x]

## 2023-08-11 DIAGNOSIS — R63.5 WEIGHT GAIN: ICD-10-CM

## 2023-08-11 DIAGNOSIS — J06.9 UPPER RESPIRATORY TRACT INFECTION, UNSPECIFIED TYPE: ICD-10-CM

## 2023-08-11 RX ORDER — ALBUTEROL SULFATE 90 UG/1
AEROSOL, METERED RESPIRATORY (INHALATION)
Qty: 18 G | Refills: 0 | Status: SHIPPED | OUTPATIENT
Start: 2023-08-11

## 2023-08-11 RX ORDER — AZELASTINE 1 MG/ML
SPRAY, METERED NASAL
Qty: 30 ML | Refills: 2 | Status: SHIPPED | OUTPATIENT
Start: 2023-08-11

## 2023-08-11 RX ORDER — SEMAGLUTIDE 1.34 MG/ML
INJECTION, SOLUTION SUBCUTANEOUS
Qty: 3 ML | Refills: 0 | OUTPATIENT
Start: 2023-08-11

## 2023-08-16 DIAGNOSIS — F31.81 BIPOLAR 2 DISORDER (HCC): ICD-10-CM

## 2023-08-16 DIAGNOSIS — R10.2 PELVIC PAIN IN FEMALE: ICD-10-CM

## 2023-08-16 DIAGNOSIS — N89.8 LEUKORRHEA: ICD-10-CM

## 2023-08-16 RX ORDER — CARIPRAZINE 4.5 MG/1
CAPSULE, GELATIN COATED ORAL
Qty: 30 CAPSULE | Refills: 0 | OUTPATIENT
Start: 2023-08-16

## 2023-08-16 RX ORDER — FLUCONAZOLE 150 MG/1
TABLET ORAL
Qty: 2 TABLET | Refills: 0 | OUTPATIENT
Start: 2023-08-16

## 2023-08-17 ENCOUNTER — TELEMEDICINE (OUTPATIENT)
Dept: PSYCHIATRY | Facility: CLINIC | Age: 53
End: 2023-08-17
Payer: MEDICARE

## 2023-08-17 DIAGNOSIS — F31.81 BIPOLAR 2 DISORDER (HCC): ICD-10-CM

## 2023-08-17 DIAGNOSIS — F41.1 GAD (GENERALIZED ANXIETY DISORDER): Primary | ICD-10-CM

## 2023-08-17 PROCEDURE — 90834 PSYTX W PT 45 MINUTES: CPT

## 2023-08-17 NOTE — PSYCH
Virtual Regular Visit    Verification of patient location:    Patient is located at Home in the following state in which I hold an active license [place]      Assessment/Plan:    Problem List Items Addressed This Visit        Other    ZAINA (generalized anxiety disorder) - Primary    Bipolar 2 disorder ([address])       Goals addressed in session: Goal 1          Reason for visit is No chief complaint on file. Encounter provider Juanito Wright LCSW    Provider located at [address] [place] [id number] [phone]      Recent Visits  No visits were found meeting these conditions. Showing recent visits within past 7 days and meeting all other requirements  Today's Visits  Date Type Provider Dept   08/17/23 Telemedicine Juanito Wright LCSW [facility]   Showing today's visits and meeting all other requirements  Future Appointments  No visits were found meeting these conditions. Showing future appointments within next 150 days and meeting all other requirements       The patient was identified by name and date of birth. Nigel Harvey was informed that this is a telemedicine visit and that the visit is being conducted throughthe [employer] platform. She agrees to proceed. .  My office door was closed. No one else was in the room. She acknowledged consent and understanding of privacy and security of the video platform. The patient has agreed to participate and understands they can discontinue the visit at any time. Patient is aware this is a billable service. Subjective  Nigel Harvey is a 47 y.o. female .       HPI     Past Medical History:   Diagnosis Date   • Anxiety    • Anxiety    • Asthma    • Bipolar disorder ([address])    • Carpal tunnel syndrome    • Chronic pain     lower back   • Depression    • Disease of thyroid gland    • Dyslipidemia    • Fibromyalgia    • Irritable bowel    • Kidney stones    • Kidney stones 2019   • Migraine    • Obesity (BMI 30.0-34. 9)    • Psychiatric disorder     depression/anxiety   • Suicide attempt [facility])     as teen   • Vitamin D deficiency        Past Surgical History:   Procedure Laterality Date   • BUNIONECTOMY     •  SECTION     • CHOLECYSTECTOMY     • PARTIAL HYSTERECTOMY      portion of  uterus still present   • TONSILLECTOMY     • TUBAL LIGATION         Current Outpatient Medications   Medication Sig Dispense Refill   • [place] Thyroid 60 MG tablet Take one and 1/2 tabs daily 135 tablet 2   • azelastine (ASTELIN) 0.1 % nasal spray 1 SPRAY INTO EACH NOSTRIL TWO (TWO) TIMES A DAY USE IN EACH NOSTRIL AS DIRECTED 30 mL 2   • Blood Glucose Monitoring Suppl (OneTouch Verio Reflect) w/Device KIT Check blood sugars once daily. Please substitute with appropriate alternative as covered by patient's insurance. Dx: E11.65 1 kit 0   • butalbital-acetaminophen-caffeine (FIORICET,ESGIC) -40 mg per tablet      • celecoxib (CELEBREX) 200 mg capsule Take 1 capsule (200 mg total) by mouth daily 30 capsule 2   • clonazePAM (KlonoPIN) 1 mg tablet Take 1 tablet (1 mg total) by mouth daily as needed for anxiety 30 tablet 3   • cyanocobalamin 1,000 mcg/mL Inject 1 mL (1,000 mcg total) into a muscle once for 1 dose 1 mL 6   • cyclobenzaprine (FLEXERIL) 5 mg tablet      • dicyclomine (BENTYL) 10 mg capsule Take 1 capsule (10 mg total) by mouth 3 (three) times a day as needed (abd cramping) 30 capsule 0   • divalproex sodium (DEPAKOTE ER) 250 mg 24 hr tablet      • fluticasone (FLONASE) 50 mcg/act nasal spray INHALE TWO PUFFS TWO (TWO) TIMES A DAY RINSE MOUTH AFTER USE. 16 g 0   • fremanezumab-vfrm (Ajovy) 225 MG/1.5ML auto-injector Inject 225 mg under the skin every 30 (thirty) days     • glucose blood (OneTouch Verio) test strip Check blood sugars once daily. Please substitute with appropriate alternative as covered by patient's insurance.  Dx: E11.65 100 each 3 • ipratropium-albuterol (DUO-NEB) 0.5-2.5 mg/3 mL nebulizer solution Take 3 mL by nebulization 4 (four) times a day 90 mL 1   • ketoconazole (NIZORAL) 2 % cream      • levocetirizine (XYZAL) 5 MG tablet Take 1 tablet (5 mg total) by mouth every evening 90 tablet 1   • lidocaine (LIDODERM) 5 % Apply 1 patch topically daily as needed (back pain) Remove & Discard patch within 12 hours or as directed by MD 6 patch 1   • meclizine (ANTIVERT) 25 mg tablet Take 1 tablet (25 mg total) by mouth every 8 (eight) hours as needed for dizziness 30 tablet 0   • meloxicam (MOBIC) 15 mg tablet TAKE 1 TABLET (15 MG TOTAL) BY MOUTH DAILY 30 tablet 0   • mometasone (ELOCON) 0.1 % cream Apply topically daily 45 g 0   • naratriptan (AMERGE) 2.5 MG tablet      • omeprazole (PriLOSEC) 40 MG capsule TAKE 1 CAPSULE (40 MG TOTAL) BY MOUTH DAILY 90 capsule 0   • ondansetron (ZOFRAN-ODT) 4 mg disintegrating tablet TAKE 1 TABLET (4 MG TOTAL) BY MOUTH EVERY EIGHT (EIGHT) HOURS AS NEEDED FOR NAUSEA 20 tablet 0   • OneTouch Delica Lancets 81N MISC Check blood sugars once daily. Please substitute with appropriate alternative as covered by patient's insurance.  Dx: E11.65 100 each 3   • Ozempic, 1 MG/DOSE, 4 MG/3ML injection pen INJECT 0.75 ML (1 MG TOTAL) UNDER THE SKIN ONCE A WEEK 3 mL 0   • Respiratory Therapy Supplies (NEBULIZER) DONA by Does not apply route every 4 (four) hours while awake 90 each 1   • semaglutide, 0.25 or 0.5 mg/dose, (Ozempic, 0.25 or 0.5 MG/DOSE,) 2 mg/3 mL injection pen Inject 0.75 mL (0.5 mg total) under the skin every 7 days 9 mL 1   • Syringe/Needle, Disp, (SYRINGE 3CC/08GK3-2/2") 20G X 1-1/2" 3 ML MISC Use once a week With b12 injection 7 each 0   • tiZANidine (ZANAFLEX) 4 mg tablet      • Topiramate  MG CP24 Take 100 mg by mouth 2 (two) times a day     • Ubrelvy 100 MG tablet      • Ventolin  (90 Base) MCG/ACT inhaler INHALE TWO PUFFS EVERY 6 (SIX) HOURS AS NEEDED FOR WHEEZING 18 g 0   • Vraylar 4.5 MG capsule TAKE 1 CAPSULE (4.5 MG TOTAL) BY MOUTH DAILY INSERT IN THE MORNING INCREASING THE DOSE 30 capsule 1   • XIFAXAN 550 MG tablet        Current Facility-Administered Medications   Medication Dose Route Frequency Provider Last Rate Last Admin   • cyanocobalamin injection 1,000 mcg  1,000 mcg Intramuscular Q30 Days JAGJIT VillavicencioJANAY   1,000 mcg at 07/27/23 1025        Allergies   Allergen Reactions   • Doxycycline Rash   • Erythromycin Rash   • Other Edema and Wheezing     jalapeno   • Augmentin [Amoxicillin-Pot Clavulanate] GI Intolerance   • Latex Rash   • Duloxetine      Other reaction(s): Nausea, Loopy   • Eletriptan      Pain in lower jaw with pressure in throat   • Emgality [Galcanezumab-Gnlm]    • Galcanezumab      rash       Review of Systems    Video Exam    There were no vitals filed for this visit. Physical Exam     Behavioral Health Psychotherapy Progress Note    Psychotherapy Provided: Individual Psychotherapy      Diagnosis ICD-10-CM Associated Orders   1. ZAINA (generalized anxiety disorder)  F41.1       2. Bipolar 2 disorder ([address])  F31.81            Goals addressed in session: Goal 1     DATA: Met with Annamaria Ring for a scheduled individual session. Topics discussed included emotional wellness, coping skills and relationship with significant other. Dina Dickens is feeling "tired". She stated she took her grandson to the [place] yesterday and went on rides with him. She stated they had fun but she's exhausted and feels "bruised up". She stated she had promised him that she'd take him and she wanted to keep that promise. Dina Dickens then spoke about her boyfriend/fiance, Cari Nassar. She stated things continue to be tense and stressful. She feels she can't be in a relationship with him because he seems to need drama. She likes to live a quiet, peaceful life and she doesn't feel he adds to that but rather, disrupts it.  Her birthday is coming up and she stated she feels bad but she won't be upset if he doesn't come up for a visit because she just wants to have a nice, relaxing time and she doesn't feel she will if he's there. Dina Dickens shows evidence of utilizing effective communication skills and maintaining emotional composure to manage mental health symptoms. During this session, this clinical used the following therapeutic modalities supportive psychotherapy, client-centered therapy and stress management skills. Substance Abuse was not addressed during this session. If the client is diagnosed with a co-occurring substance use disorder, please indicate any changes in the frequency or amount of use: N/A. Stage of change for addressing substance use diagnoses: No substance use/Not applicable    ASSESSMENT:  Dina Dickens presents with a Euthymic/ normal mood. her affect is normal range and intensity, appropriate, which is congruent, with her  mood and the content of the session. Dina Dickens was oriented x3. She was focused and engaged. Dina Dickens exhibits good therapeutic rapport with this clinician. The client has made progress on their goals. Annamaria Ring presents with a none risk of suicide, none risk of self-harm, and none risk of harm to others. For any risk assessment that surpasses a "low" rating, a safety plan must be developed. A safety plan was indicated: no  If yes, describe in detail: N/A     PLAN: Dina Dickens will return in 1 week for the next scheduled session. At the next session, the therapist will use engagement strategies, supportive psychotherapy and client-centered therapy to address her anxiety. Behavioral Health Treatment Plan and Discharge Planning: Annamaria Ring is aware of and agrees to continue to work on their treatment plan. They have identified and are working toward their discharge goals.  yes    Visit start and stop times:    08/17/23  Start Time: 0801  Stop Time: 3845  Total Visit Time: 45 minutes

## 2023-08-23 DIAGNOSIS — U07.1 COVID-19: ICD-10-CM

## 2023-08-23 DIAGNOSIS — F31.81 BIPOLAR 2 DISORDER (HCC): ICD-10-CM

## 2023-08-23 DIAGNOSIS — R07.9 CHEST PAIN, UNSPECIFIED TYPE: ICD-10-CM

## 2023-08-23 DIAGNOSIS — K58.8 OTHER IRRITABLE BOWEL SYNDROME: ICD-10-CM

## 2023-08-23 RX ORDER — CARIPRAZINE 4.5 MG/1
CAPSULE, GELATIN COATED ORAL
Qty: 30 CAPSULE | Refills: 0 | OUTPATIENT
Start: 2023-08-23

## 2023-08-24 ENCOUNTER — TELEMEDICINE (OUTPATIENT)
Dept: PSYCHIATRY | Facility: CLINIC | Age: 53
End: 2023-08-24
Payer: MEDICARE

## 2023-08-24 ENCOUNTER — TELEPHONE (OUTPATIENT)
Dept: PSYCHIATRY | Facility: CLINIC | Age: 53
End: 2023-08-24

## 2023-08-24 DIAGNOSIS — F41.1 GAD (GENERALIZED ANXIETY DISORDER): Primary | ICD-10-CM

## 2023-08-24 DIAGNOSIS — F31.81 BIPOLAR 2 DISORDER (HCC): ICD-10-CM

## 2023-08-24 DIAGNOSIS — G43.819 OTHER MIGRAINE WITHOUT STATUS MIGRAINOSUS, INTRACTABLE: Primary | ICD-10-CM

## 2023-08-24 DIAGNOSIS — E53.8 B12 DEFICIENCY: ICD-10-CM

## 2023-08-24 DIAGNOSIS — R63.5 WEIGHT GAIN: Primary | ICD-10-CM

## 2023-08-24 DIAGNOSIS — R42 DIZZINESS: ICD-10-CM

## 2023-08-24 PROCEDURE — 90837 PSYTX W PT 60 MINUTES: CPT

## 2023-08-24 RX ORDER — CYANOCOBALAMIN 1000 UG/ML
1000 INJECTION, SOLUTION INTRAMUSCULAR; SUBCUTANEOUS ONCE
Qty: 1 ML | Refills: 0 | Status: SHIPPED | OUTPATIENT
Start: 2023-08-24 | End: 2023-08-24

## 2023-08-24 RX ORDER — BUTALBITAL, ACETAMINOPHEN AND CAFFEINE 50; 325; 40 MG/1; MG/1; MG/1
1 TABLET ORAL EVERY 6 HOURS PRN
Qty: 30 TABLET | Refills: 0 | Status: SHIPPED | OUTPATIENT
Start: 2023-08-24

## 2023-08-24 RX ORDER — MECLIZINE HYDROCHLORIDE 25 MG/1
25 TABLET ORAL EVERY 8 HOURS PRN
Qty: 30 TABLET | Refills: 0 | Status: SHIPPED | OUTPATIENT
Start: 2023-08-24

## 2023-08-24 RX ORDER — PHENTERMINE HYDROCHLORIDE 15 MG/1
15 CAPSULE ORAL EVERY MORNING
Qty: 30 CAPSULE | Refills: 0 | Status: SHIPPED | OUTPATIENT
Start: 2023-08-24

## 2023-08-24 NOTE — TELEPHONE ENCOUNTER
Patient contacted the office to schedule a follow up visit with provider. Patient is now scheduled for 8/29/23  at 2:00p virtually.

## 2023-08-24 NOTE — PSYCH
Virtual Regular Visit    Verification of patient location:    Patient is located at Other in the following state in which I hold an active license [place]      Assessment/Plan:    Problem List Items Addressed This Visit        Other    ZAINA (generalized anxiety disorder) - Primary    Bipolar 2 disorder ([address])       Goals addressed in session: Goal 1          Reason for visit is No chief complaint on file. Encounter provider Kiran Mosher LCSW    Provider located at [address] [place] [id number] [phone]      Recent Visits  Date Type Provider Dept   08/17/23 Telemedicine Kiran Mosher LCSW [facility]   Showing recent visits within past 7 days and meeting all other requirements  Today's Visits  Date Type Provider Dept   08/24/23 Telemedicine Kiran Mosher LCSW [facility]   Showing today's visits and meeting all other requirements  Future Appointments  No visits were found meeting these conditions. Showing future appointments within next 150 days and meeting all other requirements       The patient was identified by name and date of birth. Flaco Palacios was informed that this is a telemedicine visit and that the visit is being conducted throughthe [employer] platform. She agrees to proceed. .  My office door was closed. No one else was in the room. She acknowledged consent and understanding of privacy and security of the video platform. The patient has agreed to participate and understands they can discontinue the visit at any time. Patient is aware this is a billable service. Subjective  Flaco Palacios is a 47 y.o. female.       HPI     Past Medical History:   Diagnosis Date   • Anxiety    • Anxiety    • Asthma    • Bipolar disorder ([address])    • Carpal tunnel syndrome    • Chronic pain     lower back   • Depression    • Disease of thyroid gland • Dyslipidemia    • Fibromyalgia    • Irritable bowel    • Kidney stones    • Kidney stones 2019   • Migraine    • Obesity (BMI 30.0-34. 9)    • Psychiatric disorder     depression/anxiety   • Suicide attempt [facility])     as teen   • Vitamin D deficiency        Past Surgical History:   Procedure Laterality Date   • BUNIONECTOMY     •  SECTION     • CHOLECYSTECTOMY     • PARTIAL HYSTERECTOMY      portion of  uterus still present   • TONSILLECTOMY     • TUBAL LIGATION         Current Outpatient Medications   Medication Sig Dispense Refill   • [place] Thyroid 60 MG tablet Take one and 1/2 tabs daily 135 tablet 2   • azelastine (ASTELIN) 0.1 % nasal spray 1 SPRAY INTO EACH NOSTRIL TWO (TWO) TIMES A DAY USE IN EACH NOSTRIL AS DIRECTED 30 mL 2   • Blood Glucose Monitoring Suppl (OneTouch Verio Reflect) w/Device KIT Check blood sugars once daily. Please substitute with appropriate alternative as covered by patient's insurance. Dx: E11.65 1 kit 0   • butalbital-acetaminophen-caffeine (FIORICET,ESGIC) -40 mg per tablet      • celecoxib (CELEBREX) 200 mg capsule Take 1 capsule (200 mg total) by mouth daily 30 capsule 2   • clonazePAM (KlonoPIN) 1 mg tablet Take 1 tablet (1 mg total) by mouth daily as needed for anxiety 30 tablet 3   • cyanocobalamin 1,000 mcg/mL Inject 1 mL (1,000 mcg total) into a muscle once for 1 dose 1 mL 6   • cyclobenzaprine (FLEXERIL) 5 mg tablet      • dicyclomine (BENTYL) 10 mg capsule Take 1 capsule (10 mg total) by mouth 3 (three) times a day as needed (abd cramping) 30 capsule 0   • divalproex sodium (DEPAKOTE ER) 250 mg 24 hr tablet      • fluticasone (FLONASE) 50 mcg/act nasal spray INHALE TWO PUFFS TWO (TWO) TIMES A DAY RINSE MOUTH AFTER USE. 16 g 0   • fremanezumab-vfrm (Ajovy) 225 MG/1.5ML auto-injector Inject 225 mg under the skin every 30 (thirty) days     • glucose blood (OneTouch Verio) test strip Check blood sugars once daily.  Please substitute with appropriate alternative as covered by patient's insurance. Dx: E11.65 100 each 3   • ipratropium-albuterol (DUO-NEB) 0.5-2.5 mg/3 mL nebulizer solution Take 3 mL by nebulization 4 (four) times a day 90 mL 1   • ketoconazole (NIZORAL) 2 % cream      • levocetirizine (XYZAL) 5 MG tablet Take 1 tablet (5 mg total) by mouth every evening 90 tablet 1   • lidocaine (LIDODERM) 5 % Apply 1 patch topically daily as needed (back pain) Remove & Discard patch within 12 hours or as directed by MD Castro patch 1   • meclizine (ANTIVERT) 25 mg tablet Take 1 tablet (25 mg total) by mouth every 8 (eight) hours as needed for dizziness 30 tablet 0   • meloxicam (MOBIC) 15 mg tablet TAKE 1 TABLET (15 MG TOTAL) BY MOUTH DAILY 30 tablet 0   • mometasone (ELOCON) 0.1 % cream Apply topically daily 45 g 0   • naratriptan (AMERGE) 2.5 MG tablet      • omeprazole (PriLOSEC) 40 MG capsule TAKE 1 CAPSULE (40 MG TOTAL) BY MOUTH DAILY 90 capsule 0   • ondansetron (ZOFRAN-ODT) 4 mg disintegrating tablet TAKE 1 TABLET (4 MG TOTAL) BY MOUTH EVERY EIGHT (EIGHT) HOURS AS NEEDED FOR NAUSEA 20 tablet 0   • OneTouch Delica Lancets 54K MISC Check blood sugars once daily. Please substitute with appropriate alternative as covered by patient's insurance.  Dx: E11.65 100 each 3   • Ozempic, 1 MG/DOSE, 4 MG/3ML injection pen INJECT 0.75 ML (1 MG TOTAL) UNDER THE SKIN ONCE A WEEK 3 mL 0   • Phentermine HCl 8 MG TABS Take 1 tablet (8 mg total) by mouth in the morning 28 tablet 0   • Respiratory Therapy Supplies (NEBULIZER) DONA by Does not apply route every 4 (four) hours while awake 90 each 1   • semaglutide, 0.25 or 0.5 mg/dose, (Ozempic, 0.25 or 0.5 MG/DOSE,) 2 mg/3 mL injection pen Inject 0.75 mL (0.5 mg total) under the skin every 7 days 9 mL 1   • Syringe/Needle, Disp, (SYRINGE 3CC/61CL5-3/2") 20G X 1-1/2" 3 ML MISC Use once a week With b12 injection 7 each 0   • tiZANidine (ZANAFLEX) 4 mg tablet      • Topiramate  MG CP24 Take 100 mg by mouth 2 (two) times a day • Ubrelvy 100 MG tablet      • Ventolin  (90 Base) MCG/ACT inhaler INHALE TWO PUFFS EVERY 6 (SIX) HOURS AS NEEDED FOR WHEEZING 18 g 0   • Vraylar 4.5 MG capsule TAKE 1 CAPSULE (4.5 MG TOTAL) BY MOUTH DAILY INSERT IN THE MORNING INCREASING THE DOSE 30 capsule 1   • XIFAXAN 550 MG tablet        Current Facility-Administered Medications   Medication Dose Route Frequency Provider Last Rate Last Admin   • cyanocobalamin injection 1,000 mcg  1,000 mcg Intramuscular Q30 Days ERICKA Quispe   1,000 mcg at 07/27/23 1025        Allergies   Allergen Reactions   • Doxycycline Rash   • Erythromycin Rash   • Other Edema and Wheezing     jalapeno   • Augmentin [Amoxicillin-Pot Clavulanate] GI Intolerance   • Latex Rash   • Duloxetine      Other reaction(s): Nausea, Loopy   • Eletriptan      Pain in lower jaw with pressure in throat   • Emgality [Galcanezumab-Gnlm]    • Galcanezumab      rash       Review of Systems    Video Exam    There were no vitals filed for this visit. Physical Exam     Behavioral Health Psychotherapy Progress Note    Psychotherapy Provided: Individual Psychotherapy      Diagnosis ICD-10-CM Associated Orders   1. ZAINA (generalized anxiety disorder)  F41.1       2. Bipolar 2 disorder ([address])  F31.81            Goals addressed in session: Goal 1     DATA: Met with Jyoti Cunningham for a scheduled individual session. Topics discussed included emotional wellness, coping skills, relationship with significant other, family stressors and relationships with family. Ren Garcia is feeling "in pain". She stated her whole body hurts. She hasn't been able to get in with pain management but is going to keep trying. Ren Garcia talked about her daughter/grandson's mother. She thinks she's trying to get custody back of her grandson. Ren Garcia stated she will fight that because her grandson has made so much progress and is stable; she feels her daughter only wants him back for monetary reasons.  Ren Garcia has had her grandson since he was 8 months old. Her daughter doesn't make much of an effort to see her son as it is so she's questioning the motive to want to get custody back. Bashir Bro then spoke about her boyfriend. She had a long talk with him about her feelings, things she hasn't said. She tried not to be harsh but she stated holding it in is taking it's toll on her. Vandana Jones stated it "was a lot" and continued to try and excuse his behavior. Bashir Bro stated she isn't sure where the relationship stands at this time. Bashir Bro shows evidence of utilizing effective communication skills, engaging in problem solving and maintaining emotional composure to manage mental health symptoms. During this session, this clinical used the following therapeutic modalities supportive psychotherapy, client-centered therapy, CBT techniques and stress management skills. Substance Abuse was not addressed during this session. If the client is diagnosed with a co-occurring substance use disorder, please indicate any changes in the frequency or amount of use: N/A. Stage of change for addressing substance use diagnoses: No substance use/Not applicable    ASSESSMENT:  Bashir Bro presents with a Euthymic/ normal mood. her affect is normal range and intensity, appropriate, which is congruent, with her  mood and the content of the session. Bashir Bro was oriented x3. She was focused and engaged. Bashir Bro exhibits good therapeutic rapport with this clinician. The client has made progress on their goals. Emma Dumont presents with a none risk of suicide, none risk of self-harm, and none risk of harm to others. For any risk assessment that surpasses a "low" rating, a safety plan must be developed. A safety plan was indicated: no  If yes, describe in detail: N/A    PLAN: Bashir Bro will return in 1 week for the next scheduled session.  At the next session, the therapist will use engagement strategies, supportive psychotherapy and client-centered therapy to address her anxiety. Behavioral Health Treatment Plan and Discharge Planning: Mulu Jones is aware of and agrees to continue to work on their treatment plan. They have identified and are working toward their discharge goals.  yes    Visit start and stop times:    08/24/23  Start Time: 0800  Stop Time: 0900  Total Visit Time: 60 minutes

## 2023-08-24 NOTE — TELEPHONE ENCOUNTER
----- Message from [address] sent at 5/5/2022 11:46 AM EDT -----  Please make patient aware strep is positive  Sent Amoxicillin to pharmacy  To take twice daily for 10 days  To encourage soft foods, voice rest, be careful not to share utensils or get in close contact with others    Thank you
Called pt to let her know about her results from the strep test we did earlier  Was able to get a hold of her and let her know it came back positive  Told her that Joana Fontaine sent her a prescription at her pharmacy, not to get in too close contact with others, no sharing utensils and to rest and eat soft foods  Mentioned about swabbing her little one but told her to wait another day or so and see how he is 
I was physically present and participated in this delivery.

## 2023-08-26 ENCOUNTER — OFFICE VISIT (OUTPATIENT)
Dept: URGENT CARE | Facility: CLINIC | Age: 53
End: 2023-08-26
Payer: MEDICARE

## 2023-08-26 VITALS
OXYGEN SATURATION: 100 % | DIASTOLIC BLOOD PRESSURE: 87 MMHG | RESPIRATION RATE: 16 BRPM | HEART RATE: 74 BPM | TEMPERATURE: 98.2 F | SYSTOLIC BLOOD PRESSURE: 122 MMHG

## 2023-08-26 DIAGNOSIS — R68.89 FLU-LIKE SYMPTOMS: Primary | ICD-10-CM

## 2023-08-26 LAB
SARS-COV-2 AG UPPER RESP QL IA: NEGATIVE
VALID CONTROL: NORMAL

## 2023-08-26 PROCEDURE — 87811 SARS-COV-2 COVID19 W/OPTIC: CPT | Performed by: PHYSICIAN ASSISTANT

## 2023-08-26 PROCEDURE — 87636 SARSCOV2 & INF A&B AMP PRB: CPT | Performed by: PHYSICIAN ASSISTANT

## 2023-08-26 PROCEDURE — G0463 HOSPITAL OUTPT CLINIC VISIT: HCPCS | Performed by: PHYSICIAN ASSISTANT

## 2023-08-26 PROCEDURE — 99213 OFFICE O/P EST LOW 20 MIN: CPT | Performed by: PHYSICIAN ASSISTANT

## 2023-08-26 NOTE — PROGRESS NOTES
[facility]        NAME: Bree Palencia is a 47 y.o. female  : 1970    MRN: [id number]  DATE: 2023  TIME: 10:23 AM      Assessment and Plan     Flu-like symptoms [R68.89]  1. Flu-like symptoms  Poct Covid 19 Rapid Antigen Test    Covid/Flu-Office Collect            Patient Instructions   There are no Patient Instructions on file for this visit. Follow up with PCP in 3-5 days. Go to ER if symptoms worsen. Chief Complaint     Chief Complaint   Patient presents with   • Generalized Body Aches     WITH FATIGUE, DIARRHEA, VOMITING AND HEADACHES. STARTED A FEW DAYS AGO         History of Present Illness     Patient presents with body aches, cough, sore throat, and diarrhea x 1 week. She has tried several herbal remedies which have not helped much. Review of Systems     Review of Systems   Constitutional: Positive for fatigue. Negative for chills and fever. HENT: Positive for sore throat. Negative for congestion, ear pain, postnasal drip, rhinorrhea, sinus pressure and sinus pain. Eyes: Negative for pain and visual disturbance. Respiratory: Positive for cough. Negative for chest tightness and shortness of breath. Cardiovascular: Negative for chest pain and palpitations. Gastrointestinal: Positive for diarrhea. Negative for abdominal pain, nausea and vomiting. Genitourinary: Negative for dysuria and hematuria. Musculoskeletal: Negative for arthralgias, back pain and myalgias. Skin: Negative for rash. Neurological: Positive for headaches. Negative for dizziness, seizures, syncope and numbness. All other systems reviewed and are negative.         Current Medications       Current Outpatient Medications:   •  methotrexate 2.5 mg tablet, , Disp: , Rfl:   •  phentermine 15 MG capsule, Take 1 capsule (15 mg total) by mouth every morning, Disp: 30 capsule, Rfl: 0  •  [place] Thyroid 60 MG tablet, Take one and 1/2 tabs daily, Disp: 135 tablet, Rfl: 2  •  azelastine (ASTELIN) 0.1 % nasal spray, 1 SPRAY INTO EACH NOSTRIL TWO (TWO) TIMES A DAY USE IN EACH NOSTRIL AS DIRECTED, Disp: 30 mL, Rfl: 2  •  Blood Glucose Monitoring Suppl (OneTouch Verio Reflect) w/Device KIT, Check blood sugars once daily. Please substitute with appropriate alternative as covered by patient's insurance. Dx: E11.65, Disp: 1 kit, Rfl: 0  •  butalbital-acetaminophen-caffeine (FIORICET,ESGIC) -40 mg per tablet, Take 1 tablet by mouth every 6 (six) hours as needed for headaches, Disp: 30 tablet, Rfl: 0  •  celecoxib (CELEBREX) 200 mg capsule, Take 1 capsule (200 mg total) by mouth daily, Disp: 30 capsule, Rfl: 2  •  clonazePAM (KlonoPIN) 1 mg tablet, Take 1 tablet (1 mg total) by mouth daily as needed for anxiety, Disp: 30 tablet, Rfl: 3  •  cyanocobalamin 1,000 mcg/mL, Inject 1 mL (1,000 mcg total) into a muscle once for 1 dose, Disp: 1 mL, Rfl: 0  •  cyclobenzaprine (FLEXERIL) 5 mg tablet, , Disp: , Rfl:   •  dicyclomine (BENTYL) 10 mg capsule, Take 1 capsule (10 mg total) by mouth 3 (three) times a day as needed (abd cramping), Disp: 30 capsule, Rfl: 0  •  divalproex sodium (DEPAKOTE ER) 250 mg 24 hr tablet, , Disp: , Rfl:   •  fluticasone (FLONASE) 50 mcg/act nasal spray, INHALE TWO PUFFS TWO (TWO) TIMES A DAY RINSE MOUTH AFTER USE., Disp: 16 g, Rfl: 0  •  fremanezumab-vfrm (Ajovy) 225 MG/1.5ML auto-injector, Inject 225 mg under the skin every 30 (thirty) days, Disp: , Rfl:   •  glucose blood (OneTouch Verio) test strip, Check blood sugars once daily. Please substitute with appropriate alternative as covered by patient's insurance.  Dx: E11.65, Disp: 100 each, Rfl: 3  •  ipratropium-albuterol (DUO-NEB) 0.5-2.5 mg/3 mL nebulizer solution, Take 3 mL by nebulization 4 (four) times a day, Disp: 90 mL, Rfl: 1  •  ketoconazole (NIZORAL) 2 % cream, , Disp: , Rfl:   •  levocetirizine (XYZAL) 5 MG tablet, Take 1 tablet (5 mg total) by mouth every evening, Disp: 90 tablet, Rfl: 1  •  lidocaine (LIDODERM) 5 %, Apply 1 patch topically daily as needed (back pain) Remove & Discard patch within 12 hours or as directed by MD, Disp: 6 patch, Rfl: 1  •  meclizine (ANTIVERT) 25 mg tablet, Take 1 tablet (25 mg total) by mouth every 8 (eight) hours as needed for dizziness, Disp: 30 tablet, Rfl: 0  •  meloxicam (MOBIC) 15 mg tablet, TAKE 1 TABLET (15 MG TOTAL) BY MOUTH DAILY, Disp: 30 tablet, Rfl: 0  •  mometasone (ELOCON) 0.1 % cream, Apply topically daily, Disp: 45 g, Rfl: 0  •  naratriptan (AMERGE) 2.5 MG tablet, , Disp: , Rfl:   •  omeprazole (PriLOSEC) 40 MG capsule, TAKE 1 CAPSULE (40 MG TOTAL) BY MOUTH DAILY, Disp: 90 capsule, Rfl: 0  •  ondansetron (ZOFRAN-ODT) 4 mg disintegrating tablet, TAKE 1 TABLET (4 MG TOTAL) BY MOUTH EVERY EIGHT (EIGHT) HOURS AS NEEDED FOR NAUSEA, Disp: 20 tablet, Rfl: 0  •  OneTouch Delica Lancets 94Z MISC, Check blood sugars once daily. Please substitute with appropriate alternative as covered by patient's insurance.  Dx: E11.65, Disp: 100 each, Rfl: 3  •  Ozempic, 1 MG/DOSE, 4 MG/3ML injection pen, INJECT 0.75 ML (1 MG TOTAL) UNDER THE SKIN ONCE A WEEK, Disp: 3 mL, Rfl: 0  •  Respiratory Therapy Supplies (NEBULIZER) DONA, by Does not apply route every 4 (four) hours while awake, Disp: 90 each, Rfl: 1  •  semaglutide, 0.25 or 0.5 mg/dose, (Ozempic, 0.25 or 0.5 MG/DOSE,) 2 mg/3 mL injection pen, Inject 0.75 mL (0.5 mg total) under the skin every 7 days, Disp: 9 mL, Rfl: 1  •  Syringe/Needle, Disp, (SYRINGE 3CC/72RN2-2/2") 20G X 1-1/2" 3 ML MISC, Use once a week With b12 injection, Disp: 7 each, Rfl: 0  •  tiZANidine (ZANAFLEX) 4 mg tablet, , Disp: , Rfl:   •  Topiramate  MG CP24, Take 100 mg by mouth 2 (two) times a day, Disp: , Rfl:   •  Ubrelvy 100 MG tablet, , Disp: , Rfl:   •  Ventolin  (90 Base) MCG/ACT inhaler, INHALE TWO PUFFS EVERY 6 (SIX) HOURS AS NEEDED FOR WHEEZING, Disp: 18 g, Rfl: 0  •  Vraylar 4.5 MG capsule, TAKE 1 CAPSULE (4.5 MG TOTAL) BY MOUTH DAILY INSERT IN THE MORNING INCREASING THE DOSE, Disp: 30 capsule, Rfl: 1  •  XIFAXAN 550 MG tablet, , Disp: , Rfl:     Current Facility-Administered Medications:   •  cyanocobalamin injection 1,000 mcg, 1,000 mcg, Intramuscular, Q30 Days, ERICKA Cunningham, 1,000 mcg at 23 1025    Current Allergies     Allergies as of 2023 - Reviewed 2023   Allergen Reaction Noted   • Doxycycline Rash    • Erythromycin Rash 2018   • Other Edema and Wheezing 2008   • Augmentin [amoxicillin-pot clavulanate] GI Intolerance 2023   • Latex Rash 2015   • Duloxetine  2020   • Eletriptan  2017   • Emgality [galcanezumab-gnlm]  2020   • Rudy [place]  10/06/2020              The following portions of the patient's history were reviewed and updated as appropriate: allergies, current medications, past family history, past medical history, past social history, past surgical history, and problem list.     Past Medical History:   Diagnosis Date   • Anxiety    • Anxiety    • Asthma    • Bipolar disorder ([address])    • Carpal tunnel syndrome    • Chronic pain     lower back   • Depression    • Disease of thyroid gland    • Dyslipidemia    • Fibromyalgia    • Irritable bowel    • Kidney stones    • Kidney stones 2019   • Migraine    • Obesity (BMI 30.0-34. 9)    • Psychiatric disorder     depression/anxiety   • Suicide attempt [facility])     as teen   • Vitamin D deficiency        Past Surgical History:   Procedure Laterality Date   • BUNIONECTOMY     •  SECTION     • CHOLECYSTECTOMY     • PARTIAL HYSTERECTOMY      portion of  uterus still present   • TONSILLECTOMY     • TUBAL LIGATION         Family History   Problem Relation Age of Onset   • Hypertension Mother    • Diabetes Mother    • Hypothyroidism Mother    • Stroke Mother    • Bipolar disorder Mother    • Anxiety disorder Mother    • Arthritis Mother    • Depression Mother    • Mental illness Mother    • Cancer Father         pancreatic    • Hypothyroidism Sister    • Hypertension Brother    • Heart disease Brother    • Cancer Maternal Uncle         lung   • Cancer Paternal Aunt         breast   • Breast cancer Paternal Aunt    • Cancer Paternal Uncle         testicular    • Cancer Paternal Grandmother         breast   • Dementia Paternal Grandmother    • Breast cancer Paternal Grandmother    • Cancer Cousin         brain   • Bipolar disorder Daughter          Medications have been verified. Objective     /87   Pulse 74   Temp 98.2 °F (36.8 °C)   Resp 16   SpO2 100%   No LMP recorded. Patient has had a hysterectomy. Physical Exam     Physical Exam  Vitals and nursing note reviewed. Constitutional:       Appearance: Normal appearance. She is normal weight. HENT:      Head: Normocephalic and atraumatic. Right Ear: Tympanic membrane, ear canal and external ear normal.      Left Ear: Tympanic membrane, ear canal and external ear normal.      Nose: Nose normal.      Mouth/Throat:      Mouth: Mucous membranes are moist.      Pharynx: Oropharynx is clear. Cardiovascular:      Rate and Rhythm: Normal rate and regular rhythm. Heart sounds: Normal heart sounds. Pulmonary:      Effort: Pulmonary effort is normal.      Breath sounds: Normal breath sounds. Abdominal:      General: Abdomen is flat. Bowel sounds are normal.      Palpations: Abdomen is soft. Tenderness: There is no abdominal tenderness. Skin:     General: Skin is warm and dry. Neurological:      General: No focal deficit present. Mental Status: She is alert and oriented to person, place, and time.    Psychiatric:         Mood and Affect: Mood normal.         Behavior: Behavior normal.

## 2023-08-29 ENCOUNTER — TELEPHONE (OUTPATIENT)
Dept: FAMILY MEDICINE CLINIC | Facility: CLINIC | Age: 53
End: 2023-08-29

## 2023-08-29 ENCOUNTER — TELEPHONE (OUTPATIENT)
Dept: PSYCHIATRY | Facility: CLINIC | Age: 53
End: 2023-08-29

## 2023-08-29 RX ORDER — SUCRALFATE 1 G/1
TABLET ORAL
Qty: 414 TABLET | Refills: 9 | OUTPATIENT
Start: 2023-08-29

## 2023-08-29 RX ORDER — COLCHICINE 0.6 MG/1
0.6 TABLET ORAL DAILY
Qty: 30 TABLET | Refills: 4 | OUTPATIENT
Start: 2023-08-29

## 2023-08-29 RX ORDER — LEVOTHYROXINE SODIUM 0.03 MG/1
37.5 TABLET ORAL DAILY
Qty: 45 TABLET | Refills: 2 | OUTPATIENT
Start: 2023-08-29

## 2023-08-29 NOTE — TELEPHONE ENCOUNTER
Patient is calling regarding cancelling an appointment. Date/Time: 8/29/23 at 2:00p    Reason: Patient is not feeling well.     Patient was rescheduled: YES [] NO [x]  If yes, when was Patient reschedule for:     Patient requesting call back to reschedule: YES [] NO [x]

## 2023-08-29 NOTE — TELEPHONE ENCOUNTER
I did call her and let her know both colchicine and levothyroxine were discontinued so refills wouldn't be appropriate. She also mentioned wanting [facility] to have a prescription put in for him for special headphones to help with loud noises that trigger him. She said it would be through the durable medical equipment.

## 2023-08-31 ENCOUNTER — TELEMEDICINE (OUTPATIENT)
Dept: PSYCHIATRY | Facility: CLINIC | Age: 53
End: 2023-08-31
Payer: MEDICARE

## 2023-08-31 DIAGNOSIS — F31.81 BIPOLAR 2 DISORDER (HCC): ICD-10-CM

## 2023-08-31 DIAGNOSIS — F41.1 GAD (GENERALIZED ANXIETY DISORDER): Primary | ICD-10-CM

## 2023-08-31 PROCEDURE — 90837 PSYTX W PT 60 MINUTES: CPT

## 2023-08-31 NOTE — BH TREATMENT PLAN
Outpatient [address] [facility]  1970      Date of Initial Psychotherapy Assessment: 6/10/22   Date of Current Treatment Plan: 08/31/23   Treatment Plan Target Date: 2/27/24  Treatment Plan Expiration Date: 2/27/24    Diagnosis:   1. ZAINA (generalized anxiety disorder)        2. Bipolar 2 disorder (HCC)             Area(s) of Need: coping skills, stress management, challenging maladaptive behaviors and thinking patterns, emotional support distress tolerance skills and give her "advice and direction objectively"     Long Term Goal 1: (continued goal) Isabella Iniguez states are to "have someone she feels that can help her reframe perspectives and alternatives to handling her own feelings and resulting actions". Stage of Change: Preparation    Target Date for completion: 2/27/24     Anticipated therapeutic modalities: CBT, behavior modification, insight oriented therapy, psychoeducation, mindfulness, problem solving skills, communication skills, stress management skills, assertiveness skills, emotional needs meeting. People identified to complete this goal: Isabella Iniguez and this clinician      Objective 1. Belem will learn and implement calming skills to reduce overall anxiety and manage anxiety, such has mindfulness, progressive muscle relaxation,                                        mindful breathing, cognitive reframing, awareness of physical sensations from emotions (objective continued).                Objective 2. Isabella Iniguez will learn and implement problem-solving and decision-making skills (defining a problem, generating possible solutions, evaluating                                    pros and cons, selecting and implementing a plan, accepting or revising the plan) (objective continued).            Objective 3. Isabella Iniguez will learn and exhibit understanding of a minimum of three effective coping skills to assist with symptom reduction (completed).         I am currently under the care of a [facility] psychiatric provider: yes    My [facility] psychiatric provider is: Maria T Whalen PA-C    I am currently taking psychiatric medications: Yes, as prescribed      I feel that I will be ready for discharge from mental health care when I reach the following: to be reevaluated prior to target date  For children and adults who have a legal guardian:   Has there been any change to custody orders and/or guardianship status? NA. If yes, attach updated documentation. I have created my Crisis Plan and have been offered a copy of this plan    [facility]: Diagnosis and Treatment Plan explained to Joshua Erwin acknowledges an understanding of their diagnosis. Joshua Erwin agrees to this treatment plan. I have been offered a copy of this Treatment Plan. yes    Joshua Erwin, 1970, actively participated in the review and update of this treatment plan during a virtual session, using the [employer] platform. Joshua Erwin  provided verbal consent on 8/31/2023 at 8:45 AM. The treatment plan was transcribed into the [address] [place] [address] Real Record at a later time.

## 2023-08-31 NOTE — PSYCH
Virtual Regular Visit    Verification of patient location:    Patient is located at Home in the following state in which I hold an active license [place]      Assessment/Plan:    Problem List Items Addressed This Visit        Other    ZAINA (generalized anxiety disorder) - Primary    Bipolar 2 disorder ([address])       Goals addressed in session: Goal 1          Reason for visit is No chief complaint on file. Encounter provider Dany Troncoso LCSW    Provider located at [address] [place] [id number] [phone]      Recent Visits  Date Type Provider Dept   08/24/23 Telemedicine Dany Troncoso LCSW [facility]   Showing recent visits within past 7 days and meeting all other requirements  Today's Visits  Date Type Provider Dept   08/31/23 Telemedicine Dany Troncoso LCSW [facility]   Showing today's visits and meeting all other requirements  Future Appointments  No visits were found meeting these conditions. Showing future appointments within next 150 days and meeting all other requirements       The patient was identified by name and date of birth. Salo Aly was informed that this is a telemedicine visit and that the visit is being conducted throughthe Ring[employer] platform. She agrees to proceed. .  My office door was closed. No one else was in the room. She acknowledged consent and understanding of privacy and security of the video platform. The patient has agreed to participate and understands they can discontinue the visit at any time. Patient is aware this is a billable service. Subjective  Salo Aly is a 47 y.o. female.       HPI     Past Medical History:   Diagnosis Date   • Anxiety    • Anxiety    • Asthma    • Bipolar disorder ([address])    • Carpal tunnel syndrome    • Chronic pain     lower back   • Depression    • Disease of thyroid gland • Dyslipidemia    • Fibromyalgia    • Irritable bowel    • Kidney stones    • Kidney stones 2019   • Migraine    • Obesity (BMI 30.0-34. 9)    • Psychiatric disorder     depression/anxiety   • Suicide attempt [facility])     as teen   • Vitamin D deficiency        Past Surgical History:   Procedure Laterality Date   • BUNIONECTOMY     •  SECTION     • CHOLECYSTECTOMY     • PARTIAL HYSTERECTOMY      portion of  uterus still present   • TONSILLECTOMY     • TUBAL LIGATION         Current Outpatient Medications   Medication Sig Dispense Refill   • phentermine 15 MG capsule Take 1 capsule (15 mg total) by mouth every morning 30 capsule 0   • [place] Thyroid 60 MG tablet Take one and 1/2 tabs daily 135 tablet 2   • azelastine (ASTELIN) 0.1 % nasal spray 1 SPRAY INTO EACH NOSTRIL TWO (TWO) TIMES A DAY USE IN EACH NOSTRIL AS DIRECTED 30 mL 2   • Blood Glucose Monitoring Suppl (OneTouch Verio Reflect) w/Device KIT Check blood sugars once daily. Please substitute with appropriate alternative as covered by patient's insurance.  Dx: E11.65 1 kit 0   • butalbital-acetaminophen-caffeine (FIORICET,ESGIC) -40 mg per tablet Take 1 tablet by mouth every 6 (six) hours as needed for headaches 30 tablet 0   • celecoxib (CELEBREX) 200 mg capsule Take 1 capsule (200 mg total) by mouth daily 30 capsule 2   • clonazePAM (KlonoPIN) 1 mg tablet Take 1 tablet (1 mg total) by mouth daily as needed for anxiety 30 tablet 3   • cyanocobalamin 1,000 mcg/mL Inject 1 mL (1,000 mcg total) into a muscle once for 1 dose 1 mL 0   • cyclobenzaprine (FLEXERIL) 5 mg tablet      • dicyclomine (BENTYL) 10 mg capsule Take 1 capsule (10 mg total) by mouth 3 (three) times a day as needed (abd cramping) 30 capsule 0   • divalproex sodium (DEPAKOTE ER) 250 mg 24 hr tablet      • fluticasone (FLONASE) 50 mcg/act nasal spray INHALE TWO PUFFS TWO (TWO) TIMES A DAY RINSE MOUTH AFTER USE. 16 g 0   • fremanezumab-vfrm (Ajovy) 225 MG/1.5ML auto-injector Inject 225 mg under the skin every 30 (thirty) days     • glucose blood (OneTouch Verio) test strip Check blood sugars once daily. Please substitute with appropriate alternative as covered by patient's insurance. Dx: E11.65 100 each 3   • ipratropium-albuterol (DUO-NEB) 0.5-2.5 mg/3 mL nebulizer solution Take 3 mL by nebulization 4 (four) times a day 90 mL 1   • ketoconazole (NIZORAL) 2 % cream      • levocetirizine (XYZAL) 5 MG tablet Take 1 tablet (5 mg total) by mouth every evening 90 tablet 1   • lidocaine (LIDODERM) 5 % Apply 1 patch topically daily as needed (back pain) Remove & Discard patch within 12 hours or as directed by MD 6 patch 1   • meclizine (ANTIVERT) 25 mg tablet Take 1 tablet (25 mg total) by mouth every 8 (eight) hours as needed for dizziness 30 tablet 0   • meloxicam (MOBIC) 15 mg tablet TAKE 1 TABLET (15 MG TOTAL) BY MOUTH DAILY 30 tablet 0   • methotrexate 2.5 mg tablet      • mometasone (ELOCON) 0.1 % cream Apply topically daily 45 g 0   • naratriptan (AMERGE) 2.5 MG tablet      • omeprazole (PriLOSEC) 40 MG capsule TAKE 1 CAPSULE (40 MG TOTAL) BY MOUTH DAILY 90 capsule 0   • ondansetron (ZOFRAN-ODT) 4 mg disintegrating tablet TAKE 1 TABLET (4 MG TOTAL) BY MOUTH EVERY EIGHT (EIGHT) HOURS AS NEEDED FOR NAUSEA 20 tablet 0   • OneTouch Delica Lancets 37U MISC Check blood sugars once daily. Please substitute with appropriate alternative as covered by patient's insurance.  Dx: E11.65 100 each 3   • Ozempic, 1 MG/DOSE, 4 MG/3ML injection pen INJECT 0.75 ML (1 MG TOTAL) UNDER THE SKIN ONCE A WEEK 3 mL 0   • Respiratory Therapy Supplies (NEBULIZER) DONA by Does not apply route every 4 (four) hours while awake 90 each 1   • semaglutide, 0.25 or 0.5 mg/dose, (Ozempic, 0.25 or 0.5 MG/DOSE,) 2 mg/3 mL injection pen Inject 0.75 mL (0.5 mg total) under the skin every 7 days 9 mL 1   • Syringe/Needle, Disp, (SYRINGE 3CC/33AT8-2/2") 20G X 1-1/2" 3 ML MISC Use once a week With b12 injection 7 each 0   • tiZANidine (ZANAFLEX) 4 mg tablet      • Topiramate  MG CP24 Take 100 mg by mouth 2 (two) times a day     • Ubrelvy 100 MG tablet      • Ventolin  (90 Base) MCG/ACT inhaler INHALE TWO PUFFS EVERY 6 (SIX) HOURS AS NEEDED FOR WHEEZING 18 g 0   • Vraylar 4.5 MG capsule TAKE 1 CAPSULE (4.5 MG TOTAL) BY MOUTH DAILY INSERT IN THE MORNING INCREASING THE DOSE 30 capsule 1   • XIFAXAN 550 MG tablet        Current Facility-Administered Medications   Medication Dose Route Frequency Provider Last Rate Last Admin   • cyanocobalamin injection 1,000 mcg  1,000 mcg Intramuscular Q30 Days ERICKA Quispe   1,000 mcg at 07/27/23 1025        Allergies   Allergen Reactions   • Doxycycline Rash   • Erythromycin Rash   • Other Edema and Wheezing     carmenlapeno   • Augmentin [Amoxicillin-Pot Clavulanate] GI Intolerance   • Latex Rash   • Duloxetine      Other reaction(s): Nausea, Loopy   • Eletriptan      Pain in lower jaw with pressure in throat   • Emgality [Galcanezumab-Gnlm]    • Galcanezumab      rash       Review of Systems    Video Exam    There were no vitals filed for this visit. Physical Exam     Behavioral Health Psychotherapy Progress Note    Psychotherapy Provided: Individual Psychotherapy      Diagnosis ICD-10-CM Associated Orders   1. ZAINA (generalized anxiety disorder)  F41.1       2. Bipolar 2 disorder ([address])  F31.81            Goals addressed in session: Goal 1     DATA: Met with Jyoti Cunningham for a scheduled individual session. Topics discussed included emotional wellness, coping skills, relationship with significant other, family stressors and relationships with family. Ren Garcia is feeling "emotional", stating she spent a lot of time yesterday crying. Her grandson's mother contacted her about taking him to do something; Ren Garcia told her they had plans this weekend and her daughter responded in a not-so-nice tone. Ren Garcia stopped texting with her because she didn't want to lose her temper.  It's very stressful to her when her daughter talks negatively about her to her siblings/Belem's children; they tell January Byers and this is additionally stressful. She wishes her other kids would push back on her, not tolerate the negative talk. She's had her grandson since he was 8 months old. He's healthy, he has the appropriate services in place, he has consistency and safety; she knows her daughter won't keep these things up for him. Her daughter has another son, in her custody, who is also diagnosed with ASD and she's not gotten services for him. He's non-verbal and really struggles; January Byers has tried to give her direction in the past when it comes to services and it's taken her years to even start to try and get him services. January Byers has taken a step back from [facility] and their relationship. She is focused on her grandson and herself. We also updated Belem's treatment plan and did her crisis plan. Januarykristy Byers shows evidence of utilizing effective communication skills, engaging in problem solving and maintaining emotional composure to manage mental health symptoms. During this session, this clinical used the following therapeutic modalities supportive psychotherapy, client-centered therapy, CBT techniques and stress management skills. Substance Abuse was not addressed during this session. If the client is diagnosed with a co-occurring substance use disorder, please indicate any changes in the frequency or amount of use: N/A. Stage of change for addressing substance use diagnoses: No substance use/Not applicable    ASSESSMENT:  January Byers presents with a Dysthymic mood. her affect is normal range and intensity, appropriate, which is congruent, with her  mood and the content of the session. January Zeeshan was oriented x3. She was focused and engaged. January Zeeshan exhibits good therapeutic rapport with this clinician. The client has made progress on their goals.     Maria Eugenia Edwards presents with a none risk of suicide, none risk of self-harm, and none risk of harm to others. For any risk assessment that surpasses a "low" rating, a safety plan must be developed. A safety plan was indicated: no  If yes, describe in detail: N/A    PLAN: Pau Deluca will return in 1 week for the next scheduled session. At the next session, the therapist will use engagement strategies, supportive psychotherapy and client-centered therapy to address her anxiety. Behavioral Health Treatment Plan and Discharge Planning: Everett Goldman is aware of and agrees to continue to work on their treatment plan. They have identified and are working toward their discharge goals.  yes    Visit start and stop times:    08/31/23  Start Time: 0804  Stop Time: 0901  Total Visit Time: 57 minutes

## 2023-08-31 NOTE — BH CRISIS PLAN
Client Name: Cornell Dey       Client YOB: 1970  : 1970    Treatment Team (include name and contact information):     Psychotherapist: Tere Del Rio LCSW    Psychiatrist: Charlotte Nix PA-C   Release of information completed: no    : N/A   Release of information completed: no    Healthcare Provider  ERICKA Peralta   [facility]  [phone]    Type of Plan   * Child plans (children 15 yo and younger) must be completed and signed by the child's legal guardian   * Plans for all individuals 15 yo and above must be signed by the client. Plan Type: adolescent/adult (14 and over) Initial      My Personal Strengths are: "good sense of humor, caring, independent, resilient, determined and hard working"    The stressors and triggers that may put me at risk are:  chronic pain, everyday stressors and family conflict    Coping skills I can use to keep myself calm and safe:  Listen to music, Call a friend or family member and [place]/meditate    Coping skills/supports I can use to maintain abstinence from substance use:   Not Applicable    The people that provide me with help and support: (Include name, contact, and how they can help)   Support person #1: Ramonita Nassar, friend/ex    * Phone number: [phone]    * How can they help me? "He's my go-to, he will help me with anything"     Support person #2: Connor Zavala, friend    * Phone number: in cell phone    * How can they help me? "moral support, mental support, I can talk to him for hours"     Support person #3: Luly Singh, younger daughter    * Phone number: in cell phone    * How can they help me?  "She does heavy lifting, helps around the house as well as helps financially"    In the past, the following has helped me in times of crisis:    Being in a quiet space, Praying or meditating and Listening to music      If it is an emergency and you need immediate help, call     If there is a possibility of danger to yourself or others, call the following crisis hotline resources:     Adult Crisis Numbers  Suicide Prevention Hotline - Dial 9-8-8  [facility]: [address]: [address]: [address]: [phone]  [address] [place] [address] Street: [phone]  [place]: [address]: [address]: [phone] (daytime). [phone] (after hours, weekends, holidays)     Child/Adolescent Crisis Numbers   [facility] \[facility] [place] [facility]\"": [address]: [phone]   [place]: [phone]   [address] [place] [address] Street: [phone]    Please note: Some [facility] do not have a separate number for Child/Adolescent specific crisis. If your county is not listed under Child/Adolescent, please call the adult number for your county     National Talk to Text Line   All Ages - [phone]    In the event your feelings become unmanageable, and you cannot reach your support system, you will call 911 immediately or go to the nearest hospital emergency room.

## 2023-09-01 DIAGNOSIS — J06.9 UPPER RESPIRATORY TRACT INFECTION, UNSPECIFIED TYPE: ICD-10-CM

## 2023-09-01 DIAGNOSIS — F31.81 BIPOLAR 2 DISORDER (HCC): ICD-10-CM

## 2023-09-01 RX ORDER — ALBUTEROL SULFATE 90 UG/1
AEROSOL, METERED RESPIRATORY (INHALATION)
Qty: 18 G | Refills: 0 | Status: SHIPPED | OUTPATIENT
Start: 2023-09-01

## 2023-09-01 RX ORDER — ALBUTEROL SULFATE 90 UG/1
AEROSOL, METERED RESPIRATORY (INHALATION)
Qty: 18 G | Refills: 1 | OUTPATIENT
Start: 2023-09-01

## 2023-09-01 RX ORDER — FLUTICASONE PROPIONATE 50 MCG
SPRAY, SUSPENSION (ML) NASAL
Qty: 16 G | Refills: 0 | Status: SHIPPED | OUTPATIENT
Start: 2023-09-01

## 2023-09-05 RX ORDER — CARIPRAZINE 4.5 MG/1
CAPSULE, GELATIN COATED ORAL
Qty: 30 CAPSULE | Refills: 0 | Status: SHIPPED | OUTPATIENT
Start: 2023-09-05

## 2023-09-06 ENCOUNTER — CLINICAL SUPPORT (OUTPATIENT)
Dept: FAMILY MEDICINE CLINIC | Facility: CLINIC | Age: 53
End: 2023-09-06
Payer: MEDICARE

## 2023-09-06 DIAGNOSIS — Z23 NEED FOR INFLUENZA VACCINATION: Primary | ICD-10-CM

## 2023-09-06 PROCEDURE — G0008 ADMIN INFLUENZA VIRUS VAC: HCPCS

## 2023-09-06 PROCEDURE — 90686 IIV4 VACC NO PRSV 0.5 ML IM: CPT

## 2023-09-07 ENCOUNTER — TELEPHONE (OUTPATIENT)
Dept: FAMILY MEDICINE CLINIC | Facility: CLINIC | Age: 53
End: 2023-09-07

## 2023-09-07 ENCOUNTER — TELEMEDICINE (OUTPATIENT)
Dept: PSYCHIATRY | Facility: CLINIC | Age: 53
End: 2023-09-07
Payer: MEDICARE

## 2023-09-07 ENCOUNTER — APPOINTMENT (OUTPATIENT)
Dept: RADIOLOGY | Facility: CLINIC | Age: 53
End: 2023-09-07
Payer: MEDICARE

## 2023-09-07 ENCOUNTER — OFFICE VISIT (OUTPATIENT)
Dept: FAMILY MEDICINE CLINIC | Facility: CLINIC | Age: 53
End: 2023-09-07
Payer: MEDICARE

## 2023-09-07 VITALS
SYSTOLIC BLOOD PRESSURE: 128 MMHG | OXYGEN SATURATION: 99 % | TEMPERATURE: 97.2 F | WEIGHT: 157 LBS | DIASTOLIC BLOOD PRESSURE: 72 MMHG | HEART RATE: 78 BPM | BODY MASS INDEX: 26.95 KG/M2

## 2023-09-07 DIAGNOSIS — R53.83 FATIGUE, UNSPECIFIED TYPE: Primary | ICD-10-CM

## 2023-09-07 DIAGNOSIS — F41.1 GAD (GENERALIZED ANXIETY DISORDER): Primary | ICD-10-CM

## 2023-09-07 DIAGNOSIS — R06.02 SHORTNESS OF BREATH: ICD-10-CM

## 2023-09-07 DIAGNOSIS — F31.81 BIPOLAR 2 DISORDER (HCC): ICD-10-CM

## 2023-09-07 DIAGNOSIS — J02.0 STREP THROAT: ICD-10-CM

## 2023-09-07 DIAGNOSIS — R19.7 DIARRHEA, UNSPECIFIED TYPE: ICD-10-CM

## 2023-09-07 DIAGNOSIS — J02.0 STREP THROAT: Primary | ICD-10-CM

## 2023-09-07 LAB
S PYO AG THROAT QL: POSITIVE
SARS-COV-2 AG UPPER RESP QL IA: NEGATIVE
VALID CONTROL: NORMAL

## 2023-09-07 PROCEDURE — 87811 SARS-COV-2 COVID19 W/OPTIC: CPT

## 2023-09-07 PROCEDURE — 71046 X-RAY EXAM CHEST 2 VIEWS: CPT

## 2023-09-07 PROCEDURE — 87880 STREP A ASSAY W/OPTIC: CPT

## 2023-09-07 PROCEDURE — 87636 SARSCOV2 & INF A&B AMP PRB: CPT

## 2023-09-07 PROCEDURE — 99214 OFFICE O/P EST MOD 30 MIN: CPT

## 2023-09-07 PROCEDURE — 90832 PSYTX W PT 30 MINUTES: CPT

## 2023-09-07 RX ORDER — AZITHROMYCIN 250 MG/1
TABLET, FILM COATED ORAL
Qty: 6 TABLET | Refills: 0 | Status: SHIPPED | OUTPATIENT
Start: 2023-09-07 | End: 2023-09-12

## 2023-09-07 RX ORDER — METHYLPREDNISOLONE 4 MG/1
TABLET ORAL
Qty: 21 EACH | Refills: 0 | Status: SHIPPED | OUTPATIENT
Start: 2023-09-07

## 2023-09-07 RX ORDER — FLUTICASONE PROPIONATE 50 MCG
1 BLISTER, WITH INHALATION DEVICE INHALATION 2 TIMES DAILY
Qty: 60 BLISTER | Refills: 0 | Status: SHIPPED | OUTPATIENT
Start: 2023-09-07 | End: 2023-10-07

## 2023-09-07 NOTE — TELEPHONE ENCOUNTER
----- Message from Pablo Pedroza, [address] [place] [address] sent at 9/7/2023 11:39 AM EDT -----  Please let her know rapid strep is positive, has she taken amoxicillin or zpak in the past without an allergic reaction?

## 2023-09-07 NOTE — PSYCH
Virtual Regular Visit    Verification of patient location:    Patient is located at Other in the following state in which I hold an active license [place]      Assessment/Plan:    Problem List Items Addressed This Visit        Other    ZAINA (generalized anxiety disorder) - Primary    Bipolar 2 disorder ([address])       Goals addressed in session: Goal 1          Reason for visit is No chief complaint on file. Encounter provider Franky Serra LCSW    Provider located at [address] [place] [id number] [phone]      Recent Visits  Date Type Provider Dept   08/31/23 Telemedicine Franky Serra LCSW [facility]   Showing recent visits within past 7 days and meeting all other requirements  Today's Visits  Date Type Provider Dept   09/07/23 Telemedicine Franky Serra LCSW [facility]   Showing today's visits and meeting all other requirements  Future Appointments  No visits were found meeting these conditions. Showing future appointments within next 150 days and meeting all other requirements       The patient was identified by name and date of birth. Akira Chavarria was informed that this is a telemedicine visit and that the visit is being conducted throughthe [employer] platform. She agrees to proceed. .  My office door was closed. No one else was in the room. She acknowledged consent and understanding of privacy and security of the video platform. The patient has agreed to participate and understands they can discontinue the visit at any time. Patient is aware this is a billable service. Subjective  Akira Chavarria is a 47 y.o. female.       HPI     Past Medical History:   Diagnosis Date   • Anxiety    • Anxiety    • Asthma    • Bipolar disorder ([address])    • Carpal tunnel syndrome    • Chronic pain     lower back   • Depression    • Disease of thyroid gland • Dyslipidemia    • Fibromyalgia    • Irritable bowel    • Kidney stones    • Kidney stones 2019   • Migraine    • Obesity (BMI 30.0-34. 9)    • Psychiatric disorder     depression/anxiety   • Suicide attempt [facility])     as teen   • Vitamin D deficiency        Past Surgical History:   Procedure Laterality Date   • BUNIONECTOMY     •  SECTION     • CHOLECYSTECTOMY     • PARTIAL HYSTERECTOMY      portion of  uterus still present   • TONSILLECTOMY     • TUBAL LIGATION         Current Outpatient Medications   Medication Sig Dispense Refill   • phentermine 15 MG capsule Take 1 capsule (15 mg total) by mouth every morning 30 capsule 0   • [place] Thyroid 60 MG tablet Take one and 1/2 tabs daily 135 tablet 2   • azelastine (ASTELIN) 0.1 % nasal spray 1 SPRAY INTO EACH NOSTRIL TWO (TWO) TIMES A DAY USE IN EACH NOSTRIL AS DIRECTED 30 mL 2   • Blood Glucose Monitoring Suppl (OneTouch Verio Reflect) w/Device KIT Check blood sugars once daily. Please substitute with appropriate alternative as covered by patient's insurance.  Dx: E11.65 1 kit 0   • butalbital-acetaminophen-caffeine (FIORICET,ESGIC) -40 mg per tablet Take 1 tablet by mouth every 6 (six) hours as needed for headaches 30 tablet 0   • celecoxib (CELEBREX) 200 mg capsule Take 1 capsule (200 mg total) by mouth daily 30 capsule 2   • clonazePAM (KlonoPIN) 1 mg tablet Take 1 tablet (1 mg total) by mouth daily as needed for anxiety 30 tablet 3   • cyanocobalamin 1,000 mcg/mL Inject 1 mL (1,000 mcg total) into a muscle once for 1 dose 1 mL 0   • cyclobenzaprine (FLEXERIL) 5 mg tablet      • dicyclomine (BENTYL) 10 mg capsule Take 1 capsule (10 mg total) by mouth 3 (three) times a day as needed (abd cramping) 30 capsule 0   • divalproex sodium (DEPAKOTE ER) 250 mg 24 hr tablet      • fluticasone (FLONASE) 50 mcg/act nasal spray INHALE TWO PUFFS TWO (TWO) TIMES A DAY RINSE MOUTH AFTER USE. 16 g 0   • fremanezumab-vfrm (Ajovy) 225 MG/1.5ML auto-injector Inject 225 mg under the skin every 30 (thirty) days     • glucose blood (OneTouch Verio) test strip Check blood sugars once daily. Please substitute with appropriate alternative as covered by patient's insurance. Dx: E11.65 100 each 3   • ipratropium-albuterol (DUO-NEB) 0.5-2.5 mg/3 mL nebulizer solution Take 3 mL by nebulization 4 (four) times a day 90 mL 1   • ketoconazole (NIZORAL) 2 % cream      • levocetirizine (XYZAL) 5 MG tablet Take 1 tablet (5 mg total) by mouth every evening 90 tablet 1   • lidocaine (LIDODERM) 5 % Apply 1 patch topically daily as needed (back pain) Remove & Discard patch within 12 hours or as directed by MD 6 patch 1   • meclizine (ANTIVERT) 25 mg tablet Take 1 tablet (25 mg total) by mouth every 8 (eight) hours as needed for dizziness 30 tablet 0   • meloxicam (MOBIC) 15 mg tablet TAKE 1 TABLET (15 MG TOTAL) BY MOUTH DAILY 30 tablet 0   • methotrexate 2.5 mg tablet      • mometasone (ELOCON) 0.1 % cream Apply topically daily 45 g 0   • naratriptan (AMERGE) 2.5 MG tablet      • omeprazole (PriLOSEC) 40 MG capsule TAKE 1 CAPSULE (40 MG TOTAL) BY MOUTH DAILY 90 capsule 0   • ondansetron (ZOFRAN-ODT) 4 mg disintegrating tablet TAKE 1 TABLET (4 MG TOTAL) BY MOUTH EVERY EIGHT (EIGHT) HOURS AS NEEDED FOR NAUSEA 20 tablet 0   • OneTouch Delica Lancets 70Q MISC Check blood sugars once daily. Please substitute with appropriate alternative as covered by patient's insurance.  Dx: E11.65 100 each 3   • Ozempic, 1 MG/DOSE, 4 MG/3ML injection pen INJECT 0.75 ML (1 MG TOTAL) UNDER THE SKIN ONCE A WEEK 3 mL 0   • Respiratory Therapy Supplies (NEBULIZER) DONA by Does not apply route every 4 (four) hours while awake 90 each 1   • semaglutide, 0.25 or 0.5 mg/dose, (Ozempic, 0.25 or 0.5 MG/DOSE,) 2 mg/3 mL injection pen Inject 0.75 mL (0.5 mg total) under the skin every 7 days 9 mL 1   • Syringe/Needle, Disp, (SYRINGE 3CC/52ZA7-1/2") 20G X 1-1/2" 3 ML MISC Use once a week With b12 injection 7 each 0   • tiZANidine (ZANAFLEX) 4 mg tablet      • Topiramate  MG CP24 Take 100 mg by mouth 2 (two) times a day     • Ubrelvy 100 MG tablet      • Ventolin  (90 Base) MCG/ACT inhaler INHALE TWO PUFFS EVERY 6 (SIX) HOURS AS NEEDED FOR WHEEZING 18 g 0   • Vraylar 4.5 MG capsule TAKE 1 CAPSULE (4.5 MG TOTAL) BY MOUTH DAILY INSERT IN THE MORNING INCREASING THE DOSE 30 capsule 0   • XIFAXAN 550 MG tablet        Current Facility-Administered Medications   Medication Dose Route Frequency Provider Last Rate Last Admin   • cyanocobalamin injection 1,000 mcg  1,000 mcg Intramuscular Q30 Days [facility] Capari, CRNP   1,000 mcg at 07/27/23 1025        Allergies   Allergen Reactions   • Doxycycline Rash   • Erythromycin Rash   • Other Edema and Wheezing     jalapeno   • Augmentin [Amoxicillin-Pot Clavulanate] GI Intolerance   • Latex Rash   • Duloxetine      Other reaction(s): Nausea, Loopy   • Eletriptan      Pain in lower jaw with pressure in throat   • Emgality [Galcanezumab-Gnlm]    • Galcanezumab      rash       Review of Systems    Video Exam    There were no vitals filed for this visit. Physical Exam     Behavioral Health Psychotherapy Progress Note    Psychotherapy Provided: Individual Psychotherapy      Diagnosis ICD-10-CM Associated Orders   1. ZAINA (generalized anxiety disorder)  F41.1       2. Bipolar 2 disorder ([address])  F31.81            Goals addressed in session: Goal 1     DATA: Met with Jessica Yoo for a scheduled individual session. Topics discussed included emotional wellness, coping skills and physical health concerns. Gloria Hodges is feeling "tired". She stated she continues to feel very lethargic. She stated her blood sugar numbers have been low so she's been adjusting her food to try and get her numbers up; this has helped some. She is also having trouble breathing; she feels out of breath often. Gloria Hodges is trying to keep hydrated as well. She had been told that after covid, she had long haulers. January Byers asked to end the session early due to not feeling well so she could contact her doctor; we agreed to talk next week. January Byers shows evidence of utilizing effective communication skills and maintaining emotional composure to manage mental health symptoms. During this session, this clinical used the following therapeutic modalities supportive psychotherapy and client-centered therapy. Substance Abuse was not addressed during this session. If the client is diagnosed with a co-occurring substance use disorder, please indicate any changes in the frequency or amount of use: N/A. Stage of change for addressing substance use diagnoses: No substance use/Not applicable    ASSESSMENT:  January Byers presents with a Dysthymic mood. her affect is normal range and intensity, appropriate, which is congruent, with her  mood and the content of the session. January Byers was oriented x3. She was focused and engaged. January Byers exhibits good therapeutic rapport with this clinician. The client has made progress on their goals. Maria Eugenia Edwards presents with a none risk of suicide, none risk of self-harm, and none risk of harm to others. For any risk assessment that surpasses a "low" rating, a safety plan must be developed. A safety plan was indicated: no  If yes, describe in detail: N/A    PLAN: January Byers will return in 1 week for the next scheduled session. At the next session, the therapist will use engagement strategies, supportive psychotherapy and client-centered therapy to address her anxiety. Behavioral Health Treatment Plan and Discharge Planning: Maria Eugenia Edwards is aware of and agrees to continue to work on their treatment plan. They have identified and are working toward their discharge goals.  yes    Visit start and stop times:    09/07/23  Start Time: 0801  Stop Time: 0826  Total Visit Time: 25 minutes

## 2023-09-07 NOTE — Clinical Note
Please let her know rapid strep is positive, has she taken amoxicillin or zpak in the past without an allergic reaction?

## 2023-09-07 NOTE — PATIENT INSTRUCTIONS
Problem List Items Addressed This Visit    None  Visit Diagnoses       Fatigue, unspecified type    -  Primary    strep covid flu today, will call with results. Relevant Orders    POCT Rapid Covid Ag    Covid/Flu- Office Collect    Shortness of breath        Please have xray, will treat with steroid taper and albuterol/flovent, rinse mouth arter use. strep covid flu today, will call with results. Relevant Medications    methylPREDNISolone 4 MG tablet therapy pack    fluticasone (Flovent Diskus) 50 mcg/actuation diskus inhaler    Other Relevant Orders    XR chest pa & lateral    Diarrhea, unspecified type        suggest staying hydrated wiht pedilyte, immodium over the counter bene fiber with probiotic.

## 2023-09-07 NOTE — PROGRESS NOTES
Assessment/Plan:         Problem List Items Addressed This Visit    None  Visit Diagnoses     Fatigue, unspecified type    -  Primary    strep covid flu today, will call with results. Relevant Orders    POCT Rapid Covid Ag    Covid/Flu- Office Collect    Shortness of breath        Please have xray, will treat with steroid taper and albuterol/flovent, rinse mouth arter use. strep covid flu today, will call with results. Relevant Medications    methylPREDNISolone 4 MG tablet therapy pack    fluticasone (Flovent Diskus) 50 mcg/actuation diskus inhaler    Other Relevant Orders    XR chest pa & lateral    Diarrhea, unspecified type        suggest staying hydrated wiht pedilyte, immodium over the counter bene fiber with probiotic. Subjective:      Patient ID: Gabby Corrales is a 47 y.o. female. Martin Wallis has been feeling unwell for a few weeks. She had 2 episodes of dizziness and a chore to speak. Her grandson was sick and she too had diarrhea this past week. She did stop her phentermine. Fatigue  Associated symptoms include arthralgias, congestion, diaphoresis, fatigue, headaches, myalgias, a sore throat and weakness. Pertinent negatives include no abdominal pain, chest pain, chills, coughing, fever, nausea, rash or vomiting. Generalized Body Aches  Associated symptoms include congestion, headaches, a sore throat, fatigue and shortness of breath. Pertinent negatives include no ear pain, eye pain, rhinorrhea, fever, chest pain, coughing, wheezing, abdominal pain, constipation, diarrhea, nausea, vomiting or rash.        The following portions of the patient's history were reviewed and updated as appropriate:   Past Medical History:  She has a past medical history of Anxiety, Anxiety, Asthma, Bipolar disorder ([address]), Carpal tunnel syndrome, Chronic pain, Depression, Disease of thyroid gland, Dyslipidemia, Fibromyalgia, Irritable bowel, Kidney stones, Kidney stones (5/20/2019), Migraine, Obesity (BMI 30.0-34.9), Psychiatric disorder, Suicide attempt ([address]), and Vitamin D deficiency. ,  _______________________________________________________________________  Medical Problems:  does not have any pertinent problems on file.,  _______________________________________________________________________  Past Surgical History:   has a past surgical history that includes  section; Cholecystectomy; Tonsillectomy; Partial hysterectomy; Bunionectomy; and Tubal ligation. ,  _______________________________________________________________________  Family History:  family history includes Anxiety disorder in her mother; Arthritis in her mother; Bipolar disorder in her daughter and mother; Breast cancer in her paternal aunt and paternal grandmother; Cancer in her cousin, father, maternal uncle, paternal aunt, paternal grandmother, and paternal uncle; Dementia in her paternal grandmother; Depression in her mother; Diabetes in her mother; Heart disease in her brother; Hypertension in her brother and mother; Hypothyroidism in her mother and sister; Mental illness in her mother; Stroke in her mother.,  _______________________________________________________________________  Social History:   reports that she has never smoked. She has never used smokeless tobacco. She reports that she does not drink alcohol and does not use drugs. ,  _______________________________________________________________________  Allergies:  is allergic to doxycycline, erythromycin, other, augmentin [amoxicillin-pot clavulanate], latex, duloxetine, eletriptan, emgality [galcanezumab-gnlm], and galcanezumab. .  _______________________________________________________________________  Current Outpatient Medications   Medication Sig Dispense Refill   • Warren Thyroid 60 MG tablet Take one and 1/2 tabs daily 135 tablet 2   • azelastine (ASTELIN) 0.1 % nasal spray 1 SPRAY INTO EACH NOSTRIL TWO (TWO) TIMES A DAY USE IN EACH NOSTRIL AS DIRECTED 30 mL 2   • Blood Glucose Monitoring Suppl (OneTouch Verio Reflect) w/Device KIT Check blood sugars once daily. Please substitute with appropriate alternative as covered by patient's insurance. Dx: E11.65 1 kit 0   • butalbital-acetaminophen-caffeine (FIORICET,ESGIC) -40 mg per tablet Take 1 tablet by mouth every 6 (six) hours as needed for headaches 30 tablet 0   • celecoxib (CELEBREX) 200 mg capsule Take 1 capsule (200 mg total) by mouth daily 30 capsule 2   • dicyclomine (BENTYL) 10 mg capsule Take 1 capsule (10 mg total) by mouth 3 (three) times a day as needed (abd cramping) 30 capsule 0   • divalproex sodium (DEPAKOTE ER) 250 mg 24 hr tablet      • fluticasone (FLONASE) 50 mcg/act nasal spray INHALE TWO PUFFS TWO (TWO) TIMES A DAY RINSE MOUTH AFTER USE. 16 g 0   • fluticasone (Flovent Diskus) 50 mcg/actuation diskus inhaler Inhale 1 puff 2 (two) times a day Rinse mouth after use. 60 blister 0   • fremanezumab-vfrm (Ajovy) 225 MG/1.5ML auto-injector Inject 225 mg under the skin every 30 (thirty) days     • glucose blood (OneTouch Verio) test strip Check blood sugars once daily. Please substitute with appropriate alternative as covered by patient's insurance.  Dx: E11.65 100 each 3   • ipratropium-albuterol (DUO-NEB) 0.5-2.5 mg/3 mL nebulizer solution Take 3 mL by nebulization 4 (four) times a day 90 mL 1   • ketoconazole (NIZORAL) 2 % cream      • levocetirizine (XYZAL) 5 MG tablet Take 1 tablet (5 mg total) by mouth every evening 90 tablet 1   • lidocaine (LIDODERM) 5 % Apply 1 patch topically daily as needed (back pain) Remove & Discard patch within 12 hours or as directed by MD 6 patch 1   • meclizine (ANTIVERT) 25 mg tablet Take 1 tablet (25 mg total) by mouth every 8 (eight) hours as needed for dizziness 30 tablet 0   • methotrexate 2.5 mg tablet      • methylPREDNISolone 4 MG tablet therapy pack Use as directed on package 21 each 0   • mometasone (ELOCON) 0.1 % cream Apply topically daily 45 g 0   • naratriptan (AMERGE) 2.5 MG tablet      • omeprazole (PriLOSEC) 40 MG capsule TAKE 1 CAPSULE (40 MG TOTAL) BY MOUTH DAILY 90 capsule 0   • ondansetron (ZOFRAN-ODT) 4 mg disintegrating tablet TAKE 1 TABLET (4 MG TOTAL) BY MOUTH EVERY EIGHT (EIGHT) HOURS AS NEEDED FOR NAUSEA 20 tablet 0   • OneTouch Delica Lancets 74A MISC Check blood sugars once daily. Please substitute with appropriate alternative as covered by patient's insurance.  Dx: E11.65 100 each 3   • Ozempic, 1 MG/DOSE, 4 MG/3ML injection pen INJECT 0.75 ML (1 MG TOTAL) UNDER THE SKIN ONCE A WEEK 3 mL 0   • phentermine 15 MG capsule Take 1 capsule (15 mg total) by mouth every morning 30 capsule 0   • Respiratory Therapy Supplies (NEBULIZER) DONA by Does not apply route every 4 (four) hours while awake 90 each 1   • Syringe/Needle, Disp, (SYRINGE 3CC/16GS0-8/2") 20G X 1-1/2" 3 ML MISC Use once a week With b12 injection 7 each 0   • tiZANidine (ZANAFLEX) 4 mg tablet      • Topiramate  MG CP24 Take 100 mg by mouth 2 (two) times a day     • Ubrelvy 100 MG tablet      • Ventolin  (90 Base) MCG/ACT inhaler INHALE TWO PUFFS EVERY 6 (SIX) HOURS AS NEEDED FOR WHEEZING 18 g 0   • Vraylar 4.5 MG capsule TAKE 1 CAPSULE (4.5 MG TOTAL) BY MOUTH DAILY INSERT IN THE MORNING INCREASING THE DOSE 30 capsule 0   • XIFAXAN 550 MG tablet      • meloxicam (MOBIC) 15 mg tablet TAKE 1 TABLET (15 MG TOTAL) BY MOUTH DAILY (Patient not taking: Reported on 9/7/2023) 30 tablet 0   • semaglutide, 0.25 or 0.5 mg/dose, (Ozempic, 0.25 or 0.5 MG/DOSE,) 2 mg/3 mL injection pen Inject 0.75 mL (0.5 mg total) under the skin every 7 days 9 mL 1     Current Facility-Administered Medications   Medication Dose Route Frequency Provider Last Rate Last Admin   • cyanocobalamin injection 1,000 mcg  1,000 mcg Intramuscular Q30 Days ERICKA Sofia   1,000 mcg at 07/27/23 1025     _______________________________________________________________________  Review of Systems   Constitutional: Positive for diaphoresis and fatigue. Negative for chills and fever. HENT: Positive for congestion and sore throat. Negative for ear pain, rhinorrhea, sinus pressure and sinus pain. Eyes: Negative for pain and visual disturbance. Respiratory: Positive for chest tightness and shortness of breath. Negative for cough and wheezing. Cardiovascular: Negative for chest pain and palpitations. Gastrointestinal: Negative for abdominal pain, constipation, diarrhea, nausea and vomiting. Genitourinary: Negative for dysuria, frequency, hematuria and urgency. Musculoskeletal: Positive for arthralgias and myalgias. Negative for back pain. Skin: Negative for color change and rash. Neurological: Positive for dizziness, weakness, light-headedness and headaches. Negative for seizures and syncope. Psychiatric/Behavioral: Negative for dysphoric mood and sleep disturbance. The patient is not nervous/anxious. All other systems reviewed and are negative. Objective:  Vitals:    09/07/23 1049   BP: 128/72   BP Location: Left arm   Patient Position: Sitting   Cuff Size: Standard   Pulse: 78   Temp: (!) 97.2 °F (36.2 °C)   SpO2: 99%   Weight: 71.2 kg (157 lb)     Body mass index is 26.95 kg/m². Physical Exam  Vitals and nursing note reviewed. Constitutional:       General: She is not in acute distress. Appearance: Normal appearance. She is not ill-appearing. HENT:      Head: Normocephalic. Right Ear: Tympanic membrane, ear canal and external ear normal. There is no impacted cerumen. Left Ear: Tympanic membrane, ear canal and external ear normal. There is no impacted cerumen. Nose: Nose normal. No congestion. Mouth/Throat:      Mouth: Mucous membranes are moist.   Eyes:      Extraocular Movements: Extraocular movements intact. Conjunctiva/sclera: Conjunctivae normal.      Pupils: Pupils are equal, round, and reactive to light.    Cardiovascular:      Rate and Rhythm: Normal rate and regular rhythm. Pulses: Normal pulses. Heart sounds: Normal heart sounds. Pulmonary:      Effort: Pulmonary effort is normal. No respiratory distress. Breath sounds: Normal breath sounds. No wheezing. Abdominal:      General: Bowel sounds are normal.      Palpations: Abdomen is soft. Musculoskeletal:         General: No swelling or tenderness. Normal range of motion. Cervical back: Normal range of motion. No tenderness. Right lower leg: No edema. Left lower leg: No edema. Lymphadenopathy:      Cervical: No cervical adenopathy. Skin:     General: Skin is warm and dry. Neurological:      Mental Status: She is alert and oriented to person, place, and time. Psychiatric:         Mood and Affect: Mood normal.         Behavior: Behavior normal.         Thought Content:  Thought content normal.         Judgment: Judgment normal.

## 2023-09-11 PROBLEM — Z86.16 HISTORY OF COVID-19: Status: RESOLVED | Noted: 2022-05-16 | Resolved: 2023-09-11

## 2023-09-12 DIAGNOSIS — R07.9 CHEST PAIN, UNSPECIFIED TYPE: ICD-10-CM

## 2023-09-12 DIAGNOSIS — J06.9 UPPER RESPIRATORY TRACT INFECTION, UNSPECIFIED TYPE: ICD-10-CM

## 2023-09-12 DIAGNOSIS — U07.1 COVID-19: ICD-10-CM

## 2023-09-12 RX ORDER — COLCHICINE 0.6 MG/1
0.6 TABLET ORAL DAILY
Qty: 30 TABLET | Refills: 4 | OUTPATIENT
Start: 2023-09-12

## 2023-09-12 RX ORDER — LEVOTHYROXINE SODIUM 0.03 MG/1
37.5 TABLET ORAL DAILY
Qty: 45 TABLET | Refills: 2 | OUTPATIENT
Start: 2023-09-12

## 2023-09-12 RX ORDER — ALBUTEROL SULFATE 90 UG/1
AEROSOL, METERED RESPIRATORY (INHALATION)
Qty: 18 G | Refills: 0 | Status: SHIPPED | OUTPATIENT
Start: 2023-09-12

## 2023-09-14 ENCOUNTER — TELEPHONE (OUTPATIENT)
Dept: FAMILY MEDICINE CLINIC | Facility: CLINIC | Age: 53
End: 2023-09-14

## 2023-09-14 ENCOUNTER — TELEMEDICINE (OUTPATIENT)
Dept: PSYCHIATRY | Facility: CLINIC | Age: 53
End: 2023-09-14
Payer: MEDICARE

## 2023-09-14 DIAGNOSIS — M54.16 LUMBAR RADICULOPATHY: Primary | ICD-10-CM

## 2023-09-14 DIAGNOSIS — F31.81 BIPOLAR 2 DISORDER (HCC): ICD-10-CM

## 2023-09-14 DIAGNOSIS — F41.1 GAD (GENERALIZED ANXIETY DISORDER): Primary | ICD-10-CM

## 2023-09-14 PROCEDURE — 90837 PSYTX W PT 60 MINUTES: CPT

## 2023-09-14 NOTE — PSYCH
Virtual Regular Visit    Verification of patient location:    Patient is located at Home in the following state in which I hold an active license [place]      Assessment/Plan:    Problem List Items Addressed This Visit        Other    ZAINA (generalized anxiety disorder) - Primary    Bipolar 2 disorder ([address])       Goals addressed in session: Goal 1          Reason for visit is No chief complaint on file. Encounter provider Sanjay Chavarria LCSW    Provider located at [address] [place] [id number] [phone]      Recent Visits  Date Type Provider Dept   09/07/23 Telemedicine Sanjay Chavarria LCSW [facility]   Showing recent visits within past 7 days and meeting all other requirements  Today's Visits  Date Type Provider Dept   09/14/23 Telemedicine Sanjay Chavarria LCSW [facility]   Showing today's visits and meeting all other requirements  Future Appointments  No visits were found meeting these conditions. Showing future appointments within next 150 days and meeting all other requirements       The patient was identified by name and date of birth. Bree Palencia was informed that this is a telemedicine visit and that the visit is being conducted throughthe [employer] platform. She agrees to proceed. .  My office door was closed. No one else was in the room. She acknowledged consent and understanding of privacy and security of the video platform. The patient has agreed to participate and understands they can discontinue the visit at any time. Patient is aware this is a billable service. Subjective  Bree Palencia is a 47 y.o. female.       HPI     Past Medical History:   Diagnosis Date   • Anxiety    • Anxiety    • Asthma    • Bipolar disorder ([address])    • Carpal tunnel syndrome    • Chronic pain     lower back   • Depression    • Disease of thyroid gland • Dyslipidemia    • Fibromyalgia    • Irritable bowel    • Kidney stones    • Kidney stones 2019   • Migraine    • Obesity (BMI 30.0-34. 9)    • Psychiatric disorder     depression/anxiety   • Suicide attempt [facility])     as teen   • Vitamin D deficiency        Past Surgical History:   Procedure Laterality Date   • BUNIONECTOMY     •  SECTION     • CHOLECYSTECTOMY     • PARTIAL HYSTERECTOMY      portion of  uterus still present   • TONSILLECTOMY     • TUBAL LIGATION         Current Outpatient Medications   Medication Sig Dispense Refill   • albuterol (PROVENTIL HFA,VENTOLIN HFA) 90 mcg/act inhaler INHALE TWO PUFFS EVERY 6 (SIX) HOURS AS NEEDED FOR WHEEZING OR SHORTNESS OF BREATH 18 g 0   • [place] Thyroid 60 MG tablet Take one and 1/2 tabs daily 135 tablet 2   • azelastine (ASTELIN) 0.1 % nasal spray 1 SPRAY INTO EACH NOSTRIL TWO (TWO) TIMES A DAY USE IN EACH NOSTRIL AS DIRECTED 30 mL 2   • Blood Glucose Monitoring Suppl (OneTouch Verio Reflect) w/Device KIT Check blood sugars once daily. Please substitute with appropriate alternative as covered by patient's insurance. Dx: E11.65 1 kit 0   • butalbital-acetaminophen-caffeine (FIORICET,ESGIC) -40 mg per tablet Take 1 tablet by mouth every 6 (six) hours as needed for headaches 30 tablet 0   • celecoxib (CELEBREX) 200 mg capsule Take 1 capsule (200 mg total) by mouth daily 30 capsule 2   • dicyclomine (BENTYL) 10 mg capsule Take 1 capsule (10 mg total) by mouth 3 (three) times a day as needed (abd cramping) 30 capsule 0   • divalproex sodium (DEPAKOTE ER) 250 mg 24 hr tablet      • fluticasone (FLONASE) 50 mcg/act nasal spray INHALE TWO PUFFS TWO (TWO) TIMES A DAY RINSE MOUTH AFTER USE. 16 g 0   • fluticasone (Flovent Diskus) 50 mcg/actuation diskus inhaler Inhale 1 puff 2 (two) times a day Rinse mouth after use.  60 blister 0   • fremanezumab-vfrm (Ajovy) 225 MG/1.5ML auto-injector Inject 225 mg under the skin every 30 (thirty) days     • glucose blood (OneTouch Verio) test strip Check blood sugars once daily. Please substitute with appropriate alternative as covered by patient's insurance. Dx: E11.65 100 each 3   • ipratropium-albuterol (DUO-NEB) 0.5-2.5 mg/3 mL nebulizer solution Take 3 mL by nebulization 4 (four) times a day 90 mL 1   • ketoconazole (NIZORAL) 2 % cream      • levocetirizine (XYZAL) 5 MG tablet Take 1 tablet (5 mg total) by mouth every evening 90 tablet 1   • lidocaine (LIDODERM) 5 % Apply 1 patch topically daily as needed (back pain) Remove & Discard patch within 12 hours or as directed by MD 6 patch 1   • meclizine (ANTIVERT) 25 mg tablet Take 1 tablet (25 mg total) by mouth every 8 (eight) hours as needed for dizziness 30 tablet 0   • meloxicam (MOBIC) 15 mg tablet TAKE 1 TABLET (15 MG TOTAL) BY MOUTH DAILY (Patient not taking: Reported on 9/7/2023) 30 tablet 0   • methotrexate 2.5 mg tablet      • methylPREDNISolone 4 MG tablet therapy pack Use as directed on package 21 each 0   • mometasone (ELOCON) 0.1 % cream Apply topically daily 45 g 0   • naratriptan (AMERGE) 2.5 MG tablet      • omeprazole (PriLOSEC) 40 MG capsule TAKE 1 CAPSULE (40 MG TOTAL) BY MOUTH DAILY 90 capsule 0   • ondansetron (ZOFRAN-ODT) 4 mg disintegrating tablet TAKE 1 TABLET (4 MG TOTAL) BY MOUTH EVERY EIGHT (EIGHT) HOURS AS NEEDED FOR NAUSEA 20 tablet 0   • OneTouch Delica Lancets 10B MISC Check blood sugars once daily. Please substitute with appropriate alternative as covered by patient's insurance.  Dx: E11.65 100 each 3   • Ozempic, 1 MG/DOSE, 4 MG/3ML injection pen INJECT 0.75 ML (1 MG TOTAL) UNDER THE SKIN ONCE A WEEK 3 mL 0   • phentermine 15 MG capsule Take 1 capsule (15 mg total) by mouth every morning 30 capsule 0   • Respiratory Therapy Supplies (NEBULIZER) DONA by Does not apply route every 4 (four) hours while awake 90 each 1   • semaglutide, 0.25 or 0.5 mg/dose, (Ozempic, 0.25 or 0.5 MG/DOSE,) 2 mg/3 mL injection pen Inject 0.75 mL (0.5 mg total) under the skin every 7 days 9 mL 1   • Syringe/Needle, Disp, (SYRINGE 3CC/91NS5-6/2") 20G X 1-1/2" 3 ML MISC Use once a week With b12 injection 7 each 0   • tiZANidine (ZANAFLEX) 4 mg tablet      • Topiramate  MG CP24 Take 100 mg by mouth 2 (two) times a day     • Ubrelvy 100 MG tablet      • Vraylar 4.5 MG capsule TAKE 1 CAPSULE (4.5 MG TOTAL) BY MOUTH DAILY INSERT IN THE MORNING INCREASING THE DOSE 30 capsule 0   • XIFAXAN 550 MG tablet        Current Facility-Administered Medications   Medication Dose Route Frequency Provider Last Rate Last Admin   • cyanocobalamin injection 1,000 mcg  1,000 mcg Intramuscular Q30 Days ERICKA Bolaños   1,000 mcg at 07/27/23 1025        Allergies   Allergen Reactions   • Doxycycline Rash   • Erythromycin Rash   • Other Edema and Wheezing     jalapeno   • Augmentin [Amoxicillin-Pot Clavulanate] GI Intolerance   • Latex Rash   • Duloxetine      Other reaction(s): Nausea, Loopy   • Eletriptan      Pain in lower jaw with pressure in throat   • Emgality [Galcanezumab-Gnlm]    • Galcanezumab      rash       Review of Systems    Video Exam    There were no vitals filed for this visit. Physical Exam     Behavioral Health Psychotherapy Progress Note    Psychotherapy Provided: Individual Psychotherapy      Diagnosis ICD-10-CM Associated Orders   1. ZAINA (generalized anxiety disorder)  F41.1       2. Bipolar 2 disorder ([address] [facility])  F31.81            Goals addressed in session: Goal 1     DATA: Met with Jabari López for a scheduled individual session. Topics discussed included emotional wellness, coping skills, relationship with significant other, relationships with family and physical health concerns. Mynor Jacobs is feeling "better". She called the doctor and went in after our session last week. She tested positive for strep and went on an antibiotic; she stated she's been feeling a lot better since then.  Mynor Jacobs just had a birthday several days ago and her boyfriend/fiance, Yoan Carney, didn't come to visit. He had planned to but then stated his ex-wife's brother passed away, someone he wasn't really close with. Jayden Garcia stated she stayed home, watched movies and relaxed for her birthday. Her younger daughter got her a gift for her birthday; her younger daughter is good to Jayden Garcia and supports her anyway she can. Jayden Garcia shows evidence of utilizing effective communication skills, engaging in problem solving and maintaining emotional composure to manage mental health symptoms. During this session, this clinical used the following therapeutic modalities supportive psychotherapy, client-centered therapy and stress management skills. Substance Abuse was not addressed during this session. If the client is diagnosed with a co-occurring substance use disorder, please indicate any changes in the frequency or amount of use: N/A. Stage of change for addressing substance use diagnoses: No substance use/Not applicable    ASSESSMENT:  Jayden Garcia presents with a Euthymic/ normal mood. her affect is normal range and intensity, appropriate, which is congruent, with her  mood and the content of the session. Jayden Garcia was oriented x3. She was focused and engaged. Jayden Garcia exhibits good therapeutic rapport with this clinician. The client has made progress on their goals. Lorene Tyson presents with a none risk of suicide, none risk of self-harm, and none risk of harm to others. For any risk assessment that surpasses a "low" rating, a safety plan must be developed. A safety plan was indicated: no  If yes, describe in detail: N/A    PLAN: Jayden Garcia will return in 1 week for the next scheduled session. At the next session, the therapist will use engagement strategies, supportive psychotherapy and client-centered therapy to address her anxiety. Behavioral Health Treatment Plan and Discharge Planning: Lorene Tyson is aware of and agrees to continue to work on their treatment plan.  They have identified and are working toward their discharge goals.  yes    Visit start and stop times:    09/14/23  Start Time: 1005  Stop Time: 1103  Total Visit Time: 58 minutes

## 2023-09-14 NOTE — TELEPHONE ENCOUNTER
Patient called saying she needs to see pain mgt. Needs a referral for [facility].  Her appt with them is Sept 19th   Will come in and  referral. Please advise when ready

## 2023-09-15 ENCOUNTER — PATIENT MESSAGE (OUTPATIENT)
Dept: FAMILY MEDICINE CLINIC | Facility: CLINIC | Age: 53
End: 2023-09-15

## 2023-09-15 NOTE — TELEPHONE ENCOUNTER
Called patient to clarify which referral is needed.  She leet me know that [facility] told her she needed and insurance referral for her secondary

## 2023-09-21 ENCOUNTER — TELEMEDICINE (OUTPATIENT)
Dept: PSYCHIATRY | Facility: CLINIC | Age: 53
End: 2023-09-21
Payer: COMMERCIAL

## 2023-09-21 DIAGNOSIS — F31.81 BIPOLAR 2 DISORDER (HCC): ICD-10-CM

## 2023-09-21 DIAGNOSIS — F41.1 GAD (GENERALIZED ANXIETY DISORDER): Primary | ICD-10-CM

## 2023-09-21 PROCEDURE — 90837 PSYTX W PT 60 MINUTES: CPT

## 2023-09-21 NOTE — PSYCH
Virtual Regular Visit    Verification of patient location:    Patient is located at Home in the following state in which I hold an active license [place]      Assessment/Plan:    Problem List Items Addressed This Visit        Other    ZAINA (generalized anxiety disorder) - Primary    Bipolar 2 disorder ([address])       Goals addressed in session: Goal 1          Reason for visit is No chief complaint on file. Encounter provider Gabriella Malcolm LCSW    Provider located at [address] [place] [id number] [phone]      Recent Visits  Date Type Provider Dept   09/14/23 Telephone [place], [facility]   09/14/23 Telemedicine Gabriella Malcolm LCSW [facility]   Showing recent visits within past 7 days and meeting all other requirements  Today's Visits  Date Type Provider Dept   09/21/23 Telemedicine Gabriella Malcolm LCSW [facility]   Showing today's visits and meeting all other requirements  Future Appointments  No visits were found meeting these conditions. Showing future appointments within next 150 days and meeting all other requirements       The patient was identified by name and date of birth. Venus England was informed that this is a telemedicine visit and that the visit is being conducted through[employer] platform. She agrees to proceed. .  My office door was closed. No one else was in the room. She acknowledged consent and understanding of privacy and security of the video platform. The patient has agreed to participate and understands they can discontinue the visit at any time. Patient is aware this is a billable service. Subjective  Venus England is a 47 y.o. female.       HPI     Past Medical History:   Diagnosis Date   • Anxiety    • Anxiety    • Asthma    • Bipolar disorder ([address])    • Carpal tunnel syndrome    • Chronic pain     lower back   • Depression    • Disease of thyroid gland    • Dyslipidemia    • Fibromyalgia    • Irritable bowel    • Kidney stones    • Kidney stones 2019   • Migraine    • Obesity (BMI 30.0-34. 9)    • Psychiatric disorder     depression/anxiety   • Suicide attempt [facility])     as teen   • Vitamin D deficiency        Past Surgical History:   Procedure Laterality Date   • BUNIONECTOMY     •  SECTION     • CHOLECYSTECTOMY     • PARTIAL HYSTERECTOMY      portion of  uterus still present   • TONSILLECTOMY     • TUBAL LIGATION         Current Outpatient Medications   Medication Sig Dispense Refill   • albuterol (PROVENTIL HFA,VENTOLIN HFA) 90 mcg/act inhaler INHALE TWO PUFFS EVERY 6 (SIX) HOURS AS NEEDED FOR WHEEZING OR SHORTNESS OF BREATH 18 g 0   • [place] Thyroid 60 MG tablet Take one and 1/2 tabs daily 135 tablet 2   • azelastine (ASTELIN) 0.1 % nasal spray 1 SPRAY INTO EACH NOSTRIL TWO (TWO) TIMES A DAY USE IN EACH NOSTRIL AS DIRECTED 30 mL 2   • Blood Glucose Monitoring Suppl (OneTouch Verio Reflect) w/Device KIT Check blood sugars once daily. Please substitute with appropriate alternative as covered by patient's insurance. Dx: E11.65 1 kit 0   • butalbital-acetaminophen-caffeine (FIORICET,ESGIC) -40 mg per tablet Take 1 tablet by mouth every 6 (six) hours as needed for headaches 30 tablet 0   • celecoxib (CELEBREX) 200 mg capsule Take 1 capsule (200 mg total) by mouth daily 30 capsule 2   • dicyclomine (BENTYL) 10 mg capsule Take 1 capsule (10 mg total) by mouth 3 (three) times a day as needed (abd cramping) 30 capsule 0   • divalproex sodium (DEPAKOTE ER) 250 mg 24 hr tablet      • fluticasone (FLONASE) 50 mcg/act nasal spray INHALE TWO PUFFS TWO (TWO) TIMES A DAY RINSE MOUTH AFTER USE. 16 g 0   • fluticasone (Flovent Diskus) 50 mcg/actuation diskus inhaler Inhale 1 puff 2 (two) times a day Rinse mouth after use.  60 blister 0   • fremanezumab-vfrm (Ajovy) 225 MG/1.5ML auto-injector Inject 225 mg under the skin every 30 (thirty) days     • glucose blood (OneTouch Verio) test strip Check blood sugars once daily. Please substitute with appropriate alternative as covered by patient's insurance. Dx: E11.65 100 each 3   • ipratropium-albuterol (DUO-NEB) 0.5-2.5 mg/3 mL nebulizer solution Take 3 mL by nebulization 4 (four) times a day 90 mL 1   • ketoconazole (NIZORAL) 2 % cream      • levocetirizine (XYZAL) 5 MG tablet Take 1 tablet (5 mg total) by mouth every evening 90 tablet 1   • lidocaine (LIDODERM) 5 % Apply 1 patch topically daily as needed (back pain) Remove & Discard patch within 12 hours or as directed by MD 6 patch 1   • meclizine (ANTIVERT) 25 mg tablet Take 1 tablet (25 mg total) by mouth every 8 (eight) hours as needed for dizziness 30 tablet 0   • meloxicam (MOBIC) 15 mg tablet TAKE 1 TABLET (15 MG TOTAL) BY MOUTH DAILY (Patient not taking: Reported on 9/7/2023) 30 tablet 0   • methotrexate 2.5 mg tablet      • methylPREDNISolone 4 MG tablet therapy pack Use as directed on package 21 each 0   • mometasone (ELOCON) 0.1 % cream Apply topically daily 45 g 0   • naratriptan (AMERGE) 2.5 MG tablet      • omeprazole (PriLOSEC) 40 MG capsule TAKE 1 CAPSULE (40 MG TOTAL) BY MOUTH DAILY 90 capsule 0   • ondansetron (ZOFRAN-ODT) 4 mg disintegrating tablet TAKE 1 TABLET (4 MG TOTAL) BY MOUTH EVERY EIGHT (EIGHT) HOURS AS NEEDED FOR NAUSEA 20 tablet 0   • OneTouch Delica Lancets 97B MISC Check blood sugars once daily. Please substitute with appropriate alternative as covered by patient's insurance.  Dx: E11.65 100 each 3   • Ozempic, 1 MG/DOSE, 4 MG/3ML injection pen INJECT 0.75 ML (1 MG TOTAL) UNDER THE SKIN ONCE A WEEK 3 mL 0   • phentermine 15 MG capsule Take 1 capsule (15 mg total) by mouth every morning 30 capsule 0   • Respiratory Therapy Supplies (NEBULIZER) DONA by Does not apply route every 4 (four) hours while awake 90 each 1   • semaglutide, 0.25 or 0.5 mg/dose, (Ozempic, 0.25 or 0.5 MG/DOSE,) 2 mg/3 mL injection pen Inject 0.75 mL (0.5 mg total) under the skin every 7 days 9 mL 1   • Syringe/Needle, Disp, (SYRINGE 3CC/71TU5-3/2") 20G X 1-1/2" 3 ML MISC Use once a week With b12 injection 7 each 0   • tiZANidine (ZANAFLEX) 4 mg tablet      • Topiramate  MG CP24 Take 100 mg by mouth 2 (two) times a day     • Ubrelvy 100 MG tablet      • Vraylar 4.5 MG capsule TAKE 1 CAPSULE (4.5 MG TOTAL) BY MOUTH DAILY INSERT IN THE MORNING INCREASING THE DOSE 30 capsule 0   • XIFAXAN 550 MG tablet        Current Facility-Administered Medications   Medication Dose Route Frequency Provider Last Rate Last Admin   • cyanocobalamin injection 1,000 mcg  1,000 mcg Intramuscular Q30 Days ERICKA Alejandra   1,000 mcg at 07/27/23 1025        Allergies   Allergen Reactions   • Doxycycline Rash   • Erythromycin Rash   • Other Edema and Wheezing     jalapeno   • Augmentin [Amoxicillin-Pot Clavulanate] GI Intolerance   • Latex Rash   • Duloxetine      Other reaction(s): Nausea, Loopy   • Eletriptan      Pain in lower jaw with pressure in throat   • Emgality [Galcanezumab-Gnlm]    • Galcanezumab      rash       Review of Systems    Video Exam    There were no vitals filed for this visit. Physical Exam     Behavioral Health Psychotherapy Progress Note    Psychotherapy Provided: Individual Psychotherapy      Diagnosis ICD-10-CM Associated Orders   1. ZAINA (generalized anxiety disorder)  F41.1       2. Bipolar 2 disorder ([facility])  F31.81            Goals addressed in session: Goal 1     DATA: Met with Emma Dumont for a scheduled individual session. Topics discussed included emotional wellness, coping skills, family stressors, relationships with family and physical health concerns. Bashir Bro is feeling "tired". She stated she felt better when she was getting b12 shots but they have since stopped and she's feeling drained. She's gone to the doctor and is getting started with pain management. She stated it's been tough trying to get sleep because her 10year-old grandson keeps coming into her room at night and sleeping in her bed. She said he seems extra concerned about her, even saying at times that he doesn't want her to leave him. Ana Cristina Sesay reassures him that she's not going anywhere and isn't sure what set this into motion. She's going to talk to his therapy team next week to try and get some feedback on how to help him through this. Ana Cristina Sesay stated she has a lot of anger toward her daughter, her grandson's mother, and how she didn't do right by her grandson and continues to not do right by him. Ana Cristina Sesay shows evidence of utilizing effective communication skills and maintaining emotional composure to manage mental health symptoms. During this session, this clinical used the following therapeutic modalities supportive psychotherapy, client-centered therapy and stress management skills. Substance Abuse was not addressed during this session. If the client is diagnosed with a co-occurring substance use disorder, please indicate any changes in the frequency or amount of use: N/A. Stage of change for addressing substance use diagnoses: No substance use/Not applicable    ASSESSMENT:  Ana Cristina Sesay presents with a Euthymic/ normal mood. her affect is normal range and intensity, appropriate, which is congruent, with her  mood and the content of the session. Ana Cristina Sesay was oriented x3. She was focused and engaged. Ana Cristina Sesay exhibits good therapeutic rapport with this clinician. The client has made progress on their goals. Carolina Fraire presents with a none risk of suicide, none risk of self-harm, and none risk of harm to others. For any risk assessment that surpasses a "low" rating, a safety plan must be developed. A safety plan was indicated: no  If yes, describe in detail: N/A    PLAN: Ana Cristina Sesay will return in 1 week for the next scheduled session.   At the next session, the therapist will use engagement strategies, supportive psychotherapy and client-centered therapy to address her anxiety. Behavioral Health Treatment Plan and Discharge Planning: Maria Eugenia Edwards is aware of and agrees to continue to work on their treatment plan. They have identified and are working toward their discharge goals.  yes    Visit start and stop times:    09/21/23  Start Time: 1001  Stop Time: 1056  Total Visit Time: 55 minutes

## 2023-09-26 ENCOUNTER — TELEPHONE (OUTPATIENT)
Dept: FAMILY MEDICINE CLINIC | Facility: CLINIC | Age: 53
End: 2023-09-26

## 2023-09-26 ENCOUNTER — APPOINTMENT (EMERGENCY)
Dept: RADIOLOGY | Facility: HOSPITAL | Age: 53
End: 2023-09-26
Payer: COMMERCIAL

## 2023-09-26 ENCOUNTER — HOSPITAL ENCOUNTER (EMERGENCY)
Facility: HOSPITAL | Age: 53
Discharge: HOME | End: 2023-09-26
Attending: EMERGENCY MEDICINE
Payer: COMMERCIAL

## 2023-09-26 VITALS
RESPIRATION RATE: 18 BRPM | BODY MASS INDEX: 26.95 KG/M2 | OXYGEN SATURATION: 100 % | TEMPERATURE: 97.5 F | SYSTOLIC BLOOD PRESSURE: 117 MMHG | WEIGHT: 157 LBS | DIASTOLIC BLOOD PRESSURE: 81 MMHG | HEART RATE: 78 BPM

## 2023-09-26 DIAGNOSIS — R07.89 ATYPICAL CHEST PAIN: Primary | ICD-10-CM

## 2023-09-26 LAB
ALBUMIN SERPL BCP-MCNC: 4.7 G/DL (ref 3.5–5)
ALP SERPL-CCNC: 70 U/L (ref 34–104)
ALT SERPL W P-5'-P-CCNC: 16 U/L (ref 7–52)
ANION GAP SERPL CALCULATED.3IONS-SCNC: 6 MMOL/L
AST SERPL W P-5'-P-CCNC: 18 U/L (ref 13–39)
ATRIAL RATE: 75 BPM
BASOPHILS # BLD AUTO: 0.01 THOUSANDS/ÂΜL (ref 0–0.1)
BASOPHILS NFR BLD AUTO: 0 % (ref 0–1)
BILIRUB SERPL-MCNC: 0.48 MG/DL (ref 0.2–1)
BUN SERPL-MCNC: 19 MG/DL (ref 5–25)
CALCIUM SERPL-MCNC: 9.8 MG/DL (ref 8.4–10.2)
CARDIAC TROPONIN I PNL SERPL HS: 2 NG/L
CHLORIDE SERPL-SCNC: 108 MMOL/L (ref 96–108)
CO2 SERPL-SCNC: 27 MMOL/L (ref 21–32)
CREAT SERPL-MCNC: 0.89 MG/DL (ref 0.6–1.3)
D DIMER PPP FEU-MCNC: <0.27 UG/ML FEU
EOSINOPHIL # BLD AUTO: 0.09 THOUSAND/ÂΜL (ref 0–0.61)
EOSINOPHIL NFR BLD AUTO: 2 % (ref 0–6)
ERYTHROCYTE [DISTWIDTH] IN BLOOD BY AUTOMATED COUNT: 12.2 % (ref 11.6–15.1)
GFR SERPL CREATININE-BSD FRML MDRD: 74 ML/MIN/1.73SQ M
GLUCOSE SERPL-MCNC: 107 MG/DL (ref 65–140)
HCT VFR BLD AUTO: 41 % (ref 34.8–46.1)
HGB BLD-MCNC: 13.5 G/DL (ref 11.5–15.4)
IMM GRANULOCYTES # BLD AUTO: 0 THOUSAND/UL (ref 0–0.2)
IMM GRANULOCYTES NFR BLD AUTO: 0 % (ref 0–2)
LYMPHOCYTES # BLD AUTO: 1.88 THOUSANDS/ÂΜL (ref 0.6–4.47)
LYMPHOCYTES NFR BLD AUTO: 46 % (ref 14–44)
MCH RBC QN AUTO: 29.3 PG (ref 26.8–34.3)
MCHC RBC AUTO-ENTMCNC: 32.9 G/DL (ref 31.4–37.4)
MCV RBC AUTO: 89 FL (ref 82–98)
MONOCYTES # BLD AUTO: 0.19 THOUSAND/ÂΜL (ref 0.17–1.22)
MONOCYTES NFR BLD AUTO: 5 % (ref 4–12)
NEUTROPHILS # BLD AUTO: 1.89 THOUSANDS/ÂΜL (ref 1.85–7.62)
NEUTS SEG NFR BLD AUTO: 47 % (ref 43–75)
NRBC BLD AUTO-RTO: 0 /100 WBCS
P AXIS: 54 DEGREES
PLATELET # BLD AUTO: 242 THOUSANDS/UL (ref 149–390)
PMV BLD AUTO: 9.4 FL (ref 8.9–12.7)
POTASSIUM SERPL-SCNC: 3.6 MMOL/L (ref 3.5–5.3)
PR INTERVAL: 186 MS
PROT SERPL-MCNC: 7.9 G/DL (ref 6.4–8.4)
QRS AXIS: 72 DEGREES
QRSD INTERVAL: 84 MS
QT INTERVAL: 392 MS
QTC INTERVAL: 437 MS
RBC # BLD AUTO: 4.6 MILLION/UL (ref 3.81–5.12)
SODIUM SERPL-SCNC: 141 MMOL/L (ref 135–147)
T WAVE AXIS: 60 DEGREES
VENTRICULAR RATE: 75 BPM
WBC # BLD AUTO: 4.06 THOUSAND/UL (ref 4.31–10.16)

## 2023-09-26 PROCEDURE — 36415 COLL VENOUS BLD VENIPUNCTURE: CPT

## 2023-09-26 PROCEDURE — 85379 FIBRIN DEGRADATION QUANT: CPT

## 2023-09-26 PROCEDURE — 71046 X-RAY EXAM CHEST 2 VIEWS: CPT

## 2023-09-26 PROCEDURE — 85025 COMPLETE CBC W/AUTO DIFF WBC: CPT

## 2023-09-26 PROCEDURE — 99285 EMERGENCY DEPT VISIT HI MDM: CPT

## 2023-09-26 PROCEDURE — 93010 ELECTROCARDIOGRAM REPORT: CPT | Performed by: INTERNAL MEDICINE

## 2023-09-26 PROCEDURE — 93005 ELECTROCARDIOGRAM TRACING: CPT

## 2023-09-26 PROCEDURE — 84484 ASSAY OF TROPONIN QUANT: CPT

## 2023-09-26 PROCEDURE — 80053 COMPREHEN METABOLIC PANEL: CPT

## 2023-09-26 NOTE — TELEPHONE ENCOUNTER
For any patient who has a new onset of A-fib with symptoms I would suggest to go to emergency room-thank you

## 2023-09-26 NOTE — TELEPHONE ENCOUNTER
Spoke with patient and she was discussing her symptoms with her sister who is in the medical field and the suggested to her she might have Afib. She said this morning her HR was racing after standing up and she feels very fatigued.  She said when this is happening she gets dizzy, and her hearst starts racing and she feels hot

## 2023-09-26 NOTE — TELEPHONE ENCOUNTER
Patient called to schedule an appointment with [place]. Based on her symptoms of lightheadedness, dizziness, some difficulty breathing at times, and chest pain since Saturday, she was advised to go to the ER. Patient stated she would rather come see [place] but I again advised her to go to the ER. Statement Selected

## 2023-09-26 NOTE — DISCHARGE INSTRUCTIONS
Follow up with PCP  Follow up with Cardiology  Return to the ED with new or worsening symptoms including but not limited to chest pain, shortness of breath, difficulty breathing

## 2023-09-26 NOTE — ED PROVIDER NOTES
History  Chief Complaint   Patient presents with   • Chest Pain     Pt was sent from her PCP after having chest pain on and off since Saturday , denies N/V but reports some SOB     Patient is a 21-year-old female with a past medical history of anxiety, asthma, bipolar, depression, thyroid disease, fibromyalgia, rheumatoid arthritis, vitamin D deficiency presenting to the emergency department for evaluation of palpitations and chest pain. Patient states she has been having intermittent chest pain and palpitations since Saturday. Patient reports she is currently asymptomatic but states she talked to her sister-in-law who was concerned she might be in A-fib. Patient states when she gets the palpitations she does feel lightheaded. Patient reports when she has palpitations she feels intermittently short of breath but denies shortness of breath at rest or without the palpitations or pain. Denies fevers, chills, rash, headache, weakness, dizziness, visual changes, abdominal pain, nausea, vomiting, diarrhea, constipation, shortness of breath or difficulty breathing. Does not offer any other concerns or complaints. Prior to Admission Medications   Prescriptions Last Dose Informant Patient Reported? Taking? [place] Thyroid 60 MG tablet   No No   Sig: Take one and 1/2 tabs daily   Blood Glucose Monitoring Suppl (OneTouch Verio Reflect) w/Device KIT   No No   Sig: Check blood sugars once daily. Please substitute with appropriate alternative as covered by patient's insurance. Dx: B77.80   OneTouch Delica Lancets 32R MISC   No No   Sig: Check blood sugars once daily. Please substitute with appropriate alternative as covered by patient's insurance.  Dx: E11.65   Ozempic, 1 MG/DOSE, 4 MG/3ML injection pen   No No   Sig: INJECT 0.75 ML (1 MG TOTAL) UNDER THE SKIN ONCE A WEEK   Respiratory Therapy Supplies (NEBULIZER) DONA  Self No No   Sig: by Does not apply route every 4 (four) hours while awake   Syringe/Needle, Disp, (SYRINGE 3CC/69XW8-7/2") 20G X 1-2" 3 ML MISC   No No   Sig: Use once a week With b12 injection   Topiramate  MG CP24  Self Yes No   Sig: Take 100 mg by mouth 2 (two) times a day   Ubrelvy 100 MG tablet  Self Yes No   Vraylar 4.5 MG capsule   No No   Sig: TAKE 1 CAPSULE (4.5 MG TOTAL) BY MOUTH DAILY INSERT IN THE MORNING INCREASING THE DOSE   XIFAXAN 550 MG tablet  Self Yes No   albuterol (PROVENTIL HFA,VENTOLIN HFA) 90 mcg/act inhaler   No No   Sig: INHALE TWO PUFFS EVERY 6 (SIX) HOURS AS NEEDED FOR WHEEZING OR SHORTNESS OF BREATH   azelastine (ASTELIN) 0.1 % nasal spray   No No   Si SPRAY INTO EACH NOSTRIL TWO (TWO) TIMES A DAY USE IN EACH NOSTRIL AS DIRECTED   butalbital-acetaminophen-caffeine (FIORICET,ESGIC) -40 mg per tablet   No No   Sig: Take 1 tablet by mouth every 6 (six) hours as needed for headaches   celecoxib (CELEBREX) 200 mg capsule  Self No No   Sig: Take 1 capsule (200 mg total) by mouth daily   dicyclomine (BENTYL) 10 mg capsule   No No   Sig: Take 1 capsule (10 mg total) by mouth 3 (three) times a day as needed (abd cramping)   divalproex sodium (DEPAKOTE ER) 250 mg 24 hr tablet  Self Yes No   fluticasone (FLONASE) 50 mcg/act nasal spray   No No   Sig: INHALE TWO PUFFS TWO (TWO) TIMES A DAY RINSE MOUTH AFTER USE.   fluticasone (Flovent Diskus) 50 mcg/actuation diskus inhaler   No No   Sig: Inhale 1 puff 2 (two) times a day Rinse mouth after use. fremanezumab-vfrm (Ajovy) 225 MG/1.5ML auto-injector  Self Yes No   Sig: Inject 225 mg under the skin every 30 (thirty) days   glucose blood (OneTouch Verio) test strip   No No   Sig: Check blood sugars once daily. Please substitute with appropriate alternative as covered by patient's insurance.  Dx: E11.65   ipratropium-albuterol (DUO-NEB) 0.5-2.5 mg/3 mL nebulizer solution   No No   Sig: Take 3 mL by nebulization 4 (four) times a day   ketoconazole (NIZORAL) 2 % cream  Self Yes No   levocetirizine (XYZAL) 5 MG tablet   No No Sig: Take 1 tablet (5 mg total) by mouth every evening   lidocaine (LIDODERM) 5 %  Self No No   Sig: Apply 1 patch topically daily as needed (back pain) Remove & Discard patch within 12 hours or as directed by MD   meclizine (ANTIVERT) 25 mg tablet   No No   Sig: Take 1 tablet (25 mg total) by mouth every 8 (eight) hours as needed for dizziness   meloxicam (MOBIC) 15 mg tablet  Self No No   Sig: TAKE 1 TABLET (15 MG TOTAL) BY MOUTH DAILY   Patient not taking: Reported on 9/7/2023   methotrexate 2.5 mg tablet   Yes No   methylPREDNISolone 4 MG tablet therapy pack   No No   Sig: Use as directed on package   mometasone (ELOCON) 0.1 % cream  Self No No   Sig: Apply topically daily   naratriptan (AMERGE) 2.5 MG tablet  Self Yes No   omeprazole (PriLOSEC) 40 MG capsule   No No   Sig: TAKE 1 CAPSULE (40 MG TOTAL) BY MOUTH DAILY   ondansetron (ZOFRAN-ODT) 4 mg disintegrating tablet   No No   Sig: TAKE 1 TABLET (4 MG TOTAL) BY MOUTH EVERY EIGHT (EIGHT) HOURS AS NEEDED FOR NAUSEA   phentermine 15 MG capsule   No No   Sig: Take 1 capsule (15 mg total) by mouth every morning   semaglutide, 0.25 or 0.5 mg/dose, (Ozempic, 0.25 or 0.5 MG/DOSE,) 2 mg/3 mL injection pen   No No   Sig: Inject 0.75 mL (0.5 mg total) under the skin every 7 days   tiZANidine (ZANAFLEX) 4 mg tablet  Self Yes No      Facility-Administered Medications Last Administration Doses Remaining   cyanocobalamin injection 1,000 mcg 7/27/2023 10:25 AM           Past Medical History:   Diagnosis Date   • Anxiety    • Anxiety    • Asthma    • Bipolar disorder (HCC)    • Carpal tunnel syndrome    • Chronic pain     lower back   • Depression    • Disease of thyroid gland    • Dyslipidemia    • Fibromyalgia    • Irritable bowel    • Kidney stones    • Kidney stones 5/20/2019   • Migraine    • Obesity (BMI 30.0-34. 9)    • Psychiatric disorder     depression/anxiety   • Suicide attempt [facility])     as teen   • Vitamin D deficiency        Past Surgical History:   Procedure Laterality Date   • BUNIONECTOMY     •  SECTION     • CHOLECYSTECTOMY     • PARTIAL HYSTERECTOMY      portion of  uterus still present   • TONSILLECTOMY     • TUBAL LIGATION         Family History   Problem Relation Age of Onset   • Hypertension Mother    • Diabetes Mother    • Hypothyroidism Mother    • Stroke Mother    • Bipolar disorder Mother    • Anxiety disorder Mother    • Arthritis Mother    • Depression Mother    • Mental illness Mother    • Cancer Father         pancreatic    • Hypothyroidism Sister    • Hypertension Brother    • Heart disease Brother    • Cancer Maternal Uncle         lung   • Cancer Paternal Aunt         breast   • Breast cancer Paternal Aunt    • Cancer Paternal Uncle         testicular    • Cancer Paternal Grandmother         breast   • Dementia Paternal Grandmother    • Breast cancer Paternal Grandmother    • Cancer Cousin         brain   • Bipolar disorder Daughter      I have reviewed and agree with the history as documented. E-Cigarette/Vaping   • E-Cigarette Use Never User      E-Cigarette/Vaping Substances   • Nicotine No    • THC No    • CBD No    • Flavoring No    • Other No    • Unknown No      Social History     Tobacco Use   • Smoking status: Never   • Smokeless tobacco: Never   Vaping Use   • Vaping Use: Never used   Substance Use Topics   • Alcohol use: No   • Drug use: Never       Review of Systems   Constitutional: Negative for chills and fever. HENT: Negative for ear pain and sore throat. Eyes: Negative for pain and visual disturbance. Respiratory: Positive for shortness of breath. Negative for cough. Cardiovascular: Positive for chest pain and palpitations. Gastrointestinal: Negative for abdominal pain, constipation, diarrhea, nausea and vomiting. Genitourinary: Negative for dysuria and hematuria. Musculoskeletal: Negative for arthralgias and back pain. Skin: Negative for color change and rash.    Neurological: Negative for seizures and syncope. All other systems reviewed and are negative. Physical Exam  Physical Exam  Vitals and nursing note reviewed. Constitutional:       General: She is not in acute distress. Appearance: Normal appearance. She is well-developed. She is not toxic-appearing or diaphoretic. HENT:      Head: Normocephalic and atraumatic. Right Ear: External ear normal.      Left Ear: External ear normal.      Nose: Nose normal.      Mouth/Throat:      Mouth: Mucous membranes are moist.   Eyes:      General: No scleral icterus. Right eye: No discharge. Left eye: No discharge. Conjunctiva/sclera: Conjunctivae normal.   Cardiovascular:      Rate and Rhythm: Normal rate and regular rhythm. Heart sounds: No murmur heard. Pulmonary:      Effort: Pulmonary effort is normal. No respiratory distress. Breath sounds: Normal breath sounds. No decreased breath sounds, wheezing, rhonchi or rales. Chest:      Chest wall: No tenderness. Abdominal:      Palpations: Abdomen is soft. Tenderness: There is no abdominal tenderness. Musculoskeletal:         General: No swelling, deformity or signs of injury. Normal range of motion. Cervical back: Normal range of motion and neck supple. No rigidity. Skin:     General: Skin is warm and dry. Capillary Refill: Capillary refill takes less than 2 seconds. Coloration: Skin is not jaundiced. Findings: No erythema or rash. Neurological:      General: No focal deficit present. Mental Status: She is alert and oriented to person, place, and time. Mental status is at baseline. Cranial Nerves: No cranial nerve deficit. Gait: Gait normal.   Psychiatric:         Mood and Affect: Mood normal.         Behavior: Behavior normal.         Thought Content:  Thought content normal.         Judgment: Judgment normal.         Vital Signs  ED Triage Vitals   Temperature Pulse Respirations Blood Pressure SpO2   09/26/23 1053 09/26/23 1053 09/26/23 1053 09/26/23 1053 09/26/23 1053   97.5 °F (36.4 °C) 75 18 141/58 100 %      Temp src Heart Rate Source Patient Position - Orthostatic VS BP Location FiO2 (%)   -- 09/26/23 1330 09/26/23 1330 09/26/23 1330 --    Monitor Lying Right arm       Pain Score       --                  Vitals:    09/26/23 1053 09/26/23 1330 09/26/23 1400   BP: 141/58 109/73 117/81   Pulse: 75 82 78   Patient Position - Orthostatic VS:  Lying          Visual Acuity      ED Medications  Medications - No data to display    Diagnostic Studies  Results Reviewed     Procedure Component Value Units Date/Time    HS Troponin 0hr (reflex protocol) [[id number]]  (Normal) Collected: 09/26/23 1306    Lab Status: Final result Specimen: Blood from Arm, Right Updated: 09/26/23 1338     hs TnI 0hr 2 ng/L     HS Troponin I 2hr [[id number]]     Lab Status: No result Specimen: Blood     Comprehensive metabolic panel [[id number]] Collected: 09/26/23 1306    Lab Status: Final result Specimen: Blood from Arm, Right Updated: 09/26/23 1335     Sodium 141 mmol/L      Potassium 3.6 mmol/L      Chloride 108 mmol/L      CO2 27 mmol/L      ANION GAP 6 mmol/L      BUN 19 mg/dL      Creatinine 0.89 mg/dL      Glucose 107 mg/dL      Calcium 9.8 mg/dL      AST 18 U/L      ALT 16 U/L      Alkaline Phosphatase 70 U/L      Total Protein 7.9 g/dL      Albumin 4.7 g/dL      Total Bilirubin 0.48 mg/dL      eGFR 74 ml/min/1.73sq m     Narrative:      [place] guidelines for Chronic Kidney Disease (CKD):   •  Stage 1 with normal or high GFR (GFR > 90 mL/min/1.73 square meters)  •  Stage 2 Mild CKD (GFR = 60-89 mL/min/1.73 square meters)  •  Stage 3A Moderate CKD (GFR = 45-59 mL/min/1.73 square meters)  •  Stage 3B Moderate CKD (GFR = 30-44 mL/min/1.73 square meters)  •  Stage 4 Severe CKD (GFR = 15-29 mL/min/1.73 square meters)  •  Stage 5 End Stage CKD (GFR <15 mL/min/1.73 square meters)  Note: GFR calculation is accurate only with a steady state creatinine    D-Dimer [[id number]]  (Normal) Collected: 09/26/23 1306    Lab Status: Final result Specimen: Blood from Arm, Right Updated: 09/26/23 1331     D-Dimer, Quant <0.27 ug/ml FEU     Narrative: In the evaluation for possible pulmonary embolism, in the appropriate (Well's Score of 4 or less) patient, the age adjusted d-dimer cutoff for this patient can be calculated as:    Age x 0.01 (in ug/mL) for Age-adjusted D-dimer exclusion threshold for a patient over 50 years. CBC and differential [[id number]]  (Abnormal) Collected: 09/26/23 1306    Lab Status: Final result Specimen: Blood from Arm, Right Updated: 09/26/23 1316     WBC 4.06 Thousand/uL      RBC 4.60 Million/uL      Hemoglobin 13.5 g/dL      Hematocrit 41.0 %      MCV 89 fL      MCH 29.3 pg      MCHC 32.9 g/dL      RDW 12.2 %      MPV 9.4 fL      Platelets 400 Thousands/uL      nRBC 0 /100 WBCs      Neutrophils Relative 47 %      Immat GRANS % 0 %      Lymphocytes Relative 46 %      Monocytes Relative 5 %      Eosinophils Relative 2 %      Basophils Relative 0 %      Neutrophils Absolute 1.89 Thousands/µL      Immature Grans Absolute 0.00 Thousand/uL      Lymphocytes Absolute 1.88 Thousands/µL      Monocytes Absolute 0.19 Thousand/µL      Eosinophils Absolute 0.09 Thousand/µL      Basophils Absolute 0.01 Thousands/µL                  XR chest 2 views   Final Result by Meredith Stephens MD (09/26 0207)      No acute cardiopulmonary disease.                   Workstation performed: [id number]                    Procedures  ECG 12 Lead Documentation Only    Date/Time: 9/26/2023 10:58 AM    Performed by: Tatiana Manjarrez PA-C  Authorized by: Tatiana Manjarrez PA-C    Indications / Diagnosis:  Chest Pain  ECG reviewed by me, the ED Provider: yes    Patient location:  ED  Previous ECG:     Previous ECG:  Compared to current    Similarity:  No change  Interpretation:     Interpretation: normal    Rate:     ECG rate:  75    ECG rate assessment: normal    Rhythm:     Rhythm: sinus rhythm    Ectopy:     Ectopy: none    QRS:     QRS axis:  Normal    QRS intervals:  Normal  Conduction:     Conduction: normal    ST segments:     ST segments:  Normal  T waves:     T waves: normal               ED Course             HEART Risk Score    Flowsheet Row Most Recent Value   Heart Score Risk Calculator    History 1 Filed at: 09/26/2023 1425   ECG 0 Filed at: 09/26/2023 1425   Age 1 Filed at: 09/26/2023 1425   Risk Factors 1 Filed at: 09/26/2023 1425   Troponin 0 Filed at: 09/26/2023 1425   HEART Score 3 Filed at: 09/26/2023 1425                        SBIRT 20yo+    Flowsheet Row Most Recent Value   Initial Alcohol Screen: US AUDIT-C     1. How often do you have a drink containing alcohol? 0 Filed at: 09/26/2023 1056   2. How many drinks containing alcohol do you have on a typical day you are drinking? 0 Filed at: 09/26/2023 1056   3b. FEMALE Any Age, or MALE 65+: How often do you have 4 or more drinks on one occassion? 0 Filed at: 09/26/2023 1056   Audit-C Score 0 Filed at: 09/26/2023 1056   KISHAN: How many times in the past year have you. .. Used an illegal drug or used a prescription medication for non-medical reasons? Never Filed at: 09/26/2023 1056                    Medical Decision Making    This is a 69-year-old female present to the emergency department for evaluation of chest pain and palpitations. Patient states she has had symptoms intermittently since Saturday. Patient states she spoke with her sister-in-law who is concerned she might have A-fib. Patient states she is asymptomatic on presentation. Patient states when she does get palpitations she feels slightly short of breath with associated lightheadedness. Patient is in no acute distress on initial examination, stable vital signs.     Differential diagnosis to include but is not limited to: ACS, STEMI, arrhythmia, pneumonia, PE, anxiety    Initial ED Plan: imaging, labs, ekg    ED results:  No acute cardiopulmonary disease on chest x-ray  0 hour troponin: 2  Heart score: 3    Final ED assessment: Patient is stable and well appearing. Discussed radiologic studies and laboratory results. Discussed follow-up with PCP and cardiology. Strict return precautions were discussed including but not limited to chest pain, shortness of breath, difficulty breathing. Patient verbalized understanding and is agreeable with the plan for discharge. Amount and/or Complexity of Data Reviewed  Labs: ordered. Radiology: ordered. Disposition  Final diagnoses:   Atypical chest pain     Time reflects when diagnosis was documented in both MDM as applicable and the Disposition within this note     Time User Action Codes Description Comment    9/26/2023  2:08 PM Heath Scottchris Add [R07.89] Atypical chest pain       ED Disposition     ED Disposition   Discharge    Condition   Stable    Date/Time   Tue Sep 26, 2023  2:08 PM    [address] discharge to home/self care.                Follow-up Information     Follow up With Specialties Details Why Contact Info Additional Information    ERICKA Novak Nurse Practitioner Call in 3 days For follow up [phone]       [facility] Emergency Department Emergency Medicine Go to  If symptoms worsen [address] [facility] [id number] [address] [facility] Emergency Department, [place], [place], [address] [facility] [address] [facility] Call in 3 days For follow up [address] [id number]  [address] [facility] [address] [facility], [address] [place] [address], [address] [facility], [place], [address] [phone]          Discharge Medication List as of 9/26/2023  2:08 PM      CONTINUE these medications which have NOT CHANGED    Details   albuterol (PROVENTIL HFA,VENTOLIN HFA) 90 mcg/act inhaler INHALE TWO PUFFS EVERY 6 (SIX) HOURS AS NEEDED FOR WHEEZING OR SHORTNESS OF BREATH, Normal      [place] Thyroid 60 MG tablet Take one and 1/2 tabs daily, Normal      azelastine (ASTELIN) 0.1 % nasal spray 1 SPRAY INTO EACH NOSTRIL TWO (TWO) TIMES A DAY USE IN EACH NOSTRIL AS DIRECTED, Normal      Blood Glucose Monitoring Suppl (OneTouch Verio Reflect) w/Device KIT Check blood sugars once daily. Please substitute with appropriate alternative as covered by patient's insurance. Dx: E11.65, Print      butalbital-acetaminophen-caffeine (FIORICET,ESGIC) -40 mg per tablet Take 1 tablet by mouth every 6 (six) hours as needed for headaches, Starting Thu 8/24/2023, Print      celecoxib (CELEBREX) 200 mg capsule Take 1 capsule (200 mg total) by mouth daily, Starting Tue 10/23/2018, Normal      dicyclomine (BENTYL) 10 mg capsule Take 1 capsule (10 mg total) by mouth 3 (three) times a day as needed (abd cramping), Starting Wed 5/24/2023, Normal      divalproex sodium (DEPAKOTE ER) 250 mg 24 hr tablet Starting Wed 8/3/2022, Historical Med      fluticasone (FLONASE) 50 mcg/act nasal spray INHALE TWO PUFFS TWO (TWO) TIMES A DAY RINSE MOUTH AFTER USE., Normal      fluticasone (Flovent Diskus) 50 mcg/actuation diskus inhaler Inhale 1 puff 2 (two) times a day Rinse mouth after use., Starting Thu 9/7/2023, Until Sat 10/7/2023, Normal      fremanezumab-vfrm (Ajovy) 225 MG/1.5ML auto-injector Inject 225 mg under the skin every 30 (thirty) days, Starting Mon 10/26/2020, Historical Med      glucose blood (OneTouch Verio) test strip Check blood sugars once daily. Please substitute with appropriate alternative as covered by patient's insurance.  Dx: E11.65, Print      ipratropium-albuterol (DUO-NEB) 0.5-2.5 mg/3 mL nebulizer solution Take 3 mL by nebulization 4 (four) times a day, Starting Mon 5/8/2023, Normal      ketoconazole (NIZORAL) 2 % cream Starting Mon 10/24/2022, Historical Med      levocetirizine (XYZAL) 5 MG tablet Take 1 tablet (5 mg total) by mouth every evening, Starting Mon 5/8/2023, Normal      lidocaine (LIDODERM) 5 % Apply 1 patch topically daily as needed (back pain) Remove & Discard patch within 12 hours or as directed by MD, Starting Thu 5/6/2021, Print      meclizine (ANTIVERT) 25 mg tablet Take 1 tablet (25 mg total) by mouth every 8 (eight) hours as needed for dizziness, Starting Thu 8/24/2023, Normal      meloxicam (MOBIC) 15 mg tablet TAKE 1 TABLET (15 MG TOTAL) BY MOUTH DAILY, Starting Tue 3/21/2023, Normal      methotrexate 2.5 mg tablet Starting Mon 7/24/2023, Historical Med      methylPREDNISolone 4 MG tablet therapy pack Use as directed on package, Normal      mometasone (ELOCON) 0.1 % cream Apply topically daily, Starting Tue 4/13/2021, Normal      naratriptan (AMERGE) 2.5 MG tablet Starting Thu 4/28/2022, Historical Med      omeprazole (PriLOSEC) 40 MG capsule TAKE 1 CAPSULE (40 MG TOTAL) BY MOUTH DAILY, Starting Thu 8/3/2023, Normal      ondansetron (ZOFRAN-ODT) 4 mg disintegrating tablet TAKE 1 TABLET (4 MG TOTAL) BY MOUTH EVERY EIGHT (EIGHT) HOURS AS NEEDED FOR NAUSEA, Normal      OneTouch Delica Lancets 11O MISC Check blood sugars once daily. Please substitute with appropriate alternative as covered by patient's insurance.  Dx: E11.65, Print      Ozempic, 1 MG/DOSE, 4 MG/3ML injection pen INJECT 0.75 ML (1 MG TOTAL) UNDER THE SKIN ONCE A WEEK, Normal      phentermine 15 MG capsule Take 1 capsule (15 mg total) by mouth every morning, Starting Thu 8/24/2023, Normal      Respiratory Therapy Supplies (NEBULIZER) DONA by Does not apply route every 4 (four) hours while awake, Starting Mon 7/27/2020, Until Thu 9/7/2023, Normal      semaglutide, 0.25 or 0.5 mg/dose, (Ozempic, 0.25 or 0.5 MG/DOSE,) 2 mg/3 mL injection pen Inject 0.75 mL (0.5 mg total) under the skin every 7 days, Starting Wed 6/28/2023, Normal      Syringe/Needle, Disp, (SYRINGE 3CC/39SU0-2/2") 20G X 1-1/2" 3 ML MISC Use once a week With b12 injection, Starting Fri 7/28/2023, Normal      tiZANidine (ZANAFLEX) 4 mg tablet Starting Tue 1/24/2023, Historical Med      Topiramate  MG CP24 Take 100 mg by mouth 2 (two) times a day, Starting Tue 3/10/2020, Historical Med      Ubrelvy 100 MG tablet Historical Med      Vraylar 4.5 MG capsule TAKE 1 CAPSULE (4.5 MG TOTAL) BY MOUTH DAILY INSERT IN THE MORNING INCREASING THE DOSE, Normal      XIFAXAN 550 MG tablet Starting Wed 1/22/2020, Historical Med             No discharge procedures on file.     PDMP Review       Value Time User    PDMP Reviewed  Yes 8/24/2023  3:42 PM Юлия Doe [place]          ED Provider  Electronically Signed by           Aliza Arroyo PA-C  09/26/23 [id number]

## 2023-09-26 NOTE — TELEPHONE ENCOUNTER
I called the patient and made aware of the suggestions, she stated that she is fine as of now and will go to the emergency department if the symptoms of A-fib comes back.

## 2023-09-28 ENCOUNTER — TELEMEDICINE (OUTPATIENT)
Dept: PSYCHIATRY | Facility: CLINIC | Age: 53
End: 2023-09-28
Payer: COMMERCIAL

## 2023-09-28 DIAGNOSIS — F31.81 BIPOLAR 2 DISORDER (HCC): ICD-10-CM

## 2023-09-28 DIAGNOSIS — F41.1 GAD (GENERALIZED ANXIETY DISORDER): Primary | ICD-10-CM

## 2023-09-28 PROCEDURE — 90837 PSYTX W PT 60 MINUTES: CPT

## 2023-09-28 NOTE — PSYCH
Virtual Regular Visit    Verification of patient location:    Patient is located at Home in the following state in which I hold an active license [place]      Assessment/Plan:    Problem List Items Addressed This Visit        Other    ZAINA (generalized anxiety disorder) - Primary    Bipolar 2 disorder ([address])       Goals addressed in session: Goal 1          Reason for visit is No chief complaint on file. Encounter provider Carmen Duong LCSW    Provider located at [address] [place] [id number] [phone]      Recent Visits  Date Type Provider Dept   09/26/23 Telephone Heena Taylor, [address] [place] [address] [place]   09/21/23 Telemedicine Carmen Duong LCSW [facility]   Showing recent visits within past 7 days and meeting all other requirements  Today's Visits  Date Type Provider Dept   09/28/23 Telemedicine Carmen Duong LCSW [facility]   Showing today's visits and meeting all other requirements  Future Appointments  No visits were found meeting these conditions. Showing future appointments within next 150 days and meeting all other requirements       The patient was identified by name and date of birth. Jessica Yoo was informed that this is a telemedicine visit and that the visit is being conducted throughthe [employer] platform. She agrees to proceed. .  My office door was closed. No one else was in the room. She acknowledged consent and understanding of privacy and security of the video platform. The patient has agreed to participate and understands they can discontinue the visit at any time. Patient is aware this is a billable service. Subjective  Jessica Yoo is a 47 y.o. female.       HPI     Past Medical History:   Diagnosis Date   • Anxiety    • Anxiety    • Asthma    • Bipolar disorder ([address])    • Carpal tunnel syndrome    • Chronic pain     lower back   • Depression    • Disease of thyroid gland    • Dyslipidemia    • Fibromyalgia    • Irritable bowel    • Kidney stones    • Kidney stones 2019   • Migraine    • Obesity (BMI 30.0-34. 9)    • Psychiatric disorder     depression/anxiety   • Suicide attempt [facility])     as teen   • Vitamin D deficiency        Past Surgical History:   Procedure Laterality Date   • BUNIONECTOMY     •  SECTION     • CHOLECYSTECTOMY     • PARTIAL HYSTERECTOMY      portion of  uterus still present   • TONSILLECTOMY     • TUBAL LIGATION         Current Outpatient Medications   Medication Sig Dispense Refill   • albuterol (PROVENTIL HFA,VENTOLIN HFA) 90 mcg/act inhaler INHALE TWO PUFFS EVERY 6 (SIX) HOURS AS NEEDED FOR WHEEZING OR SHORTNESS OF BREATH 18 g 0   • [place] Thyroid 60 MG tablet Take one and 1/2 tabs daily 135 tablet 2   • azelastine (ASTELIN) 0.1 % nasal spray 1 SPRAY INTO EACH NOSTRIL TWO (TWO) TIMES A DAY USE IN EACH NOSTRIL AS DIRECTED 30 mL 2   • Blood Glucose Monitoring Suppl (OneTouch Verio Reflect) w/Device KIT Check blood sugars once daily. Please substitute with appropriate alternative as covered by patient's insurance. Dx: E11.65 1 kit 0   • butalbital-acetaminophen-caffeine (FIORICET,ESGIC) -40 mg per tablet Take 1 tablet by mouth every 6 (six) hours as needed for headaches 30 tablet 0   • celecoxib (CELEBREX) 200 mg capsule Take 1 capsule (200 mg total) by mouth daily 30 capsule 2   • dicyclomine (BENTYL) 10 mg capsule Take 1 capsule (10 mg total) by mouth 3 (three) times a day as needed (abd cramping) 30 capsule 0   • divalproex sodium (DEPAKOTE ER) 250 mg 24 hr tablet      • fluticasone (FLONASE) 50 mcg/act nasal spray INHALE TWO PUFFS TWO (TWO) TIMES A DAY RINSE MOUTH AFTER USE. 16 g 0   • fluticasone (Flovent Diskus) 50 mcg/actuation diskus inhaler Inhale 1 puff 2 (two) times a day Rinse mouth after use.  60 blister 0   • fremanezumab-vfrm (Ajovy) 225 MG/1.5ML auto-injector Inject 225 mg under the skin every 30 (thirty) days     • glucose blood (OneTouch Verio) test strip Check blood sugars once daily. Please substitute with appropriate alternative as covered by patient's insurance. Dx: E11.65 100 each 3   • ipratropium-albuterol (DUO-NEB) 0.5-2.5 mg/3 mL nebulizer solution Take 3 mL by nebulization 4 (four) times a day 90 mL 1   • ketoconazole (NIZORAL) 2 % cream      • levocetirizine (XYZAL) 5 MG tablet Take 1 tablet (5 mg total) by mouth every evening 90 tablet 1   • lidocaine (LIDODERM) 5 % Apply 1 patch topically daily as needed (back pain) Remove & Discard patch within 12 hours or as directed by MD 6 patch 1   • meclizine (ANTIVERT) 25 mg tablet Take 1 tablet (25 mg total) by mouth every 8 (eight) hours as needed for dizziness 30 tablet 0   • meloxicam (MOBIC) 15 mg tablet TAKE 1 TABLET (15 MG TOTAL) BY MOUTH DAILY (Patient not taking: Reported on 9/7/2023) 30 tablet 0   • methotrexate 2.5 mg tablet      • methylPREDNISolone 4 MG tablet therapy pack Use as directed on package 21 each 0   • mometasone (ELOCON) 0.1 % cream Apply topically daily 45 g 0   • naratriptan (AMERGE) 2.5 MG tablet      • omeprazole (PriLOSEC) 40 MG capsule TAKE 1 CAPSULE (40 MG TOTAL) BY MOUTH DAILY 90 capsule 0   • ondansetron (ZOFRAN-ODT) 4 mg disintegrating tablet TAKE 1 TABLET (4 MG TOTAL) BY MOUTH EVERY EIGHT (EIGHT) HOURS AS NEEDED FOR NAUSEA 20 tablet 0   • OneTouch Delica Lancets 61Z MISC Check blood sugars once daily. Please substitute with appropriate alternative as covered by patient's insurance.  Dx: E11.65 100 each 3   • Ozempic, 1 MG/DOSE, 4 MG/3ML injection pen INJECT 0.75 ML (1 MG TOTAL) UNDER THE SKIN ONCE A WEEK 3 mL 0   • phentermine 15 MG capsule Take 1 capsule (15 mg total) by mouth every morning 30 capsule 0   • Respiratory Therapy Supplies (NEBULIZER) DONA by Does not apply route every 4 (four) hours while awake 90 each 1   • semaglutide, 0.25 or 0.5 mg/dose, (Ozempic, 0.25 or 0.5 MG/DOSE,) 2 mg/3 mL injection pen Inject 0.75 mL (0.5 mg total) under the skin every 7 days 9 mL 1   • Syringe/Needle, Disp, (SYRINGE 3CC/34OM5-8/2") 20G X 1-1/2" 3 ML MISC Use once a week With b12 injection 7 each 0   • tiZANidine (ZANAFLEX) 4 mg tablet      • Topiramate  MG CP24 Take 100 mg by mouth 2 (two) times a day     • Ubrelvy 100 MG tablet      • Vraylar 4.5 MG capsule TAKE 1 CAPSULE (4.5 MG TOTAL) BY MOUTH DAILY INSERT IN THE MORNING INCREASING THE DOSE 30 capsule 0   • XIFAXAN 550 MG tablet        Current Facility-Administered Medications   Medication Dose Route Frequency Provider Last Rate Last Admin   • cyanocobalamin injection 1,000 mcg  1,000 mcg Intramuscular Q30 Days ERICKA Tovar   1,000 mcg at 07/27/23 1025        Allergies   Allergen Reactions   • Doxycycline Rash   • Erythromycin Rash   • Other Edema and Wheezing     jalapeno   • Augmentin [Amoxicillin-Pot Clavulanate] GI Intolerance   • Latex Rash   • Duloxetine      Other reaction(s): Nausea, Loopy   • Eletriptan      Pain in lower jaw with pressure in throat   • Emgality [Galcanezumab-Gnlm]    • Galcanezumab      rash       Review of Systems    Video Exam    There were no vitals filed for this visit. Physical Exam     Behavioral Health Psychotherapy Progress Note    Psychotherapy Provided: Individual Psychotherapy      Diagnosis ICD-10-CM Associated Orders   1. ZAINA (generalized anxiety disorder)  F41.1       2. Bipolar 2 disorder ([facility])  F31.81            Goals addressed in session: Goal 1     DATA: Met with Akira Chavarria for a scheduled individual session. Topics discussed included emotional wellness, coping skills, relationship with significant other and physical health concerns. No Perez is feeling "ok", stating she went to the ER this week; she was having an off feeling in her chest and called her sister in law who is a nurse. She told No Perez to go to the ER because it sounded like A-fib.  She went but they didn't find anything cardiac. She has covid long haulers and they feel it could be related. She's followed up with her doctor in the meantime. Belem's boyfriend, Sherice Wiggins, flew up to visit. She stated they've had a lot of talks and she's seen some positive changes with him. Her grandson was very excited to see Sherice Wiggins and Martin Wallis likes how Sherice Wiggins role models for her grandson. Martin Wallis shows evidence of utilizing effective communication skills, engaging in problem solving and maintaining emotional composure to manage mental health symptoms. During this session, this clinical used the following therapeutic modalities supportive psychotherapy, client-centered therapy, CBT techniques and stress management skills. Substance Abuse was not addressed during this session. If the client is diagnosed with a co-occurring substance use disorder, please indicate any changes in the frequency or amount of use: N/A. Stage of change for addressing substance use diagnoses: No substance use/Not applicable    ASSESSMENT:  Martin Wallis presents with a Euthymic/ normal mood. her affect is normal range and intensity, appropriate, which is congruent, with her  mood and the content of the session. Martin Wallis was oriented x3. She was focused and engaged. Martin Wallis exhibits good therapeutic rapport with this clinician. The client has made progress on their goals. Gabby Corrales presents with a none risk of suicide, none risk of self-harm, and none risk of harm to others. For any risk assessment that surpasses a "low" rating, a safety plan must be developed. A safety plan was indicated: no  If yes, describe in detail: N/A    PLAN: Martin Wallis will return in 1 week for the next scheduled session. At the next session, the therapist will use engagement strategies, supportive psychotherapy and client-centered therapy to address her anxiety.     Behavioral Health Treatment Plan and Discharge Planning: Gabby Corrales is aware of and agrees to continue to work on their treatment plan. They have identified and are working toward their discharge goals.  yes    Visit start and stop times:    09/28/23  Start Time: 1105  Stop Time: 1205  Total Visit Time: 60 minutes

## 2023-09-29 ENCOUNTER — TELEPHONE (OUTPATIENT)
Dept: FAMILY MEDICINE CLINIC | Facility: CLINIC | Age: 53
End: 2023-09-29

## 2023-10-05 ENCOUNTER — TELEMEDICINE (OUTPATIENT)
Dept: PSYCHIATRY | Facility: CLINIC | Age: 53
End: 2023-10-05
Payer: COMMERCIAL

## 2023-10-05 DIAGNOSIS — F41.1 GAD (GENERALIZED ANXIETY DISORDER): Primary | ICD-10-CM

## 2023-10-05 DIAGNOSIS — F31.81 BIPOLAR 2 DISORDER (HCC): ICD-10-CM

## 2023-10-05 PROCEDURE — 90837 PSYTX W PT 60 MINUTES: CPT

## 2023-10-05 NOTE — PSYCH
Virtual Regular Visit    Verification of patient location:    Patient is located at Home in the following state in which I hold an active license [place]      Assessment/Plan:    Problem List Items Addressed This Visit        Other    ZAINA (generalized anxiety disorder) - Primary    Bipolar 2 disorder ([address])       Goals addressed in session: Goal 1          Reason for visit is No chief complaint on file. Encounter provider Srinivas Kirby LCSW    Provider located at [address] [place] [id number] [phone]      Recent Visits  Date Type Provider Dept   09/28/23 Telemedicine Srinivas Kirby LCSW [facility]   Showing recent visits within past 7 days and meeting all other requirements  Today's Visits  Date Type Provider Dept   10/05/23 Telemedicine Srinivas Kirby LCSW [facility]   Showing today's visits and meeting all other requirements  Future Appointments  No visits were found meeting these conditions. Showing future appointments within next 150 days and meeting all other requirements       The patient was identified by name and date of birth. Jaimee Shelby was informed that this is a telemedicine visit and that the visit is being conducted throughthe [employer] platform. She agrees to proceed. .  My office door was closed. No one else was in the room. She acknowledged consent and understanding of privacy and security of the video platform. The patient has agreed to participate and understands they can discontinue the visit at any time. Patient is aware this is a billable service. Subjective  Jaimee Shelby is a 47 y.o. female.       HPI     Past Medical History:   Diagnosis Date   • Anxiety    • Anxiety    • Asthma    • Bipolar disorder ([address])    • Carpal tunnel syndrome    • Chronic pain     lower back   • Depression    • Disease of thyroid gland • Dyslipidemia    • Fibromyalgia    • Irritable bowel    • Kidney stones    • Kidney stones 2019   • Migraine    • Obesity (BMI 30.0-34. 9)    • Psychiatric disorder     depression/anxiety   • Suicide attempt [facility])     as teen   • Vitamin D deficiency        Past Surgical History:   Procedure Laterality Date   • BUNIONECTOMY     •  SECTION     • CHOLECYSTECTOMY     • PARTIAL HYSTERECTOMY      portion of  uterus still present   • TONSILLECTOMY     • TUBAL LIGATION         Current Outpatient Medications   Medication Sig Dispense Refill   • albuterol (PROVENTIL HFA,VENTOLIN HFA) 90 mcg/act inhaler INHALE TWO PUFFS EVERY 6 (SIX) HOURS AS NEEDED FOR WHEEZING OR SHORTNESS OF BREATH 18 g 0   • [place] Thyroid 60 MG tablet Take one and 1/2 tabs daily 135 tablet 2   • azelastine (ASTELIN) 0.1 % nasal spray 1 SPRAY INTO EACH NOSTRIL TWO (TWO) TIMES A DAY USE IN EACH NOSTRIL AS DIRECTED 30 mL 2   • Blood Glucose Monitoring Suppl (OneTouch Verio Reflect) w/Device KIT Check blood sugars once daily. Please substitute with appropriate alternative as covered by patient's insurance. Dx: E11.65 1 kit 0   • butalbital-acetaminophen-caffeine (FIORICET,ESGIC) -40 mg per tablet Take 1 tablet by mouth every 6 (six) hours as needed for headaches 30 tablet 0   • celecoxib (CELEBREX) 200 mg capsule Take 1 capsule (200 mg total) by mouth daily 30 capsule 2   • dicyclomine (BENTYL) 10 mg capsule Take 1 capsule (10 mg total) by mouth 3 (three) times a day as needed (abd cramping) 30 capsule 0   • divalproex sodium (DEPAKOTE ER) 250 mg 24 hr tablet      • fluticasone (FLONASE) 50 mcg/act nasal spray INHALE TWO PUFFS TWO (TWO) TIMES A DAY RINSE MOUTH AFTER USE. 16 g 0   • fluticasone (Flovent Diskus) 50 mcg/actuation diskus inhaler Inhale 1 puff 2 (two) times a day Rinse mouth after use.  60 blister 0   • fremanezumab-vfrm (Ajovy) 225 MG/1.5ML auto-injector Inject 225 mg under the skin every 30 (thirty) days     • glucose blood (OneTouch Verio) test strip Check blood sugars once daily. Please substitute with appropriate alternative as covered by patient's insurance. Dx: E11.65 100 each 3   • ipratropium-albuterol (DUO-NEB) 0.5-2.5 mg/3 mL nebulizer solution Take 3 mL by nebulization 4 (four) times a day 90 mL 1   • ketoconazole (NIZORAL) 2 % cream      • levocetirizine (XYZAL) 5 MG tablet Take 1 tablet (5 mg total) by mouth every evening 90 tablet 1   • lidocaine (LIDODERM) 5 % Apply 1 patch topically daily as needed (back pain) Remove & Discard patch within 12 hours or as directed by MD 6 patch 1   • meclizine (ANTIVERT) 25 mg tablet Take 1 tablet (25 mg total) by mouth every 8 (eight) hours as needed for dizziness 30 tablet 0   • meloxicam (MOBIC) 15 mg tablet TAKE 1 TABLET (15 MG TOTAL) BY MOUTH DAILY (Patient not taking: Reported on 9/7/2023) 30 tablet 0   • methotrexate 2.5 mg tablet      • methylPREDNISolone 4 MG tablet therapy pack Use as directed on package 21 each 0   • mometasone (ELOCON) 0.1 % cream Apply topically daily 45 g 0   • naratriptan (AMERGE) 2.5 MG tablet      • omeprazole (PriLOSEC) 40 MG capsule TAKE 1 CAPSULE (40 MG TOTAL) BY MOUTH DAILY 90 capsule 0   • ondansetron (ZOFRAN-ODT) 4 mg disintegrating tablet TAKE 1 TABLET (4 MG TOTAL) BY MOUTH EVERY EIGHT (EIGHT) HOURS AS NEEDED FOR NAUSEA 20 tablet 0   • OneTouch Delica Lancets 36W MISC Check blood sugars once daily. Please substitute with appropriate alternative as covered by patient's insurance.  Dx: E11.65 100 each 3   • Ozempic, 1 MG/DOSE, 4 MG/3ML injection pen INJECT 0.75 ML (1 MG TOTAL) UNDER THE SKIN ONCE A WEEK 3 mL 0   • phentermine 15 MG capsule Take 1 capsule (15 mg total) by mouth every morning 30 capsule 0   • Respiratory Therapy Supplies (NEBULIZER) DONA by Does not apply route every 4 (four) hours while awake 90 each 1   • semaglutide, 0.25 or 0.5 mg/dose, (Ozempic, 0.25 or 0.5 MG/DOSE,) 2 mg/3 mL injection pen Inject 0.75 mL (0.5 mg total) under the skin every 7 days 9 mL 1   • Syringe/Needle, Disp, (SYRINGE 3CC/91TE0-3/2") 20G X 1-1/2" 3 ML MISC Use once a week With b12 injection 7 each 0   • tiZANidine (ZANAFLEX) 4 mg tablet      • Topiramate  MG CP24 Take 100 mg by mouth 2 (two) times a day     • Ubrelvy 100 MG tablet      • Vraylar 4.5 MG capsule TAKE 1 CAPSULE (4.5 MG TOTAL) BY MOUTH DAILY INSERT IN THE MORNING INCREASING THE DOSE 30 capsule 0   • XIFAXAN 550 MG tablet        Current Facility-Administered Medications   Medication Dose Route Frequency Provider Last Rate Last Admin   • cyanocobalamin injection 1,000 mcg  1,000 mcg Intramuscular Q30 Days ERICKA Novak   1,000 mcg at 07/27/23 1025        Allergies   Allergen Reactions   • Doxycycline Rash   • Erythromycin Rash   • Other Edema and Wheezing     jalapeno   • Augmentin [Amoxicillin-Pot Clavulanate] GI Intolerance   • Latex Rash   • Duloxetine      Other reaction(s): Nausea, Loopy   • Eletriptan      Pain in lower jaw with pressure in throat   • Emgality [Galcanezumab-Gnlm]    • Galcanezumab      rash       Review of Systems    Video Exam    There were no vitals filed for this visit. Physical Exam     Behavioral Health Psychotherapy Progress Note    Psychotherapy Provided: Individual Psychotherapy      Diagnosis ICD-10-CM Associated Orders   1. ZAINA (generalized anxiety disorder)  F41.1       2. Bipolar 2 disorder ([facility])  F31.81            Goals addressed in session: Goal 1     DATA: Met with Ashlyn Clark for a scheduled individual session. Topics discussed included emotional wellness, coping skills, family stressors and relationships with family. Kathy Purvis stated things have been "rough". At the start of the session, she was trying to clean her grandson because he threw up in the car. She then started to talk about her grandson's mother/daughter. Yesterday her daughter texted her and demanded to see Ravi's IEP.  Kathy Purvis questioned why she wanted this and her daughter came at her aggressively. Rakesh Saunders has felt very angry with her daughter and questioned her lack of communication with Gama Chakraborty. Rakesh Saunders stated her daughter swore at her and was very nasty. Belem's younger daughter encouraged Rakesh Saunders to keep her cool and not stoop to that level. She's really feeling like she has to seek full custody of her grandson to safeguard him and to try and put a stop to the harassing and argumentative interactions with her daughter. Rakesh Saunders shows evidence of utilizing effective communication skills, engaging in problem solving and maintaining emotional composure to manage mental health symptoms. During this session, this clinical used the following therapeutic modalities supportive psychotherapy, client-centered therapy, CBT techniques and stress management skills. Substance Abuse was not addressed during this session. If the client is diagnosed with a co-occurring substance use disorder, please indicate any changes in the frequency or amount of use: N/A. Stage of change for addressing substance use diagnoses: No substance use/Not applicable    ASSESSMENT:  Rakesh Saunders presents with a Angry and Dysthymic mood. her affect is tearful, which is congruent, with her  mood and the content of the session. Rakesh Saunders was oriented x3. She was focused and engaged. Rakesh Saunders exhibits good therapeutic rapport with this clinician. The client has made progress on their goals. Jaimee Shelby presents with a none risk of suicide, none risk of self-harm, and none risk of harm to others. For any risk assessment that surpasses a "low" rating, a safety plan must be developed. A safety plan was indicated: no  If yes, describe in detail: N/A    PLAN: Rakesh Saunders will return in 1 week for the next scheduled session. At the next session, the therapist will use supportive psychotherapy and client-centered therapy to address his anxiety and depression.     Behavioral Health Treatment Plan and Discharge Planning: Liborio Gutierrez Sebas Pena is aware of and agrees to continue to work on their treatment plan. They have identified and are working toward their discharge goals.  yes    Visit start and stop times:    10/05/23  Start Time: 1006  Stop Time: 1105  Total Visit Time: 59 minutes

## 2023-10-11 ENCOUNTER — OFFICE VISIT (OUTPATIENT)
Dept: FAMILY MEDICINE CLINIC | Facility: CLINIC | Age: 53
End: 2023-10-11
Payer: COMMERCIAL

## 2023-10-11 VITALS
RESPIRATION RATE: 14 BRPM | SYSTOLIC BLOOD PRESSURE: 110 MMHG | TEMPERATURE: 96.5 F | WEIGHT: 153 LBS | DIASTOLIC BLOOD PRESSURE: 80 MMHG | HEIGHT: 64 IN | HEART RATE: 80 BPM | BODY MASS INDEX: 26.12 KG/M2 | OXYGEN SATURATION: 99 %

## 2023-10-11 DIAGNOSIS — R63.5 WEIGHT GAIN: ICD-10-CM

## 2023-10-11 DIAGNOSIS — E03.8 OTHER SPECIFIED HYPOTHYROIDISM: ICD-10-CM

## 2023-10-11 DIAGNOSIS — R00.2 PALPITATIONS: Primary | ICD-10-CM

## 2023-10-11 DIAGNOSIS — K58.8 OTHER IRRITABLE BOWEL SYNDROME: ICD-10-CM

## 2023-10-11 DIAGNOSIS — F41.1 GAD (GENERALIZED ANXIETY DISORDER): ICD-10-CM

## 2023-10-11 PROCEDURE — 99214 OFFICE O/P EST MOD 30 MIN: CPT

## 2023-10-11 NOTE — ASSESSMENT & PLAN NOTE
Increased stress due to custody issues with her grandson. Reports good support system and family and friends. Continue to see psychiatry/therapy. Continue on current medication management.

## 2023-10-11 NOTE — PROGRESS NOTES
Assessment/Plan:         Problem List Items Addressed This Visit          Digestive    IBS (irritable bowel syndrome)     Continue to follow-up with GI. Start Xifaxan. Endocrine    Hypothyroidism     Continue on current medication regiment. Other    ZAINA (generalized anxiety disorder)     Increased stress due to custody issues with her grandson. Reports good support system and family and friends. Continue to see psychiatry/therapy. Continue on current medication management. Weight gain    Relevant Medications    semaglutide, 1 mg/dose, (Ozempic, 1 MG/DOSE,) 4 mg/3 mL injection pen    BMI 26.0-26.9,adult    Relevant Medications    semaglutide, 1 mg/dose, (Ozempic, 1 MG/DOSE,) 4 mg/3 mL injection pen     Other Visit Diagnoses       Palpitations    -  Primary    EKG from emergency room reviewed. Keep the appointment with cardiology next month. Subjective:      Patient ID: Venus England is a 47 y.o. female. Patient presents to the office after recent emergency room visit. The following portions of the patient's history were reviewed and updated as appropriate:   Past Medical History:  She has a past medical history of Anxiety, Anxiety, Asthma, Bipolar disorder ([address]), Carpal tunnel syndrome, Chronic pain, Depression, Disease of thyroid gland, Dyslipidemia, Fibromyalgia, Irritable bowel, Kidney stones, Kidney stones (2019), Migraine, Obesity (BMI 30.0-34.9), Psychiatric disorder, Suicide attempt ([address]), and Vitamin D deficiency. ,  _______________________________________________________________________  Medical Problems:  does not have any pertinent problems on file.,  _______________________________________________________________________  Past Surgical History:   has a past surgical history that includes  section; Cholecystectomy;  Tonsillectomy; Partial hysterectomy; Bunionectomy; and Tubal ligation. ,  _______________________________________________________________________  Family History:  family history includes Anxiety disorder in her mother; Arthritis in her mother; Bipolar disorder in her daughter and mother; Breast cancer in her paternal aunt and paternal grandmother; Cancer in her cousin, father, maternal uncle, paternal aunt, paternal grandmother, and paternal uncle; Dementia in her paternal grandmother; Depression in her mother; Diabetes in her mother; Heart disease in her brother; Hypertension in her brother and mother; Hypothyroidism in her mother and sister; Mental illness in her mother; Stroke in her mother.,  _______________________________________________________________________  Social History:   reports that she has never smoked. She has never used smokeless tobacco. She reports that she does not drink alcohol and does not use drugs. ,  _______________________________________________________________________  Allergies:  is allergic to doxycycline, erythromycin, other, augmentin [amoxicillin-pot clavulanate], latex, duloxetine, eletriptan, emgality [galcanezumab-gnlm], and galcanezumab. .  _______________________________________________________________________  Current Outpatient Medications   Medication Sig Dispense Refill    albuterol (PROVENTIL HFA,VENTOLIN HFA) 90 mcg/act inhaler INHALE TWO PUFFS EVERY 6 (SIX) HOURS AS NEEDED FOR WHEEZING OR SHORTNESS OF BREATH 18 g 0    [place] Thyroid 60 MG tablet Take one and 1/2 tabs daily 135 tablet 2    butalbital-acetaminophen-caffeine (FIORICET,ESGIC) -40 mg per tablet Take 1 tablet by mouth every 6 (six) hours as needed for headaches 30 tablet 0    celecoxib (CELEBREX) 200 mg capsule Take 1 capsule (200 mg total) by mouth daily 30 capsule 2    dicyclomine (BENTYL) 10 mg capsule Take 1 capsule (10 mg total) by mouth 3 (three) times a day as needed (abd cramping) 30 capsule 0    divalproex sodium (DEPAKOTE ER) 250 mg 24 hr tablet fluticasone (FLONASE) 50 mcg/act nasal spray INHALE TWO PUFFS TWO (TWO) TIMES A DAY RINSE MOUTH AFTER USE. 16 g 0    fremanezumab-vfrm (Ajovy) 225 MG/1.5ML auto-injector Inject 225 mg under the skin every 30 (thirty) days      ipratropium-albuterol (DUO-NEB) 0.5-2.5 mg/3 mL nebulizer solution Take 3 mL by nebulization 4 (four) times a day 90 mL 1    levocetirizine (XYZAL) 5 MG tablet Take 1 tablet (5 mg total) by mouth every evening 90 tablet 1    meclizine (ANTIVERT) 25 mg tablet Take 1 tablet (25 mg total) by mouth every 8 (eight) hours as needed for dizziness 30 tablet 0    methotrexate 2.5 mg tablet       naratriptan (AMERGE) 2.5 MG tablet       omeprazole (PriLOSEC) 40 MG capsule TAKE 1 CAPSULE (40 MG TOTAL) BY MOUTH DAILY 90 capsule 0    ondansetron (ZOFRAN-ODT) 4 mg disintegrating tablet TAKE 1 TABLET (4 MG TOTAL) BY MOUTH EVERY EIGHT (EIGHT) HOURS AS NEEDED FOR NAUSEA 20 tablet 0    semaglutide, 1 mg/dose, (Ozempic, 1 MG/DOSE,) 4 mg/3 mL injection pen Inject 0.75 mL (1 mg total) under the skin every 7 days 9 mL 1    tiZANidine (ZANAFLEX) 4 mg tablet       Topiramate  MG CP24 Take 100 mg by mouth 2 (two) times a day      Ubrelvy 100 MG tablet       Vraylar 4.5 MG capsule TAKE 1 CAPSULE (4.5 MG TOTAL) BY MOUTH DAILY INSERT IN THE MORNING INCREASING THE DOSE 30 capsule 0    XIFAXAN 550 MG tablet       azelastine (ASTELIN) 0.1 % nasal spray 1 SPRAY INTO EACH NOSTRIL TWO (TWO) TIMES A DAY USE IN EACH NOSTRIL AS DIRECTED 30 mL 2    Blood Glucose Monitoring Suppl (OneTouch Verio Reflect) w/Device KIT Check blood sugars once daily. Please substitute with appropriate alternative as covered by patient's insurance. Dx: E11.65 1 kit 0    fluticasone (Flovent Diskus) 50 mcg/actuation diskus inhaler Inhale 1 puff 2 (two) times a day Rinse mouth after use. 60 blister 0    glucose blood (OneTouch Verio) test strip Check blood sugars once daily.  Please substitute with appropriate alternative as covered by patient's insurance. Dx: E11.65 100 each 3    ketoconazole (NIZORAL) 2 % cream       lidocaine (LIDODERM) 5 % Apply 1 patch topically daily as needed (back pain) Remove & Discard patch within 12 hours or as directed by MD 6 patch 1    mometasone (ELOCON) 0.1 % cream Apply topically daily 45 g 0    OneTouch Delica Lancets 39X MISC Check blood sugars once daily. Please substitute with appropriate alternative as covered by patient's insurance. Dx: E11.65 100 each 3    Respiratory Therapy Supplies (NEBULIZER) DONA by Does not apply route every 4 (four) hours while awake 90 each 1    Syringe/Needle, Disp, (SYRINGE 3CC/56RZ1-7/2") 20G X 1-1/2" 3 ML MISC Use once a week With b12 injection 7 each 0     Current Facility-Administered Medications   Medication Dose Route Frequency Provider Last Rate Last Admin    cyanocobalamin injection 1,000 mcg  1,000 mcg Intramuscular Q30 Days ERICKA Mason   1,000 mcg at 07/27/23 1025     _______________________________________________________________________  Review of Systems   Constitutional:  Positive for fatigue. Negative for chills and fever. Respiratory:  Negative for cough and shortness of breath. Cardiovascular:  Positive for palpitations (on and off). Negative for chest pain. Gastrointestinal:  Positive for abdominal pain (IBS). Negative for vomiting. Genitourinary:  Negative for dysuria. Musculoskeletal:  Negative for arthralgias and back pain. Neurological:  Positive for headaches (chronic, worse with stress). Psychiatric/Behavioral:  Positive for dysphoric mood and sleep disturbance. The patient is nervous/anxious. All other systems reviewed and are negative. Objective:  Vitals:    10/11/23 0911   BP: 110/80   Pulse: 80   Resp: 14   Temp: (!) 96.5 °F (35.8 °C)   SpO2: 99%   Weight: 69.4 kg (153 lb)   Height: 5' 4" (1.626 m)     Body mass index is 26.26 kg/m². Physical Exam  Vitals and nursing note reviewed.    Constitutional:       General: She is not in acute distress. Appearance: Normal appearance. She is not ill-appearing. Cardiovascular:      Rate and Rhythm: Normal rate and regular rhythm. Heart sounds: Normal heart sounds. Pulmonary:      Effort: Pulmonary effort is normal.      Breath sounds: Normal breath sounds. Skin:     General: Skin is warm and dry. Neurological:      Mental Status: She is alert and oriented to person, place, and time. Psychiatric:         Mood and Affect: Mood is depressed. Behavior: Behavior normal.         Thought Content: Thought content normal.         Judgment: Judgment normal.               Portions of the above record have been created with voice recognition software. Occasional wrong word or "sound alike" substitution may have occurred due to the inherent limitations of voice recognition software. Read the chart carefully and recognize, using context, where substitution may have occurred.

## 2023-10-12 ENCOUNTER — TELEPHONE (OUTPATIENT)
Dept: PSYCHIATRY | Facility: CLINIC | Age: 53
End: 2023-10-12

## 2023-10-12 NOTE — TELEPHONE ENCOUNTER
Left detailed message for patient canceling 10/12 10am appt. Provider is out.  Notified her of upcoming appt

## 2023-10-19 ENCOUNTER — TELEMEDICINE (OUTPATIENT)
Dept: PSYCHIATRY | Facility: CLINIC | Age: 53
End: 2023-10-19
Payer: COMMERCIAL

## 2023-10-19 DIAGNOSIS — F41.1 GAD (GENERALIZED ANXIETY DISORDER): Primary | ICD-10-CM

## 2023-10-19 DIAGNOSIS — F31.81 BIPOLAR 2 DISORDER (HCC): ICD-10-CM

## 2023-10-19 PROCEDURE — 90837 PSYTX W PT 60 MINUTES: CPT
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
## 2023-10-24 ENCOUNTER — OFFICE VISIT (OUTPATIENT)
Dept: CARDIOLOGY CLINIC | Facility: CLINIC | Age: 53
End: 2023-10-24
Payer: COMMERCIAL

## 2023-10-24 ENCOUNTER — OFFICE VISIT (OUTPATIENT)
Dept: FAMILY MEDICINE CLINIC | Facility: CLINIC | Age: 53
End: 2023-10-24
Payer: COMMERCIAL

## 2023-10-24 ENCOUNTER — CLINICAL SUPPORT (OUTPATIENT)
Dept: CARDIOLOGY CLINIC | Facility: CLINIC | Age: 53
End: 2023-10-24
Payer: COMMERCIAL

## 2023-10-24 VITALS
HEART RATE: 78 BPM | DIASTOLIC BLOOD PRESSURE: 64 MMHG | WEIGHT: 158 LBS | HEIGHT: 64 IN | TEMPERATURE: 98.2 F | SYSTOLIC BLOOD PRESSURE: 112 MMHG | OXYGEN SATURATION: 98 % | BODY MASS INDEX: 26.98 KG/M2

## 2023-10-24 VITALS
WEIGHT: 158 LBS | HEIGHT: 64 IN | DIASTOLIC BLOOD PRESSURE: 66 MMHG | HEART RATE: 72 BPM | BODY MASS INDEX: 26.98 KG/M2 | SYSTOLIC BLOOD PRESSURE: 104 MMHG

## 2023-10-24 DIAGNOSIS — R42 DIZZINESS: ICD-10-CM

## 2023-10-24 DIAGNOSIS — R00.2 PALPITATIONS: ICD-10-CM

## 2023-10-24 DIAGNOSIS — R00.2 PALPITATIONS: Primary | ICD-10-CM

## 2023-10-24 DIAGNOSIS — M79.642 LEFT HAND PAIN: Primary | ICD-10-CM

## 2023-10-24 DIAGNOSIS — Z82.49 FAMILY HISTORY OF EARLY CAD: ICD-10-CM

## 2023-10-24 DIAGNOSIS — E78.5 HYPERLIPIDEMIA, UNSPECIFIED HYPERLIPIDEMIA TYPE: ICD-10-CM

## 2023-10-24 DIAGNOSIS — E66.3 OVERWEIGHT (BMI 25.0-29.9): ICD-10-CM

## 2023-10-24 DIAGNOSIS — R42 DIZZINESS: Primary | ICD-10-CM

## 2023-10-24 PROCEDURE — 99204 OFFICE O/P NEW MOD 45 MIN: CPT | Performed by: INTERNAL MEDICINE

## 2023-10-24 PROCEDURE — 99213 OFFICE O/P EST LOW 20 MIN: CPT | Performed by: FAMILY MEDICINE

## 2023-10-24 PROCEDURE — 93246 EXT ECG>7D<15D RECORDING: CPT | Performed by: INTERNAL MEDICINE

## 2023-10-24 NOTE — PROGRESS NOTES
[facility] Mario Winkler   [facility] [id number] [phone]                                            Cardiology Office Consult  Marcelino Bautista, 47 y.o. female  YOB: 1970  MRN: [id number] Encounter: [id number]      PCP - ERICKA Herrera  Referring Provider - Self, Referral    Chief Complaint   Patient presents with   • Hospital Follow-up       Assessment  Palpitations  TTE - 6/2022: LVEF 65%, no significant valvular abnormalities  Dizziness  Hyperlipidemia  Family history of early CAD  Brother had CABG at 39  Rheumatoid arthritis  Fibromyalgia  Chronic back pain  On disability  Hypothyroidism  Uses [employer] thyroid  H/o COVID (5/2022)  Overweight, Body mass index is 27.12 kg/m². Has gained 15 lbs in the last 10 months    Plan  Palpitations  Overview  9/26/23 ED visit - palpitations, had concerns about Afib   --> ruled out for ACS, ECG WNL --> discharged with OP cardio follow up  10/2023: initial OV with me  Heart racing sensation, suddent  Reports 5 episodes in the last 1 month, ongoing for few months. associated with dizziness. Continues to have palpitations, no chest pain or shortness of breath  Impression  ? SVT v atrial tachycardia v Afib v sinus tachycardia /anxiety  Plan  Check 2 week zio-patch monitor  Monitor for symptoms  Minimize caffeine    Hyperlipidemia, Family h/o early CAD    10-yr ASCVD risk score = 0.8%  Previously was much elevated, but with dietary modification she has managed to bring it down  She did gain 10 to 15 pounds over the last 6 months  She reports previously having had nuclear Lexiscan stress test at [facility] many years ago, which was within normal limits - no records available  No recent chest pain --> Discussed further risk -stratification with CT coronary calcium score -she is interested    No results found for this visit on 10/24/23.     Orders Placed This Encounter Procedures   • CT coronary calcium score   • Lipid Panel with Direct LDL reflex   • AMB extended holter monitor       Return in about 4 weeks (around 2023), or if symptoms worsen or fail to improve. History of Present Illness   47 y.o. female comes in as a new patient for consultation regarding intermittent episodes of palpitations and heart racing sensation over the last few months. She describes the symptoms as rapid heartbeat/heart racing sensation, which typically last for a few minutes before resolving on its own. It occurs at random times and without any clear triggers. It has occurred during shower, while waiting at the bus stop, while resting etc. it is at times associated with dizziness, although she has not had any near-syncope or syncope. She discussed this with the family member who was concerned about her having A-fib and as a result she went into the ED on 2023. Due to concerns about chest discomfort, she was ruled out for ACS. She did not have any arrhythmias noted. She was subsequently discharged with outpatient cardiology referral and is here for the same today. Of note, she does have family history of early CAD/CABG and her older brother. She reports having had a nuclear stress test at [address] [place] [address] - long time ago. She is currently on disability and is unable to do much exercise, due to back problems. She is able to walk on level ground    Family history  Father -  of pancreatic cancer at 58. No known CAD. Mother - HTN, strokes x multiple,  at 80  Siblings: 3 brother, 3 half-brother  1 full Brother (6 yrs older) - poor diet, apparently passed out and then CABGx5 at 39.   Alive at 62  1 half brother - no medical problems    Historical Information   Past Medical History:   Diagnosis Date   • Anxiety    • Anxiety    • Asthma    • Bipolar disorder ([address])    • Carpal tunnel syndrome    • Chronic pain     lower back   • Depression    • Disease of thyroid gland    • Dyslipidemia    • Fibromyalgia    • Irritable bowel    • Kidney stones    • Kidney stones 2019   • Migraine    • Obesity (BMI 30.0-34. 9)    • Psychiatric disorder     depression/anxiety   • Suicide attempt [facility])     as teen   • Vitamin D deficiency      Past Surgical History:   Procedure Laterality Date   • BUNIONECTOMY     •  SECTION     • CHOLECYSTECTOMY     • PARTIAL HYSTERECTOMY      portion of  uterus still present   • TONSILLECTOMY     • TUBAL LIGATION       Family History   Problem Relation Age of Onset   • Hypertension Mother    • Diabetes Mother    • Hypothyroidism Mother    • Stroke Mother    • Bipolar disorder Mother    • Anxiety disorder Mother    • Arthritis Mother    • Depression Mother    • Mental illness Mother    • Cancer Father         pancreatic    • Hypothyroidism Sister    • Hypertension Brother    • Heart disease Brother    • Cancer Maternal Uncle         lung   • Cancer Paternal Aunt         breast   • Breast cancer Paternal Aunt    • Cancer Paternal Uncle         testicular    • Cancer Paternal Grandmother         breast   • Dementia Paternal Grandmother    • Breast cancer Paternal Grandmother    • Cancer Cousin         brain   • Bipolar disorder Daughter      Current Outpatient Medications on File Prior to Visit   Medication Sig Dispense Refill   • albuterol (PROVENTIL HFA,VENTOLIN HFA) 90 mcg/act inhaler INHALE TWO PUFFS EVERY 6 (SIX) HOURS AS NEEDED FOR WHEEZING OR SHORTNESS OF BREATH 18 g 0   • Normangee Thyroid 60 MG tablet Take one and 1/2 tabs daily 135 tablet 2   • azelastine (ASTELIN) 0.1 % nasal spray 1 SPRAY INTO EACH NOSTRIL TWO (TWO) TIMES A DAY USE IN EACH NOSTRIL AS DIRECTED 30 mL 2   • Blood Glucose Monitoring Suppl (OneTouch Verio Reflect) w/Device KIT Check blood sugars once daily. Please substitute with appropriate alternative as covered by patient's insurance.  Dx: E11.65 1 kit 0   • butalbital-acetaminophen-caffeine ([address] [place] [address]) -58 mg per tablet Take 1 tablet by mouth every 6 (six) hours as needed for headaches 30 tablet 0   • celecoxib (CELEBREX) 200 mg capsule Take 1 capsule (200 mg total) by mouth daily 30 capsule 2   • Diclofenac Sodium (VOLTAREN) 1 % Apply 2 g topically 4 (four) times a day 50 g 0   • dicyclomine (BENTYL) 10 mg capsule Take 1 capsule (10 mg total) by mouth 3 (three) times a day as needed (abd cramping) 30 capsule 0   • divalproex sodium (DEPAKOTE ER) 250 mg 24 hr tablet      • fluticasone (FLONASE) 50 mcg/act nasal spray INHALE TWO PUFFS TWO (TWO) TIMES A DAY RINSE MOUTH AFTER USE. 16 g 0   • fluticasone (Flovent Diskus) 50 mcg/actuation diskus inhaler Inhale 1 puff 2 (two) times a day Rinse mouth after use. 60 blister 0   • fremanezumab-vfrm (Ajovy) 225 MG/1.5ML auto-injector Inject 225 mg under the skin every 30 (thirty) days     • glucose blood (OneTouch Verio) test strip Check blood sugars once daily. Please substitute with appropriate alternative as covered by patient's insurance.  Dx: E11.65 100 each 3   • ipratropium-albuterol (DUO-NEB) 0.5-2.5 mg/3 mL nebulizer solution Take 3 mL by nebulization 4 (four) times a day 90 mL 1   • ketoconazole (NIZORAL) 2 % cream      • levocetirizine (XYZAL) 5 MG tablet Take 1 tablet (5 mg total) by mouth every evening 90 tablet 1   • lidocaine (LIDODERM) 5 % Apply 1 patch topically daily as needed (back pain) Remove & Discard patch within 12 hours or as directed by MD 6 patch 1   • meclizine (ANTIVERT) 25 mg tablet Take 1 tablet (25 mg total) by mouth every 8 (eight) hours as needed for dizziness 30 tablet 0   • methotrexate 2.5 mg tablet      • mometasone (ELOCON) 0.1 % cream Apply topically daily 45 g 0   • naratriptan (AMERGE) 2.5 MG tablet      • omeprazole (PriLOSEC) 40 MG capsule TAKE 1 CAPSULE (40 MG TOTAL) BY MOUTH DAILY 90 capsule 0   • ondansetron (ZOFRAN-ODT) 4 mg disintegrating tablet TAKE 1 TABLET (4 MG TOTAL) BY MOUTH EVERY EIGHT (EIGHT) HOURS AS NEEDED FOR NAUSEA 20 tablet 0   • OneTouch Delica Lancets 14V MISC Check blood sugars once daily. Please substitute with appropriate alternative as covered by patient's insurance.  Dx: E11.65 100 each 3   • semaglutide, 1 mg/dose, (Ozempic, 1 MG/DOSE,) 4 mg/3 mL injection pen Inject 0.75 mL (1 mg total) under the skin every 7 days 9 mL 1   • Syringe/Needle, Disp, (SYRINGE 3CC/28DG2-2/2") 20G X 1-1/2" 3 ML MISC Use once a week With b12 injection 7 each 0   • tiZANidine (ZANAFLEX) 4 mg tablet      • Topiramate  MG CP24 Take 100 mg by mouth 2 (two) times a day     • Ubrelvy 100 MG tablet      • Vraylar 4.5 MG capsule TAKE 1 CAPSULE (4.5 MG TOTAL) BY MOUTH DAILY INSERT IN THE MORNING INCREASING THE DOSE 30 capsule 0   • XIFAXAN 550 MG tablet      • Respiratory Therapy Supplies (NEBULIZER) DONA by Does not apply route every 4 (four) hours while awake 90 each 1     Current Facility-Administered Medications on File Prior to Visit   Medication Dose Route Frequency Provider Last Rate Last Admin   • cyanocobalamin injection 1,000 mcg  1,000 mcg Intramuscular Q30 Days ERICKA Morrow   1,000 mcg at 07/27/23 1025     Allergies   Allergen Reactions   • Doxycycline Rash   • Erythromycin Rash   • Other Edema and Wheezing     jalapeno   • Augmentin [Amoxicillin-Pot Clavulanate] GI Intolerance   • Latex Rash   • Duloxetine      Other reaction(s): Nausea, Loopy   • Eletriptan      Pain in lower jaw with pressure in throat   • Emgality [Galcanezumab-Gnlm]    • Galcanezumab      rash     Social History     Socioeconomic History   • Marital status: Single     Spouse name: None   • Number of children: None   • Years of education: None   • Highest education level: None   Occupational History   • Occupation: Disability   Tobacco Use   • Smoking status: Never   • Smokeless tobacco: Never   Vaping Use   • Vaping Use: Never used   Substance and Sexual Activity   • Alcohol use: No   • Drug use: Never   • Sexual activity: Not Currently     Partners: Male Birth control/protection: Abstinence, Female Sterilization     Comment: Hysterectomy   Other Topics Concern   • None   Social History Narrative   • None     Social Determinants of Health     Financial Resource Strain: Medium Risk (3/1/2023)    Overall Financial Resource Strain (CARDIA)    • Difficulty of Paying Living Expenses: Somewhat hard   Food Insecurity: No Food Insecurity (3/1/2023)    Hunger Vital Sign    • Worried About Running Out of Food in the Last Year: Never true    • Ran Out of Food in the Last Year: Never true   Transportation Needs: No Transportation Needs (3/1/2023)    PRAPARE - Transportation    • Lack of Transportation (Medical): No    • Lack of Transportation (Non-Medical): No   Physical Activity: Not on file   Stress: No Stress Concern Present (3/1/2023)    [facility]    • Feeling of Stress : Not at all   Social Connections: Not on file   Intimate Partner Violence: Not on file   Housing Stability: Not on file        Review of Systems   All other systems reviewed and are negative. Vitals:  Vitals:    10/24/23 1316   BP: 104/66   Pulse: 72   Weight: 71.7 kg (158 lb)   Height: 5' 4" (1.626 m)     BMI - Body mass index is 27.12 kg/m². Wt Readings from Last 7 Encounters:   10/24/23 71.7 kg (158 lb)   10/24/23 71.7 kg (158 lb)   10/11/23 69.4 kg (153 lb)   09/26/23 71.2 kg (157 lb)   09/07/23 71.2 kg (157 lb)   07/24/23 69.5 kg (153 lb 3.2 oz)   06/05/23 68 kg (150 lb)       Physical Exam  Vitals and nursing note reviewed. Constitutional:       General: She is not in acute distress. Appearance: Normal appearance. She is well-developed. She is not ill-appearing. HENT:      Head: Normocephalic and atraumatic. Nose: No congestion. Eyes:      General: No scleral icterus. Conjunctiva/sclera: Conjunctivae normal.   Neck:      Vascular: No carotid bruit or JVD.    Cardiovascular:      Rate and Rhythm: Normal rate and regular rhythm. Pulses: Normal pulses. Heart sounds: Normal heart sounds. No murmur heard. No friction rub. No gallop. Pulmonary:      Effort: Pulmonary effort is normal. No respiratory distress. Breath sounds: Normal breath sounds. No rales. Abdominal:      General: There is no distension. Palpations: Abdomen is soft. Tenderness: There is no abdominal tenderness. Musculoskeletal:         General: No swelling or tenderness. Cervical back: Neck supple. Right lower leg: No edema. Left lower leg: No edema. Skin:     General: Skin is warm. Neurological:      General: No focal deficit present. Mental Status: She is alert and oriented to person, place, and time. Mental status is at baseline. Psychiatric:         Mood and Affect: Mood normal.         Behavior: Behavior normal.         Thought Content:  Thought content normal.           Labs:  CBC:   Lab Results   Component Value Date    WBC 4.06 (L) 09/26/2023    RBC 4.60 09/26/2023    HGB 13.5 09/26/2023    HCT 41.0 09/26/2023    MCV 89 09/26/2023     09/26/2023    RDW 12.2 09/26/2023       CMP:   Lab Results   Component Value Date     06/08/2018    K 3.6 09/26/2023     09/26/2023    CO2 27 09/26/2023    ANIONGAP 9 06/08/2018    BUN 19 09/26/2023    CREATININE 0.89 09/26/2023    EGFR 74 09/26/2023    GLUCOSE 94 06/08/2018    CALCIUM 9.8 09/26/2023    AST 18 09/26/2023    ALT 16 09/26/2023    ALKPHOS 70 09/26/2023    PROT 7.4 06/08/2018    BILITOT 0.5 06/08/2018       Magnesium:  Lab Results   Component Value Date    MG 2.0 01/07/2019       Lipid Profile:   Lab Results   Component Value Date    CHOL 205 (H) 06/08/2018    HDL 74 03/02/2023    TRIG 64 03/02/2023    LDLCALC 123 (H) 03/02/2023       Thyroid Studies:   Lab Results   Component Value Date    DJD7YZHOEFQW 3.031 07/24/2023    T3FREE 4.65 06/08/2018    FREET4 0.77 10/20/2022    R8JMVNE 1.10 07/16/2021       A1c:  No components found for: "HGA1C"    INR:  Lab Results   Component Value Date    INR 0.94 05/02/2023    INR 1.14 08/12/2019    INR 1.02 07/12/2018   5    Imaging: XR chest 2 views    Result Date: 9/26/2023  Narrative: CHEST INDICATION:   palpitations. COMPARISON: Chest radiograph September 7, 2023 EXAM PERFORMED/VIEWS:  XR CHEST PA & LATERAL FINDINGS: Cardiomediastinal silhouette appears unremarkable. The lungs are clear. No pneumothorax or pleural effusion. Osseous structures appear within normal limits for patient age. Cholecystectomy clips. Impression: No acute cardiopulmonary disease. Workstation performed: [id number]       Cardiac testing:   No results found for this or any previous visit. No results found for this or any previous visit. No results found for this or any previous visit. No results found for this or any previous visit. XR chest 2 views  Narrative: CHEST    INDICATION:   palpitations. COMPARISON: Chest radiograph September 7, 2023    EXAM PERFORMED/VIEWS:  XR CHEST PA & LATERAL    FINDINGS:    Cardiomediastinal silhouette appears unremarkable. The lungs are clear. No pneumothorax or pleural effusion. Osseous structures appear within normal limits for patient age. Cholecystectomy clips. Impression: No acute cardiopulmonary disease.     Workstation performed: [id number]

## 2023-10-24 NOTE — PROGRESS NOTES
Assessment/Plan:    No problem-specific Assessment & Plan notes found for this encounter. Diagnoses and all orders for this visit:    Left hand pain  -     Diclofenac Sodium (VOLTAREN) 1 %; Apply 2 g topically 4 (four) times a day        Patient does not prefer NSAIDs but is interested in topical NSAID for her hand pain as this is the most bothersome. States that the pain in her should and head have continued to improve throughout the day. Reports stiffness in the hand. Subjective:      Patient ID: Theresa Grullon is a 47 y.o. female. HPI  Patient presents to the office for evaluation. Notes that she fell from standing on top of her love seat. States that she was putting plastic on her windows. States that she landed on a small metal crayon box by her ear. Notes that she also landed on her left hand. States that she is in pain today on the left side of her head, shoulder and hand. States that she has been taking tylenol and this has been helpful. Denies any bruising or swelling. States that things have improved throughout the day today. The following portions of the patient's history were reviewed and updated as appropriate: allergies, current medications, past family history, past medical history, past social history, past surgical history, and problem list.    Review of Systems      Objective:  /64   Pulse 78   Temp 98.2 °F (36.8 °C)   Ht 5' 4" (1.626 m)   Wt 71.7 kg (158 lb)   SpO2 98%   BMI 27.12 kg/m²      Physical Exam  Vitals reviewed. Constitutional:       General: She is not in acute distress. Appearance: Normal appearance. HENT:      Head: Normocephalic and atraumatic. Right Ear: External ear normal.      Left Ear: External ear normal.      Nose: Nose normal.      Mouth/Throat:      Mouth: Mucous membranes are moist.   Eyes:      Extraocular Movements: Extraocular movements intact.       Conjunctiva/sclera: Conjunctivae normal.   Cardiovascular: Rate and Rhythm: Normal rate and regular rhythm. Heart sounds: Normal heart sounds. Pulmonary:      Effort: Pulmonary effort is normal.      Breath sounds: Normal breath sounds. No wheezing, rhonchi or rales. Abdominal:      General: Bowel sounds are normal. There is no distension. Palpations: Abdomen is soft. Tenderness: There is no abdominal tenderness. Musculoskeletal:         General: No swelling, tenderness or deformity. Cervical back: Neck supple. Right lower leg: No edema. Left lower leg: No edema. Comments: ROM on left hand WNL. No tenderness to touch. Left wrist ROM WNL. Skin:     General: Skin is warm. Capillary Refill: Capillary refill takes less than 2 seconds. Findings: No rash. Neurological:      Mental Status: She is alert. Mental status is at baseline.          Shikha Herb, [address] [facility]

## 2023-10-24 NOTE — PROGRESS NOTES
Ziopatch applied x14 days for palpitations and dizziness per Dr Magda Montgomery.    Serial [id number]

## 2023-10-27 ENCOUNTER — HOSPITAL ENCOUNTER (OUTPATIENT)
Dept: RADIOLOGY | Facility: HOSPITAL | Age: 53
Discharge: HOME | End: 2023-10-27
Payer: COMMERCIAL

## 2023-10-27 DIAGNOSIS — K21.9 GASTROESOPHAGEAL REFLUX DISEASE WITHOUT ESOPHAGITIS: ICD-10-CM

## 2023-10-27 DIAGNOSIS — K92.1 MELENA: ICD-10-CM

## 2023-10-27 DIAGNOSIS — R14.0 ABDOMINAL DISTENSION, GASEOUS: ICD-10-CM

## 2023-10-27 DIAGNOSIS — R10.84 ABDOMINAL PAIN, GENERALIZED: ICD-10-CM

## 2023-10-27 DIAGNOSIS — R19.5 OTHER FECAL ABNORMALITIES: ICD-10-CM

## 2023-10-27 DIAGNOSIS — K29.00 ACUTE GASTRITIS WITHOUT HEMORRHAGE, UNSPECIFIED GASTRITIS TYPE: ICD-10-CM

## 2023-10-27 PROCEDURE — 74250 X-RAY XM SM INT 1CNTRST STD: CPT

## 2023-10-31 ENCOUNTER — OFFICE VISIT (OUTPATIENT)
Dept: OBGYN CLINIC | Facility: MEDICAL CENTER | Age: 53
End: 2023-10-31
Payer: COMMERCIAL

## 2023-10-31 VITALS — SYSTOLIC BLOOD PRESSURE: 132 MMHG | WEIGHT: 159 LBS | BODY MASS INDEX: 27.29 KG/M2 | DIASTOLIC BLOOD PRESSURE: 80 MMHG

## 2023-10-31 DIAGNOSIS — R23.2 HOT FLASHES: Primary | ICD-10-CM

## 2023-10-31 PROCEDURE — 99214 OFFICE O/P EST MOD 30 MIN: CPT | Performed by: NURSE PRACTITIONER

## 2023-10-31 RX ORDER — PAROXETINE 7.5 MG/1
1 CAPSULE ORAL DAILY
Qty: 30 CAPSULE | Refills: 2 | Status: SHIPPED | OUTPATIENT
Start: 2023-10-31

## 2023-10-31 NOTE — PATIENT INSTRUCTIONS
Discussed the natural course of menopause and associated vasomotor symptoms. Average age of menopause in [facility] is 50 yo but varies based on genetic factors and tobacco use. Hot flushes are the most common symptoms experienced by women during the menopausal transition but other symptoms may occur including insomnia, vaginal dryness, and cognitive changes. A wide array of treatment options and their differing efficacies, including lifestyle management strategies (dressing in layers, keeping room temperature low, using a fan, avoiding triggers, weight loss, exercise), CBT, acupuncture, evening primrose oil, hypnosis, vitamin E, OTC Estroven, hormone replacement therapy, and/or SSRI/SNRI treatment. Exercise 150-300  minutes per week as able recommended. Paroxetine 7.5 mg / day. It is the only SSRI/SNRI that is FDA approved for hot flash treatment. Rx sent to pharmacy on file. Aware of benefits, risks and alternatives. HRT is not currently recommended for you due to risks associated with it's use. All questions answered and patient expressed understanding. Calcium 7761-7342 mg + Vitamin D 600 IU per day in divided doses. (Only absorb approximately 600 mg of calcium at one time.)   Maintain appropriate weight. Water intake of 64 oz per day minimum. May need to increase amount when exercising. Keep regular sleep schedule. Avoid blue light/brain stimulants 2 hours before bed. Black cohosh for lessening hot flash symptoms. Discussed benefits, risks and alternatives. Keep room temperature cool at night. Wear light weight clothing in layers to bed . Consider trying meditation. Free apps available for your phone that will talk you through the process. Use silicone based lubricant for vaginal dryness with sex. Consider twice weekly vaginal moisturizer to hydrate the vagina.

## 2023-10-31 NOTE — PROGRESS NOTES
Assessment/Plan:  Discussed the natural course of menopause and associated vasomotor symptoms. Average age of menopause in [address] [facility] is 48 yo but varies based on genetic factors and tobacco use. Hot flushes are the most common symptoms experienced by women during the menopausal transition but other symptoms may occur including insomnia, vaginal dryness, and cognitive changes. A wide array of treatment options and their differing efficacies, including lifestyle management strategies (dressing in layers, keeping room temperature low, using a fan, avoiding triggers, weight loss, exercise), CBT, acupuncture, evening primrose oil, hypnosis, vitamin E, OTC Estroven, hormone replacement therapy, and/or SSRI/SNRI treatment. Exercise 150-300  minutes per week as able recommended. Paroxetine 7.5 mg / day. It is the only SSRI/SNRI that is FDA approved for hot flash treatment. Rx sent to pharmacy on file. Aware of benefits, risks and alternatives. HRT is not currently recommended for you due to risks associated with it's use. All questions answered and patient expressed understanding. Calcium 5266-1564 mg + Vitamin D 600 IU per day in divided doses. (Only absorb approximately 600 mg of calcium at one time.)   Maintain appropriate weight. Water intake of 64 oz per day minimum. May need to increase amount when exercising. Keep regular sleep schedule. Avoid blue light/brain stimulants 2 hours before bed. Black cohosh for lessening hot flash symptoms. Discussed benefits, risks and alternatives. Keep room temperature cool at night. Wear light weight clothing in layers to bed . Consider trying meditation. Free apps available for your phone that will talk you through the process. Use silicone based lubricant for vaginal dryness with sex. Consider twice weekly vaginal moisturizer to hydrate the vagina.   I have spent a total time of 40 minutes on 10/31/23 in caring for this patient including Risks and benefits of tx options, Instructions for management, Patient and family education, Importance of tx compliance, Risk factor reductions, Impressions, Counseling / Coordination of care, Documenting in the medical record, Reviewing / ordering tests, medicine, procedures  , and discussing items listed above. .         1. Hot flashes  -     PARoxetine Mesylate 7.5 MG CAPS; Take 1 capsule (7.5 mg total) by mouth in the morning               Subjective:      Patient ID: Marcelino Bautista is a 47 y.o. female. HPI    Marcelino Bautista is a 47 y.o. [id number] female who is here today for a problem visit  accompanied by her male partner. C/o hot flashes x 1 year that are disrupting her sleep. They have now become unbearable in the last few months. Also c/o struggling to lose weight. Unable to exercise due to back pain so feel quite limited. Believes she eats well. Follows with pain management and orthopedics. Hysterectomy (2011) for fibroids and tubal ligation (1997) history. She has tried estroven and amberin for her hot flashes without any resolution of her symptoms. Currently following with a therapist every Thursday for CBT. Marcelino Bautista is not sexually active with male partner of 1 year. They have decided to be abstinent until marriage. She is not interested in STD screening today. She denies vaginal discharge, itching, pelvic pain. She has no urinary concerns presently, does not have incontinence. IBS-D. No breast concerns. Due for annual gyn exam shortly. The following portions of the patient's history were reviewed and updated as appropriate: allergies, current medications, past family history, past medical history, past social history, past surgical history, and problem list.    Review of Systems   Constitutional: Negative. Hot flashes   Eyes:  Negative for visual disturbance. Respiratory:  Negative for chest tightness and shortness of breath.     Cardiovascular:  Negative for chest pain, palpitations and leg swelling. Gastrointestinal:  Negative for abdominal pain, constipation, diarrhea, nausea and vomiting. Genitourinary:  Negative for dysuria, vaginal bleeding and vaginal discharge. Musculoskeletal:  Positive for back pain. Skin: Negative. Neurological:  Negative for weakness, light-headedness and headaches. Psychiatric/Behavioral:  Positive for sleep disturbance (from hot flashes). All other systems reviewed and are negative. Objective:      /80 (BP Location: Left arm, Patient Position: Sitting, Cuff Size: Standard)   Wt 72.1 kg (159 lb)   BMI 27.29 kg/m²          Physical Exam  Vitals and nursing note reviewed. Constitutional:       Appearance: Normal appearance. She is well-developed. Skin:     General: Skin is warm and dry. Neurological:      Mental Status: She is alert and oriented to person, place, and time.    Psychiatric:         Mood and Affect: Mood normal.         Behavior: Behavior normal.       Physical exam otherwise deferred today

## 2023-11-02 ENCOUNTER — TELEMEDICINE (OUTPATIENT)
Dept: PSYCHIATRY | Facility: CLINIC | Age: 53
End: 2023-11-02
Payer: COMMERCIAL

## 2023-11-02 DIAGNOSIS — F41.1 GAD (GENERALIZED ANXIETY DISORDER): Primary | ICD-10-CM

## 2023-11-02 DIAGNOSIS — F31.81 BIPOLAR 2 DISORDER (HCC): ICD-10-CM

## 2023-11-02 PROCEDURE — 90837 PSYTX W PT 60 MINUTES: CPT

## 2023-11-02 NOTE — PSYCH
Virtual Regular Visit    Verification of patient location:    Patient is located at Home in the following state in which I hold an active license [place]      Assessment/Plan:    Problem List Items Addressed This Visit          Other    ZAINA (generalized anxiety disorder) - Primary    Bipolar 2 disorder ([address])       Goals addressed in session: Goal 1          Reason for visit is No chief complaint on file. Encounter provider Rush Hart LCSW    Provider located at [address] [place] [id number] [phone]      Recent Visits  No visits were found meeting these conditions. Showing recent visits within past 7 days and meeting all other requirements  Today's Visits  Date Type Provider Dept   11/02/23 Telemedicine Rush Hart LCSW [facility]   Showing today's visits and meeting all other requirements  Future Appointments  No visits were found meeting these conditions. Showing future appointments within next 150 days and meeting all other requirements       The patient was identified by name and date of birth. Smooth Peña was informed that this is a telemedicine visit and that the visit is being conducted throughthe [employer] platform. She agrees to proceed. .  My office door was closed. No one else was in the room. She acknowledged consent and understanding of privacy and security of the video platform. The patient has agreed to participate and understands they can discontinue the visit at any time. Patient is aware this is a billable service. Subjective  Smooth Peña is a 47 y.o. female\.       HPI     Past Medical History:   Diagnosis Date    Anxiety     Anxiety     Asthma     Bipolar disorder ([address])     Carpal tunnel syndrome     Chronic pain     lower back    Depression     Disease of thyroid gland     Dyslipidemia     Fibromyalgia     Hypothyroidism 2007 Irritable bowel     Kidney stones     Kidney stones 2019    Migraine     Obesity (BMI 30.0-34. 9)     Psychiatric disorder     depression/anxiety    Suicide attempt [facility])     as teen    Vitamin D deficiency        Past Surgical History:   Procedure Laterality Date    BUNIONECTOMY       SECTION      CHOLECYSTECTOMY      PARTIAL HYSTERECTOMY      portion of  uterus still present    TONSILLECTOMY      TUBAL LIGATION         Current Outpatient Medications   Medication Sig Dispense Refill    albuterol (PROVENTIL HFA,VENTOLIN HFA) 90 mcg/act inhaler INHALE TWO PUFFS EVERY 6 (SIX) HOURS AS NEEDED FOR WHEEZING OR SHORTNESS OF BREATH 18 g 0    [place] Thyroid 60 MG tablet Take one and 1/2 tabs daily 135 tablet 2    azelastine (ASTELIN) 0.1 % nasal spray 1 SPRAY INTO EACH NOSTRIL TWO (TWO) TIMES A DAY USE IN EACH NOSTRIL AS DIRECTED 30 mL 2    Blood Glucose Monitoring Suppl (OneTouch Verio Reflect) w/Device KIT Check blood sugars once daily. Please substitute with appropriate alternative as covered by patient's insurance. Dx: E11.65 1 kit 0    butalbital-acetaminophen-caffeine (FIORICET,ESGIC) -40 mg per tablet Take 1 tablet by mouth every 6 (six) hours as needed for headaches 30 tablet 0    celecoxib (CELEBREX) 200 mg capsule Take 1 capsule (200 mg total) by mouth daily 30 capsule 2    Diclofenac Sodium (VOLTAREN) 1 % Apply 2 g topically 4 (four) times a day 50 g 0    dicyclomine (BENTYL) 10 mg capsule Take 1 capsule (10 mg total) by mouth 3 (three) times a day as needed (abd cramping) 30 capsule 0    divalproex sodium (DEPAKOTE ER) 250 mg 24 hr tablet       fluticasone (FLONASE) 50 mcg/act nasal spray INHALE TWO PUFFS TWO (TWO) TIMES A DAY RINSE MOUTH AFTER USE. 16 g 0    fluticasone (Flovent Diskus) 50 mcg/actuation diskus inhaler Inhale 1 puff 2 (two) times a day Rinse mouth after use.  60 blister 0    fremanezumab-vfrm (Ajovy) 225 MG/1.5ML auto-injector Inject 225 mg under the skin every 30 (thirty) days glucose blood (OneTouch Verio) test strip Check blood sugars once daily. Please substitute with appropriate alternative as covered by patient's insurance. Dx: E11.65 100 each 3    ipratropium-albuterol (DUO-NEB) 0.5-2.5 mg/3 mL nebulizer solution Take 3 mL by nebulization 4 (four) times a day 90 mL 1    ketoconazole (NIZORAL) 2 % cream       levocetirizine (XYZAL) 5 MG tablet Take 1 tablet (5 mg total) by mouth every evening 90 tablet 1    lidocaine (LIDODERM) 5 % Apply 1 patch topically daily as needed (back pain) Remove & Discard patch within 12 hours or as directed by MD 6 patch 1    meclizine (ANTIVERT) 25 mg tablet Take 1 tablet (25 mg total) by mouth every 8 (eight) hours as needed for dizziness 30 tablet 0    methotrexate 2.5 mg tablet       mometasone (ELOCON) 0.1 % cream Apply topically daily 45 g 0    naratriptan (AMERGE) 2.5 MG tablet       omeprazole (PriLOSEC) 40 MG capsule TAKE 1 CAPSULE (40 MG TOTAL) BY MOUTH DAILY 90 capsule 0    ondansetron (ZOFRAN-ODT) 4 mg disintegrating tablet TAKE 1 TABLET (4 MG TOTAL) BY MOUTH EVERY EIGHT (EIGHT) HOURS AS NEEDED FOR NAUSEA 20 tablet 0    OneTouch Delica Lancets 12Q MISC Check blood sugars once daily. Please substitute with appropriate alternative as covered by patient's insurance.  Dx: E11.65 100 each 3    PARoxetine Mesylate 7.5 MG CAPS Take 1 capsule (7.5 mg total) by mouth in the morning 30 capsule 2    Respiratory Therapy Supplies (NEBULIZER) DONA by Does not apply route every 4 (four) hours while awake 90 each 1    semaglutide, 1 mg/dose, (Ozempic, 1 MG/DOSE,) 4 mg/3 mL injection pen Inject 0.75 mL (1 mg total) under the skin every 7 days 9 mL 1    Syringe/Needle, Disp, (SYRINGE 3CC/58FD2-3/2") 20G X 1-1/2" 3 ML MISC Use once a week With b12 injection 7 each 0    tiZANidine (ZANAFLEX) 4 mg tablet       Topiramate  MG CP24 Take 100 mg by mouth 2 (two) times a day      Ubrelvy 100 MG tablet       Vraylar 4.5 MG capsule TAKE 1 CAPSULE (4.5 MG TOTAL) BY MOUTH DAILY INSERT IN THE MORNING INCREASING THE DOSE 30 capsule 0    XIFAXAN 550 MG tablet        Current Facility-Administered Medications   Medication Dose Route Frequency Provider Last Rate Last Admin    cyanocobalamin injection 1,000 mcg  1,000 mcg Intramuscular Q30 Days JAGJIT WhartonJANAY   1,000 mcg at 07/27/23 1025        Allergies   Allergen Reactions    Doxycycline Rash    Erythromycin Rash    Other Edema and Wheezing     jalapeno    Augmentin [Amoxicillin-Pot Clavulanate] GI Intolerance    Latex Rash    Duloxetine      Other reaction(s): Nausea, Loopy    Eletriptan      Pain in lower jaw with pressure in throat    Emgality [Galcanezumab-Gnlm]     Galcanezumab      rash       Review of Systems    Video Exam    There were no vitals filed for this visit. Physical Exam     Behavioral Health Psychotherapy Progress Note    Psychotherapy Provided: Individual Psychotherapy      Diagnosis ICD-10-CM Associated Orders   1. ZAINA (generalized anxiety disorder)  F41.1       2. Bipolar 2 disorder ([facility])  F31.81            Goals addressed in session: Goal 1     DATA: Met with Gloria Clements for a scheduled individual session. Topics discussed included emotional wellness, coping skills, relationship with significant other, and relationships with family. Harsha Elise is feeling "alright". She has to take her grandson to a hematologist because his blood work is still "off". She spoke about her boyfriend Vi Bess. She stated he has made a lot of improvements. However, he's been putting some pressure on her about their future and if they're going to get . Harsha Elise stated that she isn't going to respond to pressure, she has too much other stuff going on. She just wants him to think outside of himself, especially if he's looking to move in with Harsha Elise and her grandson. She has a routine for her grandson and herself and it's not reasonable to expect her to conform to his routine--there has to be compromise.  Harsha Elise asked this clinician about cognitive behavioral therapy. We discussed what it is and ways we can start to work on it. Cierra Raya shows evidence of utilizing effective communication skills, engaging in problem solving, and maintaining emotional composure to manage mental health symptoms. During this session, this clinical used the following therapeutic modalities supportive psychotherapy, client-centered therapy, CBT techniques, and stress management skills. Substance Abuse was not addressed during this session. If the client is diagnosed with a co-occurring substance use disorder, please indicate any changes in the frequency or amount of use: N/A. Stage of change for addressing substance use diagnoses: No substance use/Not applicable    ASSESSMENT:  Cierra Raya presents with a Euthymic/ normal mood. her affect is normal range and intensity, appropriate, which is congruent, with her  mood and the content of the session. Cierra Raya was oriented x3. She was focused and engaged. Cierra Raya exhibits good therapeutic rapport with this clinician. The client has made progress on their goals. Aylin Rios presents with a none risk of suicide, none risk of self-harm, and none risk of harm to others. For any risk assessment that surpasses a "low" rating, a safety plan must be developed. A safety plan was indicated: no  If yes, describe in detail: N/A    PLAN: Cierra Raya will return in 1 week for the next scheduled session. At the next session, the therapist will use supportive psychotherapy and client-centered therapy to address anxiety. Behavioral Health Treatment Plan and Discharge Planning: Aylin Rios is aware of and agrees to continue to work on their treatment plan. They have identified and are working toward their discharge goals.  yes    Visit start and stop times:    11/02/23  Start Time: 1002  Stop Time: 1059  Total Visit Time: 57 minutes

## 2023-11-06 ENCOUNTER — TELEPHONE (OUTPATIENT)
Dept: FAMILY MEDICINE CLINIC | Facility: CLINIC | Age: 53
End: 2023-11-06

## 2023-11-06 NOTE — TELEPHONE ENCOUNTER
Patient's aba was diagnosed with RSV on 10/30/23 at [address] [place] [address]. Now patient feels congested now.   Concerned because she has asthma   What should she do?   [phone]

## 2023-11-07 ENCOUNTER — OFFICE VISIT (OUTPATIENT)
Dept: URGENT CARE | Facility: CLINIC | Age: 53
End: 2023-11-07
Payer: COMMERCIAL

## 2023-11-07 VITALS
OXYGEN SATURATION: 97 % | DIASTOLIC BLOOD PRESSURE: 80 MMHG | HEART RATE: 76 BPM | SYSTOLIC BLOOD PRESSURE: 118 MMHG | RESPIRATION RATE: 18 BRPM | TEMPERATURE: 98 F

## 2023-11-07 DIAGNOSIS — J06.9 VIRAL UPPER RESPIRATORY TRACT INFECTION WITH COUGH: Primary | ICD-10-CM

## 2023-11-07 DIAGNOSIS — R05.1 ACUTE COUGH: ICD-10-CM

## 2023-11-07 LAB
ATRIAL RATE: 75 BPM
P AXIS: 23 DEGREES
PR INTERVAL: 184 MS
QRS AXIS: 54 DEGREES
QRSD INTERVAL: 84 MS
QT INTERVAL: 378 MS
QTC INTERVAL: 422 MS
SARS-COV-2 AG UPPER RESP QL IA: NEGATIVE
T WAVE AXIS: 66 DEGREES
VALID CONTROL: NORMAL
VENTRICULAR RATE: 75 BPM

## 2023-11-07 PROCEDURE — 93010 ELECTROCARDIOGRAM REPORT: CPT | Performed by: INTERNAL MEDICINE

## 2023-11-07 PROCEDURE — 93005 ELECTROCARDIOGRAM TRACING: CPT | Performed by: STUDENT IN AN ORGANIZED HEALTH CARE EDUCATION/TRAINING PROGRAM

## 2023-11-07 PROCEDURE — 99214 OFFICE O/P EST MOD 30 MIN: CPT | Performed by: STUDENT IN AN ORGANIZED HEALTH CARE EDUCATION/TRAINING PROGRAM

## 2023-11-07 PROCEDURE — 87811 SARS-COV-2 COVID19 W/OPTIC: CPT | Performed by: STUDENT IN AN ORGANIZED HEALTH CARE EDUCATION/TRAINING PROGRAM

## 2023-11-07 RX ORDER — GUAIFENESIN 200 MG/10ML
200 LIQUID ORAL 3 TIMES DAILY PRN
Qty: 120 ML | Refills: 0 | Status: SHIPPED | OUTPATIENT
Start: 2023-11-07

## 2023-11-07 RX ORDER — BENZONATATE 100 MG/1
100 CAPSULE ORAL 3 TIMES DAILY PRN
Qty: 20 CAPSULE | Refills: 0 | Status: SHIPPED | OUTPATIENT
Start: 2023-11-07

## 2023-11-07 NOTE — TELEPHONE ENCOUNTER
Called patient and she is aware and asked if we can call in med's for her just to have just in case for her neb machine. She is completely out.  She will also use nasal spray and mucinex as well to help

## 2023-11-07 NOTE — PROGRESS NOTES
[facility]        NAME: Carolina Fraire is a 47 y.o. female  : 1970    MRN: [id number]  DATE: 2023  TIME: 9:51 AM    Assessment and Orders   Viral upper respiratory tract infection with cough [J06.9]  1. Viral upper respiratory tract infection with cough  benzonatate (TESSALON PERLES) 100 mg capsule    guaiFENesin (ROBITUSSIN) 100 MG/5ML oral liquid      2. Acute cough  Poct Covid 19 Rapid Antigen Test    benzonatate (TESSALON PERLES) 100 mg capsule    guaiFENesin (ROBITUSSIN) 100 MG/5ML oral liquid            Plan and Discussion      Patient presents with cough, lightheadedness and chest pressure. Patient is currently being worked up for her dizziness by cardiology. She just sent in her cardiac monitor and is waiting for results. Obtained EKG as patient complaining of chest pressure - NSR. Lungs are CTA. O2 saturations are normal at 97%. Will treat with Robitussin and Tessalon Perles. Patient instructed to use home inhaler should wheezing develop. Discussed ED precautions including (but not limited to)  Difficultly breathing or shortness of breath  Chest pain  Acutely worsening symptoms. Risks and benefits discussed. Patient understands and agrees with the plan. Follow up with PCP. Chief Complaint     Chief Complaint   Patient presents with    Cough     Pt c/o cough with chest congestion x2 days. Pt states she took fiance to ED last Monday he tested positive for RSV. History of Present Illness       Recently wore a heart monitor for chest pain. She just sent the monitor in. She has not gotten her results back. Meclizine has been helping with dizziness. Patient complaining of chest pressure. Cough  This is a new problem. The current episode started in the past 7 days. Associated symptoms include headaches. Pertinent negatives include no ear pain, fever, sore throat or wheezing. She has tried nothing for the symptoms.  Her past medical history is significant for asthma (has not been using inhaler). There is no history of COPD. Review of Systems   Review of Systems   Constitutional:  Positive for fatigue. Negative for fever. HENT:  Negative for congestion, ear pain and sore throat. Respiratory:  Positive for cough and chest tightness. Negative for wheezing. Neurological:  Positive for light-headedness and headaches. Current Medications       Current Outpatient Medications:     [place] Thyroid 60 MG tablet, Take one and 1/2 tabs daily, Disp: 135 tablet, Rfl: 2    azelastine (ASTELIN) 0.1 % nasal spray, 1 SPRAY INTO EACH NOSTRIL TWO (TWO) TIMES A DAY USE IN EACH NOSTRIL AS DIRECTED, Disp: 30 mL, Rfl: 2    benzonatate (TESSALON PERLES) 100 mg capsule, Take 1 capsule (100 mg total) by mouth 3 (three) times a day as needed for cough, Disp: 20 capsule, Rfl: 0    celecoxib (CELEBREX) 200 mg capsule, Take 1 capsule (200 mg total) by mouth daily, Disp: 30 capsule, Rfl: 2    divalproex sodium (DEPAKOTE ER) 250 mg 24 hr tablet, , Disp: , Rfl:     guaiFENesin (ROBITUSSIN) 100 MG/5ML oral liquid, Take 10 mL (200 mg total) by mouth 3 (three) times a day as needed for cough, Disp: 120 mL, Rfl: 0    PARoxetine Mesylate 7.5 MG CAPS, Take 1 capsule (7.5 mg total) by mouth in the morning, Disp: 30 capsule, Rfl: 2    semaglutide, 1 mg/dose, (Ozempic, 1 MG/DOSE,) 4 mg/3 mL injection pen, Inject 0.75 mL (1 mg total) under the skin every 7 days, Disp: 9 mL, Rfl: 1    Ubrelvy 100 MG tablet, , Disp: , Rfl:     Vraylar 4.5 MG capsule, TAKE 1 CAPSULE (4.5 MG TOTAL) BY MOUTH DAILY INSERT IN THE MORNING INCREASING THE DOSE, Disp: 30 capsule, Rfl: 0    albuterol (PROVENTIL HFA,VENTOLIN HFA) 90 mcg/act inhaler, INHALE TWO PUFFS EVERY 6 (SIX) HOURS AS NEEDED FOR WHEEZING OR SHORTNESS OF BREATH (Patient not taking: Reported on 11/7/2023), Disp: 18 g, Rfl: 0    Blood Glucose Monitoring Suppl (OneTouch Verio Reflect) w/Device KIT, Check blood sugars once daily.  Please substitute with appropriate alternative as covered by patient's insurance. Dx: E11.65, Disp: 1 kit, Rfl: 0    butalbital-acetaminophen-caffeine (FIORICET,ESGIC) -40 mg per tablet, Take 1 tablet by mouth every 6 (six) hours as needed for headaches, Disp: 30 tablet, Rfl: 0    Diclofenac Sodium (VOLTAREN) 1 %, Apply 2 g topically 4 (four) times a day, Disp: 50 g, Rfl: 0    dicyclomine (BENTYL) 10 mg capsule, Take 1 capsule (10 mg total) by mouth 3 (three) times a day as needed (abd cramping), Disp: 30 capsule, Rfl: 0    fluticasone (FLONASE) 50 mcg/act nasal spray, INHALE TWO PUFFS TWO (TWO) TIMES A DAY RINSE MOUTH AFTER USE., Disp: 16 g, Rfl: 0    fluticasone (Flovent Diskus) 50 mcg/actuation diskus inhaler, Inhale 1 puff 2 (two) times a day Rinse mouth after use., Disp: 60 blister, Rfl: 0    fremanezumab-vfrm (Ajovy) 225 MG/1.5ML auto-injector, Inject 225 mg under the skin every 30 (thirty) days, Disp: , Rfl:     glucose blood (OneTouch Verio) test strip, Check blood sugars once daily. Please substitute with appropriate alternative as covered by patient's insurance.  Dx: E11.65, Disp: 100 each, Rfl: 3    ipratropium-albuterol (DUO-NEB) 0.5-2.5 mg/3 mL nebulizer solution, Take 3 mL by nebulization 4 (four) times a day (Patient not taking: Reported on 11/7/2023), Disp: 90 mL, Rfl: 1    ketoconazole (NIZORAL) 2 % cream, , Disp: , Rfl:     levocetirizine (XYZAL) 5 MG tablet, Take 1 tablet (5 mg total) by mouth every evening, Disp: 90 tablet, Rfl: 1    lidocaine (LIDODERM) 5 %, Apply 1 patch topically daily as needed (back pain) Remove & Discard patch within 12 hours or as directed by MD, Disp: 6 patch, Rfl: 1    meclizine (ANTIVERT) 25 mg tablet, Take 1 tablet (25 mg total) by mouth every 8 (eight) hours as needed for dizziness, Disp: 30 tablet, Rfl: 0    methotrexate 2.5 mg tablet, , Disp: , Rfl:     mometasone (ELOCON) 0.1 % cream, Apply topically daily, Disp: 45 g, Rfl: 0    naratriptan (AMERGE) 2.5 MG tablet, , Disp: , Rfl:     omeprazole (PriLOSEC) 40 MG capsule, TAKE 1 CAPSULE (40 MG TOTAL) BY MOUTH DAILY, Disp: 90 capsule, Rfl: 0    ondansetron (ZOFRAN-ODT) 4 mg disintegrating tablet, TAKE 1 TABLET (4 MG TOTAL) BY MOUTH EVERY EIGHT (EIGHT) HOURS AS NEEDED FOR NAUSEA, Disp: 20 tablet, Rfl: 0    OneTouch Delica Lancets 51H MISC, Check blood sugars once daily. Please substitute with appropriate alternative as covered by patient's insurance.  Dx: E11.65, Disp: 100 each, Rfl: 3    Respiratory Therapy Supplies (NEBULIZER) DONA, by Does not apply route every 4 (four) hours while awake, Disp: 90 each, Rfl: 1    Syringe/Needle, Disp, (SYRINGE 3CC/85GV2-5/2") 20G X 1-1/2" 3 ML MISC, Use once a week With b12 injection, Disp: 7 each, Rfl: 0    tiZANidine (ZANAFLEX) 4 mg tablet, , Disp: , Rfl:     Topiramate  MG CP24, Take 100 mg by mouth 2 (two) times a day, Disp: , Rfl:     XIFAXAN 550 MG tablet, , Disp: , Rfl:     Current Facility-Administered Medications:     cyanocobalamin injection 1,000 mcg, 1,000 mcg, Intramuscular, Q30 Days, ERICKA George, 1,000 mcg at 07/27/23 1025    Current Allergies     Allergies as of 11/07/2023 - Reviewed 11/07/2023   Allergen Reaction Noted    Doxycycline Rash     Erythromycin Rash 06/08/2018    Other Edema and Wheezing 04/14/2008    Augmentin [amoxicillin-pot clavulanate] GI Intolerance 05/09/2023    Latex Rash 04/27/2015    Duloxetine  09/26/2020    Eletriptan  07/31/2017    Emgality [galcanezumab-gnlm]  11/13/2020    Galcanezumab  10/06/2020            The following portions of the patient's history were reviewed and updated as appropriate: allergies, current medications, past family history, past medical history, past social history, past surgical history and problem list.     Past Medical History:   Diagnosis Date    Anxiety     Anxiety     Asthma     Bipolar disorder ([address])     Carpal tunnel syndrome     Chronic pain     lower back    Depression     Disease of thyroid gland Dyslipidemia     Fibromyalgia     Hypothyroidism 2007    Irritable bowel     Kidney stones     Kidney stones 2019    Migraine     Obesity (BMI 30.0-34. 9)     Psychiatric disorder     depression/anxiety    Suicide attempt [facility])     as teen    Vitamin D deficiency        Past Surgical History:   Procedure Laterality Date    BUNIONECTOMY       SECTION      CHOLECYSTECTOMY      PARTIAL HYSTERECTOMY      portion of  uterus still present    TONSILLECTOMY      TUBAL LIGATION         Family History   Problem Relation Age of Onset    Hypertension Mother     Diabetes Mother     Hypothyroidism Mother     Stroke Mother     Bipolar disorder Mother     Anxiety disorder Mother     Arthritis Mother     Depression Mother     Mental illness Mother     Cancer Father         pancreatic     Hypothyroidism Sister     Hypertension Brother     Heart disease Brother     Cancer Maternal Uncle         lung    Cancer Paternal Aunt         breast    Breast cancer Paternal Aunt     Cancer Paternal Uncle         testicular     Cancer Paternal Grandmother         breast    Dementia Paternal Grandmother     Breast cancer Paternal Grandmother     Cancer Cousin         brain    Bipolar disorder Daughter          Medications have been verified. Objective   /80   Pulse 76   Temp 98 °F (36.7 °C) (Temporal)   Resp 18   SpO2 97%   No LMP recorded. Patient has had a hysterectomy. Physical Exam     Physical Exam  Constitutional:       General: She is not in acute distress. Appearance: She is normal weight. HENT:      Right Ear: There is impacted cerumen. Left Ear: Tympanic membrane normal.      Nose: No congestion or rhinorrhea. Mouth/Throat:      Pharynx: No oropharyngeal exudate or posterior oropharyngeal erythema. Comments: Hoarse voice    Cardiovascular:      Rate and Rhythm: Normal rate and regular rhythm. Pulmonary:      Effort: No respiratory distress. Breath sounds:  No wheezing or viktoriya.   Neurological:      General: No focal deficit present. Mental Status: She is alert and oriented to person, place, and time.    Psychiatric:         Mood and Affect: Mood normal.         Behavior: Behavior normal.               Thomas Morley DO

## 2023-11-08 DIAGNOSIS — J06.9 UPPER RESPIRATORY TRACT INFECTION, UNSPECIFIED TYPE: ICD-10-CM

## 2023-11-08 RX ORDER — IPRATROPIUM BROMIDE AND ALBUTEROL SULFATE 2.5; .5 MG/3ML; MG/3ML
3 SOLUTION RESPIRATORY (INHALATION) 4 TIMES DAILY
Qty: 90 ML | Refills: 1 | Status: SHIPPED | OUTPATIENT
Start: 2023-11-08 | End: 2023-12-04

## 2023-11-08 RX ORDER — PREDNISONE 20 MG/1
20 TABLET ORAL DAILY
Qty: 5 TABLET | Refills: 0 | Status: SHIPPED | OUTPATIENT
Start: 2023-11-08 | End: 2023-11-13

## 2023-11-09 ENCOUNTER — TELEMEDICINE (OUTPATIENT)
Dept: PSYCHIATRY | Facility: CLINIC | Age: 53
End: 2023-11-09
Payer: COMMERCIAL

## 2023-11-09 DIAGNOSIS — F31.81 BIPOLAR 2 DISORDER (HCC): ICD-10-CM

## 2023-11-09 DIAGNOSIS — F41.1 GAD (GENERALIZED ANXIETY DISORDER): Primary | ICD-10-CM

## 2023-11-09 PROCEDURE — 90834 PSYTX W PT 45 MINUTES: CPT

## 2023-11-09 NOTE — PSYCH
Virtual Regular Visit    Verification of patient location:    Patient is located at Home in the following state in which I hold an active license [place]      Assessment/Plan:    Problem List Items Addressed This Visit          Other    ZAINA (generalized anxiety disorder) - Primary    Bipolar 2 disorder ([address] )       Goals addressed in session: Goal 1          Reason for visit is No chief complaint on file. Encounter provider Shaheen Mcconnell LCSW    Provider located at [address] [place] [id number] [phone]      Recent Visits  Date Type Provider Dept   11/06/23 Telephone ERICKA Jalloh [facility]   11/02/23 Telemedicine Shaheen Mcconnell LCSW [facility]   Showing recent visits within past 7 days and meeting all other requirements  Today's Visits  Date Type Provider Dept   11/09/23 Telemedicine Shaheen Mcconnell LCSW [facility]   Showing today's visits and meeting all other requirements  Future Appointments  No visits were found meeting these conditions. Showing future appointments within next 150 days and meeting all other requirements       The patient was identified by name and date of birth. Anastacio Casanova was informed that this is a telemedicine visit and that the visit is being conducted throughthe [employer] platform. She agrees to proceed. .  My office door was closed. No one else was in the room. She acknowledged consent and understanding of privacy and security of the video platform. The patient has agreed to participate and understands they can discontinue the visit at any time. Patient is aware this is a billable service. Subjective  Anastacio Casanova is a 47 y.o. female.       HPI     Past Medical History:   Diagnosis Date    Anxiety     Anxiety     Asthma     Bipolar disorder ([address])     Carpal tunnel syndrome Chronic pain     lower back    Depression     Disease of thyroid gland     Dyslipidemia     Fibromyalgia     Hypothyroidism     Irritable bowel     Kidney stones     Kidney stones 2019    Migraine     Obesity (BMI 30.0-34. 9)     Psychiatric disorder     depression/anxiety    Suicide attempt [facility])     as teen    Vitamin D deficiency        Past Surgical History:   Procedure Laterality Date    BUNIONECTOMY       SECTION      CHOLECYSTECTOMY      PARTIAL HYSTERECTOMY      portion of  uterus still present    TONSILLECTOMY      TUBAL LIGATION         Current Outpatient Medications   Medication Sig Dispense Refill    ipratropium-albuterol (DUO-NEB) 0.5-2.5 mg/3 mL nebulizer solution Take 3 mL by nebulization 4 (four) times a day 90 mL 1    predniSONE 20 mg tablet Take 1 tablet (20 mg total) by mouth daily for 5 days 5 tablet 0    albuterol (PROVENTIL HFA,VENTOLIN HFA) 90 mcg/act inhaler INHALE TWO PUFFS EVERY 6 (SIX) HOURS AS NEEDED FOR WHEEZING OR SHORTNESS OF BREATH (Patient not taking: Reported on 2023) 18 g 0    [place] Thyroid 60 MG tablet Take one and 1/2 tabs daily 135 tablet 2    azelastine (ASTELIN) 0.1 % nasal spray 1 SPRAY INTO EACH NOSTRIL TWO (TWO) TIMES A DAY USE IN EACH NOSTRIL AS DIRECTED 30 mL 2    benzonatate (TESSALON PERLES) 100 mg capsule Take 1 capsule (100 mg total) by mouth 3 (three) times a day as needed for cough 20 capsule 0    Blood Glucose Monitoring Suppl (OneTouch Verio Reflect) w/Device KIT Check blood sugars once daily. Please substitute with appropriate alternative as covered by patient's insurance.  Dx: E11.65 1 kit 0    butalbital-acetaminophen-caffeine (FIORICET,ESGIC) -40 mg per tablet Take 1 tablet by mouth every 6 (six) hours as needed for headaches 30 tablet 0    celecoxib (CELEBREX) 200 mg capsule Take 1 capsule (200 mg total) by mouth daily 30 capsule 2    Diclofenac Sodium (VOLTAREN) 1 % Apply 2 g topically 4 (four) times a day 50 g 0    dicyclomine (BENTYL) 10 mg capsule Take 1 capsule (10 mg total) by mouth 3 (three) times a day as needed (abd cramping) 30 capsule 0    divalproex sodium (DEPAKOTE ER) 250 mg 24 hr tablet       fluticasone (FLONASE) 50 mcg/act nasal spray INHALE TWO PUFFS TWO (TWO) TIMES A DAY RINSE MOUTH AFTER USE. 16 g 0    fluticasone (Flovent Diskus) 50 mcg/actuation diskus inhaler Inhale 1 puff 2 (two) times a day Rinse mouth after use. 60 blister 0    fremanezumab-vfrm (Ajovy) 225 MG/1.5ML auto-injector Inject 225 mg under the skin every 30 (thirty) days      glucose blood (OneTouch Verio) test strip Check blood sugars once daily. Please substitute with appropriate alternative as covered by patient's insurance. Dx: E11.65 100 each 3    guaiFENesin (ROBITUSSIN) 100 MG/5ML oral liquid Take 10 mL (200 mg total) by mouth 3 (three) times a day as needed for cough 120 mL 0    ketoconazole (NIZORAL) 2 % cream       levocetirizine (XYZAL) 5 MG tablet Take 1 tablet (5 mg total) by mouth every evening 90 tablet 1    lidocaine (LIDODERM) 5 % Apply 1 patch topically daily as needed (back pain) Remove & Discard patch within 12 hours or as directed by MD 6 patch 1    meclizine (ANTIVERT) 25 mg tablet Take 1 tablet (25 mg total) by mouth every 8 (eight) hours as needed for dizziness 30 tablet 0    methotrexate 2.5 mg tablet       mometasone (ELOCON) 0.1 % cream Apply topically daily 45 g 0    naratriptan (AMERGE) 2.5 MG tablet       omeprazole (PriLOSEC) 40 MG capsule TAKE 1 CAPSULE (40 MG TOTAL) BY MOUTH DAILY 90 capsule 0    ondansetron (ZOFRAN-ODT) 4 mg disintegrating tablet TAKE 1 TABLET (4 MG TOTAL) BY MOUTH EVERY EIGHT (EIGHT) HOURS AS NEEDED FOR NAUSEA 20 tablet 0    OneTouch Delica Lancets 53B MISC Check blood sugars once daily. Please substitute with appropriate alternative as covered by patient's insurance.  Dx: E11.65 100 each 3    PARoxetine Mesylate 7.5 MG CAPS Take 1 capsule (7.5 mg total) by mouth in the morning 30 capsule 2    Respiratory Therapy Supplies (NEBULIZER) DONA by Does not apply route every 4 (four) hours while awake 90 each 1    semaglutide, 1 mg/dose, (Ozempic, 1 MG/DOSE,) 4 mg/3 mL injection pen Inject 0.75 mL (1 mg total) under the skin every 7 days 9 mL 1    Syringe/Needle, Disp, (SYRINGE 3CC/87AP5-2/2") 20G X 1-1/2" 3 ML MISC Use once a week With b12 injection 7 each 0    tiZANidine (ZANAFLEX) 4 mg tablet       Topiramate  MG CP24 Take 100 mg by mouth 2 (two) times a day      Ubrelvy 100 MG tablet       Vraylar 4.5 MG capsule TAKE 1 CAPSULE (4.5 MG TOTAL) BY MOUTH DAILY INSERT IN THE MORNING INCREASING THE DOSE 30 capsule 0    XIFAXAN 550 MG tablet        Current Facility-Administered Medications   Medication Dose Route Frequency Provider Last Rate Last Admin    cyanocobalamin injection 1,000 mcg  1,000 mcg Intramuscular Q30 Days ERICKA Tovar   1,000 mcg at 07/27/23 1025        Allergies   Allergen Reactions    Doxycycline Rash    Erythromycin Rash    Other Edema and Wheezing     jalapeno    Augmentin [Amoxicillin-Pot Clavulanate] GI Intolerance    Latex Rash    Duloxetine      Other reaction(s): Nausea, Loopy    Eletriptan      Pain in lower jaw with pressure in throat    Emgality [Galcanezumab-Gnlm]     Galcanezumab      rash       Review of Systems    Video Exam    There were no vitals filed for this visit. Physical Exam     Behavioral Health Psychotherapy Progress Note    Psychotherapy Provided: Individual Psychotherapy      Diagnosis ICD-10-CM Associated Orders   1. ZAINA (generalized anxiety disorder)  F41.1       2. Bipolar 2 disorder ([address] [facility])  F31.81            Goals addressed in session: Goal 1     DATA: Met with Akira Chavarria for a scheduled individual session. Topics discussed included emotional wellness, coping skills, and relationship with significant other. No Perez is feeling "ok", stating she's struggling with a cough. Her grandson and boyfriend/fiance Trevor Vishnu) were both sick with colds.  She got it but she feels it didn't hit her as hard. Aline Hernandez stated she feels frustrated with Sam Mosqueda. She states he's needy and he constantly needs reminded to do things around the house; he wants praise when he does these tasks. Aline Hernandez doesn't feel up to all of this with him and feels pressured by him to get . She has made it clear that she isn't going to go through with it under pressure. Aline Hernandez shows evidence of utilizing effective communication skills, engaging in problem solving, and maintaining emotional composure to manage mental health symptoms. During this session, this clinical used the following therapeutic modalities supportive psychotherapy, client-centered therapy, CBT techniques, and stress management skills. Substance Abuse was not addressed during this session. If the client is diagnosed with a co-occurring substance use disorder, please indicate any changes in the frequency or amount of use: N/A. Stage of change for addressing substance use diagnoses: No substance use/Not applicable    ASSESSMENT:  Aline Hernandez presents with a Euthymic/ normal mood. her affect is normal range and intensity, appropriate, which is congruent, with her  mood and the content of the session. Aline Hernandez was oriented x3. She was focused and engaged. Aline Hernandez exhibits good therapeutic rapport with this clinician. The client has made progress on their goals. Mikki Tyler presents with a none risk of suicide, none risk of self-harm, and none risk of harm to others. For any risk assessment that surpasses a "low" rating, a safety plan must be developed. A safety plan was indicated: no  If yes, describe in detail: N/A    PLAN: Aline Hernandez will return in 1 week for the next scheduled session. At the next session, the therapist will use supportive psychotherapy and client-centered therapy to address anxiety.     Behavioral Health Treatment Plan and Discharge Planning: Mikki Tyler is aware of and agrees to continue to work on their treatment plan. They have identified and are working toward their discharge goals.  yes    Visit start and stop times:    11/09/23  Start Time: 1002  Stop Time: 1048  Total Visit Time: 46 minutes

## 2023-11-10 ENCOUNTER — APPOINTMENT (OUTPATIENT)
Dept: LAB | Facility: CLINIC | Age: 53
End: 2023-11-10
Payer: COMMERCIAL

## 2023-11-10 DIAGNOSIS — R53.83 OTHER FATIGUE: ICD-10-CM

## 2023-11-10 DIAGNOSIS — E78.5 HYPERLIPIDEMIA, UNSPECIFIED HYPERLIPIDEMIA TYPE: ICD-10-CM

## 2023-11-10 LAB
BILIRUB UR QL STRIP: NEGATIVE
CHOLEST SERPL-MCNC: 205 MG/DL
CLARITY UR: CLEAR
COLOR UR: NORMAL
GLUCOSE UR STRIP-MCNC: NEGATIVE MG/DL
HDLC SERPL-MCNC: 75 MG/DL
HGB UR QL STRIP.AUTO: NEGATIVE
KETONES UR STRIP-MCNC: NEGATIVE MG/DL
LDLC SERPL CALC-MCNC: 116 MG/DL (ref 0–100)
LEUKOCYTE ESTERASE UR QL STRIP: NEGATIVE
NITRITE UR QL STRIP: NEGATIVE
PH UR STRIP.AUTO: 5.5 [PH]
PROT UR STRIP-MCNC: NEGATIVE MG/DL
SP GR UR STRIP.AUTO: 1.02 (ref 1–1.03)
TRIGL SERPL-MCNC: 71 MG/DL
UROBILINOGEN UR STRIP-ACNC: <2 MG/DL

## 2023-11-10 PROCEDURE — 36415 COLL VENOUS BLD VENIPUNCTURE: CPT

## 2023-11-10 PROCEDURE — 81003 URINALYSIS AUTO W/O SCOPE: CPT

## 2023-11-10 PROCEDURE — 80061 LIPID PANEL: CPT

## 2023-11-14 ENCOUNTER — CLINICAL SUPPORT (OUTPATIENT)
Dept: CARDIOLOGY CLINIC | Facility: CLINIC | Age: 53
End: 2023-11-14
Payer: COMMERCIAL

## 2023-11-14 DIAGNOSIS — R00.2 PALPITATIONS: ICD-10-CM

## 2023-11-14 DIAGNOSIS — R42 DIZZINESS: ICD-10-CM

## 2023-11-14 PROCEDURE — 93248 EXT ECG>7D<15D REV&INTERPJ: CPT | Performed by: INTERNAL MEDICINE

## 2023-11-16 ENCOUNTER — TELEMEDICINE (OUTPATIENT)
Dept: PSYCHIATRY | Facility: CLINIC | Age: 53
End: 2023-11-16

## 2023-11-16 DIAGNOSIS — F31.81 BIPOLAR 2 DISORDER (HCC): ICD-10-CM

## 2023-11-16 DIAGNOSIS — F41.1 GAD (GENERALIZED ANXIETY DISORDER): Primary | ICD-10-CM

## 2023-11-16 NOTE — PSYCH
No Call. No Show. No Charge    Maria Eugenia Edwards no showed 11/16/23 appointment , staff called and left message to reschedule appointment     Treatment Plan not due at this session. WDL

## 2023-11-17 ENCOUNTER — TELEPHONE (OUTPATIENT)
Dept: PSYCHIATRY | Facility: CLINIC | Age: 53
End: 2023-11-17

## 2023-11-20 ENCOUNTER — TELEPHONE (OUTPATIENT)
Dept: CARDIOLOGY CLINIC | Facility: CLINIC | Age: 53
End: 2023-11-20

## 2023-11-20 NOTE — TELEPHONE ENCOUNTER
----- Message from Cortez Arnold MD sent at 11/20/2023  1:19 PM EST -----  Your heart monitor was within normal limits. Your symptoms did not correlate with any abnormal heart rhythm during the monitoring. If you are continuing to have symptoms, then let me know and we can discuss further at follow-up.

## 2023-11-24 DIAGNOSIS — R42 DIZZINESS: ICD-10-CM

## 2023-11-24 DIAGNOSIS — J06.9 UPPER RESPIRATORY TRACT INFECTION, UNSPECIFIED TYPE: ICD-10-CM

## 2023-11-24 RX ORDER — AZELASTINE 1 MG/ML
SPRAY, METERED NASAL
Qty: 30 ML | Refills: 1 | Status: SHIPPED | OUTPATIENT
Start: 2023-11-24

## 2023-11-24 RX ORDER — MECLIZINE HYDROCHLORIDE 25 MG/1
TABLET ORAL
Qty: 30 TABLET | Refills: 0 | Status: SHIPPED | OUTPATIENT
Start: 2023-11-24

## 2023-11-27 ENCOUNTER — TELEPHONE (OUTPATIENT)
Dept: PSYCHIATRY | Facility: CLINIC | Age: 53
End: 2023-11-27

## 2023-11-27 NOTE — TELEPHONE ENCOUNTER
Left detailed message requesting a call back to R/S appt 11/27 @ 3pm. Left TA number and sent scheduling ticket

## 2023-11-30 ENCOUNTER — TELEMEDICINE (OUTPATIENT)
Dept: PSYCHIATRY | Facility: CLINIC | Age: 53
End: 2023-11-30
Payer: COMMERCIAL

## 2023-11-30 ENCOUNTER — TELEPHONE (OUTPATIENT)
Dept: PSYCHIATRY | Facility: CLINIC | Age: 53
End: 2023-11-30

## 2023-11-30 DIAGNOSIS — F31.81 BIPOLAR 2 DISORDER (HCC): ICD-10-CM

## 2023-11-30 DIAGNOSIS — F41.1 GAD (GENERALIZED ANXIETY DISORDER): Primary | ICD-10-CM

## 2023-11-30 PROCEDURE — 90837 PSYTX W PT 60 MINUTES: CPT

## 2023-11-30 NOTE — TELEPHONE ENCOUNTER
Patient called the office and lvm, pt is did state what their reason of calling is. Writer called pt back and lvm leaving office's number to assist with pt.
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
## 2023-12-04 DIAGNOSIS — J06.9 UPPER RESPIRATORY TRACT INFECTION, UNSPECIFIED TYPE: ICD-10-CM

## 2023-12-04 RX ORDER — IPRATROPIUM BROMIDE AND ALBUTEROL SULFATE 2.5; .5 MG/3ML; MG/3ML
SOLUTION RESPIRATORY (INHALATION)
Qty: 90 ML | Refills: 0 | Status: SHIPPED | OUTPATIENT
Start: 2023-12-04

## 2023-12-05 ENCOUNTER — TELEMEDICINE (OUTPATIENT)
Dept: FAMILY MEDICINE CLINIC | Facility: CLINIC | Age: 53
End: 2023-12-05
Payer: COMMERCIAL

## 2023-12-05 ENCOUNTER — TELEPHONE (OUTPATIENT)
Dept: FAMILY MEDICINE CLINIC | Facility: CLINIC | Age: 53
End: 2023-12-05

## 2023-12-05 VITALS — WEIGHT: 159 LBS | HEIGHT: 64 IN | BODY MASS INDEX: 27.14 KG/M2

## 2023-12-05 DIAGNOSIS — R59.9 SWOLLEN LYMPH NODES: Primary | ICD-10-CM

## 2023-12-05 DIAGNOSIS — R11.0 NAUSEA: ICD-10-CM

## 2023-12-05 DIAGNOSIS — R19.7 DIARRHEA, UNSPECIFIED TYPE: ICD-10-CM

## 2023-12-05 DIAGNOSIS — A08.4 VIRAL GASTROENTERITIS: Primary | ICD-10-CM

## 2023-12-05 DIAGNOSIS — R59.9 SWOLLEN LYMPH NODES: ICD-10-CM

## 2023-12-05 PROCEDURE — 99214 OFFICE O/P EST MOD 30 MIN: CPT | Performed by: STUDENT IN AN ORGANIZED HEALTH CARE EDUCATION/TRAINING PROGRAM

## 2023-12-05 RX ORDER — METOCLOPRAMIDE 10 MG/1
10 TABLET ORAL 4 TIMES DAILY
Qty: 60 TABLET | Refills: 0 | Status: SHIPPED | OUTPATIENT
Start: 2023-12-05

## 2023-12-05 NOTE — PROGRESS NOTES
Virtual Regular Visit    Verification of patient location:    Patient is located at Home in the following state in which I hold an active license [place]      Assessment/Plan:    Problem List Items Addressed This Visit        Other    Nausea    Relevant Medications    metoclopramide (Reglan) 10 mg tablet   Other Visit Diagnoses     Viral gastroenteritis    -  Primary    Relevant Medications    metoclopramide (Reglan) 10 mg tablet    Diarrhea, unspecified type        Swollen lymph nodes              Discussed increasing diet slowly. Oral rehydartion         Reason for visit is   Chief Complaint   Patient presents with   • Virtual Brief Visit   • Cold Like Symptoms     low grade fever, dizzy nausea,headache, diarrhea, onset 2-3 day. At home covid test is negative. Patient states her lymph nodes are tender to the touch. Patient has been taking tylenol for symptoms. • Virtual Regular Visit          Encounter provider Steffany Burnham MD    Provider located at [address] [id number] [phone]      Recent Visits  No visits were found meeting these conditions. Showing recent visits within past 7 days and meeting all other requirements  Today's Visits  Date Type Provider Dept   12/05/23 Telemedicine Steffany Burnham MD  Lalitha today's visits and meeting all other requirements  Future Appointments  No visits were found meeting these conditions. Showing future appointments within next 150 days and meeting all other requirements       The patient was identified by name and date of birth. Smooth Peña was informed that this is a telemedicine visit and that the visit is being conducted through the [employer]. She agrees to proceed. .  My office door was closed. No one else was in the room.   She acknowledged consent and understanding of privacy and security of the video platform. The patient has agreed to participate and understands they can discontinue the visit at any time. Patient is aware this is a billable service. Sergio Tyler is a 47 y.o. female       Lots of fatigue the last couple days along with periods of dizziness, stomach hurting with some diarrhea and nausea/vomiting. Stands up and takes a few steps but feels like she is going to pass out. Low appetite, trying to keep hydrated. No fever today, tylenol has mildly helped. Has some painful lymph nodes under the arms/pelvic area. Also occurring in the neck area. Beverley Adhikari is also sick, diagnosed him with hand, foot and mouth. Negative at home covid test. Tylenol did not help with HA. Zofran did not help nor reglan         Past Medical History:   Diagnosis Date   • Anxiety    • Anxiety    • Asthma    • Bipolar disorder [facility])    • Carpal tunnel syndrome    • Chronic pain     lower back   • Depression    • Disease of thyroid gland    • Dyslipidemia    • Fibromyalgia    • Hypothyroidism    • Irritable bowel    • Kidney stones    • Kidney stones 2019   • Migraine    • Obesity (BMI 30.0-34. 9)    • Psychiatric disorder     depression/anxiety   • Suicide attempt [facility])     as teen   • Vitamin D deficiency        Past Surgical History:   Procedure Laterality Date   • BUNIONECTOMY     •  SECTION     • CHOLECYSTECTOMY     • PARTIAL HYSTERECTOMY      portion of  uterus still present   • TONSILLECTOMY     • TUBAL LIGATION         Current Outpatient Medications   Medication Sig Dispense Refill   • albuterol (PROVENTIL HFA,VENTOLIN HFA) 90 mcg/act inhaler INHALE TWO PUFFS EVERY 6 (SIX) HOURS AS NEEDED FOR WHEEZING OR SHORTNESS OF BREATH 18 g 0   • [place] Thyroid 60 MG tablet Take one and 1/2 tabs daily 135 tablet 2   • azelastine (ASTELIN) 0.1 % nasal spray 1 SPRAY INTO EACH NOSTRIL TWO (TWO) TIMES A DAY USE IN EACH NOSTRIL AS DIRECTED 30 mL 1   • Blood Glucose Monitoring Suppl (OneTouch Verio Reflect) w/Device KIT Check blood sugars once daily. Please substitute with appropriate alternative as covered by patient's insurance. Dx: E11.65 1 kit 0   • butalbital-acetaminophen-caffeine (FIORICET,ESGIC) -40 mg per tablet Take 1 tablet by mouth every 6 (six) hours as needed for headaches 30 tablet 0   • celecoxib (CELEBREX) 200 mg capsule Take 1 capsule (200 mg total) by mouth daily 30 capsule 2   • Diclofenac Sodium (VOLTAREN) 1 % Apply 2 g topically 4 (four) times a day 50 g 0   • dicyclomine (BENTYL) 10 mg capsule Take 1 capsule (10 mg total) by mouth 3 (three) times a day as needed (abd cramping) 30 capsule 0   • divalproex sodium (DEPAKOTE ER) 250 mg 24 hr tablet      • fluticasone (FLONASE) 50 mcg/act nasal spray INHALE TWO PUFFS TWO (TWO) TIMES A DAY RINSE MOUTH AFTER USE. 16 g 0   • fremanezumab-vfrm (Ajovy) 225 MG/1.5ML auto-injector Inject 225 mg under the skin every 30 (thirty) days     • glucose blood (OneTouch Verio) test strip Check blood sugars once daily. Please substitute with appropriate alternative as covered by patient's insurance.  Dx: E11.65 100 each 3   • ipratropium-albuterol (DUO-NEB) 0.5-2.5 mg/3 mL nebulizer solution TAKE THREE ML BY NEBULIZATION 4 (FOUR) TIMES A DAY 90 mL 0   • ketoconazole (NIZORAL) 2 % cream      • levocetirizine (XYZAL) 5 MG tablet Take 1 tablet (5 mg total) by mouth every evening 90 tablet 1   • meclizine (ANTIVERT) 25 mg tablet TAKE 1 TABLET (25 MG TOTAL) BY MOUTH EVERY EIGHT (EIGHT) HOURS AS NEEDED FOR DIZZINESS 30 tablet 0   • metoclopramide (Reglan) 10 mg tablet Take 1 tablet (10 mg total) by mouth 4 (four) times a day 60 tablet 0   • mometasone (ELOCON) 0.1 % cream Apply topically daily 45 g 0   • naratriptan (AMERGE) 2.5 MG tablet      • omeprazole (PriLOSEC) 40 MG capsule TAKE 1 CAPSULE (40 MG TOTAL) BY MOUTH DAILY 90 capsule 0   • ondansetron (ZOFRAN-ODT) 4 mg disintegrating tablet TAKE 1 TABLET (4 MG TOTAL) BY MOUTH EVERY EIGHT (EIGHT) HOURS AS NEEDED FOR NAUSEA 20 tablet 0   • OneTouch Delica Lancets 78V MISC Check blood sugars once daily. Please substitute with appropriate alternative as covered by patient's insurance. Dx: E11.65 100 each 3   • PARoxetine Mesylate 7.5 MG CAPS Take 1 capsule (7.5 mg total) by mouth in the morning 30 capsule 2   • semaglutide, 1 mg/dose, (Ozempic, 1 MG/DOSE,) 4 mg/3 mL injection pen Inject 0.75 mL (1 mg total) under the skin every 7 days 9 mL 1   • tiZANidine (ZANAFLEX) 4 mg tablet      • Topiramate  MG CP24 Take 100 mg by mouth 2 (two) times a day     • Ubrelvy 100 MG tablet      • Vraylar 4.5 MG capsule TAKE 1 CAPSULE (4.5 MG TOTAL) BY MOUTH DAILY INSERT IN THE MORNING INCREASING THE DOSE 30 capsule 0   • XIFAXAN 550 MG tablet      • fluticasone (Flovent Diskus) 50 mcg/actuation diskus inhaler Inhale 1 puff 2 (two) times a day Rinse mouth after use. 60 blister 0   • Respiratory Therapy Supplies (NEBULIZER) DONA by Does not apply route every 4 (four) hours while awake 90 each 1     Current Facility-Administered Medications   Medication Dose Route Frequency Provider Last Rate Last Admin   • cyanocobalamin injection 1,000 mcg  1,000 mcg Intramuscular Q30 Days ERICKA Alejandra   1,000 mcg at 07/27/23 1025        Allergies   Allergen Reactions   • Doxycycline Rash   • Erythromycin Rash   • Other Edema and Wheezing     jalapeno   • Augmentin [Amoxicillin-Pot Clavulanate] GI Intolerance   • Latex Rash   • Duloxetine      Other reaction(s): Nausea, Loopy   • Eletriptan      Pain in lower jaw with pressure in throat   • Emgality [Galcanezumab-Gnlm]    • Galcanezumab      rash       Review of Systems   Constitutional:  Negative for activity change, appetite change, chills, fatigue and fever. HENT:  Negative for congestion, rhinorrhea and sore throat. Eyes:  Negative for visual disturbance. Respiratory:  Negative for cough and shortness of breath.     Cardiovascular:  Negative for chest pain and palpitations. Gastrointestinal:  Positive for diarrhea, nausea and vomiting. Negative for abdominal pain and constipation. Genitourinary:  Negative for difficulty urinating, dysuria and frequency. Musculoskeletal:  Negative for arthralgias and myalgias. Skin:  Negative for color change and rash. Neurological:  Negative for weakness and headaches. Video Exam    Vitals:    12/05/23 1311   Weight: 72.1 kg (159 lb)   Height: 5' 4" (1.626 m)       Physical Exam  Constitutional:       Appearance: Normal appearance. Pulmonary:      Effort: Pulmonary effort is normal.   Musculoskeletal:      Cervical back: Normal range of motion. Neurological:      Mental Status: She is alert. Psychiatric:         Mood and Affect: Mood normal.         Thought Content:  Thought content normal.          Visit Time  Total Visit Duration: 15

## 2023-12-05 NOTE — TELEPHONE ENCOUNTER
Spoke with patient- she stated that she saw Dr. Audrey Apple today and since seeing him, she wants to be tested for EBV through blood work. She is asking for Pk Coyle to order this. Please advise and let patient know when she can go to the lab. Thank you!

## 2023-12-06 NOTE — TELEPHONE ENCOUNTER
Pt returned call back - pt notified, will have labs done. Comments: This is a patient instruction: Patient fasting for 8 hours or longer recommended. Pt agreed.

## 2023-12-08 ENCOUNTER — APPOINTMENT (OUTPATIENT)
Dept: LAB | Facility: CLINIC | Age: 53
End: 2023-12-08
Payer: COMMERCIAL

## 2023-12-08 ENCOUNTER — TRANSCRIBE ORDERS (OUTPATIENT)
Dept: LAB | Facility: CLINIC | Age: 53
End: 2023-12-08

## 2023-12-08 DIAGNOSIS — M05.9 RHEUMATOID ARTHRITIS, SEROPOSITIVE (HCC): ICD-10-CM

## 2023-12-08 DIAGNOSIS — M47.816 LUMBAR SPONDYLOSIS: ICD-10-CM

## 2023-12-08 DIAGNOSIS — M79.7 FIBROMYALGIA: ICD-10-CM

## 2023-12-08 DIAGNOSIS — R14.0 ABDOMINAL DISTENTION: ICD-10-CM

## 2023-12-08 DIAGNOSIS — R10.84 ABDOMINAL PAIN, GENERALIZED: ICD-10-CM

## 2023-12-08 DIAGNOSIS — K21.9 GASTROESOPHAGEAL REFLUX DISEASE WITHOUT ESOPHAGITIS: ICD-10-CM

## 2023-12-08 DIAGNOSIS — K92.1 MELENA: ICD-10-CM

## 2023-12-08 DIAGNOSIS — K29.00 ACUTE GASTRITIS WITHOUT HEMORRHAGE, UNSPECIFIED GASTRITIS TYPE: ICD-10-CM

## 2023-12-08 DIAGNOSIS — Z79.899 ENCOUNTER FOR LONG-TERM (CURRENT) USE OF HIGH-RISK MEDICATION: ICD-10-CM

## 2023-12-08 DIAGNOSIS — R59.9 SWOLLEN LYMPH NODES: ICD-10-CM

## 2023-12-08 DIAGNOSIS — R19.5 OTHER FECAL ABNORMALITIES: ICD-10-CM

## 2023-12-08 LAB
ALBUMIN SERPL BCP-MCNC: 4.9 G/DL (ref 3.5–5)
ALP SERPL-CCNC: 82 U/L (ref 34–104)
ALT SERPL W P-5'-P-CCNC: 26 U/L (ref 7–52)
ANION GAP SERPL CALCULATED.3IONS-SCNC: 8 MMOL/L
AST SERPL W P-5'-P-CCNC: 20 U/L (ref 13–39)
BASOPHILS # BLD AUTO: 0.01 THOUSANDS/ÂΜL (ref 0–0.1)
BASOPHILS NFR BLD AUTO: 0 % (ref 0–1)
BILIRUB SERPL-MCNC: 0.38 MG/DL (ref 0.2–1)
BUN SERPL-MCNC: 19 MG/DL (ref 5–25)
CALCIUM SERPL-MCNC: 10.4 MG/DL (ref 8.4–10.2)
CHLORIDE SERPL-SCNC: 108 MMOL/L (ref 96–108)
CO2 SERPL-SCNC: 27 MMOL/L (ref 21–32)
CREAT SERPL-MCNC: 0.71 MG/DL (ref 0.6–1.3)
CRP SERPL QL: <1 MG/L
EOSINOPHIL # BLD AUTO: 0.06 THOUSAND/ÂΜL (ref 0–0.61)
EOSINOPHIL NFR BLD AUTO: 1 % (ref 0–6)
ERYTHROCYTE [DISTWIDTH] IN BLOOD BY AUTOMATED COUNT: 12.5 % (ref 11.6–15.1)
ERYTHROCYTE [SEDIMENTATION RATE] IN BLOOD: 8 MM/HOUR (ref 0–29)
GFR SERPL CREATININE-BSD FRML MDRD: 98 ML/MIN/1.73SQ M
GLUCOSE P FAST SERPL-MCNC: 80 MG/DL (ref 65–99)
HCT VFR BLD AUTO: 41.6 % (ref 34.8–46.1)
HGB BLD-MCNC: 13.5 G/DL (ref 11.5–15.4)
HIV 1+2 AB+HIV1 P24 AG SERPL QL IA: NORMAL
HIV 2 AB SERPL QL IA: NORMAL
HIV1 AB SERPL QL IA: NORMAL
HIV1 P24 AG SERPL QL IA: NORMAL
IMM GRANULOCYTES # BLD AUTO: 0 THOUSAND/UL (ref 0–0.2)
IMM GRANULOCYTES NFR BLD AUTO: 0 % (ref 0–2)
LYMPHOCYTES # BLD AUTO: 1.52 THOUSANDS/ÂΜL (ref 0.6–4.47)
LYMPHOCYTES NFR BLD AUTO: 31 % (ref 14–44)
MCH RBC QN AUTO: 29 PG (ref 26.8–34.3)
MCHC RBC AUTO-ENTMCNC: 32.5 G/DL (ref 31.4–37.4)
MCV RBC AUTO: 90 FL (ref 82–98)
MONOCYTES # BLD AUTO: 0.21 THOUSAND/ÂΜL (ref 0.17–1.22)
MONOCYTES NFR BLD AUTO: 4 % (ref 4–12)
NEUTROPHILS # BLD AUTO: 3.13 THOUSANDS/ÂΜL (ref 1.85–7.62)
NEUTS SEG NFR BLD AUTO: 64 % (ref 43–75)
NRBC BLD AUTO-RTO: 0 /100 WBCS
PLATELET # BLD AUTO: 249 THOUSANDS/UL (ref 149–390)
PMV BLD AUTO: 10.1 FL (ref 8.9–12.7)
POTASSIUM SERPL-SCNC: 4.5 MMOL/L (ref 3.5–5.3)
PROT SERPL-MCNC: 7.5 G/DL (ref 6.4–8.4)
RBC # BLD AUTO: 4.65 MILLION/UL (ref 3.81–5.12)
SODIUM SERPL-SCNC: 143 MMOL/L (ref 135–147)
WBC # BLD AUTO: 4.93 THOUSAND/UL (ref 4.31–10.16)

## 2023-12-08 PROCEDURE — 36415 COLL VENOUS BLD VENIPUNCTURE: CPT

## 2023-12-08 PROCEDURE — 86645 CMV ANTIBODY IGM: CPT

## 2023-12-08 PROCEDURE — 85025 COMPLETE CBC W/AUTO DIFF WBC: CPT

## 2023-12-08 PROCEDURE — 86664 EPSTEIN-BARR NUCLEAR ANTIGEN: CPT

## 2023-12-08 PROCEDURE — 86140 C-REACTIVE PROTEIN: CPT

## 2023-12-08 PROCEDURE — 87389 HIV-1 AG W/HIV-1&-2 AB AG IA: CPT

## 2023-12-08 PROCEDURE — 86611 BARTONELLA ANTIBODY: CPT

## 2023-12-08 PROCEDURE — 80053 COMPREHEN METABOLIC PANEL: CPT

## 2023-12-08 PROCEDURE — 86663 EPSTEIN-BARR ANTIBODY: CPT

## 2023-12-08 PROCEDURE — 86644 CMV ANTIBODY: CPT

## 2023-12-08 PROCEDURE — 86003 ALLG SPEC IGE CRUDE XTRC EA: CPT

## 2023-12-08 PROCEDURE — 85652 RBC SED RATE AUTOMATED: CPT

## 2023-12-08 PROCEDURE — 86665 EPSTEIN-BARR CAPSID VCA: CPT

## 2023-12-09 ENCOUNTER — APPOINTMENT (OUTPATIENT)
Dept: LAB | Facility: HOSPITAL | Age: 53
End: 2023-12-09
Payer: COMMERCIAL

## 2023-12-09 LAB
CMV IGG SERPL IA-ACNC: <0.6 U/ML (ref 0–0.59)
CMV IGM SERPL IA-ACNC: <30 AU/ML (ref 0–29.9)
EBV NA IGG SER IA-ACNC: 283 U/ML (ref 0–17.9)
EBV VCA IGG SER IA-ACNC: 219 U/ML (ref 0–17.9)
EBV VCA IGM SER IA-ACNC: <36 U/ML (ref 0–35.9)
INTERPRETATION: ABNORMAL

## 2023-12-09 PROCEDURE — 83993 ASSAY FOR CALPROTECTIN FECAL: CPT

## 2023-12-11 ENCOUNTER — TELEPHONE (OUTPATIENT)
Dept: FAMILY MEDICINE CLINIC | Facility: CLINIC | Age: 53
End: 2023-12-11

## 2023-12-12 LAB — ASCARIS IGE QN: <0.1 KU/L

## 2023-12-13 DIAGNOSIS — J06.9 UPPER RESPIRATORY TRACT INFECTION, UNSPECIFIED TYPE: ICD-10-CM

## 2023-12-13 DIAGNOSIS — R63.5 WEIGHT GAIN: ICD-10-CM

## 2023-12-13 DIAGNOSIS — K58.8 OTHER IRRITABLE BOWEL SYNDROME: ICD-10-CM

## 2023-12-13 RX ORDER — OMEPRAZOLE 40 MG/1
40 CAPSULE, DELAYED RELEASE ORAL DAILY
Qty: 90 CAPSULE | Refills: 0 | Status: SHIPPED | OUTPATIENT
Start: 2023-12-13

## 2023-12-13 RX ORDER — AZELASTINE 1 MG/ML
SPRAY, METERED NASAL
Qty: 30 ML | Refills: 1 | Status: SHIPPED | OUTPATIENT
Start: 2023-12-13

## 2023-12-14 ENCOUNTER — TELEMEDICINE (OUTPATIENT)
Dept: PSYCHIATRY | Facility: CLINIC | Age: 53
End: 2023-12-14
Payer: COMMERCIAL

## 2023-12-14 DIAGNOSIS — F41.1 GAD (GENERALIZED ANXIETY DISORDER): Primary | ICD-10-CM

## 2023-12-14 DIAGNOSIS — F31.81 BIPOLAR 2 DISORDER (HCC): ICD-10-CM

## 2023-12-14 PROCEDURE — 90837 PSYTX W PT 60 MINUTES: CPT

## 2023-12-14 RX ORDER — SEMAGLUTIDE 1.34 MG/ML
INJECTION, SOLUTION SUBCUTANEOUS
Qty: 3 ML | Refills: 2 | Status: SHIPPED | OUTPATIENT
Start: 2023-12-14

## 2023-12-14 RX ORDER — SEMAGLUTIDE 1.34 MG/ML
INJECTION, SOLUTION SUBCUTANEOUS
Qty: 4.5 ML | Refills: 0 | Status: SHIPPED | OUTPATIENT
Start: 2023-12-14

## 2023-12-14 RX ORDER — SEMAGLUTIDE 1.34 MG/ML
INJECTION, SOLUTION SUBCUTANEOUS
Qty: 3 ML | Refills: 0 | Status: SHIPPED | OUTPATIENT
Start: 2023-12-14

## 2023-12-14 NOTE — PSYCH
Virtual Regular Visit    Verification of patient location:    Patient is located at Home in the following state in which I hold an active license [place]      Assessment/Plan:    Problem List Items Addressed This Visit          Other    ZAINA (generalized anxiety disorder) - Primary    Bipolar 2 disorder ([address] [facility])       Goals addressed in session: Goal 1          Reason for visit is No chief complaint on file. Encounter provider Sanjay Chavarria LCSW    Provider located at [address] [place] [id number] [phone]      Recent Visits  No visits were found meeting these conditions. Showing recent visits within past 7 days and meeting all other requirements  Today's Visits  Date Type Provider Dept   12/14/23 Telemedicine Sanjay Chavarria LCSW [facility]   Showing today's visits and meeting all other requirements  Future Appointments  No visits were found meeting these conditions. Showing future appointments within next 150 days and meeting all other requirements       The patient was identified by name and date of birth. Bree Palencia was informed that this is a telemedicine visit and that the visit is being conducted throughthe Linguastat platform. She agrees to proceed. .  My office door was closed. No one else was in the room. She acknowledged consent and understanding of privacy and security of the video platform. The patient has agreed to participate and understands they can discontinue the visit at any time. Patient is aware this is a billable service. Subjective  Bree Palencia is a 47 y.o. female.       HPI     Past Medical History:   Diagnosis Date    Anxiety     Anxiety     Asthma     Bipolar disorder ([address] )     Carpal tunnel syndrome     Chronic pain     lower back    Depression     Disease of thyroid gland     Dyslipidemia     Fibromyalgia     Hypothyroidism 2007 Irritable bowel     Kidney stones     Kidney stones 2019    Migraine     Obesity (BMI 30.0-34. 9)     Psychiatric disorder     depression/anxiety    Suicide attempt [facility])     as teen    Vitamin D deficiency        Past Surgical History:   Procedure Laterality Date    BUNIONECTOMY       SECTION      CHOLECYSTECTOMY      PARTIAL HYSTERECTOMY      portion of  uterus still present    TONSILLECTOMY      TUBAL LIGATION         Current Outpatient Medications   Medication Sig Dispense Refill    albuterol (PROVENTIL HFA,VENTOLIN HFA) 90 mcg/act inhaler INHALE TWO PUFFS EVERY 6 (SIX) HOURS AS NEEDED FOR WHEEZING OR SHORTNESS OF BREATH 18 g 0    [place] Thyroid 60 MG tablet Take one and 1/2 tabs daily 135 tablet 2    azelastine (ASTELIN) 0.1 % nasal spray 1 SPRAY INTO EACH NOSTRIL TWO (TWO) TIMES A DAY USE IN EACH NOSTRIL AS DIRECTED 30 mL 1    Blood Glucose Monitoring Suppl (OneTouch Verio Reflect) w/Device KIT Check blood sugars once daily. Please substitute with appropriate alternative as covered by patient's insurance. Dx: E11.65 1 kit 0    butalbital-acetaminophen-caffeine (FIORICET,ESGIC) -40 mg per tablet Take 1 tablet by mouth every 6 (six) hours as needed for headaches 30 tablet 0    celecoxib (CELEBREX) 200 mg capsule Take 1 capsule (200 mg total) by mouth daily 30 capsule 2    Diclofenac Sodium (VOLTAREN) 1 % Apply 2 g topically 4 (four) times a day 50 g 0    dicyclomine (BENTYL) 10 mg capsule Take 1 capsule (10 mg total) by mouth 3 (three) times a day as needed (abd cramping) 30 capsule 0    divalproex sodium (DEPAKOTE ER) 250 mg 24 hr tablet       fluticasone (FLONASE) 50 mcg/act nasal spray INHALE TWO PUFFS TWO (TWO) TIMES A DAY RINSE MOUTH AFTER USE. 16 g 0    fluticasone (Flovent Diskus) 50 mcg/actuation diskus inhaler Inhale 1 puff 2 (two) times a day Rinse mouth after use.  60 blister 0    fremanezumab-vfrm (Ajovy) 225 MG/1.5ML auto-injector Inject 225 mg under the skin every 30 (thirty) days glucose blood (OneTouch Verio) test strip Check blood sugars once daily. Please substitute with appropriate alternative as covered by patient's insurance. Dx: E11.65 100 each 3    ipratropium-albuterol (DUO-NEB) 0.5-2.5 mg/3 mL nebulizer solution TAKE THREE ML BY NEBULIZATION 4 (FOUR) TIMES A DAY 90 mL 0    ketoconazole (NIZORAL) 2 % cream       levocetirizine (XYZAL) 5 MG tablet Take 1 tablet (5 mg total) by mouth every evening 90 tablet 1    meclizine (ANTIVERT) 25 mg tablet TAKE 1 TABLET (25 MG TOTAL) BY MOUTH EVERY EIGHT (EIGHT) HOURS AS NEEDED FOR DIZZINESS 30 tablet 0    metoclopramide (Reglan) 10 mg tablet Take 1 tablet (10 mg total) by mouth 4 (four) times a day 60 tablet 0    mometasone (ELOCON) 0.1 % cream Apply topically daily 45 g 0    naratriptan (AMERGE) 2.5 MG tablet       omeprazole (PriLOSEC) 40 MG capsule TAKE 1 CAPSULE (40 MG TOTAL) BY MOUTH DAILY 90 capsule 0    ondansetron (ZOFRAN-ODT) 4 mg disintegrating tablet TAKE 1 TABLET (4 MG TOTAL) BY MOUTH EVERY EIGHT (EIGHT) HOURS AS NEEDED FOR NAUSEA 20 tablet 0    OneTouch Delica Lancets 74Y MISC Check blood sugars once daily. Please substitute with appropriate alternative as covered by patient's insurance.  Dx: E11.65 100 each 3    Ozempic, 0.25 or 0.5 MG/DOSE, 2 MG/1.5ML injection pen INJECT 0.5 MG UNDER THE SKIN ONCE A WEEK 4.5 mL 0    Ozempic, 1 MG/DOSE, 4 MG/3ML injection pen INJECT 0.75 ML (1 MG TOTAL) UNDER THE SKIN ONCE A WEEK 3 mL 0    Ozempic, 1 MG/DOSE, 4 MG/3ML injection pen INJECT 0.75 ML (1 MG TOTAL) UNDER THE SKIN ONCE A WEEK 3 mL 2    PARoxetine Mesylate 7.5 MG CAPS Take 1 capsule (7.5 mg total) by mouth in the morning 30 capsule 2    Respiratory Therapy Supplies (NEBULIZER) DONA by Does not apply route every 4 (four) hours while awake 90 each 1    tiZANidine (ZANAFLEX) 4 mg tablet       Topiramate  MG CP24 Take 100 mg by mouth 2 (two) times a day      Ubrelvy 100 MG tablet       Vraylar 4.5 MG capsule TAKE 1 CAPSULE (4.5 MG TOTAL) BY MOUTH DAILY INSERT IN THE MORNING INCREASING THE DOSE 30 capsule 0    XIFAXAN 550 MG tablet        Current Facility-Administered Medications   Medication Dose Route Frequency Provider Last Rate Last Admin    cyanocobalamin injection 1,000 mcg  1,000 mcg Intramuscular Q30 Days ERICKA Morrow   1,000 mcg at 07/27/23 1025        Allergies   Allergen Reactions    Doxycycline Rash    Erythromycin Rash    Other Edema and Wheezing     jalapeno    Augmentin [Amoxicillin-Pot Clavulanate] GI Intolerance    Latex Rash    Duloxetine      Other reaction(s): Nausea, Loopy    Eletriptan      Pain in lower jaw with pressure in throat    Emgality [Galcanezumab-Gnlm]     Galcanezumab      rash       Review of Systems    Video Exam    There were no vitals filed for this visit. Physical Exam     Behavioral Health Psychotherapy Progress Note    Psychotherapy Provided: Individual Psychotherapy      Diagnosis ICD-10-CM Associated Orders   1. ZAINA (generalized anxiety disorder)  F41.1       2. Bipolar 2 disorder ([address])  F31.81            Goals addressed in session: Goal 1     DATA: Met with Mikki Tyler for a scheduled individual session. Topics discussed included emotional wellness, coping skills, relationship with significant other, and physical health concerns. Aline Hernandez is feeling "ok". She started to not feel well recently and got blood work done. 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 This is the first this has been caught for her and seems to explain some of the health issues she's struggled with. She then spoke about her boyfriend. They continue to try and work things out with each other. He has made some improvements with his sleep schedule and his understanding of Belem's obligations to her grandson and her own health. She feels he wants all of her attention. They keep talking these things out and making bits of progress each time.  Aline Hernandez shows evidence of utilizing effective communication skills, engaging in problem solving, and maintaining emotional composure to manage mental health symptoms. During this session, this clinical used the following therapeutic modalities supportive psychotherapy, client-centered therapy, and CBT techniques. Substance Abuse was not addressed during this session. If the client is diagnosed with a co-occurring substance use disorder, please indicate any changes in the frequency or amount of use: N/A. Stage of change for addressing substance use diagnoses: No substance use/Not applicable    ASSESSMENT:  Ankita Shi presents with a Euthymic/ normal mood. her affect is normal range and intensity, appropriate, which is congruent, with her  mood and the content of the session. Ankita Shi was oriented x3. She was focused and engaged. Ankita Shi exhibits good therapeutic rapport with this clinician. The client has made progress on their goals. Cornell Dey presents with a none risk of suicide, none risk of self-harm, and none risk of harm to others. For any risk assessment that surpasses a "low" rating, a safety plan must be developed. A safety plan was indicated: no  If yes, describe in detail: N/A    PLAN: Ankita Shi will return in 1 week for the next scheduled session. At the next session, the therapist will use supportive psychotherapy and client-centered therapy to address anxiety. Behavioral Health Treatment Plan and Discharge Planning: Cornell Dey is aware of and agrees to continue to work on their treatment plan. They have identified and are working toward their discharge goals.  yes    Visit start and stop times:    12/14/23  Start Time: 1002  Stop Time: 1055  Total Visit Time: 53 minutes

## 2023-12-15 LAB — CALPROTECTIN STL-MCNT: <5 UG/G (ref 0–120)

## 2023-12-17 LAB
B HENSELAE IGG TITR SER IF: NEGATIVE TITER
B HENSELAE IGM TITR SER IF: NEGATIVE TITER
B QUINTANA IGG TITR SER IF: NEGATIVE TITER
B QUINTANA IGM TITR SER IF: NEGATIVE TITER

## 2023-12-18 ENCOUNTER — OFFICE VISIT (OUTPATIENT)
Dept: FAMILY MEDICINE CLINIC | Facility: CLINIC | Age: 53
End: 2023-12-18
Payer: COMMERCIAL

## 2023-12-18 VITALS
DIASTOLIC BLOOD PRESSURE: 68 MMHG | TEMPERATURE: 96.5 F | HEART RATE: 73 BPM | HEIGHT: 64 IN | OXYGEN SATURATION: 99 % | BODY MASS INDEX: 27.29 KG/M2 | SYSTOLIC BLOOD PRESSURE: 122 MMHG

## 2023-12-18 DIAGNOSIS — G56.21 CUBITAL TUNNEL SYNDROME ON RIGHT: Primary | ICD-10-CM

## 2023-12-18 DIAGNOSIS — R10.13 EPIGASTRIC PAIN: ICD-10-CM

## 2023-12-18 LAB
SL AMB  POCT GLUCOSE, UA: NORMAL
SL AMB LEUKOCYTE ESTERASE,UA: NORMAL
SL AMB POCT BILIRUBIN,UA: NORMAL
SL AMB POCT BLOOD,UA: NORMAL
SL AMB POCT CLARITY,UA: CLEAR
SL AMB POCT COLOR,UA: NORMAL
SL AMB POCT KETONES,UA: NORMAL
SL AMB POCT NITRITE,UA: NORMAL
SL AMB POCT PH,UA: 6
SL AMB POCT SPECIFIC GRAVITY,UA: 1.01
SL AMB POCT URINE PROTEIN: NORMAL
SL AMB POCT UROBILINOGEN: 3.5

## 2023-12-18 PROCEDURE — 87636 SARSCOV2 & INF A&B AMP PRB: CPT

## 2023-12-18 PROCEDURE — 99214 OFFICE O/P EST MOD 30 MIN: CPT

## 2023-12-18 PROCEDURE — 87070 CULTURE OTHR SPECIMN AEROBIC: CPT

## 2023-12-18 PROCEDURE — 81002 URINALYSIS NONAUTO W/O SCOPE: CPT

## 2023-12-18 RX ORDER — ONDANSETRON 4 MG/1
4 TABLET, FILM COATED ORAL EVERY 8 HOURS PRN
Qty: 20 TABLET | Refills: 0 | Status: SHIPPED | OUTPATIENT
Start: 2023-12-18

## 2023-12-18 RX ORDER — METOCLOPRAMIDE 5 MG/1
5 TABLET ORAL 4 TIMES DAILY
Qty: 30 TABLET | Refills: 1 | Status: SHIPPED | OUTPATIENT
Start: 2023-12-18

## 2023-12-18 NOTE — PROGRESS NOTES
Assessment/Plan:         Problem List Items Addressed This Visit    None  Visit Diagnoses     Cubital tunnel syndrome on right    -  Primary    please have msk, will call with results.    Relevant Orders    US MSK limited    Throat culture    Epigastric pain        ok to rotate zofran and reglan, drink lots of fluids can use pepto bismol for symptoms. will do testing and call with results.    Relevant Medications    ondansetron (ZOFRAN) 4 mg tablet    metoclopramide (REGLAN) 5 mg tablet    Other Relevant Orders    Covid/Flu- Office Collect Normal    POCT urine dip (Completed)    Throat culture              Subjective:      Patient ID: Belem Linares is a 53 y.o. female.    Belem is here for stomach pain since yesterday.     Dizziness  Associated symptoms include abdominal pain, diaphoresis, headaches, nausea and vomiting. Pertinent negatives include no arthralgias, chest pain, chills, congestion, coughing, fever, myalgias, rash or sore throat.   Nausea  Associated symptoms include abdominal pain, diaphoresis, headaches, nausea and vomiting. Pertinent negatives include no arthralgias, chest pain, chills, congestion, coughing, fever, myalgias, rash or sore throat.   Abdominal Pain  Associated symptoms include diarrhea, headaches, nausea and vomiting. Pertinent negatives include no arthralgias, dysuria, fever, hematuria or myalgias.       The following portions of the patient's history were reviewed and updated as appropriate:   Past Medical History:  She has a past medical history of Anxiety, Anxiety, Asthma, Bipolar disorder ([facility]), Carpal tunnel syndrome, Chronic pain, Depression, Disease of thyroid gland, Dyslipidemia, Fibromyalgia, Hypothyroidism (2007), Irritable bowel, Kidney stones, Kidney stones (05/20/2019), Migraine, Obesity (BMI 30.0-34.9), Psychiatric disorder, Suicide attempt ([facility]), and Vitamin D deficiency.,  _______________________________________________________________________  Medical  Problems:  does not have any pertinent problems on file.,  _______________________________________________________________________  Past Surgical History:   has a past surgical history that includes  section; Cholecystectomy; Tonsillectomy; Partial hysterectomy; Bunionectomy; and Tubal ligation.,  _______________________________________________________________________  Family History:  family history includes Anxiety disorder in her mother; Arthritis in her mother; Bipolar disorder in her daughter and mother; Breast cancer in her paternal aunt and paternal grandmother; Cancer in her cousin, father, maternal uncle, paternal aunt, paternal grandmother, and paternal uncle; Dementia in her paternal grandmother; Depression in her mother; Diabetes in her mother; Heart disease in her brother; Hypertension in her brother and mother; Hypothyroidism in her mother and sister; Mental illness in her mother; Stroke in her mother.,  _______________________________________________________________________  Social History:   reports that she has never smoked. She has never used smokeless tobacco. She reports current drug use. Drug: Marijuana. She reports that she does not drink alcohol.,  _______________________________________________________________________  Allergies:  is allergic to doxycycline, erythromycin, other, augmentin [amoxicillin-pot clavulanate], latex, duloxetine, eletriptan, emgality [galcanezumab-gnlm], galcanezumab, lidocaine, and methocarbamol..  _______________________________________________________________________  Current Outpatient Medications   Medication Sig Dispense Refill   • albuterol (PROVENTIL HFA,VENTOLIN HFA) 90 mcg/act inhaler INHALE TWO PUFFS EVERY 6 (SIX) HOURS AS NEEDED FOR WHEEZING OR SHORTNESS OF BREATH 18 g 0   • [place] Thyroid 60 MG tablet Take one and 1/2 tabs daily 135 tablet 2   • azelastine (ASTELIN) 0.1 % nasal spray 1 SPRAY INTO EACH NOSTRIL TWO (TWO) TIMES A DAY USE IN EACH  NOSTRIL AS DIRECTED 30 mL 1   • Blood Glucose Monitoring Suppl (OneTouch Verio Reflect) w/Device KIT Check blood sugars once daily. Please substitute with appropriate alternative as covered by patient's insurance. Dx: E11.65 1 kit 0   • butalbital-acetaminophen-caffeine (FIORICET,ESGIC) -40 mg per tablet Take 1 tablet by mouth every 6 (six) hours as needed for headaches 30 tablet 0   • celecoxib (CELEBREX) 200 mg capsule Take 1 capsule (200 mg total) by mouth daily 30 capsule 2   • Diclofenac Sodium (VOLTAREN) 1 % Apply 2 g topically 4 (four) times a day 50 g 0   • dicyclomine (BENTYL) 10 mg capsule Take 1 capsule (10 mg total) by mouth 3 (three) times a day as needed (abd cramping) 30 capsule 0   • divalproex sodium (DEPAKOTE ER) 250 mg 24 hr tablet      • fluticasone (FLONASE) 50 mcg/act nasal spray INHALE TWO PUFFS TWO (TWO) TIMES A DAY RINSE MOUTH AFTER USE. 16 g 0   • fremanezumab-vfrm (Ajovy) 225 MG/1.5ML auto-injector Inject 225 mg under the skin every 30 (thirty) days     • glucose blood (OneTouch Verio) test strip Check blood sugars once daily. Please substitute with appropriate alternative as covered by patient's insurance. Dx: E11.65 100 each 3   • ipratropium-albuterol (DUO-NEB) 0.5-2.5 mg/3 mL nebulizer solution TAKE THREE ML BY NEBULIZATION 4 (FOUR) TIMES A DAY 90 mL 0   • ketoconazole (NIZORAL) 2 % cream      • levocetirizine (XYZAL) 5 MG tablet Take 1 tablet (5 mg total) by mouth every evening 90 tablet 1   • meclizine (ANTIVERT) 25 mg tablet TAKE 1 TABLET (25 MG TOTAL) BY MOUTH EVERY EIGHT (EIGHT) HOURS AS NEEDED FOR DIZZINESS 30 tablet 0   • metoclopramide (Reglan) 10 mg tablet Take 1 tablet (10 mg total) by mouth 4 (four) times a day 60 tablet 0   • metoclopramide (REGLAN) 5 mg tablet Take 1 tablet (5 mg total) by mouth 4 (four) times a day 30 tablet 1   • mometasone (ELOCON) 0.1 % cream Apply topically daily 45 g 0   • naratriptan (AMERGE) 2.5 MG tablet      • omeprazole (PriLOSEC) 40 MG  capsule TAKE 1 CAPSULE (40 MG TOTAL) BY MOUTH DAILY 90 capsule 0   • ondansetron (ZOFRAN) 4 mg tablet Take 1 tablet (4 mg total) by mouth every 8 (eight) hours as needed for nausea or vomiting 20 tablet 0   • ondansetron (ZOFRAN-ODT) 4 mg disintegrating tablet TAKE 1 TABLET (4 MG TOTAL) BY MOUTH EVERY EIGHT (EIGHT) HOURS AS NEEDED FOR NAUSEA 20 tablet 0   • OneTouch Delica Lancets 33G MISC Check blood sugars once daily. Please substitute with appropriate alternative as covered by patient's insurance. Dx: E11.65 100 each 3   • Ozempic, 1 MG/DOSE, 4 MG/3ML injection pen INJECT 0.75 ML (1 MG TOTAL) UNDER THE SKIN ONCE A WEEK 3 mL 0   • PARoxetine Mesylate 7.5 MG CAPS Take 1 capsule (7.5 mg total) by mouth in the morning 30 capsule 2   • tiZANidine (ZANAFLEX) 4 mg tablet      • Topiramate  MG CP24 Take 100 mg by mouth 2 (two) times a day     • Ubrelvy 100 MG tablet      • Vraylar 4.5 MG capsule TAKE 1 CAPSULE (4.5 MG TOTAL) BY MOUTH DAILY INSERT IN THE MORNING INCREASING THE DOSE 30 capsule 0   • XIFAXAN 550 MG tablet      • fluticasone (Flovent Diskus) 50 mcg/actuation diskus inhaler Inhale 1 puff 2 (two) times a day Rinse mouth after use. 60 blister 0   • Ozempic, 0.25 or 0.5 MG/DOSE, 2 MG/1.5ML injection pen INJECT 0.5 MG UNDER THE SKIN ONCE A WEEK 4.5 mL 0   • Ozempic, 1 MG/DOSE, 4 MG/3ML injection pen INJECT 0.75 ML (1 MG TOTAL) UNDER THE SKIN ONCE A WEEK 3 mL 2   • Respiratory Therapy Supplies (NEBULIZER) DONA by Does not apply route every 4 (four) hours while awake 90 each 1     Current Facility-Administered Medications   Medication Dose Route Frequency Provider Last Rate Last Admin   • cyanocobalamin injection 1,000 mcg  1,000 mcg Intramuscular Q30 Days ERICKA George   1,000 mcg at 07/27/23 1025     _______________________________________________________________________  Review of Systems   Constitutional:  Positive for diaphoresis. Negative for chills and fever.   HENT:  Negative for congestion,  "ear pain, rhinorrhea, sinus pressure, sinus pain and sore throat.    Eyes:  Negative for pain and visual disturbance.   Respiratory:  Negative for cough, chest tightness, shortness of breath and wheezing.    Cardiovascular:  Negative for chest pain and palpitations.   Gastrointestinal:  Positive for abdominal pain, diarrhea, nausea and vomiting.   Genitourinary:  Negative for dysuria and hematuria.   Musculoskeletal:  Positive for neck stiffness. Negative for arthralgias, back pain and myalgias.   Skin:  Negative for color change and rash.   Neurological:  Positive for dizziness and headaches. Negative for seizures and syncope.   All other systems reviewed and are negative.        Objective:  Vitals:    12/18/23 1041   BP: 122/68   BP Location: Right arm   Patient Position: Sitting   Cuff Size: Standard   Pulse: 73   Temp: (!) 96.5 °F (35.8 °C)   SpO2: 99%   Height: 5' 4\" (1.626 m)     Body mass index is 27.29 kg/m².     Physical Exam  Vitals and nursing note reviewed.   Constitutional:       Appearance: Normal appearance. She is ill-appearing.   HENT:      Head: Normocephalic.      Right Ear: Tympanic membrane, ear canal and external ear normal. There is no impacted cerumen.      Left Ear: Tympanic membrane, ear canal and external ear normal. There is no impacted cerumen.      Nose: Nose normal. No congestion.      Mouth/Throat:      Mouth: Mucous membranes are moist.      Pharynx: No posterior oropharyngeal erythema.   Eyes:      Extraocular Movements: Extraocular movements intact.      Conjunctiva/sclera: Conjunctivae normal.      Pupils: Pupils are equal, round, and reactive to light.   Cardiovascular:      Rate and Rhythm: Normal rate and regular rhythm.      Heart sounds: Normal heart sounds. No murmur heard.  Pulmonary:      Effort: Pulmonary effort is normal.      Breath sounds: Normal breath sounds. No wheezing.   Abdominal:      Palpations: Abdomen is soft.      Tenderness: There is abdominal tenderness. "   Musculoskeletal:         General: Normal range of motion.      Cervical back: Normal range of motion.      Right lower leg: No edema.      Left lower leg: No edema.   Skin:     General: Skin is warm and dry.   Neurological:      General: No focal deficit present.      Mental Status: She is alert.   Psychiatric:         Mood and Affect: Mood normal.         Behavior: Behavior normal.

## 2023-12-19 DIAGNOSIS — H44.002 INFECTION OF LEFT EYE: Primary | ICD-10-CM

## 2023-12-19 RX ORDER — OFLOXACIN 3 MG/ML
1 SOLUTION/ DROPS OPHTHALMIC 4 TIMES DAILY
Qty: 5 ML | Refills: 0 | Status: SHIPPED | OUTPATIENT
Start: 2023-12-19

## 2023-12-20 LAB — BACTERIA THROAT CULT: NORMAL

## 2023-12-21 ENCOUNTER — TELEMEDICINE (OUTPATIENT)
Dept: PSYCHIATRY | Facility: CLINIC | Age: 53
End: 2023-12-21

## 2023-12-21 DIAGNOSIS — F31.81 BIPOLAR 2 DISORDER (HCC): ICD-10-CM

## 2023-12-21 DIAGNOSIS — F41.1 GAD (GENERALIZED ANXIETY DISORDER): Primary | ICD-10-CM

## 2023-12-21 PROCEDURE — NOSHOW

## 2023-12-21 NOTE — PSYCH
No Call. No Show. No Charge    Belem Linares no showed 12/21/23 appointment , staff called and left message to reschedule appointment     Treatment Plan not due at this session.

## 2023-12-22 ENCOUNTER — TELEPHONE (OUTPATIENT)
Dept: PSYCHIATRY | Facility: CLINIC | Age: 53
End: 2023-12-22

## 2023-12-26 ENCOUNTER — OFFICE VISIT (OUTPATIENT)
Dept: URGENT CARE | Facility: CLINIC | Age: 53
End: 2023-12-26
Payer: COMMERCIAL

## 2023-12-26 VITALS
RESPIRATION RATE: 18 BRPM | WEIGHT: 165 LBS | TEMPERATURE: 98.5 F | BODY MASS INDEX: 28.17 KG/M2 | HEIGHT: 64 IN | OXYGEN SATURATION: 99 %

## 2023-12-26 DIAGNOSIS — R30.0 DYSURIA: ICD-10-CM

## 2023-12-26 DIAGNOSIS — J06.9 ACUTE URI: Primary | ICD-10-CM

## 2023-12-26 LAB
SL AMB  POCT GLUCOSE, UA: ABNORMAL
SL AMB LEUKOCYTE ESTERASE,UA: ABNORMAL
SL AMB POCT BILIRUBIN,UA: ABNORMAL
SL AMB POCT BLOOD,UA: ABNORMAL
SL AMB POCT CLARITY,UA: CLEAR
SL AMB POCT COLOR,UA: YELLOW
SL AMB POCT KETONES,UA: ABNORMAL
SL AMB POCT NITRITE,UA: ABNORMAL
SL AMB POCT PH,UA: 6
SL AMB POCT SPECIFIC GRAVITY,UA: 1015
SL AMB POCT URINE PROTEIN: ABNORMAL
SL AMB POCT UROBILINOGEN: 0.2

## 2023-12-26 PROCEDURE — 99213 OFFICE O/P EST LOW 20 MIN: CPT | Performed by: PHYSICAL MEDICINE & REHABILITATION

## 2023-12-26 PROCEDURE — 81002 URINALYSIS NONAUTO W/O SCOPE: CPT | Performed by: PHYSICAL MEDICINE & REHABILITATION

## 2023-12-26 RX ORDER — CEPHALEXIN 500 MG/1
500 CAPSULE ORAL EVERY 6 HOURS SCHEDULED
Qty: 28 CAPSULE | Refills: 0 | Status: SHIPPED | OUTPATIENT
Start: 2023-12-26 | End: 2024-01-02

## 2023-12-26 RX ORDER — AZITHROMYCIN 250 MG/1
TABLET, FILM COATED ORAL
Qty: 6 TABLET | Refills: 0 | Status: SHIPPED | OUTPATIENT
Start: 2023-12-26 | End: 2023-12-30

## 2023-12-26 NOTE — PROGRESS NOTES
[facility]        NAME: Belem Linares is a 53 y.o. female  : 1970    MRN: [id number]  DATE: 2023  TIME: 12:51 PM    Assessment and Plan   Acute URI [J06.9]  1. Acute URI  azithromycin (ZITHROMAX) 250 mg tablet      2. Dysuria  POCT urine dip    Urine culture    cephalexin (KEFLEX) 500 mg capsule            Patient Instructions     Take antibiotics as prescribed  Can take Pyridum for urethral/bladder spasms.   Follow up with PCP in 3-5 days.  Proceed to  ER if symptoms worsen.    Chief Complaint     Chief Complaint   Patient presents with    Cold Like Symptoms     Fever, abd pain, headache dizzy, for awhile went to doc already. Now throat pain and bladder pain.,          History of Present Illness       Patient presenting with fever, bladder pain, headache and dizziness. Did see PCP for the same. Now reporting throat pain and bladder pain. With N/D. Symptoms started none week ago. Symptoms initially got better than worsened. Throat pain and coughing started yesterday. Patient took tylenol and migraine medications without relief.     Sore Throat   This is a new problem. The current episode started yesterday. The problem has been gradually worsening. The pain is worse on the right side. There has been no fever. The fever has been present for 1 to 2 days. The pain is at a severity of 7/10. Associated symptoms include abdominal pain, coughing, diarrhea, headaches, a hoarse voice, neck pain, swollen glands and trouble swallowing. Pertinent negatives include no congestion, drooling, ear discharge, ear pain, plugged ear sensation, shortness of breath or vomiting. She has had no exposure to strep or mono.       Review of Systems   Review of Systems   Constitutional:  Positive for fever. Negative for chills.   HENT:  Positive for hoarse voice, sore throat and trouble swallowing. Negative for congestion, drooling, ear discharge, ear pain, postnasal drip and rhinorrhea.    Respiratory:   Positive for cough. Negative for shortness of breath.    Gastrointestinal:  Positive for abdominal pain, diarrhea and nausea. Negative for vomiting.   Genitourinary:  Positive for flank pain. Negative for dysuria, frequency, hematuria and urgency.   Musculoskeletal:  Positive for neck pain.   Neurological:  Positive for dizziness and headaches.         Current Medications       Current Outpatient Medications:     albuterol (PROVENTIL HFA,VENTOLIN HFA) 90 mcg/act inhaler, INHALE TWO PUFFS EVERY 6 (SIX) HOURS AS NEEDED FOR WHEEZING OR SHORTNESS OF BREATH, Disp: 18 g, Rfl: 0    Caret Thyroid 60 MG tablet, Take one and 1/2 tabs daily, Disp: 135 tablet, Rfl: 2    azelastine (ASTELIN) 0.1 % nasal spray, 1 SPRAY INTO EACH NOSTRIL TWO (TWO) TIMES A DAY USE IN EACH NOSTRIL AS DIRECTED, Disp: 30 mL, Rfl: 1    azithromycin (ZITHROMAX) 250 mg tablet, Take 2 tablets today then 1 tablet daily x 4 days, Disp: 6 tablet, Rfl: 0    Blood Glucose Monitoring Suppl (OneTouch Verio Reflect) w/Device KIT, Check blood sugars once daily. Please substitute with appropriate alternative as covered by patient's insurance. Dx: E11.65, Disp: 1 kit, Rfl: 0    butalbital-acetaminophen-caffeine (FIORICET,ESGIC) -40 mg per tablet, Take 1 tablet by mouth every 6 (six) hours as needed for headaches, Disp: 30 tablet, Rfl: 0    celecoxib (CELEBREX) 200 mg capsule, Take 1 capsule (200 mg total) by mouth daily, Disp: 30 capsule, Rfl: 2    cephalexin (KEFLEX) 500 mg capsule, Take 1 capsule (500 mg total) by mouth every 6 (six) hours for 7 days, Disp: 28 capsule, Rfl: 0    Diclofenac Sodium (VOLTAREN) 1 %, Apply 2 g topically 4 (four) times a day, Disp: 50 g, Rfl: 0    dicyclomine (BENTYL) 10 mg capsule, Take 1 capsule (10 mg total) by mouth 3 (three) times a day as needed (abd cramping), Disp: 30 capsule, Rfl: 0    divalproex sodium (DEPAKOTE ER) 250 mg 24 hr tablet, , Disp: , Rfl:     fluticasone (FLONASE) 50 mcg/act nasal spray, INHALE TWO PUFFS  TWO (TWO) TIMES A DAY RINSE MOUTH AFTER USE., Disp: 16 g, Rfl: 0    fremanezumab-vfrm (Ajovy) 225 MG/1.5ML auto-injector, Inject 225 mg under the skin every 30 (thirty) days, Disp: , Rfl:     glucose blood (OneTouch Verio) test strip, Check blood sugars once daily. Please substitute with appropriate alternative as covered by patient's insurance. Dx: E11.65, Disp: 100 each, Rfl: 3    ipratropium-albuterol (DUO-NEB) 0.5-2.5 mg/3 mL nebulizer solution, TAKE THREE ML BY NEBULIZATION 4 (FOUR) TIMES A DAY, Disp: 90 mL, Rfl: 0    ketoconazole (NIZORAL) 2 % cream, , Disp: , Rfl:     levocetirizine (XYZAL) 5 MG tablet, Take 1 tablet (5 mg total) by mouth every evening, Disp: 90 tablet, Rfl: 1    meclizine (ANTIVERT) 25 mg tablet, TAKE 1 TABLET (25 MG TOTAL) BY MOUTH EVERY EIGHT (EIGHT) HOURS AS NEEDED FOR DIZZINESS, Disp: 30 tablet, Rfl: 0    metoclopramide (Reglan) 10 mg tablet, Take 1 tablet (10 mg total) by mouth 4 (four) times a day, Disp: 60 tablet, Rfl: 0    metoclopramide (REGLAN) 5 mg tablet, Take 1 tablet (5 mg total) by mouth 4 (four) times a day, Disp: 30 tablet, Rfl: 1    mometasone (ELOCON) 0.1 % cream, Apply topically daily, Disp: 45 g, Rfl: 0    naratriptan (AMERGE) 2.5 MG tablet, , Disp: , Rfl:     ofloxacin (OCUFLOX) 0.3 % ophthalmic solution, Administer 1 drop into the left eye 4 (four) times a day, Disp: 5 mL, Rfl: 0    omeprazole (PriLOSEC) 40 MG capsule, TAKE 1 CAPSULE (40 MG TOTAL) BY MOUTH DAILY, Disp: 90 capsule, Rfl: 0    ondansetron (ZOFRAN) 4 mg tablet, Take 1 tablet (4 mg total) by mouth every 8 (eight) hours as needed for nausea or vomiting, Disp: 20 tablet, Rfl: 0    ondansetron (ZOFRAN-ODT) 4 mg disintegrating tablet, TAKE 1 TABLET (4 MG TOTAL) BY MOUTH EVERY EIGHT (EIGHT) HOURS AS NEEDED FOR NAUSEA, Disp: 20 tablet, Rfl: 0    OneTouch Delica Lancets 33G MISC, Check blood sugars once daily. Please substitute with appropriate alternative as covered by patient's insurance. Dx: E11.65, Disp: 100  each, Rfl: 3    Ozempic, 1 MG/DOSE, 4 MG/3ML injection pen, INJECT 0.75 ML (1 MG TOTAL) UNDER THE SKIN ONCE A WEEK, Disp: 3 mL, Rfl: 0    PARoxetine Mesylate 7.5 MG CAPS, Take 1 capsule (7.5 mg total) by mouth in the morning, Disp: 30 capsule, Rfl: 2    tiZANidine (ZANAFLEX) 4 mg tablet, , Disp: , Rfl:     Topiramate  MG CP24, Take 100 mg by mouth 2 (two) times a day, Disp: , Rfl:     Ubrelvy 100 MG tablet, , Disp: , Rfl:     Vraylar 4.5 MG capsule, TAKE 1 CAPSULE (4.5 MG TOTAL) BY MOUTH DAILY INSERT IN THE MORNING INCREASING THE DOSE, Disp: 30 capsule, Rfl: 0    XIFAXAN 550 MG tablet, , Disp: , Rfl:     fluticasone (Flovent Diskus) 50 mcg/actuation diskus inhaler, Inhale 1 puff 2 (two) times a day Rinse mouth after use., Disp: 60 blister, Rfl: 0    Ozempic, 0.25 or 0.5 MG/DOSE, 2 MG/1.5ML injection pen, INJECT 0.5 MG UNDER THE SKIN ONCE A WEEK, Disp: 4.5 mL, Rfl: 0    Ozempic, 1 MG/DOSE, 4 MG/3ML injection pen, INJECT 0.75 ML (1 MG TOTAL) UNDER THE SKIN ONCE A WEEK, Disp: 3 mL, Rfl: 2    Respiratory Therapy Supplies (NEBULIZER) DONA, by Does not apply route every 4 (four) hours while awake, Disp: 90 each, Rfl: 1    Current Facility-Administered Medications:     cyanocobalamin injection 1,000 mcg, 1,000 mcg, Intramuscular, Q30 Days, ERICKA George, 1,000 mcg at 07/27/23 1025    Current Allergies     Allergies as of 12/26/2023 - Reviewed 12/26/2023   Allergen Reaction Noted    Doxycycline Rash     Erythromycin Rash 06/08/2018    Other Edema and Wheezing 04/14/2008    Augmentin [amoxicillin-pot clavulanate] GI Intolerance 05/09/2023    Latex Rash 04/27/2015    Duloxetine  09/26/2020    Eletriptan  07/31/2017    Emgality [galcanezumab-gnlm]  11/13/2020    Galcanezumab  10/06/2020    Lidocaine Other (See Comments) 11/28/2023    Methocarbamol Other (See Comments) 11/28/2023            The following portions of the patient's history were reviewed and updated as appropriate: allergies, current medications,  "past family history, past medical history, past social history, past surgical history and problem list.     Past Medical History:   Diagnosis Date    Anxiety     Anxiety     Asthma     Bipolar disorder (HCC)     Carpal tunnel syndrome     Chronic pain     lower back    Depression     Disease of thyroid gland     Dyslipidemia     Fibromyalgia     Hypothyroidism     Irritable bowel     Kidney stones     Kidney stones 2019    Migraine     Obesity (BMI 30.0-34.9)     Psychiatric disorder     depression/anxiety    Suicide attempt (HCC)     as teen    Vitamin D deficiency        Past Surgical History:   Procedure Laterality Date    BUNIONECTOMY       SECTION      CHOLECYSTECTOMY      PARTIAL HYSTERECTOMY      portion of  uterus still present    TONSILLECTOMY      TUBAL LIGATION         Family History   Problem Relation Age of Onset    Hypertension Mother     Diabetes Mother     Hypothyroidism Mother     Stroke Mother     Bipolar disorder Mother     Anxiety disorder Mother     Arthritis Mother     Depression Mother     Mental illness Mother     Cancer Father         pancreatic     Hypothyroidism Sister     Hypertension Brother     Heart disease Brother     Cancer Maternal Uncle         lung    Cancer Paternal Aunt         breast    Breast cancer Paternal Aunt     Cancer Paternal Uncle         testicular     Cancer Paternal Grandmother         breast    Dementia Paternal Grandmother     Breast cancer Paternal Grandmother     Cancer Cousin         brain    Bipolar disorder Daughter          Medications have been verified.        Objective   Temp 98.5 °F (36.9 °C)   Resp 18   Ht 5' 4\" (1.626 m)   Wt 74.8 kg (165 lb)   SpO2 99%   BMI 28.32 kg/m²        Physical Exam     Physical Exam  Constitutional:       General: She is not in acute distress.     Appearance: She is ill-appearing.   HENT:      Right Ear: Tympanic membrane normal.      Left Ear: Tympanic membrane normal.      Nose: Congestion present. No " rhinorrhea.      Mouth/Throat:      Mouth: Mucous membranes are moist.      Pharynx: Oropharynx is clear. Posterior oropharyngeal erythema present. No oropharyngeal exudate.   Eyes:      Conjunctiva/sclera: Conjunctivae normal.   Cardiovascular:      Rate and Rhythm: Normal rate and regular rhythm.      Heart sounds: Normal heart sounds.   Pulmonary:      Effort: Pulmonary effort is normal. No respiratory distress.      Breath sounds: Normal breath sounds. No wheezing, rhonchi or rales.   Abdominal:      Tenderness: There is right CVA tenderness.   Musculoskeletal:      Cervical back: Normal range of motion and neck supple.   Lymphadenopathy:      Cervical: Cervical adenopathy present.   Skin:     General: Skin is warm.   Neurological:      Mental Status: She is alert.   Psychiatric:         Mood and Affect: Mood normal.         Behavior: Behavior normal.

## 2023-12-26 NOTE — PATIENT INSTRUCTIONS
Please follow up with your PCP within 3-5 days.   Can take Pyridum for urethral spasms.   Will send urine culture. Please expect results within the next 24-48 hours. Can see results via mychart.

## 2023-12-28 ENCOUNTER — TELEPHONE (OUTPATIENT)
Dept: FAMILY MEDICINE CLINIC | Facility: CLINIC | Age: 53
End: 2023-12-28

## 2023-12-28 DIAGNOSIS — N39.0 URINARY TRACT INFECTION WITHOUT HEMATURIA, SITE UNSPECIFIED: Primary | ICD-10-CM

## 2023-12-28 RX ORDER — SULFAMETHOXAZOLE AND TRIMETHOPRIM 800; 160 MG/1; MG/1
1 TABLET ORAL EVERY 12 HOURS SCHEDULED
Qty: 10 TABLET | Refills: 0 | Status: SHIPPED | OUTPATIENT
Start: 2023-12-28 | End: 2024-01-02

## 2023-12-28 NOTE — TELEPHONE ENCOUNTER
Patient called with message for Monica Elder. Was in Urgent Care who gave her an antibioti (Cefalexin) which is bothering her stomach. Can you call in another [facility].  [facility]

## 2023-12-29 ENCOUNTER — CLINICAL SUPPORT (OUTPATIENT)
Dept: FAMILY MEDICINE CLINIC | Facility: CLINIC | Age: 53
End: 2023-12-29
Payer: COMMERCIAL

## 2023-12-29 ENCOUNTER — TELEPHONE (OUTPATIENT)
Dept: FAMILY MEDICINE CLINIC | Facility: CLINIC | Age: 53
End: 2023-12-29

## 2023-12-29 DIAGNOSIS — R05.1 ACUTE COUGH: Primary | ICD-10-CM

## 2023-12-29 LAB
SARS-COV-2 AG UPPER RESP QL IA: NEGATIVE
SL AMB POCT RAPID FLU A: NEGATIVE
SL AMB POCT RAPID FLU B: NEGATIVE
VALID CONTROL: NORMAL

## 2023-12-29 PROCEDURE — 87804 INFLUENZA ASSAY W/OPTIC: CPT

## 2023-12-29 PROCEDURE — 87811 SARS-COV-2 COVID19 W/OPTIC: CPT

## 2023-12-29 RX ORDER — DEXTROMETHORPHAN HYDROBROMIDE AND PROMETHAZINE HYDROCHLORIDE 15; 6.25 MG/5ML; MG/5ML
2.5 SYRUP ORAL 4 TIMES DAILY PRN
Qty: 180 ML | Refills: 0 | Status: SHIPPED | OUTPATIENT
Start: 2023-12-29

## 2023-12-29 NOTE — TELEPHONE ENCOUNTER
Patient came I did a curbside for covid and flu both were negative, she just picked up the bactrim kristal prescribed yesterday but she said she has gotten worse since she went to the urgent care. Her ribs hurt from coughing and she is running a low grade fever. Her cough sounds barky and wet. please advise?

## 2023-12-29 NOTE — TELEPHONE ENCOUNTER
Patient called in to ask you to find out if she should have the chest x-ray or not?  Please advise. Thanks

## 2024-01-03 DIAGNOSIS — R42 DIZZINESS: ICD-10-CM

## 2024-01-03 RX ORDER — MECLIZINE HYDROCHLORIDE 25 MG/1
TABLET ORAL
Qty: 30 TABLET | Refills: 0 | Status: SHIPPED | OUTPATIENT
Start: 2024-01-03

## 2024-01-09 ENCOUNTER — ANNUAL EXAM (OUTPATIENT)
Dept: OBGYN CLINIC | Facility: MEDICAL CENTER | Age: 54
End: 2024-01-09
Payer: COMMERCIAL

## 2024-01-09 VITALS
WEIGHT: 168 LBS | SYSTOLIC BLOOD PRESSURE: 126 MMHG | DIASTOLIC BLOOD PRESSURE: 88 MMHG | HEIGHT: 64 IN | BODY MASS INDEX: 28.68 KG/M2

## 2024-01-09 DIAGNOSIS — Z12.31 ENCOUNTER FOR SCREENING MAMMOGRAM FOR MALIGNANT NEOPLASM OF BREAST: ICD-10-CM

## 2024-01-09 DIAGNOSIS — R23.2 HOT FLASHES: ICD-10-CM

## 2024-01-09 DIAGNOSIS — Z01.411 ENCNTR FOR GYN EXAM (GENERAL) (ROUTINE) W ABNORMAL FINDINGS: Primary | ICD-10-CM

## 2024-01-09 DIAGNOSIS — N94.10 DYSPAREUNIA, FEMALE: ICD-10-CM

## 2024-01-09 DIAGNOSIS — R10.2 PELVIC PAIN: ICD-10-CM

## 2024-01-09 PROCEDURE — G0101 CA SCREEN;PELVIC/BREAST EXAM: HCPCS | Performed by: NURSE PRACTITIONER

## 2024-01-09 RX ORDER — PAROXETINE 7.5 MG/1
1 CAPSULE ORAL DAILY
Qty: 30 CAPSULE | Refills: 11 | Status: CANCELLED | OUTPATIENT
Start: 2024-01-09

## 2024-01-09 NOTE — PATIENT INSTRUCTIONS
Calcium 4340-7125 mg (in divided doses-max 600 mg at one time) + 600-1000 IU Vit D daily.   Referred to pelvic floor PT.   Exercise 150-300 minutes per week minimum including weight bearing exercises.   Pap with high risk HPV Q 5 years, if normal.  Due 2025  Call your insurance company to verify coverage prior to completing any ordered tests.   Annual mammogram ordered and monthly breast self exam recommended.    Colonoscopy-   Due 2033       Kegels 20 times twice daily.   Silicone based lubricant with sex. (Use water based lubricant with condoms or sexual toys.)    Vaginal moisturizers twice weekly as needed.   Pelvic US , estradiol and FSH ordered.   Consideration of starting paroxetine recommended for hot flashes.   Return to office in one year or sooner, if needed.

## 2024-01-09 NOTE — PROGRESS NOTES
Assessment/Plan:  Calcium 6785-4437 mg (in divided doses-max 600 mg at one time) + 600-1000 IU Vit D daily.   Referred to pelvic floor PT.   Exercise 150-300 minutes per week minimum including weight bearing exercises.   Pap with high risk HPV Q 5 years, if normal.  Due   Call your insurance company to verify coverage prior to completing any ordered tests.   Annual mammogram ordered and monthly breast self exam recommended.    Colonoscopy-   Due        Kegels 20 times twice daily.   Silicone based lubricant with sex. (Use water based lubricant with condoms or sexual toys.)    Vaginal moisturizers twice weekly as needed.   Pelvic US , estradiol and FSH ordered.   Consideration of starting paroxetine recommended for hot flashes.   Return to office in one year or sooner, if needed.        1. Encntr for gyn exam (general) (routine) w/o abn findings    2. Encounter for screening mammogram for malignant neoplasm of breast  -     Mammo screening bilateral w 3d & cad; Future; Expected date: 06/10/2024    3. Dyspareunia, female  -     Ambulatory referral to Physical Therapy; Future  -     US pelvis complete w transvaginal; Future; Expected date: 2024    4. Pelvic pain  -     Ambulatory referral to Physical Therapy; Future  -     US pelvis complete w transvaginal; Future; Expected date: 2024    5. Hot flashes  -     Estradiol; Future  -     Follicle stimulating hormone; Future               Subjective:      Patient ID: Belem Linares is a 53 y.o. female.    HPI    Belem Linares is a 53 y.o.  ( 34 yo son, 29 yo girl, 28 yo girl ) female who is here today for her annual visit accompanied by delroy. No gynecologic health concerns. Last in office with me on 10/31/23 with c/o hot flashes. Rx for paroxetine provided but never started.   Hysterectomy () for fibroids and tubal ligation () history.   Exercise- not regularly  Unemployed.   Belem Linares is sexually active with male  partner of 1 year. Admits to insertional and thrusting dyspareunia. Denies vaginal bleeding or dryness.  Requesting lab works to determine menopausal status.   She is not interested in STD screening today.   She denies vaginal discharge or pelvic pain.   She has no urinary concerns, does not have incontinence.  IBS-D.  No breast concerns.   3/2/23 CT of pelvic  showed no acute findings.     Last pap: 04/11/2022 normal   DEXA scan: Not on file  Mammogram: 06/09/2023 normal with heterogeneously dense breast tissue LT 13.68  Colonoscopy: 03/24/2023 10 year recall      Family history of cancer:   Cancer-related family history includes Breast cancer in her paternal aunt and paternal grandmother; Cancer in her cousin, father, maternal uncle, paternal aunt, paternal grandmother, and paternal uncle.      The following portions of the patient's history were reviewed and updated as appropriate: allergies, current medications, past family history, past medical history, past social history, past surgical history, and problem list.    Review of Systems   Constitutional: Negative.  Negative for activity change, appetite change, chills, diaphoresis, fatigue, fever and unexpected weight change.   HENT:  Negative for congestion, dental problem, sneezing, sore throat and trouble swallowing.    Eyes:  Negative for visual disturbance.   Respiratory:  Negative for chest tightness and shortness of breath.    Cardiovascular:  Negative for chest pain and leg swelling.   Gastrointestinal:  Negative for abdominal pain, constipation, diarrhea, nausea and vomiting.   Genitourinary:  Positive for dyspareunia. Negative for difficulty urinating, dysuria, frequency, hematuria, pelvic pain, urgency, vaginal bleeding, vaginal discharge and vaginal pain.   Musculoskeletal:  Negative for back pain and neck pain.   Skin: Negative.    Allergic/Immunologic: Negative.    Neurological:  Negative for weakness and headaches.   Hematological:  Negative for  "adenopathy.   Psychiatric/Behavioral: Negative.           Objective:      /88 (BP Location: Left arm, Patient Position: Sitting, Cuff Size: Standard)   Ht 5' 4\" (1.626 m) Comment: per patient  Wt 76.2 kg (168 lb) Comment: per patient  BMI 28.84 kg/m²          Physical Exam  Vitals and nursing note reviewed.   Constitutional:       Appearance: Normal appearance. She is well-developed.   HENT:      Head: Normocephalic and atraumatic.   Eyes:      General:         Right eye: No discharge.         Left eye: No discharge.   Neck:      Thyroid: No thyromegaly.      Trachea: Trachea normal.   Cardiovascular:      Rate and Rhythm: Normal rate and regular rhythm.      Heart sounds: Normal heart sounds.   Pulmonary:      Effort: Pulmonary effort is normal.      Breath sounds: Normal breath sounds.   Chest:   Breasts:     Breasts are symmetrical.      Right: Normal. No inverted nipple, mass, nipple discharge, skin change or tenderness.      Left: Normal. No inverted nipple, mass, nipple discharge, skin change or tenderness.   Abdominal:      Palpations: Abdomen is soft.   Genitourinary:     General: Normal vulva.      Exam position: Lithotomy position.      Labia:         Right: No rash, tenderness, lesion or injury.         Left: No rash, tenderness, lesion or injury.       Urethra: No prolapse, urethral pain, urethral swelling or urethral lesion.      Vagina: Normal. No signs of injury and foreign body. No vaginal discharge, erythema, tenderness or bleeding.      Adnexa:         Right: Tenderness present. No mass or fullness.          Left: No mass, tenderness or fullness.        Rectum: No external hemorrhoid.      Comments: Uterus is surgically absent  Musculoskeletal:         General: Normal range of motion.      Cervical back: Normal range of motion and neck supple.   Lymphadenopathy:      Head:      Right side of head: No submental, submandibular or tonsillar adenopathy.      Left side of head: No submental, " submandibular or tonsillar adenopathy.      Cervical: No cervical adenopathy.      Upper Body:      Right upper body: No supraclavicular or axillary adenopathy.      Left upper body: No supraclavicular or axillary adenopathy.      Lower Body: No right inguinal adenopathy. No left inguinal adenopathy.   Skin:     General: Skin is warm and dry.   Neurological:      Mental Status: She is alert and oriented to person, place, and time.   Psychiatric:         Mood and Affect: Mood normal.         Behavior: Behavior normal.

## 2024-01-11 ENCOUNTER — APPOINTMENT (OUTPATIENT)
Dept: LAB | Facility: CLINIC | Age: 54
End: 2024-01-11
Payer: COMMERCIAL

## 2024-01-11 ENCOUNTER — OFFICE VISIT (OUTPATIENT)
Dept: FAMILY MEDICINE CLINIC | Facility: CLINIC | Age: 54
End: 2024-01-11
Payer: COMMERCIAL

## 2024-01-11 ENCOUNTER — APPOINTMENT (OUTPATIENT)
Dept: RADIOLOGY | Facility: CLINIC | Age: 54
End: 2024-01-11
Payer: COMMERCIAL

## 2024-01-11 VITALS
DIASTOLIC BLOOD PRESSURE: 80 MMHG | HEIGHT: 64 IN | SYSTOLIC BLOOD PRESSURE: 120 MMHG | OXYGEN SATURATION: 98 % | BODY MASS INDEX: 28.84 KG/M2 | HEART RATE: 76 BPM

## 2024-01-11 DIAGNOSIS — R79.9 ABNORMAL FINDING OF BLOOD CHEMISTRY, UNSPECIFIED: ICD-10-CM

## 2024-01-11 DIAGNOSIS — E03.8 OTHER SPECIFIED HYPOTHYROIDISM: ICD-10-CM

## 2024-01-11 DIAGNOSIS — R20.0 ANESTHESIA OF SKIN: ICD-10-CM

## 2024-01-11 DIAGNOSIS — E55.9 VITAMIN D DEFICIENCY: ICD-10-CM

## 2024-01-11 DIAGNOSIS — R23.2 HOT FLASHES: ICD-10-CM

## 2024-01-11 DIAGNOSIS — F31.81 BIPOLAR 2 DISORDER (HCC): ICD-10-CM

## 2024-01-11 DIAGNOSIS — M05.9 SEROPOSITIVE RHEUMATOID ARTHRITIS (HCC): ICD-10-CM

## 2024-01-11 DIAGNOSIS — R73.01 ELEVATED FASTING BLOOD SUGAR: ICD-10-CM

## 2024-01-11 DIAGNOSIS — G43.811 OTHER MIGRAINE WITH STATUS MIGRAINOSUS, INTRACTABLE: ICD-10-CM

## 2024-01-11 DIAGNOSIS — G89.29 CHRONIC RIGHT-SIDED LOW BACK PAIN WITH RIGHT-SIDED SCIATICA: Primary | ICD-10-CM

## 2024-01-11 DIAGNOSIS — G62.9 NEUROPATHY: ICD-10-CM

## 2024-01-11 DIAGNOSIS — E83.52 SERUM CALCIUM ELEVATED: Primary | ICD-10-CM

## 2024-01-11 DIAGNOSIS — R42 DIZZINESS: ICD-10-CM

## 2024-01-11 DIAGNOSIS — M54.41 CHRONIC RIGHT-SIDED LOW BACK PAIN WITH RIGHT-SIDED SCIATICA: Primary | ICD-10-CM

## 2024-01-11 DIAGNOSIS — R11.0 NAUSEA: ICD-10-CM

## 2024-01-11 DIAGNOSIS — D84.9 IMMUNOCOMPROMISED (HCC): ICD-10-CM

## 2024-01-11 DIAGNOSIS — E83.52 SERUM CALCIUM ELEVATED: ICD-10-CM

## 2024-01-11 LAB
25(OH)D3 SERPL-MCNC: 34.9 NG/ML (ref 30–100)
ALBUMIN SERPL BCP-MCNC: 4.7 G/DL (ref 3.5–5)
ALP SERPL-CCNC: 83 U/L (ref 34–104)
ALT SERPL W P-5'-P-CCNC: 54 U/L (ref 7–52)
ANION GAP SERPL CALCULATED.3IONS-SCNC: 9 MMOL/L
AST SERPL W P-5'-P-CCNC: 38 U/L (ref 13–39)
BASOPHILS # BLD AUTO: 0.02 THOUSANDS/ÂΜL (ref 0–0.1)
BASOPHILS NFR BLD AUTO: 0 % (ref 0–1)
BILIRUB SERPL-MCNC: 0.31 MG/DL (ref 0.2–1)
BUN SERPL-MCNC: 17 MG/DL (ref 5–25)
CALCIUM SERPL-MCNC: 9.5 MG/DL (ref 8.4–10.2)
CHLORIDE SERPL-SCNC: 102 MMOL/L (ref 96–108)
CO2 SERPL-SCNC: 30 MMOL/L (ref 21–32)
CREAT SERPL-MCNC: 0.57 MG/DL (ref 0.6–1.3)
EOSINOPHIL # BLD AUTO: 0.12 THOUSAND/ÂΜL (ref 0–0.61)
EOSINOPHIL NFR BLD AUTO: 2 % (ref 0–6)
ERYTHROCYTE [DISTWIDTH] IN BLOOD BY AUTOMATED COUNT: 13.2 % (ref 11.6–15.1)
EST. AVERAGE GLUCOSE BLD GHB EST-MCNC: 114 MG/DL
ESTRADIOL SERPL-MCNC: <15 PG/ML
FERRITIN SERPL-MCNC: 61 NG/ML (ref 11–307)
FOLATE SERPL-MCNC: 20 NG/ML
FSH SERPL-ACNC: 80.1 MIU/ML
GFR SERPL CREATININE-BSD FRML MDRD: 106 ML/MIN/1.73SQ M
GLUCOSE P FAST SERPL-MCNC: 77 MG/DL (ref 65–99)
HBA1C MFR BLD: 5.6 %
HCT VFR BLD AUTO: 42.2 % (ref 34.8–46.1)
HGB BLD-MCNC: 13.1 G/DL (ref 11.5–15.4)
IMM GRANULOCYTES # BLD AUTO: 0.01 THOUSAND/UL (ref 0–0.2)
IMM GRANULOCYTES NFR BLD AUTO: 0 % (ref 0–2)
IRON SATN MFR SERPL: 17 % (ref 15–50)
IRON SERPL-MCNC: 74 UG/DL (ref 50–212)
LYMPHOCYTES # BLD AUTO: 1.95 THOUSANDS/ÂΜL (ref 0.6–4.47)
LYMPHOCYTES NFR BLD AUTO: 35 % (ref 14–44)
MCH RBC QN AUTO: 29 PG (ref 26.8–34.3)
MCHC RBC AUTO-ENTMCNC: 31 G/DL (ref 31.4–37.4)
MCV RBC AUTO: 93 FL (ref 82–98)
MONOCYTES # BLD AUTO: 0.3 THOUSAND/ÂΜL (ref 0.17–1.22)
MONOCYTES NFR BLD AUTO: 5 % (ref 4–12)
NEUTROPHILS # BLD AUTO: 3.12 THOUSANDS/ÂΜL (ref 1.85–7.62)
NEUTS SEG NFR BLD AUTO: 58 % (ref 43–75)
NRBC BLD AUTO-RTO: 0 /100 WBCS
PLATELET # BLD AUTO: 275 THOUSANDS/UL (ref 149–390)
PMV BLD AUTO: 10.1 FL (ref 8.9–12.7)
POTASSIUM SERPL-SCNC: 3.8 MMOL/L (ref 3.5–5.3)
PROT SERPL-MCNC: 7.4 G/DL (ref 6.4–8.4)
PTH-INTACT SERPL-MCNC: 68.4 PG/ML (ref 12–88)
RBC # BLD AUTO: 4.52 MILLION/UL (ref 3.81–5.12)
SODIUM SERPL-SCNC: 141 MMOL/L (ref 135–147)
T4 FREE SERPL-MCNC: 0.67 NG/DL (ref 0.61–1.12)
TIBC SERPL-MCNC: 438 UG/DL (ref 250–450)
TSH SERPL DL<=0.05 MIU/L-ACNC: 7.7 UIU/ML (ref 0.45–4.5)
UIBC SERPL-MCNC: 364 UG/DL (ref 155–355)
VIT B12 SERPL-MCNC: 414 PG/ML (ref 180–914)
WBC # BLD AUTO: 5.52 THOUSAND/UL (ref 4.31–10.16)

## 2024-01-11 PROCEDURE — 84439 ASSAY OF FREE THYROXINE: CPT

## 2024-01-11 PROCEDURE — 82746 ASSAY OF FOLIC ACID SERUM: CPT

## 2024-01-11 PROCEDURE — 85025 COMPLETE CBC W/AUTO DIFF WBC: CPT

## 2024-01-11 PROCEDURE — 80053 COMPREHEN METABOLIC PANEL: CPT

## 2024-01-11 PROCEDURE — 82607 VITAMIN B-12: CPT

## 2024-01-11 PROCEDURE — 83550 IRON BINDING TEST: CPT

## 2024-01-11 PROCEDURE — 72110 X-RAY EXAM L-2 SPINE 4/>VWS: CPT

## 2024-01-11 PROCEDURE — 82670 ASSAY OF TOTAL ESTRADIOL: CPT

## 2024-01-11 PROCEDURE — 83970 ASSAY OF PARATHORMONE: CPT

## 2024-01-11 PROCEDURE — 83001 ASSAY OF GONADOTROPIN (FSH): CPT

## 2024-01-11 PROCEDURE — 82306 VITAMIN D 25 HYDROXY: CPT

## 2024-01-11 PROCEDURE — 36415 COLL VENOUS BLD VENIPUNCTURE: CPT

## 2024-01-11 PROCEDURE — 83540 ASSAY OF IRON: CPT

## 2024-01-11 PROCEDURE — 82728 ASSAY OF FERRITIN: CPT

## 2024-01-11 PROCEDURE — 84443 ASSAY THYROID STIM HORMONE: CPT

## 2024-01-11 PROCEDURE — 99214 OFFICE O/P EST MOD 30 MIN: CPT

## 2024-01-11 PROCEDURE — 73502 X-RAY EXAM HIP UNI 2-3 VIEWS: CPT

## 2024-01-11 PROCEDURE — 83036 HEMOGLOBIN GLYCOSYLATED A1C: CPT

## 2024-01-11 NOTE — PROGRESS NOTES
Assessment/Plan:         Problem List Items Addressed This Visit        Endocrine    Hypothyroidism     Have lab work, follow up is scheduled with endo         Relevant Orders    TSH, 3rd generation with Free T4 reflex       Cardiovascular and Mediastinum    Migraine     Magnesium 400 mg, Riboflavin (B2) 400 mg,COQ10. Stay well hydrated 2000ml/day H2O. Get 7-8 hr of sleep. Limit alcohol. Please have CT, worsening headaches associated with dizziness and nausea.            Relevant Orders    CT head wo contrast       Musculoskeletal and Integument    Seropositive rheumatoid arthritis (HCC)     Continue to follow closely with rheumatology.             Other    Vitamin D deficiency     Have lab work, will call with results.          Relevant Orders    Vitamin D 25 hydroxy    Bipolar 2 disorder (HCC)     Continue current treatment plan, follow up as needed.          Immunocompromised (HCC)     Have lab work, continue with rheumatology.          Nausea     Have lab work, continue current treatment plan.         Other Visit Diagnoses     Serum calcium elevated    -  Primary    have lab work, will call with results    Relevant Orders    PTH, intact    Comprehensive metabolic panel    Elevated fasting blood sugar        Have lab work, continue to monitor and continue oxempic. follow up in 1 month.    Relevant Orders    Iron Panel (Includes Ferritin, Iron Sat%, Iron, and TIBC)    CBC and differential    Comprehensive metabolic panel    Hemoglobin A1C    TSH, 3rd generation with Free T4 reflex    Dizziness        Associated w/nausea/HA, have CT and lab work, has tried pt in the past and cannot afford it. Will call with results, try to keep well hydrated and sugar stable.    Relevant Orders    Iron Panel (Includes Ferritin, Iron Sat%, Iron, and TIBC)    CBC and differential    CT head wo contrast    Neuropathy        Have lab work and xray, will call with results    Relevant Orders    Vitamin B12    Folate    XR spine lumbar  minimum 4 views non injury    XR hip/pelv 2-3 vws right if performed    Abnormal finding of blood chemistry, unspecified        Have lab work, will call with results    Relevant Orders    Iron Panel (Includes Ferritin, Iron Sat%, Iron, and TIBC)    Anesthesia of skin        Have lab work, will call with results    Relevant Orders    Vitamin B12    Folate    XR spine lumbar minimum 4 views non injury    XR hip/pelv 2-3 vws right if performed              Subjective:      Patient ID: Belem Linares is a 53 y.o. female.    Belem is here to follow up, she is doing well just a little nausea and dizziness, she also is having a lot of nerve pain. Her morning sugars have been between 80-200s.     Leg Pain         The following portions of the patient's history were reviewed and updated as appropriate:   Past Medical History:  She has a past medical history of Anxiety, Anxiety, Asthma, Bipolar disorder ([facility]), Carpal tunnel syndrome, Chronic pain, Depression, Disease of thyroid gland, Dyslipidemia, Fibromyalgia, Hypothyroidism (), Irritable bowel, Kidney stones, Kidney stones (2019), Migraine, Obesity (BMI 30.0-34.9), Psychiatric disorder, Suicide attempt ([facility]), and Vitamin D deficiency.,  _______________________________________________________________________  Medical Problems:  does not have any pertinent problems on file.,  _______________________________________________________________________  Past Surgical History:   has a past surgical history that includes  section; Cholecystectomy; Tonsillectomy; Partial hysterectomy; Bunionectomy; and Tubal ligation.,  _______________________________________________________________________  Family History:  family history includes Anxiety disorder in her mother; Arthritis in her mother; Bipolar disorder in her daughter and mother; Breast cancer in her paternal aunt and paternal grandmother; Cancer in her cousin, father, maternal uncle, paternal aunt, paternal  grandmother, and paternal uncle; Dementia in her paternal grandmother; Depression in her mother; Diabetes in her mother; Heart disease in her brother; Hypertension in her brother and mother; Hypothyroidism in her mother and sister; Mental illness in her mother; Stroke in her mother.,  _______________________________________________________________________  Social History:   reports that she has never smoked. She has never used smokeless tobacco. She reports current drug use. Drug: Marijuana. She reports that she does not drink alcohol.,  _______________________________________________________________________  Allergies:  is allergic to doxycycline, erythromycin, other, augmentin [amoxicillin-pot clavulanate], latex, duloxetine, eletriptan, emgality [galcanezumab-gnlm], galcanezumab, lidocaine, and methocarbamol..  _______________________________________________________________________  Current Outpatient Medications   Medication Sig Dispense Refill   • albuterol (PROVENTIL HFA,VENTOLIN HFA) 90 mcg/act inhaler INHALE TWO PUFFS EVERY 6 (SIX) HOURS AS NEEDED FOR WHEEZING OR SHORTNESS OF BREATH 18 g 0   • [place] Thyroid 60 MG tablet Take one and 1/2 tabs daily 135 tablet 2   • azelastine (ASTELIN) 0.1 % nasal spray 1 SPRAY INTO EACH NOSTRIL TWO (TWO) TIMES A DAY USE IN EACH NOSTRIL AS DIRECTED 30 mL 1   • Blood Glucose Monitoring Suppl (OneTouch Verio Reflect) w/Device KIT Check blood sugars once daily. Please substitute with appropriate alternative as covered by patient's insurance. Dx: E11.65 1 kit 0   • butalbital-acetaminophen-caffeine (FIORICET,ESGIC) -40 mg per tablet Take 1 tablet by mouth every 6 (six) hours as needed for headaches 30 tablet 0   • celecoxib (CELEBREX) 200 mg capsule Take 1 capsule (200 mg total) by mouth daily 30 capsule 2   • Diclofenac Sodium (VOLTAREN) 1 % Apply 2 g topically 4 (four) times a day 50 g 0   • dicyclomine (BENTYL) 10 mg capsule Take 1 capsule (10 mg total) by mouth 3  (three) times a day as needed (abd cramping) 30 capsule 0   • divalproex sodium (DEPAKOTE ER) 250 mg 24 hr tablet      • fluticasone (FLONASE) 50 mcg/act nasal spray INHALE TWO PUFFS TWO (TWO) TIMES A DAY RINSE MOUTH AFTER USE. 16 g 0   • fremanezumab-vfrm (Ajovy) 225 MG/1.5ML auto-injector Inject 225 mg under the skin every 30 (thirty) days     • glucose blood (OneTouch Verio) test strip Check blood sugars once daily. Please substitute with appropriate alternative as covered by patient's insurance. Dx: E11.65 100 each 3   • ipratropium-albuterol (DUO-NEB) 0.5-2.5 mg/3 mL nebulizer solution TAKE THREE ML BY NEBULIZATION 4 (FOUR) TIMES A DAY 90 mL 0   • ketoconazole (NIZORAL) 2 % cream      • levocetirizine (XYZAL) 5 MG tablet Take 1 tablet (5 mg total) by mouth every evening 90 tablet 1   • meclizine (ANTIVERT) 25 mg tablet TAKE 1 TABLET (25 MG TOTAL) BY MOUTH EVERY EIGHT (EIGHT) HOURS AS NEEDED FOR DIZZINESS 30 tablet 0   • metoclopramide (Reglan) 10 mg tablet Take 1 tablet (10 mg total) by mouth 4 (four) times a day 60 tablet 0   • metoclopramide (REGLAN) 5 mg tablet Take 1 tablet (5 mg total) by mouth 4 (four) times a day 30 tablet 1   • mometasone (ELOCON) 0.1 % cream Apply topically daily 45 g 0   • naratriptan (AMERGE) 2.5 MG tablet      • ofloxacin (OCUFLOX) 0.3 % ophthalmic solution Administer 1 drop into the left eye 4 (four) times a day 5 mL 0   • omeprazole (PriLOSEC) 40 MG capsule TAKE 1 CAPSULE (40 MG TOTAL) BY MOUTH DAILY 90 capsule 0   • ondansetron (ZOFRAN-ODT) 4 mg disintegrating tablet TAKE 1 TABLET (4 MG TOTAL) BY MOUTH EVERY EIGHT (EIGHT) HOURS AS NEEDED FOR NAUSEA 20 tablet 0   • OneTouch Delica Lancets 33G MISC Check blood sugars once daily. Please substitute with appropriate alternative as covered by patient's insurance. Dx: E11.65 100 each 3   • Ozempic, 1 MG/DOSE, 4 MG/3ML injection pen INJECT 0.75 ML (1 MG TOTAL) UNDER THE SKIN ONCE A WEEK 3 mL 0   • PARoxetine Mesylate 7.5 MG CAPS Take 1  capsule (7.5 mg total) by mouth in the morning 30 capsule 2   • tiZANidine (ZANAFLEX) 4 mg tablet      • Topiramate  MG CP24 Take 100 mg by mouth 2 (two) times a day     • Ubrelvy 100 MG tablet      • Vraylar 4.5 MG capsule TAKE 1 CAPSULE (4.5 MG TOTAL) BY MOUTH DAILY INSERT IN THE MORNING INCREASING THE DOSE 30 capsule 0   • XIFAXAN 550 MG tablet      • fluticasone (Flovent Diskus) 50 mcg/actuation diskus inhaler Inhale 1 puff 2 (two) times a day Rinse mouth after use. 60 blister 0   • Respiratory Therapy Supplies (NEBULIZER) DONA by Does not apply route every 4 (four) hours while awake 90 each 1     Current Facility-Administered Medications   Medication Dose Route Frequency Provider Last Rate Last Admin   • cyanocobalamin injection 1,000 mcg  1,000 mcg Intramuscular Q30 Days ERICKA George   1,000 mcg at 07/27/23 1025     _______________________________________________________________________  Review of Systems   Constitutional:  Positive for chills and diaphoresis. Negative for fever.   HENT:  Negative for congestion, ear pain, nosebleeds, postnasal drip, rhinorrhea and sore throat.    Eyes:  Negative for pain and visual disturbance.   Respiratory:  Positive for chest tightness. Negative for cough, shortness of breath and wheezing.    Cardiovascular:  Negative for chest pain and palpitations.   Gastrointestinal:  Positive for abdominal pain, constipation, diarrhea and nausea. Negative for vomiting.   Genitourinary:  Negative for dysuria, frequency, hematuria and urgency.   Musculoskeletal:  Positive for arthralgias and myalgias. Negative for back pain.   Skin:  Negative for color change and rash.   Neurological:  Positive for dizziness and headaches. Negative for seizures and syncope.   Psychiatric/Behavioral:  Negative for dysphoric mood and sleep disturbance. The patient is not nervous/anxious.    All other systems reviewed and are negative.        Objective:  Vitals:    01/11/24 1004   BP: 120/80  "  Pulse: 76   SpO2: 98%   Height: 5' 4\" (1.626 m)     Body mass index is 28.84 kg/m².     Physical Exam  Vitals and nursing note reviewed.   Constitutional:       General: She is not in acute distress.     Appearance: Normal appearance. She is not ill-appearing.   HENT:      Head: Normocephalic.      Right Ear: Tympanic membrane, ear canal and external ear normal. There is no impacted cerumen.      Left Ear: Tympanic membrane, ear canal and external ear normal. There is no impacted cerumen.      Nose: Nose normal. No congestion.      Mouth/Throat:      Mouth: Mucous membranes are moist.   Eyes:      Extraocular Movements: Extraocular movements intact.      Conjunctiva/sclera: Conjunctivae normal.      Pupils: Pupils are equal, round, and reactive to light.   Cardiovascular:      Rate and Rhythm: Normal rate and regular rhythm.      Pulses: Normal pulses.      Heart sounds: Normal heart sounds.   Pulmonary:      Effort: Pulmonary effort is normal. No respiratory distress.      Breath sounds: Normal breath sounds. No wheezing.   Abdominal:      General: Bowel sounds are normal.      Palpations: Abdomen is soft.   Musculoskeletal:         General: No swelling or tenderness. Normal range of motion.      Cervical back: Normal range of motion. No tenderness.      Right lower leg: No edema.      Left lower leg: No edema.   Lymphadenopathy:      Cervical: No cervical adenopathy.   Skin:     General: Skin is warm and dry.   Neurological:      General: No focal deficit present.      Mental Status: She is alert and oriented to person, place, and time.   Psychiatric:         Mood and Affect: Mood normal.         Behavior: Behavior normal.         Thought Content: Thought content normal.         Judgment: Judgment normal.         "

## 2024-01-11 NOTE — PATIENT INSTRUCTIONS
Problem List Items Addressed This Visit          Endocrine    Hypothyroidism     Have lab work, follow up is scheduled with endo         Relevant Orders    TSH, 3rd generation with Free T4 reflex       Cardiovascular and Mediastinum    Migraine     Magnesium 400 mg, Riboflavin (B2) 400 mg,COQ10. Stay well hydrated 2000ml/day H2O. Get 7-8 hr of sleep. Limit alcohol. Please have CT, worsening headaches associated with dizziness and nausea.            Relevant Orders    CT head wo contrast       Musculoskeletal and Integument    Seropositive rheumatoid arthritis (HCC)     Continue to follow closely with rheumatology.             Other    Vitamin D deficiency     Have lab work, will call with results.          Relevant Orders    Vitamin D 25 hydroxy    Bipolar 2 disorder (HCC)     Continue current treatment plan, follow up as needed.          Immunocompromised (HCC)     Have lab work, continue with rheumatology.          Nausea     Have lab work, continue current treatment plan.           Other Visit Diagnoses       Serum calcium elevated    -  Primary    have lab work, will call with results    Relevant Orders    PTH, intact    Comprehensive metabolic panel    Elevated fasting blood sugar        Have lab work, continue to monitor and continue oxempic. follow up in 1 month.    Relevant Orders    Iron Panel (Includes Ferritin, Iron Sat%, Iron, and TIBC)    CBC and differential    Comprehensive metabolic panel    Hemoglobin A1C    Lipid panel    TSH, 3rd generation with Free T4 reflex    Dizziness        Associated w/nausea/HA, have CT and lab work, has tried pt in the past and cannot afford it. Will call with results, try to keep well hydrated and sugar stable.    Relevant Orders    Iron Panel (Includes Ferritin, Iron Sat%, Iron, and TIBC)    CBC and differential    CT head wo contrast    Neuropathy        Have lab work and xray, will call with results    Relevant Orders    Vitamin B12    Folate    XR spine lumbar  minimum 4 views non injury    XR hip/pelv 2-3 vws right if performed    Abnormal finding of blood chemistry, unspecified        Have lab work, will call with results    Relevant Orders    Iron Panel (Includes Ferritin, Iron Sat%, Iron, and TIBC)    Lipid panel    Anesthesia of skin        Have lab work, will call with results    Relevant Orders    Vitamin B12    Folate    XR spine lumbar minimum 4 views non injury    XR hip/pelv 2-3 vws right if performed

## 2024-01-11 NOTE — ASSESSMENT & PLAN NOTE
Magnesium 400 mg, Riboflavin (B2) 400 mg,COQ10. Stay well hydrated 2000ml/day H2O. Get 7-8 hr of sleep. Limit alcohol. Please have CT, worsening headaches associated with dizziness and nausea.

## 2024-01-12 ENCOUNTER — TELEPHONE (OUTPATIENT)
Dept: FAMILY MEDICINE CLINIC | Facility: CLINIC | Age: 54
End: 2024-01-12

## 2024-01-12 NOTE — TELEPHONE ENCOUNTER
Spoke with patient- she stated that she saw Monica yesterday and part of their conversation was that once her labs were in and Monica reviewed them, she would send in an eye drop. Patient wanted to know, did she have a chance to review them and can she send in that eye drop??    Please advise and call patient. Thank you!

## 2024-01-12 NOTE — TELEPHONE ENCOUNTER
I spoke with the patient, she stated that she and Monica discussed eye drops or water pill but it depends on the patient's lab results. Patient is aware that Monica is out today and will wait until she comes back in the office to review the lab results.

## 2024-01-15 ENCOUNTER — TELEPHONE (OUTPATIENT)
Dept: OBGYN CLINIC | Facility: CLINIC | Age: 54
End: 2024-01-15

## 2024-01-15 NOTE — TELEPHONE ENCOUNTER
Patient called to let Maisha know she had xray done and found the cause of her pain is bladder stones. What is the next step?

## 2024-01-16 ENCOUNTER — TELEMEDICINE (OUTPATIENT)
Dept: PSYCHIATRY | Facility: CLINIC | Age: 54
End: 2024-01-16
Payer: COMMERCIAL

## 2024-01-16 DIAGNOSIS — R42 DIZZINESS: ICD-10-CM

## 2024-01-16 DIAGNOSIS — N20.0 KIDNEY STONES: Primary | ICD-10-CM

## 2024-01-16 DIAGNOSIS — D50.9 IRON DEFICIENCY ANEMIA, UNSPECIFIED IRON DEFICIENCY ANEMIA TYPE: Primary | ICD-10-CM

## 2024-01-16 DIAGNOSIS — E03.8 OTHER SPECIFIED HYPOTHYROIDISM: ICD-10-CM

## 2024-01-16 DIAGNOSIS — F41.1 GAD (GENERALIZED ANXIETY DISORDER): Primary | ICD-10-CM

## 2024-01-16 DIAGNOSIS — F31.81 BIPOLAR 2 DISORDER (HCC): ICD-10-CM

## 2024-01-16 PROCEDURE — 90837 PSYTX W PT 60 MINUTES: CPT

## 2024-01-16 RX ORDER — IRON,CARBONYL/ASCORBIC ACID 65MG-125MG
1 TABLET, DELAYED RELEASE (ENTERIC COATED) ORAL DAILY
Qty: 90 TABLET | Refills: 1 | Status: SHIPPED | OUTPATIENT
Start: 2024-01-16

## 2024-01-16 RX ORDER — THYROID 90 MG/1
90 TABLET ORAL DAILY
Qty: 30 TABLET | Refills: 1 | Status: SHIPPED | OUTPATIENT
Start: 2024-01-16

## 2024-01-16 RX ORDER — THYROID 60 MG
90 TABLET ORAL DAILY
Qty: 135 TABLET | Refills: 2 | Status: SHIPPED | OUTPATIENT
Start: 2024-01-16 | End: 2024-01-16

## 2024-01-16 NOTE — PSYCH
Virtual Regular Visit    Verification of patient location:    Patient is located at Home in the following state in which I hold an active license [place]      Assessment/Plan:    Problem List Items Addressed This Visit          Other    ZAINA (generalized anxiety disorder) - Primary    Bipolar 2 disorder (HCC)       Goals addressed in session: Goal 1          Reason for visit is No chief complaint on file.       Encounter provider Maisha Bonds LCSW    [facility] [place] [address] [phone]      Recent Visits  Date Type Provider Dept   01/12/24 Telephone ERICKA Nava [facility] [address] [place] [address]   01/11/24 Office Visit ERICKA Nava [facility] [address] [place] [address]   Showing recent visits within past 7 days and meeting all other requirements  Today's Visits  Date Type Provider Dept   01/16/24 Telemedicine Maisha Bonds LCSW [facility]   Showing today's visits and meeting all other requirements  Future Appointments  No visits were found meeting these conditions.  Showing future appointments within next 150 days and meeting all other requirements       The patient was identified by name and date of birth. Belem Linares was informed that this is a telemedicine visit and that the visit is being conducted throughthe [employer] platform. She agrees to proceed..  My office door was closed. No one else was in the room.  She acknowledged consent and understanding of privacy and security of the video platform. The patient has agreed to participate and understands they can discontinue the visit at any time.    Patient is aware this is a billable service.     Subjective  Belem Linares is a 53 y.o. female.      HPI     Past Medical History:   Diagnosis Date    Anxiety     Anxiety     Asthma     Bipolar disorder (HCC)     Carpal tunnel syndrome      Chronic pain     lower back    Depression     Disease of thyroid gland     Dyslipidemia     Fibromyalgia     Hypothyroidism     Irritable bowel     Kidney stones     Kidney stones 2019    Migraine     Obesity (BMI 30.0-34.9)     Psychiatric disorder     depression/anxiety    Suicide attempt (HCC)     as teen    Vitamin D deficiency        Past Surgical History:   Procedure Laterality Date    BUNIONECTOMY       SECTION      CHOLECYSTECTOMY      PARTIAL HYSTERECTOMY      portion of  uterus still present    TONSILLECTOMY      TUBAL LIGATION         Current Outpatient Medications   Medication Sig Dispense Refill    albuterol (PROVENTIL HFA,VENTOLIN HFA) 90 mcg/act inhaler INHALE TWO PUFFS EVERY 6 (SIX) HOURS AS NEEDED FOR WHEEZING OR SHORTNESS OF BREATH 18 g 0    azelastine (ASTELIN) 0.1 % nasal spray 1 SPRAY INTO EACH NOSTRIL TWO (TWO) TIMES A DAY USE IN EACH NOSTRIL AS DIRECTED 30 mL 1    Blood Glucose Monitoring Suppl (OneTouch Verio Reflect) w/Device KIT Check blood sugars once daily. Please substitute with appropriate alternative as covered by patient's insurance. Dx: E11.65 1 kit 0    butalbital-acetaminophen-caffeine (FIORICET,ESGIC) -40 mg per tablet Take 1 tablet by mouth every 6 (six) hours as needed for headaches 30 tablet 0    celecoxib (CELEBREX) 200 mg capsule Take 1 capsule (200 mg total) by mouth daily 30 capsule 2    Diclofenac Sodium (VOLTAREN) 1 % Apply 2 g topically 4 (four) times a day 50 g 0    dicyclomine (BENTYL) 10 mg capsule Take 1 capsule (10 mg total) by mouth 3 (three) times a day as needed (abd cramping) 30 capsule 0    divalproex sodium (DEPAKOTE ER) 250 mg 24 hr tablet       fluticasone (FLONASE) 50 mcg/act nasal spray INHALE TWO PUFFS TWO (TWO) TIMES A DAY RINSE MOUTH AFTER USE. 16 g 0    fluticasone (Flovent Diskus) 50 mcg/actuation diskus inhaler Inhale 1 puff 2 (two) times a day Rinse mouth after use. 60 blister 0    fremanezumab-vfrm (Ajovy) 225 MG/1.5ML  auto-injector Inject 225 mg under the skin every 30 (thirty) days      glucose blood (OneTouch Verio) test strip Check blood sugars once daily. Please substitute with appropriate alternative as covered by patient's insurance. Dx: E11.65 100 each 3    ipratropium-albuterol (DUO-NEB) 0.5-2.5 mg/3 mL nebulizer solution TAKE THREE ML BY NEBULIZATION 4 (FOUR) TIMES A DAY 90 mL 0    Iron-Vitamin C (Vitron-C)  MG TABS Take 1 tablet by mouth in the morning 90 tablet 1    ketoconazole (NIZORAL) 2 % cream       levocetirizine (XYZAL) 5 MG tablet Take 1 tablet (5 mg total) by mouth every evening 90 tablet 1    meclizine (ANTIVERT) 25 mg tablet TAKE 1 TABLET (25 MG TOTAL) BY MOUTH EVERY EIGHT (EIGHT) HOURS AS NEEDED FOR DIZZINESS 30 tablet 0    metoclopramide (Reglan) 10 mg tablet Take 1 tablet (10 mg total) by mouth 4 (four) times a day 60 tablet 0    metoclopramide (REGLAN) 5 mg tablet Take 1 tablet (5 mg total) by mouth 4 (four) times a day 30 tablet 1    mometasone (ELOCON) 0.1 % cream Apply topically daily 45 g 0    naratriptan (AMERGE) 2.5 MG tablet       ofloxacin (OCUFLOX) 0.3 % ophthalmic solution Administer 1 drop into the left eye 4 (four) times a day 5 mL 0    omeprazole (PriLOSEC) 40 MG capsule TAKE 1 CAPSULE (40 MG TOTAL) BY MOUTH DAILY 90 capsule 0    ondansetron (ZOFRAN-ODT) 4 mg disintegrating tablet TAKE 1 TABLET (4 MG TOTAL) BY MOUTH EVERY EIGHT (EIGHT) HOURS AS NEEDED FOR NAUSEA 20 tablet 0    OneTouch Delica Lancets 33G MISC Check blood sugars once daily. Please substitute with appropriate alternative as covered by patient's insurance. Dx: E11.65 100 each 3    Ozempic, 1 MG/DOSE, 4 MG/3ML injection pen INJECT 0.75 ML (1 MG TOTAL) UNDER THE SKIN ONCE A WEEK 3 mL 0    PARoxetine Mesylate 7.5 MG CAPS Take 1 capsule (7.5 mg total) by mouth in the morning 30 capsule 2    Respiratory Therapy Supplies (NEBULIZER) DONA by Does not apply route every 4 (four) hours while awake 90 each 1    thyroid (ARMOUR) 90  "MG tablet Take 1 tablet (90 mg total) by mouth daily 30 tablet 1    tiZANidine (ZANAFLEX) 4 mg tablet       Topiramate  MG CP24 Take 100 mg by mouth 2 (two) times a day      Ubrelvy 100 MG tablet       Vraylar 4.5 MG capsule TAKE 1 CAPSULE (4.5 MG TOTAL) BY MOUTH DAILY INSERT IN THE MORNING INCREASING THE DOSE 30 capsule 0    XIFAXAN 550 MG tablet        Current Facility-Administered Medications   Medication Dose Route Frequency Provider Last Rate Last Admin    cyanocobalamin injection 1,000 mcg  1,000 mcg Intramuscular Q30 Days ERICKA George   1,000 mcg at 07/27/23 1025        Allergies   Allergen Reactions    Doxycycline Rash    Erythromycin Rash    Other Edema and Wheezing     jalapeno    Augmentin [Amoxicillin-Pot Clavulanate] GI Intolerance    Latex Rash    Duloxetine      Other reaction(s): Nausea, Loopy    Eletriptan      Pain in lower jaw with pressure in throat    Emgality [Galcanezumab-Gnlm]     Galcanezumab      rash    Lidocaine Other (See Comments)     Headache    Methocarbamol Other (See Comments)     Dizzyness       Review of Systems    Video Exam    There were no vitals filed for this visit.    Physical Exam     Behavioral Health Psychotherapy Progress Note    Psychotherapy Provided: Individual Psychotherapy      Diagnosis ICD-10-CM Associated Orders   1. ZAINA (generalized anxiety disorder)  F41.1       2. Bipolar 2 disorder (HCC)  F31.81            Goals addressed in session: Goal 1     DATA: Met with Belem Linares for a scheduled individual session.  Topics discussed included emotional wellness, coping skills, family stressors, and relationships with family.  Belem is feeling \"ok\". She stated she and her grandson were pretty sick over the holidays but are getting better. Her grandson's birthday is coming up and she's planning a [place] party. She's reached out to his mother/her daughter to invite them but her daughter is giving her a hard time. She keeps challenging Belem, asking " "why she can't have him overnight and so on. It's not in the court order for overnights or anything; it's just day visits. It would seem her daughter wants to do things her way and through arguing rather than following the order and making things about her son. We talked at length about her stress and frustration regarding her daughter.  Belem shows evidence of utilizing effective communication skills, engaging in problem solving, and maintaining emotional composure to manage mental health symptoms.  During this session, this clinical used the following therapeutic modalities supportive psychotherapy, client-centered therapy, and CBT techniques.    Substance Abuse was not addressed during this session. If the client is diagnosed with a co-occurring substance use disorder, please indicate any changes in the frequency or amount of use: N/A. Stage of change for addressing substance use diagnoses: No substance use/Not applicable    ASSESSMENT:  Belem presents with a Euthymic/ normal mood.  her affect is normal range and intensity, appropriate, which is congruent, with her  mood and the content of the session. Belem was oriented x3. She was focused and engaged. Belem exhibits good therapeutic rapport with this clinician. The client has made progress on their goals.    Belem Linares presents with a none risk of suicide, none risk of self-harm, and none risk of harm to others.    For any risk assessment that surpasses a \"low\" rating, a safety plan must be developed.    A safety plan was indicated: no  If yes, describe in detail: N/A    PLAN: Belem will return in 1 week for the next scheduled session.  At the next session, the therapist will use supportive psychotherapy and client-centered therapy to address depression, mood and anxiety.    Behavioral Health Treatment Plan and Discharge Planning: Belem Linares is aware of and agrees to continue to work on their treatment plan. They have identified and are " working toward their discharge goals. yes    Visit start and stop times:    01/16/24  Start Time: 1102  Stop Time: 1211  Total Visit Time: 69 minutes

## 2024-01-17 ENCOUNTER — TELEPHONE (OUTPATIENT)
Dept: FAMILY MEDICINE CLINIC | Facility: CLINIC | Age: 54
End: 2024-01-17

## 2024-01-17 ENCOUNTER — HOSPITAL ENCOUNTER (OUTPATIENT)
Dept: RADIOLOGY | Facility: HOSPITAL | Age: 54
Discharge: HOME | End: 2024-01-17
Payer: COMMERCIAL

## 2024-01-17 DIAGNOSIS — N20.0 KIDNEY STONES: ICD-10-CM

## 2024-01-17 PROCEDURE — 74018 RADEX ABDOMEN 1 VIEW: CPT

## 2024-01-17 NOTE — TELEPHONE ENCOUNTER
----- Message from ERICKA Nava sent at 1/17/2024  2:29 PM EST -----  Possible renal calculus, if having symptoms can follow up with urology

## 2024-01-17 NOTE — TELEPHONE ENCOUNTER
Patient is aware but would like to know what she can use for the pain? Also she mentioned eyedrops but you were waiting for her blood work results.

## 2024-01-18 DIAGNOSIS — H04.129 DRY EYE: Primary | ICD-10-CM

## 2024-01-18 RX ORDER — LIFITEGRAST 50 MG/ML
1 SOLUTION/ DROPS OPHTHALMIC 2 TIMES DAILY
Qty: 60 EACH | Refills: 1 | Status: SHIPPED | OUTPATIENT
Start: 2024-01-18

## 2024-01-24 ENCOUNTER — TELEPHONE (OUTPATIENT)
Dept: FAMILY MEDICINE CLINIC | Facility: CLINIC | Age: 54
End: 2024-01-24

## 2024-01-24 DIAGNOSIS — R10.13 EPIGASTRIC PAIN: ICD-10-CM

## 2024-01-24 RX ORDER — METOCLOPRAMIDE 5 MG/1
5 TABLET ORAL 4 TIMES DAILY
Qty: 30 TABLET | Refills: 0 | Status: SHIPPED | OUTPATIENT
Start: 2024-01-24

## 2024-01-24 NOTE — TELEPHONE ENCOUNTER
Spoke with patient- she stated that she is in a lot of pain. She wanted to say that she was sorry for cancelling on Monday, she was just in too much pain to make it in. She stated that the Tylenol is not cutting it. She is pending follow up with specialists but the pain is unbelievable. She stated that it feels like menstrual cramps, constant and gnawing, but she has been 14 years since her last period so it is not that. She wants to know what she can do in the meantime? Is there sometimes else she can take?    Please advise, thank you!

## 2024-01-24 NOTE — TELEPHONE ENCOUNTER
Spoke with patient- informed her to go to ER if pain levels are that high. She expressed understanding but would not confirm if she was going to go or not.

## 2024-01-25 ENCOUNTER — TELEMEDICINE (OUTPATIENT)
Dept: PSYCHIATRY | Facility: CLINIC | Age: 54
End: 2024-01-25
Payer: COMMERCIAL

## 2024-01-25 DIAGNOSIS — F41.1 GAD (GENERALIZED ANXIETY DISORDER): Primary | ICD-10-CM

## 2024-01-25 DIAGNOSIS — F31.81 BIPOLAR 2 DISORDER (HCC): ICD-10-CM

## 2024-01-25 PROCEDURE — 90834 PSYTX W PT 45 MINUTES: CPT

## 2024-01-25 NOTE — PSYCH
Virtual Regular Visit    Verification of patient location:    Patient is located at Home in the following state in which I hold an active license [place]      Assessment/Plan:    Problem List Items Addressed This Visit       ZAINA (generalized anxiety disorder) - Primary    Bipolar 2 disorder (HCC)       Goals addressed in session: Goal 1          Reason for visit is No chief complaint on file.       Encounter provider Maisha Bonds LCSW    Provider located at [facility] [address]  [place] [address] [phone]      Recent Visits  Date Type Provider Dept   01/24/24 Telephone ERICKA Nava [facility] [address] [facility]   Showing recent visits within past 7 days and meeting all other requirements  Today's Visits  Date Type Provider Dept   01/25/24 Telemedicine Maisha Bonsd LCSW [facility]   Showing today's visits and meeting all other requirements  Future Appointments  No visits were found meeting these conditions.  Showing future appointments within next 150 days and meeting all other requirements       The patient was identified by name and date of birth. Belem Linares was informed that this is a telemedicine visit and that the visit is being conducted throughthe [employer] platform. She agrees to proceed..  My office door was closed. No one else was in the room.  She acknowledged consent and understanding of privacy and security of the video platform. The patient has agreed to participate and understands they can discontinue the visit at any time.    Patient is aware this is a billable service.     Subjective  Belem Linares is a 53 y.o. female.      HPI     Past Medical History:   Diagnosis Date    Anxiety     Anxiety     Asthma     Bipolar disorder (HCC)     Carpal tunnel syndrome     Chronic pain     lower back    Depression     Disease of thyroid gland     Dyslipidemia      Fibromyalgia     Hypothyroidism     Irritable bowel     Kidney stones     Kidney stones 2019    Migraine     Obesity (BMI 30.0-34.9)     Psychiatric disorder     depression/anxiety    Suicide attempt (HCC)     as teen    Vitamin D deficiency        Past Surgical History:   Procedure Laterality Date    BUNIONECTOMY       SECTION      CHOLECYSTECTOMY      PARTIAL HYSTERECTOMY      portion of  uterus still present    TONSILLECTOMY      TUBAL LIGATION         Current Outpatient Medications   Medication Sig Dispense Refill    albuterol (PROVENTIL HFA,VENTOLIN HFA) 90 mcg/act inhaler INHALE TWO PUFFS EVERY 6 (SIX) HOURS AS NEEDED FOR WHEEZING OR SHORTNESS OF BREATH 18 g 0    azelastine (ASTELIN) 0.1 % nasal spray 1 SPRAY INTO EACH NOSTRIL TWO (TWO) TIMES A DAY USE IN EACH NOSTRIL AS DIRECTED 30 mL 1    Blood Glucose Monitoring Suppl (OneTouch Verio Reflect) w/Device KIT Check blood sugars once daily. Please substitute with appropriate alternative as covered by patient's insurance. Dx: E11.65 1 kit 0    butalbital-acetaminophen-caffeine (FIORICET,ESGIC) -40 mg per tablet Take 1 tablet by mouth every 6 (six) hours as needed for headaches 30 tablet 0    celecoxib (CELEBREX) 200 mg capsule Take 1 capsule (200 mg total) by mouth daily 30 capsule 2    Diclofenac Sodium (VOLTAREN) 1 % Apply 2 g topically 4 (four) times a day 50 g 0    dicyclomine (BENTYL) 10 mg capsule Take 1 capsule (10 mg total) by mouth 3 (three) times a day as needed (abd cramping) 30 capsule 0    divalproex sodium (DEPAKOTE ER) 250 mg 24 hr tablet       fluticasone (FLONASE) 50 mcg/act nasal spray INHALE TWO PUFFS TWO (TWO) TIMES A DAY RINSE MOUTH AFTER USE. 16 g 0    fluticasone (Flovent Diskus) 50 mcg/actuation diskus inhaler Inhale 1 puff 2 (two) times a day Rinse mouth after use. 60 blister 0    fremanezumab-vfrm (Ajovy) 225 MG/1.5ML auto-injector Inject 225 mg under the skin every 30 (thirty) days      glucose blood (OneTouch  Verio) test strip Check blood sugars once daily. Please substitute with appropriate alternative as covered by patient's insurance. Dx: E11.65 100 each 3    ipratropium-albuterol (DUO-NEB) 0.5-2.5 mg/3 mL nebulizer solution TAKE THREE ML BY NEBULIZATION 4 (FOUR) TIMES A DAY 90 mL 0    Iron-Vitamin C (Vitron-C)  MG TABS Take 1 tablet by mouth in the morning 90 tablet 1    ketoconazole (NIZORAL) 2 % cream       levocetirizine (XYZAL) 5 MG tablet Take 1 tablet (5 mg total) by mouth every evening 90 tablet 1    lifitegrast (Xiidra) 5 % op solution Administer 1 drop to both eyes 2 (two) times a day 60 each 1    meclizine (ANTIVERT) 25 mg tablet TAKE 1 TABLET (25 MG TOTAL) BY MOUTH EVERY EIGHT (EIGHT) HOURS AS NEEDED FOR DIZZINESS 30 tablet 0    metoclopramide (Reglan) 10 mg tablet Take 1 tablet (10 mg total) by mouth 4 (four) times a day 60 tablet 0    metoclopramide (REGLAN) 5 mg tablet TAKE 1 TABLET (5 MG TOTAL) BY MOUTH 4 (FOUR) TIMES A DAY 30 tablet 0    mometasone (ELOCON) 0.1 % cream Apply topically daily 45 g 0    naratriptan (AMERGE) 2.5 MG tablet       ofloxacin (OCUFLOX) 0.3 % ophthalmic solution Administer 1 drop into the left eye 4 (four) times a day 5 mL 0    omeprazole (PriLOSEC) 40 MG capsule TAKE 1 CAPSULE (40 MG TOTAL) BY MOUTH DAILY 90 capsule 0    ondansetron (ZOFRAN-ODT) 4 mg disintegrating tablet TAKE 1 TABLET (4 MG TOTAL) BY MOUTH EVERY EIGHT (EIGHT) HOURS AS NEEDED FOR NAUSEA 20 tablet 0    OneTouch Delica Lancets 33G MISC Check blood sugars once daily. Please substitute with appropriate alternative as covered by patient's insurance. Dx: E11.65 100 each 3    Ozempic, 1 MG/DOSE, 4 MG/3ML injection pen INJECT 0.75 ML (1 MG TOTAL) UNDER THE SKIN ONCE A WEEK 3 mL 0    PARoxetine Mesylate 7.5 MG CAPS Take 1 capsule (7.5 mg total) by mouth in the morning 30 capsule 2    Respiratory Therapy Supplies (NEBULIZER) DONA by Does not apply route every 4 (four) hours while awake 90 each 1    thyroid  "(ARMOUR) 90 MG tablet Take 1 tablet (90 mg total) by mouth daily 30 tablet 1    tiZANidine (ZANAFLEX) 4 mg tablet       Topiramate  MG CP24 Take 100 mg by mouth 2 (two) times a day      Ubrelvy 100 MG tablet       Vraylar 4.5 MG capsule TAKE 1 CAPSULE (4.5 MG TOTAL) BY MOUTH DAILY INSERT IN THE MORNING INCREASING THE DOSE 30 capsule 0    XIFAXAN 550 MG tablet        Current Facility-Administered Medications   Medication Dose Route Frequency Provider Last Rate Last Admin    cyanocobalamin injection 1,000 mcg  1,000 mcg Intramuscular Q30 Days Ryanne EspinozazniakERICKA   1,000 mcg at 07/27/23 1025        Allergies   Allergen Reactions    Doxycycline Rash    Erythromycin Rash    Other Edema and Wheezing     jalapeno    Augmentin [Amoxicillin-Pot Clavulanate] GI Intolerance    Latex Rash    Duloxetine      Other reaction(s): Nausea, Loopy    Eletriptan      Pain in lower jaw with pressure in throat    Emgality [Galcanezumab-Gnlm]     Galcanezumab      rash    Lidocaine Other (See Comments)     Headache    Methocarbamol Other (See Comments)     Dizzyness       Review of Systems    Video Exam    There were no vitals filed for this visit.    Physical Exam     Behavioral Health Psychotherapy Progress Note    Psychotherapy Provided: Individual Psychotherapy      Diagnosis ICD-10-CM Associated Orders   1. ZAINA (generalized anxiety disorder)  F41.1       2. Bipolar 2 disorder (HCC)  F31.81            Goals addressed in session: Goal 1     DATA: Met with Belem Linares for a scheduled individual session.  Topics discussed included emotional wellness, coping skills, and family stressors.  Belem is feeling \"frustrated\". She's been in pain, stating it feels like she's having menstrual cramps even though she's not had a period in 14 years. She's found out she has bladder stones. She's having a birthday party for her grandson so her stress is up. Her daughter/grandson's mother is giving her a hard time. She's trying to take " "over and Belem has told her that everything is cover, just come to the party. She's anxious about how the party will go with how her daughter behaves, especially because she doesn't get along with Belem's other daughter. Belem will have her boyfriend Jaylan there so she knows she'll have support. Belem really wants him to have a great birthday party and for all the kids to enjoy it, she doesn't want anything to disrupt that.   Belem shows evidence of utilizing effective communication skills and engaging in problem solving to manage mental health symptoms.  During this session, this clinical used the following therapeutic modalities supportive psychotherapy, client-centered therapy, and CBT techniques.    Substance Abuse was not addressed during this session. If the client is diagnosed with a co-occurring substance use disorder, please indicate any changes in the frequency or amount of use: N/A. Stage of change for addressing substance use diagnoses: No substance use/Not applicable    ASSESSMENT:  Belem presents with a Euthymic/ normal mood.  her affect is normal range and intensity, appropriate, which is congruent, with her  mood and the content of the session. Belem was oriented x3. She was focused and engaged. Belem exhibits good therapeutic rapport with this clinician. The client has made progress on their goals.    Belem Linares presents with a none risk of suicide, none risk of self-harm, and none risk of harm to others.    For any risk assessment that surpasses a \"low\" rating, a safety plan must be developed.    A safety plan was indicated: no  If yes, describe in detail: N/A    PLAN: Belem will return in 1 week for the next scheduled session.  At the next session, the therapist will use supportive psychotherapy and client-centered therapy to address anxiety and depression.    Behavioral Health Treatment Plan and Discharge Planning: Belem Linares is aware of and agrees to continue to work on " their treatment plan. They have identified and are working toward their discharge goals. yes    Visit start and stop times:    01/25/24  Start Time: 1005  Stop Time: 1056  Total Visit Time: 51 minutes

## 2024-02-02 ENCOUNTER — CONSULT (OUTPATIENT)
Dept: UROLOGY | Facility: CLINIC | Age: 54
End: 2024-02-02
Payer: COMMERCIAL

## 2024-02-02 VITALS
HEART RATE: 86 BPM | BODY MASS INDEX: 28.17 KG/M2 | SYSTOLIC BLOOD PRESSURE: 144 MMHG | HEIGHT: 64 IN | DIASTOLIC BLOOD PRESSURE: 86 MMHG | WEIGHT: 165 LBS | OXYGEN SATURATION: 98 %

## 2024-02-02 DIAGNOSIS — R10.2 PELVIC PAIN: ICD-10-CM

## 2024-02-02 DIAGNOSIS — R39.9 LOWER URINARY TRACT SYMPTOMS (LUTS): ICD-10-CM

## 2024-02-02 DIAGNOSIS — N20.0 NEPHROLITHIASIS: Primary | ICD-10-CM

## 2024-02-02 DIAGNOSIS — N20.0 KIDNEY STONES: ICD-10-CM

## 2024-02-02 LAB

## 2024-02-02 PROCEDURE — 51798 US URINE CAPACITY MEASURE: CPT | Performed by: PHYSICIAN ASSISTANT

## 2024-02-02 PROCEDURE — 99203 OFFICE O/P NEW LOW 30 MIN: CPT | Performed by: PHYSICIAN ASSISTANT

## 2024-02-02 PROCEDURE — 81002 URINALYSIS NONAUTO W/O SCOPE: CPT | Performed by: PHYSICIAN ASSISTANT

## 2024-02-02 RX ORDER — IBUPROFEN 800 MG/1
TABLET ORAL
COMMUNITY
Start: 2024-01-26

## 2024-02-02 NOTE — PROGRESS NOTES
2/2/2024      Chief Complaint   Patient presents with    Nephrolithiasis         Assessment and Plan    53 y.o. female new patient     Nephrolithiasis   Bladder/pelvic pain  OAB symptoms  - KUB from 1/17/24 - 3 mm calcification overlies the left renal shadow and may represent a renal calculus. Multiple phleboliths within the pelvis.   - CT A/P from 3/2/23 - KIDNEYS/URETERS:  Nonobstructing 4 mm left renal calculus.  No hydroureteronephrosis   - Urine dip today negative  - Obtain CT renal stone study to r/o ureteral calculus   - Recommend adequate hydration and dietary modifications to prevent future stone formation. Discussed how Vitamin C, calcium and vitamin D supplementation can increase risk of stone. She is also on Topamax for migraines which can also increase risk of stone formation  - Recommend avoiding bladder irritants constipation to minimize LUTS. Consider pelvic floor PT or OAB medication  - Follow up for imaging review.     History of Present Illness  Belem Linares is a 53 y.o. female here for new patient evaluation of kidney stone. She recently has been having pelvic/bladder pain, urinary frequency, urgency and urinary leakage at times. No flank pain, dysuria, hematuria, or abdominal pain. She does also complain of nausea and vomiting. She has had prior history of kidney stones with spontaneous expulsion. No prior  surgical manipulation. She has chronic back pain related to herniated discs      Review of Systems   Constitutional:  Negative for chills and fever.   Respiratory:  Negative for shortness of breath.    Cardiovascular:  Negative for chest pain.   Gastrointestinal:  Positive for nausea and vomiting. Negative for abdominal pain.   Genitourinary:  Positive for frequency, pelvic pain and urgency. Negative for difficulty urinating, dysuria, flank pain and hematuria.   Musculoskeletal:  Positive for back pain.   Neurological:  Negative for dizziness.       Past Medical History  Past Medical  History:   Diagnosis Date    Anxiety     Anxiety     Asthma     Bipolar disorder (HCC)     Carpal tunnel syndrome     Chronic pain     lower back    Depression     Disease of thyroid gland     Dyslipidemia     Fibromyalgia     Hypothyroidism     Irritable bowel     Kidney stones     Kidney stones 2019    Migraine     Obesity (BMI 30.0-34.9)     Psychiatric disorder     depression/anxiety    Suicide attempt (HCC)     as teen    Vitamin D deficiency        Past Social History  Past Surgical History:   Procedure Laterality Date    BUNIONECTOMY       SECTION      CHOLECYSTECTOMY      PARTIAL HYSTERECTOMY      portion of  uterus still present    TONSILLECTOMY      TUBAL LIGATION       Social History     Tobacco Use   Smoking Status Never    Passive exposure: Past   Smokeless Tobacco Never       Past Family History  Family History   Problem Relation Age of Onset    Hypertension Mother     Diabetes Mother     Hypothyroidism Mother     Stroke Mother     Bipolar disorder Mother     Anxiety disorder Mother     Arthritis Mother     Depression Mother     Mental illness Mother     Cancer Father         pancreatic     Hypothyroidism Sister     Hypertension Brother     Heart disease Brother     Cancer Maternal Uncle         lung    Cancer Paternal Aunt         breast    Breast cancer Paternal Aunt     Cancer Paternal Uncle         testicular     Cancer Paternal Grandmother         breast    Dementia Paternal Grandmother     Breast cancer Paternal Grandmother     Cancer Cousin         brain    Bipolar disorder Daughter        Past Social history  Social History     Socioeconomic History    Marital status: Single     Spouse name: Not on file    Number of children: Not on file    Years of education: Not on file    Highest education level: Not on file   Occupational History    Occupation: Disability   Tobacco Use    Smoking status: Never     Passive exposure: Past    Smokeless tobacco: Never   Vaping Use    Vaping  status: Never Used   Substance and Sexual Activity    Alcohol use: No    Drug use: Yes     Types: Marijuana     Comment: gummy on occasion for pain    Sexual activity: Not Currently     Partners: Male     Birth control/protection: Abstinence, Female Sterilization     Comment: Hysterectomy   Other Topics Concern    Not on file   Social History Narrative    Not on file     Social Determinants of Health     Financial Resource Strain: Medium Risk (3/1/2023)    Overall Financial Resource Strain (CARDIA)     Difficulty of Paying Living Expenses: Somewhat hard   Food Insecurity: No Food Insecurity (3/1/2023)    Hunger Vital Sign     Worried About Running Out of Food in the Last Year: Never true     Ran Out of Food in the Last Year: Never true   Transportation Needs: No Transportation Needs (3/1/2023)    PRAPARE - Transportation     Lack of Transportation (Medical): No     Lack of Transportation (Non-Medical): No   Physical Activity: Not on file   Stress: No Stress Concern Present (3/1/2023)    [employer] - Occupational Stress Questionnaire     Feeling of Stress : Not at all   Social Connections: Not on file   Intimate Partner Violence: Not on file   Housing Stability: Not on file       Current Medications  Current Outpatient Medications   Medication Sig Dispense Refill    albuterol (PROVENTIL HFA,VENTOLIN HFA) 90 mcg/act inhaler INHALE TWO PUFFS EVERY 6 (SIX) HOURS AS NEEDED FOR WHEEZING OR SHORTNESS OF BREATH 18 g 0    azelastine (ASTELIN) 0.1 % nasal spray 1 SPRAY INTO EACH NOSTRIL TWO (TWO) TIMES A DAY USE IN EACH NOSTRIL AS DIRECTED 30 mL 1    Blood Glucose Monitoring Suppl (OneTouch Verio Reflect) w/Device KIT Check blood sugars once daily. Please substitute with appropriate alternative as covered by patient's insurance. Dx: E11.65 1 kit 0    butalbital-acetaminophen-caffeine (FIORICET,ESGIC) -40 mg per tablet Take 1 tablet by mouth every 6 (six) hours as needed for headaches 30  tablet 0    celecoxib (CELEBREX) 200 mg capsule Take 1 capsule (200 mg total) by mouth daily 30 capsule 2    Diclofenac Sodium (VOLTAREN) 1 % Apply 2 g topically 4 (four) times a day 50 g 0    dicyclomine (BENTYL) 10 mg capsule Take 1 capsule (10 mg total) by mouth 3 (three) times a day as needed (abd cramping) 30 capsule 0    divalproex sodium (DEPAKOTE ER) 250 mg 24 hr tablet       fluticasone (FLONASE) 50 mcg/act nasal spray INHALE TWO PUFFS TWO (TWO) TIMES A DAY RINSE MOUTH AFTER USE. 16 g 0    fluticasone (Flovent Diskus) 50 mcg/actuation diskus inhaler Inhale 1 puff 2 (two) times a day Rinse mouth after use. 60 blister 0    fremanezumab-vfrm (Ajovy) 225 MG/1.5ML auto-injector Inject 225 mg under the skin every 30 (thirty) days      glucose blood (OneTouch Verio) test strip Check blood sugars once daily. Please substitute with appropriate alternative as covered by patient's insurance. Dx: E11.65 100 each 3     MG tablet       ipratropium-albuterol (DUO-NEB) 0.5-2.5 mg/3 mL nebulizer solution TAKE THREE ML BY NEBULIZATION 4 (FOUR) TIMES A DAY 90 mL 0    Iron-Vitamin C (Vitron-C)  MG TABS Take 1 tablet by mouth in the morning 90 tablet 1    ketoconazole (NIZORAL) 2 % cream       levocetirizine (XYZAL) 5 MG tablet Take 1 tablet (5 mg total) by mouth every evening 90 tablet 1    lifitegrast (Xiidra) 5 % op solution Administer 1 drop to both eyes 2 (two) times a day 60 each 1    meclizine (ANTIVERT) 25 mg tablet TAKE 1 TABLET (25 MG TOTAL) BY MOUTH EVERY EIGHT (EIGHT) HOURS AS NEEDED FOR DIZZINESS 30 tablet 0    metoclopramide (Reglan) 10 mg tablet Take 1 tablet (10 mg total) by mouth 4 (four) times a day 60 tablet 0    metoclopramide (REGLAN) 5 mg tablet TAKE 1 TABLET (5 MG TOTAL) BY MOUTH 4 (FOUR) TIMES A DAY 30 tablet 0    mometasone (ELOCON) 0.1 % cream Apply topically daily 45 g 0    naratriptan (AMERGE) 2.5 MG tablet       ofloxacin (OCUFLOX) 0.3 % ophthalmic solution Administer 1 drop into the  left eye 4 (four) times a day 5 mL 0    omeprazole (PriLOSEC) 40 MG capsule TAKE 1 CAPSULE (40 MG TOTAL) BY MOUTH DAILY 90 capsule 0    ondansetron (ZOFRAN-ODT) 4 mg disintegrating tablet TAKE 1 TABLET (4 MG TOTAL) BY MOUTH EVERY EIGHT (EIGHT) HOURS AS NEEDED FOR NAUSEA 20 tablet 0    OneTouch Delica Lancets 33G MISC Check blood sugars once daily. Please substitute with appropriate alternative as covered by patient's insurance. Dx: E11.65 100 each 3    Ozempic, 1 MG/DOSE, 4 MG/3ML injection pen INJECT 0.75 ML (1 MG TOTAL) UNDER THE SKIN ONCE A WEEK 3 mL 0    PARoxetine Mesylate 7.5 MG CAPS Take 1 capsule (7.5 mg total) by mouth in the morning 30 capsule 2    Respiratory Therapy Supplies (NEBULIZER) DONA by Does not apply route every 4 (four) hours while awake 90 each 1    thyroid (ARMOUR) 90 MG tablet Take 1 tablet (90 mg total) by mouth daily 30 tablet 1    tiZANidine (ZANAFLEX) 4 mg tablet       Topiramate  MG CP24 Take 100 mg by mouth 2 (two) times a day      Ubrelvy 100 MG tablet       Vraylar 4.5 MG capsule TAKE 1 CAPSULE (4.5 MG TOTAL) BY MOUTH DAILY INSERT IN THE MORNING INCREASING THE DOSE 30 capsule 0    XIFAXAN 550 MG tablet        Current Facility-Administered Medications   Medication Dose Route Frequency Provider Last Rate Last Admin    cyanocobalamin injection 1,000 mcg  1,000 mcg Intramuscular Q30 Days ERICKA George   1,000 mcg at 07/27/23 1025       Allergies  Allergies   Allergen Reactions    Doxycycline Rash    Erythromycin Rash    Other Edema and Wheezing     jalapeno    Augmentin [Amoxicillin-Pot Clavulanate] GI Intolerance    Latex Rash    Duloxetine      Other reaction(s): Nausea, Loopy    Eletriptan      Pain in lower jaw with pressure in throat    Emgality [Galcanezumab-Gnlm]     Galcanezumab      rash    Lidocaine Other (See Comments)     Headache    Methocarbamol Other (See Comments)     Dizzyness         The following portions of the patient's history were reviewed and updated  "as appropriate: allergies, current medications, past medical history, past social history, past surgical history and problem list.      Vitals  Vitals:    02/02/24 0946   BP: 144/86   Pulse: 86   SpO2: 98%   Weight: 74.8 kg (165 lb)   Height: 5' 4\" (1.626 m)           Physical Exam  Physical Exam  Constitutional:       General: She is not in acute distress.     Appearance: Normal appearance. She is not ill-appearing or toxic-appearing.   HENT:      Head: Normocephalic and atraumatic.      Right Ear: External ear normal.      Left Ear: External ear normal.      Nose: Nose normal.   Eyes:      General: No scleral icterus.     Conjunctiva/sclera: Conjunctivae normal.   Cardiovascular:      Pulses: Normal pulses.   Pulmonary:      Effort: Pulmonary effort is normal.   Musculoskeletal:         General: Normal range of motion.      Cervical back: Normal range of motion.   Neurological:      General: No focal deficit present.      Mental Status: She is alert and oriented to person, place, and time.   Psychiatric:         Mood and Affect: Mood normal.         Behavior: Behavior normal.         Thought Content: Thought content normal.         Judgment: Judgment normal.           Results  Recent Results (from the past 1 hour(s))   POCT urine dip    Collection Time: 02/02/24  9:53 AM   Result Value Ref Range    LEUKOCYTE ESTERASE,UA -     NITRITE,UA -     SL AMB POCT UROBILINOGEN 0.2     POCT URINE PROTEIN -      PH,UA 5.0     BLOOD,UA -     SPECIFIC GRAVITY,UA 1.020     KETONES,UA -     BILIRUBIN,UA -     GLUCOSE, UA -      COLOR,UA Yellow     CLARITY,UA Clear    ]  No results found for: \"PSA\"  Lab Results   Component Value Date    GLUCOSE 94 06/08/2018    CALCIUM 9.5 01/11/2024     06/08/2018    K 3.8 01/11/2024    CO2 30 01/11/2024     01/11/2024    BUN 17 01/11/2024    CREATININE 0.57 (L) 01/11/2024     Lab Results   Component Value Date    WBC 5.52 01/11/2024    HGB 13.1 01/11/2024    HCT 42.2 01/11/2024    " MCV 93 01/11/2024     01/11/2024           Orders  Orders Placed This Encounter   Procedures    POCT urine dip       Rocio Ramirez

## 2024-02-08 ENCOUNTER — TELEPHONE (OUTPATIENT)
Dept: PSYCHIATRY | Facility: CLINIC | Age: 54
End: 2024-02-08

## 2024-02-08 NOTE — TELEPHONE ENCOUNTER
Patient is calling regarding cancelling an appointment.    Date/Time: 2/8/2024 at 1 :30 pm    Reason: patient can't make it    Patient was rescheduled: YES [x] NO []  If yes, when was Patient reschedule for: 2/14/2024  at 3:30 pm    Patient requesting call back to reschedule: YES [] NO [x]

## 2024-02-09 DIAGNOSIS — H04.129 DRY EYE: ICD-10-CM

## 2024-02-09 RX ORDER — LIFITEGRAST 50 MG/ML
1 SOLUTION/ DROPS OPHTHALMIC 2 TIMES DAILY
Qty: 60 ML | Refills: 0 | Status: SHIPPED | OUTPATIENT
Start: 2024-02-09

## 2024-02-12 ENCOUNTER — APPOINTMENT (OUTPATIENT)
Dept: LAB | Facility: CLINIC | Age: 54
End: 2024-02-12
Payer: COMMERCIAL

## 2024-02-12 ENCOUNTER — APPOINTMENT (OUTPATIENT)
Dept: RADIOLOGY | Facility: CLINIC | Age: 54
End: 2024-02-12
Payer: COMMERCIAL

## 2024-02-12 ENCOUNTER — OFFICE VISIT (OUTPATIENT)
Dept: FAMILY MEDICINE CLINIC | Facility: CLINIC | Age: 54
End: 2024-02-12
Payer: COMMERCIAL

## 2024-02-12 ENCOUNTER — TELEPHONE (OUTPATIENT)
Dept: FAMILY MEDICINE CLINIC | Facility: CLINIC | Age: 54
End: 2024-02-12

## 2024-02-12 VITALS
OXYGEN SATURATION: 98 % | BODY MASS INDEX: 29.06 KG/M2 | WEIGHT: 170.2 LBS | HEART RATE: 82 BPM | DIASTOLIC BLOOD PRESSURE: 86 MMHG | RESPIRATION RATE: 16 BRPM | HEIGHT: 64 IN | SYSTOLIC BLOOD PRESSURE: 110 MMHG

## 2024-02-12 DIAGNOSIS — E66.09 CLASS 1 OBESITY DUE TO EXCESS CALORIES WITHOUT SERIOUS COMORBIDITY WITH BODY MASS INDEX (BMI) OF 30.0 TO 30.9 IN ADULT: ICD-10-CM

## 2024-02-12 DIAGNOSIS — R07.89 CHEST TIGHTNESS: Primary | ICD-10-CM

## 2024-02-12 DIAGNOSIS — R07.89 CHEST TIGHTNESS: ICD-10-CM

## 2024-02-12 DIAGNOSIS — E03.8 OTHER SPECIFIED HYPOTHYROIDISM: ICD-10-CM

## 2024-02-12 DIAGNOSIS — J06.9 UPPER RESPIRATORY TRACT INFECTION, UNSPECIFIED TYPE: ICD-10-CM

## 2024-02-12 DIAGNOSIS — D50.9 IRON DEFICIENCY ANEMIA, UNSPECIFIED IRON DEFICIENCY ANEMIA TYPE: ICD-10-CM

## 2024-02-12 DIAGNOSIS — Z00.00 ENCOUNTER FOR ANNUAL WELLNESS VISIT (AWV) IN MEDICARE PATIENT: ICD-10-CM

## 2024-02-12 DIAGNOSIS — E28.39 OVARIAN FAILURE DUE TO MENOPAUSE: ICD-10-CM

## 2024-02-12 PROBLEM — E66.811 CLASS 1 OBESITY DUE TO EXCESS CALORIES WITHOUT SERIOUS COMORBIDITY WITH BODY MASS INDEX (BMI) OF 30.0 TO 30.9 IN ADULT: Status: ACTIVE | Noted: 2024-02-12

## 2024-02-12 LAB
ALBUMIN SERPL BCP-MCNC: 4.5 G/DL (ref 3.5–5)
ALP SERPL-CCNC: 80 U/L (ref 34–104)
ALT SERPL W P-5'-P-CCNC: 23 U/L (ref 7–52)
ANION GAP SERPL CALCULATED.3IONS-SCNC: 5 MMOL/L
AST SERPL W P-5'-P-CCNC: 22 U/L (ref 13–39)
BASOPHILS # BLD AUTO: 0.02 THOUSANDS/ÂΜL (ref 0–0.1)
BASOPHILS NFR BLD AUTO: 1 % (ref 0–1)
BILIRUB SERPL-MCNC: 0.35 MG/DL (ref 0.2–1)
BUN SERPL-MCNC: 13 MG/DL (ref 5–25)
CALCIUM SERPL-MCNC: 9.7 MG/DL (ref 8.4–10.2)
CARDIAC TROPONIN I PNL SERPL HS: <2 NG/L (ref 8–18)
CHLORIDE SERPL-SCNC: 104 MMOL/L (ref 96–108)
CO2 SERPL-SCNC: 34 MMOL/L (ref 21–32)
CREAT SERPL-MCNC: 0.65 MG/DL (ref 0.6–1.3)
D DIMER PPP FEU-MCNC: <0.27 UG/ML FEU
EOSINOPHIL # BLD AUTO: 0.08 THOUSAND/ÂΜL (ref 0–0.61)
EOSINOPHIL NFR BLD AUTO: 2 % (ref 0–6)
ERYTHROCYTE [DISTWIDTH] IN BLOOD BY AUTOMATED COUNT: 13 % (ref 11.6–15.1)
FERRITIN SERPL-MCNC: 40 NG/ML (ref 11–307)
GFR SERPL CREATININE-BSD FRML MDRD: 102 ML/MIN/1.73SQ M
GLUCOSE SERPL-MCNC: 81 MG/DL (ref 65–140)
HCT VFR BLD AUTO: 42 % (ref 34.8–46.1)
HGB BLD-MCNC: 13.3 G/DL (ref 11.5–15.4)
IMM GRANULOCYTES # BLD AUTO: 0.01 THOUSAND/UL (ref 0–0.2)
IMM GRANULOCYTES NFR BLD AUTO: 0 % (ref 0–2)
IRON SATN MFR SERPL: 34 % (ref 15–50)
IRON SERPL-MCNC: 136 UG/DL (ref 50–212)
LYMPHOCYTES # BLD AUTO: 1.84 THOUSANDS/ÂΜL (ref 0.6–4.47)
LYMPHOCYTES NFR BLD AUTO: 42 % (ref 14–44)
MAGNESIUM SERPL-MCNC: 2.2 MG/DL (ref 1.9–2.7)
MCH RBC QN AUTO: 29.3 PG (ref 26.8–34.3)
MCHC RBC AUTO-ENTMCNC: 31.7 G/DL (ref 31.4–37.4)
MCV RBC AUTO: 93 FL (ref 82–98)
MONOCYTES # BLD AUTO: 0.22 THOUSAND/ÂΜL (ref 0.17–1.22)
MONOCYTES NFR BLD AUTO: 5 % (ref 4–12)
NEUTROPHILS # BLD AUTO: 2.22 THOUSANDS/ÂΜL (ref 1.85–7.62)
NEUTS SEG NFR BLD AUTO: 50 % (ref 43–75)
NRBC BLD AUTO-RTO: 0 /100 WBCS
PLATELET # BLD AUTO: 224 THOUSANDS/UL (ref 149–390)
PMV BLD AUTO: 10.2 FL (ref 8.9–12.7)
POTASSIUM SERPL-SCNC: 4.6 MMOL/L (ref 3.5–5.3)
PROT SERPL-MCNC: 7.4 G/DL (ref 6.4–8.4)
RBC # BLD AUTO: 4.54 MILLION/UL (ref 3.81–5.12)
SODIUM SERPL-SCNC: 143 MMOL/L (ref 135–147)
T4 FREE SERPL-MCNC: 0.52 NG/DL (ref 0.61–1.12)
TIBC SERPL-MCNC: 405 UG/DL (ref 250–450)
TSH SERPL DL<=0.05 MIU/L-ACNC: 9.7 UIU/ML (ref 0.45–4.5)
UIBC SERPL-MCNC: 269 UG/DL (ref 155–355)
WBC # BLD AUTO: 4.39 THOUSAND/UL (ref 4.31–10.16)

## 2024-02-12 PROCEDURE — 82728 ASSAY OF FERRITIN: CPT

## 2024-02-12 PROCEDURE — 84439 ASSAY OF FREE THYROXINE: CPT

## 2024-02-12 PROCEDURE — 85379 FIBRIN DEGRADATION QUANT: CPT

## 2024-02-12 PROCEDURE — 71046 X-RAY EXAM CHEST 2 VIEWS: CPT

## 2024-02-12 PROCEDURE — 87086 URINE CULTURE/COLONY COUNT: CPT | Performed by: PHYSICAL MEDICINE & REHABILITATION

## 2024-02-12 PROCEDURE — 80053 COMPREHEN METABOLIC PANEL: CPT

## 2024-02-12 PROCEDURE — 84443 ASSAY THYROID STIM HORMONE: CPT

## 2024-02-12 PROCEDURE — 83540 ASSAY OF IRON: CPT

## 2024-02-12 PROCEDURE — 36415 COLL VENOUS BLD VENIPUNCTURE: CPT

## 2024-02-12 PROCEDURE — 83550 IRON BINDING TEST: CPT

## 2024-02-12 PROCEDURE — G0439 PPPS, SUBSEQ VISIT: HCPCS

## 2024-02-12 PROCEDURE — 93000 ELECTROCARDIOGRAM COMPLETE: CPT

## 2024-02-12 PROCEDURE — 84484 ASSAY OF TROPONIN QUANT: CPT

## 2024-02-12 PROCEDURE — 99214 OFFICE O/P EST MOD 30 MIN: CPT

## 2024-02-12 PROCEDURE — 85025 COMPLETE CBC W/AUTO DIFF WBC: CPT

## 2024-02-12 RX ORDER — AZELASTINE 1 MG/ML
SPRAY, METERED NASAL
Qty: 30 ML | Refills: 0 | Status: SHIPPED | OUTPATIENT
Start: 2024-02-12

## 2024-02-12 RX ORDER — TIRZEPATIDE 2.5 MG/.5ML
2.5 INJECTION, SOLUTION SUBCUTANEOUS WEEKLY
Qty: 2 ML | Refills: 0 | Status: SHIPPED | OUTPATIENT
Start: 2024-02-12 | End: 2024-03-11

## 2024-02-12 RX ORDER — THYROID 90 MG/1
135 TABLET ORAL DAILY
Qty: 30 TABLET | Refills: 1 | Status: SHIPPED | OUTPATIENT
Start: 2024-02-12 | End: 2024-02-14 | Stop reason: SDUPTHER

## 2024-02-12 NOTE — TELEPHONE ENCOUNTER
----- Message from ERICKA Nava sent at 2/12/2024 12:26 PM EST -----  Can we verify that she is taking 180 mcg of thyroid supplement daily.  I wanted to update in the chart but I could not tell

## 2024-02-12 NOTE — PROGRESS NOTES
Assessment and Plan:     Problem List Items Addressed This Visit        Other    Class 1 obesity due to excess calories without serious comorbidity with body mass index (BMI) of 30.0 to 30.9 in adult     Did not do well on phentermine, will start zepbound.  Increase activity to 150 minutes a week.  Suggest lean protein high fiber diet.    Eats small portions every 3 hours. 5-9 fruits and vegetables a day.  Do not drink sugary drinks.  Follow-up 1 month           Relevant Medications    tirzepatide (Zepbound) 2.5 mg/0.5 mL auto-injector   Other Visit Diagnoses     Ovarian failure due to menopause    -  Primary    Please have DEXA scan, will call with results    Relevant Orders    DXA bone density spine hip and pelvis    Chest tightness        EKG normal, viral panel is normal, have chest x-ray and lab work.  Will call with results.    Relevant Orders    D-dimer, quantitative    High Sensitivity Troponin I Random    XR chest pa & lateral    POCT ECG (Completed)    Echo complete w/ contrast if indicated    Encounter for annual wellness visit (AWV) in Medicare patient        Updated as appropriate             Preventive health issues were discussed with patient, and age appropriate screening tests were ordered as noted in patient's After Visit Summary.  Personalized health advice and appropriate referrals for health education or preventive services given if needed, as noted in patient's After Visit Summary.     History of Present Illness:     Patient presents for a Medicare Wellness Visit    Belem has had some chest pain for the past 2 days with deep breathing. She has gained more weight, phentermine gave her ringing in the ears and HA.        Patient Care Team:  ERICKA Nava as PCP - General (Nurse Practitioner)  Cullen Butterfield MD as PCP - [facility]CP-[facility])  Cullen Butterfield MD as PCP - PCP-[employer] ([facility])     Review of Systems:     Review of Systems   Constitutional:  Positive for fatigue.  Negative for chills, diaphoresis and fever.   HENT:  Negative for congestion, ear pain, postnasal drip, rhinorrhea, sinus pressure, sinus pain and sore throat.    Eyes:  Negative for pain and visual disturbance.   Respiratory:  Positive for chest tightness and shortness of breath. Negative for cough.    Cardiovascular:  Positive for chest pain. Negative for palpitations.   Gastrointestinal:  Positive for nausea. Negative for abdominal pain, constipation, diarrhea and vomiting.   Genitourinary:  Negative for dysuria, frequency, hematuria and urgency.   Musculoskeletal:  Negative for arthralgias, back pain and myalgias.   Skin:  Negative for color change and rash.   Neurological:  Positive for dizziness, light-headedness and headaches. Negative for seizures and syncope.   All other systems reviewed and are negative.       Problem List:     Patient Active Problem List   Diagnosis   • ZAINA (generalized anxiety disorder)   • B12 deficiency   • Carpal tunnel syndrome   • Cervical disc disorder   • Degeneration of lumbar intervertebral disc   • Dyslipidemia, goal LDL below 130   • Fibromyalgia   • Hypothyroidism   • IBS (irritable bowel syndrome)   • Intractable chronic migraine without aura and without status migrainosus   • Primary fibromyalgia syndrome   • Vitamin D deficiency   • Moderate persistent asthma without complication   • Migraine   • Kidney stones   • Bipolar 2 disorder (HCC)   • Chronic fatigue   • Lumbar radiculopathy   • Sacroiliitis (HCC)   • Seropositive rheumatoid arthritis (HCC)   • Vertigo   • Insomnia   • Weight gain   • BMI 26.0-26.9,adult   • Immunocompromised (HCC)   • Arthralgia   • Pelvic pain in female   • Leukorrhea   • Right ovarian cyst   • COVID-19 long hauler   • Multiple complaints   • Nausea   • Hypoglycemia   • Class 1 obesity due to excess calories without serious comorbidity with body mass index (BMI) of 30.0 to 30.9 in adult      Past Medical and Surgical History:     Past Medical  History:   Diagnosis Date   • Anxiety    • Anxiety    • Asthma    • Bipolar disorder (HCC)    • Carpal tunnel syndrome    • Chronic pain     lower back   • Depression    • Disease of thyroid gland    • Dyslipidemia    • Fibromyalgia    • Hypothyroidism    • Irritable bowel    • Kidney stones    • Kidney stones 2019   • Migraine    • Obesity (BMI 30.0-34.9)    • Psychiatric disorder     depression/anxiety   • Suicide attempt (HCC)     as teen   • Vitamin D deficiency      Past Surgical History:   Procedure Laterality Date   • BUNIONECTOMY     •  SECTION     • CHOLECYSTECTOMY     • PARTIAL HYSTERECTOMY      portion of  uterus still present   • TONSILLECTOMY     • TUBAL LIGATION        Family History:     Family History   Problem Relation Age of Onset   • Hypertension Mother    • Diabetes Mother    • Hypothyroidism Mother    • Stroke Mother    • Bipolar disorder Mother    • Anxiety disorder Mother    • Arthritis Mother    • Depression Mother    • Mental illness Mother    • Cancer Father         pancreatic    • Hypothyroidism Sister    • Hypertension Brother    • Heart disease Brother    • Cancer Maternal Uncle         lung   • Cancer Paternal Aunt         breast   • Breast cancer Paternal Aunt    • Cancer Paternal Uncle         testicular    • Cancer Paternal Grandmother         breast   • Dementia Paternal Grandmother    • Breast cancer Paternal Grandmother    • Cancer Cousin         brain   • Bipolar disorder Daughter       Social History:     Social History     Socioeconomic History   • Marital status: Single     Spouse name: None   • Number of children: None   • Years of education: None   • Highest education level: None   Occupational History   • Occupation: Disability   Tobacco Use   • Smoking status: Never     Passive exposure: Past   • Smokeless tobacco: Never   Vaping Use   • Vaping status: Never Used   Substance and Sexual Activity   • Alcohol use: No   • Drug use: Yes     Types: Marijuana      Comment: gummy on occasion for pain   • Sexual activity: Not Currently     Partners: Male     Birth control/protection: Abstinence, Female Sterilization     Comment: Hysterectomy   Other Topics Concern   • None   Social History Narrative   • None     Social Determinants of Health     Financial Resource Strain: Low Risk  (2/12/2024)    Overall Financial Resource Strain (CARDIA)    • Difficulty of Paying Living Expenses: Not hard at all   Food Insecurity: No Food Insecurity (3/1/2023)    Hunger Vital Sign    • Worried About Running Out of Food in the Last Year: Never true    • Ran Out of Food in the Last Year: Never true   Transportation Needs: No Transportation Needs (2/12/2024)    PRAPARE - Transportation    • Lack of Transportation (Medical): No    • Lack of Transportation (Non-Medical): No   Physical Activity: Not on file   Stress: No Stress Concern Present (3/1/2023)    [employer] - Occupational Stress Questionnaire    • Feeling of Stress : Not at all   Social Connections: Not on file   Intimate Partner Violence: Not on file   Housing Stability: Not on file      Medications and Allergies:     Current Outpatient Medications   Medication Sig Dispense Refill   • albuterol (PROVENTIL HFA,VENTOLIN HFA) 90 mcg/act inhaler INHALE TWO PUFFS EVERY 6 (SIX) HOURS AS NEEDED FOR WHEEZING OR SHORTNESS OF BREATH 18 g 0   • azelastine (ASTELIN) 0.1 % nasal spray 1 SPRAY INTO EACH NOSTRIL TWO (TWO) TIMES A DAY USE IN EACH NOSTRIL AS DIRECTED 30 mL 1   • butalbital-acetaminophen-caffeine (FIORICET,ESGIC) -40 mg per tablet Take 1 tablet by mouth every 6 (six) hours as needed for headaches 30 tablet 0   • celecoxib (CELEBREX) 200 mg capsule Take 1 capsule (200 mg total) by mouth daily 30 capsule 2   • divalproex sodium (DEPAKOTE ER) 250 mg 24 hr tablet      • fluticasone (FLONASE) 50 mcg/act nasal spray INHALE TWO PUFFS TWO (TWO) TIMES A DAY RINSE MOUTH AFTER USE. 16 g 0   • fluticasone (Flovent  Diskus) 50 mcg/actuation diskus inhaler Inhale 1 puff 2 (two) times a day Rinse mouth after use. 60 blister 0   • fremanezumab-vfrm (Ajovy) 225 MG/1.5ML auto-injector Inject 225 mg under the skin every 30 (thirty) days     • ipratropium-albuterol (DUO-NEB) 0.5-2.5 mg/3 mL nebulizer solution TAKE THREE ML BY NEBULIZATION 4 (FOUR) TIMES A DAY 90 mL 0   • Iron-Vitamin C (Vitron-C)  MG TABS Take 1 tablet by mouth in the morning 90 tablet 1   • levocetirizine (XYZAL) 5 MG tablet Take 1 tablet (5 mg total) by mouth every evening 90 tablet 1   • meclizine (ANTIVERT) 25 mg tablet TAKE 1 TABLET (25 MG TOTAL) BY MOUTH EVERY EIGHT (EIGHT) HOURS AS NEEDED FOR DIZZINESS 30 tablet 0   • metoclopramide (Reglan) 10 mg tablet Take 1 tablet (10 mg total) by mouth 4 (four) times a day 60 tablet 0   • metoclopramide (REGLAN) 5 mg tablet TAKE 1 TABLET (5 MG TOTAL) BY MOUTH 4 (FOUR) TIMES A DAY 30 tablet 0   • naratriptan (AMERGE) 2.5 MG tablet      • ofloxacin (OCUFLOX) 0.3 % ophthalmic solution Administer 1 drop into the left eye 4 (four) times a day 5 mL 0   • omeprazole (PriLOSEC) 40 MG capsule TAKE 1 CAPSULE (40 MG TOTAL) BY MOUTH DAILY 90 capsule 0   • ondansetron (ZOFRAN-ODT) 4 mg disintegrating tablet TAKE 1 TABLET (4 MG TOTAL) BY MOUTH EVERY EIGHT (EIGHT) HOURS AS NEEDED FOR NAUSEA 20 tablet 0   • PARoxetine Mesylate 7.5 MG CAPS Take 1 capsule (7.5 mg total) by mouth in the morning 30 capsule 2   • thyroid (ARMOUR) 90 MG tablet Take 1 tablet (90 mg total) by mouth daily 30 tablet 1   • tirzepatide (Zepbound) 2.5 mg/0.5 mL auto-injector Inject 0.5 mL (2.5 mg total) under the skin once a week for 28 days 2 mL 0   • tiZANidine (ZANAFLEX) 4 mg tablet      • Topiramate  MG CP24 Take 100 mg by mouth 2 (two) times a day     • Ubrelvy 100 MG tablet      • Vraylar 4.5 MG capsule TAKE 1 CAPSULE (4.5 MG TOTAL) BY MOUTH DAILY INSERT IN THE MORNING INCREASING THE DOSE 30 capsule 0   • XIFAXAN 550 MG tablet      • Xiidra 5 % op  solution ADMINISTER 1 DROP TO BOTH EYES TWO (TWO) TIMES A DAY 60 mL 0   • Blood Glucose Monitoring Suppl (OneTouch Verio Reflect) w/Device KIT Check blood sugars once daily. Please substitute with appropriate alternative as covered by patient's insurance. Dx: E11.65 1 kit 0   • Diclofenac Sodium (VOLTAREN) 1 % Apply 2 g topically 4 (four) times a day 50 g 0   • dicyclomine (BENTYL) 10 mg capsule Take 1 capsule (10 mg total) by mouth 3 (three) times a day as needed (abd cramping) 30 capsule 0   • glucose blood (OneTouch Verio) test strip Check blood sugars once daily. Please substitute with appropriate alternative as covered by patient's insurance. Dx: E11.65 100 each 3   • ketoconazole (NIZORAL) 2 % cream      • mometasone (ELOCON) 0.1 % cream Apply topically daily 45 g 0   • OneTouch Delica Lancets 33G MISC Check blood sugars once daily. Please substitute with appropriate alternative as covered by patient's insurance. Dx: E11.65 100 each 3   • Respiratory Therapy Supplies (NEBULIZER) DONA by Does not apply route every 4 (four) hours while awake 90 each 1     Current Facility-Administered Medications   Medication Dose Route Frequency Provider Last Rate Last Admin   • cyanocobalamin injection 1,000 mcg  1,000 mcg Intramuscular Q30 Days ERICKA George   1,000 mcg at 07/27/23 1025     Allergies   Allergen Reactions   • Doxycycline Rash   • Erythromycin Rash   • Other Edema and Wheezing     jalapeno   • Augmentin [Amoxicillin-Pot Clavulanate] GI Intolerance   • Latex Rash   • Duloxetine      Other reaction(s): Nausea, Loopy   • Eletriptan      Pain in lower jaw with pressure in throat   • Emgality [Galcanezumab-Gnlm]    • Galcanezumab      rash   • Lidocaine Other (See Comments)     Headache   • Methocarbamol Other (See Comments)     Dizzyness      Immunizations:     Immunization History   Administered Date(s) Administered   • COVID-19 PFIZER VACCINE 0.3 ML IM 04/16/2021, 04/16/2021, 05/10/2021, 05/10/2021   •  Fluzone Split Quad 0.5 mL 09/20/2016, 09/20/2016, 09/08/2017, 09/08/2017   • INFLUENZA 11/29/2007, 11/29/2007, 10/16/2008, 10/16/2008, 09/21/2009, 09/21/2009, 10/04/2010, 10/04/2010, 09/17/2011, 09/17/2011, 09/06/2012, 09/06/2012, 09/20/2016, 09/20/2016, 09/08/2017, 09/08/2017, 07/10/2019, 09/01/2019, 09/06/2019, 09/05/2020, 09/24/2020, 09/24/2020, 09/24/2021, 09/13/2022, 09/13/2022, 09/06/2023   • Influenza Quadrivalent 3 years and older 09/20/2016   • Influenza, injectable, quadrivalent, preservative free 0.5 mL 10/05/2018, 09/06/2023   • Influenza, recombinant, quadrivalent,injectable, preservative free 09/06/2019, 09/24/2021, 09/13/2022   • Influenza, seasonal, injectable 11/29/2007, 10/16/2008, 09/21/2009, 10/04/2010, 09/17/2011, 09/06/2012, 09/20/2013, 09/23/2014, 09/08/2015   • Pneumococcal Polysaccharide PPV23 09/21/2009, 09/21/2009   • Td (adult), adsorbed 04/03/2004, 04/03/2004   • Tdap 03/01/2003, 03/01/2003, 11/12/2014   • Unknown 06/22/2012, 07/23/2012      Health Maintenance:         Topic Date Due   • Breast Cancer Screening: Mammogram  06/09/2024   • Colorectal Cancer Screening  06/28/2031   • HIV Screening  Completed   • Hepatitis C Screening  Completed         Topic Date Due   • Pneumococcal Vaccine: Pediatrics (0 to 5 Years) and At-Risk Patients (6 to 64 Years) (3 - PCV) 09/21/2010   • COVID-19 Vaccine (5 - 2023-24 season) 09/01/2023      Medicare Screening Tests and Risk Assessments:     Belem is here for her Subsequent Wellness visit. Last Medicare Wellness visit information reviewed, patient interviewed, no change since last AWV.     Health Risk Assessment:   Patient rates overall health as good. Patient feels that their physical health rating is same. Patient is very satisfied with their life. Eyesight was rated as same. Hearing was rated as same. Patient feels that their emotional and mental health rating is same. Patients states they are never, rarely angry. Patient states they are often  unusually tired/fatigued. Pain experienced in the last 7 days has been some. Patient's pain rating has been 4/10. Patient states that she has experienced no weight loss or gain in last 6 months. Hurts when she takes a deep breath in     Fall Risk Screening:   In the past year, patient has experienced: no history of falling in past year      Urinary Incontinence Screening:   Patient has not leaked urine accidently in the last six months.     Home Safety:  Patient does not have trouble with stairs inside or outside of their home. Patient has working smoke alarms and has working carbon monoxide detector. Home safety hazards include: none.     Nutrition:   Current diet is Regular.     Medications:   Patient is currently taking over-the-counter supplements. OTC medications include: see medication list. Patient is able to manage medications.     Activities of Daily Living (ADLs)/Instrumental Activities of Daily Living (IADLs):   Walk and transfer into and out of bed and chair?: Yes  Dress and groom yourself?: Yes    Bathe or shower yourself?: Yes    Feed yourself? Yes  Do your laundry/housekeeping?: Yes  Manage your money, pay your bills and track your expenses?: Yes  Make your own meals?: Yes    Do your own shopping?: Yes    Previous Hospitalizations:   Any hospitalizations or ED visits within the last 12 months?: No      Advance Care Planning:   Living will: No    Durable POA for healthcare: No    Advanced directive: Yes    Advanced directive counseling given: Yes    ACP document given: Yes    Patient declined ACP directive: No    End of Life Decisions reviewed with patient: Yes    Provider agrees with end of life decisions: Yes      Comments: Patient will fill out form and return    Cognitive Screening:   Provider or family/friend/caregiver concerned regarding cognition?: No    PREVENTIVE SCREENINGS      Cardiovascular Screening:    General: Screening Not Indicated and History Lipid Disorder      Diabetes Screening:     " General: Screening Current      Colorectal Cancer Screening:     General: Screening Current      Breast Cancer Screening:     General: Screening Current      Cervical Cancer Screening:    General: Screening Current      Osteoporosis Screening:      Due for: DXA Axial and DXA Appendicular      Abdominal Aortic Aneurysm (AAA) Screening:        General: Screening Not Indicated      Lung Cancer Screening:     General: Screening Not Indicated      Hepatitis C Screening:    General: Screening Current    Screening, Brief Intervention, and Referral to Treatment (SBIRT)    Screening      Single Item Drug Screening:  How often have you used an illegal drug (including marijuana) or a prescription medication for non-medical reasons in the past year? never    Single Item Drug Screen Score: 0  Interpretation: Negative screen for possible drug use disorder    Brief Intervention  Alcohol & drug use screenings were reviewed. No concerns regarding substance use disorder identified.     Other Counseling Topics:   Car/seat belt/driving safety and calcium and vitamin D intake.     No results found.     Physical Exam:     /86   Pulse 82   Resp 16   Ht 5' 4\" (1.626 m)   Wt 77.2 kg (170 lb 3.2 oz)   SpO2 98%   BMI 29.21 kg/m²     Physical Exam  Vitals and nursing note reviewed.   Constitutional:       General: She is not in acute distress.     Appearance: Normal appearance. She is well-developed. She is not ill-appearing.   HENT:      Head: Normocephalic and atraumatic.      Right Ear: Tympanic membrane, ear canal and external ear normal. There is no impacted cerumen.      Left Ear: Tympanic membrane, ear canal and external ear normal. There is no impacted cerumen.      Nose: Nose normal. No congestion.      Mouth/Throat:      Mouth: Mucous membranes are moist.      Pharynx: No posterior oropharyngeal erythema.   Eyes:      Extraocular Movements: Extraocular movements intact.      Conjunctiva/sclera: Conjunctivae normal.      " Pupils: Pupils are equal, round, and reactive to light.   Cardiovascular:      Rate and Rhythm: Normal rate and regular rhythm.      Heart sounds: No murmur heard.  Pulmonary:      Effort: Pulmonary effort is normal. No respiratory distress.      Breath sounds: Normal breath sounds.   Abdominal:      Palpations: Abdomen is soft.      Tenderness: There is no abdominal tenderness.   Musculoskeletal:         General: No swelling.      Cervical back: Normal range of motion and neck supple.      Right lower leg: No edema.      Left lower leg: No edema.   Lymphadenopathy:      Cervical: No cervical adenopathy.   Skin:     General: Skin is warm and dry.      Capillary Refill: Capillary refill takes less than 2 seconds.   Neurological:      General: No focal deficit present.      Mental Status: She is alert.   Psychiatric:         Mood and Affect: Mood normal.         Behavior: Behavior normal.          ERICKA Nava

## 2024-02-12 NOTE — ASSESSMENT & PLAN NOTE
Did not do well on phentermine, will start zepbound.  Increase activity to 150 minutes a week.  Suggest lean protein high fiber diet.    Eats small portions every 3 hours. 5-9 fruits and vegetables a day.  Do not drink sugary drinks.  Follow-up 1 month

## 2024-02-13 LAB — BACTERIA UR CULT: NORMAL
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
## 2024-02-14 ENCOUNTER — TELEMEDICINE (OUTPATIENT)
Dept: PSYCHIATRY | Facility: CLINIC | Age: 54
End: 2024-02-14
Payer: COMMERCIAL

## 2024-02-14 DIAGNOSIS — F31.81 BIPOLAR 2 DISORDER (HCC): Primary | ICD-10-CM

## 2024-02-14 DIAGNOSIS — E03.8 OTHER SPECIFIED HYPOTHYROIDISM: ICD-10-CM

## 2024-02-14 DIAGNOSIS — F41.1 GAD (GENERALIZED ANXIETY DISORDER): ICD-10-CM

## 2024-02-14 DIAGNOSIS — G47.00 INSOMNIA, UNSPECIFIED TYPE: ICD-10-CM

## 2024-02-14 PROCEDURE — 99214 OFFICE O/P EST MOD 30 MIN: CPT | Performed by: PHYSICIAN ASSISTANT

## 2024-02-14 RX ORDER — CLONAZEPAM 1 MG/1
1 TABLET ORAL DAILY PRN
Qty: 30 TABLET | Refills: 1 | Status: SHIPPED | OUTPATIENT
Start: 2024-02-14

## 2024-02-14 RX ORDER — THYROID 90 MG/1
135 TABLET ORAL DAILY
Qty: 135 TABLET | Refills: 1 | Status: SHIPPED | OUTPATIENT
Start: 2024-02-14

## 2024-02-15 ENCOUNTER — TELEPHONE (OUTPATIENT)
Dept: FAMILY MEDICINE CLINIC | Facility: CLINIC | Age: 54
End: 2024-02-15

## 2024-02-15 ENCOUNTER — TELEMEDICINE (OUTPATIENT)
Dept: PSYCHIATRY | Facility: CLINIC | Age: 54
End: 2024-02-15
Payer: COMMERCIAL

## 2024-02-15 DIAGNOSIS — F31.81 BIPOLAR 2 DISORDER (HCC): ICD-10-CM

## 2024-02-15 DIAGNOSIS — F41.1 GAD (GENERALIZED ANXIETY DISORDER): Primary | ICD-10-CM

## 2024-02-15 PROCEDURE — 90837 PSYTX W PT 60 MINUTES: CPT

## 2024-02-15 NOTE — TELEPHONE ENCOUNTER
"Spoke with patient- she is asking that you please give her a call back as soon as you have a moment. She would not let me take a message, she just stated, \"no, I need to speak with Estela.\"    Thank you!  "

## 2024-02-15 NOTE — PSYCH
Virtual Regular Visit    Verification of patient location:    Patient is located at Home in the following state in which I hold an active license [place]      Assessment/Plan:    Problem List Items Addressed This Visit       ZAINA (generalized anxiety disorder) - Primary    Bipolar 2 disorder (HCC)       Goals addressed in session: Goal 1          Reason for visit is No chief complaint on file.       Encounter provider Maisha Bonds LCSW    Provider located at [facility] [address] [place] [address] [phone]      Recent Visits  Date Type Provider Dept   02/12/24 Office Visit ERICKA Nava [facility] [address] [facility]   Showing recent visits within past 7 days and meeting all other requirements  Today's Visits  Date Type Provider Dept   02/15/24 Telemedicine Maisha Bonds LCSW [facility]   Showing today's visits and meeting all other requirements  Future Appointments  No visits were found meeting these conditions.  Showing future appointments within next 150 days and meeting all other requirements       The patient was identified by name and date of birth. Belem Linares was informed that this is a telemedicine visit and that the visit is being conducted throughthe [employer] platform. She agrees to proceed..  My office door was closed. No one else was in the room.  She acknowledged consent and understanding of privacy and security of the video platform. The patient has agreed to participate and understands they can discontinue the visit at any time.    Patient is aware this is a billable service.     Subjective  Belem Linares is a 53 y.o. female.      HPI     Past Medical History:   Diagnosis Date    Anxiety     Anxiety     Asthma     Bipolar disorder (HCC)     Carpal tunnel syndrome     Chronic pain     lower back    Depression     Disease of thyroid gland     Dyslipidemia      Fibromyalgia     Hypothyroidism     Irritable bowel     Kidney stones     Kidney stones 2019    Migraine     Obesity (BMI 30.0-34.9)     Psychiatric disorder     depression/anxiety    Suicide attempt (HCC)     as teen    Vitamin D deficiency        Past Surgical History:   Procedure Laterality Date    BUNIONECTOMY       SECTION      CHOLECYSTECTOMY      PARTIAL HYSTERECTOMY      portion of  uterus still present    TONSILLECTOMY      TUBAL LIGATION         Current Outpatient Medications   Medication Sig Dispense Refill    albuterol (PROVENTIL HFA,VENTOLIN HFA) 90 mcg/act inhaler INHALE TWO PUFFS EVERY 6 (SIX) HOURS AS NEEDED FOR WHEEZING OR SHORTNESS OF BREATH 18 g 0    azelastine (ASTELIN) 0.1 % nasal spray 1 SPRAY INTO EACH NOSTRIL TWO (TWO) TIMES A DAY USE IN EACH NOSTRIL AS DIRECTED 30 mL 0    Blood Glucose Monitoring Suppl (OneTouch Verio Reflect) w/Device KIT Check blood sugars once daily. Please substitute with appropriate alternative as covered by patient's insurance. Dx: E11.65 1 kit 0    butalbital-acetaminophen-caffeine (FIORICET,ESGIC) -40 mg per tablet Take 1 tablet by mouth every 6 (six) hours as needed for headaches 30 tablet 0    cariprazine (Vraylar) 6 MG capsule Take 1 capsule (6 mg total) by mouth daily 30 capsule 1    celecoxib (CELEBREX) 200 mg capsule Take 1 capsule (200 mg total) by mouth daily 30 capsule 2    clonazePAM (KlonoPIN) 1 mg tablet Take 1 tablet (1 mg total) by mouth daily as needed for anxiety 30 tablet 1    Diclofenac Sodium (VOLTAREN) 1 % Apply 2 g topically 4 (four) times a day 50 g 0    dicyclomine (BENTYL) 10 mg capsule Take 1 capsule (10 mg total) by mouth 3 (three) times a day as needed (abd cramping) 30 capsule 0    divalproex sodium (DEPAKOTE ER) 250 mg 24 hr tablet       fluticasone (FLONASE) 50 mcg/act nasal spray INHALE TWO PUFFS TWO (TWO) TIMES A DAY RINSE MOUTH AFTER USE. 16 g 0    fluticasone (Flovent Diskus) 50 mcg/actuation diskus inhaler  Inhale 1 puff 2 (two) times a day Rinse mouth after use. 60 blister 0    fremanezumab-vfrm (Ajovy) 225 MG/1.5ML auto-injector Inject 225 mg under the skin every 30 (thirty) days      glucose blood (OneTouch Verio) test strip Check blood sugars once daily. Please substitute with appropriate alternative as covered by patient's insurance. Dx: E11.65 100 each 3    ipratropium-albuterol (DUO-NEB) 0.5-2.5 mg/3 mL nebulizer solution TAKE THREE ML BY NEBULIZATION 4 (FOUR) TIMES A DAY 90 mL 0    Iron-Vitamin C (Vitron-C)  MG TABS Take 1 tablet by mouth in the morning 90 tablet 1    ketoconazole (NIZORAL) 2 % cream       levocetirizine (XYZAL) 5 MG tablet Take 1 tablet (5 mg total) by mouth every evening 90 tablet 1    meclizine (ANTIVERT) 25 mg tablet TAKE 1 TABLET (25 MG TOTAL) BY MOUTH EVERY EIGHT (EIGHT) HOURS AS NEEDED FOR DIZZINESS 30 tablet 0    metoclopramide (Reglan) 10 mg tablet Take 1 tablet (10 mg total) by mouth 4 (four) times a day 60 tablet 0    metoclopramide (REGLAN) 5 mg tablet TAKE 1 TABLET (5 MG TOTAL) BY MOUTH 4 (FOUR) TIMES A DAY 30 tablet 0    mometasone (ELOCON) 0.1 % cream Apply topically daily 45 g 0    naratriptan (AMERGE) 2.5 MG tablet       ofloxacin (OCUFLOX) 0.3 % ophthalmic solution Administer 1 drop into the left eye 4 (four) times a day 5 mL 0    omeprazole (PriLOSEC) 40 MG capsule TAKE 1 CAPSULE (40 MG TOTAL) BY MOUTH DAILY 90 capsule 0    ondansetron (ZOFRAN-ODT) 4 mg disintegrating tablet TAKE 1 TABLET (4 MG TOTAL) BY MOUTH EVERY EIGHT (EIGHT) HOURS AS NEEDED FOR NAUSEA 20 tablet 0    OneTouch Delica Lancets 33G MISC Check blood sugars once daily. Please substitute with appropriate alternative as covered by patient's insurance. Dx: E11.65 100 each 3    PARoxetine Mesylate 7.5 MG CAPS Take 1 capsule (7.5 mg total) by mouth in the morning 30 capsule 2    Respiratory Therapy Supplies (NEBULIZER) DONA by Does not apply route every 4 (four) hours while awake 90 each 1    thyroid (KAIT)  "90 MG tablet Take 1.5 tablets (135 mg total) by mouth daily 135 tablet 1    tirzepatide (Zepbound) 2.5 mg/0.5 mL auto-injector Inject 0.5 mL (2.5 mg total) under the skin once a week for 28 days 2 mL 0    tiZANidine (ZANAFLEX) 4 mg tablet       Topiramate  MG CP24 Take 100 mg by mouth 2 (two) times a day      Ubrelvy 100 MG tablet       XIFAXAN 550 MG tablet       Xiidra 5 % op solution ADMINISTER 1 DROP TO BOTH EYES TWO (TWO) TIMES A DAY 60 mL 0     Current Facility-Administered Medications   Medication Dose Route Frequency Provider Last Rate Last Admin    cyanocobalamin injection 1,000 mcg  1,000 mcg Intramuscular Q30 Days ERICKA George   1,000 mcg at 07/27/23 1025        Allergies   Allergen Reactions    Doxycycline Rash    Erythromycin Rash    Other Edema and Wheezing     jalapeno    Augmentin [Amoxicillin-Pot Clavulanate] GI Intolerance    Latex Rash    Duloxetine      Other reaction(s): Nausea, Loopy    Eletriptan      Pain in lower jaw with pressure in throat    Emgality [Galcanezumab-Gnlm]     Galcanezumab      rash    Lidocaine Other (See Comments)     Headache    Methocarbamol Other (See Comments)     Dizzyness       Review of Systems    Video Exam    There were no vitals filed for this visit.    Physical Exam     Behavioral Health Psychotherapy Progress Note    Psychotherapy Provided: Individual Psychotherapy      Diagnosis ICD-10-CM Associated Orders   1. ZAINA (generalized anxiety disorder)  F41.1       2. Bipolar 2 disorder (HCC)  F31.81            Goals addressed in session: Goal 1     DATA: Met with Belem Linares for a scheduled individual session.  Topics discussed included mental health condition(s), emotional wellness, family stressors, and relationships with family.  Belem is feeling \"ok\", stating she's been getting frequent migraines. She has some testing coming up for pain she's having but she's had to reschedule due to her migraines. Belem talked about her grandson's " "birthday party. She said her grandson's mother (Belem's daughter) and father showed up. Her daughter was confrontational from the time they walked in. Her grandson's father was making rude comments and trying to initiate conflict with Belem's boyfriend. Her daughter argued with Belem over custody and so on. Belem told her that the court order is followed and she won't go outside of that. It was very upsetting that they didn't attend to see Ravi but mostly to cause conflict. Ravi was having a good time, sitting with friends and eating; they didn't acknowledge how well Ravi is doing or anything. Belem shows evidence of utilizing effective communication skills and maintaining emotional composure to manage mental health symptoms.  During this session, this clinical used the following therapeutic modalities supportive psychotherapy, client-centered therapy, and CBT techniques.    Substance Abuse was not addressed during this session. If the client is diagnosed with a co-occurring substance use disorder, please indicate any changes in the frequency or amount of use: N/A. Stage of change for addressing substance use diagnoses: No substance use/Not applicable    ASSESSMENT:  Belem presents with a Dysthymic mood.  her affect is normal range and intensity, appropriate, which is congruent, with her  mood and the content of the session. Belem was oriented x3. She was focused and engaged. Belem exhibits good therapeutic rapport with this clinician. The client has made progress on their goals.    Belem Linares presents with a none risk of suicide, none risk of self-harm, and none risk of harm to others.    For any risk assessment that surpasses a \"low\" rating, a safety plan must be developed.    A safety plan was indicated: no  If yes, describe in detail: N/A    PLAN: Belem will return in 1 week for the next scheduled session. At the next session, the therapist will use supportive psychotherapy and " client-centered therapy to address anxiety.    Behavioral Health Treatment Plan and Discharge Planning: Belem Linares is aware of and agrees to continue to work on their treatment plan. They have identified and are working toward their discharge goals. yes    Visit start and stop times:    02/15/24  Start Time: 1005  Stop Time: 1104  Total Visit Time: 59 minutes

## 2024-02-15 NOTE — TELEPHONE ENCOUNTER
Called patient to let her knot that Zepbound was denied. Her plan does not cover any weight loss injection drugs.     I did let her know that [facility] their pharmacy department actually has wegovy in stock. You have to pay out of pocket because her plan doesn't cover but some patient has said it is reasonable. She is going to look into it and see if it is safe and let us know

## 2024-02-15 NOTE — TELEPHONE ENCOUNTER
Called patient and she just wanted the name of the drug wegovy she is checking with [facility] pharmacy to have if they have in stock

## 2024-02-15 NOTE — TELEPHONE ENCOUNTER
Patient called back. She is asking for another return phone call at your convenience. She would not give me more information than that.    Thank you!

## 2024-02-16 ENCOUNTER — TELEPHONE (OUTPATIENT)
Dept: PSYCHIATRY | Facility: CLINIC | Age: 54
End: 2024-02-16

## 2024-02-16 NOTE — TELEPHONE ENCOUNTER
Called and left message for patient to return a call to [phone] and schedule 8 week follow up with provider (4/10/24). Please schedule upon return call. Thank you.

## 2024-02-22 ENCOUNTER — TELEMEDICINE (OUTPATIENT)
Dept: PSYCHIATRY | Facility: CLINIC | Age: 54
End: 2024-02-22
Payer: COMMERCIAL

## 2024-02-22 DIAGNOSIS — F41.1 GAD (GENERALIZED ANXIETY DISORDER): Primary | ICD-10-CM

## 2024-02-22 DIAGNOSIS — F31.81 BIPOLAR 2 DISORDER (HCC): ICD-10-CM

## 2024-02-22 PROCEDURE — 90837 PSYTX W PT 60 MINUTES: CPT

## 2024-02-22 NOTE — PSYCH
Virtual Regular Visit    Verification of patient location:    Patient is located at Home in the following state in which I hold an active license [place]      Assessment/Plan:    Problem List Items Addressed This Visit       ZAINA (generalized anxiety disorder) - Primary    Bipolar 2 disorder (HCC)       Goals addressed in session: Goal 1          Reason for visit is No chief complaint on file.       Encounter provider Maisha Bonds LCSW    [facility] [place] [address] [facility] [place] [place] [address] [phone]      Recent Visits  Date Type Provider Dept   02/15/24 Telephone ERICKA Nava [facility] [address] [facility]   02/15/24 Telemedicine Maisha Bonds LCSW [facility]   Showing recent visits within past 7 days and meeting all other requirements  Today's Visits  Date Type Provider Dept   02/22/24 Telemedicine Maisha Bonds LCSW [facility]   Showing today's visits and meeting all other requirements  Future Appointments  No visits were found meeting these conditions.  Showing future appointments within next 150 days and meeting all other requirements       The patient was identified by name and date of birth. Belem Linares was informed that this is a telemedicine visit and that the visit is being conducted throughthe [employer] platform. She agrees to proceed..  My office door was closed. No one else was in the room.  She acknowledged consent and understanding of privacy and security of the video platform. The patient has agreed to participate and understands they can discontinue the visit at any time.    Patient is aware this is a billable service.     Subjective  Belem Linares is a 53 y.o. female.      HPI     Past Medical History:   Diagnosis Date    Anxiety     Anxiety     Asthma     Bipolar disorder (HCC)     Carpal tunnel syndrome     Chronic  pain     lower back    Depression     Disease of thyroid gland     Dyslipidemia     Fibromyalgia     Hypothyroidism     Irritable bowel     Kidney stones     Kidney stones 2019    Migraine     Obesity (BMI 30.0-34.9)     Psychiatric disorder     depression/anxiety    Suicide attempt (HCC)     as teen    Vitamin D deficiency        Past Surgical History:   Procedure Laterality Date    BUNIONECTOMY       SECTION      CHOLECYSTECTOMY      PARTIAL HYSTERECTOMY      portion of  uterus still present    TONSILLECTOMY      TUBAL LIGATION         Current Outpatient Medications   Medication Sig Dispense Refill    albuterol (PROVENTIL HFA,VENTOLIN HFA) 90 mcg/act inhaler INHALE TWO PUFFS EVERY 6 (SIX) HOURS AS NEEDED FOR WHEEZING OR SHORTNESS OF BREATH 18 g 0    azelastine (ASTELIN) 0.1 % nasal spray 1 SPRAY INTO EACH NOSTRIL TWO (TWO) TIMES A DAY USE IN EACH NOSTRIL AS DIRECTED 30 mL 0    Blood Glucose Monitoring Suppl (OneTouch Verio Reflect) w/Device KIT Check blood sugars once daily. Please substitute with appropriate alternative as covered by patient's insurance. Dx: E11.65 1 kit 0    butalbital-acetaminophen-caffeine (FIORICET,ESGIC) -40 mg per tablet Take 1 tablet by mouth every 6 (six) hours as needed for headaches 30 tablet 0    cariprazine (Vraylar) 6 MG capsule Take 1 capsule (6 mg total) by mouth daily 30 capsule 1    celecoxib (CELEBREX) 200 mg capsule Take 1 capsule (200 mg total) by mouth daily 30 capsule 2    clonazePAM (KlonoPIN) 1 mg tablet Take 1 tablet (1 mg total) by mouth daily as needed for anxiety 30 tablet 1    Diclofenac Sodium (VOLTAREN) 1 % Apply 2 g topically 4 (four) times a day 50 g 0    dicyclomine (BENTYL) 10 mg capsule Take 1 capsule (10 mg total) by mouth 3 (three) times a day as needed (abd cramping) 30 capsule 0    divalproex sodium (DEPAKOTE ER) 250 mg 24 hr tablet       fluticasone (FLONASE) 50 mcg/act nasal spray INHALE TWO PUFFS TWO (TWO) TIMES A DAY RINSE MOUTH  AFTER USE. 16 g 0    fluticasone (Flovent Diskus) 50 mcg/actuation diskus inhaler Inhale 1 puff 2 (two) times a day Rinse mouth after use. 60 blister 0    fremanezumab-vfrm (Ajovy) 225 MG/1.5ML auto-injector Inject 225 mg under the skin every 30 (thirty) days      glucose blood (OneTouch Verio) test strip Check blood sugars once daily. Please substitute with appropriate alternative as covered by patient's insurance. Dx: E11.65 100 each 3    ipratropium-albuterol (DUO-NEB) 0.5-2.5 mg/3 mL nebulizer solution TAKE THREE ML BY NEBULIZATION 4 (FOUR) TIMES A DAY 90 mL 0    Iron-Vitamin C (Vitron-C)  MG TABS Take 1 tablet by mouth in the morning 90 tablet 1    ketoconazole (NIZORAL) 2 % cream       levocetirizine (XYZAL) 5 MG tablet Take 1 tablet (5 mg total) by mouth every evening 90 tablet 1    meclizine (ANTIVERT) 25 mg tablet TAKE 1 TABLET (25 MG TOTAL) BY MOUTH EVERY EIGHT (EIGHT) HOURS AS NEEDED FOR DIZZINESS 30 tablet 0    metoclopramide (Reglan) 10 mg tablet Take 1 tablet (10 mg total) by mouth 4 (four) times a day 60 tablet 0    metoclopramide (REGLAN) 5 mg tablet TAKE 1 TABLET (5 MG TOTAL) BY MOUTH 4 (FOUR) TIMES A DAY 30 tablet 0    mometasone (ELOCON) 0.1 % cream Apply topically daily 45 g 0    naratriptan (AMERGE) 2.5 MG tablet       ofloxacin (OCUFLOX) 0.3 % ophthalmic solution Administer 1 drop into the left eye 4 (four) times a day 5 mL 0    omeprazole (PriLOSEC) 40 MG capsule TAKE 1 CAPSULE (40 MG TOTAL) BY MOUTH DAILY 90 capsule 0    ondansetron (ZOFRAN-ODT) 4 mg disintegrating tablet TAKE 1 TABLET (4 MG TOTAL) BY MOUTH EVERY EIGHT (EIGHT) HOURS AS NEEDED FOR NAUSEA 20 tablet 0    OneTouch Delica Lancets 33G MISC Check blood sugars once daily. Please substitute with appropriate alternative as covered by patient's insurance. Dx: E11.65 100 each 3    PARoxetine Mesylate 7.5 MG CAPS Take 1 capsule (7.5 mg total) by mouth in the morning 30 capsule 2    Respiratory Therapy Supplies (NEBULIZER) DONA by  "Does not apply route every 4 (four) hours while awake 90 each 1    thyroid (ARMOUR) 90 MG tablet Take 1.5 tablets (135 mg total) by mouth daily 135 tablet 1    tirzepatide (Zepbound) 2.5 mg/0.5 mL auto-injector Inject 0.5 mL (2.5 mg total) under the skin once a week for 28 days 2 mL 0    tiZANidine (ZANAFLEX) 4 mg tablet       Topiramate  MG CP24 Take 100 mg by mouth 2 (two) times a day      Ubrelvy 100 MG tablet       XIFAXAN 550 MG tablet       Xiidra 5 % op solution ADMINISTER 1 DROP TO BOTH EYES TWO (TWO) TIMES A DAY 60 mL 0     Current Facility-Administered Medications   Medication Dose Route Frequency Provider Last Rate Last Admin    cyanocobalamin injection 1,000 mcg  1,000 mcg Intramuscular Q30 Days ERICKA George   1,000 mcg at 07/27/23 1025        Allergies   Allergen Reactions    Doxycycline Rash    Erythromycin Rash    Other Edema and Wheezing     jalapeno    Augmentin [Amoxicillin-Pot Clavulanate] GI Intolerance    Latex Rash    Duloxetine      Other reaction(s): Nausea, Loopy    Eletriptan      Pain in lower jaw with pressure in throat    Emgality [Galcanezumab-Gnlm]     Galcanezumab      rash    Lidocaine Other (See Comments)     Headache    Methocarbamol Other (See Comments)     Dizzyness       Review of Systems    Video Exam    There were no vitals filed for this visit.    Physical Exam     Behavioral Health Psychotherapy Progress Note    Psychotherapy Provided: Individual Psychotherapy      Diagnosis ICD-10-CM Associated Orders   1. ZAINA (generalized anxiety disorder)  F41.1       2. Bipolar 2 disorder (HCC)  F31.81            Goals addressed in session: Goal 1     DATA: Met with Belem Linares for a scheduled individual session.  Topics discussed included mental health condition(s), emotional wellness, and relationship with significant other.  Belem is feeling \"ok\". She's ending her relationship with Jaylan. She's very unhappy and feels they're going in circles. She's been " "expressing her feelings to him for a long time and he's just not trying. He seems to need more attention than Belem can give him; he often says she has \"too many appointments\" and she's \"always\" doing something for her grandson.   Belem shows evidence of utilizing effective communication skills and maintaining emotional composure to manage mental health symptoms.  During this session, this clinical used the following therapeutic modalities supportive psychotherapy, client-centered therapy, and CBT techniques.    Substance Abuse was not addressed during this session. If the client is diagnosed with a co-occurring substance use disorder, please indicate any changes in the frequency or amount of use: N/A. Stage of change for addressing substance use diagnoses: No substance use/Not applicable    ASSESSMENT:  Belem presents with a Euthymic/ normal mood.  her affect is normal range and intensity, appropriate, which is congruent, with her  mood and the content of the session. Belem was oriented x3. She was focused and engaged. Belem exhibits good therapeutic rapport with this clinician. The client has made progress on their goals.    Belem Linares presents with a none risk of suicide, none risk of self-harm, and none risk of harm to others.    For any risk assessment that surpasses a \"low\" rating, a safety plan must be developed.    A safety plan was indicated: no  If yes, describe in detail: N/A    PLAN: Belem will return in 1 week for the next scheduled session. At the next session, the therapist will use supportive psychotherapy and client-centered therapy to address  anxiety.    Behavioral Health Treatment Plan and Discharge Planning: Belem Linares is aware of and agrees to continue to work on their treatment plan. They have identified and are working toward their discharge goals. no    Visit start and stop times:    02/22/24  Start Time: 1000  Stop Time: 1104  Total Visit Time: 64 minutes        "

## 2024-02-29 ENCOUNTER — TELEPHONE (OUTPATIENT)
Dept: UROLOGY | Facility: CLINIC | Age: 54
End: 2024-02-29

## 2024-02-29 NOTE — TELEPHONE ENCOUNTER
Called and left VM for the PT. CT is needed for appt with Rocio PAREDES on 3/4/24. Imaging and Office number was left for the PT to call and Schedule CT and to reschedule Office visit.

## 2024-03-04 NOTE — TELEPHONE ENCOUNTER
Patient calling about the letter she received about no showing her appt today.    She stated she received a message that it had to be rescheduled because she did not have the CT.    She is requesting to take the no show off her records.

## 2024-03-07 ENCOUNTER — TELEMEDICINE (OUTPATIENT)
Dept: PSYCHIATRY | Facility: CLINIC | Age: 54
End: 2024-03-07
Payer: COMMERCIAL

## 2024-03-07 DIAGNOSIS — F41.1 GAD (GENERALIZED ANXIETY DISORDER): Primary | ICD-10-CM

## 2024-03-07 DIAGNOSIS — F31.81 BIPOLAR 2 DISORDER (HCC): ICD-10-CM

## 2024-03-07 PROCEDURE — 90837 PSYTX W PT 60 MINUTES: CPT

## 2024-03-07 NOTE — PSYCH
Virtual Regular Visit    Verification of patient location:    Patient is located at Home in the following state in which I hold an active license [place]      Assessment/Plan:    Problem List Items Addressed This Visit       ZAINA (generalized anxiety disorder) - Primary    Bipolar 2 disorder (HCC)       Goals addressed in session: Goal 1          Reason for visit is No chief complaint on file.       Encounter provider Maisha Bonds LCSW    Provider located at [facility] [place] [address] [phone]      Recent Visits  No visits were found meeting these conditions.  Showing recent visits within past 7 days and meeting all other requirements  Today's Visits  Date Type Provider Dept   03/07/24 Telemedicine Maisha Bonds LCSW  Psychiatric [facility]   Showing today's visits and meeting all other requirements  Future Appointments  No visits were found meeting these conditions.  Showing future appointments within next 150 days and meeting all other requirements       The patient was identified by name and date of birth. Belem Linares was informed that this is a telemedicine visit and that the visit is being conducted throughthe [employer] platform. She agrees to proceed..  My office door was closed. No one else was in the room.  She acknowledged consent and understanding of privacy and security of the video platform. The patient has agreed to participate and understands they can discontinue the visit at any time.    Patient is aware this is a billable service.     Subjective  Belem Linares is a 53 y.o. female.      HPI     Past Medical History:   Diagnosis Date    Anxiety     Anxiety     Asthma     Bipolar disorder (HCC)     Carpal tunnel syndrome     Chronic pain     lower back    Depression     Disease of thyroid gland     Dyslipidemia     Fibromyalgia     Hypothyroidism 2007    Irritable bowel      Kidney stones     Kidney stones 2019    Migraine     Obesity (BMI 30.0-34.9)     Psychiatric disorder     depression/anxiety    Suicide attempt (HCC)     as teen    Vitamin D deficiency        Past Surgical History:   Procedure Laterality Date    BUNIONECTOMY       SECTION      CHOLECYSTECTOMY      PARTIAL HYSTERECTOMY      portion of  uterus still present    TONSILLECTOMY      TUBAL LIGATION         Current Outpatient Medications   Medication Sig Dispense Refill    albuterol (PROVENTIL HFA,VENTOLIN HFA) 90 mcg/act inhaler INHALE TWO PUFFS EVERY 6 (SIX) HOURS AS NEEDED FOR WHEEZING OR SHORTNESS OF BREATH 18 g 0    atorvastatin (LIPITOR) 20 mg tablet       azelastine (ASTELIN) 0.1 % nasal spray 1 SPRAY INTO EACH NOSTRIL TWO (TWO) TIMES A DAY USE IN EACH NOSTRIL AS DIRECTED 30 mL 0    Blood Glucose Monitoring Suppl (OneTouch Verio Reflect) w/Device KIT Check blood sugars once daily. Please substitute with appropriate alternative as covered by patient's insurance. Dx: E11.65 1 kit 0    butalbital-acetaminophen-caffeine (FIORICET,ESGIC) -40 mg per tablet Take 1 tablet by mouth every 6 (six) hours as needed for headaches 30 tablet 0    cariprazine (Vraylar) 6 MG capsule Take 1 capsule (6 mg total) by mouth daily 30 capsule 1    celecoxib (CELEBREX) 200 mg capsule Take 1 capsule (200 mg total) by mouth daily 30 capsule 2    clonazePAM (KlonoPIN) 1 mg tablet Take 1 tablet (1 mg total) by mouth daily as needed for anxiety 30 tablet 1    Diclofenac Sodium (VOLTAREN) 1 % Apply 2 g topically 4 (four) times a day 50 g 0    dicyclomine (BENTYL) 10 mg capsule Take 1 capsule (10 mg total) by mouth 3 (three) times a day as needed (abd cramping) 30 capsule 0    divalproex sodium (DEPAKOTE ER) 250 mg 24 hr tablet       fluticasone (FLONASE) 50 mcg/act nasal spray INHALE TWO PUFFS TWO (TWO) TIMES A DAY RINSE MOUTH AFTER USE. 16 g 0    fluticasone (Flovent Diskus) 50 mcg/actuation diskus inhaler Inhale 1 puff 2  (two) times a day Rinse mouth after use. 60 blister 0    fremanezumab-vfrm (Ajovy) 225 MG/1.5ML auto-injector Inject 225 mg under the skin every 30 (thirty) days      glucose blood (OneTouch Verio) test strip Check blood sugars once daily. Please substitute with appropriate alternative as covered by patient's insurance. Dx: E11.65 100 each 3    ketoconazole (NIZORAL) 2 % cream       meclizine (ANTIVERT) 25 mg tablet TAKE 1 TABLET (25 MG TOTAL) BY MOUTH EVERY EIGHT (EIGHT) HOURS AS NEEDED FOR DIZZINESS 30 tablet 0    metoclopramide (Reglan) 10 mg tablet Take 1 tablet (10 mg total) by mouth 4 (four) times a day 60 tablet 0    metoclopramide (REGLAN) 5 mg tablet TAKE 1 TABLET (5 MG TOTAL) BY MOUTH 4 (FOUR) TIMES A DAY 30 tablet 0    mometasone (ELOCON) 0.1 % cream Apply topically daily 45 g 0    naratriptan (AMERGE) 2.5 MG tablet       omeprazole (PriLOSEC) 40 MG capsule TAKE 1 CAPSULE (40 MG TOTAL) BY MOUTH DAILY 90 capsule 0    ondansetron (ZOFRAN-ODT) 4 mg disintegrating tablet TAKE 1 TABLET (4 MG TOTAL) BY MOUTH EVERY EIGHT (EIGHT) HOURS AS NEEDED FOR NAUSEA 20 tablet 0    OneTouch Delica Lancets 33G MISC Check blood sugars once daily. Please substitute with appropriate alternative as covered by patient's insurance. Dx: E11.65 100 each 3    Ozempic, 1 MG/DOSE, 4 MG/3ML injection pen       PARoxetine Mesylate 7.5 MG CAPS Take 1 capsule (7.5 mg total) by mouth in the morning 30 capsule 2    Respiratory Therapy Supplies (NEBULIZER) DONA by Does not apply route every 4 (four) hours while awake 90 each 1    thyroid (ARMOUR) 90 MG tablet Take 1.5 tablets (135 mg total) by mouth daily 135 tablet 1    tiZANidine (ZANAFLEX) 4 mg tablet       Topiramate  MG CP24 Take 100 mg by mouth 2 (two) times a day      Ubrelvy 100 MG tablet       XIFAXAN 550 MG tablet       Xiidra 5 % op solution ADMINISTER 1 DROP TO BOTH EYES TWO (TWO) TIMES A DAY 60 mL 0     Current Facility-Administered Medications   Medication Dose Route  "Frequency Provider Last Rate Last Admin    cyanocobalamin injection 1,000 mcg  1,000 mcg Intramuscular Q30 Days ERICKA George   1,000 mcg at 07/27/23 1025        Allergies   Allergen Reactions    Doxycycline Rash    Erythromycin Rash    Other Edema and Wheezing     jalapeno    Augmentin [Amoxicillin-Pot Clavulanate] GI Intolerance    Latex Rash    Duloxetine      Other reaction(s): Nausea, Loopy    Eletriptan      Pain in lower jaw with pressure in throat    Emgality [Galcanezumab-Gnlm]     Galcanezumab      rash    Lidocaine Other (See Comments)     Headache    Methocarbamol Other (See Comments)     Dizzyness       Review of Systems    Video Exam    There were no vitals filed for this visit.    Physical Exam     Behavioral Health Psychotherapy Progress Note    Psychotherapy Provided: Individual Psychotherapy      Diagnosis ICD-10-CM Associated Orders   1. ZAINA (generalized anxiety disorder)  F41.1       2. Bipolar 2 disorder (HCC)  F31.81            Goals addressed in session: Goal 1     DATA: Met with Belem Linares for a scheduled individual session.  Topics discussed included emotional wellness, coping skills, and relationship with significant other.  Belem is feeling \"better than last week\". She got another cold from her grandson, she said it felt like the flu. She broke up with her boyfriend and told him she isn't happy with the relationship. She said no matter what they do, how much they talk, etc., it just isn't working. We processed her feelings. Belem shows evidence of utilizing effective communication skills, engaging in problem solving, and maintaining emotional composure to manage mental health symptoms.  During this session, this clinical used the following therapeutic modalities supportive psychotherapy, client-centered therapy, and CBT techniques.    Substance Abuse was not addressed during this session. If the client is diagnosed with a co-occurring substance use disorder, please " "indicate any changes in the frequency or amount of use: N/A. Stage of change for addressing substance use diagnoses: No substance use/Not applicable    ASSESSMENT:  Belem presents with a Euthymic/ normal mood.  her affect is normal range and intensity, appropriate, which is congruent, with her  mood and the content of the session. Belem was oriented x3. She was focused and engaged. Belem exhibits good therapeutic rapport with this clinician. The client has made progress on their goals.    Belem Linares presents with a none risk of suicide, none risk of self-harm, and none risk of harm to others.    For any risk assessment that surpasses a \"low\" rating, a safety plan must be developed.    A safety plan was indicated: no  If yes, describe in detail: N/A    PLAN: Belem will return in 1 week for the next scheduled session. At the next session, the therapist will use supportive psychotherapy and client-centered therapy to address depression and anxiety.    Behavioral Health Treatment Plan and Discharge Planning: Belem Linares is aware of and agrees to continue to work on their treatment plan. They have identified and are working toward their discharge goals. yes    Visit start and stop times:    03/07/24  Start Time: 1000  Stop Time: 1103  Total Visit Time: 63 minutes        "

## 2024-03-11 DIAGNOSIS — F31.81 BIPOLAR 2 DISORDER (HCC): ICD-10-CM

## 2024-03-11 DIAGNOSIS — R42 DIZZINESS: ICD-10-CM

## 2024-03-11 RX ORDER — CARIPRAZINE 6 MG/1
CAPSULE, GELATIN COATED ORAL
Qty: 30 CAPSULE | Refills: 0 | Status: SHIPPED | OUTPATIENT
Start: 2024-03-11

## 2024-03-11 RX ORDER — MECLIZINE HYDROCHLORIDE 25 MG/1
TABLET ORAL
Qty: 30 TABLET | Refills: 0 | Status: SHIPPED | OUTPATIENT
Start: 2024-03-11

## 2024-03-15 DIAGNOSIS — E66.09 CLASS 1 OBESITY DUE TO EXCESS CALORIES WITHOUT SERIOUS COMORBIDITY WITH BODY MASS INDEX (BMI) OF 30.0 TO 30.9 IN ADULT: Primary | ICD-10-CM

## 2024-03-15 RX ORDER — SEMAGLUTIDE 1.34 MG/ML
INJECTION, SOLUTION SUBCUTANEOUS
Qty: 3 ML | Refills: 1 | Status: SHIPPED | OUTPATIENT
Start: 2024-03-15

## 2024-03-19 ENCOUNTER — APPOINTMENT (OUTPATIENT)
Dept: LAB | Facility: CLINIC | Age: 54
End: 2024-03-19
Payer: COMMERCIAL

## 2024-03-22 DIAGNOSIS — Z79.899 MEDICATION MANAGEMENT: Primary | ICD-10-CM

## 2024-03-26 ENCOUNTER — LAB (OUTPATIENT)
Dept: LAB | Facility: CLINIC | Age: 54
End: 2024-03-26
Payer: COMMERCIAL

## 2024-03-26 DIAGNOSIS — Z79.899 MEDICATION MANAGEMENT: ICD-10-CM

## 2024-03-26 LAB
ALBUMIN SERPL BCP-MCNC: 4.2 G/DL (ref 3.5–5)
ALP SERPL-CCNC: 82 U/L (ref 34–104)
ALT SERPL W P-5'-P-CCNC: 32 U/L (ref 7–52)
AST SERPL W P-5'-P-CCNC: 21 U/L (ref 13–39)
BILIRUB DIRECT SERPL-MCNC: 0.03 MG/DL (ref 0–0.2)
BILIRUB SERPL-MCNC: 0.33 MG/DL (ref 0.2–1)
PROT SERPL-MCNC: 6.7 G/DL (ref 6.4–8.4)

## 2024-03-26 PROCEDURE — 36415 COLL VENOUS BLD VENIPUNCTURE: CPT

## 2024-03-26 PROCEDURE — 80076 HEPATIC FUNCTION PANEL: CPT

## 2024-03-27 ENCOUNTER — TELEPHONE (OUTPATIENT)
Dept: OBGYN CLINIC | Facility: CLINIC | Age: 54
End: 2024-03-27

## 2024-03-27 NOTE — TELEPHONE ENCOUNTER
I told pt results are normal. Pt wanted to know if Imelda will now prescribe medication - I sent task to Imelda for when she comes in

## 2024-03-27 NOTE — TELEPHONE ENCOUNTER
----- Message from Casper Yun MD sent at 3/27/2024  8:12 AM EDT -----  Results are normal  Please notify patient

## 2024-03-28 ENCOUNTER — TELEPHONE (OUTPATIENT)
Dept: FAMILY MEDICINE CLINIC | Facility: CLINIC | Age: 54
End: 2024-03-28

## 2024-03-28 NOTE — TELEPHONE ENCOUNTER
Patient called in regards to her test strips and lancets, she's unable to get them from the medical store. She stated that they haven't had them in awhile now.  She was told to have her provider to script to patient's pharmacy and she should be  able to get it from there. Please advise.   Thanks

## 2024-03-29 ENCOUNTER — HOSPITAL ENCOUNTER (OUTPATIENT)
Age: 54
Discharge: HOME | End: 2024-03-29
Payer: COMMERCIAL

## 2024-03-29 ENCOUNTER — TELEPHONE (OUTPATIENT)
Dept: FAMILY MEDICINE CLINIC | Facility: CLINIC | Age: 54
End: 2024-03-29

## 2024-03-29 DIAGNOSIS — E28.39 OVARIAN FAILURE DUE TO MENOPAUSE: ICD-10-CM

## 2024-03-29 PROCEDURE — 77080 DXA BONE DENSITY AXIAL: CPT

## 2024-03-29 NOTE — TELEPHONE ENCOUNTER
Patient called stating she needs Monica Elder to send a new script for the One Touch Ultra test strips, glucose monitor and lancets with refills.  [facility]

## 2024-04-02 DIAGNOSIS — E16.2 HYPOGLYCEMIA: ICD-10-CM

## 2024-04-02 RX ORDER — BLOOD SUGAR DIAGNOSTIC
STRIP MISCELLANEOUS
Qty: 100 EACH | Refills: 3 | Status: SHIPPED | OUTPATIENT
Start: 2024-04-02

## 2024-04-02 RX ORDER — BLOOD-GLUCOSE METER
KIT MISCELLANEOUS
Qty: 1 KIT | Refills: 0 | Status: SHIPPED | OUTPATIENT
Start: 2024-04-02

## 2024-04-02 RX ORDER — LANCETS 33 GAUGE
EACH MISCELLANEOUS
Qty: 100 EACH | Refills: 3 | Status: SHIPPED | OUTPATIENT
Start: 2024-04-02

## 2024-04-03 ENCOUNTER — TELEPHONE (OUTPATIENT)
Dept: OTHER | Facility: OTHER | Age: 54
End: 2024-04-03

## 2024-04-03 NOTE — TELEPHONE ENCOUNTER
Patient is calling regarding cancelling an appointment.    Date/Time: 04/04 @10    Patient was rescheduled: YES [] NO [x]    Patient requesting call back to reschedule: YES [] NO [x]    Pt has an appointment scheduled for next week

## 2024-04-04 ENCOUNTER — CLINICAL SUPPORT (OUTPATIENT)
Dept: FAMILY MEDICINE CLINIC | Facility: CLINIC | Age: 54
End: 2024-04-04
Payer: COMMERCIAL

## 2024-04-04 ENCOUNTER — TELEPHONE (OUTPATIENT)
Dept: FAMILY MEDICINE CLINIC | Facility: CLINIC | Age: 54
End: 2024-04-04

## 2024-04-04 DIAGNOSIS — R63.5 WEIGHT GAIN: Primary | ICD-10-CM

## 2024-04-04 DIAGNOSIS — E66.09 CLASS 1 OBESITY DUE TO EXCESS CALORIES WITHOUT SERIOUS COMORBIDITY WITH BODY MASS INDEX (BMI) OF 30.0 TO 30.9 IN ADULT: ICD-10-CM

## 2024-04-04 DIAGNOSIS — E78.5 DYSLIPIDEMIA, GOAL LDL BELOW 130: ICD-10-CM

## 2024-04-04 DIAGNOSIS — R39.15 URINARY URGENCY: Primary | ICD-10-CM

## 2024-04-04 LAB
SL AMB  POCT GLUCOSE, UA: NORMAL
SL AMB LEUKOCYTE ESTERASE,UA: NORMAL
SL AMB POCT BILIRUBIN,UA: NORMAL
SL AMB POCT BLOOD,UA: NORMAL
SL AMB POCT CLARITY,UA: CLEAR
SL AMB POCT COLOR,UA: YELLOW
SL AMB POCT KETONES,UA: NORMAL
SL AMB POCT NITRITE,UA: NORMAL
SL AMB POCT PH,UA: 6
SL AMB POCT SPECIFIC GRAVITY,UA: 1.02
SL AMB POCT URINE PROTEIN: NORMAL
SL AMB POCT UROBILINOGEN: 3.5

## 2024-04-04 PROCEDURE — 81002 URINALYSIS NONAUTO W/O SCOPE: CPT

## 2024-04-04 PROCEDURE — 87086 URINE CULTURE/COLONY COUNT: CPT

## 2024-04-04 NOTE — TELEPHONE ENCOUNTER
Called patient and she is aware the her urine dip was normal but we are sending out for a culture, per Monica she was told to start azo and increase hydration

## 2024-04-04 NOTE — TELEPHONE ENCOUNTER
Spoke with patient- she stated that she had a question. She understands that we are waiting on a urine test, however, she wants to know what she can do in the meantime?    Please advise, thank you!

## 2024-04-06 LAB — BACTERIA UR CULT: ABNORMAL

## 2024-04-11 DIAGNOSIS — J06.9 UPPER RESPIRATORY TRACT INFECTION, UNSPECIFIED TYPE: ICD-10-CM

## 2024-04-11 DIAGNOSIS — R63.5 WEIGHT GAIN: ICD-10-CM

## 2024-04-11 RX ORDER — SEMAGLUTIDE 1.34 MG/ML
INJECTION, SOLUTION SUBCUTANEOUS
Qty: 3 ML | Refills: 1 | OUTPATIENT
Start: 2024-04-11

## 2024-04-11 RX ORDER — ALBUTEROL SULFATE 90 UG/1
AEROSOL, METERED RESPIRATORY (INHALATION)
Qty: 18 G | Refills: 1 | Status: SHIPPED | OUTPATIENT
Start: 2024-04-11

## 2024-04-12 ENCOUNTER — OFFICE VISIT (OUTPATIENT)
Dept: OBGYN CLINIC | Facility: MEDICAL CENTER | Age: 54
End: 2024-04-12
Payer: COMMERCIAL

## 2024-04-12 ENCOUNTER — TELEPHONE (OUTPATIENT)
Age: 54
End: 2024-04-12

## 2024-04-12 VITALS — DIASTOLIC BLOOD PRESSURE: 86 MMHG | BODY MASS INDEX: 30.62 KG/M2 | WEIGHT: 178.4 LBS | SYSTOLIC BLOOD PRESSURE: 140 MMHG

## 2024-04-12 DIAGNOSIS — F51.8 DISRUPTED SLEEP-WAKE CYCLE: ICD-10-CM

## 2024-04-12 DIAGNOSIS — N95.1 HOT FLASHES DUE TO MENOPAUSE: Primary | ICD-10-CM

## 2024-04-12 DIAGNOSIS — Z79.899 MEDICATION MANAGEMENT: ICD-10-CM

## 2024-04-12 DIAGNOSIS — G47.20 DISRUPTED SLEEP-WAKE CYCLE: ICD-10-CM

## 2024-04-12 PROCEDURE — 99213 OFFICE O/P EST LOW 20 MIN: CPT | Performed by: NURSE PRACTITIONER

## 2024-04-12 RX ORDER — DEXAMETHASONE 4 MG/1
TABLET ORAL
COMMUNITY
Start: 2024-03-13

## 2024-04-12 NOTE — PROGRESS NOTES
Assessment/Plan:  Veozah works for hot flash reduction and sleeping improvement.   Veozah is a  neurokinin 3 receptor antagonist. It affects the thermoregulatory center in the hypothalamus.   Veozah 45 mg by mouth daily; do not crush/cut or chew this tablet. Rx sent to pharmacy on file for 3 month supply. Will need lab work for next refill.   Contraindications include cirrhosis, elevated AST or ALT, bilirubin or low GFR.  Common reactions include abdominal pain, diarrhea, insomnia, back pain, hot flashes and elevate ALT or AST.  Monitoring-> Liver function tests are needed at baseline, then 3 months, 6 months and 9 months after treatment.   You had normal liver function tests 3/24  Hepatic panel ordered to be done in 3 months.          1. Hot flashes due to menopause  -     fezolinetant (Veozah) tablet; Take 1 tablet (45 mg total) by mouth daily    2. Disrupted sleep-wake cycle  -     fezolinetant (Veozah) tablet; Take 1 tablet (45 mg total) by mouth daily    3. Medication management  -     Hepatic function panel; Future; Expected date: 2024               Subjective:      Patient ID: Belem Linares is a 53 y.o. female.    HPI    Belem Linares is a 53 y.o.  female who is here today for a problem visit .   Here with problematic hot flashes. They occur throughout the day and night. Daytime has improved slightly recently but nighttime is admittedly difficult. She sweats/saturates the bed often. Her sleep is quite disrupted and she struggles to fall back to sleep.   Normal hepatic panel on 3/26/24. AST pf 21, ALT of 32.   No vaginal bleeding. Menopausal for at least 10 years.     The following portions of the patient's history were reviewed and updated as appropriate: allergies, current medications, past medical history, past social history, past surgical history, and problem list.    Review of Systems   Constitutional:  Positive for fatigue. Negative for activity change, appetite change, chills,  diaphoresis, fever and unexpected weight change.        Hot flashes   Eyes:  Negative for visual disturbance.   Respiratory:  Negative for chest tightness.    Cardiovascular:  Negative for chest pain.   Gastrointestinal:  Negative for abdominal pain.   Genitourinary:  Negative for dysuria, vaginal bleeding and vaginal discharge.   Skin: Negative.    Neurological:  Negative for weakness, light-headedness and headaches.   Psychiatric/Behavioral:  Positive for sleep disturbance.    All other systems reviewed and are negative.        Objective:      /86 (BP Location: Left arm, Patient Position: Sitting, Cuff Size: Standard)   Wt 80.9 kg (178 lb 6.4 oz)   BMI 30.62 kg/m²          Physical Exam  Vitals and nursing note reviewed.   Constitutional:       Appearance: Normal appearance. She is well-developed.   Skin:     General: Skin is warm and dry.   Neurological:      Mental Status: She is alert and oriented to person, place, and time.   Psychiatric:         Mood and Affect: Mood normal.         Behavior: Behavior normal.       Exam otherwise deferred

## 2024-04-12 NOTE — PATIENT INSTRUCTIONS
Veozah works for hot flash reduction and sleeping improvement.   Veozah is a  neurokinin 3 receptor antagonist. It affects the thermoregulatory center in the hypothalamus.   Veozah 45 mg by mouth daily; do not crush/cut or chew this tablet. Rx sent to pharmacy on file for 3 month supply. Will need lab work for next refill.   Contraindications include cirrhosis, elevated AST or ALT, bilirubin or low GFR.  Common reactions include abdominal pain, diarrhea, insomnia, back pain, hot flashes and elevate ALT or AST.  Monitoring-> Liver function tests are needed at baseline, then 3 months, 6 months and 9 months after treatment.   You had normal liver function tests 3/24  Hepatic panel ordered to be done in 3 months.

## 2024-04-12 NOTE — TELEPHONE ENCOUNTER
PA for Veozah    Submitted via    []CMM-KEY   [x][employer]-Case ID # [id number]  []Faxed to plan   []Other website   []Phone call Case ID #     Office notes sent, clinical questions answered. Awaiting determination    Turnaround time for your insurance to make a decision on your Prior Authorization can take 7-21 business days.

## 2024-04-12 NOTE — TELEPHONE ENCOUNTER
Patient calling stating that her pharmacy would not cover patient's Veozah rx due to it being non-formulary. Patient's pharmacy [facility] as on [facility] and they are needing a prior authorization.  Please advise.      Veozah tablet: 45 mg   Quantity: 30   Refills: 2   Indications: vasomotor symptoms of menopause

## 2024-04-12 NOTE — TELEPHONE ENCOUNTER
PA for Veozah Approved   Date(s) approved January 1, 2024 to December 31, 2024   Case #    Patient advised by [x] moblit Message                      [] Phone call       Pharmacy advised by [x]Fax                                     []Phone call    Approval letter scanned into Media Yes

## 2024-04-15 ENCOUNTER — OFFICE VISIT (OUTPATIENT)
Dept: FAMILY MEDICINE CLINIC | Facility: CLINIC | Age: 54
End: 2024-04-15
Payer: COMMERCIAL

## 2024-04-15 VITALS
BODY MASS INDEX: 30.22 KG/M2 | OXYGEN SATURATION: 97 % | WEIGHT: 177 LBS | DIASTOLIC BLOOD PRESSURE: 80 MMHG | HEART RATE: 84 BPM | SYSTOLIC BLOOD PRESSURE: 128 MMHG | HEIGHT: 64 IN

## 2024-04-15 DIAGNOSIS — R30.0 DYSURIA: ICD-10-CM

## 2024-04-15 DIAGNOSIS — N30.00 ACUTE CYSTITIS WITHOUT HEMATURIA: Primary | ICD-10-CM

## 2024-04-15 LAB
SL AMB  POCT GLUCOSE, UA: ABNORMAL
SL AMB LEUKOCYTE ESTERASE,UA: ABNORMAL
SL AMB POCT BILIRUBIN,UA: ABNORMAL
SL AMB POCT BLOOD,UA: ABNORMAL
SL AMB POCT CLARITY,UA: CLEAR
SL AMB POCT COLOR,UA: YELLOW
SL AMB POCT KETONES,UA: ABNORMAL
SL AMB POCT NITRITE,UA: ABNORMAL
SL AMB POCT PH,UA: 6
SL AMB POCT SPECIFIC GRAVITY,UA: 1.02
SL AMB POCT URINE PROTEIN: ABNORMAL
SL AMB POCT UROBILINOGEN: ABNORMAL

## 2024-04-15 PROCEDURE — G2211 COMPLEX E/M VISIT ADD ON: HCPCS | Performed by: NURSE PRACTITIONER

## 2024-04-15 PROCEDURE — 87086 URINE CULTURE/COLONY COUNT: CPT | Performed by: NURSE PRACTITIONER

## 2024-04-15 PROCEDURE — 81002 URINALYSIS NONAUTO W/O SCOPE: CPT | Performed by: NURSE PRACTITIONER

## 2024-04-15 PROCEDURE — 99213 OFFICE O/P EST LOW 20 MIN: CPT | Performed by: NURSE PRACTITIONER

## 2024-04-15 RX ORDER — NITROFURANTOIN 25; 75 MG/1; MG/1
100 CAPSULE ORAL 2 TIMES DAILY
Qty: 10 CAPSULE | Refills: 0 | Status: SHIPPED | OUTPATIENT
Start: 2024-04-15 | End: 2024-04-17

## 2024-04-15 RX ORDER — PHENAZOPYRIDINE HYDROCHLORIDE 200 MG/1
200 TABLET, FILM COATED ORAL
Qty: 10 TABLET | Refills: 0 | Status: SHIPPED | OUTPATIENT
Start: 2024-04-15

## 2024-04-15 NOTE — PROGRESS NOTES
Name: Belem Linares      : 1970      MRN: [id number]  Encounter Provider: ERICKA Lu  Encounter Date: 4/15/2024   Encounter department: [facility] [address]    Assessment & Plan     1. Acute cystitis without hematuria  Comments:  Will start Macrobid.  Advised to increase fluids.  Pyridium for pain.  Will call if we need to switch medication due to culture.  Orders:  -     nitrofurantoin (MACROBID) 100 mg capsule; Take 1 capsule (100 mg total) by mouth 2 (two) times a day for 5 days  -     phenazopyridine (PYRIDIUM) 200 mg tablet; Take 1 tablet (200 mg total) by mouth 3 (three) times a day with meals    2. Dysuria  -     POCT urine dip  -     Urine culture           Subjective      Patient presents with concerns of bladder pain, urgency and flank pain.  She states this started about a week ago and did have an urine culture which was negative.      Review of Systems   Constitutional:  Positive for chills, diaphoresis and fatigue. Negative for fever.   HENT:  Negative for ear pain and sore throat.    Eyes:  Negative for pain and visual disturbance.   Respiratory:  Negative for cough and shortness of breath.    Cardiovascular:  Negative for chest pain and palpitations.   Gastrointestinal:  Positive for abdominal distention and nausea. Negative for abdominal pain and vomiting.   Genitourinary:  Positive for flank pain, frequency and urgency. Negative for dysuria and hematuria.   Musculoskeletal:  Negative for arthralgias and back pain.   Skin:  Negative for color change and rash.   Neurological:  Negative for seizures and syncope.   All other systems reviewed and are negative.      Current Outpatient Medications on File Prior to Visit   Medication Sig    atorvastatin (LIPITOR) 20 mg tablet     azelastine (ASTELIN) 0.1 % nasal spray 1 SPRAY INTO EACH NOSTRIL TWO (TWO) TIMES A DAY USE IN EACH NOSTRIL AS DIRECTED    Blood Glucose Monitoring Suppl (OneTouch Verio  Reflect) w/Device KIT Check blood sugars once daily. Please substitute with appropriate alternative as covered by patient's insurance. Dx: E11.65    butalbital-acetaminophen-caffeine (FIORICET,ESGIC) -40 mg per tablet Take 1 tablet by mouth every 6 (six) hours as needed for headaches    celecoxib (CELEBREX) 200 mg capsule Take 1 capsule (200 mg total) by mouth daily    clonazePAM (KlonoPIN) 1 mg tablet Take 1 tablet (1 mg total) by mouth daily as needed for anxiety    Diclofenac Sodium (VOLTAREN) 1 % Apply 2 g topically 4 (four) times a day    dicyclomine (BENTYL) 10 mg capsule Take 1 capsule (10 mg total) by mouth 3 (three) times a day as needed (abd cramping)    divalproex sodium (DEPAKOTE ER) 250 mg 24 hr tablet     fezolinetant (Veozah) tablet Take 1 tablet (45 mg total) by mouth daily    fluticasone (FLONASE) 50 mcg/act nasal spray INHALE TWO PUFFS TWO (TWO) TIMES A DAY RINSE MOUTH AFTER USE.    fremanezumab-vfrm (Ajovy) 225 MG/1.5ML auto-injector Inject 225 mg under the skin every 30 (thirty) days    glucose blood (OneTouch Verio) test strip Check blood sugars once daily. Please substitute with appropriate alternative as covered by patient's insurance. Dx: E11.65    ketoconazole (NIZORAL) 2 % cream     meclizine (ANTIVERT) 25 mg tablet TAKE 1 TABLET (25 MG TOTAL) BY MOUTH EVERY EIGHT (EIGHT) HOURS AS NEEDED FOR DIZZINESS    metoclopramide (Reglan) 10 mg tablet Take 1 tablet (10 mg total) by mouth 4 (four) times a day    metoclopramide (REGLAN) 5 mg tablet TAKE 1 TABLET (5 MG TOTAL) BY MOUTH 4 (FOUR) TIMES A DAY    mometasone (ELOCON) 0.1 % cream Apply topically daily    naratriptan (AMERGE) 2.5 MG tablet     omeprazole (PriLOSEC) 40 MG capsule TAKE 1 CAPSULE (40 MG TOTAL) BY MOUTH DAILY    ondansetron (ZOFRAN-ODT) 4 mg disintegrating tablet TAKE 1 TABLET (4 MG TOTAL) BY MOUTH EVERY EIGHT (EIGHT) HOURS AS NEEDED FOR NAUSEA    OneTouch Delica Lancets 33G MISC Check blood sugars once daily. Please  "substitute with appropriate alternative as covered by patient's insurance. Dx: E11.65    PARoxetine Mesylate 7.5 MG CAPS Take 1 capsule (7.5 mg total) by mouth in the morning    semaglutide, 2 mg/dose, (Ozempic) 8 mg/ mL injection pen Inject 0.75 mL (2 mg total) under the skin every 7 days    thyroid (ARMOUR) 90 MG tablet Take 1.5 tablets (135 mg total) by mouth daily    tiZANidine (ZANAFLEX) 4 mg tablet     Topiramate  MG CP24 Take 100 mg by mouth 2 (two) times a day    Ubrelvy 100 MG tablet     Ventolin  (90 Base) MCG/ACT inhaler INHALE TWO PUFFS EVERY 4 (FOUR) HOURS AS NEEDED FOR WHEEZING OR SHORTNESS OF BREATH    Vraylar 6 MG capsule TAKE 1 CAPSULE (6 MG TOTAL) BY MOUTH DAILY    XIFAXAN 550 MG tablet     Xiidra 5 % op solution ADMINISTER 1 DROP TO BOTH EYES TWO (TWO) TIMES A DAY    dexamethasone (DECADRON) 4 mg tablet  (Patient not taking: Reported on 4/12/2024)    fluticasone (Flovent Diskus) 50 mcg/actuation diskus inhaler Inhale 1 puff 2 (two) times a day Rinse mouth after use.    Respiratory Therapy Supplies (NEBULIZER) DONA by Does not apply route every 4 (four) hours while awake       Objective     /80   Pulse 84   Ht 5' 4\" (1.626 m)   Wt 80.3 kg (177 lb)   SpO2 97%   BMI 30.38 kg/m²     Physical Exam  Constitutional:       Appearance: She is well-developed.   Cardiovascular:      Rate and Rhythm: Normal rate and regular rhythm.      Heart sounds: Normal heart sounds. No murmur heard.  Pulmonary:      Effort: Pulmonary effort is normal. No respiratory distress.      Breath sounds: Normal breath sounds.   Abdominal:      Tenderness: There is right CVA tenderness and left CVA tenderness.   Skin:     General: Skin is warm and dry.   Neurological:      Mental Status: She is alert and oriented to person, place, and time.       ERICKA Lu    "

## 2024-04-16 LAB — BACTERIA UR CULT: NORMAL

## 2024-04-17 ENCOUNTER — TELEPHONE (OUTPATIENT)
Dept: FAMILY MEDICINE CLINIC | Facility: CLINIC | Age: 54
End: 2024-04-17

## 2024-04-17 DIAGNOSIS — R30.0 DYSURIA: Primary | ICD-10-CM

## 2024-04-17 RX ORDER — SULFAMETHOXAZOLE AND TRIMETHOPRIM 800; 160 MG/1; MG/1
1 TABLET ORAL EVERY 12 HOURS SCHEDULED
Qty: 14 TABLET | Refills: 0 | Status: SHIPPED | OUTPATIENT
Start: 2024-04-17 | End: 2024-04-24

## 2024-04-18 DIAGNOSIS — R42 DIZZINESS: ICD-10-CM

## 2024-04-19 ENCOUNTER — OFFICE VISIT (OUTPATIENT)
Dept: URGENT CARE | Facility: CLINIC | Age: 54
End: 2024-04-19
Payer: COMMERCIAL

## 2024-04-19 ENCOUNTER — TELEPHONE (OUTPATIENT)
Dept: OBGYN CLINIC | Facility: MEDICAL CENTER | Age: 54
End: 2024-04-19

## 2024-04-19 VITALS
HEART RATE: 83 BPM | TEMPERATURE: 97 F | DIASTOLIC BLOOD PRESSURE: 85 MMHG | SYSTOLIC BLOOD PRESSURE: 135 MMHG | OXYGEN SATURATION: 98 % | RESPIRATION RATE: 18 BRPM

## 2024-04-19 DIAGNOSIS — J06.9 VIRAL URI WITH COUGH: ICD-10-CM

## 2024-04-19 DIAGNOSIS — J20.9 ACUTE BRONCHITIS, UNSPECIFIED ORGANISM: Primary | ICD-10-CM

## 2024-04-19 PROCEDURE — 99213 OFFICE O/P EST LOW 20 MIN: CPT | Performed by: PHYSICIAN ASSISTANT

## 2024-04-19 RX ORDER — BENZONATATE 100 MG/1
100 CAPSULE ORAL 3 TIMES DAILY PRN
Qty: 20 CAPSULE | Refills: 0 | Status: SHIPPED | OUTPATIENT
Start: 2024-04-19

## 2024-04-19 RX ORDER — ALBUTEROL SULFATE 90 UG/1
2 AEROSOL, METERED RESPIRATORY (INHALATION) EVERY 6 HOURS PRN
Qty: 8.5 G | Refills: 0 | Status: SHIPPED | OUTPATIENT
Start: 2024-04-19

## 2024-04-19 RX ORDER — MECLIZINE HYDROCHLORIDE 25 MG/1
TABLET ORAL
Qty: 30 TABLET | Refills: 0 | Status: SHIPPED | OUTPATIENT
Start: 2024-04-19

## 2024-04-19 NOTE — PATIENT INSTRUCTIONS
Cold Symptoms   WHAT YOU NEED TO KNOW:   A cold is an infection caused by a virus. The infection causes your upper respiratory system to become inflamed. Common symptoms of a cold include sneezing, dry throat, a stuffy nose, headache, watery eyes, and a cough. Your cough may be dry, or you may cough up mucus. You may also have muscle aches, joint pain, and tiredness. Rarely, you may have a fever. Most colds go away without treatment.  DISCHARGE INSTRUCTIONS:   Return to the emergency department if:   You have increased tiredness and weakness.    You are unable to eat.     Your heart is beating much faster than usual for you.     You see white spots in the back of your throat and your neck is swollen and sore to the touch.     You see pinpoint or larger reddish-purple dots on your skin.    Contact your healthcare provider if:   You have a fever higher than 102°F (38.9°C).    You have new or worsening shortness of breath.     You have thick nasal drainage for more than 2 days.     Your symptoms do not improve or get worse within 5 days.     You have questions or concerns about your condition or care.    Medicines:  The following medicines may be suggested by your healthcare provider to decrease your cold symptoms. These medicines are available without a doctor's order. Ask which medicines to take and when to take them. Follow directions.  NSAIDs or acetaminophen  help to bring down a fever or decrease pain.    Decongestants  help decrease nasal stuffiness.     Antihistamines  help decrease sneezing and a runny nose.     Cough suppressants  help decrease how much you cough.    Expectorants  help loosen mucus so you can cough it up.    Take your medicine as directed.  Contact your healthcare provider if you think your medicine is not helping or if you have side effects. Tell your provider if you are allergic to any medicine. Keep a list of the medicines, vitamins, and herbs you take. Include the amounts, and when and  why you take them. Bring the list or the pill bottles to follow-up visits. Carry your medicine list with you in case of an emergency.    Symptom relief:  The following may help relieve cold symptoms, such as a dry throat and congestion:  Gargle with mouthwash or warm salt water as directed.     Suck on throat lozenges or hard candy.     Use a cold or warm vaporizer or humidifier to ease your breathing.     Rest for at least 2 days and then as needed to decrease tiredness and weakness.     Use petroleum based jelly around your nostrils to decrease irritation from blowing your nose.    Drink liquids:  Liquids will help thin and loosen thick mucus so you can cough it up. Liquids will also keep you hydrated. Ask your healthcare provider which liquids are best for you and how much to drink each day.  Prevent the spread of germs:  You can spread your cold germs to others for at least 3 days after your symptoms start. Wash your hands often. Do not share items, such as eating utensils. Cover your nose and mouth when you cough or sneeze using the crook of your elbow instead of your hands. Throw used tissues in the garbage.  Do not smoke:  Smoking may worsen your symptoms and increase the length of time you feel sick. Talk with your healthcare provider if you need help to stop smoking.  Follow up with your doctor as directed:  Write down your questions so you remember to ask them during your visits.  © Copyright [employer] 2023 Information is for End User's use only and may not be sold, redistributed or otherwise used for commercial purposes.  The above information is an  only. It is not intended as medical advice for individual conditions or treatments. Talk to your doctor, nurse or pharmacist before following any medical regimen to see if it is safe and effective for you.  Follow up with PCP in 3-5 days.  Proceed to  ER if symptoms worsen.    If tests are performed, our office will contact you with results only  if changes need to made to the care plan discussed with you at the visit. You can review your full results on [facility].

## 2024-04-19 NOTE — PROGRESS NOTES
[facility]        NAME: Belem Linares is a 53 y.o. female  : 1970    MRN: [id number]  DATE: 2024  TIME: 9:52 AM    Assessment and Plan   Acute bronchitis, unspecified organism [J20.9]  1. Acute bronchitis, unspecified organism  benzonatate (TESSALON PERLES) 100 mg capsule    albuterol (ProAir HFA) 90 mcg/act inhaler      2. Viral URI with cough          Discussed diagnosis with patient. Recommended albuterol inhaler and tessalon pearls to use as needed for symptomatic management. Patient to continue with other conservative symptomatic management as needed.     Advised patient to take the antibiotic for her UTI as her PCP prescribed.     Patient can take tylenol and ibuprofen as needed for fevers and body aches.     Patient Instructions     Patient Instructions   Cold Symptoms   WHAT YOU NEED TO KNOW:   A cold is an infection caused by a virus. The infection causes your upper respiratory system to become inflamed. Common symptoms of a cold include sneezing, dry throat, a stuffy nose, headache, watery eyes, and a cough. Your cough may be dry, or you may cough up mucus. You may also have muscle aches, joint pain, and tiredness. Rarely, you may have a fever. Most colds go away without treatment.  DISCHARGE INSTRUCTIONS:   Return to the emergency department if:   You have increased tiredness and weakness.    You are unable to eat.     Your heart is beating much faster than usual for you.     You see white spots in the back of your throat and your neck is swollen and sore to the touch.     You see pinpoint or larger reddish-purple dots on your skin.    Contact your healthcare provider if:   You have a fever higher than 102°F (38.9°C).    You have new or worsening shortness of breath.     You have thick nasal drainage for more than 2 days.     Your symptoms do not improve or get worse within 5 days.     You have questions or concerns about your condition or care.    Medicines:  The  following medicines may be suggested by your healthcare provider to decrease your cold symptoms. These medicines are available without a doctor's order. Ask which medicines to take and when to take them. Follow directions.  NSAIDs or acetaminophen  help to bring down a fever or decrease pain.    Decongestants  help decrease nasal stuffiness.     Antihistamines  help decrease sneezing and a runny nose.     Cough suppressants  help decrease how much you cough.    Expectorants  help loosen mucus so you can cough it up.    Take your medicine as directed.  Contact your healthcare provider if you think your medicine is not helping or if you have side effects. Tell your provider if you are allergic to any medicine. Keep a list of the medicines, vitamins, and herbs you take. Include the amounts, and when and why you take them. Bring the list or the pill bottles to follow-up visits. Carry your medicine list with you in case of an emergency.    Symptom relief:  The following may help relieve cold symptoms, such as a dry throat and congestion:  Gargle with mouthwash or warm salt water as directed.     Suck on throat lozenges or hard candy.     Use a cold or warm vaporizer or humidifier to ease your breathing.     Rest for at least 2 days and then as needed to decrease tiredness and weakness.     Use petroleum based jelly around your nostrils to decrease irritation from blowing your nose.    Drink liquids:  Liquids will help thin and loosen thick mucus so you can cough it up. Liquids will also keep you hydrated. Ask your healthcare provider which liquids are best for you and how much to drink each day.  Prevent the spread of germs:  You can spread your cold germs to others for at least 3 days after your symptoms start. Wash your hands often. Do not share items, such as eating utensils. Cover your nose and mouth when you cough or sneeze using the crook of your elbow instead of your hands. Throw used tissues in the garbage.  Do  not smoke:  Smoking may worsen your symptoms and increase the length of time you feel sick. Talk with your healthcare provider if you need help to stop smoking.  Follow up with your doctor as directed:  Write down your questions so you remember to ask them during your visits.  © Copyright [employer] 2023 Information is for End User's use only and may not be sold, redistributed or otherwise used for commercial purposes.  The above information is an  only. It is not intended as medical advice for individual conditions or treatments. Talk to your doctor, nurse or pharmacist before following any medical regimen to see if it is safe and effective for you.  Follow up with PCP in 3-5 days.  Proceed to  ER if symptoms worsen.    If tests are performed, our office will contact you with results only if changes need to made to the care plan discussed with you at the visit. [facility]     Follow up with PCP in 3-5 days.  Proceed to  ER if symptoms worsen.    If tests are performed, our office will contact you with results only if changes need to made to the care plan discussed with you at the visit. [facility]    Chief Complaint     Chief Complaint   Patient presents with    Generalized Body Aches     Patient here with generalized body aches, chills, chest congestion and cough. Patient was at the doctor on Monday and was treated for UTI but had a reaction to the medication so she stopped taking it and they switched it to another one. However now since Wednesday patient started all of these other symptoms. Patient afraid to take the second antibiotic due to these symptoms she has now.          History of Present Illness       Patient presents today for evaluation of fatigue and body aches. Patient states that she was seen and treated for a UTI on Monday. She had a reaction to the medication and they switched her to a different  antibiotic. She states she is scared to take it even though she still has the symptoms of a UTI. Her biggest concern now is how tired she is. She states she just wants to stay in bed. Her muscles hurt her and she has a cough and congestion. It has been aggravating her migraines.     Fatigue  This is a new problem. The current episode started in the past 7 days. The problem occurs constantly. The problem has been unchanged. Associated symptoms include chills, congestion, coughing, fatigue, headaches, nausea, swollen glands and urinary symptoms. Pertinent negatives include no abdominal pain, anorexia, arthralgias, change in bowel habit, chest pain, fever, rash, sore throat, vertigo, visual change, vomiting or weakness. The symptoms are aggravated by drinking, eating, exertion, coughing and swallowing. Treatments tried: on abx for a UTI. The treatment provided no relief.       Review of Systems   Review of Systems   Constitutional:  Positive for chills and fatigue. Negative for activity change, appetite change and fever.   HENT:  Positive for congestion and rhinorrhea. Negative for ear discharge, ear pain, facial swelling, sinus pressure, sinus pain, sore throat and trouble swallowing.    Respiratory:  Positive for cough.    Cardiovascular:  Negative for chest pain.   Gastrointestinal:  Positive for nausea. Negative for abdominal pain, anorexia, change in bowel habit and vomiting.   Genitourinary:  Positive for dysuria and urgency.   Musculoskeletal:  Negative for arthralgias.   Skin:  Negative for rash.   Neurological:  Positive for light-headedness and headaches. Negative for vertigo and weakness.         Current Medications       Current Outpatient Medications:     albuterol (ProAir HFA) 90 mcg/act inhaler, Inhale 2 puffs every 6 (six) hours as needed for wheezing or shortness of breath, Disp: 8.5 g, Rfl: 0    azelastine (ASTELIN) 0.1 % nasal spray, 1 SPRAY INTO EACH NOSTRIL TWO (TWO) TIMES A DAY USE IN EACH NOSTRIL  AS DIRECTED, Disp: 30 mL, Rfl: 0    benzonatate (TESSALON PERLES) 100 mg capsule, Take 1 capsule (100 mg total) by mouth 3 (three) times a day as needed for cough, Disp: 20 capsule, Rfl: 0    Blood Glucose Monitoring Suppl (OneTouch Verio Reflect) w/Device KIT, Check blood sugars once daily. Please substitute with appropriate alternative as covered by patient's insurance. Dx: E11.65, Disp: 1 kit, Rfl: 0    butalbital-acetaminophen-caffeine (FIORICET,ESGIC) -40 mg per tablet, Take 1 tablet by mouth every 6 (six) hours as needed for headaches, Disp: 30 tablet, Rfl: 0    celecoxib (CELEBREX) 200 mg capsule, Take 1 capsule (200 mg total) by mouth daily, Disp: 30 capsule, Rfl: 2    clonazePAM (KlonoPIN) 1 mg tablet, Take 1 tablet (1 mg total) by mouth daily as needed for anxiety, Disp: 30 tablet, Rfl: 1    Diclofenac Sodium (VOLTAREN) 1 %, Apply 2 g topically 4 (four) times a day, Disp: 50 g, Rfl: 0    dicyclomine (BENTYL) 10 mg capsule, Take 1 capsule (10 mg total) by mouth 3 (three) times a day as needed (abd cramping), Disp: 30 capsule, Rfl: 0    divalproex sodium (DEPAKOTE ER) 250 mg 24 hr tablet, , Disp: , Rfl:     fezolinetant (Veozah) tablet, Take 1 tablet (45 mg total) by mouth daily, Disp: 30 tablet, Rfl: 2    fluticasone (FLONASE) 50 mcg/act nasal spray, INHALE TWO PUFFS TWO (TWO) TIMES A DAY RINSE MOUTH AFTER USE., Disp: 16 g, Rfl: 0    fremanezumab-vfrm (Ajovy) 225 MG/1.5ML auto-injector, Inject 225 mg under the skin every 30 (thirty) days, Disp: , Rfl:     glucose blood (OneTouch Verio) test strip, Check blood sugars once daily. Please substitute with appropriate alternative as covered by patient's insurance. Dx: E11.65, Disp: 100 each, Rfl: 3    ketoconazole (NIZORAL) 2 % cream, , Disp: , Rfl:     meclizine (ANTIVERT) 25 mg tablet, TAKE 1 TABLET (25 MG TOTAL) BY MOUTH EVERY EIGHT (EIGHT) HOURS AS NEEDED FOR DIZZINESS, Disp: 30 tablet, Rfl: 0    metoclopramide (Reglan) 10 mg tablet, Take 1 tablet (10  mg total) by mouth 4 (four) times a day, Disp: 60 tablet, Rfl: 0    metoclopramide (REGLAN) 5 mg tablet, TAKE 1 TABLET (5 MG TOTAL) BY MOUTH 4 (FOUR) TIMES A DAY, Disp: 30 tablet, Rfl: 0    mometasone (ELOCON) 0.1 % cream, Apply topically daily, Disp: 45 g, Rfl: 0    naratriptan (AMERGE) 2.5 MG tablet, , Disp: , Rfl:     omeprazole (PriLOSEC) 40 MG capsule, TAKE 1 CAPSULE (40 MG TOTAL) BY MOUTH DAILY, Disp: 90 capsule, Rfl: 0    ondansetron (ZOFRAN-ODT) 4 mg disintegrating tablet, TAKE 1 TABLET (4 MG TOTAL) BY MOUTH EVERY EIGHT (EIGHT) HOURS AS NEEDED FOR NAUSEA, Disp: 20 tablet, Rfl: 0    OneTouch Delica Lancets 33G MISC, Check blood sugars once daily. Please substitute with appropriate alternative as covered by patient's insurance. Dx: E11.65, Disp: 100 each, Rfl: 3    PARoxetine Mesylate 7.5 MG CAPS, Take 1 capsule (7.5 mg total) by mouth in the morning, Disp: 30 capsule, Rfl: 2    phenazopyridine (PYRIDIUM) 200 mg tablet, Take 1 tablet (200 mg total) by mouth 3 (three) times a day with meals, Disp: 10 tablet, Rfl: 0    semaglutide, 2 mg/dose, (Ozempic) 8 mg/ mL injection pen, Inject 0.75 mL (2 mg total) under the skin every 7 days, Disp: 9 mL, Rfl: 1    sulfamethoxazole-trimethoprim (BACTRIM DS) 800-160 mg per tablet, Take 1 tablet by mouth every 12 (twelve) hours for 7 days, Disp: 14 tablet, Rfl: 0    thyroid (ARMOUR) 90 MG tablet, Take 1.5 tablets (135 mg total) by mouth daily, Disp: 135 tablet, Rfl: 1    tiZANidine (ZANAFLEX) 4 mg tablet, , Disp: , Rfl:     Topiramate  MG CP24, Take 100 mg by mouth 2 (two) times a day, Disp: , Rfl:     Ubrelvy 100 MG tablet, , Disp: , Rfl:     Ventolin  (90 Base) MCG/ACT inhaler, INHALE TWO PUFFS EVERY 4 (FOUR) HOURS AS NEEDED FOR WHEEZING OR SHORTNESS OF BREATH, Disp: 18 g, Rfl: 1    Vraylar 6 MG capsule, TAKE 1 CAPSULE (6 MG TOTAL) BY MOUTH DAILY, Disp: 30 capsule, Rfl: 0    XIFAXAN 550 MG tablet, , Disp: , Rfl:     Xiidra 5 % op solution, ADMINISTER 1 DROP TO  BOTH EYES TWO (TWO) TIMES A DAY, Disp: 60 mL, Rfl: 0    atorvastatin (LIPITOR) 20 mg tablet, , Disp: , Rfl:     dexamethasone (DECADRON) 4 mg tablet, , Disp: , Rfl:     fluticasone (Flovent Diskus) 50 mcg/actuation diskus inhaler, Inhale 1 puff 2 (two) times a day Rinse mouth after use., Disp: 60 blister, Rfl: 0    Respiratory Therapy Supplies (NEBULIZER) DONA, by Does not apply route every 4 (four) hours while awake, Disp: 90 each, Rfl: 1    Current Facility-Administered Medications:     cyanocobalamin injection 1,000 mcg, 1,000 mcg, Intramuscular, Q30 Days, ERICKA George, 1,000 mcg at 23 1025    Current Allergies     Allergies as of 2024 - Reviewed 2024   Allergen Reaction Noted    Doxycycline Rash     Erythromycin Rash 2018    Other Edema and Wheezing 2008    Augmentin [amoxicillin-pot clavulanate] GI Intolerance 2023    Latex Rash 2015    Duloxetine  2020    Eletriptan  2017    Emgality [galcanezumab-gnlm]  2020    Galcanezumab  10/06/2020    Lidocaine Other (See Comments) 2023    Methocarbamol Other (See Comments) 2023            The following portions of the patient's history were reviewed and updated as appropriate: allergies, current medications, past family history, past medical history, past social history, past surgical history and problem list.     Past Medical History:   Diagnosis Date    Anxiety     Anxiety     Asthma     Bipolar disorder (HCC)     Carpal tunnel syndrome     Chronic pain     lower back    Depression     Disease of thyroid gland     Dyslipidemia     Fibromyalgia     Hypothyroidism     Irritable bowel     Kidney stones     Kidney stones 2019    Migraine     Obesity (BMI 30.0-34.9)     Psychiatric disorder     depression/anxiety    Suicide attempt (HCC)     as teen    Vitamin D deficiency        Past Surgical History:   Procedure Laterality Date    BUNIONECTOMY       SECTION       CHOLECYSTECTOMY      PARTIAL HYSTERECTOMY      portion of  uterus still present    TONSILLECTOMY      TUBAL LIGATION         Family History   Problem Relation Age of Onset    Hypertension Mother     Diabetes Mother     Hypothyroidism Mother     Stroke Mother     Bipolar disorder Mother     Anxiety disorder Mother     Arthritis Mother     Depression Mother     Mental illness Mother     Cancer Father         pancreatic     Hypothyroidism Sister     Hypertension Brother     Heart disease Brother     Cancer Maternal Uncle         lung    Cancer Paternal Aunt         breast    Breast cancer Paternal Aunt     Cancer Paternal Uncle         testicular     Cancer Paternal Grandmother         breast    Dementia Paternal Grandmother     Breast cancer Paternal Grandmother     Cancer Cousin         brain    Bipolar disorder Daughter          Medications have been verified.        Objective   /85   Pulse 83   Temp (!) 97 °F (36.1 °C)   Resp 18   SpO2 98%        Physical Exam     Physical Exam  Constitutional:       General: She is not in acute distress.     Appearance: Normal appearance.   HENT:      Right Ear: Tympanic membrane, ear canal and external ear normal.      Left Ear: Tympanic membrane, ear canal and external ear normal.      Nose: Congestion present.      Mouth/Throat:      Mouth: Mucous membranes are moist.   Eyes:      Pupils: Pupils are equal, round, and reactive to light.   Cardiovascular:      Rate and Rhythm: Normal rate and regular rhythm.   Pulmonary:      Effort: Pulmonary effort is normal.      Breath sounds: Normal breath sounds.   Skin:     General: Skin is warm and dry.      Capillary Refill: Capillary refill takes less than 2 seconds.   Neurological:      Mental Status: She is alert.   Psychiatric:         Mood and Affect: Mood normal.         Behavior: Behavior normal.

## 2024-04-19 NOTE — TELEPHONE ENCOUNTER
States she was treated for a UTI by her PCP and is now being treated for acute bronchitis after being evaluated at [facility].     ----- Message from Bhakti Danielson sent at 4/15/2024 11:04 AM EDT -----  Regarding: FW: Pain bladder  Contact: [phone]    ----- Message -----  From: Belem Linares  Sent: 4/15/2024   9:25 AM EDT  To: [facility]  Subject: Pain bladder                                     There was a urinalysis done.  Should be in my chart.  But the symptoms have not dissipated

## 2024-04-23 ENCOUNTER — TELEPHONE (OUTPATIENT)
Dept: PSYCHIATRY | Facility: CLINIC | Age: 54
End: 2024-04-23

## 2024-04-26 ENCOUNTER — TELEPHONE (OUTPATIENT)
Age: 54
End: 2024-04-26

## 2024-04-26 NOTE — TELEPHONE ENCOUNTER
AB from [facility] called in stating the following medication requires a prior authorization:    thyroid (ARMOUR) 90 MG tablet  (Pharm tech stated that the script shows for 60mg not 90)     ISSUED the following key: [id number]    Please advise, thank you!

## 2024-04-29 ENCOUNTER — CLINICAL SUPPORT (OUTPATIENT)
Dept: FAMILY MEDICINE CLINIC | Facility: CLINIC | Age: 54
End: 2024-04-29
Payer: COMMERCIAL

## 2024-04-29 DIAGNOSIS — Z23 ENCOUNTER FOR IMMUNIZATION: ICD-10-CM

## 2024-04-29 DIAGNOSIS — S61.411A LACERATION OF SKIN OF RIGHT HAND, INITIAL ENCOUNTER: Primary | ICD-10-CM

## 2024-04-29 PROCEDURE — 90715 TDAP VACCINE 7 YRS/> IM: CPT

## 2024-04-29 PROCEDURE — 90471 IMMUNIZATION ADMIN: CPT

## 2024-04-29 NOTE — TELEPHONE ENCOUNTER
PA for thyroid (ARMOUR) 90 MG tablet     Submitted via    []CMM-KEY   [x]IM5-Case ID # [id number]   []Faxed to plan   []Other website   []Phone call Case ID #     Office notes sent, clinical questions answered. Awaiting determination    Turnaround time for your insurance to make a decision on your Prior Authorization can take 7-21 business days.

## 2024-04-30 ENCOUNTER — OFFICE VISIT (OUTPATIENT)
Dept: FAMILY MEDICINE CLINIC | Facility: CLINIC | Age: 54
End: 2024-04-30
Payer: COMMERCIAL

## 2024-04-30 ENCOUNTER — TELEPHONE (OUTPATIENT)
Age: 54
End: 2024-04-30

## 2024-04-30 VITALS
SYSTOLIC BLOOD PRESSURE: 120 MMHG | OXYGEN SATURATION: 100 % | HEIGHT: 64 IN | BODY MASS INDEX: 29.71 KG/M2 | WEIGHT: 174 LBS | TEMPERATURE: 97 F | HEART RATE: 79 BPM | DIASTOLIC BLOOD PRESSURE: 70 MMHG

## 2024-04-30 DIAGNOSIS — R05.9 COUGH, UNSPECIFIED TYPE: ICD-10-CM

## 2024-04-30 DIAGNOSIS — G62.9 NEUROPATHY: ICD-10-CM

## 2024-04-30 DIAGNOSIS — E03.8 OTHER SPECIFIED HYPOTHYROIDISM: ICD-10-CM

## 2024-04-30 DIAGNOSIS — J02.9 SORE THROAT: Primary | ICD-10-CM

## 2024-04-30 LAB — S PYO AG THROAT QL: NEGATIVE

## 2024-04-30 PROCEDURE — 99214 OFFICE O/P EST MOD 30 MIN: CPT

## 2024-04-30 PROCEDURE — 87880 STREP A ASSAY W/OPTIC: CPT

## 2024-04-30 RX ORDER — THYROID 90 MG/1
135 TABLET ORAL DAILY
Qty: 135 TABLET | Refills: 1 | Status: SHIPPED | OUTPATIENT
Start: 2024-04-30

## 2024-04-30 RX ORDER — FLUTICASONE PROPIONATE 50 MCG
1 BLISTER, WITH INHALATION DEVICE INHALATION 2 TIMES DAILY
Qty: 60 BLISTER | Refills: 0 | Status: SHIPPED | OUTPATIENT
Start: 2024-04-30 | End: 2024-05-30

## 2024-04-30 RX ORDER — MONTELUKAST SODIUM 10 MG/1
10 TABLET ORAL
Qty: 30 TABLET | Refills: 5 | Status: SHIPPED | OUTPATIENT
Start: 2024-04-30

## 2024-04-30 RX ORDER — AZITHROMYCIN 250 MG/1
TABLET, FILM COATED ORAL DAILY
Qty: 6 TABLET | Refills: 0 | Status: SHIPPED | OUTPATIENT
Start: 2024-04-30 | End: 2024-05-05

## 2024-04-30 RX ORDER — LEVOCETIRIZINE DIHYDROCHLORIDE 5 MG/1
5 TABLET, FILM COATED ORAL EVERY EVENING
Qty: 30 TABLET | Refills: 1 | Status: SHIPPED | OUTPATIENT
Start: 2024-04-30

## 2024-04-30 NOTE — PROGRESS NOTES
Assessment/Plan:         Problem List Items Addressed This Visit        Endocrine    Hypothyroidism     Has been on levothyroxine in the last year and prior to that, she was not able to control her thyroid with any medications until started on Dez thyroid, will continue current medications and continue to monitor.          Relevant Medications    thyroid (ARMOUR) 90 MG tablet   Other Visit Diagnoses     Sore throat    -  Primary    recommend hydration and rest, will treat with zpak, xray if symptoms persist.    Relevant Medications    azithromycin (Zithromax) 250 mg tablet    levocetirizine (XYZAL) 5 MG tablet    montelukast (SINGULAIR) 10 mg tablet    fluticasone (Flovent Diskus) 50 mcg/actuation diskus inhaler    Other Relevant Orders    POCT rapid ANTIGEN strepA (Completed)    XR chest pa & lateral    Cough, unspecified type        recommend hydration and rest, will treat with zpak, xray if symptoms persist.    Relevant Medications    azithromycin (Zithromax) 250 mg tablet    levocetirizine (XYZAL) 5 MG tablet    montelukast (SINGULAIR) 10 mg tablet    fluticasone (Flovent Diskus) 50 mcg/actuation diskus inhaler    Other Relevant Orders    XR chest pa & lateral    Neuropathy        Will refer to podiatry, reviewed lab work, I think that it is related to footware. Follow up as needed.    Relevant Orders    Ambulatory Referral to Podiatry            Subjective:      Patient ID: Belem Linares is a 53 y.o. female.    Belem is here for URI, she has been sick for weeks. Her throat started hurting. She got her tdap yesterday and last night when she went to bed she started with chills and she woke up choaking with congestion and a temp of 102 F, she has been taking tylenol and having a lot of body aches.     Fever  Associated symptoms include arthralgias, chills, congestion, coughing, diaphoresis, fatigue, a fever, headaches, nausea, a rash and a sore throat. Pertinent negatives include no chest pain.    Nausea  Associated symptoms include arthralgias, chills, congestion, coughing, diaphoresis, fatigue, a fever, headaches, nausea, a rash and a sore throat. Pertinent negatives include no chest pain.   Sore Throat   Associated symptoms include congestion, coughing and headaches. Pertinent negatives include no ear pain.       The following portions of the patient's history were reviewed and updated as appropriate:   Past Medical History:  She has a past medical history of Anxiety, Anxiety, Asthma, Bipolar disorder (), Carpal tunnel syndrome, Chronic pain, Depression, Disease of thyroid gland, Dyslipidemia, Fibromyalgia, Hypothyroidism (), Irritable bowel, Kidney stones, Kidney stones (2019), Migraine, Obesity (BMI 30.0-34.9), Psychiatric disorder, Suicide attempt ([facility]), and Vitamin D deficiency.,  _______________________________________________________________________  Medical Problems:  does not have any pertinent problems on file.,  _______________________________________________________________________  Past Surgical History:   has a past surgical history that includes  section; Cholecystectomy; Tonsillectomy; Partial hysterectomy; Bunionectomy; and Tubal ligation.,  _______________________________________________________________________  Family History:  family history includes Anxiety disorder in her mother; Arthritis in her mother; Bipolar disorder in her daughter and mother; Breast cancer in her paternal aunt and paternal grandmother; Cancer in her cousin, father, maternal uncle, paternal aunt, paternal grandmother, and paternal uncle; Dementia in her paternal grandmother; Depression in her mother; Diabetes in her mother; Heart disease in her brother; Hypertension in her brother and mother; Hypothyroidism in her mother and sister; Mental illness in her mother; Stroke in her mother.,  _______________________________________________________________________  Social History:   reports that she  has never smoked. She has been exposed to tobacco smoke. She has never used smokeless tobacco. She reports current drug use. Drug: Marijuana. She reports that she does not drink alcohol.,  _______________________________________________________________________  Allergies:  is allergic to doxycycline, erythromycin, other, augmentin [amoxicillin-pot clavulanate], latex, duloxetine, eletriptan, emgality [galcanezumab-gnlm], galcanezumab, lidocaine, and methocarbamol..  _______________________________________________________________________  Current Outpatient Medications   Medication Sig Dispense Refill   • azelastine (ASTELIN) 0.1 % nasal spray 1 SPRAY INTO EACH NOSTRIL TWO (TWO) TIMES A DAY USE IN EACH NOSTRIL AS DIRECTED 30 mL 0   • azithromycin (Zithromax) 250 mg tablet Take 2 tablets (500 mg total) by mouth daily for 1 day, THEN 1 tablet (250 mg total) daily for 4 days. 6 tablet 0   • butalbital-acetaminophen-caffeine (FIORICET,ESGIC) -40 mg per tablet Take 1 tablet by mouth every 6 (six) hours as needed for headaches 30 tablet 0   • celecoxib (CELEBREX) 200 mg capsule Take 1 capsule (200 mg total) by mouth daily 30 capsule 2   • clonazePAM (KlonoPIN) 1 mg tablet Take 1 tablet (1 mg total) by mouth daily as needed for anxiety 30 tablet 1   • Diclofenac Sodium (VOLTAREN) 1 % Apply 2 g topically 4 (four) times a day 50 g 0   • dicyclomine (BENTYL) 10 mg capsule Take 1 capsule (10 mg total) by mouth 3 (three) times a day as needed (abd cramping) 30 capsule 0   • fluticasone (Flovent Diskus) 50 mcg/actuation diskus inhaler Inhale 1 puff 2 (two) times a day Rinse mouth after use. 60 blister 0   • levocetirizine (XYZAL) 5 MG tablet Take 1 tablet (5 mg total) by mouth every evening 30 tablet 1   • metoclopramide (REGLAN) 5 mg tablet TAKE 1 TABLET (5 MG TOTAL) BY MOUTH 4 (FOUR) TIMES A DAY 30 tablet 0   • montelukast (SINGULAIR) 10 mg tablet Take 1 tablet (10 mg total) by mouth daily at bedtime 30 tablet 5   •  naratriptan (AMERGE) 2.5 MG tablet      • omeprazole (PriLOSEC) 40 MG capsule TAKE 1 CAPSULE (40 MG TOTAL) BY MOUTH DAILY 90 capsule 0   • ondansetron (ZOFRAN-ODT) 4 mg disintegrating tablet TAKE 1 TABLET (4 MG TOTAL) BY MOUTH EVERY EIGHT (EIGHT) HOURS AS NEEDED FOR NAUSEA 20 tablet 0   • semaglutide, 2 mg/dose, (Ozempic) 8 mg/ mL injection pen Inject 0.75 mL (2 mg total) under the skin every 7 days 9 mL 1   • thyroid (ARMOUR) 90 MG tablet Take 1.5 tablets (135 mg total) by mouth daily 135 tablet 1   • tiZANidine (ZANAFLEX) 4 mg tablet      • Topiramate  MG CP24 Take 100 mg by mouth 2 (two) times a day     • Ventolin  (90 Base) MCG/ACT inhaler INHALE TWO PUFFS EVERY 4 (FOUR) HOURS AS NEEDED FOR WHEEZING OR SHORTNESS OF BREATH 18 g 1   • albuterol (ProAir HFA) 90 mcg/act inhaler Inhale 2 puffs every 6 (six) hours as needed for wheezing or shortness of breath (Patient not taking: Reported on 4/30/2024) 8.5 g 0   • Blood Glucose Monitoring Suppl (OneTouch Verio Reflect) w/Device KIT Check blood sugars once daily. Please substitute with appropriate alternative as covered by patient's insurance. Dx: E11.65 1 kit 0   • fezolinetant (Veozah) tablet Take 1 tablet (45 mg total) by mouth daily 30 tablet 2   • fremanezumab-vfrm (Ajovy) 225 MG/1.5ML auto-injector Inject 225 mg under the skin every 30 (thirty) days     • glucose blood (OneTouch Verio) test strip Check blood sugars once daily. Please substitute with appropriate alternative as covered by patient's insurance. Dx: E11.65 100 each 3   • ketoconazole (NIZORAL) 2 % cream      • meclizine (ANTIVERT) 25 mg tablet TAKE 1 TABLET (25 MG TOTAL) BY MOUTH EVERY EIGHT (EIGHT) HOURS AS NEEDED FOR DIZZINESS 30 tablet 0   • OneTouch Delica Lancets 33G MISC Check blood sugars once daily. Please substitute with appropriate alternative as covered by patient's insurance. Dx: E11.65 100 each 3   • Respiratory Therapy Supplies (NEBULIZER) DONA by Does not apply route every  "4 (four) hours while awake 90 each 1   • Ubrelvy 100 MG tablet      • Vraylar 6 MG capsule TAKE 1 CAPSULE (6 MG TOTAL) BY MOUTH DAILY 30 capsule 0   • XIFAXAN 550 MG tablet      • Xiidra 5 % op solution ADMINISTER 1 DROP TO BOTH EYES TWO (TWO) TIMES A DAY 60 mL 0     Current Facility-Administered Medications   Medication Dose Route Frequency Provider Last Rate Last Admin   • cyanocobalamin injection 1,000 mcg  1,000 mcg Intramuscular Q30 Days ERICKA George   1,000 mcg at 07/27/23 1025     _______________________________________________________________________  Review of Systems   Constitutional:  Positive for chills, diaphoresis, fatigue and fever.   HENT:  Positive for congestion, sinus pressure and sore throat. Negative for ear pain.    Respiratory:  Positive for cough. Negative for chest tightness and wheezing.    Cardiovascular:  Negative for chest pain and palpitations.   Gastrointestinal:  Positive for nausea.   Musculoskeletal:  Positive for arthralgias.   Skin:  Positive for rash.   Neurological:  Positive for headaches.   All other systems reviewed and are negative.        Objective:  Vitals:    04/30/24 0953   BP: 120/70   Pulse: 79   Temp: (!) 97 °F (36.1 °C)   SpO2: 100%   Weight: 78.9 kg (174 lb)   Height: 5' 4\" (1.626 m)     Body mass index is 29.87 kg/m².     Physical Exam  Vitals and nursing note reviewed.   Constitutional:       Appearance: Normal appearance. She is ill-appearing.   HENT:      Head: Normocephalic.      Right Ear: There is no impacted cerumen. Tympanic membrane is erythematous.      Left Ear: There is no impacted cerumen. Tympanic membrane is erythematous.      Nose: Nose normal.      Mouth/Throat:      Mouth: Mucous membranes are moist.      Pharynx: Posterior oropharyngeal erythema present.   Eyes:      Extraocular Movements: Extraocular movements intact.      Conjunctiva/sclera: Conjunctivae normal.      Pupils: Pupils are equal, round, and reactive to light. "   Cardiovascular:      Rate and Rhythm: Normal rate and regular rhythm.      Heart sounds: Normal heart sounds. No murmur heard.  Pulmonary:      Effort: Pulmonary effort is normal.      Breath sounds: Normal breath sounds. No wheezing.   Abdominal:      Palpations: Abdomen is soft.      Tenderness: There is no abdominal tenderness.   Musculoskeletal:         General: Normal range of motion.      Cervical back: Normal range of motion.      Right lower leg: No edema.      Left lower leg: Edema present.   Lymphadenopathy:      Cervical: No cervical adenopathy.   Skin:     General: Skin is warm and dry.   Neurological:      General: No focal deficit present.      Mental Status: She is alert.   Psychiatric:         Mood and Affect: Mood normal.         Behavior: Behavior normal.

## 2024-04-30 NOTE — TELEPHONE ENCOUNTER
PA for thyroid (ARMOUR) 90 MG tablet  Denied    Reason:                  Message sent to provider pool Yes    Denial letter scanned into Media Yes    Appeal started No    (Provider will need to decide if appeal is warranted and send clinical documentation to PA team for initiation.)

## 2024-04-30 NOTE — TELEPHONE ENCOUNTER
Pt called stating she was seen yesterday and given a DTAP.  Started with fever 100-102, chills, nausea and congestion last night.  Not sure if the 2 are related.  Asking to be seen today for appointment.    Pt scheduled for 10 am today.

## 2024-04-30 NOTE — PATIENT INSTRUCTIONS
Problem List Items Addressed This Visit          Endocrine    Hypothyroidism     Has been on levothyroxine in the last year and prior to that, she was not able to control her thyroid with any medications until started on Dez thyroid, will continue current medications and continue to monitor.          Relevant Medications    thyroid (ARMOUR) 90 MG tablet     Other Visit Diagnoses       Sore throat    -  Primary    recommend hydration and rest, will treat with zpak, xray if symptoms persist.    Relevant Medications    azithromycin (Zithromax) 250 mg tablet    levocetirizine (XYZAL) 5 MG tablet    montelukast (SINGULAIR) 10 mg tablet    fluticasone (Flovent Diskus) 50 mcg/actuation diskus inhaler    Other Relevant Orders    POCT rapid ANTIGEN strepA (Completed)    XR chest pa & lateral    Cough, unspecified type        recommend hydration and rest, will treat with zpak, xray if symptoms persist.    Relevant Medications    azithromycin (Zithromax) 250 mg tablet    levocetirizine (XYZAL) 5 MG tablet    montelukast (SINGULAIR) 10 mg tablet    fluticasone (Flovent Diskus) 50 mcg/actuation diskus inhaler    Other Relevant Orders    XR chest pa & lateral    Neuropathy        Will refer to podiatry, reviewed lab work, I think that it is related to footware. Follow up as needed.    Relevant Orders    Ambulatory Referral to Podiatry

## 2024-04-30 NOTE — TELEPHONE ENCOUNTER
The prior auth team don't handle the appeals only prior Auth and that's  it. I called [facility] determination and they let me know that this is not on formulary for her. I have to do a clinical determination and submit why this is the only medicine that's works. She is coming in later with you can you please add this information in to your note so I can submit

## 2024-04-30 NOTE — ASSESSMENT & PLAN NOTE
Has been on levothyroxine in the last year and prior to that, she was not able to control her thyroid with any medications until started on Dez thyroid, will continue current medications and continue to monitor.

## 2024-05-03 NOTE — TELEPHONE ENCOUNTER
Pt called in, states pharmacy filled script incorrectly. They dispensed her 60mg tabs, not the 90mg tabs as ordered. Previous documentation about PA denial noted, pt has had updated visit since then. Pt states she should be taking 135mg daily. (90mg tab, 1.5 tabs) Please review and resubmit so that pt does not run out of medication.

## 2024-05-06 DIAGNOSIS — E66.09 CLASS 1 OBESITY DUE TO EXCESS CALORIES WITHOUT SERIOUS COMORBIDITY WITH BODY MASS INDEX (BMI) OF 30.0 TO 30.9 IN ADULT: ICD-10-CM

## 2024-05-07 ENCOUNTER — TELEMEDICINE (OUTPATIENT)
Dept: PSYCHIATRY | Facility: CLINIC | Age: 54
End: 2024-05-07
Payer: COMMERCIAL

## 2024-05-07 DIAGNOSIS — F41.1 GAD (GENERALIZED ANXIETY DISORDER): Primary | ICD-10-CM

## 2024-05-07 DIAGNOSIS — F31.81 BIPOLAR 2 DISORDER (HCC): ICD-10-CM

## 2024-05-07 PROCEDURE — 90837 PSYTX W PT 60 MINUTES: CPT

## 2024-05-07 RX ORDER — SEMAGLUTIDE 1.34 MG/ML
INJECTION, SOLUTION SUBCUTANEOUS
Qty: 3 ML | Refills: 0 | Status: SHIPPED | OUTPATIENT
Start: 2024-05-07

## 2024-05-07 NOTE — PSYCH
Virtual Regular Visit    Verification of patient location:    Patient is located at Home in the following state in which I hold an active license [place]      Assessment/Plan:    Problem List Items Addressed This Visit       ZAINA (generalized anxiety disorder) - Primary    Bipolar 2 disorder (HCC)       Goals addressed in session: Goal 1          Reason for visit is No chief complaint on file.       Encounter provider Maisha Bonds LCSW      Recent Visits  Date Type Provider Dept   04/30/24 Office Visit ERICKA N[facility] [address]   Showing recent visits within past 7 days and meeting all other requirements  Today's Visits  Date Type Provider Dept   05/07/24 Telemedicine Maisha Bonds LCSW [facility]   Showing today's visits and meeting all other requirements  Future Appointments  No visits were found meeting these conditions.  Showing future appointments within next 150 days and meeting all other requirements       The patient was identified by name and date of birth. Belem Linares was informed that this is a telemedicine visit and that the visit is being conducted throughthe [employer] platform. She agrees to proceed..  My office door was closed. No one else was in the room.  She acknowledged consent and understanding of privacy and security of the video platform. The patient has agreed to participate and understands they can discontinue the visit at any time.    Patient is aware this is a billable service.     Subjective  Belem Linares is a 53 y.o. female.      HPI     Past Medical History:   Diagnosis Date    Anxiety     Anxiety     Asthma     Bipolar disorder (HCC)     Carpal tunnel syndrome     Chronic pain     lower back    Depression     Disease of thyroid gland     Dyslipidemia     Fibromyalgia     Hypothyroidism 2007    Irritable bowel     Kidney stones     Kidney stones 05/20/2019    Migraine     Obesity (BMI 30.0-34.9)     Psychiatric  disorder     depression/anxiety    Suicide attempt (HCC)     as teen    Vitamin D deficiency        Past Surgical History:   Procedure Laterality Date    BUNIONECTOMY       SECTION      CHOLECYSTECTOMY      PARTIAL HYSTERECTOMY      portion of  uterus still present    TONSILLECTOMY      TUBAL LIGATION         Current Outpatient Medications   Medication Sig Dispense Refill    albuterol (ProAir HFA) 90 mcg/act inhaler Inhale 2 puffs every 6 (six) hours as needed for wheezing or shortness of breath (Patient not taking: Reported on 2024) 8.5 g 0    azelastine (ASTELIN) 0.1 % nasal spray 1 SPRAY INTO EACH NOSTRIL TWO (TWO) TIMES A DAY USE IN EACH NOSTRIL AS DIRECTED 30 mL 0    Blood Glucose Monitoring Suppl (OneTouch Verio Reflect) w/Device KIT Check blood sugars once daily. Please substitute with appropriate alternative as covered by patient's insurance. Dx: E11.65 1 kit 0    butalbital-acetaminophen-caffeine (FIORICET,ESGIC) -40 mg per tablet Take 1 tablet by mouth every 6 (six) hours as needed for headaches 30 tablet 0    celecoxib (CELEBREX) 200 mg capsule Take 1 capsule (200 mg total) by mouth daily 30 capsule 2    clonazePAM (KlonoPIN) 1 mg tablet Take 1 tablet (1 mg total) by mouth daily as needed for anxiety 30 tablet 1    Diclofenac Sodium (VOLTAREN) 1 % Apply 2 g topically 4 (four) times a day 50 g 0    dicyclomine (BENTYL) 10 mg capsule Take 1 capsule (10 mg total) by mouth 3 (three) times a day as needed (abd cramping) 30 capsule 0    fezolinetant (Veozah) tablet Take 1 tablet (45 mg total) by mouth daily 30 tablet 2    fluticasone (Flovent Diskus) 50 mcg/actuation diskus inhaler Inhale 1 puff 2 (two) times a day Rinse mouth after use. 60 blister 0    fremanezumab-vfrm (Ajovy) 225 MG/1.5ML auto-injector Inject 225 mg under the skin every 30 (thirty) days      glucose blood (OneTouch Verio) test strip Check blood sugars once daily. Please substitute with appropriate alternative as covered by  patient's insurance. Dx: E11.65 100 each 3    ketoconazole (NIZORAL) 2 % cream       levocetirizine (XYZAL) 5 MG tablet Take 1 tablet (5 mg total) by mouth every evening 30 tablet 1    meclizine (ANTIVERT) 25 mg tablet TAKE 1 TABLET (25 MG TOTAL) BY MOUTH EVERY EIGHT (EIGHT) HOURS AS NEEDED FOR DIZZINESS 30 tablet 0    metoclopramide (REGLAN) 5 mg tablet TAKE 1 TABLET (5 MG TOTAL) BY MOUTH 4 (FOUR) TIMES A DAY 30 tablet 0    montelukast (SINGULAIR) 10 mg tablet Take 1 tablet (10 mg total) by mouth daily at bedtime 30 tablet 5    naratriptan (AMERGE) 2.5 MG tablet       omeprazole (PriLOSEC) 40 MG capsule TAKE 1 CAPSULE (40 MG TOTAL) BY MOUTH DAILY 90 capsule 0    ondansetron (ZOFRAN-ODT) 4 mg disintegrating tablet TAKE 1 TABLET (4 MG TOTAL) BY MOUTH EVERY EIGHT (EIGHT) HOURS AS NEEDED FOR NAUSEA 20 tablet 0    OneTouch Delica Lancets 33G MISC Check blood sugars once daily. Please substitute with appropriate alternative as covered by patient's insurance. Dx: E11.65 100 each 3    Respiratory Therapy Supplies (NEBULIZER) DONA by Does not apply route every 4 (four) hours while awake 90 each 1    semaglutide, 2 mg/dose, (Ozempic) 8 mg/ mL injection pen Inject 0.75 mL (2 mg total) under the skin every 7 days 9 mL 1    thyroid (ARMOUR) 90 MG tablet Take 1.5 tablets (135 mg total) by mouth daily 135 tablet 1    tiZANidine (ZANAFLEX) 4 mg tablet       Topiramate  MG CP24 Take 100 mg by mouth 2 (two) times a day      Ubrelvy 100 MG tablet       Ventolin  (90 Base) MCG/ACT inhaler INHALE TWO PUFFS EVERY 4 (FOUR) HOURS AS NEEDED FOR WHEEZING OR SHORTNESS OF BREATH 18 g 1    Vraylar 6 MG capsule TAKE 1 CAPSULE (6 MG TOTAL) BY MOUTH DAILY 30 capsule 0    XIFAXAN 550 MG tablet       Xiidra 5 % op solution ADMINISTER 1 DROP TO BOTH EYES TWO (TWO) TIMES A DAY 60 mL 0     Current Facility-Administered Medications   Medication Dose Route Frequency Provider Last Rate Last Admin    cyanocobalamin injection 1,000 mcg  1,000  "mcg Intramuscular Q30 Days ERICKA George   1,000 mcg at 07/27/23 1025        Allergies   Allergen Reactions    Doxycycline Rash    Erythromycin Rash    Other Edema and Wheezing     jalapeno    Augmentin [Amoxicillin-Pot Clavulanate] GI Intolerance    Latex Rash    Duloxetine      Other reaction(s): Nausea, Loopy    Eletriptan      Pain in lower jaw with pressure in throat    Emgality [Galcanezumab-Gnlm]     Galcanezumab      rash    Lidocaine Other (See Comments)     Headache    Methocarbamol Other (See Comments)     Dizzyness       Review of Systems    Video Exam    There were no vitals filed for this visit.    Physical Exam     Behavioral Health Psychotherapy Progress Note    Psychotherapy Provided: Individual Psychotherapy      Diagnosis ICD-10-CM Associated Orders   1. ZAINA (generalized anxiety disorder)  F41.1       2. Bipolar 2 disorder (HCC)  F31.81            Goals addressed in session: Goal 1     DATA: Met with Belem Linares for a scheduled individual session.  Topics discussed included emotional wellness, coping skills, and relationship with significant other.  Belem is feeling \"depressed but I'm just really realizing it now\". She states \"I'm annoyed with everything, my family, society as a whole\".  The situation with her grandson and his mother/her daughter continues to really wear on her. Her relationship with Jaylan is also very draining.  She's been going back to [employer] as part of taking care of herself and has gotten into gardening. Belem has a good relationship with her ex and that is something that Jaylan feels threatened by. Belem just wants him to understand that there are clear boundaries with her ex; they get along better as friends and still enjoy being in each other's lives, especially when it comes to Belem's grandson. They care about one another but there are no elements of a romantic relationship there. We processed and talked through her feelings.  Belem shows evidence of " "utilizing effective communication skills and engaging in problem solving to manage mental health symptoms.  During this session, this clinical used the following therapeutic modalities supportive psychotherapy, client-centered therapy, and CBT techniques.    Substance Abuse was not addressed during this session. If the client is diagnosed with a co-occurring substance use disorder, please indicate any changes in the frequency or amount of use: N/A. Stage of change for addressing substance use diagnoses: No substance use/Not applicable    ASSESSMENT:  Belem presents with a Depressed mood.  her affect is normal range and intensity, appropriate, which is congruent, with her  mood and the content of the session. Belem was oriented x3. She was focused and engaged. Belem exhibits good therapeutic rapport with this clinician. The client has made progress on their goals.    Belem Linares presents with a none risk of suicide, none risk of self-harm, and none risk of harm to others.    For any risk assessment that surpasses a \"low\" rating, a safety plan must be developed.    A safety plan was indicated: no  If yes, describe in detail: N/A    PLAN: Belem will return in 1 week for the next scheduled session.  At the next session, the therapist will use supportive psychotherapy and client-centered therapy to address depression and anxiety.    Behavioral Health Treatment Plan and Discharge Planning: Belem Linares is aware of and agrees to continue to work on their treatment plan. They have identified and are working toward their discharge goals. yes    Visit start and stop times:    05/07/24  Start Time: 1201  Stop Time: 1303  Total Visit Time: 62 minutes        "

## 2024-05-15 ENCOUNTER — TELEPHONE (OUTPATIENT)
Dept: PSYCHIATRY | Facility: CLINIC | Age: 54
End: 2024-05-15

## 2024-05-15 ENCOUNTER — APPOINTMENT (OUTPATIENT)
Dept: LAB | Facility: CLINIC | Age: 54
End: 2024-05-15
Payer: COMMERCIAL

## 2024-05-15 DIAGNOSIS — H04.129 DRY EYE: ICD-10-CM

## 2024-05-15 DIAGNOSIS — F41.1 GAD (GENERALIZED ANXIETY DISORDER): ICD-10-CM

## 2024-05-15 DIAGNOSIS — E03.8 OTHER SPECIFIED HYPOTHYROIDISM: ICD-10-CM

## 2024-05-15 DIAGNOSIS — F31.81 BIPOLAR 2 DISORDER (HCC): ICD-10-CM

## 2024-05-15 DIAGNOSIS — J06.9 UPPER RESPIRATORY TRACT INFECTION, UNSPECIFIED TYPE: ICD-10-CM

## 2024-05-15 DIAGNOSIS — K62.5 BLOOD PER RECTUM: ICD-10-CM

## 2024-05-15 LAB — HEMOCCULT STL QL IA: NEGATIVE

## 2024-05-15 PROCEDURE — G0328 FECAL BLOOD SCRN IMMUNOASSAY: HCPCS

## 2024-05-15 RX ORDER — CLONAZEPAM 1 MG/1
1 TABLET ORAL DAILY PRN
Qty: 30 TABLET | Refills: 0 | Status: SHIPPED | OUTPATIENT
Start: 2024-05-15

## 2024-05-15 NOTE — TELEPHONE ENCOUNTER
Called and spoke with patient to schedule follow up. Scheduled 6/14 1030AM virtual. Confirmed refill.

## 2024-05-16 RX ORDER — CLONAZEPAM 1 MG/1
1 TABLET ORAL DAILY PRN
Qty: 30 TABLET | Refills: 0 | OUTPATIENT
Start: 2024-05-16

## 2024-05-16 RX ORDER — CARIPRAZINE 4.5 MG/1
CAPSULE, GELATIN COATED ORAL
Qty: 30 CAPSULE | Refills: 2 | OUTPATIENT
Start: 2024-05-16

## 2024-05-16 RX ORDER — FLUTICASONE PROPIONATE 50 MCG
SPRAY, SUSPENSION (ML) NASAL
Refills: 0 | OUTPATIENT
Start: 2024-05-16

## 2024-05-16 RX ORDER — AZELASTINE 1 MG/ML
SPRAY, METERED NASAL
Qty: 30 ML | Refills: 1 | Status: SHIPPED | OUTPATIENT
Start: 2024-05-16

## 2024-05-16 RX ORDER — THYROID 60 MG
TABLET ORAL
Qty: 135 TABLET | Refills: 1 | Status: SHIPPED | OUTPATIENT
Start: 2024-05-16

## 2024-05-16 RX ORDER — CARIPRAZINE 6 MG/1
CAPSULE, GELATIN COATED ORAL
Qty: 30 CAPSULE | Refills: 0 | Status: SHIPPED | OUTPATIENT
Start: 2024-05-16

## 2024-05-16 NOTE — TELEPHONE ENCOUNTER
Requested medication(s) are due for refill today: Yes  Patient has already received a courtesy refill: No  Other reason request has been forwarded to provider: FAILED PROTOCOL / Patient has an upcoming appt with Dr. Rose 5/28/24

## 2024-05-17 ENCOUNTER — TELEMEDICINE (OUTPATIENT)
Dept: PSYCHIATRY | Facility: CLINIC | Age: 54
End: 2024-05-17

## 2024-05-17 DIAGNOSIS — F31.81 BIPOLAR 2 DISORDER (HCC): Primary | ICD-10-CM

## 2024-05-17 DIAGNOSIS — F41.1 GAD (GENERALIZED ANXIETY DISORDER): ICD-10-CM

## 2024-05-17 RX ORDER — LIFITEGRAST 50 MG/ML
1 SOLUTION/ DROPS OPHTHALMIC 2 TIMES DAILY
Qty: 60 ML | Refills: 0 | Status: SHIPPED | OUTPATIENT
Start: 2024-05-17

## 2024-05-17 NOTE — PSYCH
No Call. No Show. No Charge    Belem Linares no showed 05/17/24 appointment , staff called and left message to reschedule appointment     Treatment Plan not due at this session.

## 2024-05-20 ENCOUNTER — TELEPHONE (OUTPATIENT)
Dept: PSYCHIATRY | Facility: CLINIC | Age: 54
End: 2024-05-20

## 2024-05-21 ENCOUNTER — TELEPHONE (OUTPATIENT)
Age: 54
End: 2024-05-21

## 2024-05-21 ENCOUNTER — TELEPHONE (OUTPATIENT)
Dept: PSYCHIATRY | Facility: CLINIC | Age: 54
End: 2024-05-21

## 2024-05-21 DIAGNOSIS — J45.40 MODERATE PERSISTENT ASTHMA WITHOUT COMPLICATION: Primary | ICD-10-CM

## 2024-05-21 RX ORDER — FLUTICASONE FUROATE 50 UG/1
1 POWDER RESPIRATORY (INHALATION) DAILY
Qty: 30 BLISTER | Refills: 0 | Status: SHIPPED | OUTPATIENT
Start: 2024-05-21 | End: 2024-06-20

## 2024-05-21 NOTE — TELEPHONE ENCOUNTER
Patient LVM to reschedule appointment with talk therapist.    Writer called and spoke with patient. Writer stated the request would be sent to therapist and  for further assistance.

## 2024-05-21 NOTE — TELEPHONE ENCOUNTER
Flovent has been discontinued by the .  Pt will need a new prescription sent to pharmacy for the the preferred alternative Arnuity.

## 2024-05-28 ENCOUNTER — OFFICE VISIT (OUTPATIENT)
Age: 54
End: 2024-05-28
Payer: COMMERCIAL

## 2024-05-28 VITALS
DIASTOLIC BLOOD PRESSURE: 76 MMHG | BODY MASS INDEX: 30.35 KG/M2 | HEIGHT: 64 IN | SYSTOLIC BLOOD PRESSURE: 112 MMHG | WEIGHT: 177.8 LBS

## 2024-05-28 DIAGNOSIS — E03.9 ACQUIRED HYPOTHYROIDISM: Primary | ICD-10-CM

## 2024-05-28 DIAGNOSIS — R53.82 CHRONIC FATIGUE: ICD-10-CM

## 2024-05-28 PROCEDURE — 99214 OFFICE O/P EST MOD 30 MIN: CPT | Performed by: STUDENT IN AN ORGANIZED HEALTH CARE EDUCATION/TRAINING PROGRAM

## 2024-05-28 NOTE — ASSESSMENT & PLAN NOTE
Hypothyroidism which is treated with [place] thyroid, currently on 135 mcg daily.  I reviewed with the patient that desiccated thyroid contain both T4 and T3 and balance of T4 and T3 in animals is different from the human thyroid and the amount of both T4 and T3 can vary in every batch of desiccated thyroid, making it harder to keep TFT stable, on top of that descicated thyroid pills contain chemical to hold the pill together, so they are not completely natural, desiccated thyroid in not recommended by [facility].   She is agreeable to switch to LT4 replacement therapy, he prefers Tirosint if this is covered by her insurance, I ordered TFT, and will decide regarding Tirosint dose, she is on 135 mcg [place] which is approximately equal to 200 mcg of levothyroxine which is higher than weight-based LT4 dose,

## 2024-05-28 NOTE — PROGRESS NOTES
Belem Linares 53 y.o. female MRN: [id number]    Encounter: [id number]      Assessment & Plan     Problem List Items Addressed This Visit          Endocrine    Hypothyroidism - Primary     Hypothyroidism which is treated with [place] thyroid, currently on 135 mcg daily.  I reviewed with the patient that desiccated thyroid contain both T4 and T3 and balance of T4 and T3 in animals is different from the human thyroid and the amount of both T4 and T3 can vary in every batch of desiccated thyroid, making it harder to keep TFT stable, on top of that descicated thyroid pills contain chemical to hold the pill together, so they are not completely natural, desiccated thyroid in not recommended by [facility].   She is agreeable to switch to LT4 replacement therapy, he prefers Tirosint if this is covered by her insurance, I ordered TFT, and will decide regarding Tirosint dose, she is on 135 mcg [place] which is approximately equal to 200 mcg of levothyroxine which is higher than weight-based LT4 dose,         Relevant Orders    Thyroid Antibodies Panel    T4, free    TSH, 3rd generation    T3       Other    Chronic fatigue     We reviewed differential diagnosis, endocrinopathies which causing fatigue, hypothyroidism if remains uncontrolled will play a role, she is agreeable to switch to Tirosint,   However other causes including insomnia, mood changes/depression, anxiety, should be considered.                CC:   Hypothyroidism     History of Present Illness     HPI:  Belem Linares is a 53 year old female who presents for follow up,  She is on [place] Thyroid 135 mcg daily,( 90 mcg, 1 and half), no recent TFT result, TFT in February showed TSH of 9.701 and free T4 of 0.52 ng/dl,   She reports feeling severe fatigue and lack of energy,   She reports not refreshed in the morning, denies snoring.  She reports generalized bodyache, weight gain anxiety and mood changes  She denies palpitations, tremor, cold or heat  intolerance,  She reports history of partial hysterectomy.     Review of Systems   Constitutional:  Positive for appetite change and fatigue. Negative for unexpected weight change.   HENT:  Negative for trouble swallowing and voice change.    Eyes:  Negative for visual disturbance.   Respiratory:  Negative for cough, shortness of breath and wheezing.    Cardiovascular:  Negative for palpitations and leg swelling.   Gastrointestinal:  Positive for constipation and diarrhea. Negative for abdominal pain, nausea and vomiting.   Endocrine: Negative for cold intolerance, heat intolerance, polyphagia and polyuria.   Musculoskeletal:  Negative for arthralgias.   Skin:  Negative for color change, rash and wound.   Neurological:  Negative for dizziness, tremors, weakness, light-headedness, numbness and headaches.   Psychiatric/Behavioral:  Positive for sleep disturbance. Negative for agitation. The patient is nervous/anxious.        Historical Information   Past Medical History:   Diagnosis Date    Anxiety     Anxiety     Asthma     Bipolar disorder (HCC)     Carpal tunnel syndrome     Chronic pain     lower back    Depression     Disease of thyroid gland     Dyslipidemia     Fibromyalgia     Hypothyroidism     Irritable bowel     Kidney stones     Kidney stones 2019    Migraine     Obesity (BMI 30.0-34.9)     Psychiatric disorder     depression/anxiety    Suicide attempt (HCC)     as teen    Vitamin D deficiency      Past Surgical History:   Procedure Laterality Date    BUNIONECTOMY       SECTION      CHOLECYSTECTOMY      PARTIAL HYSTERECTOMY      portion of  uterus still present    TONSILLECTOMY      TUBAL LIGATION       Social History   Social History     Substance and Sexual Activity   Alcohol Use No     Social History     Substance and Sexual Activity   Drug Use Yes    Types: Marijuana    Comment: gummy on occasion for pain     Social History     Tobacco Use   Smoking Status Never    Passive exposure:  Past   Smokeless Tobacco Never     Family History:   Family History   Problem Relation Age of Onset    Hypertension Mother     Diabetes Mother     Hypothyroidism Mother     Stroke Mother     Bipolar disorder Mother     Anxiety disorder Mother     Arthritis Mother     Depression Mother     Mental illness Mother     Cancer Father         pancreatic     Hypothyroidism Sister     Hypertension Brother     Heart disease Brother     Cancer Maternal Uncle         lung    Cancer Paternal Aunt         breast    Breast cancer Paternal Aunt     Cancer Paternal Uncle         testicular     Cancer Paternal Grandmother         breast    Dementia Paternal Grandmother     Breast cancer Paternal Grandmother     Cancer Cousin         brain    Bipolar disorder Daughter        Meds/Allergies   Current Outpatient Medications   Medication Sig Dispense Refill    Blood Glucose Monitoring Suppl (OneTouch Verio Reflect) w/Device KIT Check blood sugars once daily. Please substitute with appropriate alternative as covered by patient's insurance. Dx: E11.65 1 kit 0    butalbital-acetaminophen-caffeine (FIORICET,ESGIC) -40 mg per tablet Take 1 tablet by mouth every 6 (six) hours as needed for headaches 30 tablet 0    celecoxib (CELEBREX) 200 mg capsule Take 1 capsule (200 mg total) by mouth daily 30 capsule 2    clonazePAM (KlonoPIN) 1 mg tablet TAKE 1 TABLET (1 MG TOTAL) BY MOUTH DAILY AS NEEDED FOR ANXIETY 30 tablet 0    Diclofenac Sodium (VOLTAREN) 1 % Apply 2 g topically 4 (four) times a day 50 g 0    dicyclomine (BENTYL) 10 mg capsule Take 1 capsule (10 mg total) by mouth 3 (three) times a day as needed (abd cramping) 30 capsule 0    fezolinetant (Veozah) tablet Take 1 tablet (45 mg total) by mouth daily 30 tablet 2    fremanezumab-vfrm (Ajovy) 225 MG/1.5ML auto-injector Inject 225 mg under the skin every 30 (thirty) days      glucose blood (OneTouch Verio) test strip Check blood sugars once daily. Please substitute with appropriate  alternative as covered by patient's insurance. Dx: E11.65 100 each 3    ketoconazole (NIZORAL) 2 % cream       levocetirizine (XYZAL) 5 MG tablet Take 1 tablet (5 mg total) by mouth every evening 30 tablet 1    meclizine (ANTIVERT) 25 mg tablet TAKE 1 TABLET (25 MG TOTAL) BY MOUTH EVERY EIGHT (EIGHT) HOURS AS NEEDED FOR DIZZINESS 30 tablet 0    naratriptan (AMERGE) 2.5 MG tablet       ondansetron (ZOFRAN-ODT) 4 mg disintegrating tablet TAKE 1 TABLET (4 MG TOTAL) BY MOUTH EVERY EIGHT (EIGHT) HOURS AS NEEDED FOR NAUSEA 20 tablet 0    OneTouch Delica Lancets 33G MISC Check blood sugars once daily. Please substitute with appropriate alternative as covered by patient's insurance. Dx: E11.65 100 each 3    semaglutide, 2 mg/dose, (Ozempic) 8 mg/ mL injection pen Inject 0.75 mL (2 mg total) under the skin every 7 days 9 mL 1    thyroid (ARMOUR) 90 MG tablet Take 1.5 tablets (135 mg total) by mouth daily 135 tablet 1    tiZANidine (ZANAFLEX) 4 mg tablet       Topiramate  MG CP24 Take 100 mg by mouth 2 (two) times a day      Ubrelvy 100 MG tablet       Ventolin  (90 Base) MCG/ACT inhaler INHALE TWO PUFFS EVERY 4 (FOUR) HOURS AS NEEDED FOR WHEEZING OR SHORTNESS OF BREATH 18 g 1    XIFAXAN 550 MG tablet       Xiidra 5 % op solution ADMINISTER 1 DROP TO BOTH EYES TWO (TWO) TIMES A DAY 60 mL 0    albuterol (ProAir HFA) 90 mcg/act inhaler Inhale 2 puffs every 6 (six) hours as needed for wheezing or shortness of breath (Patient not taking: Reported on 4/30/2024) 8.5 g 0    [place] Thyroid 60 MG tablet TAKE ONE AND 1/2 TABS DAILY (Patient not taking: Reported on 5/28/2024) 135 tablet 1    azelastine (ASTELIN) 0.1 % nasal spray 1 SPRAY INTO EACH NOSTRIL TWO (TWO) TIMES A DAY USE IN EACH NOSTRIL AS DIRECTED (Patient not taking: Reported on 5/28/2024) 30 mL 1    Fluticasone Furoate (Arnuity Ellipta) 50 MCG/ACT AEPB Inhale 1 puff in the morning Rinse mouth after use. (Patient not taking: Reported on 5/28/2024) 30 blister 0  "   metoclopramide (REGLAN) 5 mg tablet TAKE 1 TABLET (5 MG TOTAL) BY MOUTH 4 (FOUR) TIMES A DAY (Patient not taking: Reported on 5/28/2024) 30 tablet 0    montelukast (SINGULAIR) 10 mg tablet Take 1 tablet (10 mg total) by mouth daily at bedtime (Patient not taking: Reported on 5/28/2024) 30 tablet 5    omeprazole (PriLOSEC) 40 MG capsule TAKE 1 CAPSULE (40 MG TOTAL) BY MOUTH DAILY (Patient not taking: Reported on 5/28/2024) 90 capsule 0    Ozempic, 1 MG/DOSE, 4 MG/3ML injection pen INJECT 0.75 ML (1 MG TOTAL) UNDER THE SKIN ONCE A WEEK (Patient not taking: Reported on 5/28/2024) 3 mL 0    Respiratory Therapy Supplies (NEBULIZER) DONA by Does not apply route every 4 (four) hours while awake 90 each 1    Vraylar 6 MG capsule TAKE 1 CAPSULE (6 MG TOTAL) BY MOUTH DAILY (Patient not taking: Reported on 5/28/2024) 30 capsule 0     Current Facility-Administered Medications   Medication Dose Route Frequency Provider Last Rate Last Admin    cyanocobalamin injection 1,000 mcg  1,000 mcg Intramuscular Q30 Days ERICKA George   1,000 mcg at 07/27/23 1025     Allergies   Allergen Reactions    Doxycycline Rash    Erythromycin Rash    Other Edema and Wheezing     jalapeno    Augmentin [Amoxicillin-Pot Clavulanate] GI Intolerance    Latex Rash    Duloxetine      Other reaction(s): Nausea, Loopy    Eletriptan      Pain in lower jaw with pressure in throat    Emgality [Galcanezumab-Gnlm]     Galcanezumab      rash    Lidocaine Other (See Comments)     Headache    Methocarbamol Other (See Comments)     Dizzyness       Objective   Vitals: Blood pressure 112/76, height 5' 4\" (1.626 m), weight 80.6 kg (177 lb 12.8 oz), not currently breastfeeding.    Physical Exam  Constitutional:       Appearance: She is well-developed.   HENT:      Head: Normocephalic and atraumatic.      Nose: Nose normal.   Eyes:      Extraocular Movements: EOM normal.      Pupils: Pupils are equal, round, and reactive to light.   Neck:      Thyroid: No " "thyromegaly.      Vascular: No JVD.   Cardiovascular:      Rate and Rhythm: Normal rate and regular rhythm.      Heart sounds: Normal heart sounds. No murmur heard.     No friction rub. No gallop.   Pulmonary:      Effort: Pulmonary effort is normal. No respiratory distress.      Breath sounds: Normal breath sounds. No stridor. No wheezing or rales.   Chest:      Chest wall: No tenderness.   Abdominal:      General: Bowel sounds are normal. There is no distension.      Palpations: Abdomen is soft. There is no mass.      Tenderness: There is no abdominal tenderness. There is no guarding.   Musculoskeletal:         General: No deformity or edema. Normal range of motion.      Cervical back: Normal range of motion.   Skin:     General: Skin is warm.   Neurological:      Mental Status: She is alert and oriented to person, place, and time.         The history was obtained from the review of the chart, patient.    Lab Results:   Lab Results   Component Value Date/Time    TSH 3RD GENERATON 9.701 (H) 02/12/2024 11:52 AM    TSH 3RD GENERATON 7.702 (H) 01/11/2024 11:29 AM    TSH 3RD GENERATON 3.031 07/24/2023 10:27 AM    Free T4 0.52 (L) 02/12/2024 11:52 AM    Free T4 0.67 01/11/2024 11:29 AM       Imaging Studies:       I have personally reviewed pertinent reports.      Portions of the record may have been created with voice recognition software. Occasional wrong word or \"sound a like\" substitutions may have occurred due to the inherent limitations of voice recognition software. Read the chart carefully and recognize, using context, where substitutions have occurred.    "

## 2024-05-28 NOTE — ASSESSMENT & PLAN NOTE
We reviewed differential diagnosis, endocrinopathies which causing fatigue, hypothyroidism if remains uncontrolled will play a role, she is agreeable to switch to Tirosint,   However other causes including insomnia, mood changes/depression, anxiety, should be considered.

## 2024-05-29 ENCOUNTER — APPOINTMENT (OUTPATIENT)
Dept: LAB | Facility: CLINIC | Age: 54
End: 2024-05-29
Payer: COMMERCIAL

## 2024-05-29 LAB
T3 SERPL-MCNC: 1.4 NG/ML
T4 FREE SERPL-MCNC: 0.41 NG/DL (ref 0.61–1.12)
TSH SERPL DL<=0.05 MIU/L-ACNC: 6.86 UIU/ML (ref 0.45–4.5)

## 2024-05-29 PROCEDURE — 84443 ASSAY THYROID STIM HORMONE: CPT | Performed by: STUDENT IN AN ORGANIZED HEALTH CARE EDUCATION/TRAINING PROGRAM

## 2024-05-29 PROCEDURE — 36415 COLL VENOUS BLD VENIPUNCTURE: CPT | Performed by: STUDENT IN AN ORGANIZED HEALTH CARE EDUCATION/TRAINING PROGRAM

## 2024-05-29 PROCEDURE — 84439 ASSAY OF FREE THYROXINE: CPT | Performed by: STUDENT IN AN ORGANIZED HEALTH CARE EDUCATION/TRAINING PROGRAM

## 2024-05-29 PROCEDURE — 86376 MICROSOMAL ANTIBODY EACH: CPT | Performed by: STUDENT IN AN ORGANIZED HEALTH CARE EDUCATION/TRAINING PROGRAM

## 2024-05-29 PROCEDURE — 84480 ASSAY TRIIODOTHYRONINE (T3): CPT | Performed by: STUDENT IN AN ORGANIZED HEALTH CARE EDUCATION/TRAINING PROGRAM

## 2024-05-29 PROCEDURE — 86800 THYROGLOBULIN ANTIBODY: CPT | Performed by: STUDENT IN AN ORGANIZED HEALTH CARE EDUCATION/TRAINING PROGRAM

## 2024-05-30 ENCOUNTER — TELEPHONE (OUTPATIENT)
Age: 54
End: 2024-05-30

## 2024-05-30 DIAGNOSIS — E03.8 HYPOTHYROIDISM DUE TO HASHIMOTO'S THYROIDITIS: Primary | ICD-10-CM

## 2024-05-30 DIAGNOSIS — E06.3 HYPOTHYROIDISM DUE TO HASHIMOTO'S THYROIDITIS: Primary | ICD-10-CM

## 2024-05-30 LAB
THYROGLOB AB SERPL-ACNC: 4.4 IU/ML (ref 0–0.9)
THYROPEROXIDASE AB SERPL-ACNC: 65 IU/ML (ref 0–34)

## 2024-05-30 RX ORDER — LEVOTHYROXINE SODIUM 13 UG/1
150 CAPSULE ORAL
Qty: 90 CAPSULE | Refills: 0 | Status: SHIPPED | OUTPATIENT
Start: 2024-05-30 | End: 2024-06-03

## 2024-05-30 NOTE — TELEPHONE ENCOUNTER
Patient calling back. She states she is willing to try Tirosint if that can be sent to [facility] on [address] [facility].     She also wanted to ask if the  Had a chance to review her thyroidglobulin results. Please advise

## 2024-05-30 NOTE — TELEPHONE ENCOUNTER
Patient calling in regards to her lab results that she saw today. She is concerned with the high numbers and would like to discuss with Dr. Rose ARIAS.

## 2024-05-31 NOTE — TELEPHONE ENCOUNTER
Patient called back- she needs her Tirosint but they are requiring a PA. She said she cannot wait the potential 7-21 days for her medication, what else can she do?

## 2024-06-03 ENCOUNTER — TELEPHONE (OUTPATIENT)
Age: 54
End: 2024-06-03

## 2024-06-03 DIAGNOSIS — E03.9 ACQUIRED HYPOTHYROIDISM: Primary | ICD-10-CM

## 2024-06-03 RX ORDER — LEVOTHYROXINE SODIUM 150 MCG
150 TABLET ORAL DAILY
Qty: 90 TABLET | Refills: 0 | Status: SHIPPED | OUTPATIENT
Start: 2024-06-03 | End: 2024-06-04

## 2024-06-03 NOTE — TELEPHONE ENCOUNTER
Spoke with patient - relayed Dr. Rose's message.  Also, a Denial for the rx Tirosint was scanned into patients chart.    I told patient that the prior auth was denied. and she advise me that she called her insurance company and explained what was going on. The insurance company pushed the rx request through.    Patient picked up prescription and has started taking.

## 2024-06-03 NOTE — TELEPHONE ENCOUNTER
----- Message from Jeaneth Rose MD sent at 5/31/2024  2:45 PM EDT -----  Anti-TPO is positive also in favor of Hashimoto's thyroiditis, as we discussed yesterday over the phone with her, this will not change our management, we need to continue thyroid hormone replacement therapy, we ordered Tirosint, hopefully this will be approved by insurance,

## 2024-06-04 ENCOUNTER — TELEPHONE (OUTPATIENT)
Age: 54
End: 2024-06-04

## 2024-06-04 ENCOUNTER — TELEMEDICINE (OUTPATIENT)
Dept: PSYCHIATRY | Facility: CLINIC | Age: 54
End: 2024-06-04
Payer: COMMERCIAL

## 2024-06-04 DIAGNOSIS — J45.40 MODERATE PERSISTENT ASTHMA WITHOUT COMPLICATION: Primary | ICD-10-CM

## 2024-06-04 DIAGNOSIS — F41.1 GAD (GENERALIZED ANXIETY DISORDER): Primary | ICD-10-CM

## 2024-06-04 DIAGNOSIS — E03.8 OTHER SPECIFIED HYPOTHYROIDISM: Primary | ICD-10-CM

## 2024-06-04 DIAGNOSIS — F31.81 BIPOLAR 2 DISORDER (HCC): ICD-10-CM

## 2024-06-04 DIAGNOSIS — R20.0 ANESTHESIA OF SKIN: ICD-10-CM

## 2024-06-04 DIAGNOSIS — R53.83 OTHER FATIGUE: Primary | ICD-10-CM

## 2024-06-04 PROCEDURE — 90837 PSYTX W PT 60 MINUTES: CPT

## 2024-06-04 RX ORDER — LEVOTHYROXINE SODIUM 13 UG/1
150 CAPSULE ORAL
Start: 2024-06-04 | End: 2024-09-02

## 2024-06-04 NOTE — TELEPHONE ENCOUNTER
PA for TIROSINT 150MCG not required     ReasonInformation regarding your request  The patient currently has access to the requested medication and a Prior Authorization is not needed for the patient/medication.              Message sent to provider pool yes

## 2024-06-04 NOTE — BH TREATMENT PLAN
"Outpatient Behavioral Health Psychotherapy Treatment Plan     Belem Linares  1970       Date of Initial Psychotherapy Assessment: 6/10/22   Date of Current Treatment Plan: 06/04/24  Treatment Plan Target Date: 12/01/24  Treatment Plan Expiration Date: 12/01/24     Diagnosis:   1. ZAINA (generalized anxiety disorder)          2. Bipolar 2 disorder (HCC)                Area(s) of Need: coping skills, stress management, challenging maladaptive behaviors and thinking patterns, emotional support distress tolerance skills and give her \"advice and direction objectively\"      Long Term Goal 1: Belem states are to \"have someone she feels that can help her reframe perspectives and alternatives to handling her own feelings and resulting actions\". (continued goal)      Stage of Change: Preparation     Target Date for completion: 12/01/24             Anticipated therapeutic modalities: CBT, behavior modification, insight oriented therapy, psychoeducation, mindfulness, problem solving skills, communication skills, stress management skills, assertiveness skills, emotional needs meeting.              People identified to complete this goal: Belem and this clinician                        Objective 1. Belem will learn and implement calming skills to reduce overall anxiety and manage anxiety, such has mindfulness, progressive muscle relaxation,                                        mindful breathing, cognitive reframing, awareness of physical sensations from emotions (objective continued).                 Objective 2. Belem will learn and implement problem-solving and decision-making skills (defining a problem, generating possible solutions, evaluating                                    pros and cons, selecting and implementing a plan, accepting or revising the plan) (objective continued)       I am currently under the care of a [facility] psychiatric provider: yes     My [facility] psychiatric provider is: Es" LENA Rollins     I am currently taking psychiatric medications: Yes, as prescribed        I feel that I will be ready for discharge from mental health care when I reach the following: to be reevaluated prior to target date  For children and adults who have a legal guardian:          Has there been any change to custody orders and/or guardianship status? NA. If yes, attach updated documentation.     I have created my Crisis Plan and have been offered a copy of this plan     Behavioral Health Treatment Plan [facility]: Diagnosis and Treatment Plan explained to Belem Linares acknowledges an understanding of their diagnosis. Belem Linares agrees to this treatment plan.     I have been offered a copy of this Treatment Plan. yes     Belem Linares, 1970, actively participated in the review and update of this treatment plan during a virtual session, using the [employer] platform.   Belem Linares  provided verbal consent on 6/4/2024 at 3:45 PM. The treatment plan was transcribed into the Electronic Health Record at a later time.

## 2024-06-04 NOTE — TELEPHONE ENCOUNTER
Patient called to ask Monica to put in an order for lab work on a B-12 panel because she is always tired and also asked for a referral to be sent to [facility], their phone number is [phone] and their fax number is [phone]. Please call patient back when this is in, thank you.

## 2024-06-04 NOTE — PSYCH
Virtual Regular Visit    Verification of patient location:    Patient is located at Home in the following state in which I hold an active license [place]      Assessment/Plan:    Problem List Items Addressed This Visit       ZAINA (generalized anxiety disorder) - Primary    Bipolar 2 disorder (HCC)       Goals addressed in session: Goal 1          Reason for visit is No chief complaint on file.       Encounter provider Maisha Bonds LCSW      Recent Visits  No visits were found meeting these conditions.  Showing recent visits within past 7 days and meeting all other requirements  Future Appointments  No visits were found meeting these conditions.  Showing future appointments within next 150 days and meeting all other requirements       The patient was identified by name and date of birth. Belem Linares was informed that this is a telemedicine visit and that the visit is being conducted throughthe [employer] platform. She agrees to proceed..  My office door was closed. No one else was in the room.  She acknowledged consent and understanding of privacy and security of the video platform. The patient has agreed to participate and understands they can discontinue the visit at any time.    Patient is aware this is a billable service.     Subjective  Belem Linares is a 53 y.o. female.      HPI     Past Medical History:   Diagnosis Date    Anxiety     Anxiety     Asthma     Bipolar disorder (HCC)     Carpal tunnel syndrome     Chronic pain     lower back    Depression     Disease of thyroid gland     Dyslipidemia     Fibromyalgia     Hypothyroidism     Irritable bowel     Kidney stones     Kidney stones 2019    Migraine     Obesity (BMI 30.0-34.9)     Psychiatric disorder     depression/anxiety    Suicide attempt (HCC)     as teen    Vitamin D deficiency        Past Surgical History:   Procedure Laterality Date    BUNIONECTOMY       SECTION      CHOLECYSTECTOMY      PARTIAL HYSTERECTOMY      portion  of  uterus still present    TONSILLECTOMY      TUBAL LIGATION         Current Outpatient Medications   Medication Sig Dispense Refill    albuterol (ProAir HFA) 90 mcg/act inhaler Inhale 2 puffs every 6 (six) hours as needed for wheezing or shortness of breath (Patient not taking: Reported on 4/30/2024) 8.5 g 0    azelastine (ASTELIN) 0.1 % nasal spray 1 SPRAY INTO EACH NOSTRIL TWO (TWO) TIMES A DAY USE IN EACH NOSTRIL AS DIRECTED (Patient not taking: Reported on 5/28/2024) 30 mL 1    Blood Glucose Monitoring Suppl (OneTouch Verio Reflect) w/Device KIT Check blood sugars once daily. Please substitute with appropriate alternative as covered by patient's insurance. Dx: E11.65 1 kit 0    butalbital-acetaminophen-caffeine (FIORICET,ESGIC) -40 mg per tablet Take 1 tablet by mouth every 6 (six) hours as needed for headaches 30 tablet 0    celecoxib (CELEBREX) 200 mg capsule Take 1 capsule (200 mg total) by mouth daily 30 capsule 2    clonazePAM (KlonoPIN) 1 mg tablet TAKE 1 TABLET (1 MG TOTAL) BY MOUTH DAILY AS NEEDED FOR ANXIETY 30 tablet 0    Diclofenac Sodium (VOLTAREN) 1 % Apply 2 g topically 4 (four) times a day 50 g 0    dicyclomine (BENTYL) 10 mg capsule Take 1 capsule (10 mg total) by mouth 3 (three) times a day as needed (abd cramping) 30 capsule 0    fezolinetant (Veozah) tablet Take 1 tablet (45 mg total) by mouth daily 30 tablet 2    Fluticasone Furoate (Arnuity Ellipta) 50 MCG/ACT AEPB Inhale 1 puff in the morning Rinse mouth after use. (Patient not taking: Reported on 5/28/2024) 30 blister 0    fremanezumab-vfrm (Ajovy) 225 MG/1.5ML auto-injector Inject 225 mg under the skin every 30 (thirty) days      glucose blood (OneTouch Verio) test strip Check blood sugars once daily. Please substitute with appropriate alternative as covered by patient's insurance. Dx: E11.65 100 each 3    ketoconazole (NIZORAL) 2 % cream       levocetirizine (XYZAL) 5 MG tablet Take 1 tablet (5 mg total) by mouth every evening 30  tablet 1    meclizine (ANTIVERT) 25 mg tablet TAKE 1 TABLET (25 MG TOTAL) BY MOUTH EVERY EIGHT (EIGHT) HOURS AS NEEDED FOR DIZZINESS 30 tablet 0    metoclopramide (REGLAN) 5 mg tablet TAKE 1 TABLET (5 MG TOTAL) BY MOUTH 4 (FOUR) TIMES A DAY (Patient not taking: Reported on 5/28/2024) 30 tablet 0    montelukast (SINGULAIR) 10 mg tablet Take 1 tablet (10 mg total) by mouth daily at bedtime (Patient not taking: Reported on 5/28/2024) 30 tablet 5    naratriptan (AMERGE) 2.5 MG tablet       omeprazole (PriLOSEC) 40 MG capsule TAKE 1 CAPSULE (40 MG TOTAL) BY MOUTH DAILY (Patient not taking: Reported on 5/28/2024) 90 capsule 0    ondansetron (ZOFRAN-ODT) 4 mg disintegrating tablet TAKE 1 TABLET (4 MG TOTAL) BY MOUTH EVERY EIGHT (EIGHT) HOURS AS NEEDED FOR NAUSEA 20 tablet 0    OneTouch Delica Lancets 33G MISC Check blood sugars once daily. Please substitute with appropriate alternative as covered by patient's insurance. Dx: E11.65 100 each 3    Ozempic, 1 MG/DOSE, 4 MG/3ML injection pen INJECT 0.75 ML (1 MG TOTAL) UNDER THE SKIN ONCE A WEEK (Patient not taking: Reported on 5/28/2024) 3 mL 0    Respiratory Therapy Supplies (NEBULIZER) DONA by Does not apply route every 4 (four) hours while awake 90 each 1    semaglutide, 2 mg/dose, (Ozempic) 8 mg/ mL injection pen Inject 0.75 mL (2 mg total) under the skin every 7 days 9 mL 1    Tirosint 150 MCG CAPS Take 1 capsule (150 mcg total) by mouth daily before breakfast      tiZANidine (ZANAFLEX) 4 mg tablet       Topiramate  MG CP24 Take 100 mg by mouth 2 (two) times a day      Ubrelvy 100 MG tablet       Ventolin  (90 Base) MCG/ACT inhaler INHALE TWO PUFFS EVERY 4 (FOUR) HOURS AS NEEDED FOR WHEEZING OR SHORTNESS OF BREATH 18 g 1    Vraylar 6 MG capsule TAKE 1 CAPSULE (6 MG TOTAL) BY MOUTH DAILY (Patient not taking: Reported on 5/28/2024) 30 capsule 0    XIFAXAN 550 MG tablet       Xiidra 5 % op solution ADMINISTER 1 DROP TO BOTH EYES TWO (TWO) TIMES A DAY 60 mL 0  "    Current Facility-Administered Medications   Medication Dose Route Frequency Provider Last Rate Last Admin    cyanocobalamin injection 1,000 mcg  1,000 mcg Intramuscular Q30 Days ERICKA George   1,000 mcg at 07/27/23 1025        Allergies   Allergen Reactions    Doxycycline Rash    Erythromycin Rash    Other Edema and Wheezing     jalapeno    Augmentin [Amoxicillin-Pot Clavulanate] GI Intolerance    Latex Rash    Duloxetine      Other reaction(s): Nausea, Loopy    Eletriptan      Pain in lower jaw with pressure in throat    Emgality [Galcanezumab-Gnlm]     Galcanezumab      rash    Lidocaine Other (See Comments)     Headache    Methocarbamol Other (See Comments)     Dizzyness       Review of Systems    Video Exam    There were no vitals filed for this visit.    Physical Exam     Behavioral Health Psychotherapy Progress Note    Psychotherapy Provided: Individual Psychotherapy      Diagnosis ICD-10-CM Associated Orders   1. ZAINA (generalized anxiety disorder)  F41.1       2. Bipolar 2 disorder (HCC)  F31.81            Goals addressed in session: Goal 1     DATA: Met with Belem CALLIE Vijay for a scheduled individual session.  Topics discussed included emotional wellness, coping skills, and life updates .  Belem is feeling \"tired\". She states her thyroid has been off and a test came back positive for Hasimoto's. She's working with her doctor to try a different medication. Belem apologized for missing our last session, stated she had literally fallen asleep and was knocked out. She also stated that she had to cut back on her sessions due to the cost. She's incurring debt because she can't keep up with copays so she decided to decrease from weekly to monthly sessions. She's applied for a care program assistance but she's not sure she'll get approved. She admitted it's been overwhelming for her the last several months and she felt it was time to tell this clinician what has been going on. Belem stated she may " "have to go with another network if she can get assistance there. This clinician told her this was understandable and just asked to keep the communication open. Belem shows evidence of utilizing effective communication skills, engaging in problem solving, and maintaining emotional composure to manage mental health symptoms.  During this session, this clinical used the following therapeutic modalities supportive psychotherapy, client-centered therapy, and CBT techniques.    Substance Abuse was not addressed during this session. If the client is diagnosed with a co-occurring substance use disorder, please indicate any changes in the frequency or amount of use: N/A. Stage of change for addressing substance use diagnoses: No substance use/Not applicable    ASSESSMENT:  Belem presents with a Anxious and Dysthymic mood.  her affect is normal range and intensity, appropriate, which is congruent, with her  mood and the content of the session. Belem was oriented x3. She was focused and engaged. Belem exhibits good therapeutic rapport with this clinician. The client has made progress on their goals.    Belem Linares presents with a none risk of suicide, none risk of self-harm, and none risk of harm to others.    For any risk assessment that surpasses a \"low\" rating, a safety plan must be developed.    A safety plan was indicated: no  If yes, describe in detail: N/A    PLAN: Belem will return in 1 month for the next scheduled session. At the next session, the therapist will use supportive psychotherapy and client-centered therapy to address depression and anxiety.    Behavioral Health Treatment Plan and Discharge Planning: Belem Linares is aware of and agrees to continue to work on their treatment plan. They have identified and are working toward their discharge goals. yes    Visit start and stop times:    06/04/24           "

## 2024-06-05 ENCOUNTER — APPOINTMENT (OUTPATIENT)
Dept: LAB | Facility: CLINIC | Age: 54
End: 2024-06-05
Payer: COMMERCIAL

## 2024-06-05 DIAGNOSIS — R20.0 ANESTHESIA OF SKIN: ICD-10-CM

## 2024-06-05 DIAGNOSIS — R53.83 OTHER FATIGUE: ICD-10-CM

## 2024-06-05 LAB — VIT B12 SERPL-MCNC: 263 PG/ML (ref 180–914)

## 2024-06-05 PROCEDURE — 36415 COLL VENOUS BLD VENIPUNCTURE: CPT

## 2024-06-05 PROCEDURE — 82607 VITAMIN B-12: CPT

## 2024-06-06 ENCOUNTER — TELEPHONE (OUTPATIENT)
Age: 54
End: 2024-06-06

## 2024-06-06 DIAGNOSIS — R79.89 LOW VITAMIN B12 LEVEL: Primary | ICD-10-CM

## 2024-06-06 RX ORDER — SYRINGE W-NEEDLE,DISPOSAB,3 ML 25GX5/8"
SYRINGE, EMPTY DISPOSABLE MISCELLANEOUS WEEKLY
Qty: 50 EACH | Refills: 0 | Status: SHIPPED | OUTPATIENT
Start: 2024-06-06

## 2024-06-06 RX ORDER — CYANOCOBALAMIN 1000 UG/ML
1000 INJECTION, SOLUTION INTRAMUSCULAR; SUBCUTANEOUS WEEKLY
Qty: 4 ML | Refills: 1 | Status: SHIPPED | OUTPATIENT
Start: 2024-06-06

## 2024-06-06 NOTE — TELEPHONE ENCOUNTER
Belem is aware of message from kristal 6/6/24 she said yes to having b12 injections to do herself at home can the office call her at [phone] when sent

## 2024-06-06 NOTE — TELEPHONE ENCOUNTER
Pt called back asking if medication was sent to pharmacy yet for b12 injections.  Pt informed someone will call her when prescription has been sent

## 2024-06-10 ENCOUNTER — TELEMEDICINE (OUTPATIENT)
Dept: PSYCHIATRY | Facility: CLINIC | Age: 54
End: 2024-06-10
Payer: COMMERCIAL

## 2024-06-10 DIAGNOSIS — F31.81 BIPOLAR 2 DISORDER (HCC): Primary | ICD-10-CM

## 2024-06-10 DIAGNOSIS — F41.1 GAD (GENERALIZED ANXIETY DISORDER): ICD-10-CM

## 2024-06-10 PROCEDURE — 90837 PSYTX W PT 60 MINUTES: CPT
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
## 2024-06-12 ENCOUNTER — APPOINTMENT (EMERGENCY)
Dept: CT IMAGING | Facility: HOSPITAL | Age: 54
End: 2024-06-12
Payer: COMMERCIAL

## 2024-06-12 ENCOUNTER — HOSPITAL ENCOUNTER (EMERGENCY)
Facility: HOSPITAL | Age: 54
Discharge: HOME | End: 2024-06-12
Attending: EMERGENCY MEDICINE
Payer: COMMERCIAL

## 2024-06-12 ENCOUNTER — TELEPHONE (OUTPATIENT)
Dept: PSYCHIATRY | Facility: CLINIC | Age: 54
End: 2024-06-12

## 2024-06-12 VITALS
OXYGEN SATURATION: 97 % | BODY MASS INDEX: 30.27 KG/M2 | SYSTOLIC BLOOD PRESSURE: 124 MMHG | RESPIRATION RATE: 20 BRPM | DIASTOLIC BLOOD PRESSURE: 72 MMHG | HEART RATE: 72 BPM | WEIGHT: 176.37 LBS | TEMPERATURE: 98.6 F

## 2024-06-12 DIAGNOSIS — R42 VERTIGO: ICD-10-CM

## 2024-06-12 DIAGNOSIS — R10.9 ABDOMINAL PAIN: Primary | ICD-10-CM

## 2024-06-12 DIAGNOSIS — J06.9 UPPER RESPIRATORY TRACT INFECTION, UNSPECIFIED TYPE: ICD-10-CM

## 2024-06-12 DIAGNOSIS — G43.909 MIGRAINE: ICD-10-CM

## 2024-06-12 LAB
ALBUMIN SERPL BCP-MCNC: 4.8 G/DL (ref 3.5–5)
ALP SERPL-CCNC: 86 U/L (ref 34–104)
ALT SERPL W P-5'-P-CCNC: 17 U/L (ref 7–52)
ANION GAP SERPL CALCULATED.3IONS-SCNC: 5 MMOL/L (ref 4–13)
AST SERPL W P-5'-P-CCNC: 26 U/L (ref 13–39)
BASOPHILS # BLD AUTO: 0.01 THOUSANDS/ÂΜL (ref 0–0.1)
BASOPHILS NFR BLD AUTO: 0 % (ref 0–1)
BILIRUB SERPL-MCNC: 0.65 MG/DL (ref 0.2–1)
BILIRUB UR QL STRIP: NEGATIVE
BUN SERPL-MCNC: 13 MG/DL (ref 5–25)
CALCIUM SERPL-MCNC: 10 MG/DL (ref 8.4–10.2)
CHLORIDE SERPL-SCNC: 107 MMOL/L (ref 96–108)
CLARITY UR: CLEAR
CO2 SERPL-SCNC: 25 MMOL/L (ref 21–32)
COLOR UR: NORMAL
CREAT SERPL-MCNC: 0.81 MG/DL (ref 0.6–1.3)
EOSINOPHIL # BLD AUTO: 0.1 THOUSAND/ÂΜL (ref 0–0.61)
EOSINOPHIL NFR BLD AUTO: 2 % (ref 0–6)
ERYTHROCYTE [DISTWIDTH] IN BLOOD BY AUTOMATED COUNT: 12.5 % (ref 11.6–15.1)
EXT PREGNANCY TEST URINE: NEGATIVE
EXT. CONTROL: NORMAL
GFR SERPL CREATININE-BSD FRML MDRD: 83 ML/MIN/1.73SQ M
GLUCOSE SERPL-MCNC: 80 MG/DL (ref 65–140)
GLUCOSE UR STRIP-MCNC: NEGATIVE MG/DL
HCT VFR BLD AUTO: 42.3 % (ref 34.8–46.1)
HGB BLD-MCNC: 13.7 G/DL (ref 11.5–15.4)
HGB UR QL STRIP.AUTO: NEGATIVE
IMM GRANULOCYTES # BLD AUTO: 0 THOUSAND/UL (ref 0–0.2)
IMM GRANULOCYTES NFR BLD AUTO: 0 % (ref 0–2)
KETONES UR STRIP-MCNC: NEGATIVE MG/DL
LEUKOCYTE ESTERASE UR QL STRIP: NEGATIVE
LIPASE SERPL-CCNC: 21 U/L (ref 11–82)
LYMPHOCYTES # BLD AUTO: 1.93 THOUSANDS/ÂΜL (ref 0.6–4.47)
LYMPHOCYTES NFR BLD AUTO: 48 % (ref 14–44)
MCH RBC QN AUTO: 29.3 PG (ref 26.8–34.3)
MCHC RBC AUTO-ENTMCNC: 32.4 G/DL (ref 31.4–37.4)
MCV RBC AUTO: 90 FL (ref 82–98)
MONOCYTES # BLD AUTO: 0.26 THOUSAND/ÂΜL (ref 0.17–1.22)
MONOCYTES NFR BLD AUTO: 6 % (ref 4–12)
NEUTROPHILS # BLD AUTO: 1.8 THOUSANDS/ÂΜL (ref 1.85–7.62)
NEUTS SEG NFR BLD AUTO: 44 % (ref 43–75)
NITRITE UR QL STRIP: NEGATIVE
NRBC BLD AUTO-RTO: 0 /100 WBCS
PH UR STRIP.AUTO: 7.5 [PH]
PLATELET # BLD AUTO: 232 THOUSANDS/UL (ref 149–390)
PMV BLD AUTO: 9.9 FL (ref 8.9–12.7)
POTASSIUM SERPL-SCNC: 4.7 MMOL/L (ref 3.5–5.3)
PROT SERPL-MCNC: 8.1 G/DL (ref 6.4–8.4)
PROT UR STRIP-MCNC: NEGATIVE MG/DL
RBC # BLD AUTO: 4.68 MILLION/UL (ref 3.81–5.12)
SODIUM SERPL-SCNC: 137 MMOL/L (ref 135–147)
SP GR UR STRIP.AUTO: 1.02 (ref 1–1.03)
UROBILINOGEN UR STRIP-ACNC: <2 MG/DL
WBC # BLD AUTO: 4.1 THOUSAND/UL (ref 4.31–10.16)

## 2024-06-12 PROCEDURE — 96365 THER/PROPH/DIAG IV INF INIT: CPT

## 2024-06-12 PROCEDURE — 80053 COMPREHEN METABOLIC PANEL: CPT | Performed by: EMERGENCY MEDICINE

## 2024-06-12 PROCEDURE — 99284 EMERGENCY DEPT VISIT MOD MDM: CPT

## 2024-06-12 PROCEDURE — 96375 TX/PRO/DX INJ NEW DRUG ADDON: CPT

## 2024-06-12 PROCEDURE — 85025 COMPLETE CBC W/AUTO DIFF WBC: CPT | Performed by: EMERGENCY MEDICINE

## 2024-06-12 PROCEDURE — 81025 URINE PREGNANCY TEST: CPT | Performed by: EMERGENCY MEDICINE

## 2024-06-12 PROCEDURE — 74176 CT ABD & PELVIS W/O CONTRAST: CPT

## 2024-06-12 PROCEDURE — 81003 URINALYSIS AUTO W/O SCOPE: CPT | Performed by: EMERGENCY MEDICINE

## 2024-06-12 PROCEDURE — 83690 ASSAY OF LIPASE: CPT | Performed by: EMERGENCY MEDICINE

## 2024-06-12 PROCEDURE — 99284 EMERGENCY DEPT VISIT MOD MDM: CPT | Performed by: EMERGENCY MEDICINE

## 2024-06-12 PROCEDURE — 36415 COLL VENOUS BLD VENIPUNCTURE: CPT | Performed by: EMERGENCY MEDICINE

## 2024-06-12 RX ORDER — KETOROLAC TROMETHAMINE 30 MG/ML
15 INJECTION, SOLUTION INTRAMUSCULAR; INTRAVENOUS ONCE
Status: COMPLETED | OUTPATIENT
Start: 2024-06-12 | End: 2024-06-12

## 2024-06-12 RX ORDER — METOCLOPRAMIDE HYDROCHLORIDE 5 MG/ML
10 INJECTION INTRAMUSCULAR; INTRAVENOUS ONCE
Status: COMPLETED | OUTPATIENT
Start: 2024-06-12 | End: 2024-06-12

## 2024-06-12 RX ORDER — DIPHENHYDRAMINE HYDROCHLORIDE 50 MG/ML
12.5 INJECTION INTRAMUSCULAR; INTRAVENOUS ONCE
Status: DISCONTINUED | OUTPATIENT
Start: 2024-06-12 | End: 2024-06-12 | Stop reason: HOSPADM

## 2024-06-12 RX ORDER — ALBUTEROL SULFATE 90 UG/1
AEROSOL, METERED RESPIRATORY (INHALATION)
Qty: 18 G | Refills: 0 | Status: SHIPPED | OUTPATIENT
Start: 2024-06-12

## 2024-06-12 RX ORDER — MAGNESIUM SULFATE HEPTAHYDRATE 40 MG/ML
2 INJECTION, SOLUTION INTRAVENOUS ONCE
Status: COMPLETED | OUTPATIENT
Start: 2024-06-12 | End: 2024-06-12

## 2024-06-12 RX ADMIN — METOCLOPRAMIDE 10 MG: 5 INJECTION, SOLUTION INTRAMUSCULAR; INTRAVENOUS at 11:45

## 2024-06-12 RX ADMIN — MAGNESIUM SULFATE HEPTAHYDRATE 2 G: 40 INJECTION, SOLUTION INTRAVENOUS at 11:45

## 2024-06-12 RX ADMIN — SODIUM CHLORIDE 1000 ML: 0.9 INJECTION, SOLUTION INTRAVENOUS at 11:45

## 2024-06-12 RX ADMIN — KETOROLAC TROMETHAMINE 15 MG: 30 INJECTION, SOLUTION INTRAMUSCULAR; INTRAVENOUS at 11:45

## 2024-06-12 NOTE — ED NOTES
"Patient feeling \"anxious and claustrophobic\" following medication administration.  Declines Benadryl, reports she has previously tolerated Reglan without side effects.  Provider aware.    Patient requesting to stop IV meds and fluids so she can walk around.  Patient aware fluids and meds can go on IV pole with her but she prefers to be disconnected.  IV fluids/meds paused.     Salina Fontaine RN  06/12/24 [id number]    "

## 2024-06-12 NOTE — ED PROVIDER NOTES
History  Chief Complaint   Patient presents with    Abdominal Pain     Bilateral lower quad abd pain that began yesterday evening. +nausea      53-year-old female presents the emergency room with lower abdominal pain since yesterday evening.  Patient states that she has had a constant cramping pain in her lower abdomen that has been getting worse since this morning.  States that she has nausea but denies any vomiting.  Also reports a history of vertigo and has had room spinning dizziness since yesterday which is consistent with her prior vertigo. Symptoms worse with position changes and improve with lying down. States that she took meclizine an hour prior to arrival without any relief.  She also states that she has a history of migraines and feels like one is starting. She denies any vomiting, fever, cp, sob, urinary symtoms, or d/c. Ha a hx of IBS but denies any diarrhea or constipation. Has not tried anything for the symptoms. No other complaints.       History provided by:  Patient  Abdominal Pain  Pain location:  Suprapubic  Pain quality: cramping    Pain radiates to:  Does not radiate  Pain severity:  Moderate  Onset quality:  Sudden  Timing:  Constant  Progression:  Worsening  Chronicity:  New  Context: previous surgery    Context: not sick contacts    Relieved by:  None tried  Worsened by:  Nothing  Ineffective treatments:  None tried  Associated symptoms: nausea    Associated symptoms: no chest pain, no chills, no constipation, no cough, no diarrhea, no dysuria, no fever, no hematuria, no shortness of breath, no sore throat and no vomiting        Prior to Admission Medications   Prescriptions Last Dose Informant Patient Reported? Taking?   Blood Glucose Monitoring Suppl (OneTouch Verio Reflect) w/Device KIT  Self No No   Sig: Check blood sugars once daily. Please substitute with appropriate alternative as covered by patient's insurance. Dx: E11.65   Diclofenac Sodium (VOLTAREN) 1 %  Self No No   Sig: Apply 2  "g topically 4 (four) times a day   Fluticasone Furoate (Arnuity Ellipta) 50 MCG/ACT AEPB  Self No No   Sig: Inhale 1 puff in the morning Rinse mouth after use.   Patient not taking: Reported on 2024   OneTouch Delica Lancets 33G MISC  Self No No   Sig: Check blood sugars once daily. Please substitute with appropriate alternative as covered by patient's insurance. Dx: E11.65   Ozempic, 1 MG/DOSE, 4 MG/3ML injection pen  Self No No   Sig: INJECT 0.75 ML (1 MG TOTAL) UNDER THE SKIN ONCE A WEEK   Patient not taking: Reported on 2024   Respiratory Therapy Supplies (NEBULIZER) DONA  Self No No   Sig: by Does not apply route every 4 (four) hours while awake   Syringe/Needle, Disp, (SYRINGE 3CC/25GX1\") 25G X 1\" 3 ML MISC   No No   Sig: Use once a week   Tirosint 150 MCG CAPS   No No   Sig: Take 1 capsule (150 mcg total) by mouth daily before breakfast   Topiramate  MG CP24  Self Yes No   Sig: Take 100 mg by mouth 2 (two) times a day   Ubrelvy 100 MG tablet  Self Yes No   Ventolin  (90 Base) MCG/ACT inhaler  Self No No   Sig: INHALE TWO PUFFS EVERY 4 (FOUR) HOURS AS NEEDED FOR WHEEZING OR SHORTNESS OF BREATH   Vraylar 6 MG capsule  Self No No   Sig: TAKE 1 CAPSULE (6 MG TOTAL) BY MOUTH DAILY   Patient not taking: Reported on 2024   XIFAXAN 550 MG tablet  Self Yes No   Xiidra 5 % op solution  Self No No   Sig: ADMINISTER 1 DROP TO BOTH EYES TWO (TWO) TIMES A DAY   albuterol (ProAir HFA) 90 mcg/act inhaler  Self No No   Sig: Inhale 2 puffs every 6 (six) hours as needed for wheezing or shortness of breath   Patient not taking: Reported on 2024   azelastine (ASTELIN) 0.1 % nasal spray  Self No No   Si SPRAY INTO EACH NOSTRIL TWO (TWO) TIMES A DAY USE IN EACH NOSTRIL AS DIRECTED   Patient not taking: Reported on 2024   butalbital-acetaminophen-caffeine (FIORICET,ESGIC) -40 mg per tablet  Self No No   Sig: Take 1 tablet by mouth every 6 (six) hours as needed for headaches   celecoxib " (CELEBREX) 200 mg capsule  Self No No   Sig: Take 1 capsule (200 mg total) by mouth daily   clonazePAM (KlonoPIN) 1 mg tablet  Self No No   Sig: TAKE 1 TABLET (1 MG TOTAL) BY MOUTH DAILY AS NEEDED FOR ANXIETY   cyanocobalamin 1,000 mcg/mL   No No   Sig: Inject 1 mL (1,000 mcg total) into a muscle once a week   dicyclomine (BENTYL) 10 mg capsule  Self No No   Sig: Take 1 capsule (10 mg total) by mouth 3 (three) times a day as needed (abd cramping)   fezolinetant (Veozah) tablet  Self No No   Sig: Take 1 tablet (45 mg total) by mouth daily   fremanezumab-vfrm (Ajovy) 225 MG/1.5ML auto-injector  Self Yes No   Sig: Inject 225 mg under the skin every 30 (thirty) days   glucose blood (OneTouch Verio) test strip  Self No No   Sig: Check blood sugars once daily. Please substitute with appropriate alternative as covered by patient's insurance. Dx: E11.65   ketoconazole (NIZORAL) 2 % cream  Self Yes No   levocetirizine (XYZAL) 5 MG tablet  Self No No   Sig: Take 1 tablet (5 mg total) by mouth every evening   meclizine (ANTIVERT) 25 mg tablet  Self No No   Sig: TAKE 1 TABLET (25 MG TOTAL) BY MOUTH EVERY EIGHT (EIGHT) HOURS AS NEEDED FOR DIZZINESS   metoclopramide (REGLAN) 5 mg tablet  Self No No   Sig: TAKE 1 TABLET (5 MG TOTAL) BY MOUTH 4 (FOUR) TIMES A DAY   Patient not taking: Reported on 5/28/2024   montelukast (SINGULAIR) 10 mg tablet  Self No No   Sig: Take 1 tablet (10 mg total) by mouth daily at bedtime   Patient not taking: Reported on 5/28/2024   naratriptan (AMERGE) 2.5 MG tablet  Self Yes No   omeprazole (PriLOSEC) 40 MG capsule  Self No No   Sig: TAKE 1 CAPSULE (40 MG TOTAL) BY MOUTH DAILY   Patient not taking: Reported on 5/28/2024   ondansetron (ZOFRAN-ODT) 4 mg disintegrating tablet  Self No No   Sig: TAKE 1 TABLET (4 MG TOTAL) BY MOUTH EVERY EIGHT (EIGHT) HOURS AS NEEDED FOR NAUSEA   semaglutide, 2 mg/dose, (Ozempic) 8 mg/ mL injection pen  Self No No   Sig: Inject 0.75 mL (2 mg total) under the skin every 7  days   tiZANidine (ZANAFLEX) 4 mg tablet  Self Yes No      Facility-Administered Medications Last Administration Doses Remaining   cyanocobalamin injection 1,000 mcg 2023 10:25 AM           Past Medical History:   Diagnosis Date    Anxiety     Anxiety     Asthma     Bipolar disorder (HCC)     Carpal tunnel syndrome     Chronic pain     lower back    Depression     Disease of thyroid gland     Dyslipidemia     Fibromyalgia     Hypothyroidism     Irritable bowel     Kidney stones     Kidney stones 2019    Migraine     Obesity (BMI 30.0-34.9)     Psychiatric disorder     depression/anxiety    Suicide attempt (HCC)     as teen    Vitamin D deficiency        Past Surgical History:   Procedure Laterality Date    BUNIONECTOMY       SECTION      CHOLECYSTECTOMY      PARTIAL HYSTERECTOMY      portion of  uterus still present    TONSILLECTOMY      TUBAL LIGATION         Family History   Problem Relation Age of Onset    Hypertension Mother     Diabetes Mother     Hypothyroidism Mother     Stroke Mother     Bipolar disorder Mother     Anxiety disorder Mother     Arthritis Mother     Depression Mother     Mental illness Mother     Cancer Father         pancreatic     Hypothyroidism Sister     Hypertension Brother     Heart disease Brother     Cancer Maternal Uncle         lung    Cancer Paternal Aunt         breast    Breast cancer Paternal Aunt     Cancer Paternal Uncle         testicular     Cancer Paternal Grandmother         breast    Dementia Paternal Grandmother     Breast cancer Paternal Grandmother     Cancer Cousin         brain    Bipolar disorder Daughter      I have reviewed and agree with the history as documented.    E-Cigarette/Vaping    E-Cigarette Use Never User      E-Cigarette/Vaping Substances    Nicotine No     THC No     CBD No     Flavoring No     Other No     Unknown No      Social History     Tobacco Use    Smoking status: Never     Passive exposure: Past    Smokeless tobacco:  Never   Vaping Use    Vaping status: Never Used   Substance Use Topics    Alcohol use: No    Drug use: Yes     Types: Marijuana     Comment: gummy on occasion for pain       Review of Systems   Constitutional:  Negative for chills and fever.   HENT:  Negative for congestion, ear pain and sore throat.    Eyes:  Negative for pain and visual disturbance.   Respiratory:  Negative for cough, shortness of breath and wheezing.    Cardiovascular:  Negative for chest pain and leg swelling.   Gastrointestinal:  Positive for abdominal pain and nausea. Negative for constipation, diarrhea and vomiting.   Genitourinary:  Negative for dysuria, frequency, hematuria and urgency.   Musculoskeletal:  Negative for neck pain and neck stiffness.   Skin:  Negative for rash and wound.   Neurological:  Negative for weakness, numbness and headaches.   Psychiatric/Behavioral:  Negative for agitation and confusion.    All other systems reviewed and are negative.      Physical Exam  Physical Exam  Vitals and nursing note reviewed.   Constitutional:       Appearance: She is well-developed.   HENT:      Head: Normocephalic and atraumatic.   Eyes:      Pupils: Pupils are equal, round, and reactive to light.   Cardiovascular:      Rate and Rhythm: Normal rate and regular rhythm.   Pulmonary:      Effort: Pulmonary effort is normal.      Breath sounds: Normal breath sounds.   Abdominal:      General: Bowel sounds are normal.      Palpations: Abdomen is soft.      Tenderness: There is abdominal tenderness.   Musculoskeletal:         General: Normal range of motion.      Cervical back: Normal range of motion and neck supple.   Skin:     General: Skin is warm and dry.   Neurological:      General: No focal deficit present.      Mental Status: She is alert and oriented to person, place, and time.      Comments: No focal deficits. Nystagmus noted to the right.          Vital Signs  ED Triage Vitals [06/12/24 1112]   Temperature Pulse Respirations Blood  Pressure SpO2   98.6 °F (37 °C) 72 20 124/72 97 %      Temp Source Heart Rate Source Patient Position - Orthostatic VS BP Location FiO2 (%)   Temporal Monitor Sitting Left arm --      Pain Score       --           Vitals:    06/12/24 1112   BP: 124/72   Pulse: 72   Patient Position - Orthostatic VS: Sitting         Visual Acuity      ED Medications  Medications   sodium chloride 0.9 % bolus 1,000 mL (1,000 mL Intravenous New Bag 6/12/24 1145)   magnesium sulfate 2 g/50 mL IVPB (premix) 2 g (2 g Intravenous New Bag 6/12/24 1145)   diphenhydrAMINE (BENADRYL) injection 12.5 mg (has no administration in time range)   iohexol (OMNIPAQUE) 350 MG/ML injection (MULTI-DOSE) 100 mL (has no administration in time range)   ketorolac (TORADOL) injection 15 mg (15 mg Intravenous Given 6/12/24 1145)   metoclopramide (REGLAN) injection 10 mg (10 mg Intravenous Given 6/12/24 1145)       Diagnostic Studies  Results Reviewed       Procedure Component Value Units Date/Time    Lipase [[id number]]  (Normal) Collected: 06/12/24 1142    Lab Status: Final result Specimen: Blood from Arm, Right Updated: 06/12/24 1206     Lipase 21 u/L     Comprehensive metabolic panel [[id number]] Collected: 06/12/24 1142    Lab Status: Final result Specimen: Blood from Arm, Right Updated: 06/12/24 1206     Sodium 137 mmol/L      Potassium 4.7 mmol/L      Chloride 107 mmol/L      CO2 25 mmol/L      ANION GAP 5 mmol/L      BUN 13 mg/dL      Creatinine 0.81 mg/dL      Glucose 80 mg/dL      Calcium 10.0 mg/dL      AST 26 U/L      ALT 17 U/L      Alkaline Phosphatase 86 U/L      Total Protein 8.1 g/dL      Albumin 4.8 g/dL      Total Bilirubin 0.65 mg/dL      eGFR 83 ml/min/1.73sq m     Narrative:      National Kid[employer] Foundation guidelines for Chronic Kidney Disease (CKD):     Stage 1 with normal or high GFR (GFR > 90 mL/min/1.73 square meters)    Stage 2 Mild CKD (GFR = 60-89 mL/min/1.73 square meters)    Stage 3A Moderate CKD (GFR = 45-59 mL/min/1.73  square meters)    Stage 3B Moderate CKD (GFR = 30-44 mL/min/1.73 square meters)    Stage 4 Severe CKD (GFR = 15-29 mL/min/1.73 square meters)    Stage 5 End Stage CKD (GFR <15 mL/min/1.73 square meters)  Note: GFR calculation is accurate only with a steady state creatinine    POCT pregnancy, urine [[id number]]  (Normal) Resulted: 06/12/24 1158    Lab Status: Final result Specimen: Urine Updated: 06/12/24 1158     EXT Preg Test, Ur Negative     Control Valid    UA w Reflex to Microscopic w Reflex to Culture [[id number]] Collected: 06/12/24 1142    Lab Status: Final result Specimen: Urine, Other Updated: 06/12/24 1153     Color, UA Light Yellow     Clarity, UA Clear     Specific Gravity, UA 1.017     pH, UA 7.5     Leukocytes, UA Negative     Nitrite, UA Negative     Protein, UA Negative mg/dl      Glucose, UA Negative mg/dl      Ketones, UA Negative mg/dl      Urobilinogen, UA <2.0 mg/dl      Bilirubin, UA Negative     Occult Blood, UA Negative    CBC and differential [[id number]]  (Abnormal) Collected: 06/12/24 1142    Lab Status: Final result Specimen: Blood from Arm, Right Updated: 06/12/24 1149     WBC 4.10 Thousand/uL      RBC 4.68 Million/uL      Hemoglobin 13.7 g/dL      Hematocrit 42.3 %      MCV 90 fL      MCH 29.3 pg      MCHC 32.4 g/dL      RDW 12.5 %      MPV 9.9 fL      Platelets 232 Thousands/uL      nRBC 0 /100 WBCs      Segmented % 44 %      Immature Grans % 0 %      Lymphocytes % 48 %      Monocytes % 6 %      Eosinophils Relative 2 %      Basophils Relative 0 %      Absolute Neutrophils 1.80 Thousands/µL      Absolute Immature Grans 0.00 Thousand/uL      Absolute Lymphocytes 1.93 Thousands/µL      Absolute Monocytes 0.26 Thousand/µL      Eosinophils Absolute 0.10 Thousand/µL      Basophils Absolute 0.01 Thousands/µL                    CT abdomen pelvis wo contrast    (Results Pending)              Procedures  Procedures         ED Course  ED Course as of 06/12/24 1302   Wed Jun 12, 2024   1123 Lower  abd pain hx of vertigo and took meclizine - not new for her feels same. Cramping pain. Constant but worse since this morning. Nausea no vomiting. Last night dizzy- 2-3a. Hx of IBS. No diarrhea or constipation.    1245 Informed by nurse that patient is now anxious after Reglan, asking for her fluids to be disconnected so that she can walk around. Patient refused benadryl - states that she had reglan in the past without benadryl without any issues or reaction. I offered her benadryl, ativan, valium to help her symptoms from reglan but she refuses. She also refuses IV contrast.    1250 Informed by nursing that patient asked her IV out and walked out prior to CT results                                              Medical Decision Making  53-year-old female with abdominal pain and dizziness/headache consistent with her prior migraines and vertigo.  Will get labs, CT scan, UA, and give migraine cocktail.  I offered patient Valium since she already took meclizine prior to arrival but patient refused.  She also refused Benadryl but states that she has had Reglan by itself without any issues in the past.  Will give Toradol, Reglan, magnesium, fluids and reassess.      ED Course as of 06/12/24 1252  Wed Jun 12, 2024  1245 Informed by nurse that patient is now anxious after Reglan, asking for her fluids to be disconnected so that she can walk around. Patient refused benadryl - states that she had reglan in the past without benadryl without any issues or reaction. I offered her benadryl, ativan, valium to help her symptoms from reglan but she refuses. She also refuses IV contrast.   1250 Informed by nursing that patient asked her IV out and walked out prior to CT results         Amount and/or Complexity of Data Reviewed  Labs: ordered.  Radiology: ordered.    Risk  Prescription drug management.             Disposition  Final diagnoses:   Abdominal pain   Vertigo   Migraine     Time reflects when diagnosis was documented in  both MDM as applicable and the Disposition within this note       Time User Action Codes Description Comment    6/12/2024 12:51 PM Emily Lea [R10.9] Abdominal pain     6/12/2024 12:51 PM Emily Lea Add [R42] Vertigo     6/12/2024 12:51 PM Emily Lea Add [G43.909] Migraine           ED Disposition       ED Disposition   Left from Room after Provider Exam    Condition   --    Date/Time   Wed Jun 12, 2024 12:51 PM    Comment   --             Follow-up Information    None         Patient's Medications   Discharge Prescriptions    No medications on file       No discharge procedures on file.    PDMP Review         Value Time User    PDMP Reviewed  Yes 2/14/2024  4:24 PM Es Rollins PA-C            ED Provider  Electronically Signed by             Emily Lea DO  06/12/24 4634

## 2024-06-12 NOTE — TELEPHONE ENCOUNTER
Patient is calling regarding cancelling an appointment.    Date/Time: 6/14/2024 10:30am    Reason:     Patient was rescheduled: YES [x] NO []  If yes, when was Patient reschedule for: 6/19/2024 1:30pm    Patient requesting call back to reschedule: YES [] NO [x]

## 2024-06-19 ENCOUNTER — TELEPHONE (OUTPATIENT)
Dept: FAMILY MEDICINE CLINIC | Facility: CLINIC | Age: 54
End: 2024-06-19

## 2024-06-19 ENCOUNTER — TELEPHONE (OUTPATIENT)
Dept: PSYCHIATRY | Facility: CLINIC | Age: 54
End: 2024-06-19

## 2024-06-19 DIAGNOSIS — R42 DIZZINESS: ICD-10-CM

## 2024-06-19 DIAGNOSIS — R11.0 NAUSEA: ICD-10-CM

## 2024-06-19 RX ORDER — ONDANSETRON 4 MG/1
4 TABLET, ORALLY DISINTEGRATING ORAL EVERY 8 HOURS PRN
Qty: 20 TABLET | Refills: 5 | Status: SHIPPED | OUTPATIENT
Start: 2024-06-19

## 2024-06-19 RX ORDER — MECLIZINE HYDROCHLORIDE 25 MG/1
TABLET ORAL
Qty: 90 TABLET | Refills: 5 | Status: SHIPPED | OUTPATIENT
Start: 2024-06-19

## 2024-06-19 NOTE — TELEPHONE ENCOUNTER
Pt called in regards to medication meclizine (ANTIVERT) 25 mg tablet. Pt wanted to refill medication. Informed pt that script was sent today at 11:13 am to the pharmacy. Pt verbalized understanding.

## 2024-06-19 NOTE — TELEPHONE ENCOUNTER
Reason for call:   [x] Refill   [] Prior Auth  [] Other:     Office: [facility] [address]   [x] PCP/Provider - Jerrod   [] Specialty/Provider -     Medication: ondansetron     Dose/Frequency: 4 mg/ TAKE 1 TABLET (4 MG TOTAL) BY MOUTH EVERY EIGHT (EIGHT) HOURS PRN     Quantity: 20 tabs     Pharmacy: [facility]     Does the patient have enough for 3 days?   [] Yes   [x] No - Send as HP to POD- pt is out completely.

## 2024-06-19 NOTE — TELEPHONE ENCOUNTER
Patient is calling regarding cancelling an appointment.    Date/Time: 6/19/24 at 1:30 pm    Reason: The patient will not make it home in time for V/V     Patient was rescheduled: YES [] NO [x]  If yes, when was Patient reschedule for:     Patient requesting call back to reschedule: YES [] NO [x]  The patient will call back to r/s

## 2024-06-20 ENCOUNTER — TELEMEDICINE (OUTPATIENT)
Dept: PSYCHIATRY | Facility: CLINIC | Age: 54
End: 2024-06-20

## 2024-06-20 ENCOUNTER — OFFICE VISIT (OUTPATIENT)
Dept: FAMILY MEDICINE CLINIC | Facility: CLINIC | Age: 54
End: 2024-06-20
Payer: COMMERCIAL

## 2024-06-20 VITALS
HEART RATE: 73 BPM | OXYGEN SATURATION: 99 % | HEIGHT: 64 IN | WEIGHT: 178.5 LBS | TEMPERATURE: 98.2 F | DIASTOLIC BLOOD PRESSURE: 76 MMHG | SYSTOLIC BLOOD PRESSURE: 118 MMHG | BODY MASS INDEX: 30.48 KG/M2

## 2024-06-20 DIAGNOSIS — F41.1 GAD (GENERALIZED ANXIETY DISORDER): ICD-10-CM

## 2024-06-20 DIAGNOSIS — F31.81 BIPOLAR 2 DISORDER (HCC): Primary | ICD-10-CM

## 2024-06-20 DIAGNOSIS — L74.0 HEAT RASH: Primary | ICD-10-CM

## 2024-06-20 PROCEDURE — G2211 COMPLEX E/M VISIT ADD ON: HCPCS | Performed by: FAMILY MEDICINE

## 2024-06-20 PROCEDURE — 99213 OFFICE O/P EST LOW 20 MIN: CPT | Performed by: FAMILY MEDICINE

## 2024-06-20 RX ORDER — TRIAMCINOLONE ACETONIDE 1 MG/G
OINTMENT TOPICAL 2 TIMES DAILY
Qty: 453 G | Refills: 2 | Status: SHIPPED | OUTPATIENT
Start: 2024-06-20

## 2024-06-20 NOTE — PSYCH
No Call. No Show. No Charge    Belem Linares no showed 06/20/24 appointment , staff called and left message to reschedule appointment     Treatment Plan not due at this session.

## 2024-06-20 NOTE — PROGRESS NOTES
Assessment/Plan:     Chronic Problems:  No problem-specific Assessment & Plan notes found for this encounter.      Visit Diagnosis:  Diagnoses and all orders for this visit:    Heat rash  -     triamcinolone (KENALOG) 0.1 % ointment; Apply topically 2 (two) times a day          Subjective:    Patient ID: Belem Linares is a 53 y.o. female.    Complaining of rash  Nonspecific in area of most likely felt related to heat, heat rash  Has tried a variety of home products  Denies fever chills sore throat negative change in lotions creams or oils detergents cleaning products  Negative ill contact  Negative new medications        The following portions of the patient's history were reviewed and updated as appropriate: allergies, current medications, past family history, past medical history, past social history, past surgical history and problem list.    Review of Systems   Constitutional:  Negative for appetite change, chills, fever and unexpected weight change.   HENT:  Negative for congestion, dental problem, ear pain, hearing loss, postnasal drip, rhinorrhea, sinus pressure, sinus pain, sneezing, sore throat, tinnitus and voice change.    Eyes:  Negative for visual disturbance.   Respiratory:  Negative for apnea, cough, chest tightness and shortness of breath.    Cardiovascular:  Negative for chest pain, palpitations and leg swelling.   Gastrointestinal:  Negative for abdominal pain, blood in stool, constipation, diarrhea, nausea and vomiting.   Endocrine: Negative for cold intolerance, heat intolerance, polydipsia, polyphagia and polyuria.   Genitourinary:  Negative for decreased urine volume, difficulty urinating, dysuria, frequency and hematuria.   Musculoskeletal:  Negative for arthralgias, back pain, gait problem, joint swelling and myalgias.   Skin:  Positive for rash. Negative for color change and wound.   Allergic/Immunologic: Negative for environmental allergies and food allergies.   Neurological:   "Negative for dizziness, syncope, weakness, light-headedness, numbness and headaches.   Hematological:  Negative for adenopathy. Does not bruise/bleed easily.   Psychiatric/Behavioral:  Negative for sleep disturbance and suicidal ideas. The patient is not nervous/anxious.          /76 (BP Location: Left arm, Patient Position: Sitting)   Pulse 73   Temp 98.2 °F (36.8 °C)   Ht 5' 4\" (1.626 m)   Wt 81 kg (178 lb 8 oz)   SpO2 99%   BMI 30.64 kg/m²   Social History     Socioeconomic History    Marital status: Single     Spouse name: Not on file    Number of children: Not on file    Years of education: Not on file    Highest education level: Not on file   Occupational History    Occupation: Disability   Tobacco Use    Smoking status: Never     Passive exposure: Past    Smokeless tobacco: Never   Vaping Use    Vaping status: Never Used   Substance and Sexual Activity    Alcohol use: No    Drug use: Yes     Types: Marijuana     Comment: gummy on occasion for pain    Sexual activity: Not Currently     Partners: Male     Birth control/protection: Abstinence, Female Sterilization     Comment: Hysterectomy   Other Topics Concern    Not on file   Social History Narrative    Not on file     Social Determinants of Health     Financial Resource Strain: Low Risk  (2/12/2024)    Overall Financial Resource Strain (CARDIA)     Difficulty of Paying Living Expenses: Not hard at all   Food Insecurity: No Food Insecurity (3/1/2023)    Hunger Vital Sign     Worried About Running Out of Food in the Last Year: Never true     Ran Out of Food in the Last Year: Never true   Transportation Needs: No Transportation Needs (2/12/2024)    PRAPARE - Transportation     Lack of Transportation (Medical): No     Lack of Transportation (Non-Medical): No   Physical Activity: Not on file   Stress: No Stress Concern Present (3/1/2023)    [employer] - Occupational Stress Questionnaire     Feeling of Stress : Not at all "   Social Connections: Unknown (2024)    Received from [employer]     How often do you feel lonely or isolated from those around you? (Adult - for ages 18 years and over): Not on file   Intimate Partner Violence: Not on file   Housing Stability: Not on file     Past Medical History:   Diagnosis Date    Anxiety     Anxiety     Asthma     Bipolar disorder (HCC)     Carpal tunnel syndrome     Chronic pain     lower back    Depression     Disease of thyroid gland     Dyslipidemia     Fibromyalgia     Hypothyroidism     Irritable bowel     Kidney stones     Kidney stones 2019    Migraine     Obesity (BMI 30.0-34.9)     Psychiatric disorder     depression/anxiety    Suicide attempt (HCC)     as teen    Vitamin D deficiency      Family History   Problem Relation Age of Onset    Hypertension Mother     Diabetes Mother     Hypothyroidism Mother     Stroke Mother     Bipolar disorder Mother     Anxiety disorder Mother     Arthritis Mother     Depression Mother     Mental illness Mother     Cancer Father         pancreatic     Hypothyroidism Sister     Hypertension Brother     Heart disease Brother     Cancer Maternal Uncle         lung    Cancer Paternal Aunt         breast    Breast cancer Paternal Aunt     Cancer Paternal Uncle         testicular     Cancer Paternal Grandmother         breast    Dementia Paternal Grandmother     Breast cancer Paternal Grandmother     Cancer Cousin         brain    Bipolar disorder Daughter      Past Surgical History:   Procedure Laterality Date    BUNIONECTOMY       SECTION      CHOLECYSTECTOMY      PARTIAL HYSTERECTOMY      portion of  uterus still present    TONSILLECTOMY      TUBAL LIGATION         Current Outpatient Medications:     Blood Glucose Monitoring Suppl (OneTouch Verio Reflect) w/Device KIT, Check blood sugars once daily. Please substitute with appropriate alternative as covered by patient's insurance. Dx: E11.65, Disp: 1 kit, Rfl:  0    butalbital-acetaminophen-caffeine (FIORICET,ESGIC) -40 mg per tablet, Take 1 tablet by mouth every 6 (six) hours as needed for headaches, Disp: 30 tablet, Rfl: 0    celecoxib (CELEBREX) 200 mg capsule, Take 1 capsule (200 mg total) by mouth daily, Disp: 30 capsule, Rfl: 2    clonazePAM (KlonoPIN) 1 mg tablet, TAKE 1 TABLET (1 MG TOTAL) BY MOUTH DAILY AS NEEDED FOR ANXIETY, Disp: 30 tablet, Rfl: 0    cyanocobalamin 1,000 mcg/mL, Inject 1 mL (1,000 mcg total) into a muscle once a week, Disp: 4 mL, Rfl: 1    Diclofenac Sodium (VOLTAREN) 1 %, Apply 2 g topically 4 (four) times a day, Disp: 50 g, Rfl: 0    dicyclomine (BENTYL) 10 mg capsule, Take 1 capsule (10 mg total) by mouth 3 (three) times a day as needed (abd cramping), Disp: 30 capsule, Rfl: 0    fezolinetant (Veozah) tablet, Take 1 tablet (45 mg total) by mouth daily, Disp: 30 tablet, Rfl: 2    fremanezumab-vfrm (Ajovy) 225 MG/1.5ML auto-injector, Inject 225 mg under the skin every 30 (thirty) days, Disp: , Rfl:     glucose blood (OneTouch Verio) test strip, Check blood sugars once daily. Please substitute with appropriate alternative as covered by patient's insurance. Dx: E11.65, Disp: 100 each, Rfl: 3    ketoconazole (NIZORAL) 2 % cream, , Disp: , Rfl:     levocetirizine (XYZAL) 5 MG tablet, Take 1 tablet (5 mg total) by mouth every evening, Disp: 30 tablet, Rfl: 1    meclizine (ANTIVERT) 25 mg tablet, TAKE 1 TABLET (25 MG TOTAL) BY MOUTH EVERY EIGHT (EIGHT) HOURS AS NEEDED FOR DIZZINESS, Disp: 90 tablet, Rfl: 5    naratriptan (AMERGE) 2.5 MG tablet, , Disp: , Rfl:     ondansetron (ZOFRAN-ODT) 4 mg disintegrating tablet, Take 1 tablet (4 mg total) by mouth every 8 (eight) hours as needed for nausea or vomiting, Disp: 20 tablet, Rfl: 5    OneTouch Delica Lancets 33G MISC, Check blood sugars once daily. Please substitute with appropriate alternative as covered by patient's insurance. Dx: E11.65, Disp: 100 each, Rfl: 3    semaglutide, 2 mg/dose,  "(Ozempic) 8 mg/ mL injection pen, Inject 0.75 mL (2 mg total) under the skin every 7 days, Disp: 9 mL, Rfl: 1    Syringe/Needle, Disp, (SYRINGE 3CC/25GX1\") 25G X 1\" 3 ML MISC, Use once a week, Disp: 50 each, Rfl: 0    Tirosint 150 MCG CAPS, Take 1 capsule (150 mcg total) by mouth daily before breakfast, Disp: , Rfl:     tiZANidine (ZANAFLEX) 4 mg tablet, , Disp: , Rfl:     Topiramate  MG CP24, Take 100 mg by mouth 2 (two) times a day, Disp: , Rfl:     triamcinolone (KENALOG) 0.1 % ointment, Apply topically 2 (two) times a day, Disp: 453 g, Rfl: 2    Ubrelvy 100 MG tablet, , Disp: , Rfl:     Ventolin  (90 Base) MCG/ACT inhaler, INHALE TWO PUFFS EVERY 4 (FOUR) HOURS AS NEEDED FOR WHEEZING OR SHORTNESS OF BREATH, Disp: 18 g, Rfl: 0    XIFAXAN 550 MG tablet, , Disp: , Rfl:     Xiidra 5 % op solution, ADMINISTER 1 DROP TO BOTH EYES TWO (TWO) TIMES A DAY, Disp: 60 mL, Rfl: 0    albuterol (ProAir HFA) 90 mcg/act inhaler, Inhale 2 puffs every 6 (six) hours as needed for wheezing or shortness of breath (Patient not taking: Reported on 4/30/2024), Disp: 8.5 g, Rfl: 0    azelastine (ASTELIN) 0.1 % nasal spray, 1 SPRAY INTO EACH NOSTRIL TWO (TWO) TIMES A DAY USE IN EACH NOSTRIL AS DIRECTED (Patient not taking: Reported on 5/28/2024), Disp: 30 mL, Rfl: 1    Fluticasone Furoate (Arnuity Ellipta) 50 MCG/ACT AEPB, Inhale 1 puff in the morning Rinse mouth after use. (Patient not taking: Reported on 5/28/2024), Disp: 30 blister, Rfl: 0    metoclopramide (REGLAN) 5 mg tablet, TAKE 1 TABLET (5 MG TOTAL) BY MOUTH 4 (FOUR) TIMES A DAY (Patient not taking: Reported on 5/28/2024), Disp: 30 tablet, Rfl: 0    montelukast (SINGULAIR) 10 mg tablet, Take 1 tablet (10 mg total) by mouth daily at bedtime (Patient not taking: Reported on 5/28/2024), Disp: 30 tablet, Rfl: 5    omeprazole (PriLOSEC) 40 MG capsule, TAKE 1 CAPSULE (40 MG TOTAL) BY MOUTH DAILY (Patient not taking: Reported on 5/28/2024), Disp: 90 capsule, Rfl: 0    " Ozempic, 1 MG/DOSE, 4 MG/3ML injection pen, INJECT 0.75 ML (1 MG TOTAL) UNDER THE SKIN ONCE A WEEK (Patient not taking: Reported on 5/28/2024), Disp: 3 mL, Rfl: 0    Respiratory Therapy Supplies (NEBULIZER) DONA, by Does not apply route every 4 (four) hours while awake, Disp: 90 each, Rfl: 1    Vraylar 6 MG capsule, TAKE 1 CAPSULE (6 MG TOTAL) BY MOUTH DAILY (Patient not taking: Reported on 5/28/2024), Disp: 30 capsule, Rfl: 0    Current Facility-Administered Medications:     cyanocobalamin injection 1,000 mcg, 1,000 mcg, Intramuscular, Q30 Days, ERICKA George, 1,000 mcg at 07/27/23 1025    Allergies   Allergen Reactions    Doxycycline Rash    Erythromycin Rash    Other Edema and Wheezing     jalapeno    Augmentin [Amoxicillin-Pot Clavulanate] GI Intolerance    Latex Rash    Duloxetine      Other reaction(s): Nausea, Loopy    Eletriptan      Pain in lower jaw with pressure in throat    Emgality [Galcanezumab-Gnlm]     Galcanezumab      rash    Lidocaine Other (See Comments)     Headache    Methocarbamol Other (See Comments)     Dizzyness          Lab Review   not applicable     Imaging: CT abdomen pelvis wo contrast    Result Date: 6/12/2024  Narrative: CT ABDOMEN AND PELVIS WITHOUT IV CONTRAST INDICATION: lower abd pain. COMPARISON: None. TECHNIQUE: CT examination of the abdomen and pelvis was performed without intravenous contrast. Multiplanar 2D reformatted images were created from the source data. This examination, like all CT scans performed in the [facility], was performed utilizing techniques to minimize radiation dose exposure, including the use of iterative reconstruction and automated exposure control. Radiation dose length product (DLP) for this visit: 650 mGy-cm Enteric Contrast: Not administered. FINDINGS: ABDOMEN LOWER CHEST: No clinically significant abnormality in the visualized lower chest. LIVER/BILIARY TREE: Unremarkable. GALLBLADDER: Gallbladder surgically absent SPLEEN:  Unremarkable. PANCREAS: Unremarkable. ADRENAL GLANDS: Unremarkable. KIDNEYS/URETERS: Nonobstructing mid left renal calculus measuring about 4 mm No hydronephrosis STOMACH AND BOWEL: Moderate amount of fecal debris in the colon seen No evidence of small bowel obstruction Ileocecal junction appears unremarkable APPENDIX: Normal size appendix seen ABDOMINOPELVIC CAVITY: No ascites. No pneumoperitoneum. No lymphadenopathy. VESSELS: Unremarkable for patient's age. PELVIS REPRODUCTIVE ORGANS: Unremarkable for patient's age. URINARY BLADDER: Unremarkable. ABDOMINAL WALL/INGUINAL REGIONS: Unremarkable. BONES: No acute fracture or suspicious osseous lesion.     Impression: No hydronephrosis No acute inflammatory stranding. There is nonobstructing mid left renal calculus measuring about 4 mm Workstation performed: [id number]     XR FOOT 3+ VW BILATERAL    Result Date: 6/7/2024  Narrative: This result has an attachment that is not available. Impression of 3 views bilateral feet: bilateral plantar calcaneal; spurs and a bone island in the 2nd metatarsal is noted. 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 No fracture bilaterally.      Objective:     Physical Exam  Constitutional:       General: She is not in acute distress.     Appearance: She is not ill-appearing or toxic-appearing.   HENT:      Head: Normocephalic and atraumatic.   Cardiovascular:      Pulses: Normal pulses.   Pulmonary:      Effort: Pulmonary effort is normal.   Musculoskeletal:      Cervical back: Normal range of motion.   Skin:     Findings: Rash present.      Comments: Nonspecific faint maculopapular eruptions mildly pruritic scattered none confluent   Neurological:      Mental Status: She is oriented to person, place, and time.           There are no Patient Instructions on file for this visit.    ERICKA Tabor    Portions of the record may have been created with voice recognition  "software.  Occasional wrong word or \"sound a like\" substitutions may have occurred due to the inherent limitations of voice recognition software.  Read the chart carefully and recognize, using context, where substitutions have occurred.  "

## 2024-06-21 ENCOUNTER — TELEPHONE (OUTPATIENT)
Dept: PSYCHIATRY | Facility: CLINIC | Age: 54
End: 2024-06-21

## 2024-06-21 DIAGNOSIS — H04.129 DRY EYE: ICD-10-CM

## 2024-06-21 DIAGNOSIS — F41.1 GAD (GENERALIZED ANXIETY DISORDER): ICD-10-CM

## 2024-06-21 DIAGNOSIS — J06.9 UPPER RESPIRATORY TRACT INFECTION, UNSPECIFIED TYPE: ICD-10-CM

## 2024-06-21 DIAGNOSIS — E66.09 CLASS 1 OBESITY DUE TO EXCESS CALORIES WITHOUT SERIOUS COMORBIDITY WITH BODY MASS INDEX (BMI) OF 30.0 TO 30.9 IN ADULT: ICD-10-CM

## 2024-06-21 DIAGNOSIS — F31.81 BIPOLAR 2 DISORDER (HCC): ICD-10-CM

## 2024-06-21 RX ORDER — LIFITEGRAST 50 MG/ML
1 SOLUTION/ DROPS OPHTHALMIC 2 TIMES DAILY
Qty: 60 ML | Refills: 0 | Status: SHIPPED | OUTPATIENT
Start: 2024-06-21

## 2024-06-21 RX ORDER — SEMAGLUTIDE 1.34 MG/ML
INJECTION, SOLUTION SUBCUTANEOUS
Qty: 3 ML | Refills: 1 | OUTPATIENT
Start: 2024-06-21

## 2024-06-21 RX ORDER — SEMAGLUTIDE 1.34 MG/ML
INJECTION, SOLUTION SUBCUTANEOUS
Qty: 3 ML | Refills: 1 | Status: SHIPPED | OUTPATIENT
Start: 2024-06-21

## 2024-06-21 RX ORDER — AZELASTINE 1 MG/ML
SPRAY, METERED NASAL
Refills: 2 | OUTPATIENT
Start: 2024-06-21

## 2024-06-21 RX ORDER — ALBUTEROL SULFATE 90 UG/1
AEROSOL, METERED RESPIRATORY (INHALATION)
Qty: 18 G | Refills: 0 | OUTPATIENT
Start: 2024-06-21

## 2024-06-21 RX ORDER — AZELASTINE 1 MG/ML
SPRAY, METERED NASAL
Qty: 30 ML | Refills: 5 | Status: SHIPPED | OUTPATIENT
Start: 2024-06-21

## 2024-06-25 ENCOUNTER — TELEPHONE (OUTPATIENT)
Dept: PSYCHIATRY | Facility: CLINIC | Age: 54
End: 2024-06-25

## 2024-06-25 NOTE — TELEPHONE ENCOUNTER
Belem Linares called the office and left a vm and  requested a call back to discuss scheduling..    They can be reached at P# [phone].       Thank you.

## 2024-06-26 RX ORDER — CLONAZEPAM 1 MG/1
1 TABLET ORAL DAILY PRN
Qty: 30 TABLET | Refills: 0 | Status: SHIPPED | OUTPATIENT
Start: 2024-06-26

## 2024-06-26 RX ORDER — CARIPRAZINE 4.5 MG/1
CAPSULE, GELATIN COATED ORAL
Qty: 30 CAPSULE | Refills: 2 | OUTPATIENT
Start: 2024-06-26

## 2024-06-26 RX ORDER — CARIPRAZINE 6 MG/1
CAPSULE, GELATIN COATED ORAL
Qty: 30 CAPSULE | Refills: 0 | Status: SHIPPED | OUTPATIENT
Start: 2024-06-26

## 2024-06-27 ENCOUNTER — TELEMEDICINE (OUTPATIENT)
Dept: PSYCHIATRY | Facility: CLINIC | Age: 54
End: 2024-06-27
Payer: COMMERCIAL

## 2024-06-27 DIAGNOSIS — Z00.6 ENCOUNTER FOR EXAMINATION FOR NORMAL COMPARISON OR CONTROL IN CLINICAL RESEARCH PROGRAM: ICD-10-CM

## 2024-06-27 DIAGNOSIS — F41.1 GAD (GENERALIZED ANXIETY DISORDER): ICD-10-CM

## 2024-06-27 DIAGNOSIS — F31.81 BIPOLAR 2 DISORDER (HCC): Primary | ICD-10-CM

## 2024-06-27 PROCEDURE — 90837 PSYTX W PT 60 MINUTES: CPT

## 2024-06-27 NOTE — PSYCH
Virtual Regular Visit    Verification of patient location:    Patient is located at Home in the following state in which I hold an active license [place]      Assessment/Plan:    Problem List Items Addressed This Visit       ZAINA (generalized anxiety disorder)    Bipolar 2 disorder (HCC) - Primary       Goals addressed in session: Goal 1          Reason for visit is No chief complaint on file.       Encounter provider Maisha Bonds LCSW      Recent Visits  Date Type Provider Dept   06/21/24 Telephone Maisha Bonds LCSW [facility]   Showing recent visits within past 7 days and meeting all other requirements  Today's Visits  Date Type Provider Dept   06/27/24 Telemedicine Maisha Bonds LCSW [facility]   Showing today's visits and meeting all other requirements  Future Appointments  No visits were found meeting these conditions.  Showing future appointments within next 150 days and meeting all other requirements       The patient was identified by name and date of birth. Belem Linares was informed that this is a telemedicine visit and that the visit is being conducted throughthe [employer] platform. She agrees to proceed..  My office door was closed. No one else was in the room.  She acknowledged consent and understanding of privacy and security of the video platform. The patient has agreed to participate and understands they can discontinue the visit at any time.    Patient is aware this is a billable service.     Subjective  Belem Linares is a 53 y.o. female.      HPI     Past Medical History:   Diagnosis Date    Anxiety     Anxiety     Asthma     Bipolar disorder (HCC)     Carpal tunnel syndrome     Chronic pain     lower back    Depression     Disease of thyroid gland     Dyslipidemia     Fibromyalgia     Hypothyroidism 2007    Irritable bowel     Kidney stones     Kidney stones 05/20/2019    Migraine     Obesity (BMI 30.0-34.9)     Psychiatric  disorder     depression/anxiety    Suicide attempt (HCC)     as teen    Vitamin D deficiency        Past Surgical History:   Procedure Laterality Date    BUNIONECTOMY       SECTION      CHOLECYSTECTOMY      PARTIAL HYSTERECTOMY      portion of  uterus still present    TONSILLECTOMY      TUBAL LIGATION         Current Outpatient Medications   Medication Sig Dispense Refill    albuterol (ProAir HFA) 90 mcg/act inhaler Inhale 2 puffs every 6 (six) hours as needed for wheezing or shortness of breath (Patient not taking: Reported on 2024) 8.5 g 0    azelastine (ASTELIN) 0.1 % nasal spray 1 SPRAY INTO EACH NOSTRIL TWO (TWO) TIMES A DAY USE IN EACH NOSTRIL AS DIRECTED 30 mL 5    Blood Glucose Monitoring Suppl (OneTouch Verio Reflect) w/Device KIT Check blood sugars once daily. Please substitute with appropriate alternative as covered by patient's insurance. Dx: E11.65 1 kit 0    butalbital-acetaminophen-caffeine (FIORICET,ESGIC) -40 mg per tablet Take 1 tablet by mouth every 6 (six) hours as needed for headaches 30 tablet 0    celecoxib (CELEBREX) 200 mg capsule Take 1 capsule (200 mg total) by mouth daily 30 capsule 2    clonazePAM (KlonoPIN) 1 mg tablet TAKE 1 TABLET (1 MG TOTAL) BY MOUTH DAILY AS NEEDED FOR ANXIETY 30 tablet 0    cyanocobalamin 1,000 mcg/mL Inject 1 mL (1,000 mcg total) into a muscle once a week 4 mL 1    Diclofenac Sodium (VOLTAREN) 1 % Apply 2 g topically 4 (four) times a day 50 g 0    dicyclomine (BENTYL) 10 mg capsule Take 1 capsule (10 mg total) by mouth 3 (three) times a day as needed (abd cramping) 30 capsule 0    fezolinetant (Veozah) tablet Take 1 tablet (45 mg total) by mouth daily 30 tablet 2    fremanezumab-vfrm (Ajovy) 225 MG/1.5ML auto-injector Inject 225 mg under the skin every 30 (thirty) days      glucose blood (OneTouch Verio) test strip Check blood sugars once daily. Please substitute with appropriate alternative as covered by patient's insurance. Dx: E11.65 100  "each 3    ketoconazole (NIZORAL) 2 % cream       levocetirizine (XYZAL) 5 MG tablet Take 1 tablet (5 mg total) by mouth every evening 30 tablet 1    meclizine (ANTIVERT) 25 mg tablet TAKE 1 TABLET (25 MG TOTAL) BY MOUTH EVERY EIGHT (EIGHT) HOURS AS NEEDED FOR DIZZINESS 90 tablet 5    metoclopramide (REGLAN) 5 mg tablet TAKE 1 TABLET (5 MG TOTAL) BY MOUTH 4 (FOUR) TIMES A DAY (Patient not taking: Reported on 5/28/2024) 30 tablet 0    montelukast (SINGULAIR) 10 mg tablet Take 1 tablet (10 mg total) by mouth daily at bedtime (Patient not taking: Reported on 5/28/2024) 30 tablet 5    naratriptan (AMERGE) 2.5 MG tablet       omeprazole (PriLOSEC) 40 MG capsule TAKE 1 CAPSULE (40 MG TOTAL) BY MOUTH DAILY (Patient not taking: Reported on 5/28/2024) 90 capsule 0    ondansetron (ZOFRAN-ODT) 4 mg disintegrating tablet Take 1 tablet (4 mg total) by mouth every 8 (eight) hours as needed for nausea or vomiting 20 tablet 5    OneTouch Delica Lancets 33G MISC Check blood sugars once daily. Please substitute with appropriate alternative as covered by patient's insurance. Dx: E11.65 100 each 3    Ozempic, 1 MG/DOSE, 4 MG/3ML injection pen INJECT 0.75 ML (1 MG TOTAL) UNDER THE SKIN ONCE A WEEK 3 mL 1    Respiratory Therapy Supplies (NEBULIZER) DONA by Does not apply route every 4 (four) hours while awake 90 each 1    semaglutide, 2 mg/dose, (Ozempic) 8 mg/ mL injection pen Inject 0.75 mL (2 mg total) under the skin every 7 days 9 mL 1    Syringe/Needle, Disp, (SYRINGE 3CC/25GX1\") 25G X 1\" 3 ML MISC Use once a week 50 each 0    Tirosint 150 MCG CAPS Take 1 capsule (150 mcg total) by mouth daily before breakfast      tiZANidine (ZANAFLEX) 4 mg tablet       Topiramate  MG CP24 Take 100 mg by mouth 2 (two) times a day      triamcinolone (KENALOG) 0.1 % ointment Apply topically 2 (two) times a day 453 g 2    Ubrelvy 100 MG tablet       Ventolin  (90 Base) MCG/ACT inhaler INHALE TWO PUFFS EVERY 4 (FOUR) HOURS AS NEEDED FOR " "WHEEZING OR SHORTNESS OF BREATH 18 g 0    Vraylar 6 MG capsule TAKE 1 CAPSULE (6 MG TOTAL) BY MOUTH DAILY 30 capsule 0    XIFAXAN 550 MG tablet       Xiidra 5 % op solution ADMINISTER 1 DROP TO BOTH EYES TWO (TWO) TIMES A DAY 60 mL 0     Current Facility-Administered Medications   Medication Dose Route Frequency Provider Last Rate Last Admin    cyanocobalamin injection 1,000 mcg  1,000 mcg Intramuscular Q30 Days Ryanne ERICKA Nicolas   1,000 mcg at 07/27/23 1025        Allergies   Allergen Reactions    Doxycycline Rash    Erythromycin Rash    Other Edema and Wheezing     jalapeno    Augmentin [Amoxicillin-Pot Clavulanate] GI Intolerance    Latex Rash    Duloxetine      Other reaction(s): Nausea, Loopy    Eletriptan      Pain in lower jaw with pressure in throat    Emgality [Galcanezumab-Gnlm]     Galcanezumab      rash    Lidocaine Other (See Comments)     Headache    Methocarbamol Other (See Comments)     Dizzyness       Review of Systems    Video Exam    There were no vitals filed for this visit.    Physical Exam     Behavioral Health Psychotherapy Progress Note    Psychotherapy Provided: Individual Psychotherapy      Diagnosis ICD-10-CM Associated Orders   1. Bipolar 2 disorder (HCC)  F31.81       2. ZAINA (generalized anxiety disorder)  F41.1            Goals addressed in session: Goal 1     DATA: Met with Belem Linares for a scheduled individual session.  Topics discussed included emotional wellness, coping skills, and family stressors.  Belem is feeling \"exhausted\". Her grandson is doing a vacation bible study/basketball camp this week. It runs late into the evening which is throwing off her and her grandson's regular routine. She has been very tired during the day this week and she feels sluggish. She's been getting less sleep and it's wearing on her. She states she's not been able to \"hold [her] tongue\" as of late. She told her boyfriend she was tired of his tantrums and she had a bit of a conflict with " "her younger daughter because Belem feels she's not being treated respectfully. We talked through and processed her feelings. Belem shows evidence of utilizing effective communication skills and engaging in problem solving to manage mental health symptoms.  During this session, this clinical used the following therapeutic modalities supportive psychotherapy and client-centered therapy.    Substance Abuse was not addressed during this session. If the client is diagnosed with a co-occurring substance use disorder, please indicate any changes in the frequency or amount of use: N/A. Stage of change for addressing substance use diagnoses: No substance use/Not applicable    ASSESSMENT:  Belem presents with a Euthymic/ normal mood.  her affect is normal range and intensity, appropriate, which is congruent, with her  mood and the content of the session. Belem was oriented x3. She was focused and engaged. Belem exhibits good therapeutic rapport with this clinician. The client has made progress on their goals.    Belem Linares presents with a none risk of suicide, none risk of self-harm, and none risk of harm to others.    For any risk assessment that surpasses a \"low\" rating, a safety plan must be developed.    A safety plan was indicated: no  If yes, describe in detail: N/A    PLAN: Belem will return in 1 week for the next scheduled session. At the next session, the therapist will use supportive psychotherapy and client-centered therapy to address depression and anxiety.    Behavioral Health Treatment Plan and Discharge Planning: Belem Linares is aware of and agrees to continue to work on their treatment plan. They have identified and are working toward their discharge goals. yes    Visit start and stop times:    06/27/24  Start Time: 0802  Stop Time: 0900  Total Visit Time: 58 minutes        "

## 2024-07-02 ENCOUNTER — LAB (OUTPATIENT)
Dept: LAB | Facility: CLINIC | Age: 54
End: 2024-07-02

## 2024-07-02 ENCOUNTER — APPOINTMENT (OUTPATIENT)
Dept: LAB | Facility: CLINIC | Age: 54
End: 2024-07-02

## 2024-07-02 DIAGNOSIS — Z00.6 ENCOUNTER FOR EXAMINATION FOR NORMAL COMPARISON OR CONTROL IN CLINICAL RESEARCH PROGRAM: ICD-10-CM

## 2024-07-02 DIAGNOSIS — Z79.899 MEDICATION MANAGEMENT: ICD-10-CM

## 2024-07-02 LAB
ALBUMIN SERPL BCG-MCNC: 4.4 G/DL (ref 3.5–5)
ALP SERPL-CCNC: 81 U/L (ref 34–104)
ALT SERPL W P-5'-P-CCNC: 18 U/L (ref 7–52)
AST SERPL W P-5'-P-CCNC: 19 U/L (ref 13–39)
BILIRUB DIRECT SERPL-MCNC: 0.11 MG/DL (ref 0–0.2)
BILIRUB SERPL-MCNC: 0.41 MG/DL (ref 0.2–1)
PROT SERPL-MCNC: 7 G/DL (ref 6.4–8.4)

## 2024-07-02 PROCEDURE — 36415 COLL VENOUS BLD VENIPUNCTURE: CPT

## 2024-07-02 PROCEDURE — 80076 HEPATIC FUNCTION PANEL: CPT

## 2024-07-08 ENCOUNTER — TELEPHONE (OUTPATIENT)
Age: 54
End: 2024-07-08

## 2024-07-08 NOTE — TELEPHONE ENCOUNTER
Patient called in to request her diagnosis to be printed out to be picked up in office. Spoke with Becca and forms will be printed.     Patient states she needs these forms to send to billing office to help with opening a case to help with co pays.

## 2024-07-15 ENCOUNTER — TELEPHONE (OUTPATIENT)
Age: 54
End: 2024-07-15

## 2024-07-18 DIAGNOSIS — E66.09 CLASS 1 OBESITY DUE TO EXCESS CALORIES WITHOUT SERIOUS COMORBIDITY WITH BODY MASS INDEX (BMI) OF 30.0 TO 30.9 IN ADULT: ICD-10-CM

## 2024-07-19 ENCOUNTER — TELEPHONE (OUTPATIENT)
Age: 54
End: 2024-07-19

## 2024-07-19 DIAGNOSIS — R63.5 WEIGHT GAIN: ICD-10-CM

## 2024-07-19 DIAGNOSIS — F41.1 GAD (GENERALIZED ANXIETY DISORDER): ICD-10-CM

## 2024-07-19 DIAGNOSIS — E66.09 CLASS 1 OBESITY DUE TO EXCESS CALORIES WITHOUT SERIOUS COMORBIDITY WITH BODY MASS INDEX (BMI) OF 30.0 TO 30.9 IN ADULT: ICD-10-CM

## 2024-07-19 DIAGNOSIS — F31.81 BIPOLAR 2 DISORDER (HCC): ICD-10-CM

## 2024-07-19 DIAGNOSIS — J06.9 UPPER RESPIRATORY TRACT INFECTION, UNSPECIFIED TYPE: ICD-10-CM

## 2024-07-19 RX ORDER — AZELASTINE 1 MG/ML
SPRAY, METERED NASAL
Qty: 30 ML | Refills: 2 | Status: SHIPPED | OUTPATIENT
Start: 2024-07-19

## 2024-07-19 RX ORDER — SEMAGLUTIDE 1.34 MG/ML
INJECTION, SOLUTION SUBCUTANEOUS
Qty: 3 ML | Refills: 0 | Status: SHIPPED | OUTPATIENT
Start: 2024-07-19 | End: 2024-07-22 | Stop reason: SDUPTHER

## 2024-07-19 NOTE — TELEPHONE ENCOUNTER
Pt tested positive for covid last Friday while in [place].  Grandson  tested positive this Monday. They both retested today and pt is still showing positive but grandson is negative.  Pt asking if she can be retested again this Monday when she brings her grandson in for an appointment.     Please advise

## 2024-07-19 NOTE — TELEPHONE ENCOUNTER
Pt has been notified that she can test positive for up to 90 days after initial positive Covid result. Pt would still like to be retested. Pt can be added as a nurse visit for a swab on Monday when she brings her grandson.

## 2024-07-22 RX ORDER — SEMAGLUTIDE 1.34 MG/ML
INJECTION, SOLUTION SUBCUTANEOUS
Qty: 3 ML | Refills: 0 | Status: SHIPPED | OUTPATIENT
Start: 2024-07-22

## 2024-07-22 RX ORDER — SEMAGLUTIDE 0.68 MG/ML
INJECTION, SOLUTION SUBCUTANEOUS
Qty: 3 ML | Refills: 4 | Status: SHIPPED | OUTPATIENT
Start: 2024-07-22 | End: 2024-08-21

## 2024-07-23 RX ORDER — CLONAZEPAM 1 MG/1
1 TABLET ORAL DAILY PRN
Qty: 30 TABLET | Refills: 0 | Status: SHIPPED | OUTPATIENT
Start: 2024-07-23

## 2024-07-23 NOTE — TELEPHONE ENCOUNTER
Rx request came in for Clonazepam.  EMR and PDMP were reviewed and Pt appears to require Vraylar renewal as well.  Pt also needs to have a f/u appt and will route a msg to the staff to arrange this.  I e-scribed the following to [facility] designated pharmacy:  Vraylar to 6mg (1) cap po qd # 30   Clonazepam 1mg (1) tab po qd prn anxiety # 30

## 2024-07-24 ENCOUNTER — TELEMEDICINE (OUTPATIENT)
Dept: PSYCHIATRY | Facility: CLINIC | Age: 54
End: 2024-07-24
Payer: COMMERCIAL

## 2024-07-24 DIAGNOSIS — F41.1 GAD (GENERALIZED ANXIETY DISORDER): ICD-10-CM

## 2024-07-24 DIAGNOSIS — F31.81 BIPOLAR 2 DISORDER (HCC): Primary | ICD-10-CM

## 2024-07-24 PROCEDURE — 90837 PSYTX W PT 60 MINUTES: CPT

## 2024-07-24 NOTE — PSYCH
Virtual Regular Visit    Verification of patient location:    Patient is located at Home in the following state in which I hold an active license [place]      Assessment/Plan:    Problem List Items Addressed This Visit       ZAINA (generalized anxiety disorder)    Bipolar 2 disorder (HCC) - Primary       Goals addressed in session: Goal 1          Reason for visit is No chief complaint on file.       Encounter provider Maisha Bonds LCSW      Recent Visits  No visits were found meeting these conditions.  Showing recent visits within past 7 days and meeting all other requirements  Today's Visits  Date Type Provider Dept   07/24/24 Telemedicine Maisha Bonds LCSW [facility]   Showing today's visits and meeting all other requirements  Future Appointments  No visits were found meeting these conditions.  Showing future appointments within next 150 days and meeting all other requirements       The patient was identified by name and date of birth. Belem Linares was informed that this is a telemedicine visit and that the visit is being conducted throughthe [employer] platform. She agrees to proceed..  My office door was closed. No one else was in the room.  She acknowledged consent and understanding of privacy and security of the video platform. The patient has agreed to participate and understands they can discontinue the visit at any time.    Patient is aware this is a billable service.     Subjective  Belem Linares is a 53 y.o. female.      HPI     Past Medical History:   Diagnosis Date    Anxiety     Anxiety     Asthma     Bipolar disorder (HCC)     Carpal tunnel syndrome     Chronic pain     lower back    Depression     Disease of thyroid gland     Dyslipidemia     Fibromyalgia     Hypothyroidism 2007    Irritable bowel     Kidney stones     Kidney stones 05/20/2019    Migraine     Obesity (BMI 30.0-34.9)     Psychiatric disorder     depression/anxiety    Suicide attempt (HCC)      as teen    Vitamin D deficiency        Past Surgical History:   Procedure Laterality Date    BUNIONECTOMY       SECTION      CHOLECYSTECTOMY      PARTIAL HYSTERECTOMY      portion of  uterus still present    TONSILLECTOMY      TUBAL LIGATION         Current Outpatient Medications   Medication Sig Dispense Refill    albuterol (ProAir HFA) 90 mcg/act inhaler Inhale 2 puffs every 6 (six) hours as needed for wheezing or shortness of breath (Patient not taking: Reported on 2024) 8.5 g 0    azelastine (ASTELIN) 0.1 % nasal spray 1 SPRAY INTO EACH NOSTRIL TWO (TWO) TIMES A DAY USE IN EACH NOSTRIL AS DIRECTED 30 mL 2    Blood Glucose Monitoring Suppl (OneTouch Verio Reflect) w/Device KIT Check blood sugars once daily. Please substitute with appropriate alternative as covered by patient's insurance. Dx: E11.65 1 kit 0    butalbital-acetaminophen-caffeine (FIORICET,ESGIC) -40 mg per tablet Take 1 tablet by mouth every 6 (six) hours as needed for headaches 30 tablet 0    cariprazine (Vraylar) 6 MG capsule Take 1 capsule (6 mg total) by mouth daily 30 capsule 0    celecoxib (CELEBREX) 200 mg capsule Take 1 capsule (200 mg total) by mouth daily 30 capsule 2    clonazePAM (KlonoPIN) 1 mg tablet TAKE 1 TABLET (1 MG TOTAL) BY MOUTH DAILY AS NEEDED FOR ANXIETY 30 tablet 0    cyanocobalamin 1,000 mcg/mL Inject 1 mL (1,000 mcg total) into a muscle once a week 4 mL 1    Diclofenac Sodium (VOLTAREN) 1 % Apply 2 g topically 4 (four) times a day 50 g 0    dicyclomine (BENTYL) 10 mg capsule Take 1 capsule (10 mg total) by mouth 3 (three) times a day as needed (abd cramping) 30 capsule 0    fezolinetant (Veozah) tablet Take 1 tablet (45 mg total) by mouth daily 30 tablet 2    fremanezumab-vfrm (Ajovy) 225 MG/1.5ML auto-injector Inject 225 mg under the skin every 30 (thirty) days      glucose blood (OneTouch Verio) test strip Check blood sugars once daily. Please substitute with appropriate alternative as covered by  "patient's insurance. Dx: E11.65 100 each 3    ketoconazole (NIZORAL) 2 % cream       levocetirizine (XYZAL) 5 MG tablet Take 1 tablet (5 mg total) by mouth every evening 30 tablet 1    meclizine (ANTIVERT) 25 mg tablet TAKE 1 TABLET (25 MG TOTAL) BY MOUTH EVERY EIGHT (EIGHT) HOURS AS NEEDED FOR DIZZINESS 90 tablet 5    metoclopramide (REGLAN) 5 mg tablet TAKE 1 TABLET (5 MG TOTAL) BY MOUTH 4 (FOUR) TIMES A DAY (Patient not taking: Reported on 5/28/2024) 30 tablet 0    montelukast (SINGULAIR) 10 mg tablet Take 1 tablet (10 mg total) by mouth daily at bedtime (Patient not taking: Reported on 5/28/2024) 30 tablet 5    naratriptan (AMERGE) 2.5 MG tablet       omeprazole (PriLOSEC) 40 MG capsule TAKE 1 CAPSULE (40 MG TOTAL) BY MOUTH DAILY (Patient not taking: Reported on 5/28/2024) 90 capsule 0    ondansetron (ZOFRAN-ODT) 4 mg disintegrating tablet Take 1 tablet (4 mg total) by mouth every 8 (eight) hours as needed for nausea or vomiting 20 tablet 5    OneTouch Delica Lancets 33G MISC Check blood sugars once daily. Please substitute with appropriate alternative as covered by patient's insurance. Dx: E11.65 100 each 3    Ozempic, 0.25 or 0.5 MG/DOSE, 2 MG/3ML injection pen INJECT 0.75 ML (0.5 MG TOTAL) UNDER THE SKIN EVERY SEVEN DAYS 3 mL 4    Ozempic, 1 MG/DOSE, 4 MG/3ML injection pen INJECT 0.75 ML (1 MG TOTAL) UNDER THE SKIN ONCE A WEEK 3 mL 0    Respiratory Therapy Supplies (NEBULIZER) DONA by Does not apply route every 4 (four) hours while awake 90 each 1    semaglutide, 2 mg/dose, (Ozempic) 8 mg/ mL injection pen Inject 0.75 mL (2 mg total) under the skin every 7 days 9 mL 1    Syringe/Needle, Disp, (SYRINGE 3CC/25GX1\") 25G X 1\" 3 ML MISC Use once a week 50 each 0    Tirosint 150 MCG CAPS Take 1 capsule (150 mcg total) by mouth daily before breakfast      tiZANidine (ZANAFLEX) 4 mg tablet       Topiramate  MG CP24 Take 100 mg by mouth 2 (two) times a day      triamcinolone (KENALOG) 0.1 % ointment Apply " "topically 2 (two) times a day 453 g 2    Ubrelvy 100 MG tablet       Ventolin  (90 Base) MCG/ACT inhaler INHALE TWO PUFFS EVERY 4 (FOUR) HOURS AS NEEDED FOR WHEEZING OR SHORTNESS OF BREATH 18 g 0    XIFAXAN 550 MG tablet       Xiidra 5 % op solution ADMINISTER 1 DROP TO BOTH EYES TWO (TWO) TIMES A DAY 60 mL 0     Current Facility-Administered Medications   Medication Dose Route Frequency Provider Last Rate Last Admin    cyanocobalamin injection 1,000 mcg  1,000 mcg Intramuscular Q30 Days ERICKA George   1,000 mcg at 07/27/23 1025        Allergies   Allergen Reactions    Doxycycline Rash    Erythromycin Rash    Other Edema and Wheezing     jalapeno    Augmentin [Amoxicillin-Pot Clavulanate] GI Intolerance    Latex Rash    Duloxetine      Other reaction(s): Nausea, Loopy    Eletriptan      Pain in lower jaw with pressure in throat    Emgality [Galcanezumab-Gnlm]     Galcanezumab      rash    Lidocaine Other (See Comments)     Headache    Methocarbamol Other (See Comments)     Dizzyness       Review of Systems    Video Exam    There were no vitals filed for this visit.    Physical Exam     Behavioral Health Psychotherapy Progress Note    Psychotherapy Provided: Individual Psychotherapy      Diagnosis ICD-10-CM Associated Orders   1. Bipolar 2 disorder (HCC)  F31.81       2. ZAINA (generalized anxiety disorder)  F41.1            Goals addressed in session: Goal 1     DATA: Met with Belem Linares for a scheduled individual session.  Topics discussed included emotional wellness, coping skills, and relationships with family.  Belem is feeling \"tired\". She and her grandson went to [place] with her ex. She said they had a good time but it was very hot, it was a lot of walking and they caught covid. They both had fevers and felt very run down. She and her grandson are glad to be home. She is still feeling very run down. Belem talked about her daughters and things that have been going on in their lives, " "processing her thoughts and stress. Belem shows evidence of utilizing effective communication skills, engaging in problem solving, and maintaining emotional composure to manage mental health symptoms.  During this session, this clinical used the following therapeutic modalities supportive psychotherapy and client-centered therapy.    Substance Abuse was not addressed during this session. If the client is diagnosed with a co-occurring substance use disorder, please indicate any changes in the frequency or amount of use: N/A. Stage of change for addressing substance use diagnoses: No substance use/Not applicable    ASSESSMENT:  Belem presents with a Euthymic/ normal mood.  her affect is normal range and intensity, appropriate, which is congruent, with her  mood and the content of the session. Belem was oriented x3. She was focused and engaged. Belem exhibits good therapeutic rapport with this clinician. The client has made progress on their goals.    Belem Linares presents with a none risk of suicide, none risk of self-harm, and none risk of harm to others.    For any risk assessment that surpasses a \"low\" rating, a safety plan must be developed.    A safety plan was indicated: no  If yes, describe in detail: N/A    PLAN: Belem will return in 1-2 weeks for the next scheduled session. At the next session, the therapist will use supportive psychotherapy and client-centered therapy to address depression and anxiety.    Behavioral Health Treatment Plan and Discharge Planning: Belem Linares is aware of and agrees to continue to work on their treatment plan. They have identified and are working toward their discharge goals. yes    Visit start and stop times:    07/24/24  Start Time: 0803  Stop Time: 0904  Total Visit Time: 61 minutes        "

## 2024-07-30 LAB
APOB+LDLR+PCSK9 GENE MUT ANL BLD/T: NOT DETECTED
BRCA1+BRCA2 DEL+DUP + FULL MUT ANL BLD/T: NOT DETECTED
MLH1+MSH2+MSH6+PMS2 GN DEL+DUP+FUL M: NOT DETECTED

## 2024-07-31 ENCOUNTER — TELEMEDICINE (OUTPATIENT)
Dept: PSYCHIATRY | Facility: CLINIC | Age: 54
End: 2024-07-31
Payer: COMMERCIAL

## 2024-07-31 DIAGNOSIS — F41.1 GAD (GENERALIZED ANXIETY DISORDER): ICD-10-CM

## 2024-07-31 DIAGNOSIS — F31.81 BIPOLAR 2 DISORDER (HCC): Primary | ICD-10-CM

## 2024-07-31 PROCEDURE — 90837 PSYTX W PT 60 MINUTES: CPT

## 2024-07-31 NOTE — PSYCH
Virtual Regular Visit    Verification of patient location:    Patient is located at Home in the following state in which I hold an active license [place]      Assessment/Plan:    Problem List Items Addressed This Visit       ZAINA (generalized anxiety disorder)    Bipolar 2 disorder (HCC) - Primary       Goals addressed in session: Goal 1          Reason for visit is No chief complaint on file.       Encounter provider Maisha Bonds LCSW      Recent Visits  Date Type Provider Dept   07/24/24 Telemedicine Maisha Bonds LCSW [facility]   Showing recent visits within past 7 days and meeting all other requirements  Today's Visits  Date Type Provider Dept   07/31/24 Telemedicine Maisha Bonds LCSW [facility]   Showing today's visits and meeting all other requirements  Future Appointments  No visits were found meeting these conditions.  Showing future appointments within next 150 days and meeting all other requirements       The patient was identified by name and date of birth. Belem Linares was informed that this is a telemedicine visit and that the visit is being conducted throughthe [employer] platform. She agrees to proceed..  My office door was closed. No one else was in the room.  She acknowledged consent and understanding of privacy and security of the video platform. The patient has agreed to participate and understands they can discontinue the visit at any time.    Patient is aware this is a billable service.     Subjective  Belem Linares is a 53 y.o. female.      HPI     Past Medical History:   Diagnosis Date    Anxiety     Anxiety     Asthma     Bipolar disorder (HCC)     Carpal tunnel syndrome     Chronic pain     lower back    Depression     Disease of thyroid gland     Dyslipidemia     Fibromyalgia     Hypothyroidism 2007    Irritable bowel     Kidney stones     Kidney stones 05/20/2019    Migraine     Obesity (BMI 30.0-34.9)     Psychiatric  disorder     depression/anxiety    Suicide attempt (HCC)     as teen    Vitamin D deficiency        Past Surgical History:   Procedure Laterality Date    BUNIONECTOMY       SECTION      CHOLECYSTECTOMY      PARTIAL HYSTERECTOMY      portion of  uterus still present    TONSILLECTOMY      TUBAL LIGATION         Current Outpatient Medications   Medication Sig Dispense Refill    albuterol (ProAir HFA) 90 mcg/act inhaler Inhale 2 puffs every 6 (six) hours as needed for wheezing or shortness of breath (Patient not taking: Reported on 2024) 8.5 g 0    azelastine (ASTELIN) 0.1 % nasal spray 1 SPRAY INTO EACH NOSTRIL TWO (TWO) TIMES A DAY USE IN EACH NOSTRIL AS DIRECTED 30 mL 2    Blood Glucose Monitoring Suppl (OneTouch Verio Reflect) w/Device KIT Check blood sugars once daily. Please substitute with appropriate alternative as covered by patient's insurance. Dx: E11.65 1 kit 0    butalbital-acetaminophen-caffeine (FIORICET,ESGIC) -40 mg per tablet Take 1 tablet by mouth every 6 (six) hours as needed for headaches 30 tablet 0    cariprazine (Vraylar) 6 MG capsule Take 1 capsule (6 mg total) by mouth daily 30 capsule 0    celecoxib (CELEBREX) 200 mg capsule Take 1 capsule (200 mg total) by mouth daily 30 capsule 2    clonazePAM (KlonoPIN) 1 mg tablet TAKE 1 TABLET (1 MG TOTAL) BY MOUTH DAILY AS NEEDED FOR ANXIETY 30 tablet 0    cyanocobalamin 1,000 mcg/mL Inject 1 mL (1,000 mcg total) into a muscle once a week 4 mL 1    Diclofenac Sodium (VOLTAREN) 1 % Apply 2 g topically 4 (four) times a day 50 g 0    dicyclomine (BENTYL) 10 mg capsule Take 1 capsule (10 mg total) by mouth 3 (three) times a day as needed (abd cramping) 30 capsule 0    fezolinetant (Veozah) tablet Take 1 tablet (45 mg total) by mouth daily 30 tablet 2    fremanezumab-vfrm (Ajovy) 225 MG/1.5ML auto-injector Inject 225 mg under the skin every 30 (thirty) days      glucose blood (OneTouch Verio) test strip Check blood sugars once daily. Please  "substitute with appropriate alternative as covered by patient's insurance. Dx: E11.65 100 each 3    ketoconazole (NIZORAL) 2 % cream       levocetirizine (XYZAL) 5 MG tablet Take 1 tablet (5 mg total) by mouth every evening 30 tablet 1    meclizine (ANTIVERT) 25 mg tablet TAKE 1 TABLET (25 MG TOTAL) BY MOUTH EVERY EIGHT (EIGHT) HOURS AS NEEDED FOR DIZZINESS 90 tablet 5    metoclopramide (REGLAN) 5 mg tablet TAKE 1 TABLET (5 MG TOTAL) BY MOUTH 4 (FOUR) TIMES A DAY (Patient not taking: Reported on 5/28/2024) 30 tablet 0    montelukast (SINGULAIR) 10 mg tablet Take 1 tablet (10 mg total) by mouth daily at bedtime (Patient not taking: Reported on 5/28/2024) 30 tablet 5    naratriptan (AMERGE) 2.5 MG tablet       omeprazole (PriLOSEC) 40 MG capsule TAKE 1 CAPSULE (40 MG TOTAL) BY MOUTH DAILY (Patient not taking: Reported on 5/28/2024) 90 capsule 0    ondansetron (ZOFRAN-ODT) 4 mg disintegrating tablet Take 1 tablet (4 mg total) by mouth every 8 (eight) hours as needed for nausea or vomiting 20 tablet 5    OneTouch Delica Lancets 33G MISC Check blood sugars once daily. Please substitute with appropriate alternative as covered by patient's insurance. Dx: E11.65 100 each 3    Ozempic, 0.25 or 0.5 MG/DOSE, 2 MG/3ML injection pen INJECT 0.75 ML (0.5 MG TOTAL) UNDER THE SKIN EVERY SEVEN DAYS 3 mL 4    Ozempic, 1 MG/DOSE, 4 MG/3ML injection pen INJECT 0.75 ML (1 MG TOTAL) UNDER THE SKIN ONCE A WEEK 3 mL 0    Respiratory Therapy Supplies (NEBULIZER) DONA by Does not apply route every 4 (four) hours while awake 90 each 1    semaglutide, 2 mg/dose, (Ozempic) 8 mg/ mL injection pen Inject 0.75 mL (2 mg total) under the skin every 7 days 9 mL 1    Syringe/Needle, Disp, (SYRINGE 3CC/25GX1\") 25G X 1\" 3 ML MISC Use once a week 50 each 0    Tirosint 150 MCG CAPS Take 1 capsule (150 mcg total) by mouth daily before breakfast      tiZANidine (ZANAFLEX) 4 mg tablet       Topiramate  MG CP24 Take 100 mg by mouth 2 (two) times a day   " "   triamcinolone (KENALOG) 0.1 % ointment Apply topically 2 (two) times a day 453 g 2    Ubrelvy 100 MG tablet       Ventolin  (90 Base) MCG/ACT inhaler INHALE TWO PUFFS EVERY 4 (FOUR) HOURS AS NEEDED FOR WHEEZING OR SHORTNESS OF BREATH 18 g 0    XIFAXAN 550 MG tablet       Xiidra 5 % op solution ADMINISTER 1 DROP TO BOTH EYES TWO (TWO) TIMES A DAY 60 mL 0     Current Facility-Administered Medications   Medication Dose Route Frequency Provider Last Rate Last Admin    cyanocobalamin injection 1,000 mcg  1,000 mcg Intramuscular Q30 Days RyanneERICKA Leone   1,000 mcg at 07/27/23 1025        Allergies   Allergen Reactions    Doxycycline Rash    Erythromycin Rash    Other Edema and Wheezing     jalapeno    Augmentin [Amoxicillin-Pot Clavulanate] GI Intolerance    Latex Rash    Duloxetine      Other reaction(s): Nausea, Loopy    Eletriptan      Pain in lower jaw with pressure in throat    Emgality [Galcanezumab-Gnlm]     Galcanezumab      rash    Lidocaine Other (See Comments)     Headache    Methocarbamol Other (See Comments)     Dizzyness       Review of Systems    Video Exam    There were no vitals filed for this visit.    Physical Exam     Behavioral Health Psychotherapy Progress Note    Psychotherapy Provided: Individual Psychotherapy      Diagnosis ICD-10-CM Associated Orders   1. Bipolar 2 disorder (HCC)  F31.81       2. ZAINA (generalized anxiety disorder)  F41.1            Goals addressed in session: Goal 1     DATA: Met with Belem LEDESMA Vijay for a scheduled individual session.  Topics discussed included emotional wellness, coping skills, and family stressors.  Belem is feeling \"not sure\". She's still having covid-related problems. Her grandson was not feeling well and the urgent care was no help to her twice. She ended up having to take him to the ER to get an antibiotic. He had to follow up with an ENT and may need tubes put in his ears again. Belem continues to feel stress related to her " "grandson's mother. She doesn't respond to Belem's texts and if she does, she's trying to take control of things and tell Belem what to do. Belem has had her grandson since he was 11 months old and he's now 7 years old; she feels her grandson's mother doesn't even know him let alone know what he needs. We talked through and processed her feelings. Belem shows evidence of utilizing effective communication skills, engaging in problem solving, and maintaining emotional composure to manage mental health symptoms.  During this session, this clinical used the following therapeutic modalities supportive psychotherapy and client-centered therapy.    Substance Abuse was not addressed during this session. If the client is diagnosed with a co-occurring substance use disorder, please indicate any changes in the frequency or amount of use: N/A. Stage of change for addressing substance use diagnoses: No substance use/Not applicable    ASSESSMENT:  Belem presents with a Euthymic/ normal mood.  her affect is normal range and intensity, appropriate, which is congruent, with her  mood and the content of the session. Belem was oriented x3. She was focused and engaged. Belem exhibits good therapeutic rapport with this clinician. The client has made progress on their goals.    Belem Linares presents with a none risk of suicide, none risk of self-harm, and none risk of harm to others.    For any risk assessment that surpasses a \"low\" rating, a safety plan must be developed.    A safety plan was indicated: no  If yes, describe in detail: N/A    PLAN: Belem will return in 1 month for the next scheduled session.  At the next session, the therapist will use supportive psychotherapy and client-centered therapy to address mood and anxiety.    Behavioral Health Treatment Plan and Discharge Planning: Belem Linares is aware of and agrees to continue to work on their treatment plan. They have identified and are working toward " their discharge goals. yes    Visit start and stop times:    07/31/24  Start Time: 1230  Stop Time: 1335  Total Visit Time: 65 minutes

## 2024-08-06 ENCOUNTER — OFFICE VISIT (OUTPATIENT)
Dept: FAMILY MEDICINE CLINIC | Facility: CLINIC | Age: 54
End: 2024-08-06
Payer: COMMERCIAL

## 2024-08-06 ENCOUNTER — TELEPHONE (OUTPATIENT)
Age: 54
End: 2024-08-06

## 2024-08-06 VITALS
HEART RATE: 76 BPM | DIASTOLIC BLOOD PRESSURE: 74 MMHG | HEIGHT: 64 IN | BODY MASS INDEX: 30.46 KG/M2 | SYSTOLIC BLOOD PRESSURE: 120 MMHG | TEMPERATURE: 97.6 F | WEIGHT: 178.4 LBS | OXYGEN SATURATION: 99 %

## 2024-08-06 DIAGNOSIS — R21 RASH AND NONSPECIFIC SKIN ERUPTION: ICD-10-CM

## 2024-08-06 DIAGNOSIS — R53.83 OTHER FATIGUE: Primary | ICD-10-CM

## 2024-08-06 DIAGNOSIS — R79.89 LOW VITAMIN B12 LEVEL: ICD-10-CM

## 2024-08-06 PROCEDURE — G2211 COMPLEX E/M VISIT ADD ON: HCPCS | Performed by: FAMILY MEDICINE

## 2024-08-06 PROCEDURE — 99214 OFFICE O/P EST MOD 30 MIN: CPT | Performed by: FAMILY MEDICINE

## 2024-08-06 RX ORDER — TRIAMCINOLONE ACETONIDE 1 MG/G
CREAM TOPICAL 2 TIMES DAILY
Qty: 15 G | Refills: 0 | Status: SHIPPED | OUTPATIENT
Start: 2024-08-06

## 2024-08-06 RX ORDER — CYANOCOBALAMIN 1000 UG/ML
1000 INJECTION, SOLUTION INTRAMUSCULAR; SUBCUTANEOUS WEEKLY
Qty: 4 ML | Refills: 1 | Status: SHIPPED | OUTPATIENT
Start: 2024-08-06

## 2024-08-06 NOTE — PROGRESS NOTES
Ambulatory Visit  Name: Belem Linares      : 1970      MRN: [id number]  Encounter Provider: ERICKA Tabor  Encounter Date: 2024   Encounter department: [facility] [address]    Assessment & Plan   1. Other fatigue  Comments:  Discussed with patient multi Torio, restart the temporal supplementation obtain TSH  2. Low vitamin B12 level  Comments:  Restart patient left hand dated  Orders:  -     cyanocobalamin 1,000 mcg/mL; Inject 1 mL (1,000 mcg total) into a muscle once a week  3. Rash and nonspecific skin eruption  -     triamcinolone (KENALOG) 0.1 % cream; Apply topically 2 (two) times a day       History of Present Illness     Complaining of fatigue  COVID end of July  Understands could be multifactorial  Has been off 12 injections, thyroid not checked recently  B12 def , thyroid , hypothyroid   Endocrine ,   Rash lower extremity foot side/heel  Pruritic  No other areas of involvement  No change in lotions creams no known contact    Fatigue  Associated symptoms include fatigue. Pertinent negatives include no abdominal pain, arthralgias, chest pain, chills, congestion, coughing, fever, headaches, joint swelling, myalgias, nausea, numbness, rash, sore throat, vomiting or weakness.       Review of Systems   Constitutional:  Positive for fatigue. Negative for appetite change, chills, fever and unexpected weight change.   HENT:  Negative for congestion, dental problem, ear pain, hearing loss, postnasal drip, rhinorrhea, sinus pressure, sinus pain, sneezing, sore throat, tinnitus and voice change.    Eyes:  Negative for visual disturbance.   Respiratory:  Negative for apnea, cough, chest tightness and shortness of breath.    Cardiovascular:  Negative for chest pain, palpitations and leg swelling.   Gastrointestinal:  Negative for abdominal pain, blood in stool, constipation, diarrhea, nausea and vomiting.   Endocrine: Negative for cold intolerance, heat  "intolerance, polydipsia, polyphagia and polyuria.   Genitourinary:  Negative for decreased urine volume, difficulty urinating, dysuria, frequency and hematuria.   Musculoskeletal:  Negative for arthralgias, back pain, gait problem, joint swelling and myalgias.   Skin:  Negative for color change, rash and wound.   Allergic/Immunologic: Negative for environmental allergies and food allergies.   Neurological:  Negative for dizziness, syncope, weakness, light-headedness, numbness and headaches.   Hematological:  Negative for adenopathy. Does not bruise/bleed easily.   Psychiatric/Behavioral:  Negative for sleep disturbance and suicidal ideas. The patient is not nervous/anxious.        Objective     /74 (BP Location: Left arm, Patient Position: Sitting)   Pulse 76   Temp 97.6 °F (36.4 °C)   Ht 5' 4\" (1.626 m)   Wt 80.9 kg (178 lb 6.4 oz)   SpO2 99%   BMI 30.62 kg/m²     Physical Exam  Constitutional:       General: She is not in acute distress.     Appearance: She is well-developed. She is not ill-appearing or toxic-appearing.   HENT:      Head: Normocephalic and atraumatic.   Cardiovascular:      Rate and Rhythm: Normal rate and regular rhythm.      Heart sounds: Normal heart sounds.   Pulmonary:      Effort: Pulmonary effort is normal.      Breath sounds: Normal breath sounds.   Musculoskeletal:         General: Normal range of motion.      Cervical back: Normal range of motion and neck supple.   Skin:     General: Skin is warm and dry.      Findings: Rash present.      Comments: Right aspect heel lower foot right  Maculopapular pruritic   Neurological:      Mental Status: She is alert and oriented to person, place, and time.      Deep Tendon Reflexes: Reflexes are normal and symmetric.   Psychiatric:         Behavior: Behavior normal.         Thought Content: Thought content normal.         Judgment: Judgment normal.       Administrative Statements           "

## 2024-08-06 NOTE — TELEPHONE ENCOUNTER
Pt was seen in the office today provider mention that he would send in a script for steroids for her, pt says its not at the pharmacy.

## 2024-08-07 ENCOUNTER — TELEPHONE (OUTPATIENT)
Dept: FAMILY MEDICINE CLINIC | Facility: CLINIC | Age: 54
End: 2024-08-07

## 2024-08-14 DIAGNOSIS — G47.20 DISRUPTED SLEEP-WAKE CYCLE: ICD-10-CM

## 2024-08-14 DIAGNOSIS — F51.8 DISRUPTED SLEEP-WAKE CYCLE: ICD-10-CM

## 2024-08-14 DIAGNOSIS — N95.1 HOT FLASHES DUE TO MENOPAUSE: ICD-10-CM

## 2024-08-16 ENCOUNTER — TELEMEDICINE (OUTPATIENT)
Dept: PSYCHIATRY | Facility: CLINIC | Age: 54
End: 2024-08-16
Payer: COMMERCIAL

## 2024-08-16 DIAGNOSIS — F41.1 GAD (GENERALIZED ANXIETY DISORDER): ICD-10-CM

## 2024-08-16 DIAGNOSIS — F31.81 BIPOLAR 2 DISORDER (HCC): Primary | ICD-10-CM

## 2024-08-16 DIAGNOSIS — Z79.899 MEDICATION MANAGEMENT: Primary | ICD-10-CM

## 2024-08-16 PROCEDURE — 90837 PSYTX W PT 60 MINUTES: CPT

## 2024-08-16 RX ORDER — FEZOLINETANT 45 MG/1
45 TABLET, FILM COATED ORAL DAILY
Qty: 30 TABLET | Refills: 1 | Status: SHIPPED | OUTPATIENT
Start: 2024-08-16

## 2024-08-16 NOTE — PSYCH
Virtual Regular Visit    Verification of patient location:    Patient is located at Home in the following state in which I hold an active license [place]      Assessment/Plan:    Problem List Items Addressed This Visit       ZAINA (generalized anxiety disorder)    Bipolar 2 disorder (HCC) - Primary       Goals addressed in session: Goal 1          Reason for visit is No chief complaint on file.       Encounter provider Maisha Bonds LCSW      Recent Visits  No visits were found meeting these conditions.  Showing recent visits within past 7 days and meeting all other requirements  Today's Visits  Date Type Provider Dept   08/16/24 Telemedicine Maisha Bonds LCSW [facility]   Showing today's visits and meeting all other requirements  Future Appointments  No visits were found meeting these conditions.  Showing future appointments within next 150 days and meeting all other requirements       The patient was identified by name and date of birth. Belem Linares was informed that this is a telemedicine visit and that the visit is being conducted throughthe WedWu platform. She agrees to proceed..  My office door was closed. No one else was in the room.  She acknowledged consent and understanding of privacy and security of the video platform. The patient has agreed to participate and understands they can discontinue the visit at any time.    Patient is aware this is a billable service.     Subjective  Belem Linares is a 53 y.o. female.      HPI     Past Medical History:   Diagnosis Date    Anxiety     Anxiety     Asthma     Bipolar disorder (HCC)     Carpal tunnel syndrome     Chronic pain     lower back    Depression     Disease of thyroid gland     Dyslipidemia     Fibromyalgia     Hypothyroidism 2007    Irritable bowel     Kidney stones     Kidney stones 05/20/2019    Migraine     Obesity (BMI 30.0-34.9)     Psychiatric disorder     depression/anxiety    Suicide attempt (HCC)      as teen    Vitamin D deficiency        Past Surgical History:   Procedure Laterality Date    BUNIONECTOMY       SECTION      CHOLECYSTECTOMY      PARTIAL HYSTERECTOMY      portion of  uterus still present    TONSILLECTOMY      TUBAL LIGATION         Current Outpatient Medications   Medication Sig Dispense Refill    albuterol (ProAir HFA) 90 mcg/act inhaler Inhale 2 puffs every 6 (six) hours as needed for wheezing or shortness of breath (Patient not taking: Reported on 2024) 8.5 g 0    azelastine (ASTELIN) 0.1 % nasal spray 1 SPRAY INTO EACH NOSTRIL TWO (TWO) TIMES A DAY USE IN EACH NOSTRIL AS DIRECTED 30 mL 2    Blood Glucose Monitoring Suppl (OneTouch Verio Reflect) w/Device KIT Check blood sugars once daily. Please substitute with appropriate alternative as covered by patient's insurance. Dx: E11.65 1 kit 0    butalbital-acetaminophen-caffeine (FIORICET,ESGIC) -40 mg per tablet Take 1 tablet by mouth every 6 (six) hours as needed for headaches 30 tablet 0    cariprazine (Vraylar) 6 MG capsule Take 1 capsule (6 mg total) by mouth daily 30 capsule 0    celecoxib (CELEBREX) 200 mg capsule Take 1 capsule (200 mg total) by mouth daily 30 capsule 2    clonazePAM (KlonoPIN) 1 mg tablet TAKE 1 TABLET (1 MG TOTAL) BY MOUTH DAILY AS NEEDED FOR ANXIETY 30 tablet 0    cyanocobalamin 1,000 mcg/mL Inject 1 mL (1,000 mcg total) into a muscle once a week 4 mL 1    Diclofenac Sodium (VOLTAREN) 1 % Apply 2 g topically 4 (four) times a day 50 g 0    dicyclomine (BENTYL) 10 mg capsule Take 1 capsule (10 mg total) by mouth 3 (three) times a day as needed (abd cramping) 30 capsule 0    fezolinetant (Veozah) tablet Take 1 tablet (45 mg total) by mouth daily 30 tablet 2    fremanezumab-vfrm (Ajovy) 225 MG/1.5ML auto-injector Inject 225 mg under the skin every 30 (thirty) days      glucose blood (OneTouch Verio) test strip Check blood sugars once daily. Please substitute with appropriate alternative as covered by  "patient's insurance. Dx: E11.65 100 each 3    ketoconazole (NIZORAL) 2 % cream       levocetirizine (XYZAL) 5 MG tablet Take 1 tablet (5 mg total) by mouth every evening 30 tablet 1    meclizine (ANTIVERT) 25 mg tablet TAKE 1 TABLET (25 MG TOTAL) BY MOUTH EVERY EIGHT (EIGHT) HOURS AS NEEDED FOR DIZZINESS 90 tablet 5    metoclopramide (REGLAN) 5 mg tablet TAKE 1 TABLET (5 MG TOTAL) BY MOUTH 4 (FOUR) TIMES A DAY (Patient not taking: Reported on 5/28/2024) 30 tablet 0    montelukast (SINGULAIR) 10 mg tablet Take 1 tablet (10 mg total) by mouth daily at bedtime (Patient not taking: Reported on 5/28/2024) 30 tablet 5    naratriptan (AMERGE) 2.5 MG tablet       omeprazole (PriLOSEC) 40 MG capsule TAKE 1 CAPSULE (40 MG TOTAL) BY MOUTH DAILY (Patient not taking: Reported on 5/28/2024) 90 capsule 0    ondansetron (ZOFRAN-ODT) 4 mg disintegrating tablet Take 1 tablet (4 mg total) by mouth every 8 (eight) hours as needed for nausea or vomiting 20 tablet 5    OneTouch Delica Lancets 33G MISC Check blood sugars once daily. Please substitute with appropriate alternative as covered by patient's insurance. Dx: E11.65 100 each 3    Ozempic, 0.25 or 0.5 MG/DOSE, 2 MG/3ML injection pen INJECT 0.75 ML (0.5 MG TOTAL) UNDER THE SKIN EVERY SEVEN DAYS 3 mL 4    Ozempic, 1 MG/DOSE, 4 MG/3ML injection pen INJECT 0.75 ML (1 MG TOTAL) UNDER THE SKIN ONCE A WEEK 3 mL 0    Respiratory Therapy Supplies (NEBULIZER) DONA by Does not apply route every 4 (four) hours while awake 90 each 1    semaglutide, 2 mg/dose, (Ozempic) 8 mg/ mL injection pen Inject 0.75 mL (2 mg total) under the skin every 7 days 9 mL 1    Syringe/Needle, Disp, (SYRINGE 3CC/25GX1\") 25G X 1\" 3 ML MISC Use once a week 50 each 0    Tirosint 150 MCG CAPS Take 1 capsule (150 mcg total) by mouth daily before breakfast      tiZANidine (ZANAFLEX) 4 mg tablet       Topiramate  MG CP24 Take 100 mg by mouth 2 (two) times a day      triamcinolone (KENALOG) 0.1 % cream Apply topically " "2 (two) times a day 15 g 0    triamcinolone (KENALOG) 0.1 % ointment Apply topically 2 (two) times a day 453 g 2    Ubrelvy 100 MG tablet       Ventolin  (90 Base) MCG/ACT inhaler INHALE TWO PUFFS EVERY 4 (FOUR) HOURS AS NEEDED FOR WHEEZING OR SHORTNESS OF BREATH 18 g 0    XIFAXAN 550 MG tablet       Xiidra 5 % op solution ADMINISTER 1 DROP TO BOTH EYES TWO (TWO) TIMES A DAY 60 mL 0     Current Facility-Administered Medications   Medication Dose Route Frequency Provider Last Rate Last Admin    cyanocobalamin injection 1,000 mcg  1,000 mcg Intramuscular Q30 Days ERICKA George   1,000 mcg at 07/27/23 1025        Allergies   Allergen Reactions    Doxycycline Rash    Erythromycin Rash    Other Edema and Wheezing     jalapeno    Augmentin [Amoxicillin-Pot Clavulanate] GI Intolerance    Latex Rash    Duloxetine      Other reaction(s): Nausea, Loopy    Eletriptan      Pain in lower jaw with pressure in throat    Emgality [Galcanezumab-Gnlm]     Galcanezumab      rash    Lidocaine Other (See Comments)     Headache    Methocarbamol Other (See Comments)     Dizzyness       Review of Systems    Video Exam    There were no vitals filed for this visit.    Physical Exam     Behavioral Health Psychotherapy Progress Note    Psychotherapy Provided: Individual Psychotherapy      Diagnosis ICD-10-CM Associated Orders   1. Bipolar 2 disorder (HCC)  F31.81       2. ZAINA (generalized anxiety disorder)  F41.1            Goals addressed in session: Goal 1     DATA: Met with Belem Linares for a scheduled individual session.  Topics discussed included emotional wellness, coping skills, and family stressors.  Belem is feeling \"ok\". She states she's trying to cope with various things in her life. She was with her ex for 14 years and they are still very close. She feels she is still grieving because she thought that \"was it\" for her, that she'd be with him for the rest of her life. She is stressed about her daughters. The " "one is having increased mental health issues and the other, her grandson's mother, is inconsistent and dishonest in most of her interactions with Belem. It causes her a lot of anxiety to think about her daughter trying to take her grandson away. She doesn't feel it's a healthy environment for him and she's worked really hard to get her grandson where he is mentally and behaviorally. We talked through her anxiety and processed her feelings. Belem shows evidence of utilizing effective communication skills, engaging in problem solving, and maintaining emotional composure to manage mental health symptoms.  During this session, this clinical used the following therapeutic modalities supportive psychotherapy, client-centered therapy, and CBT techniques.    Substance Abuse was not addressed during this session. If the client is diagnosed with a co-occurring substance use disorder, please indicate any changes in the frequency or amount of use: N/A. Stage of change for addressing substance use diagnoses: No substance use/Not applicable    ASSESSMENT:  Belem presents with a Euthymic/ normal mood.  her affect is normal range and intensity, appropriate, which is congruent, with her  mood and the content of the session. Belem was oriented x3. She was focused and engaged. Belem exhibits good therapeutic rapport with this clinician. The client has made progress on their goals.    Belem Linares presents with a none risk of suicide, none risk of self-harm, and none risk of harm to others.    For any risk assessment that surpasses a \"low\" rating, a safety plan must be developed.    A safety plan was indicated: no  If yes, describe in detail: N/A    PLAN: Belem will return in 3 weeks for the next scheduled session.  At the next session, the therapist will use supportive psychotherapy and client-centered therapy to address depression and anxiety.    Behavioral Health Treatment Plan and Discharge Planning: Belem Linares " is aware of and agrees to continue to work on their treatment plan. They have identified and are working toward their discharge goals. yes    Visit start and stop times:    08/16/24  Start Time: 0901  Stop Time: 1002  Total Visit Time: 61 minutes

## 2024-08-16 NOTE — TELEPHONE ENCOUNTER
I renewed this medication. She needs to complete another hepatic panel around 10/2/24. The order is already in her chart.

## 2024-08-22 DIAGNOSIS — E03.8 OTHER SPECIFIED HYPOTHYROIDISM: ICD-10-CM

## 2024-08-22 RX ORDER — LEVOTHYROXINE SODIUM 13 UG/1
150 CAPSULE ORAL
Qty: 90 CAPSULE | Refills: 1 | Status: SHIPPED | OUTPATIENT
Start: 2024-08-22 | End: 2024-11-20

## 2024-09-03 ENCOUNTER — TELEPHONE (OUTPATIENT)
Age: 54
End: 2024-09-03

## 2024-09-03 NOTE — TELEPHONE ENCOUNTER
Patient is calling regarding cancelling an appointment.    Date/Time: 9/3/24    Reason: sick    Patient was rescheduled: YES [] NO [x]  If yes, when was Patient reschedule for: Upcoming appointment on 9/9/24    Patient requesting call back to reschedule: YES [] NO [x]

## 2024-09-05 DIAGNOSIS — J02.9 SORE THROAT: ICD-10-CM

## 2024-09-05 DIAGNOSIS — R05.9 COUGH, UNSPECIFIED TYPE: ICD-10-CM

## 2024-09-05 RX ORDER — MONTELUKAST SODIUM 10 MG/1
10 TABLET ORAL
Qty: 30 TABLET | Refills: 5 | Status: SHIPPED | OUTPATIENT
Start: 2024-09-05

## 2024-09-09 ENCOUNTER — TELEMEDICINE (OUTPATIENT)
Dept: PSYCHIATRY | Facility: CLINIC | Age: 54
End: 2024-09-09
Payer: COMMERCIAL

## 2024-09-09 DIAGNOSIS — F31.81 BIPOLAR 2 DISORDER (HCC): ICD-10-CM

## 2024-09-09 DIAGNOSIS — F41.1 GAD (GENERALIZED ANXIETY DISORDER): Primary | ICD-10-CM

## 2024-09-09 PROCEDURE — 90837 PSYTX W PT 60 MINUTES: CPT
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
## 2024-09-18 ENCOUNTER — IMMUNIZATIONS (OUTPATIENT)
Dept: FAMILY MEDICINE CLINIC | Facility: CLINIC | Age: 54
End: 2024-09-18
Payer: COMMERCIAL

## 2024-09-18 DIAGNOSIS — Z23 NEED FOR INFLUENZA VACCINATION: Primary | ICD-10-CM

## 2024-09-18 PROCEDURE — G0008 ADMIN INFLUENZA VIRUS VAC: HCPCS

## 2024-09-18 PROCEDURE — 90673 RIV3 VACCINE NO PRESERV IM: CPT

## 2024-09-23 ENCOUNTER — TELEMEDICINE (OUTPATIENT)
Dept: PSYCHIATRY | Facility: CLINIC | Age: 54
End: 2024-09-23
Payer: COMMERCIAL

## 2024-09-23 DIAGNOSIS — F31.81 BIPOLAR 2 DISORDER (HCC): Primary | ICD-10-CM

## 2024-09-23 DIAGNOSIS — F41.1 GAD (GENERALIZED ANXIETY DISORDER): ICD-10-CM

## 2024-09-23 PROCEDURE — 90837 PSYTX W PT 60 MINUTES: CPT
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
## 2024-09-27 NOTE — PROGRESS NOTES
Ambulatory Visit  Name: Belem Linares      : 1970      MRN: [id number]  Encounter Provider: ERICKA Carolina  Encounter Date: 2024   Encounter department: [facility]    Assessment & Plan  Hypothyroidism due to Hashimoto's thyroiditis  No recent thyroid labs available.  Will send for thyroid function test today and await results prior to making dose adjustments, if necessary. Continue Tirosint 150 mcg daily at this time.  She reports taking this medication consistently and correctly.    Component      Latest Ref Rng 2024   FREE T4      0.61 - 1.12 ng/dL 0.41 (L)    TSH 3RD GENERATON      0.450 - 4.500 uIU/mL 6.861 (H)         Orders:    TSH, 3rd generation    T4, free    Chronic fatigue  Continues to complain of profound fatigue.  Counseled that some fatigue may be related due to current thyroid levels however if fatigue does not improve when thyroid labs have returned to goal, she would benefit from further workup by her primary care provider.  Discussed consuming iron rich foods and compliance with B12 injection.  Also reviewed various aspects of her daily life which could be contributing to fatigue.         History of Present Illness     Belem Linares is a 54 y.o. female who presents to the office today for follow-up of hypothyroidism, secondary to Hashimoto's thyroiditis.  She was last seen in the office 2024 by Dr. Rose at which time she was transitioned from [place] Thyroid to Tirosint.  She does not have labs for today's visit.    Current Medication Regimen:   Tirosint 150 mcg daily      History obtained from : patient  Review of Systems   Constitutional:  Negative for chills, fever and unexpected weight change.   HENT:  Positive for voice change (hoarse). Negative for congestion, sore throat and trouble swallowing.    Respiratory:  Negative for cough and shortness of breath.    Cardiovascular:  Positive for chest  pain (sporadic, follows with cardiology). Negative for palpitations.   Gastrointestinal:  Negative for abdominal pain, constipation, diarrhea, nausea and vomiting.   Endocrine: Positive for cold intolerance and heat intolerance.   Musculoskeletal:  Positive for arthralgias and back pain.   Skin:  Negative for wound.   Neurological:  Negative for tremors.   Psychiatric/Behavioral:  Negative for sleep disturbance. The patient is not nervous/anxious.      Current Outpatient Medications on File Prior to Visit   Medication Sig Dispense Refill    albuterol (ProAir HFA) 90 mcg/act inhaler Inhale 2 puffs every 6 (six) hours as needed for wheezing or shortness of breath (Patient not taking: Reported on 4/30/2024) 8.5 g 0    Blood Glucose Monitoring Suppl (OneTouch Verio Reflect) w/Device KIT Check blood sugars once daily. Please substitute with appropriate alternative as covered by patient's insurance. Dx: E11.65 1 kit 0    butalbital-acetaminophen-caffeine (FIORICET,ESGIC) -40 mg per tablet Take 1 tablet by mouth every 6 (six) hours as needed for headaches 30 tablet 0    cariprazine (Vraylar) 6 MG capsule Take 1 capsule (6 mg total) by mouth daily 30 capsule 0    celecoxib (CELEBREX) 200 mg capsule Take 1 capsule (200 mg total) by mouth daily 30 capsule 2    clonazePAM (KlonoPIN) 1 mg tablet TAKE 1 TABLET (1 MG TOTAL) BY MOUTH DAILY AS NEEDED FOR ANXIETY 30 tablet 0    cyanocobalamin 1,000 mcg/mL Inject 1 mL (1,000 mcg total) into a muscle once a week 4 mL 1    dicyclomine (BENTYL) 10 mg capsule Take 1 capsule (10 mg total) by mouth 3 (three) times a day as needed (abd cramping) 30 capsule 0    fremanezumab-vfrm (Ajovy) 225 MG/1.5ML auto-injector Inject 225 mg under the skin every 30 (thirty) days      glucose blood (OneTouch Verio) test strip Check blood sugars once daily. Please substitute with appropriate alternative as covered by patient's insurance. Dx: E11.65 100 each 3    ketoconazole (NIZORAL) 2 % cream        "meclizine (ANTIVERT) 25 mg tablet TAKE 1 TABLET (25 MG TOTAL) BY MOUTH EVERY EIGHT (EIGHT) HOURS AS NEEDED FOR DIZZINESS 90 tablet 5    naratriptan (AMERGE) 2.5 MG tablet       ondansetron (ZOFRAN-ODT) 4 mg disintegrating tablet Take 1 tablet (4 mg total) by mouth every 8 (eight) hours as needed for nausea or vomiting 20 tablet 5    OneTouch Delica Lancets 33G MISC Check blood sugars once daily. Please substitute with appropriate alternative as covered by patient's insurance. Dx: E11.65 100 each 3    Respiratory Therapy Supplies (NEBULIZER) DONA by Does not apply route every 4 (four) hours while awake 90 each 1    semaglutide, 2 mg/dose, (Ozempic) 8 mg/ mL injection pen Inject 0.75 mL (2 mg total) under the skin every 7 days 9 mL 1    Syringe/Needle, Disp, (SYRINGE 3CC/25GX1\") 25G X 1\" 3 ML MISC Use once a week 50 each 0    Tirosint 150 MCG CAPS TAKE 1 CAPSULE (150 MCG TOTAL) BY MOUTH DAILY BEFORE BREAKFAST 90 capsule 1    tiZANidine (ZANAFLEX) 4 mg tablet       Topiramate  MG CP24 Take 100 mg by mouth 2 (two) times a day      triamcinolone (KENALOG) 0.1 % cream Apply topically 2 (two) times a day 15 g 0    triamcinolone (KENALOG) 0.1 % ointment Apply topically 2 (two) times a day 453 g 2    Ubrelvy 100 MG tablet       Ventolin  (90 Base) MCG/ACT inhaler INHALE TWO PUFFS EVERY 4 (FOUR) HOURS AS NEEDED FOR WHEEZING OR SHORTNESS OF BREATH 18 g 0    Veozah tablet TAKE 1 TABLET (45 MG TOTAL) BY MOUTH DAILY 30 tablet 1    XIFAXAN 550 MG tablet       Xiidra 5 % op solution ADMINISTER 1 DROP TO BOTH EYES TWO (TWO) TIMES A DAY 60 mL 0    [DISCONTINUED] azelastine (ASTELIN) 0.1 % nasal spray 1 SPRAY INTO EACH NOSTRIL TWO (TWO) TIMES A DAY USE IN EACH NOSTRIL AS DIRECTED 30 mL 2    [DISCONTINUED] Diclofenac Sodium (VOLTAREN) 1 % Apply 2 g topically 4 (four) times a day 50 g 0    [DISCONTINUED] levocetirizine (XYZAL) 5 MG tablet Take 1 tablet (5 mg total) by mouth every evening 30 tablet 1    [DISCONTINUED] " metoclopramide (REGLAN) 5 mg tablet TAKE 1 TABLET (5 MG TOTAL) BY MOUTH 4 (FOUR) TIMES A DAY (Patient not taking: Reported on 5/28/2024) 30 tablet 0    [DISCONTINUED] montelukast (SINGULAIR) 10 mg tablet TAKE 1 TABLET (10 MG TOTAL) BY MOUTH DAILY AT BEDTIME 30 tablet 5    [DISCONTINUED] omeprazole (PriLOSEC) 40 MG capsule TAKE 1 CAPSULE (40 MG TOTAL) BY MOUTH DAILY (Patient not taking: Reported on 5/28/2024) 90 capsule 0    [DISCONTINUED] Ozempic, 1 MG/DOSE, 4 MG/3ML injection pen INJECT 0.75 ML (1 MG TOTAL) UNDER THE SKIN ONCE A WEEK 3 mL 0     Current Facility-Administered Medications on File Prior to Visit   Medication Dose Route Frequency Provider Last Rate Last Admin    cyanocobalamin injection 1,000 mcg  1,000 mcg Intramuscular Q30 Days ERICKA George   1,000 mcg at 07/27/23 1025      Social History     Tobacco Use    Smoking status: Never     Passive exposure: Past    Smokeless tobacco: Never   Vaping Use    Vaping status: Never Used   Substance and Sexual Activity    Alcohol use: No    Drug use: Yes     Types: Marijuana     Comment: gummy on occasion for pain    Sexual activity: Not Currently     Partners: Male     Birth control/protection: Abstinence, Female Sterilization     Comment: Hysterectomy         Objective     There were no vitals taken for this visit.    Physical Exam  Vitals reviewed.   Constitutional:       General: She is not in acute distress.     Appearance: She is well-developed.   HENT:      Head: Normocephalic and atraumatic.      Mouth/Throat:      Mouth: Mucous membranes are moist.   Eyes:      Conjunctiva/sclera: Conjunctivae normal.   Cardiovascular:      Rate and Rhythm: Normal rate.   Pulmonary:      Effort: Pulmonary effort is normal. No respiratory distress.   Abdominal:      Palpations: Abdomen is soft.      Tenderness: There is no abdominal tenderness.   Musculoskeletal:         General: No swelling.      Cervical back: Normal range of motion.   Skin:     General: Skin is  warm and dry.      Capillary Refill: Capillary refill takes less than 2 seconds.   Neurological:      General: No focal deficit present.      Mental Status: She is alert and oriented to person, place, and time.   Psychiatric:         Mood and Affect: Mood normal.         Behavior: Behavior normal.         Thought Content: Thought content normal.

## 2024-09-30 ENCOUNTER — APPOINTMENT (OUTPATIENT)
Age: 54
End: 2024-09-30
Payer: COMMERCIAL

## 2024-09-30 ENCOUNTER — OFFICE VISIT (OUTPATIENT)
Age: 54
End: 2024-09-30
Payer: COMMERCIAL

## 2024-09-30 VITALS
SYSTOLIC BLOOD PRESSURE: 116 MMHG | WEIGHT: 181 LBS | HEART RATE: 71 BPM | BODY MASS INDEX: 30.9 KG/M2 | DIASTOLIC BLOOD PRESSURE: 72 MMHG | HEIGHT: 64 IN | OXYGEN SATURATION: 99 % | TEMPERATURE: 97.8 F

## 2024-09-30 DIAGNOSIS — E06.3 HYPOTHYROIDISM DUE TO HASHIMOTO'S THYROIDITIS: Primary | ICD-10-CM

## 2024-09-30 DIAGNOSIS — R53.82 CHRONIC FATIGUE: ICD-10-CM

## 2024-09-30 LAB
T4 FREE SERPL-MCNC: 0.62 NG/DL (ref 0.61–1.12)
TSH SERPL DL<=0.05 MIU/L-ACNC: 4.77 UIU/ML (ref 0.45–4.5)

## 2024-09-30 PROCEDURE — 99214 OFFICE O/P EST MOD 30 MIN: CPT

## 2024-09-30 PROCEDURE — 36415 COLL VENOUS BLD VENIPUNCTURE: CPT

## 2024-09-30 PROCEDURE — 84439 ASSAY OF FREE THYROXINE: CPT

## 2024-09-30 PROCEDURE — 84443 ASSAY THYROID STIM HORMONE: CPT

## 2024-09-30 NOTE — ASSESSMENT & PLAN NOTE
Continues to complain of profound fatigue.  Counseled that some fatigue may be related due to current thyroid levels however if fatigue does not improve when thyroid labs have returned to goal, she would benefit from further workup by her primary care provider.  Discussed consuming iron rich foods and compliance with B12 injection.  Also reviewed various aspects of her daily life which could be contributing to fatigue.

## 2024-09-30 NOTE — ASSESSMENT & PLAN NOTE
No recent thyroid labs available.  Will send for thyroid function test today and await results prior to making dose adjustments, if necessary. Continue Tirosint 150 mcg daily at this time.  She reports taking this medication consistently and correctly.    Component      Latest Ref Rng 5/29/2024   FREE T4      0.61 - 1.12 ng/dL 0.41 (L)    TSH 3RD GENERATON      0.450 - 4.500 uIU/mL 6.861 (H)         Orders:    TSH, 3rd generation    T4, free

## 2024-10-01 DIAGNOSIS — E03.8 OTHER SPECIFIED HYPOTHYROIDISM: ICD-10-CM

## 2024-10-01 RX ORDER — LEVOTHYROXINE SODIUM 13 UG/1
CAPSULE ORAL
Qty: 90 CAPSULE | Refills: 1 | Status: SHIPPED | OUTPATIENT
Start: 2024-10-01

## 2024-10-03 DIAGNOSIS — E66.09 CLASS 1 OBESITY DUE TO EXCESS CALORIES WITHOUT SERIOUS COMORBIDITY WITH BODY MASS INDEX (BMI) OF 30.0 TO 30.9 IN ADULT: ICD-10-CM

## 2024-10-03 DIAGNOSIS — E66.811 CLASS 1 OBESITY DUE TO EXCESS CALORIES WITHOUT SERIOUS COMORBIDITY WITH BODY MASS INDEX (BMI) OF 30.0 TO 30.9 IN ADULT: ICD-10-CM

## 2024-10-03 DIAGNOSIS — E78.5 DYSLIPIDEMIA, GOAL LDL BELOW 130: ICD-10-CM

## 2024-10-03 DIAGNOSIS — R63.5 WEIGHT GAIN: ICD-10-CM

## 2024-10-04 RX ORDER — SEMAGLUTIDE 2.68 MG/ML
INJECTION, SOLUTION SUBCUTANEOUS
Qty: 9 ML | Refills: 4 | Status: SHIPPED | OUTPATIENT
Start: 2024-10-04

## 2024-10-07 ENCOUNTER — TELEMEDICINE (OUTPATIENT)
Dept: PSYCHIATRY | Facility: CLINIC | Age: 54
End: 2024-10-07
Payer: COMMERCIAL

## 2024-10-07 DIAGNOSIS — F31.81 BIPOLAR 2 DISORDER (HCC): ICD-10-CM

## 2024-10-07 DIAGNOSIS — F41.1 GAD (GENERALIZED ANXIETY DISORDER): Primary | ICD-10-CM

## 2024-10-07 PROCEDURE — 90834 PSYTX W PT 45 MINUTES: CPT

## 2024-10-07 NOTE — PSYCH
Virtual Regular Visit    Verification of patient location:    Patient is located at Home in the following state in which I hold an active license [place]      Assessment/Plan:    Problem List Items Addressed This Visit       ZANIA (generalized anxiety disorder) - Primary    Bipolar 2 disorder (HCC)       Goals addressed in session: Goal 1          Reason for visit is No chief complaint on file.       Encounter provider Maisha Bonds LCSW      Recent Visits  No visits were found meeting these conditions.  Showing recent visits within past 7 days and meeting all other requirements  Today's Visits  Date Type Provider Dept   10/07/24 Telemedicine Maisha Bonds LCSW [facility]   Showing today's visits and meeting all other requirements  Future Appointments  No visits were found meeting these conditions.  Showing future appointments within next 150 days and meeting all other requirements       The patient was identified by name and date of birth. Belem Linares was informed that this is a telemedicine visit and that the visit is being conducted throughthe Noome[employer] platform. She agrees to proceed..  My office door was closed. No one else was in the room.  She acknowledged consent and understanding of privacy and security of the video platform. The patient has agreed to participate and understands they can discontinue the visit at any time.    Patient is aware this is a billable service.     Subjective  Belem Linares is a 54 y.o. female.      HPI     Past Medical History:   Diagnosis Date    Anxiety     Anxiety     Asthma     Bipolar disorder (HCC)     Carpal tunnel syndrome     Chronic pain     lower back    Depression     Disease of thyroid gland     Dyslipidemia     Fibromyalgia     Hypothyroidism 2007    Irritable bowel     Kidney stones     Kidney stones 05/20/2019    Migraine     Obesity (BMI 30.0-34.9)     Psychiatric disorder     depression/anxiety    Suicide attempt (HCC)      as teen    Vitamin D deficiency        Past Surgical History:   Procedure Laterality Date    BUNIONECTOMY       SECTION      CHOLECYSTECTOMY      PARTIAL HYSTERECTOMY      portion of  uterus still present    TONSILLECTOMY      TUBAL LIGATION         Current Outpatient Medications   Medication Sig Dispense Refill    albuterol (ProAir HFA) 90 mcg/act inhaler Inhale 2 puffs every 6 (six) hours as needed for wheezing or shortness of breath (Patient not taking: Reported on 2024) 8.5 g 0    Blood Glucose Monitoring Suppl (OneTouch Verio Reflect) w/Device KIT Check blood sugars once daily. Please substitute with appropriate alternative as covered by patient's insurance. Dx: E11.65 1 kit 0    butalbital-acetaminophen-caffeine (FIORICET,ESGIC) -40 mg per tablet Take 1 tablet by mouth every 6 (six) hours as needed for headaches 30 tablet 0    cariprazine (Vraylar) 6 MG capsule Take 1 capsule (6 mg total) by mouth daily 30 capsule 0    celecoxib (CELEBREX) 200 mg capsule Take 1 capsule (200 mg total) by mouth daily 30 capsule 2    clonazePAM (KlonoPIN) 1 mg tablet TAKE 1 TABLET (1 MG TOTAL) BY MOUTH DAILY AS NEEDED FOR ANXIETY 30 tablet 0    cyanocobalamin 1,000 mcg/mL Inject 1 mL (1,000 mcg total) into a muscle once a week 4 mL 1    dicyclomine (BENTYL) 10 mg capsule Take 1 capsule (10 mg total) by mouth 3 (three) times a day as needed (abd cramping) 30 capsule 0    fremanezumab-vfrm (Ajovy) 225 MG/1.5ML auto-injector Inject 225 mg under the skin every 30 (thirty) days      glucose blood (OneTouch Verio) test strip Check blood sugars once daily. Please substitute with appropriate alternative as covered by patient's insurance. Dx: E11.65 100 each 3    ketoconazole (NIZORAL) 2 % cream       meclizine (ANTIVERT) 25 mg tablet TAKE 1 TABLET (25 MG TOTAL) BY MOUTH EVERY EIGHT (EIGHT) HOURS AS NEEDED FOR DIZZINESS 90 tablet 5    naratriptan (AMERGE) 2.5 MG tablet       ondansetron (ZOFRAN-ODT) 4 mg disintegrating  "tablet Take 1 tablet (4 mg total) by mouth every 8 (eight) hours as needed for nausea or vomiting 20 tablet 5    OneTouch Delica Lancets 33G MISC Check blood sugars once daily. Please substitute with appropriate alternative as covered by patient's insurance. Dx: E11.65 100 each 3    Respiratory Therapy Supplies (NEBULIZER) DONA by Does not apply route every 4 (four) hours while awake 90 each 1    semaglutide, 2 mg/dose, (Ozempic, 2 MG/DOSE,) 8 mg/ mL injection pen INJECT 0.75 ML (2 MG TOTAL) UNDER THE SKIN EVERY SEVEN DAYS 9 mL 4    Syringe/Needle, Disp, (SYRINGE 3CC/25GX1\") 25G X 1\" 3 ML MISC Use once a week 50 each 0    Tirosint 150 MCG CAPS Take 1 capsule by mouth daily before breakfast and 2 capsules on Sundays. 90 capsule 1    tiZANidine (ZANAFLEX) 4 mg tablet       Topiramate  MG CP24 Take 100 mg by mouth 2 (two) times a day      triamcinolone (KENALOG) 0.1 % cream Apply topically 2 (two) times a day 15 g 0    triamcinolone (KENALOG) 0.1 % ointment Apply topically 2 (two) times a day 453 g 2    Ubrelvy 100 MG tablet       Ventolin  (90 Base) MCG/ACT inhaler INHALE TWO PUFFS EVERY 4 (FOUR) HOURS AS NEEDED FOR WHEEZING OR SHORTNESS OF BREATH 18 g 0    Veozah tablet TAKE 1 TABLET (45 MG TOTAL) BY MOUTH DAILY 30 tablet 1    XIFAXAN 550 MG tablet       Xiidra 5 % op solution ADMINISTER 1 DROP TO BOTH EYES TWO (TWO) TIMES A DAY 60 mL 0     Current Facility-Administered Medications   Medication Dose Route Frequency Provider Last Rate Last Admin    cyanocobalamin injection 1,000 mcg  1,000 mcg Intramuscular Q30 Days ERICKA George   1,000 mcg at 07/27/23 1025        Allergies   Allergen Reactions    Doxycycline Rash    Erythromycin Rash    Other Edema and Wheezing     jalapeno    Augmentin [Amoxicillin-Pot Clavulanate] GI Intolerance    Latex Rash    Duloxetine      Other reaction(s): Nausea, Loopy    Eletriptan      Pain in lower jaw with pressure in throat    Emgality [Galcanezumab-Gnlm]     " "Galcanezumab      rash    Lidocaine Other (See Comments)     Headache    Methocarbamol Other (See Comments)     Dizzyness       Review of Systems    Video Exam    There were no vitals filed for this visit.    Physical Exam     Behavioral Health Psychotherapy Progress Note    Psychotherapy Provided: Individual Psychotherapy      Diagnosis ICD-10-CM Associated Orders   1. ZAINA (generalized anxiety disorder)  F41.1       2. Bipolar 2 disorder (HCC)  F31.81            Goals addressed in session: Goal 1     DATA: Met with Belem Linares for a scheduled individual session.  Topics discussed included emotional wellness and coping skills.  Belem is feeling \"ok\". Her stepmother has pancreatic cancer but it's been found early. This is what Belem's father passed away from. Belem went to the endocrinologist and during the physical exam, found a nodule. She has to get it scanned and possibly biopsied.  We talked about updates with her daughters and grandson. Overall she's been doing well. Belem shows evidence of utilizing effective communication skills, engaging in problem solving, and maintaining emotional composure to manage mental health symptoms.  During this session, this clinical used the following therapeutic modalities supportive psychotherapy, client-centered therapy, and CBT techniques.    Substance Abuse was not addressed during this session. If the client is diagnosed with a co-occurring substance use disorder, please indicate any changes in the frequency or amount of use: N/A. Stage of change for addressing substance use diagnoses: No substance use/Not applicable    ASSESSMENT:  Belem presents with a Euthymic/ normal mood.  her affect is normal range and intensity, appropriate, which is congruent, with her  mood and the content of the session. Belem was oriented x3. She was focused and engaged. Belem exhibits good therapeutic rapport with this clinician. The client has made progress on their " "goals.    Belem Linares presents with a none risk of suicide, none risk of self-harm, and none risk of harm to others.    For any risk assessment that surpasses a \"low\" rating, a safety plan must be developed.    A safety plan was indicated: no  If yes, describe in detail: N/A    PLAN: Belem will return in 2-3 weeks for the next scheduled session.  At the next session, the therapist will use supportive psychotherapy and client-centered therapy to address depression and anxiety.    Behavioral Health Treatment Plan and Discharge Planning: Belem Linares is aware of and agrees to continue to work on their treatment plan. They have identified and are working toward their discharge goals. yes    Visit start and stop times:    10/07/24  Start Time: 1203  Stop Time: 1244  Total Visit Time: 41 minutes        "

## 2024-10-08 ENCOUNTER — HOSPITAL ENCOUNTER (OUTPATIENT)
Dept: ULTRASOUND IMAGING | Facility: CLINIC | Age: 54
Discharge: HOME | End: 2024-10-08
Payer: COMMERCIAL

## 2024-10-08 PROCEDURE — 76536 US EXAM OF HEAD AND NECK: CPT

## 2024-10-09 ENCOUNTER — APPOINTMENT (OUTPATIENT)
Dept: LAB | Facility: CLINIC | Age: 54
End: 2024-10-09
Payer: COMMERCIAL

## 2024-10-09 DIAGNOSIS — Z79.899 MEDICATION MANAGEMENT: Primary | ICD-10-CM

## 2024-10-09 LAB
ALBUMIN SERPL BCG-MCNC: 4.4 G/DL (ref 3.5–5)
ALP SERPL-CCNC: 80 U/L (ref 34–104)
ALT SERPL W P-5'-P-CCNC: 19 U/L (ref 7–52)
AST SERPL W P-5'-P-CCNC: 21 U/L (ref 13–39)
BILIRUB DIRECT SERPL-MCNC: 0.04 MG/DL (ref 0–0.2)
BILIRUB SERPL-MCNC: 0.37 MG/DL (ref 0.2–1)
PROT SERPL-MCNC: 7.1 G/DL (ref 6.4–8.4)

## 2024-10-11 DIAGNOSIS — R30.9 URINARY PAIN: Primary | ICD-10-CM

## 2024-10-11 RX ORDER — AMOXICILLIN 875 MG
875 TABLET ORAL 2 TIMES DAILY
Qty: 20 TABLET | Refills: 0 | Status: SHIPPED | OUTPATIENT
Start: 2024-10-11 | End: 2024-10-21

## 2024-10-15 ENCOUNTER — TELEPHONE (OUTPATIENT)
Dept: PSYCHIATRY | Facility: CLINIC | Age: 54
End: 2024-10-15

## 2024-10-21 ENCOUNTER — TELEMEDICINE (OUTPATIENT)
Dept: PSYCHIATRY | Facility: CLINIC | Age: 54
End: 2024-10-21
Payer: COMMERCIAL

## 2024-10-21 DIAGNOSIS — F41.1 GAD (GENERALIZED ANXIETY DISORDER): ICD-10-CM

## 2024-10-21 DIAGNOSIS — F31.81 BIPOLAR 2 DISORDER (HCC): Primary | ICD-10-CM

## 2024-10-21 PROCEDURE — 90837 PSYTX W PT 60 MINUTES: CPT

## 2024-10-21 NOTE — PSYCH
Virtual Regular Visit    Verification of patient location:    Patient is located at Home in the following state in which I hold an active license [place]      Assessment/Plan:    Problem List Items Addressed This Visit       ZAINA (generalized anxiety disorder)    Bipolar 2 disorder (HCC) - Primary       Goals addressed in session: Goal 1          Reason for visit is No chief complaint on file.       Encounter provider Maisha Bonds LCSW      Recent Visits  Date Type Provider Dept   10/15/24 Telephone Maisha Bonds LCSW [facility]   Showing recent visits within past 7 days and meeting all other requirements  Today's Visits  Date Type Provider Dept   10/21/24 Telemedicine Maisha Bonds LCSW [facility]   Showing today's visits and meeting all other requirements  Future Appointments  No visits were found meeting these conditions.  Showing future appointments within next 150 days and meeting all other requirements       The patient was identified by name and date of birth. Belem Linares was informed that this is a telemedicine visit and that the visit is being conducted throughthe [employer] platform. She agrees to proceed..  My office door was closed. No one else was in the room.  She acknowledged consent and understanding of privacy and security of the video platform. The patient has agreed to participate and understands they can discontinue the visit at any time.    Patient is aware this is a billable service.       Subjective  Belem Linares is a 54 y.o. female.      HPI     Past Medical History:   Diagnosis Date    Anxiety     Anxiety     Asthma     Bipolar disorder (HCC)     Carpal tunnel syndrome     Chronic pain     lower back    Depression     Disease of thyroid gland     Dyslipidemia     Fibromyalgia     Hypothyroidism 2007    Irritable bowel     Kidney stones     Kidney stones 05/20/2019    Migraine     Obesity (BMI 30.0-34.9)     Psychiatric  disorder     depression/anxiety    Suicide attempt (HCC)     as teen    Vitamin D deficiency        Past Surgical History:   Procedure Laterality Date    BUNIONECTOMY       SECTION      CHOLECYSTECTOMY      PARTIAL HYSTERECTOMY      portion of  uterus still present    TONSILLECTOMY      TUBAL LIGATION         Current Outpatient Medications   Medication Sig Dispense Refill    albuterol (ProAir HFA) 90 mcg/act inhaler Inhale 2 puffs every 6 (six) hours as needed for wheezing or shortness of breath (Patient not taking: Reported on 2024) 8.5 g 0    amoxicillin (AMOXIL) 875 mg tablet Take 1 tablet (875 mg total) by mouth 2 (two) times a day for 10 days 20 tablet 0    Blood Glucose Monitoring Suppl (OneTouch Verio Reflect) w/Device KIT Check blood sugars once daily. Please substitute with appropriate alternative as covered by patient's insurance. Dx: E11.65 1 kit 0    butalbital-acetaminophen-caffeine (FIORICET,ESGIC) -40 mg per tablet Take 1 tablet by mouth every 6 (six) hours as needed for headaches 30 tablet 0    cariprazine (Vraylar) 6 MG capsule Take 1 capsule (6 mg total) by mouth daily 30 capsule 0    celecoxib (CELEBREX) 200 mg capsule Take 1 capsule (200 mg total) by mouth daily 30 capsule 2    clonazePAM (KlonoPIN) 1 mg tablet TAKE 1 TABLET (1 MG TOTAL) BY MOUTH DAILY AS NEEDED FOR ANXIETY 30 tablet 0    cyanocobalamin 1,000 mcg/mL Inject 1 mL (1,000 mcg total) into a muscle once a week 4 mL 1    dicyclomine (BENTYL) 10 mg capsule Take 1 capsule (10 mg total) by mouth 3 (three) times a day as needed (abd cramping) 30 capsule 0    fremanezumab-vfrm (Ajovy) 225 MG/1.5ML auto-injector Inject 225 mg under the skin every 30 (thirty) days      glucose blood (OneTouch Verio) test strip Check blood sugars once daily. Please substitute with appropriate alternative as covered by patient's insurance. Dx: E11.65 100 each 3    ketoconazole (NIZORAL) 2 % cream       meclizine (ANTIVERT) 25 mg tablet TAKE 1  "TABLET (25 MG TOTAL) BY MOUTH EVERY EIGHT (EIGHT) HOURS AS NEEDED FOR DIZZINESS 90 tablet 5    naratriptan (AMERGE) 2.5 MG tablet       ondansetron (ZOFRAN-ODT) 4 mg disintegrating tablet Take 1 tablet (4 mg total) by mouth every 8 (eight) hours as needed for nausea or vomiting 20 tablet 5    OneTouch Delica Lancets 33G MISC Check blood sugars once daily. Please substitute with appropriate alternative as covered by patient's insurance. Dx: E11.65 100 each 3    Respiratory Therapy Supplies (NEBULIZER) DONA by Does not apply route every 4 (four) hours while awake 90 each 1    semaglutide, 2 mg/dose, (Ozempic, 2 MG/DOSE,) 8 mg/ mL injection pen INJECT 0.75 ML (2 MG TOTAL) UNDER THE SKIN EVERY SEVEN DAYS 9 mL 4    Syringe/Needle, Disp, (SYRINGE 3CC/25GX1\") 25G X 1\" 3 ML MISC Use once a week 50 each 0    Tirosint 150 MCG CAPS Take 1 capsule by mouth daily before breakfast and 2 capsules on Sundays. 90 capsule 1    tiZANidine (ZANAFLEX) 4 mg tablet       Topiramate  MG CP24 Take 100 mg by mouth 2 (two) times a day      triamcinolone (KENALOG) 0.1 % cream Apply topically 2 (two) times a day 15 g 0    triamcinolone (KENALOG) 0.1 % ointment Apply topically 2 (two) times a day 453 g 2    Ubrelvy 100 MG tablet       Ventolin  (90 Base) MCG/ACT inhaler INHALE TWO PUFFS EVERY 4 (FOUR) HOURS AS NEEDED FOR WHEEZING OR SHORTNESS OF BREATH 18 g 0    Veozah tablet TAKE 1 TABLET (45 MG TOTAL) BY MOUTH DAILY 30 tablet 1    XIFAXAN 550 MG tablet       Xiidra 5 % op solution ADMINISTER 1 DROP TO BOTH EYES TWO (TWO) TIMES A DAY 60 mL 0     Current Facility-Administered Medications   Medication Dose Route Frequency Provider Last Rate Last Admin    cyanocobalamin injection 1,000 mcg  1,000 mcg Intramuscular Q30 Days ERICKA George   1,000 mcg at 07/27/23 1025        Allergies   Allergen Reactions    Doxycycline Rash    Erythromycin Rash    Other Edema and Wheezing     jalapeno    Augmentin [Amoxicillin-Pot Clavulanate] " "GI Intolerance    Latex Rash    Duloxetine      Other reaction(s): Nausea, Loopy    Eletriptan      Pain in lower jaw with pressure in throat    Emgality [Galcanezumab-Gnlm]     Galcanezumab      rash    Lidocaine Other (See Comments)     Headache    Methocarbamol Other (See Comments)     Dizzyness       Review of Systems    Video Exam    There were no vitals filed for this visit.    Physical Exam     Behavioral Health Psychotherapy Progress Note    Psychotherapy Provided: Individual Psychotherapy      Diagnosis ICD-10-CM Associated Orders   1. Bipolar 2 disorder (HCC)  F31.81       2. ZAINA (generalized anxiety disorder)  F41.1            Goals addressed in session: Goal 1     DATA: Met with Belem Linares for a scheduled individual session.  Topics discussed included emotional wellness, coping skills, and family stressors.  Belem is feeling \"overwhelmed\". She stated her daughter is finally into her new place but not without hiccups. Her daughter didn't have the deposit for the landlord and he got very upset. He called Belem and then showed up at her house yelling. It turns out it was a misunderstanding but Belem's ex was able to help. Belem's daughter moved cats in when she was supposed to rehome them. Belem said it's been so stressful and she's worn out. Her grandson has had some negative behaviors recently, which tells her that when her attention gets taken away from him, she sees some negative behavior. She and Jaylan have not been getting along and they aren't technically together. He keeps asking her where they stand and she's been clear that she doesn't feel it's working, they don't see eye to eye on anything. Belem shows evidence of utilizing effective communication skills, engaging in problem solving, and maintaining emotional composure to manage mental health symptoms.  During this session, this clinical used the following therapeutic modalities supportive psychotherapy, client-centered therapy, and " "CBT techniques.    Substance Abuse was not addressed during this session. If the client is diagnosed with a co-occurring substance use disorder, please indicate any changes in the frequency or amount of use: N/A. Stage of change for addressing substance use diagnoses: No substance use/Not applicable    ASSESSMENT:  Belem presents with a Euthymic/ normal mood.  her affect is normal range and intensity, appropriate, which is congruent, with her  mood and the content of the session. Belem was oriented x3. She was focused and engaged. Belem exhibits good therapeutic rapport with this clinician. The client has made progress on their goals.    Belem Linares presents with a none risk of suicide, none risk of self-harm, and none risk of harm to others.    For any risk assessment that surpasses a \"low\" rating, a safety plan must be developed.    A safety plan was indicated: no  If yes, describe in detail: N/A    PLAN: Belem will return in 1 month for the next scheduled session.  At the next session, the therapist will use supportive psychotherapy and client-centered therapy to address depression and anxiety.    Behavioral Health Treatment Plan and Discharge Planning: Belem Linares is aware of and agrees to continue to work on their treatment plan. They have identified and are working toward their discharge goals. yes    Visit start and stop times:    10/21/24  Start Time: 1202  Stop Time: 1306  Total Visit Time: 64 minutes        "

## 2024-10-28 ENCOUNTER — TELEMEDICINE (OUTPATIENT)
Dept: PSYCHIATRY | Facility: CLINIC | Age: 54
End: 2024-10-28
Payer: COMMERCIAL

## 2024-10-28 DIAGNOSIS — F41.1 GAD (GENERALIZED ANXIETY DISORDER): Primary | ICD-10-CM

## 2024-10-28 DIAGNOSIS — F31.81 BIPOLAR 2 DISORDER (HCC): ICD-10-CM

## 2024-10-28 PROCEDURE — 90837 PSYTX W PT 60 MINUTES: CPT

## 2024-10-28 NOTE — PSYCH
Virtual Regular Visit    Verification of patient location:    Patient is located at Home in the following state in which I hold an active license [place]      Assessment/Plan:    Problem List Items Addressed This Visit       ZAINA (generalized anxiety disorder) - Primary    Bipolar 2 disorder (HCC)       Goals addressed in session: Goal 1          Reason for visit is No chief complaint on file.       Encounter provider Maisha Bonds LCSW      Recent Visits  Date Type Provider Dept   10/21/24 Telemedicine Maisha Bonds LCSW [facility]   Showing recent visits within past 7 days and meeting all other requirements  Today's Visits  Date Type Provider Dept   10/28/24 Telemedicine Maisha Bonds LCSW [facility]   Showing today's visits and meeting all other requirements  Future Appointments  No visits were found meeting these conditions.  Showing future appointments within next 150 days and meeting all other requirements       The patient was identified by name and date of birth. Belem Linares was informed that this is a telemedicine visit and that the visit is being conducted throughthe [employer] platform. She agrees to proceed..  My office door was closed. No one else was in the room.  She acknowledged consent and understanding of privacy and security of the video platform. The patient has agreed to participate and understands they can discontinue the visit at any time.    Patient is aware this is a billable service.     Subjective  Belem Linares is a 54 y.o. female.      HPI     Past Medical History:   Diagnosis Date    Anxiety     Anxiety     Asthma     Bipolar disorder (HCC)     Carpal tunnel syndrome     Chronic pain     lower back    Depression     Disease of thyroid gland     Dyslipidemia     Fibromyalgia     Hypothyroidism 2007    Irritable bowel     Kidney stones     Kidney stones 05/20/2019    Migraine     Obesity (BMI 30.0-34.9)     Psychiatric  disorder     depression/anxiety    Suicide attempt (HCC)     as teen    Vitamin D deficiency        Past Surgical History:   Procedure Laterality Date    BUNIONECTOMY       SECTION      CHOLECYSTECTOMY      PARTIAL HYSTERECTOMY      portion of  uterus still present    TONSILLECTOMY      TUBAL LIGATION         Current Outpatient Medications   Medication Sig Dispense Refill    albuterol (ProAir HFA) 90 mcg/act inhaler Inhale 2 puffs every 6 (six) hours as needed for wheezing or shortness of breath (Patient not taking: Reported on 2024) 8.5 g 0    Blood Glucose Monitoring Suppl (OneTouch Verio Reflect) w/Device KIT Check blood sugars once daily. Please substitute with appropriate alternative as covered by patient's insurance. Dx: E11.65 1 kit 0    butalbital-acetaminophen-caffeine (FIORICET,ESGIC) -40 mg per tablet Take 1 tablet by mouth every 6 (six) hours as needed for headaches 30 tablet 0    cariprazine (Vraylar) 6 MG capsule Take 1 capsule (6 mg total) by mouth daily 30 capsule 0    celecoxib (CELEBREX) 200 mg capsule Take 1 capsule (200 mg total) by mouth daily 30 capsule 2    clonazePAM (KlonoPIN) 1 mg tablet TAKE 1 TABLET (1 MG TOTAL) BY MOUTH DAILY AS NEEDED FOR ANXIETY 30 tablet 0    cyanocobalamin 1,000 mcg/mL Inject 1 mL (1,000 mcg total) into a muscle once a week 4 mL 1    dicyclomine (BENTYL) 10 mg capsule Take 1 capsule (10 mg total) by mouth 3 (three) times a day as needed (abd cramping) 30 capsule 0    fremanezumab-vfrm (Ajovy) 225 MG/1.5ML auto-injector Inject 225 mg under the skin every 30 (thirty) days      glucose blood (OneTouch Verio) test strip Check blood sugars once daily. Please substitute with appropriate alternative as covered by patient's insurance. Dx: E11.65 100 each 3    ketoconazole (NIZORAL) 2 % cream       meclizine (ANTIVERT) 25 mg tablet TAKE 1 TABLET (25 MG TOTAL) BY MOUTH EVERY EIGHT (EIGHT) HOURS AS NEEDED FOR DIZZINESS 90 tablet 5    naratriptan (AMERGE) 2.5  "MG tablet       ondansetron (ZOFRAN-ODT) 4 mg disintegrating tablet Take 1 tablet (4 mg total) by mouth every 8 (eight) hours as needed for nausea or vomiting 20 tablet 5    OneTouch Delica Lancets 33G MISC Check blood sugars once daily. Please substitute with appropriate alternative as covered by patient's insurance. Dx: E11.65 100 each 3    Respiratory Therapy Supplies (NEBULIZER) DONA by Does not apply route every 4 (four) hours while awake 90 each 1    semaglutide, 2 mg/dose, (Ozempic, 2 MG/DOSE,) 8 mg/ mL injection pen INJECT 0.75 ML (2 MG TOTAL) UNDER THE SKIN EVERY SEVEN DAYS 9 mL 4    Syringe/Needle, Disp, (SYRINGE 3CC/25GX1\") 25G X 1\" 3 ML MISC Use once a week 50 each 0    Tirosint 150 MCG CAPS Take 1 capsule by mouth daily before breakfast and 2 capsules on Sundays. 90 capsule 1    tiZANidine (ZANAFLEX) 4 mg tablet       Topiramate  MG CP24 Take 100 mg by mouth 2 (two) times a day      triamcinolone (KENALOG) 0.1 % cream Apply topically 2 (two) times a day 15 g 0    triamcinolone (KENALOG) 0.1 % ointment Apply topically 2 (two) times a day 453 g 2    Ubrelvy 100 MG tablet       Ventolin  (90 Base) MCG/ACT inhaler INHALE TWO PUFFS EVERY 4 (FOUR) HOURS AS NEEDED FOR WHEEZING OR SHORTNESS OF BREATH 18 g 0    Veozah tablet TAKE 1 TABLET (45 MG TOTAL) BY MOUTH DAILY 30 tablet 1    XIFAXAN 550 MG tablet       Xiidra 5 % op solution ADMINISTER 1 DROP TO BOTH EYES TWO (TWO) TIMES A DAY 60 mL 0     Current Facility-Administered Medications   Medication Dose Route Frequency Provider Last Rate Last Admin    cyanocobalamin injection 1,000 mcg  1,000 mcg Intramuscular Q30 Days ERICKA George   1,000 mcg at 07/27/23 1025        Allergies   Allergen Reactions    Doxycycline Rash    Erythromycin Rash    Other Edema and Wheezing     jalapeno    Augmentin [Amoxicillin-Pot Clavulanate] GI Intolerance    Latex Rash    Duloxetine      Other reaction(s): Nausea, Loopy    Eletriptan      Pain in lower jaw " "with pressure in throat    Emgality [Galcanezumab-Gnlm]     Galcanezumab      rash    Lidocaine Other (See Comments)     Headache    Methocarbamol Other (See Comments)     Dizzyness       Review of Systems    Video Exam    There were no vitals filed for this visit.    Physical Exam     Behavioral Health Psychotherapy Progress Note    Psychotherapy Provided: Individual Psychotherapy      Diagnosis ICD-10-CM Associated Orders   1. ZAINA (generalized anxiety disorder)  F41.1       2. Bipolar 2 disorder (HCC)  F31.81            Goals addressed in session: Goal 1     DATA: Met with Belem Linares for a scheduled individual session.  Topics discussed included emotional wellness, coping skills, and family stressors.  Belem is feeling \"not well\". She stated she and her grandson aren't feeling well. Also, her grandson has continued to act out. He seems to be struggling with his parents not really being in his life. He sees his younger brother is with them and likely wonders why he isn't. Belem and this clinician talked at length about this.  Belem shows evidence of utilizing effective communication skills, engaging in problem solving, and maintaining emotional composure to manage mental health symptoms.  During this session, this clinical used the following therapeutic modalities supportive psychotherapy, client-centered therapy, and CBT techniques.    Substance Abuse was not addressed during this session. If the client is diagnosed with a co-occurring substance use disorder, please indicate any changes in the frequency or amount of use: N/A. Stage of change for addressing substance use diagnoses: No substance use/Not applicable    ASSESSMENT:  Belem presents with a Euthymic/ normal mood.  her affect is normal range and intensity, appropriate, which is congruent, with her  mood and the content of the session. Belem was oriented x3. She was focused and engaged. Belem exhibits good therapeutic rapport with this " "clinician. The client has made progress on their goals.    Belem Linares presents with a none risk of suicide, none risk of self-harm, and none risk of harm to others.    For any risk assessment that surpasses a \"low\" rating, a safety plan must be developed.    A safety plan was indicated: no  If yes, describe in detail: N/A    PLAN: Belem will return in 3 weeks for the next scheduled session. At the next session, the therapist will use supportive psychotherapy and client-centered therapy to address depression and anxiety.    Behavioral Health Treatment Plan and Discharge Planning: Belem Linares is aware of and agrees to continue to work on their treatment plan. They have identified and are working toward their discharge goals. yes    Visit start and stop times:    10/28/24  Start Time: 1500  Stop Time: 1602  Total Visit Time: 62 minutes        "

## 2024-11-04 DIAGNOSIS — R79.89 LOW VITAMIN B12 LEVEL: ICD-10-CM

## 2024-11-05 ENCOUNTER — OFFICE VISIT (OUTPATIENT)
Age: 54
End: 2024-11-05
Payer: COMMERCIAL

## 2024-11-05 VITALS
BODY MASS INDEX: 30.56 KG/M2 | WEIGHT: 179 LBS | DIASTOLIC BLOOD PRESSURE: 82 MMHG | HEIGHT: 64 IN | SYSTOLIC BLOOD PRESSURE: 112 MMHG

## 2024-11-05 DIAGNOSIS — R39.11 URINARY HESITANCY: ICD-10-CM

## 2024-11-05 DIAGNOSIS — N76.0 ACUTE VAGINITIS: ICD-10-CM

## 2024-11-05 DIAGNOSIS — R10.2 PELVIC PAIN IN FEMALE: Primary | ICD-10-CM

## 2024-11-05 PROBLEM — E16.2 HYPOGLYCEMIA: Status: RESOLVED | Noted: 2023-06-05 | Resolved: 2024-11-05

## 2024-11-05 PROBLEM — N89.8 LEUKORRHEA: Status: RESOLVED | Noted: 2023-01-18 | Resolved: 2024-11-05

## 2024-11-05 PROBLEM — R63.5 WEIGHT GAIN: Status: RESOLVED | Noted: 2022-06-13 | Resolved: 2024-11-05

## 2024-11-05 PROBLEM — R11.0 NAUSEA: Status: RESOLVED | Noted: 2023-06-05 | Resolved: 2024-11-05

## 2024-11-05 PROCEDURE — 99213 OFFICE O/P EST LOW 20 MIN: CPT | Performed by: NURSE PRACTITIONER

## 2024-11-05 PROCEDURE — 87086 URINE CULTURE/COLONY COUNT: CPT | Performed by: NURSE PRACTITIONER

## 2024-11-05 RX ORDER — OMEPRAZOLE 40 MG/1
CAPSULE, DELAYED RELEASE ORAL
COMMUNITY
Start: 2024-10-28

## 2024-11-05 RX ORDER — FLUCONAZOLE 150 MG/1
150 TABLET ORAL ONCE
Qty: 2 TABLET | Refills: 0 | Status: SHIPPED | OUTPATIENT
Start: 2024-11-05 | End: 2024-11-05

## 2024-11-05 RX ORDER — MONTELUKAST SODIUM 10 MG/1
TABLET ORAL
COMMUNITY
Start: 2024-10-22

## 2024-11-05 RX ORDER — ADALIMUMAB 40MG/0.4ML
40 KIT SUBCUTANEOUS
COMMUNITY
Start: 2024-09-18

## 2024-11-05 RX ORDER — METRONIDAZOLE 7.5 MG/G
1 GEL VAGINAL
Qty: 5 G | Refills: 0 | Status: SHIPPED | OUTPATIENT
Start: 2024-11-05 | End: 2024-11-10

## 2024-11-05 RX ORDER — CYANOCOBALAMIN 1000 UG/ML
1000 INJECTION, SOLUTION INTRAMUSCULAR; SUBCUTANEOUS WEEKLY
Qty: 4 ML | Refills: 0 | Status: SHIPPED | OUTPATIENT
Start: 2024-11-05

## 2024-11-05 NOTE — PROGRESS NOTES
Diagnoses and all orders for this visit:    Pelvic pain in female  Comments:  Order in place  Orders:  -     US pelvis complete w transvaginal; Future    Urinary hesitancy  Comments:  Urine cx sent    Acute vaginitis  -     metroNIDAZOLE (METROGEL) 0.75 % vaginal gel; Insert 1 Application into the vagina daily at bedtime for 5 days  -     fluconazole (DIFLUCAN) 150 mg tablet; Take 1 tablet (150 mg total) by mouth once for 1 dose Once and repeat in 3 days    Call if no symptom improvement, all questions answered, return for annual.            Pleasant 54 y.o. perimenopausal female patient here for multiple complaints.  She states she is having urinary hesitancy for the past week.  She has mid pelvic pain by her  scar for the past month.  She did have a supracervical hysterectomy in the past.  She also states she is having itching by her outer labia, especially worse when she shaves. She denies any treatments tried. She denies recent antibiotic use. She denies douching. She denies fever. She denies new sexual partners.  She is not sexually active currently.  She is going through hot flashes and menopausal symptoms.  She states she is prediabetic and is on Ozempic.    Past Medical History:   Diagnosis Date    Anxiety     Anxiety     Asthma     Bipolar disorder (HCC)     Carpal tunnel syndrome     Chronic pain     lower back    Depression     Disease of thyroid gland     Dyslipidemia     Fibromyalgia     Hypothyroidism     Irritable bowel     Kidney stones     Kidney stones 2019    Migraine     Obesity (BMI 30.0-34.9)     Psychiatric disorder     depression/anxiety    Suicide attempt (HCC)     as teen    Vitamin D deficiency      Past Surgical History:   Procedure Laterality Date    BUNIONECTOMY       SECTION      CHOLECYSTECTOMY      PARTIAL HYSTERECTOMY      portion of  uterus still present    TONSILLECTOMY      TUBAL LIGATION       Social History     Tobacco Use    Smoking status: Never      Passive exposure: Past    Smokeless tobacco: Never   Vaping Use    Vaping status: Never Used   Substance Use Topics    Alcohol use: No    Drug use: Yes     Types: Marijuana     Comment: gummy on occasion for pain     Family History   Problem Relation Age of Onset    Hypertension Mother     Diabetes Mother     Hypothyroidism Mother     Stroke Mother     Bipolar disorder Mother     Anxiety disorder Mother     Arthritis Mother     Depression Mother     Mental illness Mother     Cancer Father         pancreatic     Hypothyroidism Sister     Hypertension Brother     Heart disease Brother     Cancer Maternal Uncle         lung    Cancer Paternal Aunt         breast    Breast cancer Paternal Aunt     Cancer Paternal Uncle         testicular     Cancer Paternal Grandmother         breast    Dementia Paternal Grandmother     Breast cancer Paternal Grandmother     Cancer Cousin         brain    Bipolar disorder Daughter        Current Outpatient Medications:     Adalimumab (Humira, 2 Pen,) 40 MG/0.4ML AJKT, Inject 40 mg under the skin every 14 (fourteen) days, Disp: , Rfl:     Adalimumab (Humira, 2 Pen,) 40 MG/0.4ML PNKT, , Disp: , Rfl:     Blood Glucose Monitoring Suppl (OneTouch Verio Reflect) w/Device KIT, Check blood sugars once daily. Please substitute with appropriate alternative as covered by patient's insurance. Dx: E11.65, Disp: 1 kit, Rfl: 0    butalbital-acetaminophen-caffeine (FIORICET,ESGIC) -40 mg per tablet, Take 1 tablet by mouth every 6 (six) hours as needed for headaches, Disp: 30 tablet, Rfl: 0    cariprazine (Vraylar) 6 MG capsule, Take 1 capsule (6 mg total) by mouth daily, Disp: 30 capsule, Rfl: 0    celecoxib (CELEBREX) 200 mg capsule, Take 1 capsule (200 mg total) by mouth daily, Disp: 30 capsule, Rfl: 2    clonazePAM (KlonoPIN) 1 mg tablet, TAKE 1 TABLET (1 MG TOTAL) BY MOUTH DAILY AS NEEDED FOR ANXIETY, Disp: 30 tablet, Rfl: 0    cyanocobalamin 1,000 mcg/mL, Inject 1 mL (1,000 mcg total)  "into a muscle once a week, Disp: 4 mL, Rfl: 1    dicyclomine (BENTYL) 10 mg capsule, Take 1 capsule (10 mg total) by mouth 3 (three) times a day as needed (abd cramping), Disp: 30 capsule, Rfl: 0    fluconazole (DIFLUCAN) 150 mg tablet, Take 1 tablet (150 mg total) by mouth once for 1 dose Once and repeat in 3 days, Disp: 2 tablet, Rfl: 0    fremanezumab-vfrm (Ajovy) 225 MG/1.5ML auto-injector, Inject 225 mg under the skin every 30 (thirty) days, Disp: , Rfl:     glucose blood (OneTouch Verio) test strip, Check blood sugars once daily. Please substitute with appropriate alternative as covered by patient's insurance. Dx: E11.65, Disp: 100 each, Rfl: 3    ketoconazole (NIZORAL) 2 % cream, , Disp: , Rfl:     meclizine (ANTIVERT) 25 mg tablet, TAKE 1 TABLET (25 MG TOTAL) BY MOUTH EVERY EIGHT (EIGHT) HOURS AS NEEDED FOR DIZZINESS, Disp: 90 tablet, Rfl: 5    metroNIDAZOLE (METROGEL) 0.75 % vaginal gel, Insert 1 Application into the vagina daily at bedtime for 5 days, Disp: 5 g, Rfl: 0    montelukast (SINGULAIR) 10 mg tablet, , Disp: , Rfl:     naratriptan (AMERGE) 2.5 MG tablet, , Disp: , Rfl:     omeprazole (PriLOSEC) 40 MG capsule, , Disp: , Rfl:     ondansetron (ZOFRAN-ODT) 4 mg disintegrating tablet, Take 1 tablet (4 mg total) by mouth every 8 (eight) hours as needed for nausea or vomiting, Disp: 20 tablet, Rfl: 5    OneTouch Delica Lancets 33G MISC, Check blood sugars once daily. Please substitute with appropriate alternative as covered by patient's insurance. Dx: E11.65, Disp: 100 each, Rfl: 3    semaglutide, 2 mg/dose, (Ozempic, 2 MG/DOSE,) 8 mg/ mL injection pen, INJECT 0.75 ML (2 MG TOTAL) UNDER THE SKIN EVERY SEVEN DAYS, Disp: 9 mL, Rfl: 4    Syringe/Needle, Disp, (SYRINGE 3CC/25GX1\") 25G X 1\" 3 ML MISC, Use once a week, Disp: 50 each, Rfl: 0    Tirosint 150 MCG CAPS, Take 1 capsule by mouth daily before breakfast and 2 capsules on Sundays., Disp: 90 capsule, Rfl: 1    tiZANidine (ZANAFLEX) 4 mg tablet, , Disp: " ", Rfl:     Topiramate  MG CP24, Take 100 mg by mouth 2 (two) times a day, Disp: , Rfl:     triamcinolone (KENALOG) 0.1 % cream, Apply topically 2 (two) times a day, Disp: 15 g, Rfl: 0    Ubrelvy 100 MG tablet, , Disp: , Rfl:     Ventolin  (90 Base) MCG/ACT inhaler, INHALE TWO PUFFS EVERY 4 (FOUR) HOURS AS NEEDED FOR WHEEZING OR SHORTNESS OF BREATH, Disp: 18 g, Rfl: 0    Veozah tablet, TAKE 1 TABLET (45 MG TOTAL) BY MOUTH DAILY, Disp: 30 tablet, Rfl: 1    XIFAXAN 550 MG tablet, , Disp: , Rfl:     Respiratory Therapy Supplies (NEBULIZER) DONA, by Does not apply route every 4 (four) hours while awake, Disp: 90 each, Rfl: 1    Current Facility-Administered Medications:     cyanocobalamin injection 1,000 mcg, 1,000 mcg, Intramuscular, Q30 Days, ERICKA George, 1,000 mcg at 23 1025    Allergies   Allergen Reactions    Doxycycline Rash    Erythromycin Rash    Other Edema and Wheezing     jalapeno    Augmentin [Amoxicillin-Pot Clavulanate] GI Intolerance    Latex Rash    Duloxetine      Other reaction(s): Nausea, Loopy    Eletriptan      Pain in lower jaw with pressure in throat    Emgality [Galcanezumab-Gnlm]     Galcanezumab      rash    Lidocaine Other (See Comments)     Headache    Methocarbamol Other (See Comments)     Dizzyness     OB History    Para Term  AB Living   4 3 3   1 3   SAB IAB Ectopic Multiple Live Births   0       1      # Outcome Date GA Lbr George/2nd Weight Sex Type Anes PTL Lv   4 AB            3 Term      CS-Unspec   ROSY   2 Term      CS-Unspec   ROSY   1 Term      CS-Unspec   ROSY       Vitals:    24 0900   BP: 112/82   Weight: 81.2 kg (179 lb)   Height: 5' 4\" (1.626 m)     Body mass index is 30.73 kg/m².  No LMP recorded. Patient has had a hysterectomy.    Review of Systems   Constitutional: Negative for chills, fatigue, fever and unexpected weight change.   Respiratory: Negative for shortness of breath.    Gastrointestinal: Negative for anal bleeding, " blood in stool, +IBS with constipation and diarrhea.   Genitourinary: Negative for difficulty urinating, dysuria and hematuria. +Hesitancy     Physical Exam   Constitutional: She appears well-developed and well-nourished. No distress. Alert and oriented.  HENT: atraumatic, EOMI bilaterally  Head: Normocephalic.   Neck: Normal range of motion. Neck supple.   Pulmonary: Effort normal.  Abdominal: Soft.   Pelvic exam was performed with patient supine. No labial fusion. There is no rash, tenderness, lesion or injury on the right labia. There is no rash, tenderness, lesion or injury on the left labia. Urethral meatus does not show any tenderness, inflammation or discharge. Palpation of midline bladder without pain or discomfort. Uterus is absent. Cervix exhibits no motion tenderness, no discharge and no friability. Right adnexum displays no mass, no tenderness and no fullness. Left adnexum displays no mass, no tenderness and no fullness. No erythema or tenderness in the vagina. No foreign body in the vagina. No signs of injury around the vagina. Vaginal discharge found. Perineum and anus without areas of injury. No lesions noted or swelling.  Lymphadenopathy:        Right: No inguinal adenopathy present.        Left: No inguinal adenopathy present.

## 2024-11-05 NOTE — PATIENT INSTRUCTIONS
Patient Education     Pelvic Pain   About this topic   Pelvic pain is pain below the belly button and above the legs. It may be acute and last a few hours or a few days or much longer. If the pain lasts longer than 3 months it is chronic pelvic pain.  Your pain may be sharp, dull, or cramping. It may be there all the time or may come and go. Sometimes, the pain builds up over a short time. You may feel the pain throughout this area of your body or in one specific area. It may be mild, moderate, or severe.  Pain can cause upset stomach and throwing up. When you are in pain you may not feel hungry. You may feel nervous. Pain may be a warning sign that something is wrong inside the body. Acute pelvic pain may be caused by a very serious health problem.  How your pelvic pain is treated is based on many things. Some of them are the kind of pain, how bad it is, and what is causing the pain. Treatment may include drugs or surgery.  What are the causes?   Problems in the belly or bowels like:  Blocked bowel  Appendicitis  Diverticulitis  Hernia  Hard stools  Irritable bowel syndrome  Peritonitis  Problems in the urinary tract like:  Urinary tract infection  Kidney or bladder stones  Narrowing of the urethra  Other causes like:  Muscle strain or spasm  Herniated disc  Bone problems  Hernias  Pelvic abscess  Pelvic inflammatory disease  Sexually transmitted disease  Nerve problems  Adhesions after surgery  IUD is in the wrong place  Problems with internal reproductive organs like:  Miscarriage or ectopic pregnancy  Ovarian cyst breaks open  Vaginal infection  Pelvic inflammatory disease or sexually transmitted disease  Problems with monthly periods  Ovarian or testicular torsion  Endometriosis or fibroids  Ovulation pain  Prostate problems  Pain after a permanent birth control surgery  What are the main signs?   Tenderness in lower belly  Fever  Upset stomach and throwing up  Dizziness  Sweating  Feeling anxious  How does the  doctor diagnose this health problem?   Your doctor will take your history and do an exam. If you have a vagina, this will likely include a pelvic exam with a tool called a speculum. Your doctor will ask about your pain to help find where it is located. The doctor may want to do some tests to find the cause of your pain. The doctor may order:  Lab tests  Pelvic exam  Ultrasound  X-ray  CT or MRI scan  Intravenous pyelography  Barium enema  Laparoscopy  How does the doctor treat this health problem?   Treatment is based on what is causing your pain. This may include changes in your diet or physical or sexual activity.  Are there other health problems to treat?   Depending on what is causing the pain, there may be other problems to treat.  What drugs may be needed?   The doctor may order drugs to:  Help with pain  Fight an infection  Balance hormones  Relax muscles  Lower stress and anxiety or help with depression  What can be done to prevent this health problem?   There is nothing you can do to prevent some of these problems.  Always practice safe sex by using condoms.  Manage digestive problems like constipation.  Last Reviewed Date   2021-09-14  Consumer Information Use and Disclaimer   This generalized information is a limited summary of diagnosis, treatment, and/or medication information. It is not meant to be comprehensive and should be used as a tool to help the user understand and/or assess potential diagnostic and treatment options. It does NOT include all information about conditions, treatments, medications, side effects, or risks that may apply to a specific patient. It is not intended to be medical advice or a substitute for the medical advice, diagnosis, or treatment of a health care provider based on the health care provider's examination and assessment of a patient’s specific and unique circumstances. Patients must speak with a health care provider for complete information about their health, medical  questions, and treatment options, including any risks or benefits regarding use of medications. This information does not endorse any treatments or medications as safe, effective, or approved for treating a specific patient. [employer] and its affiliates disclaim any warranty or liability relating to this information or the use thereof. The use of this information is governed by the Terms of Use, available at https://www.[employer].com/en/know/clinical-effectiveness-terms   Copyright   Copyright © 2024 [employer] and its affiliates and/or licensors. All rights reserved.

## 2024-11-07 ENCOUNTER — PATIENT MESSAGE (OUTPATIENT)
Dept: FAMILY MEDICINE CLINIC | Facility: CLINIC | Age: 54
End: 2024-11-07

## 2024-11-07 LAB — BACTERIA UR CULT: NORMAL

## 2024-11-08 DIAGNOSIS — R59.9 SWOLLEN LYMPH NODES: Primary | ICD-10-CM

## 2024-11-08 NOTE — PATIENT COMMUNICATION
Pt called in as well today to follow up on REPUCOM message. Pt asking for recommendations.  Pt made aware that the REPUCOM message has been sent to PCP.    Please advise

## 2024-11-13 ENCOUNTER — APPOINTMENT (OUTPATIENT)
Dept: LAB | Facility: CLINIC | Age: 54
End: 2024-11-13
Payer: COMMERCIAL

## 2024-11-13 LAB
ALBUMIN SERPL BCG-MCNC: 4.3 G/DL (ref 3.5–5)
ALP SERPL-CCNC: 69 U/L (ref 34–104)
ALT SERPL W P-5'-P-CCNC: 23 U/L (ref 7–52)
ANION GAP SERPL CALCULATED.3IONS-SCNC: 3 MMOL/L (ref 4–13)
AST SERPL W P-5'-P-CCNC: 25 U/L (ref 13–39)
BASOPHILS # BLD AUTO: 0.01 THOUSANDS/ÂΜL (ref 0–0.1)
BASOPHILS NFR BLD AUTO: 0 % (ref 0–1)
BILIRUB SERPL-MCNC: 0.33 MG/DL (ref 0.2–1)
BUN SERPL-MCNC: 14 MG/DL (ref 5–25)
CALCIUM SERPL-MCNC: 9.3 MG/DL (ref 8.4–10.2)
CHLORIDE SERPL-SCNC: 107 MMOL/L (ref 96–108)
CO2 SERPL-SCNC: 31 MMOL/L (ref 21–32)
CREAT SERPL-MCNC: 0.66 MG/DL (ref 0.6–1.3)
EOSINOPHIL # BLD AUTO: 0.08 THOUSAND/ÂΜL (ref 0–0.61)
EOSINOPHIL NFR BLD AUTO: 2 % (ref 0–6)
ERYTHROCYTE [DISTWIDTH] IN BLOOD BY AUTOMATED COUNT: 12.1 % (ref 11.6–15.1)
GFR SERPL CREATININE-BSD FRML MDRD: 101 ML/MIN/1.73SQ M
GLUCOSE P FAST SERPL-MCNC: 86 MG/DL (ref 65–99)
HCT VFR BLD AUTO: 40.8 % (ref 34.8–46.1)
HGB BLD-MCNC: 13 G/DL (ref 11.5–15.4)
IMM GRANULOCYTES # BLD AUTO: 0.01 THOUSAND/UL (ref 0–0.2)
IMM GRANULOCYTES NFR BLD AUTO: 0 % (ref 0–2)
LYMPHOCYTES # BLD AUTO: 1.67 THOUSANDS/ÂΜL (ref 0.6–4.47)
LYMPHOCYTES NFR BLD AUTO: 37 % (ref 14–44)
MCH RBC QN AUTO: 29.5 PG (ref 26.8–34.3)
MCHC RBC AUTO-ENTMCNC: 31.9 G/DL (ref 31.4–37.4)
MCV RBC AUTO: 93 FL (ref 82–98)
MONOCYTES # BLD AUTO: 0.3 THOUSAND/ÂΜL (ref 0.17–1.22)
MONOCYTES NFR BLD AUTO: 7 % (ref 4–12)
NEUTROPHILS # BLD AUTO: 2.42 THOUSANDS/ÂΜL (ref 1.85–7.62)
NEUTS SEG NFR BLD AUTO: 54 % (ref 43–75)
NRBC BLD AUTO-RTO: 0 /100 WBCS
PLATELET # BLD AUTO: 236 THOUSANDS/UL (ref 149–390)
PMV BLD AUTO: 10.2 FL (ref 8.9–12.7)
POTASSIUM SERPL-SCNC: 4.6 MMOL/L (ref 3.5–5.3)
PROT SERPL-MCNC: 7 G/DL (ref 6.4–8.4)
RBC # BLD AUTO: 4.41 MILLION/UL (ref 3.81–5.12)
SODIUM SERPL-SCNC: 141 MMOL/L (ref 135–147)
T4 FREE SERPL-MCNC: 1.31 NG/DL (ref 0.61–1.12)
TSH SERPL DL<=0.05 MIU/L-ACNC: 0.02 UIU/ML (ref 0.45–4.5)
WBC # BLD AUTO: 4.49 THOUSAND/UL (ref 4.31–10.16)

## 2024-11-13 PROCEDURE — 85025 COMPLETE CBC W/AUTO DIFF WBC: CPT

## 2024-11-13 PROCEDURE — 80053 COMPREHEN METABOLIC PANEL: CPT

## 2024-11-13 PROCEDURE — 86663 EPSTEIN-BARR ANTIBODY: CPT

## 2024-11-13 PROCEDURE — 86664 EPSTEIN-BARR NUCLEAR ANTIGEN: CPT

## 2024-11-13 PROCEDURE — 86308 HETEROPHILE ANTIBODY SCREEN: CPT

## 2024-11-13 PROCEDURE — 86665 EPSTEIN-BARR CAPSID VCA: CPT

## 2024-11-13 PROCEDURE — 36415 COLL VENOUS BLD VENIPUNCTURE: CPT

## 2024-11-14 ENCOUNTER — RESULTS FOLLOW-UP (OUTPATIENT)
Age: 54
End: 2024-11-14

## 2024-11-14 ENCOUNTER — RESULTS FOLLOW-UP (OUTPATIENT)
Dept: FAMILY MEDICINE CLINIC | Facility: CLINIC | Age: 54
End: 2024-11-14

## 2024-11-14 DIAGNOSIS — E03.8 OTHER SPECIFIED HYPOTHYROIDISM: ICD-10-CM

## 2024-11-14 LAB
EBV EA IGG SER QL IA: NEGATIVE
EBV NA IGG SER QL IA: POSITIVE
EBV VCA IGG SER QL IA: POSITIVE
EBV VCA IGM SER QL IA: NEGATIVE
HETEROPH AB SER QL: NEGATIVE

## 2024-11-14 RX ORDER — LEVOTHYROXINE SODIUM 13 UG/1
150 CAPSULE ORAL DAILY
Qty: 90 CAPSULE | Refills: 1 | Status: SHIPPED | OUTPATIENT
Start: 2024-11-14

## 2024-11-18 ENCOUNTER — TELEMEDICINE (OUTPATIENT)
Dept: PSYCHIATRY | Facility: CLINIC | Age: 54
End: 2024-11-18

## 2024-11-18 DIAGNOSIS — F31.81 BIPOLAR 2 DISORDER (HCC): Primary | ICD-10-CM

## 2024-11-18 DIAGNOSIS — F41.1 GAD (GENERALIZED ANXIETY DISORDER): ICD-10-CM

## 2024-11-18 PROCEDURE — PBNCHG PB NO CHARGE PLACEHOLDER

## 2024-11-19 ENCOUNTER — TELEPHONE (OUTPATIENT)
Age: 54
End: 2024-11-19

## 2024-11-19 ENCOUNTER — APPOINTMENT (OUTPATIENT)
Dept: RADIOLOGY | Facility: CLINIC | Age: 54
End: 2024-11-19
Payer: COMMERCIAL

## 2024-11-19 DIAGNOSIS — S97.82XA CRUSHING INJURY OF LEFT FOOT, INITIAL ENCOUNTER: ICD-10-CM

## 2024-11-19 DIAGNOSIS — S97.82XA CRUSHING INJURY OF LEFT FOOT, INITIAL ENCOUNTER: Primary | ICD-10-CM

## 2024-11-19 PROCEDURE — 73630 X-RAY EXAM OF FOOT: CPT

## 2024-11-19 RX ORDER — CLONAZEPAM 1 MG/1
1 TABLET ORAL DAILY PRN
Qty: 3 TABLET | Refills: 0 | Status: SHIPPED | OUTPATIENT
Start: 2024-11-19 | End: 2024-11-22 | Stop reason: SDUPTHER

## 2024-11-19 NOTE — TELEPHONE ENCOUNTER
Patient called stating she stubbed left pinkie toe on a wooden bench yesterday. She thinks its broken. Patient reports that the toe is very painful, purple and she is unable to wear a shoe on that foot.     She would like for PCP to order an xray for her.       Patient would appreciate a call back if PCP places order. [phone]

## 2024-11-19 NOTE — TELEPHONE ENCOUNTER
07/25/2024 07/23/2024 clonazePAM (Tablet) 30.0 30 1 MG NA [facility] Commercial Insurance 0 / 0 [place]   1 [id number] 06/27/2024 06/26/2024 clonazePAM (Tablet) 30.0 30 1 MG NA [facility] Commercial Insurance 0 / 0 PA   1 [id number] 05/16/2024 05/15/2024 clonazePAM (Tablet) 30.0 30 1 MG NA [facility] Commercial Insurance 0 / 0 [place]   1 [id number] 03/12/2024 02/14/2024 clonazePAM (Tablet) 30.0 30 1 MG NA [facility] Commercial Insurance 1 / 1 [place]   1 [id number] 02/14/2024 02/14/2024 clonazePAM (Tablet) 30.0 30 1 MG NA [facility] Commercial Insurance 0 / 1 PA

## 2024-11-20 ENCOUNTER — RESULTS FOLLOW-UP (OUTPATIENT)
Dept: FAMILY MEDICINE CLINIC | Facility: CLINIC | Age: 54
End: 2024-11-20

## 2024-11-22 ENCOUNTER — TELEMEDICINE (OUTPATIENT)
Dept: PSYCHIATRY | Facility: CLINIC | Age: 54
End: 2024-11-22
Payer: COMMERCIAL

## 2024-11-22 DIAGNOSIS — F41.1 GAD (GENERALIZED ANXIETY DISORDER): ICD-10-CM

## 2024-11-22 DIAGNOSIS — F31.81 BIPOLAR 2 DISORDER (HCC): Primary | ICD-10-CM

## 2024-11-22 PROCEDURE — 99214 OFFICE O/P EST MOD 30 MIN: CPT | Performed by: PHYSICIAN ASSISTANT

## 2024-11-22 RX ORDER — CLONAZEPAM 1 MG/1
1 TABLET ORAL DAILY PRN
Qty: 30 TABLET | Refills: 3 | Status: SHIPPED | OUTPATIENT
Start: 2024-11-22

## 2024-11-22 NOTE — BH TREATMENT PLAN
"TREATMENT PLAN (Medication Management Only)        [facility]    Name/Date of Birth/MRN:  Belem Linares 54 y.o. 1970 MRN: [id number]  Date of Treatment Plan: November 22, 2024  Diagnosis/Diagnoses:   1. Bipolar 2 disorder (HCC)    2. ZAINA (generalized anxiety disorder)      Strengths/Personal Resources for Self-Care: \"I don't give up; The driving force just so that my grandson can have the best life that he can possibly have\"  Area/Areas of need (in own words): \"The anxiety is the only thing, is my biggest issue \"  1. Long Term Goal:   Maintain mood stability and control of anxiety  Target Date:  3-6 months  Person/Persons responsible for completion of goal: Maisha Lynn (therapist)  2.  Short Term Objective (s) - How will we reach this goal?:   A.  Provider new recommended medication/dosage changes and/or continue medication(s):   Pt is having depressive, anxious and near panic attacks, and also mild hypomanic moments.  She is circumstantial and tangential at times and all in the setting of medication and appt noncompliance.  Pt feels the Clonazepam is helping well and wants it to be continued, and I have no objection.  I will restart Vraylar and titrate back her usual dosage.  Treatment plan done virtually as per [facility]'s administrative directive, since Pt cannot be here to sign in person, and Pt accepts the plan.   Could not sign for Tx plan due to \"Fatal device communication error\" of the [employer].   Safety Plan was done by Pt's therapist on 8/31/2024.  Pt no longer on Paroxetine 7.5mg qAM for hot flashes -  GYN stopped it in 4/2024, and now gives Pt Veozah for the Sxs  Restart:  Vraylar 3mg (1) cap po qd x 1 week # 7, then 6mg (1) cap po qd # 30 R3  Clonazepam 1mg (1) tab po qd prn anxiety # 30 R3 -- last filled 11/20/2024 for 3 tabs per PDMP  Pt states he is still taking Trokendi XR 100mg (1) tab po bid for migraines--per " Neurology   Vit D--per Rheumatology   Continue counseling with Maisha Bonds [facility]  Insurance will not allow for Lipid panel at this time from me  Pt to get repeat TSH,  FT4, -- per endocrinologist   Return 12 weeks, call sooner prn        Target Date:  3-6 months  Person/Persons Responsible for Completion of Goal: Belem and Maisha Suggs   Progress Towards Goals: stable, continuing treatment  Treatment Modality:  Medication mgt and psychotherapy  Review due 180 days from date of this plan: 6 months - 5/22/2025  Expected length of service: ongoing treatment unless revised  My Physician/PA/NP and I have developed this plan together and I agree to work on the goals and objectives. I understand the treatment goals that were developed for my treatment.  Electronic Signatures: on file (unless signed below)    Es Rollins PA-C 11/22/24
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
## 2024-11-22 NOTE — ASSESSMENT & PLAN NOTE
Orders:    clonazePAM (KlonoPIN) 1 mg tablet; Take 1 tablet (1 mg total) by mouth daily as needed for anxiety

## 2024-11-22 NOTE — ASSESSMENT & PLAN NOTE
Orders:    cariprazine (Vraylar) 6 MG capsule; Take 1 capsule (6 mg total) by mouth daily Start this dose AFTER the 3mg caps are finished    cariprazine (Vraylar) 3 MG capsule; Start this dose first and take 1 cap po qd x 1 week then go to the 6mg cap

## 2024-12-02 ENCOUNTER — TELEPHONE (OUTPATIENT)
Dept: PSYCHIATRY | Facility: CLINIC | Age: 54
End: 2024-12-02

## 2024-12-02 NOTE — TELEPHONE ENCOUNTER
Called and left message for patient to return a call to [phone] and schedule 12 week follow up with provider (ANAHI Rollins ) around 2/14/2025  Please schedule upon return call. Thank you.

## 2024-12-03 ENCOUNTER — TELEPHONE (OUTPATIENT)
Dept: FAMILY MEDICINE CLINIC | Facility: CLINIC | Age: 54
End: 2024-12-03

## 2024-12-03 ENCOUNTER — APPOINTMENT (OUTPATIENT)
Dept: LAB | Facility: CLINIC | Age: 54
End: 2024-12-03
Payer: COMMERCIAL

## 2024-12-03 ENCOUNTER — OFFICE VISIT (OUTPATIENT)
Dept: FAMILY MEDICINE CLINIC | Facility: CLINIC | Age: 54
End: 2024-12-03
Payer: COMMERCIAL

## 2024-12-03 VITALS
HEART RATE: 74 BPM | DIASTOLIC BLOOD PRESSURE: 76 MMHG | SYSTOLIC BLOOD PRESSURE: 124 MMHG | WEIGHT: 189 LBS | HEIGHT: 64 IN | BODY MASS INDEX: 32.27 KG/M2 | OXYGEN SATURATION: 99 %

## 2024-12-03 DIAGNOSIS — E66.09 CLASS 1 OBESITY DUE TO EXCESS CALORIES WITH SERIOUS COMORBIDITY AND BODY MASS INDEX (BMI) OF 32.0 TO 32.9 IN ADULT: Primary | ICD-10-CM

## 2024-12-03 DIAGNOSIS — E66.811 CLASS 1 OBESITY DUE TO EXCESS CALORIES WITHOUT SERIOUS COMORBIDITY WITH BODY MASS INDEX (BMI) OF 30.0 TO 30.9 IN ADULT: ICD-10-CM

## 2024-12-03 DIAGNOSIS — R42 DIZZY: ICD-10-CM

## 2024-12-03 DIAGNOSIS — R63.5 WEIGHT GAIN: ICD-10-CM

## 2024-12-03 DIAGNOSIS — R42 DIZZINESS: ICD-10-CM

## 2024-12-03 DIAGNOSIS — R53.83 OTHER FATIGUE: ICD-10-CM

## 2024-12-03 DIAGNOSIS — E53.8 LOW SERUM VITAMIN B12: ICD-10-CM

## 2024-12-03 DIAGNOSIS — E55.9 VITAMIN D DEFICIENCY: ICD-10-CM

## 2024-12-03 DIAGNOSIS — R42 VERTIGO: ICD-10-CM

## 2024-12-03 DIAGNOSIS — Z12.31 SCREENING MAMMOGRAM, ENCOUNTER FOR: ICD-10-CM

## 2024-12-03 DIAGNOSIS — E66.811 CLASS 1 OBESITY DUE TO EXCESS CALORIES WITH SERIOUS COMORBIDITY AND BODY MASS INDEX (BMI) OF 32.0 TO 32.9 IN ADULT: Primary | ICD-10-CM

## 2024-12-03 DIAGNOSIS — E86.0 DEHYDRATION: ICD-10-CM

## 2024-12-03 DIAGNOSIS — R79.9 ABNORMAL FINDING OF BLOOD CHEMISTRY, UNSPECIFIED: ICD-10-CM

## 2024-12-03 DIAGNOSIS — R42 DIZZINESS: Primary | ICD-10-CM

## 2024-12-03 DIAGNOSIS — E66.09 CLASS 1 OBESITY DUE TO EXCESS CALORIES WITHOUT SERIOUS COMORBIDITY WITH BODY MASS INDEX (BMI) OF 30.0 TO 30.9 IN ADULT: ICD-10-CM

## 2024-12-03 DIAGNOSIS — E78.5 DYSLIPIDEMIA, GOAL LDL BELOW 130: ICD-10-CM

## 2024-12-03 DIAGNOSIS — R07.9 CHEST PAIN, UNSPECIFIED TYPE: ICD-10-CM

## 2024-12-03 LAB
ALBUMIN SERPL BCG-MCNC: 4.1 G/DL (ref 3.5–5)
ALP SERPL-CCNC: 66 U/L (ref 34–104)
ALT SERPL W P-5'-P-CCNC: 20 U/L (ref 7–52)
ANION GAP SERPL CALCULATED.3IONS-SCNC: 5 MMOL/L (ref 4–13)
AST SERPL W P-5'-P-CCNC: 14 U/L (ref 13–39)
BASOPHILS # BLD AUTO: 0.02 THOUSANDS/ΜL (ref 0–0.1)
BASOPHILS NFR BLD AUTO: 0 % (ref 0–1)
BILIRUB SERPL-MCNC: 0.28 MG/DL (ref 0.2–1)
BUN SERPL-MCNC: 13 MG/DL (ref 5–25)
CALCIUM SERPL-MCNC: 9 MG/DL (ref 8.4–10.2)
CHLORIDE SERPL-SCNC: 105 MMOL/L (ref 96–108)
CHOLEST SERPL-MCNC: 195 MG/DL (ref ?–200)
CO2 SERPL-SCNC: 31 MMOL/L (ref 21–32)
CREAT SERPL-MCNC: 0.67 MG/DL (ref 0.6–1.3)
EOSINOPHIL # BLD AUTO: 0.17 THOUSAND/ΜL (ref 0–0.61)
EOSINOPHIL NFR BLD AUTO: 3 % (ref 0–6)
ERYTHROCYTE [DISTWIDTH] IN BLOOD BY AUTOMATED COUNT: 12.6 % (ref 11.6–15.1)
EST. AVERAGE GLUCOSE BLD GHB EST-MCNC: 111 MG/DL
FERRITIN SERPL-MCNC: 27 NG/ML (ref 11–307)
GFR SERPL CREATININE-BSD FRML MDRD: 100 ML/MIN/1.73SQ M
GLUCOSE P FAST SERPL-MCNC: 90 MG/DL (ref 65–99)
HBA1C MFR BLD: 5.5 %
HCT VFR BLD AUTO: 40.4 % (ref 34.8–46.1)
HDLC SERPL-MCNC: 54 MG/DL
HGB BLD-MCNC: 13 G/DL (ref 11.5–15.4)
IMM GRANULOCYTES # BLD AUTO: 0.01 THOUSAND/UL (ref 0–0.2)
IMM GRANULOCYTES NFR BLD AUTO: 0 % (ref 0–2)
IRON SATN MFR SERPL: 16 % (ref 15–50)
IRON SERPL-MCNC: 59 UG/DL (ref 50–212)
LDLC SERPL CALC-MCNC: 109 MG/DL (ref 0–100)
LYMPHOCYTES # BLD AUTO: 2.24 THOUSANDS/ΜL (ref 0.6–4.47)
LYMPHOCYTES NFR BLD AUTO: 35 % (ref 14–44)
MAGNESIUM SERPL-MCNC: 2.3 MG/DL (ref 1.9–2.7)
MCH RBC QN AUTO: 29.3 PG (ref 26.8–34.3)
MCHC RBC AUTO-ENTMCNC: 32.2 G/DL (ref 31.4–37.4)
MCV RBC AUTO: 91 FL (ref 82–98)
MONOCYTES # BLD AUTO: 0.36 THOUSAND/ΜL (ref 0.17–1.22)
MONOCYTES NFR BLD AUTO: 6 % (ref 4–12)
NEUTROPHILS # BLD AUTO: 3.69 THOUSANDS/ΜL (ref 1.85–7.62)
NEUTS SEG NFR BLD AUTO: 56 % (ref 43–75)
NONHDLC SERPL-MCNC: 141 MG/DL
NRBC BLD AUTO-RTO: 0 /100 WBCS
PLATELET # BLD AUTO: 272 THOUSANDS/UL (ref 149–390)
PMV BLD AUTO: 10.2 FL (ref 8.9–12.7)
POTASSIUM SERPL-SCNC: 4.6 MMOL/L (ref 3.5–5.3)
PROT SERPL-MCNC: 6.5 G/DL (ref 6.4–8.4)
RBC # BLD AUTO: 4.43 MILLION/UL (ref 3.81–5.12)
SODIUM SERPL-SCNC: 141 MMOL/L (ref 135–147)
T4 FREE SERPL-MCNC: 0.84 NG/DL (ref 0.61–1.12)
TIBC SERPL-MCNC: 374 UG/DL (ref 250–450)
TRIGL SERPL-MCNC: 160 MG/DL (ref ?–150)
TSH SERPL DL<=0.05 MIU/L-ACNC: 0.23 UIU/ML (ref 0.45–4.5)
UIBC SERPL-MCNC: 315 UG/DL (ref 155–355)
VIT B12 SERPL-MCNC: 1248 PG/ML (ref 180–914)
WBC # BLD AUTO: 6.49 THOUSAND/UL (ref 4.31–10.16)

## 2024-12-03 PROCEDURE — 83550 IRON BINDING TEST: CPT

## 2024-12-03 PROCEDURE — 82728 ASSAY OF FERRITIN: CPT

## 2024-12-03 PROCEDURE — 85025 COMPLETE CBC W/AUTO DIFF WBC: CPT

## 2024-12-03 PROCEDURE — 99214 OFFICE O/P EST MOD 30 MIN: CPT

## 2024-12-03 PROCEDURE — 83036 HEMOGLOBIN GLYCOSYLATED A1C: CPT

## 2024-12-03 PROCEDURE — 80053 COMPREHEN METABOLIC PANEL: CPT

## 2024-12-03 PROCEDURE — 83540 ASSAY OF IRON: CPT

## 2024-12-03 PROCEDURE — 82607 VITAMIN B-12: CPT

## 2024-12-03 PROCEDURE — 83735 ASSAY OF MAGNESIUM: CPT

## 2024-12-03 PROCEDURE — 80061 LIPID PANEL: CPT

## 2024-12-03 PROCEDURE — 36415 COLL VENOUS BLD VENIPUNCTURE: CPT

## 2024-12-03 PROCEDURE — 82306 VITAMIN D 25 HYDROXY: CPT

## 2024-12-03 RX ORDER — DEXAMETHASONE 4 MG/1
TABLET ORAL
COMMUNITY
Start: 2024-11-27

## 2024-12-03 RX ORDER — TIZANIDINE 2 MG/1
TABLET ORAL
COMMUNITY
Start: 2024-08-27

## 2024-12-03 RX ORDER — PREDNISONE 2.5 MG/1
TABLET ORAL
COMMUNITY
Start: 2024-10-12

## 2024-12-03 RX ORDER — FLUCONAZOLE 150 MG/1
TABLET ORAL
COMMUNITY
Start: 2024-11-05

## 2024-12-03 RX ORDER — METHOTREXATE 2.5 MG/1
TABLET ORAL
COMMUNITY
Start: 2024-09-18

## 2024-12-03 RX ORDER — TIRZEPATIDE 2.5 MG/.5ML
2.5 INJECTION, SOLUTION SUBCUTANEOUS WEEKLY
Qty: 2 ML | Refills: 0 | Status: SHIPPED | OUTPATIENT
Start: 2024-12-03 | End: 2024-12-31

## 2024-12-03 RX ORDER — HYOSCYAMINE SULFATE 0.125 MG
TABLET ORAL
COMMUNITY
Start: 2024-10-09

## 2024-12-03 NOTE — ASSESSMENT & PLAN NOTE

## 2024-12-03 NOTE — TELEPHONE ENCOUNTER
Called infusion center and patient is all set up for 12/13 and she is aware. Patient was able to make her own apt

## 2024-12-03 NOTE — ASSESSMENT & PLAN NOTE
REcommend myplate.gov, will switch from Ozempic to Zepbound, referral placed to weight management as I feel they could be very helpful.  Do recommend exercise 10 minutes to 30 minutes most days as tolerated.  1 to 2 L of water a day also ordered lab work that is fasting.  Follow-up in 1 month or sooner if needed  Orders:    tirzepatide (Zepbound) 2.5 mg/0.5 mL auto-injector; Inject 0.5 mL (2.5 mg total) under the skin once a week for 28 days    CBC and differential; Future    Comprehensive metabolic panel; Future    Hemoglobin A1C; Future    Lipid panel; Future

## 2024-12-03 NOTE — ASSESSMENT & PLAN NOTE
Prior Authorization Clinical Questions for Weight Management Pharmacotherapy    1. Does the patient have a contrainidcation to medication prescribed for weight management?: No  2. Does the patient have a diagnosis of obesity, confirmed by a BMI greater than or equal to 30 kg/m^2?: Yes  3. Does the patient have a BMI of greater than or equal to 27 kg/m^2 with at least one weight-related comorbidity/risk factor/complication (e.g. diabetes, dyslipidemia, coronary artery disease)?: Yes  4. Weight-related co-morbidities/risk factors: dyslipidemia, depression  5. Has the patient been on a weight loss regimen of low-calorie diet, increased physical activity, and lifestyle modifications for a minimum of 6 months?: Yes  6. Has the patient completed a comprehensive weight loss program (ie, Weight Watchers, Noom, Bariatrics, other jessee on phone)? If so, what?: No  7. Does the patient have a history of type 2 diabetes?: No  8. Has the member tried and failed other weight loss medication within the past 12 months?: Yes   -- Q8 Further explanation: ozempic   9. Will the member use requested medication in combination with another GLP agonist or weight loss drug?: No  10. Is the medication a controlled substance?: No  For renewals: Has the patient had a positive outcome with current weight management medication (i.e., change in body weight of at least 4-5% after 12-16 weeks on maximally tolerated dose)?: No     Baseline weight (in pounds): 140 lbs  Current weight (in pounds): 189 lbs  Weight loss percentage: 35%     REcommend myplate.gov, will switch from Ozempic to Zepbound, referral placed to weight management as I feel they could be very helpful.  Do recommend exercise 10 minutes to 30 minutes most days as tolerated.  1 to 2 L of water a day also ordered lab work that is fasting.  Follow-up in 1 month or sooner if needed    Orders:    tirzepatide (Zepbound) 2.5 mg/0.5 mL auto-injector; Inject 0.5 mL (2.5 mg total) under the skin  once a week for 28 days

## 2024-12-03 NOTE — PROGRESS NOTES
Name: Belem Linares      : 1970      MRN: [id number]  Encounter Provider: ERICKA Nava  Encounter Date: 12/3/2024   Encounter department: [facility] [address]  :  Assessment & Plan  Class 1 obesity due to excess calories without serious comorbidity with body mass index (BMI) of 30.0 to 30.9 in adult  Prior Authorization Clinical Questions for Weight Management Pharmacotherapy    1. Does the patient have a contrainidcation to medication prescribed for weight management?: No  2. Does the patient have a diagnosis of obesity, confirmed by a BMI greater than or equal to 30 kg/m^2?: Yes  3. Does the patient have a BMI of greater than or equal to 27 kg/m^2 with at least one weight-related comorbidity/risk factor/complication (e.g. diabetes, dyslipidemia, coronary artery disease)?: Yes  4. Weight-related co-morbidities/risk factors: dyslipidemia, depression  5. Has the patient been on a weight loss regimen of low-calorie diet, increased physical activity, and lifestyle modifications for a minimum of 6 months?: Yes  6. Has the patient completed a comprehensive weight loss program (ie, Weight Watchers, Noom, Bariatrics, other jessee on phone)? If so, what?: No  7. Does the patient have a history of type 2 diabetes?: No  8. Has the member tried and failed other weight loss medication within the past 12 months?: Yes   -- Q8 Further explanation: ozempic   9. Will the member use requested medication in combination with another GLP agonist or weight loss drug?: No  10. Is the medication a controlled substance?: No  For renewals: Has the patient had a positive outcome with current weight management medication (i.e., change in body weight of at least 4-5% after 12-16 weeks on maximally tolerated dose)?: No     Baseline weight (in pounds): 140 lbs  Current weight (in pounds): 189 lbs  Weight loss percentage: 35%     REcommend [email or web address], will switch from Ozempic to Zepbound,  referral placed to weight management as I feel they could be very helpful.  Do recommend exercise 10 minutes to 30 minutes most days as tolerated.  1 to 2 L of water a day also ordered lab work that is fasting.  Follow-up in 1 month or sooner if needed    Orders:  •  tirzepatide (Zepbound) 2.5 mg/0.5 mL auto-injector; Inject 0.5 mL (2.5 mg total) under the skin once a week for 28 days  •  CBC and differential; Future  •  Comprehensive metabolic panel; Future  •  Hemoglobin A1C; Future  •  Lipid panel; Future  •  Vitamin D 25 hydroxy; Future  •  Ambulatory Referral to Weight Management; Future    Weight gain  REcommend [email or web address].gov, will switch from Ozempic to Zepbound, referral placed to weight management as I feel they could be very helpful.  Do recommend exercise 10 minutes to 30 minutes most days as tolerated.  1 to 2 L of water a day also ordered lab work that is fasting.  Follow-up in 1 month or sooner if needed  Orders:  •  tirzepatide (Zepbound) 2.5 mg/0.5 mL auto-injector; Inject 0.5 mL (2.5 mg total) under the skin once a week for 28 days  •  Ambulatory Referral to Weight Management; Future    Dyslipidemia, goal LDL below 130  REcommend [email or web address].gov, will switch from Ozempic to Zepbound, referral placed to weight management as I feel they could be very helpful.  Do recommend exercise 10 minutes to 30 minutes most days as tolerated.  1 to 2 L of water a day also ordered lab work that is fasting.  Follow-up in 1 month or sooner if needed  Orders:  •  tirzepatide (Zepbound) 2.5 mg/0.5 mL auto-injector; Inject 0.5 mL (2.5 mg total) under the skin once a week for 28 days  •  CBC and differential; Future  •  Comprehensive metabolic panel; Future  •  Hemoglobin A1C; Future  •  Lipid panel; Future    Class 1 obesity due to excess calories with serious comorbidity and body mass index (BMI) of 32.0 to 32.9 in adult  Prior Authorization Clinical Questions for Weight Management Pharmacotherapy    1. Does the patient have  a contrainidcation to medication prescribed for weight management?: No  2. Does the patient have a diagnosis of obesity, confirmed by a BMI greater than or equal to 30 kg/m^2?: Yes  3. Does the patient have a BMI of greater than or equal to 27 kg/m^2 with at least one weight-related comorbidity/risk factor/complication (e.g. diabetes, dyslipidemia, coronary artery disease)?: Yes  4. Weight-related co-morbidities/risk factors: dyslipidemia, depression  5. Has the patient been on a weight loss regimen of low-calorie diet, increased physical activity, and lifestyle modifications for a minimum of 6 months?: Yes  6. Has the patient completed a comprehensive weight loss program (ie, Weight Watchers, Noom, Bariatrics, other jessee on phone)? If so, what?: No  7. Does the patient have a history of type 2 diabetes?: No  8. Has the member tried and failed other weight loss medication within the past 12 months?: Yes   -- Q8 Further explanation: ozempic   9. Will the member use requested medication in combination with another GLP agonist or weight loss drug?: No  10. Is the medication a controlled substance?: No  For renewals: Has the patient had a positive outcome with current weight management medication (i.e., change in body weight of at least 4-5% after 12-16 weeks on maximally tolerated dose)?: No     Baseline weight (in pounds): 140 lbs  Current weight (in pounds): 189 lbs  Weight loss percentage: 35%     REcommend [email or web address], will switch from Ozempic to Zepbound, referral placed to weight management as I feel they could be very helpful.  Do recommend exercise 10 minutes to 30 minutes most days as tolerated.  1 to 2 L of water a day also ordered lab work that is fasting.  Follow-up in 1 month or sooner if needed    Orders:  •  tirzepatide (Zepbound) 2.5 mg/0.5 mL auto-injector; Inject 0.5 mL (2.5 mg total) under the skin once a week for 28 days    Low serum vitamin B12  Completed injections, will recheck B12 and call with  recommendations  Orders:  •  Vitamin B12; Future    Vitamin D deficiency  Please have lab work we will call with results follow-up in 1 month or sooner if needed  Orders:  •  Vitamin D 25 hydroxy; Future    Other fatigue  Please have lab work we will call with results follow-up in 1 month or sooner if needed  Orders:  •  Iron Panel (Includes Ferritin, Iron Sat%, Iron, and TIBC); Future    Abnormal finding of blood chemistry, unspecified  Please have lab work we will call with results follow-up in 1 month or sooner if needed  Orders:  •  Hemoglobin A1C; Future  •  Iron Panel (Includes Ferritin, Iron Sat%, Iron, and TIBC); Future    Screening mammogram, encounter for  Have mammo, will call with results   Orders:  •  Mammo screening bilateral w 3d and cad; Future    Dizzy  Will do ct and lab work, dizziness with position change, increase hydration, do recommend small frequent meals.   Orders:  •  Stress test only, exercise; Future    Chest pain, unspecified type  Will do stress and echo, ER for any unrelieved cp.   Orders:  •  Stress test only, exercise; Future  •  Echo complete w/ contrast if indicated; Future  •  CT head wo contrast; Future           History of Present Illness     Belem is here for weight gain and fatigue.     Review of Systems   Constitutional:  Positive for diaphoresis and fatigue. Negative for chills and fever.   HENT:  Positive for congestion. Negative for ear pain, sinus pressure, sinus pain and sore throat.    Eyes:  Negative for visual disturbance.   Respiratory:  Positive for shortness of breath. Negative for cough, chest tightness and wheezing.    Cardiovascular:  Positive for chest pain. Negative for palpitations.   Gastrointestinal:  Positive for abdominal pain, diarrhea and nausea. Negative for constipation and vomiting.   Genitourinary:  Negative for dysuria, frequency and hematuria.   Musculoskeletal:  Negative for arthralgias and back pain.   Neurological:  Positive for dizziness and  headaches. Negative for syncope.   Psychiatric/Behavioral:  Positive for dysphoric mood and sleep disturbance. The patient is nervous/anxious.    All other systems reviewed and are negative.    Pertinent Medical History   See [facility]      Medical History Reviewed by provider this encounter:  Tobacco  Allergies  Meds  Problems  Med Hx  Surg Hx  Fam Hx     .  Past Medical History   Past Medical History:   Diagnosis Date   • Anxiety    • Anxiety    • Asthma    • Bipolar disorder (HCC)    • Carpal tunnel syndrome    • Chronic pain     lower back   • Depression    • Disease of thyroid gland    • Dyslipidemia    • Fibromyalgia    • Hypothyroidism    • Irritable bowel    • Kidney stones    • Kidney stones 2019   • Migraine    • Obesity (BMI 30.0-34.9)    • Psychiatric disorder     depression/anxiety   • Suicide attempt (HCC)     as teen   • Vitamin D deficiency      Past Surgical History:   Procedure Laterality Date   • BUNIONECTOMY     •  SECTION     • CHOLECYSTECTOMY     • PARTIAL HYSTERECTOMY      portion of  uterus still present   • TONSILLECTOMY     • TUBAL LIGATION       Family History   Problem Relation Age of Onset   • Hypertension Mother    • Diabetes Mother    • Hypothyroidism Mother    • Stroke Mother    • Bipolar disorder Mother    • Anxiety disorder Mother    • Arthritis Mother    • Depression Mother    • Mental illness Mother    • Cancer Father         pancreatic    • Hypothyroidism Sister    • Hypertension Brother    • Heart disease Brother    • Cancer Maternal Uncle         lung   • Cancer Paternal Aunt         breast   • Breast cancer Paternal Aunt    • Cancer Paternal Uncle         testicular    • Cancer Paternal Grandmother         breast   • Dementia Paternal Grandmother    • Breast cancer Paternal Grandmother    • Cancer Cousin         brain   • Bipolar disorder Daughter       reports that she has never smoked. She has been exposed to tobacco smoke. She has never used smokeless  tobacco. She reports current drug use. Drug: Marijuana. She reports that she does not drink alcohol.  Current Outpatient Medications on File Prior to Visit   Medication Sig Dispense Refill   • dexamethasone (DECADRON) 4 mg tablet      • fluconazole (DIFLUCAN) 150 mg tablet      • hyoscyamine (ANASPAZ,LEVSIN) 0.125 MG tablet      • methotrexate 2.5 MG tablet      • predniSONE 2.5 mg tablet      • tiZANidine (ZANAFLEX) 2 mg tablet      • Adalimumab (Humira, 2 Pen,) 40 MG/0.4ML AJKT Inject 40 mg under the skin every 14 (fourteen) days     • Adalimumab (Humira, 2 Pen,) 40 MG/0.4ML PNKT      • Blood Glucose Monitoring Suppl (OneTouch Verio Reflect) w/Device KIT Check blood sugars once daily. Please substitute with appropriate alternative as covered by patient's insurance. Dx: E11.65 1 kit 0   • butalbital-acetaminophen-caffeine (FIORICET,ESGIC) -40 mg per tablet Take 1 tablet by mouth every 6 (six) hours as needed for headaches 30 tablet 0   • cariprazine (Vraylar) 3 MG capsule Start this dose first and take 1 cap po qd x 1 week then go to the 6mg cap 7 capsule 0   • cariprazine (Vraylar) 6 MG capsule Take 1 capsule (6 mg total) by mouth daily Start this dose AFTER the 3mg caps are finished 30 capsule 3   • celecoxib (CELEBREX) 200 mg capsule Take 1 capsule (200 mg total) by mouth daily 30 capsule 2   • clonazePAM (KlonoPIN) 1 mg tablet Take 1 tablet (1 mg total) by mouth daily as needed for anxiety 30 tablet 3   • cyanocobalamin 1,000 mcg/mL INJECT 1 ML (1,000 MCG TOTAL) INTO A MUSCLE ONCE A WEEK 4 mL 0   • dicyclomine (BENTYL) 10 mg capsule Take 1 capsule (10 mg total) by mouth 3 (three) times a day as needed (abd cramping) 30 capsule 0   • fremanezumab-vfrm (Ajovy) 225 MG/1.5ML auto-injector Inject 225 mg under the skin every 30 (thirty) days     • glucose blood (OneTouch Verio) test strip Check blood sugars once daily. Please substitute with appropriate alternative as covered by patient's insurance. Dx: E11.65  "100 each 3   • ketoconazole (NIZORAL) 2 % cream      • meclizine (ANTIVERT) 25 mg tablet TAKE 1 TABLET (25 MG TOTAL) BY MOUTH EVERY EIGHT (EIGHT) HOURS AS NEEDED FOR DIZZINESS 90 tablet 5   • montelukast (SINGULAIR) 10 mg tablet      • naratriptan (AMERGE) 2.5 MG tablet      • omeprazole (PriLOSEC) 40 MG capsule      • ondansetron (ZOFRAN-ODT) 4 mg disintegrating tablet Take 1 tablet (4 mg total) by mouth every 8 (eight) hours as needed for nausea or vomiting 20 tablet 5   • OneTouch Delica Lancets 33G MISC Check blood sugars once daily. Please substitute with appropriate alternative as covered by patient's insurance. Dx: E11.65 100 each 3   • Respiratory Therapy Supplies (NEBULIZER) DONA by Does not apply route every 4 (four) hours while awake 90 each 1   • Syringe/Needle, Disp, (SYRINGE 3CC/25GX1\") 25G X 1\" 3 ML MISC Use once a week 50 each 0   • Tirosint 150 MCG CAPS Take 1 capsule (150 mcg total) by mouth in the morning 90 capsule 1   • tiZANidine (ZANAFLEX) 4 mg tablet      • Topiramate  MG CP24 Take 100 mg by mouth 2 (two) times a day     • triamcinolone (KENALOG) 0.1 % cream Apply topically 2 (two) times a day 15 g 0   • Ubrelvy 100 MG tablet      • Ventolin  (90 Base) MCG/ACT inhaler INHALE TWO PUFFS EVERY 4 (FOUR) HOURS AS NEEDED FOR WHEEZING OR SHORTNESS OF BREATH 18 g 0   • Veozah tablet TAKE 1 TABLET (45 MG TOTAL) BY MOUTH DAILY 30 tablet 1   • XIFAXAN 550 MG tablet      • [DISCONTINUED] semaglutide, 2 mg/dose, (Ozempic, 2 MG/DOSE,) 8 mg/ mL injection pen INJECT 0.75 ML (2 MG TOTAL) UNDER THE SKIN EVERY SEVEN DAYS 9 mL 4     Current Facility-Administered Medications on File Prior to Visit   Medication Dose Route Frequency Provider Last Rate Last Admin   • cyanocobalamin injection 1,000 mcg  1,000 mcg Intramuscular Q30 Days ERICKA George   1,000 mcg at 07/27/23 1025     Allergies   Allergen Reactions   • Doxycycline Rash   • Erythromycin Rash   • Other Edema and Wheezing     jalapeno "   • Augmentin [Amoxicillin-Pot Clavulanate] GI Intolerance   • Latex Rash   • Duloxetine      Other reaction(s): Nausea, Loopy   • Eletriptan      Pain in lower jaw with pressure in throat   • Emgality [Galcanezumab-Gnlm]    • Galcanezumab      rash   • Lidocaine Other (See Comments)     Headache   • Methocarbamol Other (See Comments)     Dizzyness      Current Outpatient Medications on File Prior to Visit   Medication Sig Dispense Refill   • dexamethasone (DECADRON) 4 mg tablet      • fluconazole (DIFLUCAN) 150 mg tablet      • hyoscyamine (ANASPAZ,LEVSIN) 0.125 MG tablet      • methotrexate 2.5 MG tablet      • predniSONE 2.5 mg tablet      • tiZANidine (ZANAFLEX) 2 mg tablet      • Adalimumab (Humira, 2 Pen,) 40 MG/0.4ML AJKT Inject 40 mg under the skin every 14 (fourteen) days     • Adalimumab (Humira, 2 Pen,) 40 MG/0.4ML PNKT      • Blood Glucose Monitoring Suppl (OneTouch Verio Reflect) w/Device KIT Check blood sugars once daily. Please substitute with appropriate alternative as covered by patient's insurance. Dx: E11.65 1 kit 0   • butalbital-acetaminophen-caffeine (FIORICET,ESGIC) -40 mg per tablet Take 1 tablet by mouth every 6 (six) hours as needed for headaches 30 tablet 0   • cariprazine (Vraylar) 3 MG capsule Start this dose first and take 1 cap po qd x 1 week then go to the 6mg cap 7 capsule 0   • cariprazine (Vraylar) 6 MG capsule Take 1 capsule (6 mg total) by mouth daily Start this dose AFTER the 3mg caps are finished 30 capsule 3   • celecoxib (CELEBREX) 200 mg capsule Take 1 capsule (200 mg total) by mouth daily 30 capsule 2   • clonazePAM (KlonoPIN) 1 mg tablet Take 1 tablet (1 mg total) by mouth daily as needed for anxiety 30 tablet 3   • cyanocobalamin 1,000 mcg/mL INJECT 1 ML (1,000 MCG TOTAL) INTO A MUSCLE ONCE A WEEK 4 mL 0   • dicyclomine (BENTYL) 10 mg capsule Take 1 capsule (10 mg total) by mouth 3 (three) times a day as needed (abd cramping) 30 capsule 0   • fremanezumab-vfrm  "(Ajovy) 225 MG/1.5ML auto-injector Inject 225 mg under the skin every 30 (thirty) days     • glucose blood (OneTouch Verio) test strip Check blood sugars once daily. Please substitute with appropriate alternative as covered by patient's insurance. Dx: E11.65 100 each 3   • ketoconazole (NIZORAL) 2 % cream      • meclizine (ANTIVERT) 25 mg tablet TAKE 1 TABLET (25 MG TOTAL) BY MOUTH EVERY EIGHT (EIGHT) HOURS AS NEEDED FOR DIZZINESS 90 tablet 5   • montelukast (SINGULAIR) 10 mg tablet      • naratriptan (AMERGE) 2.5 MG tablet      • omeprazole (PriLOSEC) 40 MG capsule      • ondansetron (ZOFRAN-ODT) 4 mg disintegrating tablet Take 1 tablet (4 mg total) by mouth every 8 (eight) hours as needed for nausea or vomiting 20 tablet 5   • OneTouch Delica Lancets 33G MISC Check blood sugars once daily. Please substitute with appropriate alternative as covered by patient's insurance. Dx: E11.65 100 each 3   • Respiratory Therapy Supplies (NEBULIZER) DONA by Does not apply route every 4 (four) hours while awake 90 each 1   • Syringe/Needle, Disp, (SYRINGE 3CC/25GX1\") 25G X 1\" 3 ML MISC Use once a week 50 each 0   • Tirosint 150 MCG CAPS Take 1 capsule (150 mcg total) by mouth in the morning 90 capsule 1   • tiZANidine (ZANAFLEX) 4 mg tablet      • Topiramate  MG CP24 Take 100 mg by mouth 2 (two) times a day     • triamcinolone (KENALOG) 0.1 % cream Apply topically 2 (two) times a day 15 g 0   • Ubrelvy 100 MG tablet      • Ventolin  (90 Base) MCG/ACT inhaler INHALE TWO PUFFS EVERY 4 (FOUR) HOURS AS NEEDED FOR WHEEZING OR SHORTNESS OF BREATH 18 g 0   • Veozah tablet TAKE 1 TABLET (45 MG TOTAL) BY MOUTH DAILY 30 tablet 1   • XIFAXAN 550 MG tablet      • [DISCONTINUED] semaglutide, 2 mg/dose, (Ozempic, 2 MG/DOSE,) 8 mg/ mL injection pen INJECT 0.75 ML (2 MG TOTAL) UNDER THE SKIN EVERY SEVEN DAYS 9 mL 4     Current Facility-Administered Medications on File Prior to Visit   Medication Dose Route Frequency Provider Last " "Rate Last Admin   • cyanocobalamin injection 1,000 mcg  1,000 mcg Intramuscular Q30 Days ERICKA George   1,000 mcg at 07/27/23 1025      Social History     Tobacco Use   • Smoking status: Never     Passive exposure: Past   • Smokeless tobacco: Never   Vaping Use   • Vaping status: Never Used   Substance and Sexual Activity   • Alcohol use: No   • Drug use: Yes     Types: Marijuana     Comment: gummy on occasion for pain   • Sexual activity: Not Currently     Partners: Male     Birth control/protection: Abstinence, Female Sterilization     Comment: Hysterectomy        Objective   /76   Pulse 74   Ht 5' 4\" (1.626 m)   Wt 85.7 kg (189 lb)   SpO2 99%   BMI 32.44 kg/m²      Physical Exam  Vitals and nursing note reviewed.   Constitutional:       General: She is not in acute distress.     Appearance: Normal appearance. She is well-developed. She is not ill-appearing.   HENT:      Head: Normocephalic and atraumatic.      Right Ear: Tympanic membrane, ear canal and external ear normal. There is no impacted cerumen.      Left Ear: Tympanic membrane, ear canal and external ear normal. There is no impacted cerumen.      Nose: Nose normal. No congestion.      Mouth/Throat:      Mouth: Mucous membranes are moist.      Pharynx: No posterior oropharyngeal erythema.   Eyes:      Extraocular Movements: Extraocular movements intact.      Conjunctiva/sclera: Conjunctivae normal.      Pupils: Pupils are equal, round, and reactive to light.   Cardiovascular:      Rate and Rhythm: Normal rate and regular rhythm.      Heart sounds: No murmur heard.  Pulmonary:      Effort: Pulmonary effort is normal. No respiratory distress.      Breath sounds: Normal breath sounds.   Abdominal:      Palpations: Abdomen is soft.      Tenderness: There is no abdominal tenderness.   Musculoskeletal:         General: No swelling.      Cervical back: Normal range of motion and neck supple.      Right lower leg: No edema.      Left lower " leg: No edema.   Lymphadenopathy:      Cervical: No cervical adenopathy.   Skin:     General: Skin is warm and dry.      Capillary Refill: Capillary refill takes less than 2 seconds.   Neurological:      General: No focal deficit present.      Mental Status: She is alert.   Psychiatric:         Mood and Affect: Mood normal.         Behavior: Behavior normal.     Administrative Statements   I have spent a total time of 30 minutes in caring for this patient on the day of the visit/encounter including Diagnostic results, Prognosis, Risks and benefits of tx options, Instructions for management, Patient and family education, Importance of tx compliance, Risk factor reductions, Impressions, Counseling / Coordination of care, Documenting in the medical record, Reviewing / ordering tests, medicine, procedures  , Obtaining or reviewing history  , and Communicating with other healthcare professionals . Topics discussed with the patient / family include symptom assessment and management, medication review, medication adjustment, psychosocial support, supportive listening, and anticipatory guidance.

## 2024-12-03 NOTE — ASSESSMENT & PLAN NOTE
REcommend myplate.gov, will switch from Ozempic to Zepbound, referral placed to weight management as I feel they could be very helpful.  Do recommend exercise 10 minutes to 30 minutes most days as tolerated.  1 to 2 L of water a day also ordered lab work that is fasting.  Follow-up in 1 month or sooner if needed  Orders:    tirzepatide (Zepbound) 2.5 mg/0.5 mL auto-injector; Inject 0.5 mL (2.5 mg total) under the skin once a week for 28 days    Ambulatory Referral to Weight Management; Future

## 2024-12-03 NOTE — ASSESSMENT & PLAN NOTE
Completed injections, will recheck B12 and call with recommendations  Orders:    Vitamin B12; Future

## 2024-12-03 NOTE — ASSESSMENT & PLAN NOTE
Please have lab work we will call with results follow-up in 1 month or sooner if needed  Orders:    Vitamin D 25 hydroxy; Future

## 2024-12-04 ENCOUNTER — TELEPHONE (OUTPATIENT)
Dept: FAMILY MEDICINE CLINIC | Facility: CLINIC | Age: 54
End: 2024-12-04

## 2024-12-04 ENCOUNTER — RESULTS FOLLOW-UP (OUTPATIENT)
Dept: FAMILY MEDICINE CLINIC | Facility: CLINIC | Age: 54
End: 2024-12-04

## 2024-12-04 DIAGNOSIS — E61.1 IRON DEFICIENCY: Primary | ICD-10-CM

## 2024-12-04 RX ORDER — BACILLUS COAGULANS 1B CELL
1 CAPSULE ORAL DAILY
Qty: 90 TABLET | Refills: 1 | Status: SHIPPED | OUTPATIENT
Start: 2024-12-04

## 2024-12-04 NOTE — TELEPHONE ENCOUNTER
Pt called in to let PCP know that she checked with her insurance and they will not cover Zepbound. She is also wanting to let PCP know that she removed her socks last night and noticed that her ankles had edema in them. She states its a mild edema does not pit but she states she can defiantly see it. Please advise if any recommendations for patient.

## 2024-12-04 NOTE — TELEPHONE ENCOUNTER
PA for     tirzepatide (Zepbound) 2.5 mg/0.5 mL auto-injector     DENIED    Reason:(Screenshot if applicable)        Message sent to office clinical pool Yes    Denial letter scanned into Media Yes    Appeal started No (Provider will need to decide if appeal is warranted and send clinical documentation to Prior Authorization Team for initiation.)    **Please follow up with your patient regarding denial and next steps**

## 2024-12-04 NOTE — TELEPHONE ENCOUNTER
PA for tirzepatide (Zepbound) 2.5 mg/0.5 mL auto-injector  SUBMITTED to [facility]    via    []CMM-KEY:   [x]Surescripts-Case ID #  [id number]      []Availity-Auth ID # NDC #   []Faxed to plan   []Other website   []Phone call Case ID #     [x]PA sent as URGENT    All office notes, labs and other pertaining documents and studies sent. Clinical questions answered. Awaiting determination from insurance company.     Turnaround time for your insurance to make a decision on your Prior Authorization can take 7-21 business days.

## 2024-12-05 ENCOUNTER — RESULTS FOLLOW-UP (OUTPATIENT)
Dept: FAMILY MEDICINE CLINIC | Facility: CLINIC | Age: 54
End: 2024-12-05

## 2024-12-05 DIAGNOSIS — E06.3 HYPOTHYROIDISM DUE TO HASHIMOTO THYROIDITIS: Primary | ICD-10-CM

## 2024-12-05 LAB — 25(OH)D3 SERPL-MCNC: 23.4 NG/ML (ref 30–100)

## 2024-12-05 NOTE — TELEPHONE ENCOUNTER
Patient is aware she actually called yesterday to let us know and she wanted to make you aware of some edema on her ankles, please see message on the bottom

## 2024-12-07 DIAGNOSIS — N95.1 HOT FLASHES DUE TO MENOPAUSE: ICD-10-CM

## 2024-12-07 DIAGNOSIS — F51.8 DISRUPTED SLEEP-WAKE CYCLE: ICD-10-CM

## 2024-12-07 DIAGNOSIS — G47.20 DISRUPTED SLEEP-WAKE CYCLE: ICD-10-CM

## 2024-12-09 ENCOUNTER — TELEMEDICINE (OUTPATIENT)
Dept: PSYCHIATRY | Facility: CLINIC | Age: 54
End: 2024-12-09
Payer: COMMERCIAL

## 2024-12-09 DIAGNOSIS — F41.1 GAD (GENERALIZED ANXIETY DISORDER): ICD-10-CM

## 2024-12-09 DIAGNOSIS — F31.81 BIPOLAR 2 DISORDER (HCC): Primary | ICD-10-CM

## 2024-12-09 PROCEDURE — 90834 PSYTX W PT 45 MINUTES: CPT

## 2024-12-09 RX ORDER — FEZOLINETANT 45 MG/1
45 TABLET, FILM COATED ORAL DAILY
Qty: 30 TABLET | Refills: 0 | Status: SHIPPED | OUTPATIENT
Start: 2024-12-09

## 2024-12-09 NOTE — PSYCH
Virtual Regular Visit    Verification of patient location:    Patient is located at Home in the following state in which I hold an active license [place]      Assessment/Plan:    Problem List Items Addressed This Visit       ZAINA (generalized anxiety disorder)    Bipolar 2 disorder (HCC) - Primary       Goals addressed in session: Goal 1          Reason for visit is No chief complaint on file.       Encounter provider Maisha Bonds LCSW      Recent Visits  Date Type Provider Dept   12/03/24 Office Visit ERICKA Nava [facility] [address]   Showing recent visits within past 7 days and meeting all other requirements  Today's Visits  Date Type Provider Dept   12/09/24 Telemedicine Maisha Bonds LCSW [facility]   Showing today's visits and meeting all other requirements  Future Appointments  No visits were found meeting these conditions.  Showing future appointments within next 150 days and meeting all other requirements       The patient was identified by name and date of birth. Belem Linares was informed that this is a telemedicine visit and that the visit is being conducted throughthe Epic Embedded platform. She agrees to proceed..  My office door was closed. No one else was in the room.  She acknowledged consent and understanding of privacy and security of the video platform. The patient has agreed to participate and understands they can discontinue the visit at any time.    Patient is aware this is a billable service.     Subjective  Belem Linares is a 54 y.o. female.      HPI     Past Medical History:   Diagnosis Date    Anxiety     Anxiety     Asthma     Bipolar disorder (HCC)     Carpal tunnel syndrome     Chronic pain     lower back    Depression     Disease of thyroid gland     Dyslipidemia     Fibromyalgia     Hypothyroidism 2007    Irritable bowel     Kidney stones     Kidney stones 05/20/2019    Migraine     Obesity (BMI 30.0-34.9)      Psychiatric disorder     depression/anxiety    Suicide attempt (HCC)     as teen    Vitamin D deficiency        Past Surgical History:   Procedure Laterality Date    BUNIONECTOMY       SECTION      CHOLECYSTECTOMY      PARTIAL HYSTERECTOMY      portion of  uterus still present    TONSILLECTOMY      TUBAL LIGATION         Current Outpatient Medications   Medication Sig Dispense Refill    Adalimumab (Humira, 2 Pen,) 40 MG/0.4ML AJKT Inject 40 mg under the skin every 14 (fourteen) days      Adalimumab (Humira, 2 Pen,) 40 MG/0.4ML PNKT       Blood Glucose Monitoring Suppl (OneTouch Verio Reflect) w/Device KIT Check blood sugars once daily. Please substitute with appropriate alternative as covered by patient's insurance. Dx: E11.65 1 kit 0    butalbital-acetaminophen-caffeine (FIORICET,ESGIC) -40 mg per tablet Take 1 tablet by mouth every 6 (six) hours as needed for headaches 30 tablet 0    cariprazine (Vraylar) 3 MG capsule Start this dose first and take 1 cap po qd x 1 week then go to the 6mg cap 7 capsule 0    cariprazine (Vraylar) 6 MG capsule Take 1 capsule (6 mg total) by mouth daily Start this dose AFTER the 3mg caps are finished 30 capsule 3    celecoxib (CELEBREX) 200 mg capsule Take 1 capsule (200 mg total) by mouth daily 30 capsule 2    clonazePAM (KlonoPIN) 1 mg tablet Take 1 tablet (1 mg total) by mouth daily as needed for anxiety 30 tablet 3    cyanocobalamin 1,000 mcg/mL INJECT 1 ML (1,000 MCG TOTAL) INTO A MUSCLE ONCE A WEEK 4 mL 0    dexamethasone (DECADRON) 4 mg tablet       dicyclomine (BENTYL) 10 mg capsule Take 1 capsule (10 mg total) by mouth 3 (three) times a day as needed (abd cramping) 30 capsule 0    fluconazole (DIFLUCAN) 150 mg tablet       fremanezumab-vfrm (Ajovy) 225 MG/1.5ML auto-injector Inject 225 mg under the skin every 30 (thirty) days      glucose blood (OneTouch Verio) test strip Check blood sugars once daily. Please substitute with appropriate alternative as covered by  "patient's insurance. Dx: E11.65 100 each 3    hyoscyamine (ANASPAZ,LEVSIN) 0.125 MG tablet       Iron-Vitamin C (Vitron-C)  MG TABS Take 1 tablet by mouth in the morning 90 tablet 1    ketoconazole (NIZORAL) 2 % cream       meclizine (ANTIVERT) 25 mg tablet TAKE 1 TABLET (25 MG TOTAL) BY MOUTH EVERY EIGHT (EIGHT) HOURS AS NEEDED FOR DIZZINESS 90 tablet 5    methotrexate 2.5 MG tablet       montelukast (SINGULAIR) 10 mg tablet       naratriptan (AMERGE) 2.5 MG tablet       omeprazole (PriLOSEC) 40 MG capsule       ondansetron (ZOFRAN-ODT) 4 mg disintegrating tablet Take 1 tablet (4 mg total) by mouth every 8 (eight) hours as needed for nausea or vomiting 20 tablet 5    OneTouch Delica Lancets 33G MISC Check blood sugars once daily. Please substitute with appropriate alternative as covered by patient's insurance. Dx: E11.65 100 each 3    predniSONE 2.5 mg tablet       Respiratory Therapy Supplies (NEBULIZER) DONA by Does not apply route every 4 (four) hours while awake 90 each 1    Syringe/Needle, Disp, (SYRINGE 3CC/25GX1\") 25G X 1\" 3 ML MISC Use once a week 50 each 0    Tirosint 150 MCG CAPS Take 1 capsule (150 mcg total) by mouth in the morning 90 capsule 1    tirzepatide (Zepbound) 2.5 mg/0.5 mL auto-injector Inject 0.5 mL (2.5 mg total) under the skin once a week for 28 days 2 mL 0    tiZANidine (ZANAFLEX) 2 mg tablet       tiZANidine (ZANAFLEX) 4 mg tablet       Topiramate  MG CP24 Take 100 mg by mouth 2 (two) times a day      triamcinolone (KENALOG) 0.1 % cream Apply topically 2 (two) times a day 15 g 0    Ubrelvy 100 MG tablet       Ventolin  (90 Base) MCG/ACT inhaler INHALE TWO PUFFS EVERY 4 (FOUR) HOURS AS NEEDED FOR WHEEZING OR SHORTNESS OF BREATH 18 g 0    Veozah tablet TAKE 1 TABLET (45 MG TOTAL) BY MOUTH DAILY 30 tablet 1    XIFAXAN 550 MG tablet        Current Facility-Administered Medications   Medication Dose Route Frequency Provider Last Rate Last Admin    cyanocobalamin injection " "1,000 mcg  1,000 mcg Intramuscular Q30 Days ERICKA George   1,000 mcg at 07/27/23 1025        Allergies   Allergen Reactions    Doxycycline Rash    Erythromycin Rash    Other Edema and Wheezing     jalapeno    Augmentin [Amoxicillin-Pot Clavulanate] GI Intolerance    Latex Rash    Duloxetine      Other reaction(s): Nausea, Loopy    Eletriptan      Pain in lower jaw with pressure in throat    Emgality [Galcanezumab-Gnlm]     Galcanezumab      rash    Lidocaine Other (See Comments)     Headache    Methocarbamol Other (See Comments)     Dizzyness       Review of Systems    Video Exam    There were no vitals filed for this visit.    Physical Exam     Behavioral Health Psychotherapy Progress Note    Psychotherapy Provided: Individual Psychotherapy      Diagnosis ICD-10-CM Associated Orders   1. Bipolar 2 disorder (HCC)  F31.81       2. ZAINA (generalized anxiety disorder)  F41.1            Goals addressed in session: Goal 1     DATA: Met with Belem Linares for a scheduled individual session.  Topics discussed included emotional wellness, coping skills, breakup/divorce, and family stressors.  Belem is feeling \"angry\". She began to cry when she came on the session. Jaylan came up the day before Thanksgiving and is still there. She said \"some people aren't meant to be together\". This clinician was under the impression that they had broken up. Belem went to another room and then put on headphones for privacy since Jaylan is there. He came up as a surprise. Her grandson is starting a new medication that she will have to do the injection and she got the run around from the doctor so that has been stressful. Jaylan knocked over her plant stand, the dirt went all over the rug and it messed up all of her plants. He told her it was an accident; she states \"everything is an accident according to him\". He's scratched her car and messed up other things of hers. Belem feels guilt about asking him to leave but this clinician " "encouraged her to ask him to go. Belem feels something is going on with her daughter and her landlord. He seems to be being a little too nice to her daughter so Belem called her daughter on it. She said her daughter denied anything is going on but Belem is finding out that they are spending time together. Belem's daughter-in-law called her and said her son is struggling with his mental health and she's concerned. Belem's son has been estranged from her for some time. Belem shows evidence of utilizing effective communication skills and engaging in problem solving to manage mental health symptoms.  During this session, this clinical used the following therapeutic modalities supportive psychotherapy, client-centered therapy, stress management skills, and problem solving skills.    Substance Abuse was not addressed during this session. If the client is diagnosed with a co-occurring substance use disorder, please indicate any changes in the frequency or amount of use: N/A. Stage of change for addressing substance use diagnoses: No substance use/Not applicable    ASSESSMENT:  Belem presents with a Anxious and Depressed mood.  her affect is tearful, which is congruent, with her  mood and the content of the session. Belem was oriented x3. She was focused and engaged. Belem exhibits good therapeutic rapport with this clinician. The client has made progress on their goals.    Belem Linares presents with a none risk of suicide, none risk of self-harm, and none risk of harm to others.    For any risk assessment that surpasses a \"low\" rating, a safety plan must be developed.    A safety plan was indicated: no  If yes, describe in detail: N/A    PLAN: Belem will return in 3-4 weeks for the next scheduled session.  At the next session, the therapist will use supportive psychotherapy and client-centered therapy to address depression and anxiety.    Behavioral Health Treatment Plan and Discharge Planning: Belem LEDESMA " Vijay is aware of and agrees to continue to work on their treatment plan. They have identified and are working toward their discharge goals. yes    Visit start and stop times:    12/09/24  Start Time: 1502  Stop Time: 1552  Total Visit Time: 50 minutes

## 2024-12-10 ENCOUNTER — TELEPHONE (OUTPATIENT)
Dept: OBGYN CLINIC | Facility: MEDICAL CENTER | Age: 54
End: 2024-12-10

## 2024-12-10 NOTE — TELEPHONE ENCOUNTER
Spoke to patient, reminded her to get her hepatic panel lab work done in early January. Also advised her not to take the Veozah when she takes the Fioricet. Patient verbalized understanding.

## 2024-12-10 NOTE — TELEPHONE ENCOUNTER
Please remind her to complete her hepatic panel lab work early January. The order is already in her chart.   Also, Fioricet should not be taken with veozah.

## 2024-12-11 ENCOUNTER — TELEPHONE (OUTPATIENT)
Dept: INFUSION CENTER | Facility: CLINIC | Age: 54
End: 2024-12-11

## 2024-12-11 ENCOUNTER — TELEPHONE (OUTPATIENT)
Age: 54
End: 2024-12-11

## 2024-12-11 DIAGNOSIS — E86.0 DEHYDRATION: Primary | ICD-10-CM

## 2024-12-11 DIAGNOSIS — R42 VERTIGO: ICD-10-CM

## 2024-12-11 NOTE — TELEPHONE ENCOUNTER
NEW PATIENT PHONE CALL  S/w patient she is aware of upcoming appt Friday 12/13 @ 830 am for hydration.  Aware of policy and procedures.

## 2024-12-13 ENCOUNTER — HOSPITAL ENCOUNTER (OUTPATIENT)
Dept: INFUSION CENTER | Facility: CLINIC | Age: 54
Discharge: HOME | End: 2024-12-13

## 2024-12-17 ENCOUNTER — VBI (OUTPATIENT)
Dept: ADMINISTRATIVE | Facility: OTHER | Age: 54
End: 2024-12-17

## 2024-12-17 NOTE — TELEPHONE ENCOUNTER
12/17/24 2:43 PM     Chart reviewed for Hemoglobin A1c was/were submitted to the patient's insurance.     Yanique Odell   [facility]

## 2024-12-20 ENCOUNTER — TELEPHONE (OUTPATIENT)
Age: 54
End: 2024-12-20

## 2025-01-13 ENCOUNTER — TELEPHONE (OUTPATIENT)
Age: 55
End: 2025-01-13

## 2025-01-13 NOTE — TELEPHONE ENCOUNTER
Belem is scheduled for office visit with PCP tomorrow morning, 1/14/2025, 4 week follow up.    Belem states she has not yet had echo and other testing done. She asks if she should reschedule tomorrow's appointment to a later time when she has testing done. She is scheduled for echo on 1/17/2025.

## 2025-01-15 ENCOUNTER — NURSE TRIAGE (OUTPATIENT)
Age: 55
End: 2025-01-15

## 2025-01-15 NOTE — TELEPHONE ENCOUNTER
"Patient woke up with concerns for pink eye. R eye crusted shut, painful, yellow/white discharge.    Patient declined UC and made office visit for tomorrow morning. She will consider UC if symptoms worsen.     ---------------------------------------------      Reason for Disposition   Bacterial conjunctivitis suspected (e.g., white, yellow or green discharge nearly all day long; or eyelids stuck shut from discharge after sleep), but NO doctor standing order to call in antibiotic eye drops  (Exception: Yogi; continue triage.)    Answer Assessment - Initial Assessment Questions  1. EYE DISCHARGE: \"Is the discharge in one or both eyes?\" \"What color is it?\" \"How much is there?\" \"When did the discharge start?\"       R eye only. White/Yellow. Started this morning    2. REDNESS OF SCLERA: \"Is there redness in the white of the eye?\" If Yes, ask: \"Is it in one or both eyes?\" \"When did the redness start?\"      Redness. Today    3. EYELIDS: \"Are the eyelids red or swollen?\" If Yes, ask: \"How much?\"       Denies    4. VISION: \"Do you have blurred vision?\"      Denies    Pain 5/10    Protocols used: Eye - Pus or Discharge-Adult-[place]    "

## 2025-01-16 ENCOUNTER — TELEPHONE (OUTPATIENT)
Age: 55
End: 2025-01-16

## 2025-01-16 ENCOUNTER — OFFICE VISIT (OUTPATIENT)
Dept: FAMILY MEDICINE CLINIC | Facility: CLINIC | Age: 55
End: 2025-01-16
Payer: COMMERCIAL

## 2025-01-16 VITALS
DIASTOLIC BLOOD PRESSURE: 78 MMHG | OXYGEN SATURATION: 100 % | TEMPERATURE: 97.5 F | HEART RATE: 78 BPM | SYSTOLIC BLOOD PRESSURE: 126 MMHG | HEIGHT: 64 IN | WEIGHT: 187.9 LBS | BODY MASS INDEX: 32.08 KG/M2

## 2025-01-16 DIAGNOSIS — F31.81 BIPOLAR 2 DISORDER (HCC): ICD-10-CM

## 2025-01-16 DIAGNOSIS — H10.33 ACUTE BACTERIAL CONJUNCTIVITIS OF BOTH EYES: Primary | ICD-10-CM

## 2025-01-16 DIAGNOSIS — M05.9 SEROPOSITIVE RHEUMATOID ARTHRITIS (HCC): ICD-10-CM

## 2025-01-16 DIAGNOSIS — R23.8 SKIN IRRITATION: ICD-10-CM

## 2025-01-16 PROCEDURE — 99214 OFFICE O/P EST MOD 30 MIN: CPT | Performed by: NURSE PRACTITIONER

## 2025-01-16 RX ORDER — HYDROCORTISONE 25 MG/G
CREAM TOPICAL 2 TIMES DAILY
Qty: 20 G | Refills: 1 | Status: SHIPPED | OUTPATIENT
Start: 2025-01-16

## 2025-01-16 RX ORDER — POLYMYXIN B SULFATE AND TRIMETHOPRIM 1; 10000 MG/ML; [USP'U]/ML
1 SOLUTION OPHTHALMIC EVERY 6 HOURS
Qty: 10 ML | Refills: 0 | Status: SHIPPED | OUTPATIENT
Start: 2025-01-16

## 2025-01-16 NOTE — TELEPHONE ENCOUNTER
Patient was seen in office this morning and diagnosed with acute bacterial conjunctivitis.    Patient called back to say she forgot to mention at the visit that she is experiencing intermittent right ear pain which radiates into the right cheek bone.  Patient states pain is 8/10.

## 2025-01-16 NOTE — PROGRESS NOTES
"Name: Belem Linares      : 1970      MRN: [id number]  Encounter Provider: ERICKA Bryant  Encounter Date: 2025   Encounter department: [facility] [address] [facility]  :  Assessment & Plan  Acute bacterial conjunctivitis of both eyes  Advised to clean eyes out with moist cloth from middle out.  Advised to throw away eye make-up brushes, eye make-up.  Discussed portance of hand hygiene, avoiding scratching/irritation of eyes, washing of bed sheets.  Eyedrops as prescribed.    Orders:    polymyxin b-trimethoprim (POLYTRIM) ophthalmic solution; Administer 1 drop to both eyes every 6 (six) hours    Skin irritation  Advised to avoid scratching/irritation of skin, to use tepid water, pat skin dry after bathing.  Hydrocortisone cream as prescribed.    Orders:    hydrocortisone 2.5 % cream; Apply topically 2 (two) times a day    Seropositive rheumatoid arthritis (HCC)  Stable.  To continue to follow with rheumatology.       Bipolar 2 disorder (HCC)  Stable on current medications.  To continue follow-up with psych as scheduled.              History of Present Illness     Patient presents to the office for evaluation of bilateral eye discharge and pain.  Symptoms started 2 days with right eye symptoms.  Had discomfort, redness, itching.  Yesterday symptoms progressed to left eye.  Patient notes increased discharge and had difficulty opening eyes this morning due to \"crusting\" in eyes.  Denies headaches, visual disturbances, fevers.    Patient also notes rash to  incision line.  Over the past few days, has had increased pruritus and irritation to area.  Began scratching area and symptoms worsen.  Denies open wounds, cuts, fevers, chills.        Review of Systems   Constitutional:  Negative for chills and fever.   HENT:  Negative for congestion, ear pain, sinus pressure, sinus pain, sneezing and sore throat.    Eyes:  Positive for pain, discharge, redness and " "itching. Negative for photophobia and visual disturbance.   Respiratory:  Negative for cough and shortness of breath.    Cardiovascular:  Negative for chest pain.   Gastrointestinal:  Negative for nausea and vomiting.   Genitourinary:  Negative for decreased urine volume.   Musculoskeletal:  Negative for arthralgias and myalgias.   Skin:  Positive for rash. Negative for color change.   Neurological:  Negative for dizziness and headaches.   Hematological:  Negative for adenopathy.   Psychiatric/Behavioral:  Negative for confusion.        Objective   /78 (BP Location: Left arm, Patient Position: Sitting)   Pulse 78   Temp 97.5 °F (36.4 °C)   Ht 5' 4\" (1.626 m)   Wt 85.2 kg (187 lb 14.4 oz)   SpO2 100%   BMI 32.25 kg/m²      Physical Exam  Vitals reviewed.   Constitutional:       General: She is not in acute distress.     Appearance: Normal appearance. She is not ill-appearing.   HENT:      Head: Normocephalic and atraumatic.      Right Ear: Tympanic membrane, ear canal and external ear normal.      Left Ear: Tympanic membrane, ear canal and external ear normal.      Nose: Nose normal.      Mouth/Throat:      Mouth: Mucous membranes are moist.      Pharynx: Oropharynx is clear.   Eyes:      General: Lids are normal.      Conjunctiva/sclera:      Right eye: Right conjunctiva is injected. Exudate present.      Left eye: Left conjunctiva is injected. No exudate.  Cardiovascular:      Rate and Rhythm: Normal rate and regular rhythm.      Pulses: Normal pulses.      Heart sounds: Normal heart sounds.   Pulmonary:      Effort: Pulmonary effort is normal.      Breath sounds: Normal breath sounds.   Musculoskeletal:         General: Normal range of motion.      Cervical back: Normal range of motion and neck supple.   Lymphadenopathy:      Cervical: No cervical adenopathy.   Skin:     General: Skin is warm and moist.      Findings: Rash present.             Comments: Skin irritation noted at  incision scar, " no open wounds noted, no drainage   Neurological:      General: No focal deficit present.      Mental Status: She is alert.   Psychiatric:         Mood and Affect: Mood normal.         Behavior: Behavior normal.

## 2025-01-16 NOTE — TELEPHONE ENCOUNTER
Pt called in this morning stating she has an appointment scheduled with Sima for 10:20 am.  Asking if there are any earlier appointments available with any provider.     Nothing earlier available and patient will keep 10:20 am appointment as scheduled.

## 2025-01-17 ENCOUNTER — HOSPITAL ENCOUNTER (OUTPATIENT)
Dept: NON INVASIVE DIAGNOSTICS | Facility: HOSPITAL | Age: 55
Discharge: HOME | End: 2025-01-17
Payer: COMMERCIAL

## 2025-01-17 VITALS
DIASTOLIC BLOOD PRESSURE: 78 MMHG | WEIGHT: 187 LBS | HEIGHT: 64 IN | SYSTOLIC BLOOD PRESSURE: 126 MMHG | HEART RATE: 78 BPM | BODY MASS INDEX: 31.92 KG/M2

## 2025-01-17 DIAGNOSIS — R07.9 CHEST PAIN, UNSPECIFIED TYPE: ICD-10-CM

## 2025-01-17 LAB
AORTIC ROOT: 2.5 CM
AORTIC VALVE MEAN VELOCITY: 11 M/S
AV LVOT MEAN GRADIENT: 2 MMHG
AV LVOT PEAK GRADIENT: 4 MMHG
AV MEAN PRESS GRAD SYS DOP V1V2: 5 MMHG
AV PEAK GRADIENT: 9 MMHG
AV VELOCITY RATIO: 0.73
AV VMAX SYS DOP: 1.5 M/S
BSA FOR ECHO PROCEDURE: 1.9 M2
DOP CALC AO VTI: 32.67 CM
DOP CALC LVOT PEAK VEL VTI: 23.91 CM
DOP CALC LVOT PEAK VEL: 1.05 M/S
E WAVE DECELERATION TIME: 244 MS
E/A RATIO: 0.97
FRACTIONAL SHORTENING: 33 (ref 28–44)
INTERVENTRICULAR SEPTUM IN DIASTOLE (PARASTERNAL SHORT AXIS VIEW): 0.8 CM
INTERVENTRICULAR SEPTUM: 0.8 CM (ref 0.6–1.1)
LAAS-AP2: 17.1 CM2
LAAS-AP4: 17.4 CM2
LEFT ATRIUM AREA SYSTOLE SINGLE PLANE A4C: 16.1 CM2
LEFT ATRIUM SIZE: 3.3 CM
LEFT ATRIUM VOLUME (MOD BIPLANE): 48 ML
LEFT ATRIUM VOLUME INDEX (MOD BIPLANE): 25.1 ML/M2
LEFT INTERNAL DIMENSION IN SYSTOLE: 2.6 CM (ref 2.1–4)
LEFT VENTRICULAR INTERNAL DIMENSION IN DIASTOLE: 3.9 CM (ref 3.5–6)
LEFT VENTRICULAR POSTERIOR WALL IN END DIASTOLE: 0.7 CM
LEFT VENTRICULAR STROKE VOLUME: 40 ML
LV EF US.2D.A4C+ESTIMATED: 66 %
LVSV (TEICH): 40 ML
MV E'TISSUE VEL-LAT: 11 CM/S
MV E'TISSUE VEL-SEP: 10 CM/S
MV PEAK A VEL: 0.91 M/S
MV PEAK E VEL: 88 CM/S
MV STENOSIS PRESSURE HALF TIME: 71 MS
MV VALVE AREA P 1/2 METHOD: 3.1
RIGHT ATRIUM AREA SYSTOLE A4C: 12.3 CM2
RIGHT VENTRICLE ID DIMENSION: 2.9 CM
SL CV LEFT ATRIUM LENGTH A2C: 5 CM
SL CV LV EF: 66
SL CV PED ECHO LEFT VENTRICLE DIASTOLIC VOLUME (MOD BIPLANE) 2D: 66 ML
SL CV PED ECHO LEFT VENTRICLE SYSTOLIC VOLUME (MOD BIPLANE) 2D: 26 ML
TR MAX PG: 16 MMHG
TR PEAK VELOCITY: 2 M/S
TRICUSPID ANNULAR PLANE SYSTOLIC EXCURSION: 2.3 CM
TRICUSPID VALVE PEAK REGURGITATION VELOCITY: 2.01 M/S

## 2025-01-17 PROCEDURE — 93306 TTE W/DOPPLER COMPLETE: CPT

## 2025-01-17 PROCEDURE — 93306 TTE W/DOPPLER COMPLETE: CPT | Performed by: INTERNAL MEDICINE

## 2025-01-22 ENCOUNTER — TELEPHONE (OUTPATIENT)
Dept: FAMILY MEDICINE CLINIC | Facility: CLINIC | Age: 55
End: 2025-01-22

## 2025-01-22 DIAGNOSIS — E66.811 CLASS 1 OBESITY DUE TO EXCESS CALORIES WITH SERIOUS COMORBIDITY AND BODY MASS INDEX (BMI) OF 32.0 TO 32.9 IN ADULT: Primary | ICD-10-CM

## 2025-01-22 DIAGNOSIS — E66.09 CLASS 1 OBESITY DUE TO EXCESS CALORIES WITH SERIOUS COMORBIDITY AND BODY MASS INDEX (BMI) OF 32.0 TO 32.9 IN ADULT: Primary | ICD-10-CM

## 2025-01-22 RX ORDER — TIRZEPATIDE 2.5 MG/.5ML
2.5 INJECTION, SOLUTION SUBCUTANEOUS WEEKLY
Qty: 2 ML | Refills: 0 | Status: SHIPPED | OUTPATIENT
Start: 2025-01-22 | End: 2025-01-24

## 2025-01-22 NOTE — TELEPHONE ENCOUNTER
Patient called back, she stated that the insurance will cover it we just have to request a Prior authorization. If you can put the new script for her.

## 2025-01-23 ENCOUNTER — TELEPHONE (OUTPATIENT)
Age: 55
End: 2025-01-23

## 2025-01-23 NOTE — TELEPHONE ENCOUNTER
Representative from [employer] confirmed information about pt's Zepbound prescription, in order to proceed with processing prior authorization.    She also states pt called and requested initiation of Wegovy prior auth. Representative will fax form to PCP office regarding that request.

## 2025-01-24 ENCOUNTER — APPOINTMENT (OUTPATIENT)
Dept: LAB | Facility: CLINIC | Age: 55
End: 2025-01-24
Payer: COMMERCIAL

## 2025-01-24 DIAGNOSIS — M79.7 FIBROMYALGIA: ICD-10-CM

## 2025-01-24 DIAGNOSIS — E78.5 DYSLIPIDEMIA, GOAL LDL BELOW 130: ICD-10-CM

## 2025-01-24 DIAGNOSIS — M51.369 DEGENERATION OF INTERVERTEBRAL DISC OF LUMBAR REGION, UNSPECIFIED WHETHER PAIN PRESENT: ICD-10-CM

## 2025-01-24 DIAGNOSIS — E66.811 CLASS 1 OBESITY DUE TO EXCESS CALORIES WITH SERIOUS COMORBIDITY AND BODY MASS INDEX (BMI) OF 32.0 TO 32.9 IN ADULT: ICD-10-CM

## 2025-01-24 DIAGNOSIS — R63.5 WEIGHT GAIN: ICD-10-CM

## 2025-01-24 DIAGNOSIS — E66.09 CLASS 1 OBESITY DUE TO EXCESS CALORIES WITH SERIOUS COMORBIDITY AND BODY MASS INDEX (BMI) OF 32.0 TO 32.9 IN ADULT: ICD-10-CM

## 2025-01-24 DIAGNOSIS — M25.50 ARTHRALGIA, UNSPECIFIED JOINT: ICD-10-CM

## 2025-01-24 DIAGNOSIS — J45.40 MODERATE PERSISTENT ASTHMA WITHOUT COMPLICATION: Primary | ICD-10-CM

## 2025-01-24 DIAGNOSIS — K58.8 OTHER IRRITABLE BOWEL SYNDROME: ICD-10-CM

## 2025-01-24 LAB
ALBUMIN SERPL BCG-MCNC: 4 G/DL (ref 3.5–5)
ALP SERPL-CCNC: 83 U/L (ref 34–104)
ALT SERPL W P-5'-P-CCNC: 38 U/L (ref 7–52)
AST SERPL W P-5'-P-CCNC: 28 U/L (ref 13–39)
BILIRUB DIRECT SERPL-MCNC: 0.05 MG/DL (ref 0–0.2)
BILIRUB SERPL-MCNC: 0.41 MG/DL (ref 0.2–1)
PROT SERPL-MCNC: 7.3 G/DL (ref 6.4–8.4)
T4 FREE SERPL-MCNC: 0.92 NG/DL (ref 0.61–1.12)
TSH SERPL DL<=0.05 MIU/L-ACNC: 0.32 UIU/ML (ref 0.45–4.5)

## 2025-01-24 NOTE — TELEPHONE ENCOUNTER
PA for (Zepbound) 2.5 mg/0.5 mL auto-injector SUBMITTED to     via    [x][facility]-KEY: [id number]  []Surescripts-Case ID #   []Availity-Auth ID # NDC #   []Faxed to plan   []Other website   []Phone call Case ID #     [x]PA sent as URGENT    All office notes, labs and other pertaining documents and studies sent. Clinical questions answered. Awaiting determination from insurance company.     Turnaround time for your insurance to make a decision on your Prior Authorization can take 7-21 business days.

## 2025-01-27 ENCOUNTER — RESULTS FOLLOW-UP (OUTPATIENT)
Dept: OBGYN CLINIC | Facility: CLINIC | Age: 55
End: 2025-01-27

## 2025-01-27 ENCOUNTER — RESULTS FOLLOW-UP (OUTPATIENT)
Age: 55
End: 2025-01-27

## 2025-01-27 DIAGNOSIS — E06.3 HYPOTHYROIDISM DUE TO HASHIMOTO THYROIDITIS: ICD-10-CM

## 2025-01-27 RX ORDER — LEVOTHYROXINE SODIUM 13 UG/1
CAPSULE ORAL
Qty: 90 CAPSULE | Refills: 1 | Status: SHIPPED | OUTPATIENT
Start: 2025-01-27 | End: 2025-01-27

## 2025-01-27 RX ORDER — LEVOTHYROXINE SODIUM 112 UG/1
112 CAPSULE ORAL
Qty: 90 CAPSULE | Refills: 1 | Status: SHIPPED | OUTPATIENT
Start: 2025-01-27

## 2025-01-28 DIAGNOSIS — F51.8 DISRUPTED SLEEP-WAKE CYCLE: ICD-10-CM

## 2025-01-28 DIAGNOSIS — R42 DIZZINESS: ICD-10-CM

## 2025-01-28 DIAGNOSIS — H04.129 DRY EYE: ICD-10-CM

## 2025-01-28 DIAGNOSIS — E66.09 CLASS 1 OBESITY DUE TO EXCESS CALORIES WITHOUT SERIOUS COMORBIDITY WITH BODY MASS INDEX (BMI) OF 30.0 TO 30.9 IN ADULT: ICD-10-CM

## 2025-01-28 DIAGNOSIS — E66.811 CLASS 1 OBESITY DUE TO EXCESS CALORIES WITHOUT SERIOUS COMORBIDITY WITH BODY MASS INDEX (BMI) OF 30.0 TO 30.9 IN ADULT: ICD-10-CM

## 2025-01-28 DIAGNOSIS — N95.1 HOT FLASHES DUE TO MENOPAUSE: ICD-10-CM

## 2025-01-28 DIAGNOSIS — E78.5 DYSLIPIDEMIA, GOAL LDL BELOW 130: ICD-10-CM

## 2025-01-28 DIAGNOSIS — J06.9 UPPER RESPIRATORY TRACT INFECTION, UNSPECIFIED TYPE: ICD-10-CM

## 2025-01-28 DIAGNOSIS — G47.20 DISRUPTED SLEEP-WAKE CYCLE: ICD-10-CM

## 2025-01-28 DIAGNOSIS — R79.89 LOW VITAMIN B12 LEVEL: ICD-10-CM

## 2025-01-28 DIAGNOSIS — J02.9 SORE THROAT: ICD-10-CM

## 2025-01-28 DIAGNOSIS — R63.5 WEIGHT GAIN: ICD-10-CM

## 2025-01-28 DIAGNOSIS — E03.8 OTHER SPECIFIED HYPOTHYROIDISM: ICD-10-CM

## 2025-01-28 DIAGNOSIS — R05.9 COUGH, UNSPECIFIED TYPE: ICD-10-CM

## 2025-01-28 RX ORDER — FEZOLINETANT 45 MG/1
45 TABLET, FILM COATED ORAL DAILY
Qty: 30 TABLET | Refills: 0 | Status: SHIPPED | OUTPATIENT
Start: 2025-01-28

## 2025-01-29 RX ORDER — CYANOCOBALAMIN 1000 UG/ML
1000 INJECTION, SOLUTION INTRAMUSCULAR; SUBCUTANEOUS WEEKLY
Qty: 4 ML | Refills: 0 | Status: SHIPPED | OUTPATIENT
Start: 2025-01-29

## 2025-01-29 RX ORDER — MONTELUKAST SODIUM 10 MG/1
10 TABLET ORAL
Qty: 30 TABLET | Refills: 4 | Status: SHIPPED | OUTPATIENT
Start: 2025-01-29

## 2025-01-29 RX ORDER — THYROID,PORK 60 MG
90 TABLET ORAL DAILY
Qty: 135 TABLET | Refills: 1 | Status: SHIPPED | OUTPATIENT
Start: 2025-01-29

## 2025-01-29 RX ORDER — AZELASTINE 1 MG/ML
SPRAY, METERED NASAL
Qty: 30 ML | Refills: 1 | Status: SHIPPED | OUTPATIENT
Start: 2025-01-29

## 2025-01-29 RX ORDER — SEMAGLUTIDE 2.68 MG/ML
INJECTION, SOLUTION SUBCUTANEOUS
Qty: 3 ML | Refills: 4 | Status: SHIPPED | OUTPATIENT
Start: 2025-01-29

## 2025-01-29 RX ORDER — LIFITEGRAST 50 MG/ML
1 SOLUTION/ DROPS OPHTHALMIC 2 TIMES DAILY
Qty: 60 ML | Refills: 0 | Status: SHIPPED | OUTPATIENT
Start: 2025-01-29

## 2025-01-29 RX ORDER — SEMAGLUTIDE 1.34 MG/ML
INJECTION, SOLUTION SUBCUTANEOUS
Qty: 3 ML | Refills: 1 | Status: SHIPPED | OUTPATIENT
Start: 2025-01-29

## 2025-01-29 RX ORDER — MECLIZINE HYDROCHLORIDE 25 MG/1
TABLET ORAL
Qty: 90 TABLET | Refills: 4 | Status: SHIPPED | OUTPATIENT
Start: 2025-01-29

## 2025-01-30 ENCOUNTER — TELEPHONE (OUTPATIENT)
Age: 55
End: 2025-01-30

## 2025-01-30 DIAGNOSIS — E66.09 CLASS 1 OBESITY DUE TO EXCESS CALORIES WITH SERIOUS COMORBIDITY AND BODY MASS INDEX (BMI) OF 32.0 TO 32.9 IN ADULT: ICD-10-CM

## 2025-01-30 DIAGNOSIS — E66.811 CLASS 1 OBESITY DUE TO EXCESS CALORIES WITH SERIOUS COMORBIDITY AND BODY MASS INDEX (BMI) OF 32.0 TO 32.9 IN ADULT: ICD-10-CM

## 2025-01-30 DIAGNOSIS — E66.9 OBESITY (BMI 30-39.9): ICD-10-CM

## 2025-01-30 DIAGNOSIS — R63.5 WEIGHT GAIN: Primary | ICD-10-CM

## 2025-01-30 NOTE — TELEPHONE ENCOUNTER
Monica Hitchcock wont be covered because of the diagnosis and zepbound is excluded from her plan so that is not covered neither

## 2025-01-30 NOTE — TELEPHONE ENCOUNTER
Pt called to states that her Ozempic rx is going to need a PA.  However she states that PCP was thinking of changing pt to Wegovy or Zepbound.  Please review and advise if PCP would like to proceed with Ozempic PA.    Pt would like to be called with update.

## 2025-02-04 NOTE — TELEPHONE ENCOUNTER
Called patient and n/a lmov for a call back both Zepbound and Wegovy was denied it is not covered under her plan and excluded. If she wants she can pay out pocket

## 2025-02-04 NOTE — TELEPHONE ENCOUNTER
MAURICE Kenney called in stating patient is wanting a prior auth started for Wegovy and  Zepbound I do not see Zepbound on her medication list? Please advise.

## 2025-02-05 RX ORDER — OMEPRAZOLE 40 MG/1
CAPSULE, DELAYED RELEASE ORAL
COMMUNITY
Start: 2025-01-28

## 2025-02-07 ENCOUNTER — NURSE TRIAGE (OUTPATIENT)
Age: 55
End: 2025-02-07

## 2025-02-07 NOTE — TELEPHONE ENCOUNTER
"Pt called in asking to have message sent to PCP.  Pt c/o abd pain, vomiting, diarrhea and fever.  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    States she is having sharp abd pain mid of stomach 8/10 that started last night.  Pt c/o also of 3 episodes of vomiting and 10 episodes of diarrhea in the last 24 hours.      Pt states she has taken Zofran which helped with vomiting but not sure what to do regarding diarrhea.  Pt states she is unsure if her stool is dark but states it is liquid.  Pt admits to dry mouth.  States she has only been able to sip room temp water and a couple ice cubes.  Pt admits to feeling weak and lightheaded.      Advised pt based on the above to go to ED.  Pt still requesting to have message be sent over to PCP for further review and recommendations.      Advised pt to sip water every 15min to help with hydration.  Pt states she has Liquid IV which she will try.  Advised to also eat something small and bland like plain crackers.  Pt verbalized understanding.  Please review and advise how pt should proceed.  Preferred pharmacy pended.  Reason for Disposition   SEVERE abdominal pain (e.g., excruciating)    Answer Assessment - Initial Assessment Questions  1. LOCATION: \"Where does it hurt?\"       Right in the middle    2. RADIATION: \"Does the pain shoot anywhere else?\" (e.g., chest, back)      Denies    3. ONSET: \"When did the pain begin?\" (e.g., minutes, hours or days ago)       Last night    4. SUDDEN: \"Gradual or sudden onset?\"      Suddenly last night    5. PATTERN \"Does the pain come and go, or is it constant?\"      Intermittent    6. SEVERITY: \"How bad is the pain?\"  (e.g., Scale 1-10; mild, moderate, or severe)      8/10    7. RECURRENT SYMPTOM: \"Have you ever had this type of stomach pain before?\" If Yes, ask: \"When was the last time?\" and \"What happened that time?\"       " "States this is worse than her IBS.    8. CAUSE: \"What do you think is causing the stomach pain?\"      Thinks she caught something from her grandson    9. RELIEVING/AGGRAVATING FACTORS: \"What makes it better or worse?\" (e.g., antacids, bending or twisting motion, bowel movement)      States worse when she tries to straighten out.    10. OTHER SYMPTOMS: \"Do you have any other symptoms?\" (e.g., back pain, diarrhea, fever, urination pain, vomiting)        Fever, vomiting and diarrhea    Protocols used: Abdominal Pain - Female-Adult-[place]    "

## 2025-02-12 ENCOUNTER — TELEPHONE (OUTPATIENT)
Dept: FAMILY MEDICINE CLINIC | Facility: CLINIC | Age: 55
End: 2025-02-12

## 2025-02-12 ENCOUNTER — TELEPHONE (OUTPATIENT)
Age: 55
End: 2025-02-12

## 2025-02-12 NOTE — TELEPHONE ENCOUNTER
Pt called in to request a fax of her CT-order, be faxed over to [facility] facility.Pt states she is there now and they can take her, they just need the order. Triage nurse called the office and shmuel stated she will fax this order over now. Pt verbalized understanding.

## 2025-02-21 ENCOUNTER — RESULTS FOLLOW-UP (OUTPATIENT)
Dept: FAMILY MEDICINE CLINIC | Facility: CLINIC | Age: 55
End: 2025-02-21

## 2025-03-03 ENCOUNTER — APPOINTMENT (OUTPATIENT)
Dept: LAB | Facility: CLINIC | Age: 55
End: 2025-03-03
Payer: COMMERCIAL

## 2025-03-03 LAB
T4 FREE SERPL-MCNC: 0.71 NG/DL (ref 0.61–1.12)
TSH SERPL DL<=0.05 MIU/L-ACNC: 2.88 UIU/ML (ref 0.45–4.5)

## 2025-03-04 ENCOUNTER — RESULTS FOLLOW-UP (OUTPATIENT)
Age: 55
End: 2025-03-04

## 2025-03-05 ENCOUNTER — TELEPHONE (OUTPATIENT)
Dept: FAMILY MEDICINE CLINIC | Facility: CLINIC | Age: 55
End: 2025-03-05

## 2025-03-10 ENCOUNTER — TELEPHONE (OUTPATIENT)
Dept: FAMILY MEDICINE CLINIC | Facility: CLINIC | Age: 55
End: 2025-03-10

## 2025-03-13 DIAGNOSIS — F31.81 BIPOLAR 2 DISORDER (HCC): ICD-10-CM

## 2025-03-13 DIAGNOSIS — R23.8 SKIN IRRITATION: ICD-10-CM

## 2025-03-13 RX ORDER — CARIPRAZINE 6 MG/1
CAPSULE, GELATIN COATED ORAL
Qty: 30 CAPSULE | Refills: 2 | OUTPATIENT
Start: 2025-03-13

## 2025-03-14 RX ORDER — HYDROCORTISONE 2.5% 25 MG/G
CREAM TOPICAL
Qty: 30 G | Refills: 5 | Status: SHIPPED | OUTPATIENT
Start: 2025-03-14

## 2025-03-20 ENCOUNTER — TELEPHONE (OUTPATIENT)
Dept: FAMILY MEDICINE CLINIC | Facility: CLINIC | Age: 55
End: 2025-03-20

## 2025-03-20 DIAGNOSIS — R60.9 SWELLING: Primary | ICD-10-CM

## 2025-03-20 RX ORDER — FUROSEMIDE 20 MG/1
20 TABLET ORAL 2 TIMES DAILY
Qty: 10 TABLET | Refills: 0 | Status: SHIPPED | OUTPATIENT
Start: 2025-03-20

## 2025-03-20 NOTE — TELEPHONE ENCOUNTER
----- Message from ERICKA Nava sent at 3/20/2025  4:39 PM EDT -----  Sent lasix please have labs to check electrolytes in 1 wk, if symptoms persist need visit

## 2025-03-21 ENCOUNTER — APPOINTMENT (OUTPATIENT)
Dept: LAB | Facility: CLINIC | Age: 55
End: 2025-03-21
Payer: COMMERCIAL

## 2025-03-21 ENCOUNTER — OFFICE VISIT (OUTPATIENT)
Dept: FAMILY MEDICINE CLINIC | Facility: CLINIC | Age: 55
End: 2025-03-21
Payer: COMMERCIAL

## 2025-03-21 VITALS
BODY MASS INDEX: 32.1 KG/M2 | SYSTOLIC BLOOD PRESSURE: 100 MMHG | DIASTOLIC BLOOD PRESSURE: 60 MMHG | OXYGEN SATURATION: 98 % | HEART RATE: 70 BPM | HEIGHT: 64 IN

## 2025-03-21 DIAGNOSIS — E61.1 IRON DEFICIENCY: ICD-10-CM

## 2025-03-21 DIAGNOSIS — R79.9 ABNORMAL FINDING OF BLOOD CHEMISTRY, UNSPECIFIED: ICD-10-CM

## 2025-03-21 DIAGNOSIS — E66.09 CLASS 1 OBESITY DUE TO EXCESS CALORIES WITH SERIOUS COMORBIDITY AND BODY MASS INDEX (BMI) OF 32.0 TO 32.9 IN ADULT: ICD-10-CM

## 2025-03-21 DIAGNOSIS — R93.89 ABNORMAL FINDINGS ON DIAGNOSTIC IMAGING OF OTHER SPECIFIED BODY STRUCTURES: ICD-10-CM

## 2025-03-21 DIAGNOSIS — E53.8 VITAMIN B 12 DEFICIENCY: ICD-10-CM

## 2025-03-21 DIAGNOSIS — R30.0 DYSURIA: Primary | ICD-10-CM

## 2025-03-21 DIAGNOSIS — E66.811 CLASS 1 OBESITY DUE TO EXCESS CALORIES WITH SERIOUS COMORBIDITY AND BODY MASS INDEX (BMI) OF 32.0 TO 32.9 IN ADULT: ICD-10-CM

## 2025-03-21 LAB
25(OH)D3 SERPL-MCNC: 24.3 NG/ML (ref 30–100)
ALBUMIN SERPL BCG-MCNC: 4.5 G/DL (ref 3.5–5)
ALP SERPL-CCNC: 89 U/L (ref 34–104)
ALT SERPL W P-5'-P-CCNC: 16 U/L (ref 7–52)
ANION GAP SERPL CALCULATED.3IONS-SCNC: 8 MMOL/L (ref 4–13)
AST SERPL W P-5'-P-CCNC: 21 U/L (ref 13–39)
BASOPHILS # BLD AUTO: 0.02 THOUSANDS/ÂΜL (ref 0–0.1)
BASOPHILS NFR BLD AUTO: 0 % (ref 0–1)
BILIRUB SERPL-MCNC: 0.36 MG/DL (ref 0.2–1)
BUN SERPL-MCNC: 16 MG/DL (ref 5–25)
CALCIUM SERPL-MCNC: 9.6 MG/DL (ref 8.4–10.2)
CHLORIDE SERPL-SCNC: 105 MMOL/L (ref 96–108)
CHOLEST SERPL-MCNC: 254 MG/DL (ref ?–200)
CO2 SERPL-SCNC: 28 MMOL/L (ref 21–32)
CREAT SERPL-MCNC: 0.83 MG/DL (ref 0.6–1.3)
EOSINOPHIL # BLD AUTO: 0.08 THOUSAND/ÂΜL (ref 0–0.61)
EOSINOPHIL NFR BLD AUTO: 2 % (ref 0–6)
ERYTHROCYTE [DISTWIDTH] IN BLOOD BY AUTOMATED COUNT: 13.5 % (ref 11.6–15.1)
EST. AVERAGE GLUCOSE BLD GHB EST-MCNC: 120 MG/DL
FERRITIN SERPL-MCNC: 57 NG/ML (ref 11–307)
GFR SERPL CREATININE-BSD FRML MDRD: 80 ML/MIN/1.73SQ M
GLUCOSE P FAST SERPL-MCNC: 93 MG/DL (ref 65–99)
HBA1C MFR BLD: 5.8 %
HCT VFR BLD AUTO: 41.5 % (ref 34.8–46.1)
HDLC SERPL-MCNC: 69 MG/DL
HGB BLD-MCNC: 13.3 G/DL (ref 11.5–15.4)
IMM GRANULOCYTES # BLD AUTO: 0.01 THOUSAND/UL (ref 0–0.2)
IMM GRANULOCYTES NFR BLD AUTO: 0 % (ref 0–2)
IRON SATN MFR SERPL: 26 % (ref 15–50)
IRON SERPL-MCNC: 105 UG/DL (ref 50–212)
LDLC SERPL CALC-MCNC: 166 MG/DL (ref 0–100)
LYMPHOCYTES # BLD AUTO: 1.88 THOUSANDS/ÂΜL (ref 0.6–4.47)
LYMPHOCYTES NFR BLD AUTO: 34 % (ref 14–44)
MCH RBC QN AUTO: 28.9 PG (ref 26.8–34.3)
MCHC RBC AUTO-ENTMCNC: 32 G/DL (ref 31.4–37.4)
MCV RBC AUTO: 90 FL (ref 82–98)
MONOCYTES # BLD AUTO: 0.31 THOUSAND/ÂΜL (ref 0.17–1.22)
MONOCYTES NFR BLD AUTO: 6 % (ref 4–12)
NEUTROPHILS # BLD AUTO: 3.17 THOUSANDS/ÂΜL (ref 1.85–7.62)
NEUTS SEG NFR BLD AUTO: 58 % (ref 43–75)
NONHDLC SERPL-MCNC: 185 MG/DL
NRBC BLD AUTO-RTO: 0 /100 WBCS
PLATELET # BLD AUTO: 271 THOUSANDS/UL (ref 149–390)
PMV BLD AUTO: 10.4 FL (ref 8.9–12.7)
POTASSIUM SERPL-SCNC: 4.7 MMOL/L (ref 3.5–5.3)
PROT SERPL-MCNC: 7.1 G/DL (ref 6.4–8.4)
RBC # BLD AUTO: 4.6 MILLION/UL (ref 3.81–5.12)
SL AMB  POCT GLUCOSE, UA: NORMAL
SL AMB LEUKOCYTE ESTERASE,UA: NORMAL
SL AMB POCT BILIRUBIN,UA: NORMAL
SL AMB POCT BLOOD,UA: NORMAL
SL AMB POCT CLARITY,UA: NORMAL
SL AMB POCT COLOR,UA: YELLOW
SL AMB POCT KETONES,UA: NORMAL
SL AMB POCT NITRITE,UA: NORMAL
SL AMB POCT PH,UA: 6
SL AMB POCT SPECIFIC GRAVITY,UA: 1.02
SL AMB POCT URINE PROTEIN: NORMAL
SL AMB POCT UROBILINOGEN: NORMAL
SODIUM SERPL-SCNC: 141 MMOL/L (ref 135–147)
T4 FREE SERPL-MCNC: 0.72 NG/DL (ref 0.61–1.12)
TIBC SERPL-MCNC: 403.2 UG/DL (ref 250–450)
TRANSFERRIN SERPL-MCNC: 288 MG/DL (ref 203–362)
TRIGL SERPL-MCNC: 93 MG/DL (ref ?–150)
TSH SERPL DL<=0.05 MIU/L-ACNC: 7.96 UIU/ML (ref 0.45–4.5)
UIBC SERPL-MCNC: 298 UG/DL (ref 155–355)
VIT B12 SERPL-MCNC: 2355 PG/ML (ref 180–914)
WBC # BLD AUTO: 5.47 THOUSAND/UL (ref 4.31–10.16)

## 2025-03-21 PROCEDURE — 99214 OFFICE O/P EST MOD 30 MIN: CPT

## 2025-03-21 PROCEDURE — 80061 LIPID PANEL: CPT

## 2025-03-21 PROCEDURE — 36415 COLL VENOUS BLD VENIPUNCTURE: CPT

## 2025-03-21 PROCEDURE — 84443 ASSAY THYROID STIM HORMONE: CPT

## 2025-03-21 PROCEDURE — 82607 VITAMIN B-12: CPT

## 2025-03-21 PROCEDURE — 82728 ASSAY OF FERRITIN: CPT

## 2025-03-21 PROCEDURE — 87086 URINE CULTURE/COLONY COUNT: CPT

## 2025-03-21 PROCEDURE — 82306 VITAMIN D 25 HYDROXY: CPT

## 2025-03-21 PROCEDURE — 85025 COMPLETE CBC W/AUTO DIFF WBC: CPT

## 2025-03-21 PROCEDURE — 83036 HEMOGLOBIN GLYCOSYLATED A1C: CPT

## 2025-03-21 PROCEDURE — 83540 ASSAY OF IRON: CPT

## 2025-03-21 PROCEDURE — 81002 URINALYSIS NONAUTO W/O SCOPE: CPT

## 2025-03-21 PROCEDURE — 83550 IRON BINDING TEST: CPT

## 2025-03-21 PROCEDURE — 80053 COMPREHEN METABOLIC PANEL: CPT

## 2025-03-21 PROCEDURE — 84439 ASSAY OF FREE THYROXINE: CPT

## 2025-03-21 RX ORDER — DICYCLOMINE HYDROCHLORIDE 10 MG/1
CAPSULE ORAL
COMMUNITY
Start: 2025-03-06

## 2025-03-21 RX ORDER — NITROFURANTOIN 25; 75 MG/1; MG/1
100 CAPSULE ORAL 2 TIMES DAILY
Qty: 10 CAPSULE | Refills: 0 | Status: SHIPPED | OUTPATIENT
Start: 2025-03-21 | End: 2025-03-26

## 2025-03-21 RX ORDER — DEXAMETHASONE 4 MG/1
TABLET ORAL
COMMUNITY
Start: 2025-03-05

## 2025-03-21 RX ORDER — LEVOTHYROXINE SODIUM 13 UG/1
CAPSULE ORAL
COMMUNITY
Start: 2025-01-28

## 2025-03-21 RX ORDER — FREMANEZUMAB-VFRM 225 MG/1.5ML
INJECTION SUBCUTANEOUS
COMMUNITY
Start: 2025-03-20

## 2025-03-21 RX ORDER — CARIPRAZINE 6 MG/1
CAPSULE, GELATIN COATED ORAL
COMMUNITY
Start: 2025-02-25

## 2025-03-21 NOTE — PROGRESS NOTES
Name: Belem Linares      : 1970      MRN: [id number]  Encounter Provider: ERICKA Nava  Encounter Date: 3/21/2025   Encounter department: [facility] [address]  :  Assessment & Plan  Dysuria  Bacteria in dip will send antibiotics increase hydration follow-up as needed  Orders:  •  POCT urine dip  •  Urine culture  •  nitrofurantoin (MACROBID) 100 mg capsule; Take 1 capsule (100 mg total) by mouth 2 (two) times a day for 5 days    Class 1 obesity due to excess calories with serious comorbidity and body mass index (BMI) of 32.0 to 32.9 in adult  Please have lab work follow-up for annual  Orders:  •  Comprehensive metabolic panel; Future  •  CBC and differential; Future  •  Lipid panel; Future  •  Hemoglobin A1C; Future  •  TSH, 3rd generation with Free T4 reflex; Future  •  Vitamin D 25 hydroxy; Future    Iron deficiency  Please have lab work follow-up for annual  Orders:  •  Iron Panel (Includes Ferritin, Iron Sat%, Iron, and TIBC); Future    Vitamin B 12 deficiency  Please have lab work follow-up for annual  Orders:  •  Vitamin B12; Future    Abnormal finding of blood chemistry, unspecified  Please have lab work follow-up for annual  Orders:  •  Hemoglobin A1C; Future    Abnormal findings on diagnostic imaging of other specified body structures  Please have lab work follow-up for annual  Orders:  •  TSH, 3rd generation with Free T4 reflex; Future           History of Present Illness   Belem is here for urinary pain for about 2-3 days    Difficulty Urinating   This is a new problem. The current episode started yesterday. The problem occurs intermittently. The problem has been unchanged. The quality of the pain is described as aching. The pain is at a severity of 7/10. The pain is moderate. There has been no fever. She is Not sexually active. There is No history of pyelonephritis. Associated symptoms include chills, frequency, nausea and urgency. Pertinent  "negatives include no hematuria or vomiting.     Review of Systems   Constitutional:  Positive for chills and fatigue. Negative for fever.   HENT:  Positive for sneezing and sore throat. Negative for congestion, ear pain, rhinorrhea and sinus pain.    Respiratory:  Negative for cough, chest tightness and shortness of breath.    Gastrointestinal:  Positive for abdominal pain and nausea. Negative for vomiting.   Genitourinary:  Positive for dysuria, frequency and urgency. Negative for hematuria.   Skin:  Negative for rash and wound.   All other systems reviewed and are negative.      Objective   /60   Pulse 70   Ht 5' 4\" (1.626 m)   SpO2 98%   BMI 32.10 kg/m²      Physical Exam  Vitals and nursing note reviewed.   Constitutional:       General: She is not in acute distress.     Appearance: Normal appearance. She is well-developed. She is not ill-appearing.   HENT:      Head: Normocephalic and atraumatic.      Right Ear: Tympanic membrane, ear canal and external ear normal. There is no impacted cerumen.      Left Ear: Tympanic membrane, ear canal and external ear normal. There is no impacted cerumen.      Nose: Nose normal. No congestion.      Mouth/Throat:      Mouth: Mucous membranes are moist.      Pharynx: No posterior oropharyngeal erythema.   Eyes:      Extraocular Movements: Extraocular movements intact.      Conjunctiva/sclera: Conjunctivae normal.      Pupils: Pupils are equal, round, and reactive to light.   Cardiovascular:      Rate and Rhythm: Normal rate and regular rhythm.      Heart sounds: No murmur heard.  Pulmonary:      Effort: Pulmonary effort is normal. No respiratory distress.      Breath sounds: Normal breath sounds.   Abdominal:      Palpations: Abdomen is soft.      Tenderness: There is abdominal tenderness.   Musculoskeletal:         General: Tenderness present. No swelling.      Cervical back: Normal range of motion and neck supple.      Right lower leg: No edema.      Left lower " leg: No edema.   Lymphadenopathy:      Cervical: No cervical adenopathy.   Skin:     General: Skin is warm and dry.      Capillary Refill: Capillary refill takes less than 2 seconds.   Neurological:      General: No focal deficit present.      Mental Status: She is alert.   Psychiatric:         Mood and Affect: Mood normal.

## 2025-03-21 NOTE — ASSESSMENT & PLAN NOTE
Please have lab work follow-up for annual  Orders:  •  Comprehensive metabolic panel; Future  •  CBC and differential; Future  •  Lipid panel; Future  •  Hemoglobin A1C; Future  •  TSH, 3rd generation with Free T4 reflex; Future  •  Vitamin D 25 hydroxy; Future

## 2025-03-22 LAB — BACTERIA UR CULT: NORMAL

## 2025-03-24 ENCOUNTER — TELEPHONE (OUTPATIENT)
Age: 55
End: 2025-03-24

## 2025-03-24 ENCOUNTER — NURSE TRIAGE (OUTPATIENT)
Age: 55
End: 2025-03-24

## 2025-03-24 NOTE — TELEPHONE ENCOUNTER
Pt called in to inform Monica she believes she doesn't have a UTI & thinks it may be a bladder infection.     Pt reports she was taking the antibiotics as prescribed for 2 days, along with her water pill, but stopped taking both medications b/c she was having flank pain all day Sat & Sun. Pt also reports having a low grade fever.     For any further information or questions, please contact pt. Thank you!    Belem: [phone]

## 2025-03-24 NOTE — TELEPHONE ENCOUNTER
"FOLLOW UP: patient scheduled for an appt tomorrow    REASON FOR CONVERSATION: Abdominal Pain    SYMPTOMS: odor (unsure of urine or vaginal), lower abdominal pain- suprapubic    OTHER: patient complaining of urinary symptoms such as bladder pressure and pain, odor and cramping.  She went to her PCP Friday and she was prescribed macrobid for a UTI.  Patient reports she stopped taking the macrobid because her urine culture was negative and it wasn't helping.  She has a lot of pain in her bladder area after urinating.  She reports it is \"severe\", but does not feel she needs the ED now.  She denies fever, chills, abnormal vaginal discharge, vaginal bleeding.  She has a lot of odor but she is unsure if its vaginal or urine.  Appt scheduled for tomorrow.  Patient will go to ED if pain or symptoms worsen.    DISPOSITION: See Today or Tomorrow in Office (overriding See PCP Today Or Tomorrow In Office)    Reason for Disposition   Patient wants to be seen    Answer Assessment - Initial Assessment Questions  1. LOCATION: \"Where does it hurt?\"       Suprapubic \"under c/s scar\"  2. RADIATION: \"Does the pain shoot anywhere else?\" (e.g., chest, back)      no  3. ONSET: \"When did the pain begin?\" (e.g., minutes, hours or days ago)       thurs  4. SUDDEN: \"Gradual or sudden onset?\"      unsure  5. PATTERN \"Does the pain come and go, or is it constant?\"      Comes and goes, worse after urinating  6. SEVERITY: \"How bad is the pain?\"  (e.g., Scale 1-10; mild, moderate, or severe)      severe  7. RECURRENT SYMPTOM: \"Have you ever had this type of stomach pain before?\" If Yes, ask: \"When was the last time?\" and \"What happened that time?\"       denies  8. CAUSE: \"What do you think is causing the stomach pain?\"      unsure  9. RELIEVING/AGGRAVATING FACTORS: \"What makes it better or worse?\" (e.g., antacids, bending or twisting motion, bowel movement)      Worse after peeing  10. OTHER SYMPTOMS: \"Do you have any other symptoms?\" (e.g., back " "pain, diarrhea, fever, urination pain, vomiting)        Vaginal odor, no change in discharge  11. PREGNANCY: \"Is there any chance you are pregnant?\" \"When was your last menstrual period?\"        denies    Protocols used: Abdominal Pain - Female-Adult-[place]    "

## 2025-03-25 ENCOUNTER — OFFICE VISIT (OUTPATIENT)
Dept: OBGYN CLINIC | Facility: MEDICAL CENTER | Age: 55
End: 2025-03-25
Payer: COMMERCIAL

## 2025-03-25 ENCOUNTER — TELEPHONE (OUTPATIENT)
Age: 55
End: 2025-03-25

## 2025-03-25 VITALS — SYSTOLIC BLOOD PRESSURE: 124 MMHG | DIASTOLIC BLOOD PRESSURE: 90 MMHG | TEMPERATURE: 97.6 F

## 2025-03-25 DIAGNOSIS — R30.0 DYSURIA: ICD-10-CM

## 2025-03-25 DIAGNOSIS — R10.2 PELVIC PAIN: ICD-10-CM

## 2025-03-25 DIAGNOSIS — B37.9 YEAST INFECTION: Primary | ICD-10-CM

## 2025-03-25 DIAGNOSIS — N30.90 CYSTITIS: ICD-10-CM

## 2025-03-25 LAB
BACTERIA UR QL AUTO: ABNORMAL /HPF
BILIRUB UR QL STRIP: NEGATIVE
BV WHIFF TEST VAG QL: ABNORMAL
CLARITY UR: CLEAR
CLUE CELLS SPEC QL WET PREP: ABNORMAL
COLOR UR: ABNORMAL
GLUCOSE UR STRIP-MCNC: NEGATIVE MG/DL
HGB UR QL STRIP.AUTO: NEGATIVE
KETONES UR STRIP-MCNC: NEGATIVE MG/DL
LEUKOCYTE ESTERASE UR QL STRIP: ABNORMAL
NITRITE UR QL STRIP: NEGATIVE
NON-SQ EPI CELLS URNS QL MICRO: ABNORMAL /HPF
PH SMN: 4 [PH]
PH UR STRIP.AUTO: 5.5 [PH]
PROT UR STRIP-MCNC: NEGATIVE MG/DL
RBC #/AREA URNS AUTO: ABNORMAL /HPF
SP GR UR STRIP.AUTO: 1.02 (ref 1–1.03)
T VAGINALIS VAG QL WET PREP: ABNORMAL
UROBILINOGEN UR STRIP-ACNC: <2 MG/DL
WBC #/AREA URNS AUTO: ABNORMAL /HPF
YEAST VAG QL WET PREP: POSITIVE

## 2025-03-25 PROCEDURE — 87086 URINE CULTURE/COLONY COUNT: CPT | Performed by: PHYSICIAN ASSISTANT

## 2025-03-25 PROCEDURE — 81001 URINALYSIS AUTO W/SCOPE: CPT | Performed by: PHYSICIAN ASSISTANT

## 2025-03-25 PROCEDURE — 99214 OFFICE O/P EST MOD 30 MIN: CPT | Performed by: PHYSICIAN ASSISTANT

## 2025-03-25 PROCEDURE — 87210 SMEAR WET MOUNT SALINE/INK: CPT | Performed by: PHYSICIAN ASSISTANT

## 2025-03-25 RX ORDER — PHENAZOPYRIDINE HYDROCHLORIDE 200 MG/1
200 TABLET, FILM COATED ORAL
Qty: 10 TABLET | Refills: 0 | Status: SHIPPED | OUTPATIENT
Start: 2025-03-25

## 2025-03-25 RX ORDER — FLUCONAZOLE 150 MG/1
TABLET ORAL
Qty: 2 TABLET | Refills: 0 | Status: SHIPPED | OUTPATIENT
Start: 2025-03-25 | End: 2025-03-29

## 2025-03-25 NOTE — TELEPHONE ENCOUNTER
Patient is asking for the provider to please review her labs and to call her back with her results. Patient states she has some concerns regarding her thyroid and A1c. P

## 2025-03-25 NOTE — PROGRESS NOTES
Name: Belem Linares      : 1970      MRN: [id number]  Encounter Provider: Concepcion Peters PA-C  Encounter Date: 3/25/2025   Encounter department: [facility] [place]  :  Assessment & Plan  Yeast infection  Reviewed findings of yeast on wet mount  Diflucan sent to pharmacy on file. Use reviewed.  Reviewed vulvar hygiene   RTO in 1 week if sx persist, sooner as needed.     Orders:    fluconazole (DIFLUCAN) 150 mg tablet; Take one tablet now, then one tablet in 3 days.    POCT wet mount    Dysuria  Diflucan for tx of yeast. Start pyridium while awaiting repeat UA/UC.  Increase water intake. Remove bladder irritants.   Pt has hx of IBS and did have flare/GI bug the week - days leading up to current sx. With recurrence consider IC.   Plan pending results.       Orders:    phenazopyridine (PYRIDIUM) 200 mg tablet; Take 1 tablet (200 mg total) by mouth 3 (three) times a day with meals    fluconazole (DIFLUCAN) 150 mg tablet; Take one tablet now, then one tablet in 3 days.    POCT wet mount    Cystitis    Orders:    Urine culture    Urinalysis with microscopic    phenazopyridine (PYRIDIUM) 200 mg tablet; Take 1 tablet (200 mg total) by mouth 3 (three) times a day with meals    Pelvic pain  Obtain TVUS as previously instructed.            History of Present Illness   53 y/o female with c/o suprapubic pain x 5 days. Seen by PCP 3/21/2025. Started on antibiotic due to signs of UTI on urine dip. Culture negative. Pt stopped antibiotic after 2 days due to flank pain and subjective fever. Continues with dysuria, frequency/urgency, and vaginal odor. Low back pain is described as cramping , like menses. She is s/p hysterectomy. Ovaries present b/l. Denies F/C/S, N/V, vaginal bleeding or discharge. No sexually active. Outstanding TVUS due to pelvic pain reported 5 months ago.           Review of Systems   Constitutional:  Negative for fever.   Gastrointestinal:  Positive for  diarrhea (recent stomach bug, now resolved). Negative for abdominal pain and vomiting.   Genitourinary:  Positive for dysuria, flank pain (low back), frequency, pelvic pain and urgency. Negative for decreased urine volume, difficulty urinating, vaginal bleeding, vaginal discharge and vaginal pain.   Neurological:  Positive for headaches.          Objective   /90 (BP Location: Left arm, Patient Position: Sitting, Cuff Size: Standard)   Temp 97.6 °F (36.4 °C) (Oral)      Physical Exam  Vitals and nursing note reviewed.   Constitutional:       General: She is not in acute distress.     Appearance: Normal appearance.   HENT:      Head: Normocephalic and atraumatic.   Eyes:      Conjunctiva/sclera: Conjunctivae normal.   Pulmonary:      Effort: Pulmonary effort is normal.   Abdominal:      General: There is no distension.      Palpations: Abdomen is soft.      Tenderness: There is no abdominal tenderness.   Genitourinary:     General: Normal vulva.      Labia:         Right: No rash or lesion.         Left: No rash or lesion.       Vagina: Normal.      Cervix: Normal.      Uterus: Absent.       Adnexa: Left adnexa normal.        Right: Tenderness present. No mass or fullness.     Lymphadenopathy:      Lower Body: No right inguinal adenopathy. No left inguinal adenopathy.   Skin:     General: Skin is warm and dry.   Neurological:      General: No focal deficit present.      Mental Status: She is alert.      Cranial Nerves: No cranial nerve deficit.   Psychiatric:         Mood and Affect: Mood normal.         Behavior: Behavior normal.

## 2025-03-25 NOTE — TELEPHONE ENCOUNTER
Called patient and she is aware, she said she is still having the same pain you had seen her before. She said once she stopped the meds the flank pain went away.

## 2025-03-26 ENCOUNTER — RESULTS FOLLOW-UP (OUTPATIENT)
Dept: FAMILY MEDICINE CLINIC | Facility: CLINIC | Age: 55
End: 2025-03-26

## 2025-03-26 DIAGNOSIS — R79.89 ABNORMAL TSH: Primary | ICD-10-CM

## 2025-03-26 DIAGNOSIS — R93.89 ABNORMAL FINDINGS ON DIAGNOSTIC IMAGING OF OTHER SPECIFIED BODY STRUCTURES: ICD-10-CM

## 2025-03-27 LAB — BACTERIA UR CULT: NORMAL

## 2025-03-28 NOTE — TELEPHONE ENCOUNTER
Pt called to determine if PCP had sent in a prescription for a statin medication.  Informed pt that PCP would like to discuss medication options further with pt at her upcoming Medicare Annual Wellness appt on 4/25.  Pt verbalized understanding.  No further questions or concerns at this time.

## 2025-03-31 ENCOUNTER — TELEPHONE (OUTPATIENT)
Dept: FAMILY MEDICINE CLINIC | Facility: CLINIC | Age: 55
End: 2025-03-31

## 2025-03-31 NOTE — TELEPHONE ENCOUNTER
Received fax from [employer] requesting a prior auth on Zepbound 2.5MG.    Key: [id number]    Request scanned into patients chart.

## 2025-04-01 NOTE — TELEPHONE ENCOUNTER
This might be old because an auth was done for this two months ago and it is excluded from her plan which patient is aware

## 2025-04-01 NOTE — TELEPHONE ENCOUNTER
No we can just disregard this if you want to but her plan hasn't changed since you tried two months ago

## 2025-04-01 NOTE — TELEPHONE ENCOUNTER
Please have provider place order for Zepbound 2.5 mg as it is not on current medication list, unable to initiate prior authorization.

## 2025-04-02 ENCOUNTER — TELEMEDICINE (OUTPATIENT)
Dept: PSYCHIATRY | Facility: CLINIC | Age: 55
End: 2025-04-02
Payer: COMMERCIAL

## 2025-04-02 DIAGNOSIS — F31.81 BIPOLAR 2 DISORDER (HCC): ICD-10-CM

## 2025-04-02 DIAGNOSIS — F41.1 GAD (GENERALIZED ANXIETY DISORDER): Primary | ICD-10-CM

## 2025-04-02 PROCEDURE — 90837 PSYTX W PT 60 MINUTES: CPT

## 2025-04-02 NOTE — PSYCH
"Virtual Regular VisitName: Belem Linares      : 1970      MRN: [id number]  Encounter Provider: Maisha Bonds LCSW  Encounter Date: 2025   Encounter department: [facility]  :  Assessment & Plan  ZAINA (generalized anxiety disorder)         Bipolar 2 disorder (HCC)         ZAINA (generalized anxiety disorder)    Bipolar 2 disorder (HCC)    Goals addressed in session: Goal 1     DATA: Met with Belem Linares for a scheduled individual session.  Topics discussed included emotional wellness, coping skills, and relationships with family.  Belem states she took a break from therapy due to financial strain; we haven't met since 2024. Belem states she and her grandson got pretty sick with the stomach flu there for a while and it took time for them to both bounce back. Belem states her daughter-in-law reached out to her recently. Belem hadn't talked to her son in quite some time but did continue to message him, he just never responded. After her DIL reached out and told her one of Belem's text \"got to him\" and he was tearful. Belem got to see her DIL and other grandson while her son was away on a trip. When he came back, he reached out to Belem and they all made a trip up to see Belem and her grandson. She feels like a \"hole\" in her heart healed with her son coming back in her life. Belem got a yorkie puppy and really has enjoyed having her.  Belem states she is pre-diabetic and her cholesterol is up. She's trying to focus on her health. she shows evidence of utilizing effective communication skills, engaging in problem solving, and maintaining emotional composure to manage mental health symptoms.  During this session, this clinician used the following therapeutic modalities: Engagement Strategies, Client-centered Therapy, and Supportive Psychotherapy    Substance Abuse was not addressed during this session. If the client is diagnosed with a " "co-occurring substance use disorder, please indicate any changes in the frequency or amount of use: N/A. Stage of change for addressing substance use diagnoses: No substance use/Not applicable    ASSESSMENT:  Belem presents with a Euthymic/ normal mood. Belem's affect is Normal range and intensity, which is congruent, with their mood and the content of the session. The client has made progress on their goals as evidenced by n/a at this time.    Belem presents with a none risk of suicide, none risk of self-harm, and none risk of harm to others.    For any risk assessment that surpasses a \"low\" rating, a safety plan must be developed.    A safety plan was indicated: no  If yes, describe in detail N/A    PLAN: Between sessions, Belem will focus on her physical health and balance. At the next session, the therapist will use Engagement Strategies and Supportive Psychotherapy to address mood and anxiety.    Behavioral Health Treatment Plan [facility]: Diagnosis and Treatment Plan explained to Belem, Belem relates understanding diagnosis and is agreeable to Treatment Plan. Yes     Depression Follow-up Plan Completed: Not applicable     Reason for visit is No chief complaint on file.     Recent Visits  Date Type Provider Dept   03/31/25 Telephone ERICKA Nava [facility] [address]   Showing recent visits within past 7 days and meeting all other requirements  Today's Visits  Date Type Provider Dept   04/02/25 Telemedicine Maisha Bonds LCSW [facility]   Showing today's visits and meeting all other requirements  Future Appointments  No visits were found meeting these conditions.  Showing future appointments within next 150 days and meeting all other requirements     History of Present Illness     HPI    Past Medical History   Past Medical History:   Diagnosis Date    Allergic 1995    Anxiety     Anxiety     Asthma     Bipolar disorder (HCC)     Carpal tunnel syndrome  "    Chronic pain     lower back    Depression     Disease of thyroid gland     Dyslipidemia     Fibromyalgia     GERD (gastroesophageal reflux disease)     Headache(784.0)     Heart murmur     Hypothyroidism     Irritable bowel     Kidney stones     Kidney stones 2019    Migraine     Obesity (BMI 30.0-34.9)     Psychiatric disorder     depression/anxiety    Suicide attempt (HCC)     as teen    Vitamin D deficiency      Past Surgical History:   Procedure Laterality Date    BUNIONECTOMY       SECTION      CHOLECYSTECTOMY      HYSTERECTOMY      Via c section    PARTIAL HYSTERECTOMY      portion of  uterus still present    TONSILLECTOMY      TUBAL LIGATION       Current Outpatient Medications   Medication Instructions    Ajovy 225 MG/1.5ML auto-injector     azelastine (ASTELIN) 0.1 % nasal spray 1 SPRAY INTO EACH NOSTRIL TWO (TWO) TIMES A DAY USE IN EACH NOSTRIL AS DIRECTED    Blood Glucose Monitoring Suppl (OneTouch Verio Reflect) w/Device KIT Check blood sugars once daily. Please substitute with appropriate alternative as covered by patient's insurance. Dx: E11.65    butalbital-acetaminophen-caffeine (FIORICET,ESGIC) -40 mg per tablet 1 tablet, Oral, Every 6 hours PRN    celecoxib (CELEBREX) 200 mg, Oral, Daily    clonazePAM (KLONOPIN) 1 mg, Oral, Daily PRN    cyanocobalamin 1,000 mcg, Intramuscular, Weekly    dexamethasone (DECADRON) 4 mg tablet     dicyclomine (BENTYL) 10 mg capsule     furosemide (LASIX) 20 mg, Oral, 2 times daily    glucose blood (OneTouch Verio) test strip Check blood sugars once daily. Please substitute with appropriate alternative as covered by patient's insurance. Dx: E11.65    hydrocortisone (Proctozone-HC) 2.5 % rectal cream APPLY TOPICALLY TWO (TWO) TIMES A DAY    Iron-Vitamin C (Vitron-C)  MG TABS 1 tablet, Oral, Daily    ketoconazole (NIZORAL) 2 % cream No dose, route, or frequency recorded.    meclizine (ANTIVERT) 25 mg tablet TAKE 1 TABLET (25 MG  "TOTAL) BY MOUTH EVERY EIGHT (EIGHT) HOURS AS NEEDED FOR DIZZINESS    naratriptan (AMERGE) 2.5 MG tablet No dose, route, or frequency recorded.    omeprazole (PriLOSEC) 40 MG capsule     ondansetron (ZOFRAN-ODT) 4 mg, Oral, Every 8 hours PRN    OneTouch Delica Lancets 33G MISC Check blood sugars once daily. Please substitute with appropriate alternative as covered by patient's insurance. Dx: E11.65    phenazopyridine (PYRIDIUM) 200 mg, Oral, 3 times daily with meals    predniSONE 2.5 mg tablet     Respiratory Therapy Supplies (NEBULIZER) DONA Does not apply, Every 4 hours (RESP)    rimegepant sulfate (NURTEC) 75 mg    semaglutide, 2 mg/dose, (Ozempic, 2 MG/DOSE,) 8 mg/ mL injection pen INJECT 0.75 ML (2 MG TOTAL) UNDER THE SKIN EVERY SEVEN DAYS    Syringe/Needle, Disp, (SYRINGE 3CC/25GX1\") 25G X 1\" 3 ML MISC Does not apply, Weekly    Tirosint 150 MCG CAPS     Tirosint 0.112 mg, Oral, Daily before breakfast    tiZANidine (ZANAFLEX) 2 mg tablet     tiZANidine (ZANAFLEX) 4 mg tablet No dose, route, or frequency recorded.    triamcinolone (KENALOG) 0.1 % cream Topical, 2 times daily    Ubrelvy 100 MG tablet No dose, route, or frequency recorded.    Ventolin  (90 Base) MCG/ACT inhaler INHALE TWO PUFFS EVERY 4 (FOUR) HOURS AS NEEDED FOR WHEEZING OR SHORTNESS OF BREATH    Veozah 45 mg, Oral, Daily    Vraylar 6 MG capsule     XIFAXAN 550 MG tablet No dose, route, or frequency recorded.    Xiidra 5 % op solution 1 drop, Both Eyes, 2 times daily     Allergies   Allergen Reactions    Doxycycline Rash    Erythromycin Rash    Other Edema and Wheezing     jalapeno    Augmentin [Amoxicillin-Pot Clavulanate] GI Intolerance    Latex Rash    Duloxetine      Other reaction(s): Nausea, Loopy    Eletriptan      Pain in lower jaw with pressure in throat    Emgality [Galcanezumab-Gnlm]     Galcanezumab      rash    Lidocaine Other (See Comments)     Headache    Methocarbamol Other (See Comments)     Dizzyness       Objective   There " were no vitals taken for this visit.    Video Exam  Physical Exam     Administrative Statements   Encounter provider Maisha Bonds LCSW    The Patient is located at Home and in the following state in which I hold an active license [place].    The patient was identified by name and date of birth. Belem Linares was informed that this is a telemedicine visit and that the visit is being conducted through the Epic Embedded platform. She agrees to proceed..  My office door was closed. No one else was in the room.  She acknowledged consent and understanding of privacy and security of the video platform. The patient has agreed to participate and understands they can discontinue the visit at any time.    Visit Time  Start Time: 1230  Stop Time: 1325  Total Visit Time: 55 minutes

## 2025-04-09 ENCOUNTER — APPOINTMENT (OUTPATIENT)
Dept: LAB | Facility: CLINIC | Age: 55
End: 2025-04-09
Payer: COMMERCIAL

## 2025-04-09 ENCOUNTER — TELEMEDICINE (OUTPATIENT)
Dept: PSYCHIATRY | Facility: CLINIC | Age: 55
End: 2025-04-09
Payer: COMMERCIAL

## 2025-04-09 DIAGNOSIS — F41.1 GAD (GENERALIZED ANXIETY DISORDER): ICD-10-CM

## 2025-04-09 DIAGNOSIS — R79.89 ABNORMAL TSH: ICD-10-CM

## 2025-04-09 DIAGNOSIS — E06.3 HYPOTHYROIDISM DUE TO HASHIMOTO THYROIDITIS: ICD-10-CM

## 2025-04-09 DIAGNOSIS — R42 DIZZINESS: ICD-10-CM

## 2025-04-09 DIAGNOSIS — F31.81 BIPOLAR 2 DISORDER (HCC): Primary | ICD-10-CM

## 2025-04-09 DIAGNOSIS — R93.89 ABNORMAL FINDINGS ON DIAGNOSTIC IMAGING OF OTHER SPECIFIED BODY STRUCTURES: ICD-10-CM

## 2025-04-09 LAB
ALBUMIN SERPL BCG-MCNC: 4.5 G/DL (ref 3.5–5)
ALP SERPL-CCNC: 80 U/L (ref 34–104)
ALT SERPL W P-5'-P-CCNC: 16 U/L (ref 7–52)
ANION GAP SERPL CALCULATED.3IONS-SCNC: 9 MMOL/L (ref 4–13)
AST SERPL W P-5'-P-CCNC: 19 U/L (ref 13–39)
BILIRUB SERPL-MCNC: 0.37 MG/DL (ref 0.2–1)
BUN SERPL-MCNC: 15 MG/DL (ref 5–25)
CALCIUM SERPL-MCNC: 9.5 MG/DL (ref 8.4–10.2)
CHLORIDE SERPL-SCNC: 107 MMOL/L (ref 96–108)
CO2 SERPL-SCNC: 27 MMOL/L (ref 21–32)
CREAT SERPL-MCNC: 0.65 MG/DL (ref 0.6–1.3)
GFR SERPL CREATININE-BSD FRML MDRD: 101 ML/MIN/1.73SQ M
GLUCOSE P FAST SERPL-MCNC: 78 MG/DL (ref 65–99)
MAGNESIUM SERPL-MCNC: 2.1 MG/DL (ref 1.9–2.7)
POTASSIUM SERPL-SCNC: 4.6 MMOL/L (ref 3.5–5.3)
PROT SERPL-MCNC: 7.2 G/DL (ref 6.4–8.4)
SODIUM SERPL-SCNC: 143 MMOL/L (ref 135–147)
T4 FREE SERPL-MCNC: 0.67 NG/DL (ref 0.61–1.12)
TSH SERPL DL<=0.05 MIU/L-ACNC: 7.35 UIU/ML (ref 0.45–4.5)

## 2025-04-09 PROCEDURE — 83735 ASSAY OF MAGNESIUM: CPT

## 2025-04-09 PROCEDURE — 84439 ASSAY OF FREE THYROXINE: CPT

## 2025-04-09 PROCEDURE — 84443 ASSAY THYROID STIM HORMONE: CPT

## 2025-04-09 PROCEDURE — 90837 PSYTX W PT 60 MINUTES: CPT

## 2025-04-09 NOTE — BH TREATMENT PLAN
"Outpatient Behavioral Health Psychotherapy Treatment Plan     Belem Linares  1970       Date of Initial Psychotherapy Assessment: 6/10/22   Date of Current Treatment Plan: 04/09/25  Treatment Plan Target Date: 10/06/25  Treatment Plan Expiration Date: 10/06/25     Diagnosis:   1. ZAINA (generalized anxiety disorder)          2. Bipolar 2 disorder (HCC)                Area(s) of Need: coping skills, stress management, challenging maladaptive behaviors and thinking patterns, emotional support distress tolerance skills and give her \"advice and direction objectively\"      Long Term Goal 1:  \"have someone I feel that can help me reframe perspectives and alternatives to handling my own feelings and resulting actions\". (goal revised)      Stage of Change: Preparation     Target Date for completion: 10/06/25             Anticipated therapeutic modalities: CBT, behavior modification, insight oriented therapy, psychoeducation, mindfulness, problem solving skills, communication skills, stress management skills, assertiveness skills, emotional needs meeting.              People identified to complete this goal: Belem and this clinician                        Objective 1. Belem will work to increase her awareness and understanding of specific triggers and situations that contribute to anxiety. (new objective)                Objective 2. Belem will learn and practice emotional regulation skills to help individuals manage intense emotions associated with anxiety. (new objective)     Objective 3. Belem will work on setting and maintaining healthy boundaries to reduce overwhelming stressors and demands. (new objective)         I am currently under the care of a [facility] psychiatric provider: yes     My [facility] psychiatric provider is: Es Rollins PA-C     I am currently taking psychiatric medications: Yes, as prescribed        I feel that I will be ready for discharge from mental health care when I reach the " following: to be reevaluated prior to target date  For children and adults who have a legal guardian:          Has there been any change to custody orders and/or guardianship status? NA. If yes, attach updated documentation.     I have created my Crisis Plan and have been offered a copy of this plan     Behavioral Health Treatment Plan  [facility]: Diagnosis and Treatment Plan explained to Belem Glorianandez acknowledges an understanding of their diagnosis. Belem Linares agrees to this treatment plan.    I have been offered a copy of this Treatment Plan. yes    Belem Linares, 1970, actively participated in the review and update of this treatment plan during a virtual session, using the Epic Embedded platform.   Belem Linares  provided verbal consent on 4/9/2025 at 1:25 PM. The treatment plan was transcribed into the Electronic Health Record at a later time.

## 2025-04-09 NOTE — PSYCH
"Virtual Regular VisitName: Belem Linares      : 1970      MRN: [id number]  Encounter Provider: Maisha Bonds LCSW  Encounter Date: 2025   Encounter department: [facility]  :  Assessment & Plan  Bipolar 2 disorder (HCC)    ZAINA (generalized anxiety disorder)    Goals addressed in session: Goal 1     DATA: Met with Belem Linares for a scheduled individual session.  Topics discussed included emotional wellness, coping skills, and breakup/divorce.  Belem is feeling \"good\". She spoke about her situation with Jaylan and how it just doesn't seem to work when he comes into town and stays with her. They've had this discussion and she's told him she doesn't want to be in a relationship with him. They still have contact and sometimes it's good but other times, he lashes out at her for breaking it off. She's trying to maintain boundaries with him but he often tries to pull her into an argument. Belem states Jaylan's made comments about Belem's grandson, and now her son, insinuating that she puts them first. She's been clear that she does and that's not negotiable. she shows evidence of utilizing effective communication skills, engaging in problem solving, and maintaining emotional composure to manage mental health symptoms.  During this session, this clinician used the following therapeutic modalities: Engagement Strategies, Client-centered Therapy, Solution-Focused Therapy, and Supportive Psychotherapy    Substance Abuse was not addressed during this session. If the client is diagnosed with a co-occurring substance use disorder, please indicate any changes in the frequency or amount of use: N/A. Stage of change for addressing substance use diagnoses: No substance use/Not applicable    ASSESSMENT:  Belem presents with a Euthymic/ normal mood. Belem's affect is Normal range and intensity, which is congruent, with their mood and the content of the session. The " "client has made progress on their goals as evidenced by working on setting boundaries.    Belem presents with a none risk of suicide, none risk of self-harm, and none risk of harm to others.    For any risk assessment that surpasses a \"low\" rating, a safety plan must be developed.    A safety plan was indicated: no  If yes, describe in detail N/A    PLAN: Between sessions, Belem will continue to maintain boundaries with her ex and within her family. At the next session, the therapist will use Engagement Strategies and Supportive Psychotherapy to address mood and anxiety.    Behavioral Health Treatment Plan [facility]: Diagnosis and Treatment Plan explained to Belem, Belem relates understanding diagnosis and is agreeable to Treatment Plan. Yes     Depression Follow-up Plan Completed: Not applicable     Reason for visit is No chief complaint on file.     Recent Visits  Date Type Provider Dept   04/02/25 Telemedicine Maisha Bonds LCSW [facility]   Showing recent visits within past 7 days and meeting all other requirements  Today's Visits  Date Type Provider Dept   04/09/25 Telemedicine Maisha Bonds LCSW [facility]   Showing today's visits and meeting all other requirements  Future Appointments  No visits were found meeting these conditions.  Showing future appointments within next 150 days and meeting all other requirements     History of Present Illness     HPI    Past Medical History   Past Medical History:   Diagnosis Date    Allergic 1995    Anxiety     Anxiety     Asthma     Bipolar disorder (HCC)     Carpal tunnel syndrome     Chronic pain     lower back    Depression     Disease of thyroid gland     Dyslipidemia     Fibromyalgia     GERD (gastroesophageal reflux disease)     Headache(784.0)     Heart murmur 1980    Hypothyroidism 2007    Irritable bowel     Kidney stones     Kidney stones 05/20/2019    Migraine     Obesity (BMI 30.0-34.9)     " Psychiatric disorder     depression/anxiety    Suicide attempt (HCC)     as teen    Vitamin D deficiency      Past Surgical History:   Procedure Laterality Date    BUNIONECTOMY       SECTION      CHOLECYSTECTOMY      HYSTERECTOMY      Via c section    PARTIAL HYSTERECTOMY      portion of  uterus still present    TONSILLECTOMY      TUBAL LIGATION       Current Outpatient Medications   Medication Instructions    Ajovy 225 MG/1.5ML auto-injector     azelastine (ASTELIN) 0.1 % nasal spray 1 SPRAY INTO EACH NOSTRIL TWO (TWO) TIMES A DAY USE IN EACH NOSTRIL AS DIRECTED    Blood Glucose Monitoring Suppl (OneTouch Verio Reflect) w/Device KIT Check blood sugars once daily. Please substitute with appropriate alternative as covered by patient's insurance. Dx: E11.65    butalbital-acetaminophen-caffeine (FIORICET,ESGIC) -40 mg per tablet 1 tablet, Oral, Every 6 hours PRN    celecoxib (CELEBREX) 200 mg, Oral, Daily    clonazePAM (KLONOPIN) 1 mg, Oral, Daily PRN    cyanocobalamin 1,000 mcg, Intramuscular, Weekly    dexamethasone (DECADRON) 4 mg tablet     dicyclomine (BENTYL) 10 mg capsule     furosemide (LASIX) 20 mg, Oral, 2 times daily    glucose blood (OneTouch Verio) test strip Check blood sugars once daily. Please substitute with appropriate alternative as covered by patient's insurance. Dx: E11.65    hydrocortisone (Proctozone-HC) 2.5 % rectal cream APPLY TOPICALLY TWO (TWO) TIMES A DAY    Iron-Vitamin C (Vitron-C)  MG TABS 1 tablet, Oral, Daily    ketoconazole (NIZORAL) 2 % cream No dose, route, or frequency recorded.    meclizine (ANTIVERT) 25 mg tablet TAKE 1 TABLET (25 MG TOTAL) BY MOUTH EVERY EIGHT (EIGHT) HOURS AS NEEDED FOR DIZZINESS    naratriptan (AMERGE) 2.5 MG tablet No dose, route, or frequency recorded.    omeprazole (PriLOSEC) 40 MG capsule     ondansetron (ZOFRAN-ODT) 4 mg, Oral, Every 8 hours PRN    OneTouch Delica Lancets 33G MISC Check blood sugars once daily. Please substitute  "with appropriate alternative as covered by patient's insurance. Dx: E11.65    phenazopyridine (PYRIDIUM) 200 mg, Oral, 3 times daily with meals    predniSONE 2.5 mg tablet     Respiratory Therapy Supplies (NEBULIZER) DONA Does not apply, Every 4 hours (RESP)    rimegepant sulfate (NURTEC) 75 mg    semaglutide, 2 mg/dose, (Ozempic, 2 MG/DOSE,) 8 mg/ mL injection pen INJECT 0.75 ML (2 MG TOTAL) UNDER THE SKIN EVERY SEVEN DAYS    Syringe/Needle, Disp, (SYRINGE 3CC/25GX1\") 25G X 1\" 3 ML MISC Does not apply, Weekly    Tirosint 150 MCG CAPS     Tirosint 0.112 mg, Oral, Daily before breakfast    tiZANidine (ZANAFLEX) 2 mg tablet     tiZANidine (ZANAFLEX) 4 mg tablet No dose, route, or frequency recorded.    triamcinolone (KENALOG) 0.1 % cream Topical, 2 times daily    Ubrelvy 100 MG tablet No dose, route, or frequency recorded.    Ventolin  (90 Base) MCG/ACT inhaler INHALE TWO PUFFS EVERY 4 (FOUR) HOURS AS NEEDED FOR WHEEZING OR SHORTNESS OF BREATH    Veozah 45 mg, Oral, Daily    Vraylar 6 MG capsule     XIFAXAN 550 MG tablet No dose, route, or frequency recorded.    Xiidra 5 % op solution 1 drop, Both Eyes, 2 times daily     Allergies   Allergen Reactions    Doxycycline Rash    Erythromycin Rash    Other Edema and Wheezing     jalapeno    Augmentin [Amoxicillin-Pot Clavulanate] GI Intolerance    Latex Rash    Duloxetine      Other reaction(s): Nausea, Loopy    Eletriptan      Pain in lower jaw with pressure in throat    Emgality [Galcanezumab-Gnlm]     Galcanezumab      rash    Lidocaine Other (See Comments)     Headache    Methocarbamol Other (See Comments)     Dizzyness       Objective   There were no vitals taken for this visit.    Video Exam  Physical Exam     Administrative Statements   Encounter provider Maisha Bonds LCSW    The Patient is located at Home and in the following state in which I hold an active license [place].    The patient was identified by name and date of birth. Belem Linares was " informed that this is a telemedicine visit and that the visit is being conducted through the Epic Embedded platform. She agrees to proceed..  My office door was closed. No one else was in the room.  She acknowledged consent and understanding of privacy and security of the video platform. The patient has agreed to participate and understands they can discontinue the visit at any time.    Visit Time  Start Time: 1232  Stop Time: 1330  Total Visit Time: 58 minutes

## 2025-04-09 NOTE — BH CRISIS PLAN
Client Name: Belem Linares       Client YOB: 1970    [facility] Safety Plan      Creation Date: 4/9/25 Update Date: 4/8/26   Created By: Maisha Bonds LCSW       Step 1: Warning Signs:   Warning Signs   Feeling like I'd rather be dead.   Hopelessness            Step 2: Internal Coping Strategies:   Internal Coping Strategies   I'm not suicidal            Step 3: People and social settings that provide distraction:   Name Contact Information   N/A     Places   N/A           Step 4: People whom I can ask for help during a crisis:     Patient did not identify any contacts: Yes      Step 5: Professionals or agencies I can contact during a crisis:      Clinican/Agency Name Phone Emergency Contact    [facility] Crisis [phone] self      Local Emergency Department Emergency Department Phone Emergency Department Address    [facility] Emergency Room [facility] [phone] [address] [place] [address]        Crisis Phone Numbers:   Suicide Prevention Lifeline: Call or Text  987 Crisis Text Line: Text HOME to 591-722   Please note: Some [facility] do not have a separate number for Child/Adolescent specific crisis. If your county is not listed under Child/Adolescent, please call the adult number for your county      Adult Crisis Numbers: Child/Adolescent Crisis Numbers   [facility]: [phone] [facility]: [phone]   [facility]: [phone] [facility]: [phone]   [facility]: [phone] [place]: [phone]   [facility]: [phone] [place]: [phone]   [facility]: [phone]   [facility]: [phone]   [facility]: [phone]   [facility]: [phone] (daytime) [phone] (after hours, weekends, holidays)      Step 6: Making the environment safer (plan for lethal means safety):   Patient did not identify any lethal methods: Yes     Optional: What is most important to me  and worth living for?   My grandson!!! He is the reason i even smile     Teresa Safety Plan. Kesha Cook and Blanco Bruno. Used with permission of the authors.

## 2025-04-10 ENCOUNTER — RESULTS FOLLOW-UP (OUTPATIENT)
Dept: FAMILY MEDICINE CLINIC | Facility: CLINIC | Age: 55
End: 2025-04-10

## 2025-04-10 DIAGNOSIS — F31.81 BIPOLAR 2 DISORDER (HCC): Primary | ICD-10-CM

## 2025-04-10 DIAGNOSIS — F41.1 GAD (GENERALIZED ANXIETY DISORDER): ICD-10-CM

## 2025-04-10 RX ORDER — CARIPRAZINE 6 MG/1
CAPSULE, GELATIN COATED ORAL
Qty: 30 CAPSULE | Refills: 2 | OUTPATIENT
Start: 2025-04-10

## 2025-04-11 DIAGNOSIS — F41.1 GAD (GENERALIZED ANXIETY DISORDER): ICD-10-CM

## 2025-04-11 RX ORDER — CARIPRAZINE 6 MG/1
6 CAPSULE, GELATIN COATED ORAL DAILY
Qty: 30 CAPSULE | Refills: 1 | Status: SHIPPED | OUTPATIENT
Start: 2025-04-11

## 2025-04-11 RX ORDER — CLONAZEPAM 1 MG/1
1 TABLET ORAL DAILY PRN
Qty: 30 TABLET | Refills: 1 | Status: SHIPPED | OUTPATIENT
Start: 2025-04-11

## 2025-04-11 NOTE — TELEPHONE ENCOUNTER
Called and spoke to patient looking to schedule f/u, after med refill request.    Patient stated that they did not submit request and it must have been pharmacy.    Patient stated that they were to worried about vraylar but they needed klonopin (this writer believes this is what was said).    F/u scheduled for virtual on 5/21/25 @ 9:30 am.

## 2025-04-11 NOTE — TELEPHONE ENCOUNTER
Jackelyn sent a Rx request for Vraylar.  Our nursing staff worker called Belem to make an appt at my request yesterday.  The Pt made a f/u appt for 5/21/2025 and requested Clonazepam.  PDMP was reviewed and was last filled 1/16/2025.  Today I sent a msg to clerical staff to inform the Pt I would provide her with Rxs now, but she must be seen at the next appt for further refills thereafter.   Discrepancies were noted in the medication log/lists in regards to Rxs that had been cancelled out or that Pt supposedly requested for removal.  Given the communications today, I e-scribed the following to her designated [facility]:  Vraylar 6mg (1) cap po qd # 30 R1  Clonazepam 1mg (1) tab po qd prn anxiety # 30 R1

## 2025-04-11 NOTE — TELEPHONE ENCOUNTER
01/16/2025 11/22/2024 clonazePAM (Tablet) 30.0 30 1 MG NA [facility] Commercial [place] 2 / 3 [place]   1 [id number] 12/19/2024 11/22/2024 clonazePAM (Tablet) 30.0 30 1 MG NA [facility] Commercial [place] 1 / 3 [place]   1 [id number] 11/22/2024 11/22/2024 clonazePAM (Tablet) 30.0 30 1 MG NA [facility] Commercial [place] 0 / 3 [place]   1 [id number] 11/20/2024 11/19/2024 clonazePAM (Tablet) 3.0 3 1 MG NA [facility] Commercial [place] 0 / 0 [place]   1 [id number] 07/25/2024 07/23/2024 clonazePAM (Tablet) 30.0 30 1 MG NA [facility] [place] 0 / 0 [place]   1 [id number] 06/27/2024 06/26/2024 clonazePAM (Tablet) 30.0 30

## 2025-04-14 RX ORDER — CLONAZEPAM 1 MG/1
1 TABLET ORAL DAILY PRN
Qty: 30 TABLET | OUTPATIENT
Start: 2025-04-14

## 2025-04-16 ENCOUNTER — TELEMEDICINE (OUTPATIENT)
Dept: PSYCHIATRY | Facility: CLINIC | Age: 55
End: 2025-04-16

## 2025-04-16 DIAGNOSIS — F31.81 BIPOLAR 2 DISORDER (HCC): ICD-10-CM

## 2025-04-16 DIAGNOSIS — F41.1 GAD (GENERALIZED ANXIETY DISORDER): Primary | ICD-10-CM

## 2025-04-16 PROCEDURE — PBNCHG PB NO CHARGE PLACEHOLDER

## 2025-04-22 ENCOUNTER — NURSE TRIAGE (OUTPATIENT)
Age: 55
End: 2025-04-22

## 2025-04-22 NOTE — TELEPHONE ENCOUNTER
"FOLLOW UP: urgent care today    REASON FOR CONVERSATION: Dizziness    SYMPTOMS: dizziness with diarrhea    OTHER: dizziness more concerning to her. Will go to  today for eval. Present on phone.    DISPOSITION: Go to Office Now/Urgent Care      Reason for Disposition   Lightheadedness (dizziness) present now, after 2 hours of rest and fluids    Answer Assessment - Initial Assessment Questions  1. DESCRIPTION: \"Describe your dizziness.\"      Woozy    2. LIGHTHEADED: \"Do you feel lightheaded?\" (e.g., somewhat faint, woozy, weak upon standing)      Woozy, light headed.    3. VERTIGO: \"Do you feel like either you or the room is spinning or tilting?\" (i.e., vertigo)      Yes    4. SEVERITY: \"How bad is it?\"  \"Do you feel like you are going to faint?\" \"Can you stand and walk?\"      Moderate    5. ONSET:  \"When did the dizziness begin?\"      3 days ago    6. AGGRAVATING FACTORS: \"Does anything make it worse?\" (e.g., standing, change in head position)      Standing     7. HEART RATE: \"Can you tell me your heart rate?\" \"How many beats in 15 seconds?\"  (Note: Not all patients can do this.)        Normal    8. CAUSE: \"What do you think is causing the dizziness?\" (e.g., decreased fluids or food, diarrhea, emotional distress, heat exposure, new medicine, sudden standing, vomiting; unknown)      Unknown    9. RECURRENT SYMPTOM: \"Have you had dizziness before?\" If Yes, ask: \"When was the last time?\" \"What happened that time?\"      Has history - also migraines     10. OTHER SYMPTOMS: \"Do you have any other symptoms?\" (e.g., fever, chest pain, vomiting, diarrhea, bleeding)        Neck pressure    Protocols used: Dizziness-Adult-[place]    "

## 2025-04-23 NOTE — TELEPHONE ENCOUNTER
Called patient and she is aware and will try the debrox first and if doesn't work she will come in the office

## 2025-04-25 DIAGNOSIS — E06.3 HYPOTHYROIDISM DUE TO HASHIMOTO THYROIDITIS: ICD-10-CM

## 2025-04-25 DIAGNOSIS — E03.8 OTHER SPECIFIED HYPOTHYROIDISM: ICD-10-CM

## 2025-04-25 RX ORDER — LEVOTHYROXINE SODIUM 112 UG/1
112 CAPSULE ORAL
Qty: 90 CAPSULE | Refills: 1 | Status: SHIPPED | OUTPATIENT
Start: 2025-04-25

## 2025-04-25 RX ORDER — THYROID,PORK 60 MG
90 TABLET ORAL DAILY
Qty: 135 TABLET | Refills: 0 | Status: SHIPPED | OUTPATIENT
Start: 2025-04-25 | End: 2025-04-30 | Stop reason: ALTCHOICE

## 2025-04-30 ENCOUNTER — TELEMEDICINE (OUTPATIENT)
Dept: FAMILY MEDICINE CLINIC | Facility: CLINIC | Age: 55
End: 2025-04-30
Payer: COMMERCIAL

## 2025-04-30 DIAGNOSIS — K58.0 IRRITABLE BOWEL SYNDROME WITH DIARRHEA: Primary | ICD-10-CM

## 2025-04-30 DIAGNOSIS — J30.1 SEASONAL ALLERGIC RHINITIS DUE TO POLLEN: ICD-10-CM

## 2025-04-30 PROCEDURE — 99213 OFFICE O/P EST LOW 20 MIN: CPT | Performed by: NURSE PRACTITIONER

## 2025-04-30 PROCEDURE — G2211 COMPLEX E/M VISIT ADD ON: HCPCS | Performed by: NURSE PRACTITIONER

## 2025-04-30 RX ORDER — FLUTICASONE PROPIONATE 50 MCG
1 SPRAY, SUSPENSION (ML) NASAL DAILY
Qty: 18.2 ML | Refills: 0 | Status: SHIPPED | OUTPATIENT
Start: 2025-04-30

## 2025-04-30 RX ORDER — LOPERAMIDE HYDROCHLORIDE 2 MG/1
2 CAPSULE ORAL 4 TIMES DAILY PRN
Qty: 30 CAPSULE | Refills: 0 | Status: SHIPPED | OUTPATIENT
Start: 2025-04-30

## 2025-04-30 RX ORDER — DICYCLOMINE HCL 20 MG
20 TABLET ORAL EVERY 6 HOURS
Qty: 360 TABLET | Refills: 0 | Status: SHIPPED | OUTPATIENT
Start: 2025-04-30

## 2025-04-30 NOTE — PROGRESS NOTES
Virtual Regular VisitName: Belem Linares      : 1970      MRN: [id number]  Encounter Provider: ERICKA Bryant  Encounter Date: 2025   Encounter department: [facility] [address] [facility]  :  Assessment & Plan  Irritable bowel syndrome with diarrhea  Discussed importance of adequate nutrition, hydration, stress reduction.  Discussed FODMAP diet.  Bentyl and loperamide as prescribed.  To follow-up with GI    Orders:    dicyclomine (BENTYL) 20 mg tablet; Take 1 tablet (20 mg total) by mouth every 6 (six) hours    loperamide (IMODIUM) 2 mg capsule; Take 1 capsule (2 mg total) by mouth 4 (four) times a day as needed for diarrhea    Seasonal allergic rhinitis due to pollen  Discussed importance of increased hydration, frequent dusting of room.  Discussed use of air purifier's, avoid exposure to outdoors for prolonged time.  Advised saline rinses twice daily, steam.  To start over-the-counter Claritin, Allegra, Zyrtec.  Flonase as prescribed.    Orders:    fluticasone (FLONASE) 50 mcg/act nasal spray; 1 spray into each nostril daily        History of Present Illness     Patient presents via video for evaluation of IBS flareup.  Patient states over the past few days she began to experience increased abdominal cramping and diarrhea.  Does have history of IBS, currently on Xifaxan.  Last had EGD and colonoscopy within the past year.  Does follow-up with GI.  Increased stress recently.  Patient denies blood in stool, nausea, vomiting, fever, chills.  Notes increased rhinorrhea, sneezing, congestion.      Review of Systems   Constitutional:  Negative for chills and fever.   HENT:  Positive for congestion, postnasal drip, rhinorrhea and sneezing. Negative for ear pain, sore throat and trouble swallowing.    Eyes:  Negative for photophobia and visual disturbance.   Respiratory:  Negative for cough and shortness of breath.    Cardiovascular:  Negative for chest pain and  palpitations.   Gastrointestinal:  Positive for abdominal pain and diarrhea. Negative for blood in stool, nausea and vomiting.   Genitourinary:  Negative for decreased urine volume, dysuria and urgency.   Musculoskeletal:  Negative for arthralgias and myalgias.   Skin:  Negative for color change and rash.   Neurological:  Negative for dizziness, weakness, light-headedness and headaches.   Hematological:  Negative for adenopathy.   Psychiatric/Behavioral:  Negative for agitation. The patient is not nervous/anxious.        Objective   There were no vitals taken for this visit.    Physical Exam  Constitutional:       General: She is not in acute distress.     Appearance: Normal appearance. She is not ill-appearing.   HENT:      Head: Normocephalic and atraumatic.      Right Ear: External ear normal.      Left Ear: External ear normal.      Nose: Rhinorrhea present.      Mouth/Throat:      Pharynx: Oropharynx is clear.   Pulmonary:      Effort: Pulmonary effort is normal.   Musculoskeletal:         General: Normal range of motion.      Cervical back: Normal range of motion.   Skin:     Coloration: Skin is not pale.   Neurological:      Mental Status: She is alert and oriented to person, place, and time.   Psychiatric:         Mood and Affect: Mood normal.         Behavior: Behavior normal.         Administrative Statements   Encounter provider ERICKA Bryant    The Patient is located at Home and in the following state in which I hold an active license [place].    The patient was identified by name and date of birth. Belem Linares was informed that this is a telemedicine visit and that the visit is being conducted through the Epic Embedded platform. She agrees to proceed..  My office door was closed. No one else was in the room.  She acknowledged consent and understanding of privacy and security of the video platform. The patient has agreed to participate and understands they can discontinue the visit at any  time.    I have spent a total time of 20 minutes in caring for this patient on the day of the visit/encounter including Instructions for management, Patient and family education, Importance of tx compliance, Impressions, Counseling / Coordination of care, Documenting in the medical record, and Obtaining or reviewing history  , not including the time spent for establishing the audio/video connection.

## 2025-04-30 NOTE — ASSESSMENT & PLAN NOTE
Discussed importance of adequate nutrition, hydration, stress reduction.  Discussed FODMAP diet.  Bentyl and loperamide as prescribed.  To follow-up with GI    Orders:    dicyclomine (BENTYL) 20 mg tablet; Take 1 tablet (20 mg total) by mouth every 6 (six) hours    loperamide (IMODIUM) 2 mg capsule; Take 1 capsule (2 mg total) by mouth 4 (four) times a day as needed for diarrhea

## 2025-05-05 ENCOUNTER — TELEPHONE (OUTPATIENT)
Age: 55
End: 2025-05-05

## 2025-05-05 DIAGNOSIS — F31.81 BIPOLAR 2 DISORDER (HCC): ICD-10-CM

## 2025-05-05 RX ORDER — CARIPRAZINE 6 MG/1
6 CAPSULE, GELATIN COATED ORAL DAILY
Qty: 30 CAPSULE | Refills: 0 | OUTPATIENT
Start: 2025-05-05

## 2025-05-05 NOTE — TELEPHONE ENCOUNTER
PA for Tirosint 112 MCGSUBMITTED to [facility]     via    [x][employer]-Case ID #       [x]PA sent as URGENT    All office notes, labs and other pertaining documents and studies sent. Clinical questions answered. Awaiting determination from insurance company.     Turnaround time for your insurance to make a decision on your Prior Authorization can take 7-21 business days.

## 2025-05-05 NOTE — TELEPHONE ENCOUNTER
PA for Tirosint 112 MCG  APPROVED     Date(s) approved January 1, 2025 to December 31, 2025     Case #[id number]     Patient advised by          []MyChart Message  [x]Phone call   []LMOM  []L/M to call office as no active Communication consent on file  []Unable to leave detailed message as VM not approved on Communication consent       Pharmacy advised by    [x]Fax  []Phone call  []Secure Chat    Specialty Pharmacy    []     Approval letter scanned into Media Yes

## 2025-05-07 ENCOUNTER — NURSE TRIAGE (OUTPATIENT)
Age: 55
End: 2025-05-07

## 2025-05-07 NOTE — TELEPHONE ENCOUNTER
"FOLLOW UP: Declined urgent care. Office visit made for tomorrow.    REASON FOR CONVERSATION: Diarrhea    SYMPTOMS: diarrhea since Saturday. Mostly soft stool. Increased fatigue    OTHER: patient declined UC but will be seen tomorrow.    DISPOSITION: Go to Urgent Care Now (overriding See Today in Office)      Reason for Disposition   SEVERE diarrhea (e.g., 7 or more times / day more than normal) and present > 24 hours (1 day)    Answer Assessment - Initial Assessment Questions  1. DIARRHEA SEVERITY: \"How bad is the diarrhea?\" \"How many more stools have you had in the past 24 hours than normal?\"       7    2. ONSET: \"When did the diarrhea begin?\"       Saturday    3. STOOL DESCRIPTION:  \"How loose or watery is the diarrhea?\" \"What is the stool color?\" \"Is there any blood or mucous in the stool?\"      Mucous, soft    4. VOMITING: \"Are you also vomiting?\" If Yes, ask: \"How many times in the past 24 hours?\"       Denies    5. ABDOMEN PAIN: \"Are you having any abdomen pain?\" If Yes, ask: \"What does it feel like?\" (e.g., crampy, dull, intermittent, constant)       Middle lower - achy    6. ABDOMEN PAIN SEVERITY: If present, ask: \"How bad is the pain?\"  (e.g., Scale 1-10; mild, moderate, or severe)      8/10    7. ORAL INTAKE: If vomiting, \"Have you been able to drink liquids?\" \"How much liquids have you had in the past 24 hours?\"      Drinking    8. HYDRATION: \"Any signs of dehydration?\" (e.g., dry mouth [not just dry lips], too weak to stand, dizziness, new weight loss) \"When did you last urinate?\"      States she's well hydrated    9. EXPOSURE: \"Have you traveled to a foreign country recently?\" \"Have you been exposed to anyone with diarrhea?\" \"Could you have eaten any food that was spoiled?\"      Denies    10. ANTIBIOTIC USE: \"Are you taking antibiotics now or have you taken antibiotics in the past 2 months?\"        Abx in march 11. OTHER SYMPTOMS: \"Do you have any other symptoms?\" (e.g., fever, blood in stool)        " Lightheadedness from not eating.    Protocols used: Diarrhea-Adult-[place]

## 2025-05-08 ENCOUNTER — OFFICE VISIT (OUTPATIENT)
Dept: FAMILY MEDICINE CLINIC | Facility: CLINIC | Age: 55
End: 2025-05-08
Payer: COMMERCIAL

## 2025-05-08 ENCOUNTER — APPOINTMENT (OUTPATIENT)
Dept: LAB | Facility: CLINIC | Age: 55
End: 2025-05-08
Payer: COMMERCIAL

## 2025-05-08 ENCOUNTER — APPOINTMENT (OUTPATIENT)
Dept: RADIOLOGY | Facility: CLINIC | Age: 55
End: 2025-05-08
Payer: COMMERCIAL

## 2025-05-08 VITALS
TEMPERATURE: 97.6 F | SYSTOLIC BLOOD PRESSURE: 116 MMHG | BODY MASS INDEX: 32.44 KG/M2 | WEIGHT: 190 LBS | DIASTOLIC BLOOD PRESSURE: 70 MMHG | HEIGHT: 64 IN | OXYGEN SATURATION: 100 % | HEART RATE: 65 BPM

## 2025-05-08 DIAGNOSIS — R79.9 ABNORMAL FINDING OF BLOOD CHEMISTRY, UNSPECIFIED: ICD-10-CM

## 2025-05-08 DIAGNOSIS — R21 RASH: ICD-10-CM

## 2025-05-08 DIAGNOSIS — E66.9 OBESITY (BMI 30-39.9): ICD-10-CM

## 2025-05-08 DIAGNOSIS — R53.83 OTHER FATIGUE: ICD-10-CM

## 2025-05-08 DIAGNOSIS — R20.0 ANESTHESIA OF SKIN: ICD-10-CM

## 2025-05-08 DIAGNOSIS — F41.1 GAD (GENERALIZED ANXIETY DISORDER): ICD-10-CM

## 2025-05-08 DIAGNOSIS — R53.83 OTHER FATIGUE: Primary | ICD-10-CM

## 2025-05-08 DIAGNOSIS — R05.3 CHRONIC COUGH: ICD-10-CM

## 2025-05-08 DIAGNOSIS — E78.2 MIXED HYPERLIPIDEMIA: ICD-10-CM

## 2025-05-08 DIAGNOSIS — G43.719 INTRACTABLE CHRONIC MIGRAINE WITHOUT AURA AND WITHOUT STATUS MIGRAINOSUS: ICD-10-CM

## 2025-05-08 DIAGNOSIS — R19.8 CHANGE IN BOWEL MOVEMENT: ICD-10-CM

## 2025-05-08 DIAGNOSIS — R07.9 CHEST PAIN, UNSPECIFIED TYPE: ICD-10-CM

## 2025-05-08 DIAGNOSIS — R42 DIZZINESS: ICD-10-CM

## 2025-05-08 LAB
25(OH)D3 SERPL-MCNC: 24.6 NG/ML (ref 30–100)
ALBUMIN SERPL BCG-MCNC: 4.5 G/DL (ref 3.5–5)
ALP SERPL-CCNC: 91 U/L (ref 34–104)
ALT SERPL W P-5'-P-CCNC: 20 U/L (ref 7–52)
ANION GAP SERPL CALCULATED.3IONS-SCNC: 9 MMOL/L (ref 4–13)
AST SERPL W P-5'-P-CCNC: 22 U/L (ref 13–39)
BASOPHILS # BLD AUTO: 0.01 THOUSANDS/ÂΜL (ref 0–0.1)
BASOPHILS NFR BLD AUTO: 0 % (ref 0–1)
BILIRUB SERPL-MCNC: 0.53 MG/DL (ref 0.2–1)
BUN SERPL-MCNC: 13 MG/DL (ref 5–25)
CALCIUM SERPL-MCNC: 9.6 MG/DL (ref 8.4–10.2)
CHLORIDE SERPL-SCNC: 105 MMOL/L (ref 96–108)
CO2 SERPL-SCNC: 27 MMOL/L (ref 21–32)
CREAT SERPL-MCNC: 0.77 MG/DL (ref 0.6–1.3)
EOSINOPHIL # BLD AUTO: 0.07 THOUSAND/ÂΜL (ref 0–0.61)
EOSINOPHIL NFR BLD AUTO: 2 % (ref 0–6)
ERYTHROCYTE [DISTWIDTH] IN BLOOD BY AUTOMATED COUNT: 13.7 % (ref 11.6–15.1)
EST. AVERAGE GLUCOSE BLD GHB EST-MCNC: 114 MG/DL
FERRITIN SERPL-MCNC: 49 NG/ML (ref 30–307)
FOLATE SERPL-MCNC: 14.8 NG/ML
GFR SERPL CREATININE-BSD FRML MDRD: 88 ML/MIN/1.73SQ M
GLUCOSE P FAST SERPL-MCNC: 84 MG/DL (ref 65–99)
HBA1C MFR BLD: 5.6 %
HCT VFR BLD AUTO: 41.2 % (ref 34.8–46.1)
HGB BLD-MCNC: 13.3 G/DL (ref 11.5–15.4)
IMM GRANULOCYTES # BLD AUTO: 0.01 THOUSAND/UL (ref 0–0.2)
IMM GRANULOCYTES NFR BLD AUTO: 0 % (ref 0–2)
IRON SATN MFR SERPL: 36 % (ref 15–50)
IRON SERPL-MCNC: 161 UG/DL (ref 50–212)
LYMPHOCYTES # BLD AUTO: 1.78 THOUSANDS/ÂΜL (ref 0.6–4.47)
LYMPHOCYTES NFR BLD AUTO: 43 % (ref 14–44)
MAGNESIUM SERPL-MCNC: 2.2 MG/DL (ref 1.9–2.7)
MCH RBC QN AUTO: 29.6 PG (ref 26.8–34.3)
MCHC RBC AUTO-ENTMCNC: 32.3 G/DL (ref 31.4–37.4)
MCV RBC AUTO: 92 FL (ref 82–98)
MONOCYTES # BLD AUTO: 0.24 THOUSAND/ÂΜL (ref 0.17–1.22)
MONOCYTES NFR BLD AUTO: 6 % (ref 4–12)
NEUTROPHILS # BLD AUTO: 1.99 THOUSANDS/ÂΜL (ref 1.85–7.62)
NEUTS SEG NFR BLD AUTO: 49 % (ref 43–75)
NRBC BLD AUTO-RTO: 0 /100 WBCS
PLATELET # BLD AUTO: 224 THOUSANDS/UL (ref 149–390)
PMV BLD AUTO: 10.5 FL (ref 8.9–12.7)
POTASSIUM SERPL-SCNC: 4.1 MMOL/L (ref 3.5–5.3)
PROT SERPL-MCNC: 7.3 G/DL (ref 6.4–8.4)
RBC # BLD AUTO: 4.5 MILLION/UL (ref 3.81–5.12)
SODIUM SERPL-SCNC: 141 MMOL/L (ref 135–147)
T3 SERPL-MCNC: 1.5 NG/ML (ref 0.9–1.8)
T4 FREE SERPL-MCNC: 0.95 NG/DL (ref 0.61–1.12)
TIBC SERPL-MCNC: 443.8 UG/DL (ref 250–450)
TRANSFERRIN SERPL-MCNC: 317 MG/DL (ref 203–362)
TSH SERPL DL<=0.05 MIU/L-ACNC: 2.52 UIU/ML (ref 0.45–4.5)
UIBC SERPL-MCNC: 283 UG/DL (ref 155–355)
VIT B12 SERPL-MCNC: 698 PG/ML (ref 180–914)
WBC # BLD AUTO: 4.1 THOUSAND/UL (ref 4.31–10.16)

## 2025-05-08 PROCEDURE — 71046 X-RAY EXAM CHEST 2 VIEWS: CPT

## 2025-05-08 PROCEDURE — 36415 COLL VENOUS BLD VENIPUNCTURE: CPT

## 2025-05-08 PROCEDURE — 82306 VITAMIN D 25 HYDROXY: CPT

## 2025-05-08 PROCEDURE — 99214 OFFICE O/P EST MOD 30 MIN: CPT

## 2025-05-08 PROCEDURE — 83550 IRON BINDING TEST: CPT

## 2025-05-08 PROCEDURE — 83735 ASSAY OF MAGNESIUM: CPT

## 2025-05-08 PROCEDURE — 86225 DNA ANTIBODY NATIVE: CPT

## 2025-05-08 PROCEDURE — 83036 HEMOGLOBIN GLYCOSYLATED A1C: CPT

## 2025-05-08 PROCEDURE — 82607 VITAMIN B-12: CPT

## 2025-05-08 PROCEDURE — 82785 ASSAY OF IGE: CPT

## 2025-05-08 PROCEDURE — 84480 ASSAY TRIIODOTHYRONINE (T3): CPT

## 2025-05-08 PROCEDURE — 86038 ANTINUCLEAR ANTIBODIES: CPT

## 2025-05-08 PROCEDURE — 86003 ALLG SPEC IGE CRUDE XTRC EA: CPT

## 2025-05-08 PROCEDURE — 80053 COMPREHEN METABOLIC PANEL: CPT

## 2025-05-08 PROCEDURE — 85025 COMPLETE CBC W/AUTO DIFF WBC: CPT

## 2025-05-08 PROCEDURE — 84439 ASSAY OF FREE THYROXINE: CPT

## 2025-05-08 PROCEDURE — 82728 ASSAY OF FERRITIN: CPT

## 2025-05-08 PROCEDURE — 86618 LYME DISEASE ANTIBODY: CPT

## 2025-05-08 PROCEDURE — 86376 MICROSOMAL ANTIBODY EACH: CPT

## 2025-05-08 PROCEDURE — 82746 ASSAY OF FOLIC ACID SERUM: CPT

## 2025-05-08 PROCEDURE — 84443 ASSAY THYROID STIM HORMONE: CPT

## 2025-05-08 PROCEDURE — 83540 ASSAY OF IRON: CPT

## 2025-05-08 RX ORDER — HYDROCORTISONE 2.5% 25 MG/G
CREAM TOPICAL
COMMUNITY
Start: 2025-05-02

## 2025-05-08 RX ORDER — MONTELUKAST SODIUM 10 MG/1
TABLET ORAL
COMMUNITY
Start: 2025-04-25

## 2025-05-08 RX ORDER — ROSUVASTATIN CALCIUM 5 MG/1
5 TABLET, COATED ORAL DAILY
Qty: 30 TABLET | Refills: 5 | Status: SHIPPED | OUTPATIENT
Start: 2025-05-08

## 2025-05-08 RX ORDER — CLOTRIMAZOLE 1 %
1 CREAM WITH APPLICATOR VAGINAL DAILY
Qty: 45 G | Refills: 0 | Status: SHIPPED | OUTPATIENT
Start: 2025-05-08 | End: 2025-05-08

## 2025-05-08 RX ORDER — CARIPRAZINE 6 MG/1
CAPSULE, GELATIN COATED ORAL
COMMUNITY
Start: 2025-05-05

## 2025-05-08 RX ORDER — CYCLOSPORINE 0.5 MG/ML
EMULSION OPHTHALMIC
COMMUNITY
Start: 2025-05-05

## 2025-05-08 RX ORDER — TOPIRAMATE 100 MG/1
CAPSULE, EXTENDED RELEASE ORAL
COMMUNITY
Start: 2025-04-25

## 2025-05-08 NOTE — PROGRESS NOTES
Name: Belem Linares      : 1970      MRN: [id number]  Encounter Provider: ERICKA Nava  Encounter Date: 2025   Encounter department: [facility] [address]  :  Assessment & Plan  Other fatigue  Patient with chronic fatigue.  Did just recently have a diarrhea episode however reports symptoms present long prior.  Will do additional lab work strongly recommend sleep apnea testing will call with results of everything follow-up in 1 month.  Did discuss that patient does have RA and fibromyalgia which both can contribute to fatigue  Orders:  •  Ambulatory Referral to Sleep Medicine; Future  •  CBC and differential; Future  •  Comprehensive metabolic panel; Future  •  Hemoglobin A1C; Future  •  Iron Panel (Includes Ferritin, Iron Sat%, Iron, and TIBC); Future  •  Anti-microsomal antibody; Future  •  T3; Future  •  T4, free; Future  •  Lyme Total AB W Reflex to IGM/IGG; Future  •  Folate; Future  •  Vitamin B12; Future  •  RAOUL Screen w/Reflex Cascade; Future    Intractable chronic migraine without aura and without status migrainosus  Discussed relationship between obesity headaches snoring sleep apnea and fatigue affecting ADLs will do sleep study and call with results  Orders:  •  Ambulatory Referral to Sleep Medicine; Future    Mixed hyperlipidemia  Will start low-dose Crestor recheck in 4 to 6 weeks  Orders:  •  Lipid panel; Future  •  TSH, 3rd generation with Free T4 reflex; Future  •  rosuvastatin (CRESTOR) 5 mg tablet; Take 1 tablet (5 mg total) by mouth daily    Obesity (BMI 30-39.9)  Given Mediterranean diet recommend exercising 2-1/2 hours weekly have fasting lab work strongly recommend limiting refined carbohydrates and sugars in the diet    Orders:  •  Vitamin D 25 hydroxy; Future  •  TSH, 3rd generation with Free T4 reflex; Future  •  Ambulatory Referral to Medical Fitness Exercise Specialist; Future    Chest pain, unspecified type  Patient has been  experiencing intermittent chest pain reviewed previous echo will do stress test x-ray also complains of coughing with laughing will do pulmonary function test and allergy testing we will call with results of all testing  Orders:  •  Stress test only, exercise; Future  •  XR chest pa and lateral; Future  •  Complete PFT with post bronchodilator; Future  •  Food Allergy Profile; Future  •  [place] Allergy Panel, Adult; Future    Abnormal finding of blood chemistry, unspecified  Lab lab work we will call with results  Orders:  •  Hemoglobin A1C; Future  •  Iron Panel (Includes Ferritin, Iron Sat%, Iron, and TIBC); Future    Body mass index (BMI) 30.0-30.9, adult  Given Mediterranean diet recommend exercising 2-1/2 hours weekly have fasting lab work strongly recommend limiting refined carbohydrates and sugars in the diet  Orders:  •  Vitamin D 25 hydroxy; Future    Anesthesia of skin  Lab lab work we will call with results  Orders:  •  Folate; Future  •  Vitamin B12; Future    Change in bowel movement  Patient does follow closely with GI do recommend continuing probiotic will do FIT testing as patient is concerned we will call with results  Orders:  •  iFOBT (FIT); Future    Chronic cough  Will do allergy panel and call with results  Orders:  •  Food Allergy Profile; Future  •  [place] Allergy Panel, Adult; Future    Rash  Believed allergic reaction from the sun we will continue to monitor              History of Present Illness   Irma had diarrhea since Saturday, but now resolved. She has been having a lot of fatigue and right sided chest pain.     Diarrhea   Associated symptoms include abdominal pain, arthralgias, headaches and myalgias. Pertinent negatives include no coughing.     Review of Systems   Respiratory:  Negative for cough, chest tightness and shortness of breath.    Cardiovascular:  Positive for chest pain. Negative for leg swelling.   Gastrointestinal:  Positive for abdominal pain and diarrhea.  "  Musculoskeletal:  Positive for arthralgias, back pain and myalgias.   Neurological:  Positive for syncope and headaches.   All other systems reviewed and are negative.      Objective   /70   Pulse 65   Temp 97.6 °F (36.4 °C)   Ht 5' 4\" (1.626 m)   Wt 86.2 kg (190 lb)   SpO2 100%   BMI 32.61 kg/m²      Physical Exam  Vitals and nursing note reviewed.   Constitutional:       General: She is not in acute distress.     Appearance: Normal appearance. She is well-developed. She is not ill-appearing.   HENT:      Head: Normocephalic and atraumatic.      Right Ear: Tympanic membrane, ear canal and external ear normal. There is no impacted cerumen.      Left Ear: Tympanic membrane, ear canal and external ear normal. There is no impacted cerumen.      Nose: Nose normal. No congestion.      Mouth/Throat:      Mouth: Mucous membranes are moist.      Pharynx: No posterior oropharyngeal erythema.   Eyes:      Extraocular Movements: Extraocular movements intact.      Conjunctiva/sclera: Conjunctivae normal.      Pupils: Pupils are equal, round, and reactive to light.   Cardiovascular:      Rate and Rhythm: Normal rate and regular rhythm.      Heart sounds: No murmur heard.  Pulmonary:      Effort: Pulmonary effort is normal. No respiratory distress.      Breath sounds: Normal breath sounds.   Abdominal:      Palpations: Abdomen is soft.      Tenderness: There is no abdominal tenderness.   Musculoskeletal:         General: No swelling.      Cervical back: Normal range of motion and neck supple.      Right lower leg: No edema.      Left lower leg: No edema.   Lymphadenopathy:      Cervical: No cervical adenopathy.   Skin:     General: Skin is warm and dry.      Capillary Refill: Capillary refill takes less than 2 seconds.   Neurological:      General: No focal deficit present.      Mental Status: She is alert.   Psychiatric:         Mood and Affect: Mood normal.           "

## 2025-05-08 NOTE — TELEPHONE ENCOUNTER
Refill cannot be delegated    1 [id number] 04/15/2025 04/11/2025 04/11/2025 clonazePAM (Tablet) 30.0 30 1 MG NA [facility] [employer] 0 / 1 PA   1 [id number] 04/15/2025 04/11/2025 04/11/2025 Butalbital-acetaminophen-caffeine (Tablet) 18.0 30 325 MG-50 MG-40 MG NA MARY ANNE ELIZABETH [employer] [facility] [employer] 0 / 5 PA   1 [id number] 01/23/2025 01/16/2025 11/22/2024 clonazePAM (Tablet) 30.0 30 1 MG NA [facility] [employer] [facility] [employer] 2 / 3 PA   1 [id number] 12/23/2024 12/19/2024 11/22/2024 clonazePAM (Tablet) 30.0 30 1 MG NA [employer] [facility] [employer] 1 / 3 PA   1 [id number] 11/26/2024 11/22/2024 11/22/2024 clonazePAM (Tablet) 30.0 30 1 MG NA [employer] [facility] [employer] 0 / 3 PA   1 [id number] 11/20/2024 11/20/2024 11/19/2024 clonazePAM (Tablet) 3.0 3 1 MG [facility] [employer] 0 / 0 PA   1 [id number] 07/25/2024 07/25/2024 07/23/2024 clonazePAM (Tablet) 30.0 30 1 MG [employer] [facility] [employer] 0 / 0 PA   1 [id number] ** 06/27/2024 06/26/2024 clonazePAM (Tablet) 30.0 30 1 MG NA [facility] [employer] [facility] [employer] 0 / 0 PA   1 [id number] ** 05/16/2024 05/15/2024 clonazePAM (Tablet) 30.0 30 1 MG [employer] [employer] 0 / 0 PA

## 2025-05-08 NOTE — ASSESSMENT & PLAN NOTE
Discussed relationship between obesity headaches snoring sleep apnea and fatigue affecting ADLs will do sleep study and call with results  Orders:  •  Ambulatory Referral to Sleep Medicine; Future

## 2025-05-09 ENCOUNTER — RESULTS FOLLOW-UP (OUTPATIENT)
Dept: FAMILY MEDICINE CLINIC | Facility: CLINIC | Age: 55
End: 2025-05-09

## 2025-05-09 ENCOUNTER — TELEPHONE (OUTPATIENT)
Age: 55
End: 2025-05-09

## 2025-05-09 ENCOUNTER — APPOINTMENT (OUTPATIENT)
Dept: LAB | Facility: CLINIC | Age: 55
End: 2025-05-09
Payer: COMMERCIAL

## 2025-05-09 LAB
A ALTERNATA IGE QN: <0.1 KUA/I (ref 0–0.1)
A FUMIGATUS IGE QN: <0.1 KUA/I (ref 0–0.1)
ALMOND IGE QN: <0.1 KUA/I (ref 0–0.1)
B BURGDOR IGG+IGM SER QL IA: NEGATIVE
BERMUDA GRASS IGE QN: <0.1 KUA/I (ref 0–0.1)
BOXELDER IGE QN: 0.37 KUA/I (ref 0–0.1)
C HERBARUM IGE QN: <0.1 KUA/I (ref 0–0.1)
CASHEW NUT IGE QN: <0.1 KUA/I (ref 0–0.1)
CAT DANDER IGE QN: <0.1 KUA/I (ref 0–0.1)
CMN PIGWEED IGE QN: <0.1 KUA/I (ref 0–0.1)
CODFISH IGE QN: <0.1 KUA/I (ref 0–0.1)
COMMON RAGWEED IGE QN: <0.1 KUA/I (ref 0–0.1)
COTTONWOOD IGE QN: <0.1 KUA/I (ref 0–0.1)
D FARINAE IGE QN: 0.54 KUA/I (ref 0–0.1)
D PTERONYSS IGE QN: 1 KUA/I (ref 0–0.1)
DOG DANDER IGE QN: <0.1 KUA/I (ref 0–0.1)
DSDNA IGG SERPL IA-ACNC: 1.3 IU/ML (ref ?–15)
EGG WHITE IGE QN: <0.1 KUA/I (ref 0–0.1)
GLUTEN IGE QN: <0.1 KUA/I (ref 0–0.1)
HAZELNUT IGE QN: 0.18 KUA/L (ref 0–0.1)
HEMOCCULT STL QL IA: NEGATIVE
LONDON PLANE IGE QN: <0.1 KUA/I (ref 0–0.1)
MILK IGE QN: <0.1 KUA/I (ref 0–0.1)
MOUSE URINE PROT IGE QN: <0.1 KUA/I (ref 0–0.1)
MT JUNIPER IGE QN: <0.1 KUA/I (ref 0–0.1)
MUGWORT IGE QN: <0.1 KUA/I (ref 0–0.1)
NUCLEAR IGG SER IA-RTO: <0.09 RATIO (ref ?–1)
P NOTATUM IGE QN: <0.1 KUA/I (ref 0–0.1)
PEANUT IGE QN: <0.1 KUA/I (ref 0–0.1)
ROACH IGE QN: <0.1 KUA/I (ref 0–0.1)
SALMON IGE QN: <0.1 KUA/I (ref 0–0.1)
SCALLOP IGE QN: <0.1 KUA/L (ref 0–0.1)
SESAME SEED IGE QN: <0.1 KUA/I (ref 0–0.1)
SHEEP SORREL IGE QN: <0.1 KUA/I (ref 0–0.1)
SHRIMP IGE QN: <0.1 KUA/L (ref 0–0.1)
SILVER BIRCH IGE QN: 0.5 KUA/I (ref 0–0.1)
SOYBEAN IGE QN: <0.1 KUA/I (ref 0–0.1)
TIMOTHY IGE QN: <0.1 KUA/I (ref 0–0.1)
TOTAL IGE SMQN RAST: 17.4 KU/L (ref 0–113)
TOTAL IGE SMQN RAST: 17.5 KU/L (ref 0–113)
TUNA IGE QN: <0.1 KUA/I (ref 0–0.1)
WALNUT IGE QN: <0.1 KUA/I (ref 0–0.1)
WALNUT IGE QN: <0.1 KUA/I (ref 0–0.1)
WHEAT IGE QN: <0.1 KUA/I (ref 0–0.1)
WHITE ASH IGE QN: <0.1 KUA/I (ref 0–0.1)
WHITE ELM IGE QN: <0.1 KUA/I (ref 0–0.1)
WHITE MULBERRY IGE QN: <0.1 KUA/I (ref 0–0.1)
WHITE OAK IGE QN: 0.39 KUA/I (ref 0–0.1)

## 2025-05-09 PROCEDURE — G0328 FECAL BLOOD SCRN IMMUNOASSAY: HCPCS

## 2025-05-09 NOTE — TELEPHONE ENCOUNTER
Pt called in to make sure she should keep her up coming appt. Pt states she was just seen by the provider and pt needed more information on her (fit test). Triage nurse called the office and staff:shmuel informed me the pt should keep her appt. Also, she can go to any [facility] lab to be given the kit for the fit test. Pt verbalized understanding.

## 2025-05-10 LAB — THYROPEROXIDASE AB SERPL-ACNC: 82 IU/ML (ref 0–34)

## 2025-05-12 ENCOUNTER — TRANSCRIBE ORDERS (OUTPATIENT)
Dept: SLEEP CENTER | Facility: CLINIC | Age: 55
End: 2025-05-12

## 2025-05-12 DIAGNOSIS — G43.719 INTRACTABLE CHRONIC MIGRAINE WITHOUT AURA AND WITHOUT STATUS MIGRAINOSUS: Primary | ICD-10-CM

## 2025-05-12 DIAGNOSIS — R53.83 OTHER FATIGUE: ICD-10-CM

## 2025-05-12 DIAGNOSIS — G47.8 OTHER SLEEP DISORDERS: ICD-10-CM

## 2025-05-14 RX ORDER — CLONAZEPAM 1 MG/1
1 TABLET ORAL DAILY PRN
Qty: 30 TABLET | Refills: 0 | OUTPATIENT
Start: 2025-05-14

## 2025-05-19 ENCOUNTER — OFFICE VISIT (OUTPATIENT)
Dept: FAMILY MEDICINE CLINIC | Facility: CLINIC | Age: 55
End: 2025-05-19
Payer: COMMERCIAL

## 2025-05-19 ENCOUNTER — NURSE TRIAGE (OUTPATIENT)
Age: 55
End: 2025-05-19

## 2025-05-19 VITALS
BODY MASS INDEX: 32.44 KG/M2 | HEART RATE: 73 BPM | WEIGHT: 190 LBS | SYSTOLIC BLOOD PRESSURE: 126 MMHG | DIASTOLIC BLOOD PRESSURE: 78 MMHG | HEIGHT: 64 IN | OXYGEN SATURATION: 99 %

## 2025-05-19 DIAGNOSIS — M25.50 ARTHRALGIA, UNSPECIFIED JOINT: ICD-10-CM

## 2025-05-19 DIAGNOSIS — S60.415A ABRASION OF LEFT RING FINGER, INITIAL ENCOUNTER: Primary | ICD-10-CM

## 2025-05-19 PROCEDURE — 99214 OFFICE O/P EST MOD 30 MIN: CPT

## 2025-05-19 RX ORDER — MUPIROCIN 20 MG/G
OINTMENT TOPICAL 3 TIMES DAILY
Qty: 30 G | Refills: 1 | Status: SHIPPED | OUTPATIENT
Start: 2025-05-19

## 2025-05-19 RX ORDER — TIZANIDINE 2 MG/1
TABLET ORAL
COMMUNITY
Start: 2025-05-13

## 2025-05-19 NOTE — PROGRESS NOTES
"Name: Belem Linares      : 1970      MRN: [id number]  Encounter Provider: ERICKA Nava  Encounter Date: 2025   Encounter department: [facility] [address]  :  Assessment & Plan  Abrasion of left ring finger, initial encounter  Have xray and and mupirocin tid, follow up as needed, will call with results.   Orders:  •  XR finger left fourth digit-ring; Future  •  mupirocin (BACTROBAN) 2 % ointment; Apply topically 3 (three) times a day    Arthralgia, unspecified joint  Recommend epsom salt baths, continue conservative treatment with otc medications and muscle relaxer in the pm.               History of Present Illness   Belem is here for fall, she is having a lot of body aches. She fell on Saturday.     Fall  Pertinent negatives include no fever or headaches.     Review of Systems   Constitutional:  Positive for fatigue. Negative for chills, diaphoresis and fever.   Eyes:  Positive for photophobia.   Respiratory:  Negative for cough, chest tightness and shortness of breath.    Musculoskeletal:  Positive for arthralgias, back pain and myalgias.   Skin:  Positive for wound.   Neurological:  Negative for dizziness and headaches.   All other systems reviewed and are negative.      Objective   /78   Pulse 73   Ht 5' 4\" (1.626 m)   Wt 86.2 kg (190 lb)   SpO2 99%   BMI 32.61 kg/m²      Physical Exam  Vitals and nursing note reviewed.   Constitutional:       General: She is not in acute distress.     Appearance: Normal appearance. She is well-developed. She is not ill-appearing.   HENT:      Head: Normocephalic and atraumatic.      Right Ear: Tympanic membrane, ear canal and external ear normal. There is no impacted cerumen.      Left Ear: Tympanic membrane, ear canal and external ear normal. There is no impacted cerumen.      Nose: Nose normal. No congestion.      Mouth/Throat:      Mouth: Mucous membranes are moist.      Pharynx: No posterior " oropharyngeal erythema.     Eyes:      Extraocular Movements: Extraocular movements intact.      Conjunctiva/sclera: Conjunctivae normal.      Pupils: Pupils are equal, round, and reactive to light.       Cardiovascular:      Rate and Rhythm: Normal rate and regular rhythm.      Heart sounds: No murmur heard.  Pulmonary:      Effort: Pulmonary effort is normal. No respiratory distress.      Breath sounds: Normal breath sounds.   Abdominal:      Palpations: Abdomen is soft.      Tenderness: There is no abdominal tenderness.     Musculoskeletal:         General: Tenderness present. No swelling.      Cervical back: Normal range of motion and neck supple.      Right lower leg: No edema.      Left lower leg: No edema.   Lymphadenopathy:      Cervical: No cervical adenopathy.     Skin:     General: Skin is warm and dry.      Capillary Refill: Capillary refill takes less than 2 seconds.      Findings: Bruising and erythema present.     Neurological:      General: No focal deficit present.      Mental Status: She is alert.     Psychiatric:         Mood and Affect: Mood normal.         Behavior: Behavior normal.

## 2025-05-19 NOTE — TELEPHONE ENCOUNTER
"FOLLOW UP: office visit today    REASON FOR CONVERSATION: Finger Injury    SYMPTOMS: Skin tear to L ring finger and abrasion to L pinky finger    OTHER: fell on concrete two days ago. Stating it is oozing.    DISPOSITION: See Today in Office      Reason for Disposition   Patient wants to be seen    Answer Assessment - Initial Assessment Questions  1. MECHANISM: \"How did the injury happen?\"       Fell on concrete    2. ONSET: \"When did the injury happen?\" (e.g., minutes, hours ago)       Saturday    3. LOCATION: \"What part of the finger is injured?\" \"Is the nail damaged?\"       L hand, ring finger and pinky    4. APPEARANCE of the INJURY: \"What does the injury look like?\"       \"Plasma oozing\" \"I can see meat\"    5. SEVERITY: \"Can you use the hand normally?\"  \"Can you bend your fingers into a ball and then fully open them?\"      Hurts too much to bend it like that. Difficulty holding fork.    6. SIZE: For cuts, bruises, or swelling, ask: \"How large is it?\" (e.g., inches or centimeters;  entire finger)       1/4\"    7. PAIN: \"Is there pain?\" If Yes, ask: \"How bad is the pain?\"  (Scale 0-10; or none, mild, moderate, severe)      Only when moving them. 9/10 when no pain meds and moving    8. TETANUS: For any breaks in the skin, ask: \"When was the last tetanus booster?\"      4/29/24    9. OTHER SYMPTOMS: \"Do you have any other symptoms?\"      Denies    Protocols used: Finger Injury-Adult-[place]    "

## 2025-05-19 NOTE — ASSESSMENT & PLAN NOTE
Recommend epsom salt baths, continue conservative treatment with otc medications and muscle relaxer in the pm.

## 2025-05-20 ENCOUNTER — TELEPHONE (OUTPATIENT)
Age: 55
End: 2025-05-20

## 2025-05-22 DIAGNOSIS — J06.9 UPPER RESPIRATORY TRACT INFECTION, UNSPECIFIED TYPE: ICD-10-CM

## 2025-05-22 DIAGNOSIS — E16.2 HYPOGLYCEMIA: ICD-10-CM

## 2025-05-22 DIAGNOSIS — K64.9 HEMORRHOIDS, UNSPECIFIED HEMORRHOID TYPE: Primary | ICD-10-CM

## 2025-05-22 DIAGNOSIS — E66.811 CLASS 1 OBESITY DUE TO EXCESS CALORIES WITHOUT SERIOUS COMORBIDITY WITH BODY MASS INDEX (BMI) OF 30.0 TO 30.9 IN ADULT: ICD-10-CM

## 2025-05-22 DIAGNOSIS — E78.5 DYSLIPIDEMIA, GOAL LDL BELOW 130: ICD-10-CM

## 2025-05-22 DIAGNOSIS — R63.5 WEIGHT GAIN: ICD-10-CM

## 2025-05-22 DIAGNOSIS — E66.09 CLASS 1 OBESITY DUE TO EXCESS CALORIES WITHOUT SERIOUS COMORBIDITY WITH BODY MASS INDEX (BMI) OF 30.0 TO 30.9 IN ADULT: ICD-10-CM

## 2025-05-23 ENCOUNTER — RESULTS FOLLOW-UP (OUTPATIENT)
Dept: FAMILY MEDICINE CLINIC | Facility: CLINIC | Age: 55
End: 2025-05-23

## 2025-05-23 ENCOUNTER — APPOINTMENT (OUTPATIENT)
Dept: RADIOLOGY | Facility: CLINIC | Age: 55
End: 2025-05-23
Payer: COMMERCIAL

## 2025-05-23 DIAGNOSIS — S60.415A ABRASION OF LEFT RING FINGER, INITIAL ENCOUNTER: ICD-10-CM

## 2025-05-23 PROCEDURE — 73140 X-RAY EXAM OF FINGER(S): CPT

## 2025-05-23 RX ORDER — AZELASTINE HYDROCHLORIDE 137 UG/1
SPRAY, METERED NASAL
Qty: 30 ML | Refills: 2 | Status: SHIPPED | OUTPATIENT
Start: 2025-05-23

## 2025-05-23 RX ORDER — HYDROCORTISONE 2.5% 25 MG/G
CREAM TOPICAL
Qty: 30 G | Refills: 0 | Status: SHIPPED | OUTPATIENT
Start: 2025-05-23

## 2025-05-23 RX ORDER — CARIPRAZINE 6 MG/1
CAPSULE, GELATIN COATED ORAL
Qty: 30 CAPSULE | Refills: 2 | OUTPATIENT
Start: 2025-05-23

## 2025-05-23 NOTE — TELEPHONE ENCOUNTER
Refill must be reviewed and completed by the office or provider. The refill is unable to be approved or denied by the medication management team.

## 2025-05-23 NOTE — TELEPHONE ENCOUNTER
Patient called with update on her Left finger abrasion. She states the cream and epson salt soak is not helping. It is still red and painful. She is asking what else she can do for it, or if she needs a antibiotic. Can call Belem back at [phone].

## 2025-05-27 ENCOUNTER — TELEMEDICINE (OUTPATIENT)
Dept: PSYCHIATRY | Facility: CLINIC | Age: 55
End: 2025-05-27

## 2025-05-27 DIAGNOSIS — R73.9 HYPERGLYCEMIA: Primary | ICD-10-CM

## 2025-05-27 DIAGNOSIS — T78.40XA ALLERGY, INITIAL ENCOUNTER: ICD-10-CM

## 2025-05-27 DIAGNOSIS — F41.1 GAD (GENERALIZED ANXIETY DISORDER): Primary | ICD-10-CM

## 2025-05-27 DIAGNOSIS — F31.81 BIPOLAR 2 DISORDER (HCC): ICD-10-CM

## 2025-05-27 PROCEDURE — NOSHOW

## 2025-05-27 RX ORDER — MONTELUKAST SODIUM 10 MG/1
10 TABLET ORAL
Qty: 90 TABLET | Refills: 3 | Status: SHIPPED | OUTPATIENT
Start: 2025-05-27

## 2025-05-27 RX ORDER — SEMAGLUTIDE 2.68 MG/ML
INJECTION, SOLUTION SUBCUTANEOUS
Refills: 4 | OUTPATIENT
Start: 2025-05-27

## 2025-05-27 RX ORDER — MONTELUKAST SODIUM 10 MG/1
10 TABLET ORAL
Qty: 30 TABLET | Refills: 4 | OUTPATIENT
Start: 2025-05-27

## 2025-05-27 RX ORDER — LANCETS 33 GAUGE
EACH MISCELLANEOUS
Refills: 2 | OUTPATIENT
Start: 2025-05-27

## 2025-05-28 ENCOUNTER — TELEMEDICINE (OUTPATIENT)
Dept: PSYCHIATRY | Facility: CLINIC | Age: 55
End: 2025-05-28
Payer: COMMERCIAL

## 2025-05-28 DIAGNOSIS — F41.1 GAD (GENERALIZED ANXIETY DISORDER): Primary | ICD-10-CM

## 2025-05-28 DIAGNOSIS — F31.81 BIPOLAR 2 DISORDER (HCC): ICD-10-CM

## 2025-05-28 PROCEDURE — 90837 PSYTX W PT 60 MINUTES: CPT

## 2025-05-29 DIAGNOSIS — N95.1 HOT FLASHES DUE TO MENOPAUSE: ICD-10-CM

## 2025-05-29 DIAGNOSIS — G47.20 DISRUPTED SLEEP-WAKE CYCLE: ICD-10-CM

## 2025-05-29 DIAGNOSIS — F51.8 DISRUPTED SLEEP-WAKE CYCLE: ICD-10-CM

## 2025-06-02 RX ORDER — FEZOLINETANT 45 MG/1
45 TABLET, FILM COATED ORAL DAILY
Qty: 30 TABLET | Refills: 0 | OUTPATIENT
Start: 2025-06-02

## 2025-06-05 DIAGNOSIS — N95.1 HOT FLASHES DUE TO MENOPAUSE: Primary | ICD-10-CM

## 2025-06-06 ENCOUNTER — TELEPHONE (OUTPATIENT)
Age: 55
End: 2025-06-06

## 2025-06-06 NOTE — TELEPHONE ENCOUNTER
The PA team received a request for Veozah , in order for the pt's insurnace to approve the medication for the pt there needs to be tried and failed medications, the pt must have tried and failed one estrogen product, vaginal cream, patch, or pill and or Paxil or Effexor unless they she has a history of breast cancer, or she is unable to take estrogen based products. Please advise thank you

## 2025-06-07 ENCOUNTER — TELEPHONE (OUTPATIENT)
Dept: OBGYN CLINIC | Facility: CLINIC | Age: 55
End: 2025-06-07

## 2025-06-07 NOTE — TELEPHONE ENCOUNTER
Left voice message to verify how long she has been taking Veozah. It was prescribed around 4/24 and now I'm getting notification that she needs a prior authorization. Call back to let me know at [phone]. Thanks.

## 2025-06-09 NOTE — TELEPHONE ENCOUNTER
Pt called refill line back. Pt stated it has been a few months not sure of the original date. Pt stated Harshil gave a  number to call to get the priro auth going. The number to call is [phone]

## 2025-06-12 ENCOUNTER — TELEPHONE (OUTPATIENT)
Dept: FAMILY MEDICINE CLINIC | Facility: CLINIC | Age: 55
End: 2025-06-12

## 2025-06-12 ENCOUNTER — APPOINTMENT (OUTPATIENT)
Dept: LAB | Facility: CLINIC | Age: 55
End: 2025-06-12
Payer: COMMERCIAL

## 2025-06-12 ENCOUNTER — HOSPITAL ENCOUNTER (OUTPATIENT)
Dept: RADIOLOGY | Facility: HOSPITAL | Age: 55
End: 2025-06-12
Payer: COMMERCIAL

## 2025-06-12 ENCOUNTER — OFFICE VISIT (OUTPATIENT)
Dept: FAMILY MEDICINE CLINIC | Facility: CLINIC | Age: 55
End: 2025-06-12
Payer: COMMERCIAL

## 2025-06-12 VITALS
SYSTOLIC BLOOD PRESSURE: 126 MMHG | HEART RATE: 60 BPM | HEIGHT: 64 IN | OXYGEN SATURATION: 98 % | BODY MASS INDEX: 32.44 KG/M2 | WEIGHT: 190 LBS | DIASTOLIC BLOOD PRESSURE: 78 MMHG

## 2025-06-12 DIAGNOSIS — E55.9 VITAMIN D DEFICIENCY: Primary | ICD-10-CM

## 2025-06-12 DIAGNOSIS — R22.41 LOCALIZED SWELLING OF RIGHT FOOT: ICD-10-CM

## 2025-06-12 DIAGNOSIS — R42 DIZZINESS: ICD-10-CM

## 2025-06-12 DIAGNOSIS — K52.9 CHRONIC DIARRHEA OF UNKNOWN ORIGIN: ICD-10-CM

## 2025-06-12 DIAGNOSIS — E78.5 DYSLIPIDEMIA, GOAL LDL BELOW 130: ICD-10-CM

## 2025-06-12 DIAGNOSIS — E66.9 OBESITY (BMI 30-39.9): ICD-10-CM

## 2025-06-12 DIAGNOSIS — G43.809 VESTIBULAR MIGRAINE: ICD-10-CM

## 2025-06-12 DIAGNOSIS — E06.3 HYPOTHYROIDISM DUE TO HASHIMOTO THYROIDITIS: ICD-10-CM

## 2025-06-12 LAB
ALBUMIN SERPL BCG-MCNC: 4.4 G/DL (ref 3.5–5)
ALP SERPL-CCNC: 73 U/L (ref 34–104)
ALT SERPL W P-5'-P-CCNC: 14 U/L (ref 7–52)
ANION GAP SERPL CALCULATED.3IONS-SCNC: 5 MMOL/L (ref 4–13)
AST SERPL W P-5'-P-CCNC: 21 U/L (ref 13–39)
BASOPHILS # BLD AUTO: 0.01 THOUSANDS/ÂΜL (ref 0–0.1)
BASOPHILS NFR BLD AUTO: 0 % (ref 0–1)
BILIRUB SERPL-MCNC: 0.35 MG/DL (ref 0.2–1)
BUN SERPL-MCNC: 11 MG/DL (ref 5–25)
CALCIUM SERPL-MCNC: 9.6 MG/DL (ref 8.4–10.2)
CHLORIDE SERPL-SCNC: 107 MMOL/L (ref 96–108)
CHOLEST SERPL-MCNC: 175 MG/DL (ref ?–200)
CO2 SERPL-SCNC: 30 MMOL/L (ref 21–32)
CREAT SERPL-MCNC: 0.7 MG/DL (ref 0.6–1.3)
EOSINOPHIL # BLD AUTO: 0.08 THOUSAND/ÂΜL (ref 0–0.61)
EOSINOPHIL NFR BLD AUTO: 2 % (ref 0–6)
ERYTHROCYTE [DISTWIDTH] IN BLOOD BY AUTOMATED COUNT: 12.7 % (ref 11.6–15.1)
GFR SERPL CREATININE-BSD FRML MDRD: 99 ML/MIN/1.73SQ M
GLUCOSE P FAST SERPL-MCNC: 88 MG/DL (ref 65–99)
HCT VFR BLD AUTO: 42.5 % (ref 34.8–46.1)
HDLC SERPL-MCNC: 66 MG/DL
HGB BLD-MCNC: 13.4 G/DL (ref 11.5–15.4)
IMM GRANULOCYTES # BLD AUTO: 0.01 THOUSAND/UL (ref 0–0.2)
IMM GRANULOCYTES NFR BLD AUTO: 0 % (ref 0–2)
LDLC SERPL CALC-MCNC: 89 MG/DL (ref 0–100)
LYMPHOCYTES # BLD AUTO: 1.62 THOUSANDS/ÂΜL (ref 0.6–4.47)
LYMPHOCYTES NFR BLD AUTO: 30 % (ref 14–44)
MCH RBC QN AUTO: 29.4 PG (ref 26.8–34.3)
MCHC RBC AUTO-ENTMCNC: 31.5 G/DL (ref 31.4–37.4)
MCV RBC AUTO: 93 FL (ref 82–98)
MONOCYTES # BLD AUTO: 0.32 THOUSAND/ÂΜL (ref 0.17–1.22)
MONOCYTES NFR BLD AUTO: 6 % (ref 4–12)
NEUTROPHILS # BLD AUTO: 3.39 THOUSANDS/ÂΜL (ref 1.85–7.62)
NEUTS SEG NFR BLD AUTO: 62 % (ref 43–75)
NONHDLC SERPL-MCNC: 109 MG/DL
NRBC BLD AUTO-RTO: 0 /100 WBCS
PLATELET # BLD AUTO: 208 THOUSANDS/UL (ref 149–390)
PMV BLD AUTO: 10.7 FL (ref 8.9–12.7)
POTASSIUM SERPL-SCNC: 4.7 MMOL/L (ref 3.5–5.3)
PROT SERPL-MCNC: 7.3 G/DL (ref 6.4–8.4)
RBC # BLD AUTO: 4.56 MILLION/UL (ref 3.81–5.12)
SODIUM SERPL-SCNC: 142 MMOL/L (ref 135–147)
T4 FREE SERPL-MCNC: 0.78 NG/DL (ref 0.61–1.12)
TRIGL SERPL-MCNC: 99 MG/DL (ref ?–150)
TSH SERPL DL<=0.05 MIU/L-ACNC: 3.03 UIU/ML (ref 0.45–4.5)
WBC # BLD AUTO: 5.43 THOUSAND/UL (ref 4.31–10.16)

## 2025-06-12 PROCEDURE — 99214 OFFICE O/P EST MOD 30 MIN: CPT

## 2025-06-12 PROCEDURE — 80053 COMPREHEN METABOLIC PANEL: CPT

## 2025-06-12 PROCEDURE — 73630 X-RAY EXAM OF FOOT: CPT

## 2025-06-12 PROCEDURE — 85025 COMPLETE CBC W/AUTO DIFF WBC: CPT

## 2025-06-12 PROCEDURE — 74018 RADEX ABDOMEN 1 VIEW: CPT

## 2025-06-12 PROCEDURE — G0439 PPPS, SUBSEQ VISIT: HCPCS

## 2025-06-12 RX ORDER — ERGOCALCIFEROL 1.25 MG/1
50000 CAPSULE, LIQUID FILLED ORAL WEEKLY
Qty: 16 CAPSULE | Refills: 1 | Status: SHIPPED | OUTPATIENT
Start: 2025-06-12

## 2025-06-12 RX ORDER — DICYCLOMINE HYDROCHLORIDE 10 MG/1
CAPSULE ORAL
COMMUNITY
Start: 2025-05-23

## 2025-06-12 NOTE — ASSESSMENT & PLAN NOTE
Cholesterol is still high, continue to try to cut down on high cholesterol foods such as full fat daily, butter, red meat, eggs, lee/pork, mayonnaise and increase high fiber foods suck as oatmeal and vegetables. I also suggest increasing healthy fats such as olive oil, nuts/nut butters and avocados.   Follow up lab work ordered   Orders:  •  Lipid panel; Future  •  Comprehensive metabolic panel; Future  •  CBC and differential; Future   Please call pt, hdl and calcium slightly low, please recommend below, thanks    Recommend diet and exercise:  More than 50% of the diet should be plant based (vegetables more than fruit) with unprocessed foods.  Limit meat products, opt for fish.  Opt for water, avoid sweetened beverages altogether. Avoid fast food.  Eat whole grains over white bread products, including rice and pastas.  Exercise at least 150 minutes of cardiovascular exercise per week and weight train two to three times per week.

## 2025-06-12 NOTE — ASSESSMENT & PLAN NOTE
Recommend vestibular pt and follow up with neuro  Orders:  •  Ambulatory Referral to Physical Therapy; Future

## 2025-06-12 NOTE — PROGRESS NOTES
Name: Belem Linares      : 1970      MRN: [id number]  Encounter Provider: ERICKA Nava  Encounter Date: 2025   Encounter department: [facility] [address]  :  Assessment & Plan  Vitamin D deficiency  Vitamin D is low, will send weekly prescription and also suggest nightly D3 4000 u or more    Orders:  •  ergocalciferol (VITAMIN D2) 50,000 units; Take 1 capsule (50,000 Units total) by mouth once a week    Dizziness  Recommend vestibular pt and follow up with neuro  Orders:  •  Ambulatory Referral to Physical Therapy; Future    Vestibular migraine  Recommend vestibular pt and follow up with neuro    Orders:  •  Ambulatory Referral to Physical Therapy; Future    Obesity (BMI 30-39.9)  Recommend fodmat and 2.5 hours of exercise weekly, please follow up with weight loss    Orders:  •  Ambulatory Referral to Weight Management; Future    Dyslipidemia, goal LDL below 130  Cholesterol is still high, continue to try to cut down on high cholesterol foods such as full fat daily, butter, red meat, eggs, lee/pork, mayonnaise and increase high fiber foods suck as oatmeal and vegetables. I also suggest increasing healthy fats such as olive oil, nuts/nut butters and avocados.   Follow up lab work ordered   Orders:  •  Lipid panel; Future  •  Comprehensive metabolic panel; Future  •  CBC and differential; Future    Hypothyroidism due to Hashimoto thyroiditis  Will recheck and call with results   Orders:  •  TSH, 3rd generation with Free T4 reflex; Future    Localized swelling of right foot  Will do xray and will call with results, recommend compression and elevation.   Orders:  •  XR foot 3+ vw right; Future       Preventive health issues were discussed with patient, and age appropriate screening tests were ordered as noted in patient's After Visit Summary. Personalized health advice and appropriate referrals for health education or preventive services given if  needed, as noted in patient's After Visit Summary.    History of Present Illness     Belem is here for annual, doing well but still struggling with diarrhea and weight.        Patient Care Team:  ERICKA Nava as PCP - General (Nurse Practitioner)  Cullen Butterfield MD as PCP - PCP-[facility] ([facility])  Cullen Butterfield MD as PCP - PCP-[employer] ([facility])    Review of Systems   Constitutional:  Positive for diaphoresis and fatigue. Negative for chills and fever.   Eyes:  Negative for visual disturbance.   Respiratory:  Negative for cough, chest tightness and shortness of breath.    Cardiovascular:  Negative for chest pain and palpitations.   Gastrointestinal:  Positive for abdominal pain, constipation and diarrhea.   Genitourinary:  Negative for dysuria and hematuria.   Musculoskeletal:  Positive for arthralgias and myalgias.   Neurological:  Positive for dizziness and headaches.   Psychiatric/Behavioral:  Negative for dysphoric mood and sleep disturbance. The patient is not nervous/anxious.    All other systems reviewed and are negative.    Medical History Reviewed by provider this encounter:  Tobacco  Allergies  Meds  Problems  Med Hx  Surg Hx  Fam Hx       Annual Wellness Visit Questionnaire   Belem is here for her Subsequent Wellness visit. Last Medicare Wellness visit information reviewed, patient interviewed, no change since last AWV.     Health Risk Assessment:   Patient rates overall health as good. Patient feels that their physical health rating is slightly worse. Patient is satisfied with their life. Eyesight was rated as same. Hearing was rated as slightly worse. Patient feels that their emotional and mental health rating is same. Patients states they are sometimes angry. Patient states they are often unusually tired/fatigued. Pain experienced in the last 7 days has been a lot. Patient's pain rating has been 7/10. Patient states that she has experienced weight loss or gain in last 6  months.     Depression Screening:   PHQ-2 Score: 4  PHQ-9 Score: 14      Fall Risk Screening:   In the past year, patient has experienced: no history of falling in past year      Urinary Incontinence Screening:   Patient has not leaked urine accidently in the last six months.     Home Safety:  Patient does not have trouble with stairs inside or outside of their home. Patient has working smoke alarms and has working carbon monoxide detector. Home safety hazards include: medications that cause fatigue.     Nutrition:   Current diet is Low Cholesterol, Low Saturated Fat, No Added Salt and Limited junk food.     Medications:   Patient is currently taking over-the-counter supplements. OTC medications include: see medication list. Patient is able to manage medications.     Activities of Daily Living (ADLs)/Instrumental Activities of Daily Living (IADLs):   Walk and transfer into and out of bed and chair?: Yes  Dress and groom yourself?: Yes    Bathe or shower yourself?: Yes    Feed yourself? Yes  Do your laundry/housekeeping?: Yes  Manage your money, pay your bills and track your expenses?: Yes  Make your own meals?: Yes    Do your own shopping?: Yes    Durable Medical Equipment Suppliers  For your health medical supply    Previous Hospitalizations:   Any hospitalizations or ED visits within the last 12 months?: No      Advance Care Planning:   Living will: No    Durable POA for healthcare: No    Advanced directive: No    Advanced directive counseling given: Yes    ACP document given: Yes    End of Life Decisions reviewed with patient: Yes    Provider agrees with end of life decisions: Yes      Comments: Would want attempts made if hope for meaningful survival     Cognitive Screening:   Provider or family/friend/caregiver concerned regarding cognition?: No    Preventive Screenings      Cardiovascular Screening:    General: Screening Not Indicated and History Lipid Disorder      Diabetes Screening:     General: Screening  Current      Colorectal Cancer Screening:     General: Screening Current      Breast Cancer Screening:     General: Screening Current      Cervical Cancer Screening:    General: Screening Current      Abdominal Aortic Aneurysm (AAA) Screening:        General: Screening Not Indicated      Lung Cancer Screening:     General: Screening Not Indicated      Hepatitis C Screening:    General: Screening Current    Immunizations:  - Immunizations due: Prevnar 20 and Zoster (Shingrix)    Screening, Brief Intervention, and Referral to Treatment (SBIRT)     Screening  Typical number of drinks in a day: 0  Typical number of drinks in a week: 0  Interpretation: Low risk drinking behavior.    AUDIT-C Screenin) How often did you have a drink containing alcohol in the past year? never  2) How many drinks did you have on a typical day when you were drinking in the past year? 0  3) How often did you have 6 or more drinks on one occasion in the past year? never    AUDIT-C Score: 0  Interpretation: Score 0-2 (female): Negative screen for alcohol misuse    Single Item Drug Screening:  How often have you used an illegal drug (including marijuana) or a prescription medication for non-medical reasons in the past year? never    Single Item Drug Screen Score: 0  Interpretation: Negative screen for possible drug use disorder    Brief Intervention  Alcohol & drug use screenings were reviewed. No concerns regarding substance use disorder identified.     Other Counseling Topics:   Car/seat belt/driving safety and calcium and vitamin D intake.     Social Drivers of Health     Financial Resource Strain: Low Risk  (2024)    Overall Financial Resource Strain (CARDIA)    • Difficulty of Paying Living Expenses: Not hard at all   Food Insecurity: Unknown (2025)    Nursing - Inadequate Food Risk Classification    • Worried About Running Out of Food in the Last Year: Patient declined    • Ran Out of Food in the Last Year: Never true  "  Transportation Needs: No Transportation Needs (5/12/2025)    PRAPARE - Transportation    • Lack of Transportation (Medical): No    • Lack of Transportation (Non-Medical): No   Housing Stability: Low Risk  (5/12/2025)    Housing Stability Vital Sign    • Unable to Pay for Housing in the Last Year: No    • Number of Times Moved in the Last Year: 0    • Homeless in the Last Year: No   Utilities: Not At Risk (5/12/2025)    [facility] Utilities    • Threatened with loss of utilities: No     No results found.    Objective   /78   Pulse 60   Ht 5' 4\" (1.626 m)   Wt 86.2 kg (190 lb)   SpO2 98%   BMI 32.61 kg/m²     Physical Exam  Vitals and nursing note reviewed.   Constitutional:       General: She is not in acute distress.     Appearance: Normal appearance. She is well-developed. She is not ill-appearing.   HENT:      Head: Normocephalic and atraumatic.      Right Ear: Tympanic membrane, ear canal and external ear normal. There is no impacted cerumen.      Left Ear: Tympanic membrane, ear canal and external ear normal. There is no impacted cerumen.      Nose: Nose normal. No congestion or rhinorrhea.      Mouth/Throat:      Mouth: Mucous membranes are moist.      Pharynx: No posterior oropharyngeal erythema.     Eyes:      Extraocular Movements: Extraocular movements intact.      Conjunctiva/sclera: Conjunctivae normal.      Pupils: Pupils are equal, round, and reactive to light.       Cardiovascular:      Rate and Rhythm: Normal rate and regular rhythm.      Pulses: Normal pulses.      Heart sounds: Normal heart sounds. No murmur heard.  Pulmonary:      Effort: Pulmonary effort is normal. No respiratory distress.      Breath sounds: Normal breath sounds.   Abdominal:      General: Bowel sounds are normal. There is no distension.      Palpations: Abdomen is soft.      Tenderness: There is no abdominal tenderness.     Musculoskeletal:         General: No swelling or tenderness. Normal range of motion.      Cervical " back: Normal range of motion and neck supple. No tenderness.      Right lower leg: No edema.      Left lower leg: No edema.   Lymphadenopathy:      Cervical: No cervical adenopathy.     Skin:     General: Skin is warm and dry.      Capillary Refill: Capillary refill takes less than 2 seconds.     Neurological:      General: No focal deficit present.      Mental Status: She is alert and oriented to person, place, and time.     Psychiatric:         Mood and Affect: Mood normal.         Behavior: Behavior normal.         Thought Content: Thought content normal.         Judgment: Judgment normal.

## 2025-06-12 NOTE — PATIENT INSTRUCTIONS
Medicare Preventive Visit Patient Instructions  Thank you for completing your Welcome to Medicare Visit or Medicare Annual Wellness Visit today. Your next wellness visit will be due in one year (6/13/2026).  The screening/preventive services that you may require over the next 5-10 years are detailed below. Some tests may not apply to you based off risk factors and/or age. Screening tests ordered at today's visit but not completed yet may show as past due. Also, please note that scanned in results may not display below.  Preventive Screenings:  Service Recommendations Previous Testing/Comments   Colorectal Cancer Screening  * Colonoscopy    * Fecal Occult Blood Test (FOBT)/Fecal Immunochemical Test (FIT)  * Fecal DNA/Cologuard Test  * Flexible Sigmoidoscopy Age: 45-75 years old   Colonoscopy: every 10 years (may be performed more frequently if at higher risk)  OR  FOBT/FIT: every 1 year  OR  Cologuard: every 3 years  OR  Sigmoidoscopy: every 5 years  Screening may be recommended earlier than age 45 if at higher risk for colorectal cancer. Also, an individualized decision between you and your healthcare provider will decide whether screening between the ages of 76-85 would be appropriate. Colonoscopy: 06/28/2021  FOBT/FIT: 05/09/2025  Cologuard: 03/24/2023  Sigmoidoscopy: Not on file    Screening Current     Breast Cancer Screening Age: 40+ years old  Frequency: every 1-2 years  Not required if history of left and right mastectomy Mammogram: 02/12/2025    Screening Current   Cervical Cancer Screening Between the ages of 21-29, pap smear recommended once every 3 years.   Between the ages of 30-65, can perform pap smear with HPV co-testing every 5 years.   Recommendations may differ for women with a history of total hysterectomy, cervical cancer, or abnormal pap smears in past. Pap Smear: 01/09/2024    Screening Current   Hepatitis C Screening Once for adults born between 1945 and 1965  More frequently in patients at  high risk for Hepatitis C Hep C Antibody: 01/10/2019    Screening Current   Diabetes Screening 1-2 times per year if you're at risk for diabetes or have pre-diabetes Fasting glucose: 84 mg/dL (5/8/2025)  A1C: 5.6 % (5/8/2025)  Screening Current   Cholesterol Screening Once every 5 years if you don't have a lipid disorder. May order more often based on risk factors. Lipid panel: 03/21/2025    Screening Not Indicated  History Lipid Disorder     Other Preventive Screenings Covered by Medicare:  Abdominal Aortic Aneurysm (AAA) Screening: covered once if your at risk. You're considered to be at risk if you have a family history of AAA.  Lung Cancer Screening: covers low dose CT scan once per year if you meet all of the following conditions: (1) Age 55-77; (2) No signs or symptoms of lung cancer; (3) Current smoker or have quit smoking within the last 15 years; (4) You have a tobacco smoking history of at least 20 pack years (packs per day multiplied by number of years you smoked); (5) You get a written order from a healthcare provider.  Glaucoma Screening: covered annually if you're considered high risk: (1) You have diabetes OR (2) Family history of glaucoma OR (3)  aged 50 and older OR (4)  American aged 65 and older  Osteoporosis Screening: covered every 2 years if you meet one of the following conditions: (1) You're estrogen deficient and at risk for osteoporosis based off medical history and other findings; (2) Have a vertebral abnormality; (3) On glucocorticoid therapy for more than 3 months; (4) Have primary hyperparathyroidism; (5) On osteoporosis medications and need to assess response to drug therapy.   Last bone density test (DXA Scan): 03/29/2024.  HIV Screening: covered annually if you're between the age of 15-65. Also covered annually if you are younger than 15 and older than 65 with risk factors for HIV infection. For pregnant patients, it is covered up to 3 times per  pregnancy.    Immunizations:  Immunization Recommendations   Influenza Vaccine Annual influenza vaccination during flu season is recommended for all persons aged >= 6 months who do not have contraindications   Pneumococcal Vaccine   * Pneumococcal conjugate vaccine = PCV13 (Prevnar 13), PCV15 (Vaxneuvance), PCV20 (Prevnar 20)  * Pneumococcal polysaccharide vaccine = PPSV23 (Pneumovax) Adults 19-65 yo with certain risk factors or if 65+ yo  If never received any pneumonia vaccine: recommend Prevnar 20 (PCV20)  Give PCV20 if previously received 1 dose of PCV13 or PPSV23   Hepatitis B Vaccine 3 dose series if at intermediate or high risk (ex: diabetes, end stage renal disease, liver disease)   Respiratory syncytial virus (RSV) Vaccine - COVERED BY MEDICARE PART D  * RSVPreF3 (Arexvy) CDC recommends that adults 60 years of age and older may receive a single dose of RSV vaccine using shared clinical decision-making (SCDM)   Tetanus (Td) Vaccine - COST NOT COVERED BY MEDICARE PART B Following completion of primary series, a booster dose should be given every 10 years to maintain immunity against tetanus. Td may also be given as tetanus wound prophylaxis.   Tdap Vaccine - COST NOT COVERED BY MEDICARE PART B Recommended at least once for all adults. For pregnant patients, recommended with each pregnancy.   Shingles Vaccine (Shingrix) - COST NOT COVERED BY MEDICARE PART B  2 shot series recommended in those 19 years and older who have or will have weakened immune systems or those 50 years and older     Health Maintenance Due:      Topic Date Due   • Breast Cancer Screening: Mammogram  02/12/2026   • Colorectal Cancer Screening  06/28/2031   • HIV Screening  Completed   • Hepatitis C Screening  Completed     Immunizations Due:      Topic Date Due   • Pneumococcal Vaccine: 50+ Years (3 of 3 - PCV) 09/21/2010   • COVID-19 Vaccine (5 - 2024-25 season) 09/01/2024     Advance Directives   What are advance directives?  Advance  directives are legal documents that state your wishes and plans for medical care. These plans are made ahead of time in case you lose your ability to make decisions for yourself. Advance directives can apply to any medical decision, such as the treatments you want, and if you want to donate organs.   What are the types of advance directives?  There are many types of advance directives, and each state has rules about how to use them. You may choose a combination of any of the following:  Living will:  This is a written record of the treatment you want. You can also choose which treatments you do not want, which to limit, and which to stop at a certain time. This includes surgery, medicine, IV fluid, and tube feedings.   Durable power of  for healthcare (DPAHC):  This is a written record that states who you want to make healthcare choices for you when you are unable to make them for yourself. This person, called a proxy, is usually a family member or a friend. You may choose more than 1 proxy.  Do not resuscitate (DNR) order:  A DNR order is used in case your heart stops beating or you stop breathing. It is a request not to have certain forms of treatment, such as CPR. A DNR order may be included in other types of advance directives.  Medical directive:  This covers the care that you want if you are in a coma, near death, or unable to make decisions for yourself. You can list the treatments you want for each condition. Treatment may include pain medicine, surgery, blood transfusions, dialysis, IV or tube feedings, and a ventilator (breathing machine).  Values history:  This document has questions about your views, beliefs, and how you feel and think about life. This information can help others choose the care that you would choose.  Why are advance directives important?  An advance directive helps you control your care. Although spoken wishes may be used, it is better to have your wishes written down. Spoken  wishes can be misunderstood, or not followed. Treatments may be given even if you do not want them. An advance directive may make it easier for your family to make difficult choices about your care.   Weight Management   Why it is important to manage your weight:  Being overweight increases your risk of health conditions such as heart disease, high blood pressure, type 2 diabetes, and certain types of cancer. It can also increase your risk for osteoarthritis, sleep apnea, and other respiratory problems. Aim for a slow, steady weight loss. Even a small amount of weight loss can lower your risk of health problems.  How to lose weight safely:  A safe and healthy way to lose weight is to eat fewer calories and get regular exercise. You can lose up about 1 pound a week by decreasing the number of calories you eat by 500 calories each day.   Healthy meal plan for weight management:  A healthy meal plan includes a variety of foods, contains fewer calories, and helps you stay healthy. A healthy meal plan includes the following:  Eat whole-grain foods more often.  A healthy meal plan should contain fiber. Fiber is the part of grains, fruits, and vegetables that is not broken down by your body. Whole-grain foods are healthy and provide extra fiber in your diet. Some examples of whole-grain foods are whole-wheat breads and pastas, oatmeal, brown rice, and bulgur.  Eat a variety of vegetables every day.  Include dark, leafy greens such as spinach, kale, morena greens, and mustard greens. Eat yellow and orange vegetables such as carrots, sweet potatoes, and winter squash.   Eat a variety of fruits every day.  Choose fresh or canned fruit (canned in its own juice or light syrup) instead of juice. Fruit juice has very little or no fiber.  Eat low-fat dairy foods.  Drink fat-free (skim) milk or 1% milk. Eat fat-free yogurt and low-fat cottage cheese. Try low-fat cheeses such as mozzarella and other reduced-fat cheeses.  Choose  meat and other protein foods that are low in fat.  Choose beans or other legumes such as split peas or lentils. Choose fish, skinless poultry (chicken or turkey), or lean cuts of red meat (beef or pork). Before you cook meat or poultry, cut off any visible fat.   Use less fat and oil.  Try baking foods instead of frying them. Add less fat, such as margarine, sour cream, regular salad dressing and mayonnaise to foods. Eat fewer high-fat foods. Some examples of high-fat foods include french fries, doughnuts, ice cream, and cakes.  Eat fewer sweets.  Limit foods and drinks that are high in sugar. This includes candy, cookies, regular soda, and sweetened drinks.  Exercise:  Exercise at least 30 minutes per day on most days of the week. Some examples of exercise include walking, biking, dancing, and swimming. You can also fit in more physical activity by taking the stairs instead of the elevator or parking farther away from stores. Ask your healthcare provider about the best exercise plan for you.    © Copyright [employer] 2018 Information is for End User's use only and may not be sold, redistributed or otherwise used for commercial purposes. All illustrations and images included in CareNotes® are the copyrighted property of [employer] Inc[employer] or [employer]

## 2025-06-13 ENCOUNTER — RESULTS FOLLOW-UP (OUTPATIENT)
Dept: FAMILY MEDICINE CLINIC | Facility: CLINIC | Age: 55
End: 2025-06-13

## 2025-06-13 DIAGNOSIS — T78.40XA ALLERGY, INITIAL ENCOUNTER: ICD-10-CM

## 2025-06-13 RX ORDER — MONTELUKAST SODIUM 10 MG/1
10 TABLET ORAL
Qty: 30 TABLET | Refills: 5 | Status: SHIPPED | OUTPATIENT
Start: 2025-06-13

## 2025-06-16 NOTE — TELEPHONE ENCOUNTER
PA for Vitamin D (Ergocalciferol) 1.25 MGSUBMITTED              via    [x]CMM-KEY: [id number]    [x]PA sent as URGENT    All office notes, labs and other pertaining documents and studies sent. Clinical questions answered. Awaiting determination from insurance company.     Turnaround time for your insurance to make a decision on your Prior Authorization can take 7-21 business days.

## 2025-06-16 NOTE — TELEPHONE ENCOUNTER
PA for Vitamin D (Ergocalciferol) 1.25 MG(26782 UT) capsules EXCLUDED from plan       Reason:(Screenshot if applicable)        Message sent to office clinical pool Yes

## 2025-06-24 ENCOUNTER — OFFICE VISIT (OUTPATIENT)
Dept: FAMILY MEDICINE CLINIC | Facility: CLINIC | Age: 55
End: 2025-06-24
Payer: COMMERCIAL

## 2025-06-24 ENCOUNTER — NURSE TRIAGE (OUTPATIENT)
Age: 55
End: 2025-06-24

## 2025-06-24 VITALS
DIASTOLIC BLOOD PRESSURE: 80 MMHG | OXYGEN SATURATION: 98 % | HEIGHT: 64 IN | BODY MASS INDEX: 32.61 KG/M2 | SYSTOLIC BLOOD PRESSURE: 120 MMHG | HEART RATE: 92 BPM
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
## 2025-06-24 PROCEDURE — G2211 COMPLEX E/M VISIT ADD ON: HCPCS | Performed by: NURSE PRACTITIONER

## 2025-06-24 PROCEDURE — 99213 OFFICE O/P EST LOW 20 MIN: CPT | Performed by: NURSE PRACTITIONER

## 2025-06-24 RX ORDER — PREDNISONE 10 MG/1
TABLET ORAL
Qty: 30 TABLET | Refills: 0 | Status: SHIPPED | OUTPATIENT
Start: 2025-06-24

## 2025-06-24 NOTE — PROGRESS NOTES
"Name: Belem Linares      : 1970      MRN: [id number]  Encounter Provider: ERICKA Bryant  Encounter Date: 2025   Encounter department: [facility] [address]  :  Assessment & Plan  Bug bite, initial encounter  Advised to apply cool compresses to area, avoid scratching/irritation.  Prednisone as prescribed.  To return for increased redness, drainage, pain, fever.    Orders:    predniSONE 10 mg tablet; 50 mg p.o. qd x2 days, 40 mg p.o. qd x2 days, 30 mg p.o. qd x2 days, 20 mg p.o. qd x2 days, and 10 mg p.o. qd x2 days. Take with food           History of Present Illness   Patient presents to office for evaluation of possible bug bite.    Per patient, noticed she was bit by bug 3 days ago to right ankle.    Noticed 3 areas of increased redness and pruritus.  Began using anti-itch spray with lidocaine.  Observed rash began to right ankle and lower leg.  Patient denies wheezing, cough, sensation of throat closing up.      Review of Systems   Constitutional:  Negative for chills and fever.   HENT:  Negative for sneezing, sore throat and trouble swallowing.    Eyes:  Negative for discharge, redness and itching.   Respiratory:  Negative for cough, shortness of breath and wheezing.    Cardiovascular:  Negative for chest pain and palpitations.   Gastrointestinal:  Negative for nausea and vomiting.   Genitourinary:  Negative for decreased urine volume.   Musculoskeletal:  Negative for arthralgias and myalgias.   Skin:  Positive for rash.   Neurological:  Negative for dizziness, weakness, light-headedness and headaches.   Psychiatric/Behavioral:  Negative for confusion. The patient is not nervous/anxious.        Objective   /80 (BP Location: Left arm, Cuff Size: Large)   Pulse 92   Ht 5' 4\" (1.626 m)   SpO2 98%   BMI 32.61 kg/m²      Physical Exam  Vitals reviewed.   Constitutional:       General: She is not in acute distress.     Appearance: Normal " appearance. She is not ill-appearing.   HENT:      Head: Normocephalic and atraumatic.      Right Ear: Tympanic membrane, ear canal and external ear normal.      Left Ear: Tympanic membrane, ear canal and external ear normal.      Nose: Nose normal.      Mouth/Throat:      Mouth: Mucous membranes are moist.      Pharynx: Oropharynx is clear.     Eyes:      Pupils: Pupils are equal, round, and reactive to light.       Cardiovascular:      Rate and Rhythm: Normal rate and regular rhythm.      Pulses: Normal pulses.      Heart sounds: Normal heart sounds.   Pulmonary:      Effort: Pulmonary effort is normal.      Breath sounds: Normal breath sounds.     Musculoskeletal:         General: Normal range of motion.      Cervical back: Normal range of motion and neck supple.      Right lower leg: No edema.      Left lower leg: No edema.     Skin:     Capillary Refill: Capillary refill takes less than 2 seconds.      Findings: Rash present.      Comments: 3 bug bites noted to right lower ankle.  Areas dimes-sized, local erythema noted.  No s/s infection noted.     Neurological:      General: No focal deficit present.      Mental Status: She is alert and oriented to person, place, and time.     Psychiatric:         Mood and Affect: Mood normal.         Behavior: Behavior normal.

## 2025-06-24 NOTE — TELEPHONE ENCOUNTER
"REASON FOR CONVERSATION: Insect Bite    SYMPTOMS: bite from unknown bug on Saturday to right ankle, noted on Sunday redness smaller than a dime, swelling to the point she couldn't see her ankle bone    OTHER HEALTH INFORMATION: states she tried spraying anti itch on the site and it burned    PROTOCOL DISPOSITION: See Today in Office    CARE ADVICE PROVIDED: Scheduled to see Sima ERICKA Rogers today 6/24/25 at 10am    PRACTICE FOLLOW-UP: None      Reason for Disposition   Red or very tender (to touch) area, and started over 24 hours after the bite    Answer Assessment - Initial Assessment Questions  1. TYPE of INSECT: \"What type of insect was it?\"       Unsure    2. ONSET: \"When did you get bitten?\"       Got bit on Saturday 6/21/25    3. LOCATION: \"Where is the insect bite located?\"       Right ankle    4. REDNESS: \"Is the area red or pink?\" If Yes, ask: \"What size is area of redness?\" (inches or cm). \"When did the redness start?\"      Dime size    5. PAIN: \"Is there any pain?\" If Yes, ask: \"How bad is it?\"  (Scale 1-10; or mild, moderate, severe)      Denies    6. ITCHING: \"Does it itch?\" If Yes, ask: \"How bad is the itch?\"       Yes itching    7. SWELLING: \"How big is the swelling?\" (inches, cm, or compare to coins)      Swelling has decreased, she could see the bone today but yesterday could not    8. OTHER SYMPTOMS: \"Do you have any other symptoms?\"  (e.g., difficulty breathing, hives)      Some discoloration to the site    Protocols used: Insect Bite-Adult-[place]    "

## 2025-06-24 NOTE — TELEPHONE ENCOUNTER
Regarding: Unknown Bug Bite  ----- Message from Natalio DELVALLE sent at 6/24/2025  8:32 AM EDT -----  Patient was bit by an unknown bug on Saturday. Sunday patient woke up with a swollen right ankle. She had red dots on her ankle two on outside of her right leg one on  the inside. She reports they are Itchy and has some discoloration. Patient has been using over the counter itch spray but it looks worse today.She reports more red dots have appeared. Please advise.

## 2025-06-27 ENCOUNTER — VBI (OUTPATIENT)
Dept: ADMINISTRATIVE | Facility: OTHER | Age: 55
End: 2025-06-27

## 2025-06-27 NOTE — TELEPHONE ENCOUNTER
06/27/25 2:44 PM     Chart reviewed for CRC: FIT/FOBT (3) was/were submitted to the patient's insurance.     Yanique Odell [facility]

## 2025-07-27 ENCOUNTER — NURSE TRIAGE (OUTPATIENT)
Dept: OTHER | Facility: OTHER | Age: 55
End: 2025-07-27

## 2025-07-28 DIAGNOSIS — E06.3 HYPOTHYROIDISM DUE TO HASHIMOTO THYROIDITIS: ICD-10-CM

## 2025-07-30 RX ORDER — LEVOTHYROXINE SODIUM 112 UG/1
112 CAPSULE ORAL
Qty: 90 CAPSULE | Refills: 0 | Status: SHIPPED | OUTPATIENT
Start: 2025-07-30

## 2025-08-04 ENCOUNTER — OFFICE VISIT (OUTPATIENT)
Dept: FAMILY MEDICINE CLINIC | Facility: CLINIC | Age: 55
End: 2025-08-04
Payer: COMMERCIAL

## 2025-08-04 ENCOUNTER — TELEPHONE (OUTPATIENT)
Age: 55
End: 2025-08-04

## 2025-08-04 VITALS
HEART RATE: 80 BPM | RESPIRATION RATE: 18 BRPM | HEIGHT: 64 IN | DIASTOLIC BLOOD PRESSURE: 70 MMHG | OXYGEN SATURATION: 98 % | SYSTOLIC BLOOD PRESSURE: 120 MMHG | BODY MASS INDEX: 32.61 KG/M2

## 2025-08-04 DIAGNOSIS — M25.561 ACUTE PAIN OF RIGHT KNEE: Primary | ICD-10-CM

## 2025-08-04 PROCEDURE — G2211 COMPLEX E/M VISIT ADD ON: HCPCS | Performed by: NURSE PRACTITIONER

## 2025-08-04 PROCEDURE — 99214 OFFICE O/P EST MOD 30 MIN: CPT | Performed by: NURSE PRACTITIONER

## 2025-08-05 ENCOUNTER — APPOINTMENT (OUTPATIENT)
Dept: RADIOLOGY | Age: 55
End: 2025-08-05
Payer: COMMERCIAL

## 2025-08-05 DIAGNOSIS — M25.561 ACUTE PAIN OF RIGHT KNEE: ICD-10-CM

## 2025-08-05 PROCEDURE — 73564 X-RAY EXAM KNEE 4 OR MORE: CPT

## 2025-08-08 ENCOUNTER — TELEPHONE (OUTPATIENT)
Age: 55
End: 2025-08-08

## 2025-08-10 ENCOUNTER — DOCUMENTATION (OUTPATIENT)
Dept: FAMILY MEDICINE CLINIC | Facility: CLINIC | Age: 55
End: 2025-08-10

## 2025-08-11 ENCOUNTER — NURSE TRIAGE (OUTPATIENT)
Age: 55
End: 2025-08-11

## 2025-08-12 ENCOUNTER — NURSE TRIAGE (OUTPATIENT)
Age: 55
End: 2025-08-12

## 2025-08-13 ENCOUNTER — OFFICE VISIT (OUTPATIENT)
Dept: FAMILY MEDICINE CLINIC | Facility: CLINIC | Age: 55
End: 2025-08-13
Payer: COMMERCIAL

## 2025-08-13 PROCEDURE — 87086 URINE CULTURE/COLONY COUNT: CPT | Performed by: NURSE PRACTITIONER

## 2025-08-22 ENCOUNTER — TELEPHONE (OUTPATIENT)
Age: 55
End: 2025-08-22

## 2025-08-22 ENCOUNTER — TELEMEDICINE (OUTPATIENT)
Dept: PSYCHIATRY | Facility: CLINIC | Age: 55
End: 2025-08-22
Payer: COMMERCIAL

## 2025-08-22 DIAGNOSIS — F41.1 GAD (GENERALIZED ANXIETY DISORDER): ICD-10-CM

## 2025-08-22 DIAGNOSIS — F31.81 BIPOLAR 2 DISORDER (HCC): Primary | ICD-10-CM

## 2025-08-22 PROCEDURE — 90837 PSYTX W PT 60 MINUTES: CPT
